# Patient Record
Sex: MALE | Race: BLACK OR AFRICAN AMERICAN | NOT HISPANIC OR LATINO | Employment: UNEMPLOYED | URBAN - METROPOLITAN AREA
[De-identification: names, ages, dates, MRNs, and addresses within clinical notes are randomized per-mention and may not be internally consistent; named-entity substitution may affect disease eponyms.]

---

## 2017-01-05 ENCOUNTER — HISTORICAL (OUTPATIENT)
Dept: RADIOLOGY | Facility: HOSPITAL | Age: 57
End: 2017-01-05

## 2017-04-03 ENCOUNTER — HISTORICAL (OUTPATIENT)
Dept: GASTROENTEROLOGY | Facility: CLINIC | Age: 57
End: 2017-04-03

## 2017-04-06 ENCOUNTER — HISTORICAL (OUTPATIENT)
Dept: GASTROENTEROLOGY | Facility: CLINIC | Age: 57
End: 2017-04-06

## 2017-07-05 ENCOUNTER — HISTORICAL (OUTPATIENT)
Dept: INTERNAL MEDICINE | Facility: CLINIC | Age: 57
End: 2017-07-05

## 2017-10-12 ENCOUNTER — HISTORICAL (OUTPATIENT)
Dept: ADMINISTRATIVE | Facility: HOSPITAL | Age: 57
End: 2017-10-12

## 2017-12-12 ENCOUNTER — HISTORICAL (OUTPATIENT)
Dept: RADIOLOGY | Facility: HOSPITAL | Age: 57
End: 2017-12-12

## 2022-04-11 ENCOUNTER — HISTORICAL (OUTPATIENT)
Dept: ADMINISTRATIVE | Facility: HOSPITAL | Age: 62
End: 2022-04-11

## 2022-04-27 VITALS
OXYGEN SATURATION: 97 % | BODY MASS INDEX: 21.37 KG/M2 | WEIGHT: 149.25 LBS | SYSTOLIC BLOOD PRESSURE: 126 MMHG | DIASTOLIC BLOOD PRESSURE: 75 MMHG | HEIGHT: 70 IN

## 2023-08-14 ENCOUNTER — HOSPITAL ENCOUNTER (EMERGENCY)
Facility: HOSPITAL | Age: 63
Discharge: HOME | End: 2023-08-14
Attending: FAMILY MEDICINE
Payer: MEDICAID

## 2023-08-14 VITALS
HEIGHT: 71 IN | TEMPERATURE: 101 F | RESPIRATION RATE: 20 BRPM | OXYGEN SATURATION: 99 % | SYSTOLIC BLOOD PRESSURE: 192 MMHG | DIASTOLIC BLOOD PRESSURE: 103 MMHG | BODY MASS INDEX: 20.3 KG/M2 | WEIGHT: 145 LBS | HEART RATE: 89 BPM

## 2023-08-14 DIAGNOSIS — I10 HYPERTENSION: ICD-10-CM

## 2023-08-14 DIAGNOSIS — U07.1 COVID-19: Primary | ICD-10-CM

## 2023-08-14 LAB
ALBUMIN SERPL-MCNC: 2.9 G/DL (ref 3.4–4.8)
ALBUMIN/GLOB SERPL: 0.6 RATIO (ref 1.1–2)
ALP SERPL-CCNC: 189 UNIT/L (ref 40–150)
ALT SERPL-CCNC: 37 UNIT/L (ref 0–55)
APPEARANCE UR: CLEAR
AST SERPL-CCNC: 76 UNIT/L (ref 5–34)
BASOPHILS # BLD AUTO: 0.02 X10(3)/MCL
BASOPHILS NFR BLD AUTO: 0.4 %
BILIRUB SERPL-MCNC: 0.7 MG/DL
BILIRUB UR QL STRIP.AUTO: NEGATIVE
BUN SERPL-MCNC: 12.2 MG/DL (ref 8.4–25.7)
CALCIUM SERPL-MCNC: 8.7 MG/DL (ref 8.8–10)
CHLORIDE SERPL-SCNC: 104 MMOL/L (ref 98–107)
CK SERPL-CCNC: 101 U/L (ref 30–200)
CO2 SERPL-SCNC: 23 MMOL/L (ref 23–31)
COLOR UR: ABNORMAL
CREAT SERPL-MCNC: 1.05 MG/DL (ref 0.73–1.18)
EOSINOPHIL # BLD AUTO: 0.01 X10(3)/MCL (ref 0–0.9)
EOSINOPHIL NFR BLD AUTO: 0.2 %
ERYTHROCYTE [DISTWIDTH] IN BLOOD BY AUTOMATED COUNT: 14.6 % (ref 11.5–17)
FLUAV AG UPPER RESP QL IA.RAPID: NOT DETECTED
FLUBV AG UPPER RESP QL IA.RAPID: NOT DETECTED
GFR SERPLBLD CREATININE-BSD FMLA CKD-EPI: >60 MLS/MIN/1.73/M2
GLOBULIN SER-MCNC: 4.7 GM/DL (ref 2.4–3.5)
GLUCOSE SERPL-MCNC: 88 MG/DL (ref 82–115)
GLUCOSE UR QL STRIP.AUTO: NEGATIVE
HCT VFR BLD AUTO: 40.8 % (ref 42–52)
HGB BLD-MCNC: 13.6 G/DL (ref 14–18)
IMM GRANULOCYTES # BLD AUTO: 0.01 X10(3)/MCL (ref 0–0.04)
IMM GRANULOCYTES NFR BLD AUTO: 0.2 %
KETONES UR QL STRIP.AUTO: NEGATIVE
LACTATE SERPL-SCNC: 1.4 MMOL/L (ref 0.5–2.2)
LEUKOCYTE ESTERASE UR QL STRIP.AUTO: NEGATIVE
LYMPHOCYTES # BLD AUTO: 1.66 X10(3)/MCL (ref 0.6–4.6)
LYMPHOCYTES NFR BLD AUTO: 31 %
MAGNESIUM SERPL-MCNC: 1.6 MG/DL (ref 1.6–2.6)
MCH RBC QN AUTO: 30.7 PG (ref 27–31)
MCHC RBC AUTO-ENTMCNC: 33.3 G/DL (ref 33–36)
MCV RBC AUTO: 92.1 FL (ref 80–94)
MONOCYTES # BLD AUTO: 1.05 X10(3)/MCL (ref 0.1–1.3)
MONOCYTES NFR BLD AUTO: 19.6 %
NEUTROPHILS # BLD AUTO: 2.6 X10(3)/MCL (ref 2.1–9.2)
NEUTROPHILS NFR BLD AUTO: 48.6 %
NITRITE UR QL STRIP.AUTO: NEGATIVE
NRBC BLD AUTO-RTO: 0 %
PH UR STRIP.AUTO: 8 [PH]
PLATELET # BLD AUTO: 127 X10(3)/MCL (ref 130–400)
PLATELETS.RETICULATED NFR BLD AUTO: 2.6 % (ref 0.9–11.2)
PMV BLD AUTO: 10.6 FL (ref 7.4–10.4)
POTASSIUM SERPL-SCNC: 4.1 MMOL/L (ref 3.5–5.1)
PROT SERPL-MCNC: 7.6 GM/DL (ref 5.8–7.6)
PROT UR QL STRIP.AUTO: NEGATIVE
RBC # BLD AUTO: 4.43 X10(6)/MCL (ref 4.7–6.1)
RBC UR QL AUTO: NEGATIVE
SARS-COV-2 RNA RESP QL NAA+PROBE: DETECTED
SODIUM SERPL-SCNC: 135 MMOL/L (ref 136–145)
SP GR UR STRIP.AUTO: 1.01
TROPONIN I SERPL-MCNC: <0.01 NG/ML (ref 0–0.04)
UROBILINOGEN UR STRIP-ACNC: >=8
WBC # SPEC AUTO: 5.35 X10(3)/MCL (ref 4.5–11.5)

## 2023-08-14 PROCEDURE — 82550 ASSAY OF CK (CPK): CPT | Performed by: FAMILY MEDICINE

## 2023-08-14 PROCEDURE — 99285 EMERGENCY DEPT VISIT HI MDM: CPT | Mod: 25

## 2023-08-14 PROCEDURE — 25000003 PHARM REV CODE 250: Performed by: FAMILY MEDICINE

## 2023-08-14 PROCEDURE — 83735 ASSAY OF MAGNESIUM: CPT | Performed by: FAMILY MEDICINE

## 2023-08-14 PROCEDURE — 0240U COVID/FLU A&B PCR: CPT | Performed by: FAMILY MEDICINE

## 2023-08-14 PROCEDURE — 80053 COMPREHEN METABOLIC PANEL: CPT | Performed by: FAMILY MEDICINE

## 2023-08-14 PROCEDURE — 81003 URINALYSIS AUTO W/O SCOPE: CPT | Performed by: FAMILY MEDICINE

## 2023-08-14 PROCEDURE — 84484 ASSAY OF TROPONIN QUANT: CPT | Performed by: FAMILY MEDICINE

## 2023-08-14 PROCEDURE — 85025 COMPLETE CBC W/AUTO DIFF WBC: CPT | Performed by: FAMILY MEDICINE

## 2023-08-14 PROCEDURE — 93005 ELECTROCARDIOGRAM TRACING: CPT

## 2023-08-14 PROCEDURE — 83605 ASSAY OF LACTIC ACID: CPT | Performed by: FAMILY MEDICINE

## 2023-08-14 PROCEDURE — 96360 HYDRATION IV INFUSION INIT: CPT

## 2023-08-14 RX ORDER — ACETAMINOPHEN 325 MG/1
650 TABLET ORAL
Status: COMPLETED | OUTPATIENT
Start: 2023-08-14 | End: 2023-08-14

## 2023-08-14 RX ADMIN — ACETAMINOPHEN 325MG 650 MG: 325 TABLET ORAL at 08:08

## 2023-08-14 RX ADMIN — SODIUM CHLORIDE 1000 ML: 9 INJECTION, SOLUTION INTRAVENOUS at 09:08

## 2023-08-15 NOTE — ED PROVIDER NOTES
"Encounter Date: 8/14/2023       History     Chief Complaint   Patient presents with    Weakness     Patient is a 62-year-old gentleman presents emergency room complaints of weakness fatigue.  Patient reports that he is "semi homeless" that has been sleeping outside for the last 7 days, reports being in the heat.  Patient reports increased weakness over today, reports feeling dehydrated.  Denies chest pain or shortness of breath.  Patient reports having a cough that developed today.  Noted to be febrile and tachycardic in triage.  Patient denies chest pain.  Denies abdominal pain nausea or vomiting.    The history is provided by the patient.     Review of patient's allergies indicates:  No Known Allergies  Past Medical History:   Diagnosis Date    Carpal tunnel syndrome     HTN (hypertension)     Sciatica      Past Surgical History:   Procedure Laterality Date    SKIN GRAFT       History reviewed. No pertinent family history.  Social History     Tobacco Use    Smoking status: Some Days     Current packs/day: 0.00     Types: Cigarettes    Smokeless tobacco: Never   Substance Use Topics    Alcohol use: Yes     Comment: daily     Review of Systems   Constitutional:  Positive for chills, fatigue and fever.   HENT:  Negative for ear pain, rhinorrhea and sore throat.    Eyes:  Negative for photophobia and pain.   Respiratory:  Negative for cough, shortness of breath and wheezing.    Cardiovascular:  Negative for chest pain.   Gastrointestinal:  Negative for abdominal pain, diarrhea, nausea and vomiting.   Genitourinary:  Negative for dysuria.   Neurological:  Positive for weakness. Negative for dizziness and headaches.   All other systems reviewed and are negative.      Physical Exam     Initial Vitals [08/14/23 2009]   BP Pulse Resp Temp SpO2   (!) 200/106 97 20 (!) 100.9 °F (38.3 °C) 98 %      MAP       --         Physical Exam    Nursing note and vitals reviewed.  Constitutional: He appears well-developed and " well-nourished.   HENT:   Head: Normocephalic and atraumatic.   Mouth/Throat: Oropharynx is clear and moist.   Eyes: EOM are normal. Pupils are equal, round, and reactive to light.   Neck: Neck supple.   Normal range of motion.  Cardiovascular:  Normal rate, regular rhythm, normal heart sounds and intact distal pulses.     Exam reveals no gallop and no friction rub.       No murmur heard.  Pulmonary/Chest: Breath sounds normal. No respiratory distress. He has no wheezes. He has no rhonchi. He has no rales.   Abdominal: Abdomen is soft. Bowel sounds are normal. He exhibits no distension. There is no abdominal tenderness.   Musculoskeletal:         General: Normal range of motion.      Cervical back: Normal range of motion and neck supple.     Neurological: He is alert and oriented to person, place, and time. He has normal strength.   Skin: Skin is warm and dry. Capillary refill takes less than 2 seconds.   Psychiatric: He has a normal mood and affect. His behavior is normal. Judgment and thought content normal.         ED Course   Procedures  Labs Reviewed   COMPREHENSIVE METABOLIC PANEL - Abnormal; Notable for the following components:       Result Value    Sodium Level 135 (*)     Calcium Level Total 8.7 (*)     Albumin Level 2.9 (*)     Globulin 4.7 (*)     Albumin/Globulin Ratio 0.6 (*)     Alkaline Phosphatase 189 (*)     Aspartate Aminotransferase 76 (*)     All other components within normal limits   COVID/FLU A&B PCR - Abnormal; Notable for the following components:    SARS-CoV-2 PCR Detected (*)     All other components within normal limits    Narrative:     The Xpert Xpress SARS-CoV-2/FLU/RSV plus is a rapid, multiplexed real-time PCR test intended for the simultaneous qualitative detection and differentiation of SARS-CoV-2, Influenza A, Influenza B, and respiratory syncytial virus (RSV) viral RNA in either nasopharyngeal swab or nasal swab specimens.         URINALYSIS, REFLEX TO URINE CULTURE - Abnormal;  Notable for the following components:    Urobilinogen, UA >=8.0 (*)     All other components within normal limits   CBC WITH DIFFERENTIAL - Abnormal; Notable for the following components:    RBC 4.43 (*)     Hgb 13.6 (*)     Hct 40.8 (*)     Platelet 127 (*)     MPV 10.6 (*)     All other components within normal limits   TROPONIN I - Normal   MAGNESIUM - Normal   LACTIC ACID, PLASMA - Normal   CK - Normal   CBC W/ AUTO DIFFERENTIAL    Narrative:     The following orders were created for panel order CBC Auto Differential.  Procedure                               Abnormality         Status                     ---------                               -----------         ------                     CBC with Differential[[id number]]        Abnormal            Final result                 Please view results for these tests on the individual orders.     EKG Readings: (Independently Interpreted)   My Independent EKG Interpretation  08/14/2023 8:30 PM  Rate: 89 bpm  Rhythm: Sinus  Axis: Normal  Intervals: Normal  ST Changes: Nonspecific  Impression: Normal sinus rhythm with nonspecific ST changes           Imaging Results              X-Ray Chest 1 View (Final result)  Result time 08/14/23 20:51:46      Final result by Que Robison MD (08/14/23 20:51:46)                   Impression:      No acute disease is seen      Electronically signed by: Que Robison MD  Date:    08/14/2023  Time:    20:51               Narrative:    EXAMINATION:  XR CHEST 1 VIEW    CLINICAL HISTORY:  fever;    TECHNIQUE:  Single frontal view of the chest was performed.    COMPARISON:  05/29/2018    FINDINGS:  No infiltrates are seen.  Heart size is within normal limits.  The costophrenic angles are clear.  There is vascular calcification noted.                                       Medications   sodium chloride 0.9% bolus 1,000 mL 1,000 mL (1,000 mLs Intravenous New Bag 8/14/23 2118)   acetaminophen tablet 650 mg (650 mg Oral Given 8/14/23 2047)      Medical Decision Making:   Initial Assessment:   62-year-old gentleman presents emergency room complaints of weakness and fatigue, reports being homeless, having significant heat exposure.  Will obtain laboratory evaluation including CBC CMP urinalysis as well as a CK level.  Patient reports cough x1 day, and has low-grade fever of 100.9-may be related to COVID-19 since we have been seeing increased frequency of this which could also be contributing to the patient's worsening fatigue.  Differential may also include community-acquired pneumonia-will obtain chest x-ray.  Patient is not short of breath saturating 98%.  Will provide IV fluids and continue to monitor.  Differential Diagnosis:   Community-acquired pneumonia, COVID-19, electrolyte abnormality, dehydration, acute kidney injury             ED Course as of 08/14/23 2140   Mon Aug 14, 2023   2121 SARS-CoV2 (COVID-19) Qualitative PCR(!): Detected  Patient noted be positive for COVID, otherwise no acute abnormality.  Stable for discharge to home. [MW]      ED Course User Index  [MW] Keven Mendoza MD                 Clinical Impression:   Final diagnoses:  [I10] Hypertension  [U07.1] COVID-19 (Primary)        ED Disposition Condition    Discharge Stable          ED Prescriptions    None       Follow-up Information       Follow up With Specialties Details Why Contact Info    [facility] - Emergency Dept Emergency Medicine  As needed, If symptoms worsen [id number]  [facility] [id number] [phone]    Primary Care Physician  In 5 days               Keven Mendoza MD  08/14/23 2140

## 2023-10-15 ENCOUNTER — HOSPITAL ENCOUNTER (INPATIENT)
Facility: HOSPITAL | Age: 63
LOS: 8 days | Discharge: HOME | DRG: 432 | End: 2023-10-23
Attending: EMERGENCY MEDICINE | Admitting: INTERNAL MEDICINE
Payer: MEDICAID

## 2023-10-15 DIAGNOSIS — K76.9 CHRONIC LIVER DISEASE: ICD-10-CM

## 2023-10-15 DIAGNOSIS — G93.41 ENCEPHALOPATHY, METABOLIC: Primary | ICD-10-CM

## 2023-10-15 DIAGNOSIS — C22.0 HCC (HEPATOCELLULAR CARCINOMA): ICD-10-CM

## 2023-10-15 DIAGNOSIS — B18.2 CHRONIC HEPATITIS C WITHOUT HEPATIC COMA: ICD-10-CM

## 2023-10-15 DIAGNOSIS — N28.9 ACUTE RENAL INSUFFICIENCY: ICD-10-CM

## 2023-10-15 DIAGNOSIS — R53.1 WEAKNESS: ICD-10-CM

## 2023-10-15 DIAGNOSIS — I10 ESSENTIAL HYPERTENSION: ICD-10-CM

## 2023-10-15 LAB
ALBUMIN SERPL-MCNC: 1.8 G/DL (ref 3.4–4.8)
ALBUMIN/GLOB SERPL: 0.3 RATIO (ref 1.1–2)
ALP SERPL-CCNC: 515 UNIT/L (ref 40–150)
ALT SERPL-CCNC: 32 UNIT/L (ref 0–55)
AST SERPL-CCNC: 83 UNIT/L (ref 5–34)
BASOPHILS # BLD AUTO: 0.04 X10(3)/MCL
BASOPHILS NFR BLD AUTO: 0.4 %
BILIRUB SERPL-MCNC: 1.7 MG/DL
BUN SERPL-MCNC: 58.4 MG/DL (ref 8.4–25.7)
CALCIUM SERPL-MCNC: 8.1 MG/DL (ref 8.8–10)
CHLORIDE SERPL-SCNC: 110 MMOL/L (ref 98–107)
CO2 SERPL-SCNC: 21 MMOL/L (ref 23–31)
CREAT SERPL-MCNC: 2.2 MG/DL (ref 0.73–1.18)
EOSINOPHIL # BLD AUTO: 0.09 X10(3)/MCL (ref 0–0.9)
EOSINOPHIL NFR BLD AUTO: 1 %
ERYTHROCYTE [DISTWIDTH] IN BLOOD BY AUTOMATED COUNT: 16 % (ref 11.5–17)
ETHANOL SERPL-MCNC: <10 MG/DL
FLUAV AG UPPER RESP QL IA.RAPID: NOT DETECTED
FLUBV AG UPPER RESP QL IA.RAPID: NOT DETECTED
GFR SERPLBLD CREATININE-BSD FMLA CKD-EPI: 33 MLS/MIN/1.73/M2
GLOBULIN SER-MCNC: 6.3 GM/DL (ref 2.4–3.5)
GLUCOSE SERPL-MCNC: 118 MG/DL (ref 82–115)
HCT VFR BLD AUTO: 39.1 % (ref 42–52)
HGB BLD-MCNC: 13.3 G/DL (ref 14–18)
IMM GRANULOCYTES # BLD AUTO: 0.05 X10(3)/MCL (ref 0–0.04)
IMM GRANULOCYTES NFR BLD AUTO: 0.6 %
LYMPHOCYTES # BLD AUTO: 1.7 X10(3)/MCL (ref 0.6–4.6)
LYMPHOCYTES NFR BLD AUTO: 19 %
MCH RBC QN AUTO: 29.2 PG (ref 27–31)
MCHC RBC AUTO-ENTMCNC: 34 G/DL (ref 33–36)
MCV RBC AUTO: 85.9 FL (ref 80–94)
MONOCYTES # BLD AUTO: 0.87 X10(3)/MCL (ref 0.1–1.3)
MONOCYTES NFR BLD AUTO: 9.7 %
NEUTROPHILS # BLD AUTO: 6.22 X10(3)/MCL (ref 2.1–9.2)
NEUTROPHILS NFR BLD AUTO: 69.3 %
NRBC BLD AUTO-RTO: 0 %
PLATELET # BLD AUTO: 305 X10(3)/MCL (ref 130–400)
PMV BLD AUTO: 10.4 FL (ref 7.4–10.4)
POTASSIUM SERPL-SCNC: 5.3 MMOL/L (ref 3.5–5.1)
PROT SERPL-MCNC: 8.1 GM/DL (ref 5.8–7.6)
RBC # BLD AUTO: 4.55 X10(6)/MCL (ref 4.7–6.1)
SARS-COV-2 RNA RESP QL NAA+PROBE: NOT DETECTED
SODIUM SERPL-SCNC: 140 MMOL/L (ref 136–145)
TROPONIN I SERPL-MCNC: <0.01 NG/ML (ref 0–0.04)
WBC # SPEC AUTO: 8.97 X10(3)/MCL (ref 4.5–11.5)

## 2023-10-15 PROCEDURE — 82077 ASSAY SPEC XCP UR&BREATH IA: CPT | Performed by: EMERGENCY MEDICINE

## 2023-10-15 PROCEDURE — 93005 ELECTROCARDIOGRAM TRACING: CPT

## 2023-10-15 PROCEDURE — 11000001 HC ACUTE MED/SURG PRIVATE ROOM

## 2023-10-15 PROCEDURE — 80307 DRUG TEST PRSMV CHEM ANLYZR: CPT | Performed by: EMERGENCY MEDICINE

## 2023-10-15 PROCEDURE — 93010 EKG 12-LEAD: ICD-10-PCS | Mod: ,,, | Performed by: STUDENT IN AN ORGANIZED HEALTH CARE EDUCATION/TRAINING PROGRAM

## 2023-10-15 PROCEDURE — 82550 ASSAY OF CK (CPK): CPT | Performed by: INTERNAL MEDICINE

## 2023-10-15 PROCEDURE — 81001 URINALYSIS AUTO W/SCOPE: CPT | Performed by: INTERNAL MEDICINE

## 2023-10-15 PROCEDURE — 93010 ELECTROCARDIOGRAM REPORT: CPT | Mod: ,,, | Performed by: STUDENT IN AN ORGANIZED HEALTH CARE EDUCATION/TRAINING PROGRAM

## 2023-10-15 PROCEDURE — 0240U COVID/FLU A&B PCR: CPT | Performed by: EMERGENCY MEDICINE

## 2023-10-15 PROCEDURE — 96360 HYDRATION IV INFUSION INIT: CPT

## 2023-10-15 PROCEDURE — 80053 COMPREHEN METABOLIC PANEL: CPT | Performed by: EMERGENCY MEDICINE

## 2023-10-15 PROCEDURE — 83690 ASSAY OF LIPASE: CPT | Performed by: INTERNAL MEDICINE

## 2023-10-15 PROCEDURE — 85025 COMPLETE CBC W/AUTO DIFF WBC: CPT | Performed by: EMERGENCY MEDICINE

## 2023-10-15 PROCEDURE — 25000003 PHARM REV CODE 250: Performed by: EMERGENCY MEDICINE

## 2023-10-15 PROCEDURE — 84484 ASSAY OF TROPONIN QUANT: CPT | Performed by: EMERGENCY MEDICINE

## 2023-10-15 RX ORDER — HYDRALAZINE HYDROCHLORIDE 20 MG/ML
20 INJECTION INTRAMUSCULAR; INTRAVENOUS
Status: COMPLETED | OUTPATIENT
Start: 2023-10-15 | End: 2023-10-16

## 2023-10-15 RX ORDER — SODIUM CHLORIDE 9 MG/ML
INJECTION, SOLUTION INTRAVENOUS CONTINUOUS
Status: DISCONTINUED | OUTPATIENT
Start: 2023-10-16 | End: 2023-10-16

## 2023-10-15 RX ADMIN — SODIUM CHLORIDE 1000 ML: 9 INJECTION, SOLUTION INTRAVENOUS at 10:10

## 2023-10-15 RX ADMIN — SODIUM CHLORIDE 1000 ML: 9 INJECTION, SOLUTION INTRAVENOUS at 11:10

## 2023-10-16 PROBLEM — N28.9 ACUTE RENAL INSUFFICIENCY: Status: ACTIVE | Noted: 2023-10-16

## 2023-10-16 LAB
ALBUMIN SERPL-MCNC: 2 G/DL (ref 3.4–4.8)
ALBUMIN/GLOB SERPL: 0.3 RATIO (ref 1.1–2)
ALP SERPL-CCNC: 540 UNIT/L (ref 40–150)
ALT SERPL-CCNC: 36 UNIT/L (ref 0–55)
AMMONIA PLAS-MSCNC: 68.8 UMOL/L (ref 18–72)
AMPHET UR QL SCN: NEGATIVE
APPEARANCE UR: CLEAR
APTT PPP: 32.6 SECONDS (ref 23.2–33.7)
AST SERPL-CCNC: 93 UNIT/L (ref 5–34)
B PERT.PT PRMT NPH QL NAA+NON-PROBE: NOT DETECTED
BACTERIA #/AREA URNS AUTO: ABNORMAL /HPF
BARBITURATE SCN PRESENT UR: NEGATIVE
BASOPHILS # BLD AUTO: 0.04 X10(3)/MCL
BASOPHILS NFR BLD AUTO: 0.4 %
BENZODIAZ UR QL SCN: NEGATIVE
BILIRUB SERPL-MCNC: 1.9 MG/DL
BILIRUB UR QL STRIP.AUTO: NEGATIVE
BUN SERPL-MCNC: 57.8 MG/DL (ref 8.4–25.7)
C PNEUM DNA NPH QL NAA+NON-PROBE: NOT DETECTED
C3 SERPL-MCNC: 42 MG/DL (ref 80–173)
C4 SERPL-MCNC: 7 MG/DL (ref 13–46)
CALCIUM SERPL-MCNC: 8.1 MG/DL (ref 8.8–10)
CANNABINOIDS UR QL SCN: NEGATIVE
CHLORIDE SERPL-SCNC: 111 MMOL/L (ref 98–107)
CK SERPL-CCNC: 21 U/L (ref 30–200)
CO2 SERPL-SCNC: 19 MMOL/L (ref 23–31)
COCAINE UR QL SCN: POSITIVE
COLOR UR AUTO: ABNORMAL
CREAT SERPL-MCNC: 2.02 MG/DL (ref 0.73–1.18)
CREAT UR-MCNC: 118 MG/DL (ref 63–166)
CREAT UR-MCNC: 118.9 MG/DL (ref 63–166)
EOSINOPHIL # BLD AUTO: 0.08 X10(3)/MCL (ref 0–0.9)
EOSINOPHIL NFR BLD AUTO: 0.7 %
ERYTHROCYTE [DISTWIDTH] IN BLOOD BY AUTOMATED COUNT: 16.2 % (ref 11.5–17)
FENTANYL UR QL SCN: NEGATIVE
GFR SERPLBLD CREATININE-BSD FMLA CKD-EPI: 37 MLS/MIN/1.73/M2
GLOBULIN SER-MCNC: 7 GM/DL (ref 2.4–3.5)
GLUCOSE SERPL-MCNC: 110 MG/DL (ref 82–115)
GLUCOSE UR QL STRIP.AUTO: NEGATIVE
HADV DNA NPH QL NAA+NON-PROBE: NOT DETECTED
HAV IGM SERPL QL IA: NONREACTIVE
HBV CORE IGM SERPL QL IA: NONREACTIVE
HBV SURFACE AG SERPL QL IA: NONREACTIVE
HCOV 229E RNA NPH QL NAA+NON-PROBE: NOT DETECTED
HCOV HKU1 RNA NPH QL NAA+NON-PROBE: NOT DETECTED
HCOV NL63 RNA NPH QL NAA+NON-PROBE: NOT DETECTED
HCOV OC43 RNA NPH QL NAA+NON-PROBE: NOT DETECTED
HCT VFR BLD AUTO: 39.9 % (ref 42–52)
HCV AB SERPL QL IA: REACTIVE
HGB BLD-MCNC: 13.4 G/DL (ref 14–18)
HIV 1+2 AB+HIV1 P24 AG SERPL QL IA: NONREACTIVE
HMPV RNA NPH QL NAA+NON-PROBE: NOT DETECTED
HPIV1 RNA NPH QL NAA+NON-PROBE: NOT DETECTED
HPIV2 RNA NPH QL NAA+NON-PROBE: NOT DETECTED
HPIV3 RNA NPH QL NAA+NON-PROBE: NOT DETECTED
HPIV4 RNA NPH QL NAA+NON-PROBE: NOT DETECTED
IMM GRANULOCYTES # BLD AUTO: 0.08 X10(3)/MCL (ref 0–0.04)
IMM GRANULOCYTES NFR BLD AUTO: 0.7 %
INR PPP: 1.2
KETONES UR QL STRIP.AUTO: NEGATIVE
LEUKOCYTE ESTERASE UR QL STRIP.AUTO: ABNORMAL
LIPASE SERPL-CCNC: 104 U/L
LYMPHOCYTES # BLD AUTO: 2.37 X10(3)/MCL (ref 0.6–4.6)
LYMPHOCYTES NFR BLD AUTO: 20.9 %
M PNEUMO DNA NPH QL NAA+NON-PROBE: NOT DETECTED
MCH RBC QN AUTO: 28.9 PG (ref 27–31)
MCHC RBC AUTO-ENTMCNC: 33.6 G/DL (ref 33–36)
MCV RBC AUTO: 86 FL (ref 80–94)
MDMA UR QL SCN: NEGATIVE
MONOCYTES # BLD AUTO: 1.14 X10(3)/MCL (ref 0.1–1.3)
MONOCYTES NFR BLD AUTO: 10 %
NEUTROPHILS # BLD AUTO: 7.65 X10(3)/MCL (ref 2.1–9.2)
NEUTROPHILS NFR BLD AUTO: 67.3 %
NITRITE UR QL STRIP.AUTO: NEGATIVE
NRBC BLD AUTO-RTO: 0 %
OPIATES UR QL SCN: NEGATIVE
OSMOLALITY UR: 495 MOSM/KG (ref 300–1300)
PCP UR QL: NEGATIVE
PH UR STRIP.AUTO: 6 [PH]
PH UR: 6 [PH] (ref 3–11)
PLATELET # BLD AUTO: 295 X10(3)/MCL (ref 130–400)
PMV BLD AUTO: 10.2 FL (ref 7.4–10.4)
POTASSIUM SERPL-SCNC: 4 MMOL/L (ref 3.5–5.1)
PROT SERPL-MCNC: 9 GM/DL (ref 5.8–7.6)
PROT UR QL STRIP.AUTO: NEGATIVE
PROT UR STRIP-MCNC: 18.9 MG/DL
PROTHROMBIN TIME: 15.2 SECONDS (ref 12.5–14.5)
RBC # BLD AUTO: 4.64 X10(6)/MCL (ref 4.7–6.1)
RBC #/AREA URNS AUTO: 11 /HPF
RBC UR QL AUTO: ABNORMAL
RSV RNA NPH QL NAA+NON-PROBE: NOT DETECTED
RV+EV RNA NPH QL NAA+NON-PROBE: NOT DETECTED
SODIUM SERPL-SCNC: 139 MMOL/L (ref 136–145)
SODIUM UR-SCNC: 37 MMOL/L
SP GR UR STRIP.AUTO: 1.02 (ref 1–1.03)
SPECIFIC GRAVITY, URINE AUTO (.000) (OHS): 1.02 (ref 1–1.03)
SQUAMOUS #/AREA URNS AUTO: <5 /HPF
URINE PROTEIN/CREATININE RATIO (OHS): 0.2
UROBILINOGEN UR STRIP-ACNC: 4
WBC # SPEC AUTO: 11.36 X10(3)/MCL (ref 4.5–11.5)
WBC #/AREA URNS AUTO: 8 /HPF

## 2023-10-16 PROCEDURE — 83935 ASSAY OF URINE OSMOLALITY: CPT | Performed by: INTERNAL MEDICINE

## 2023-10-16 PROCEDURE — 87633 RESP VIRUS 12-25 TARGETS: CPT | Performed by: INTERNAL MEDICINE

## 2023-10-16 PROCEDURE — 25000003 PHARM REV CODE 250: Performed by: INTERNAL MEDICINE

## 2023-10-16 PROCEDURE — 80053 COMPREHEN METABOLIC PANEL: CPT | Performed by: INTERNAL MEDICINE

## 2023-10-16 PROCEDURE — 85610 PROTHROMBIN TIME: CPT | Performed by: INTERNAL MEDICINE

## 2023-10-16 PROCEDURE — 25000003 PHARM REV CODE 250: Performed by: EMERGENCY MEDICINE

## 2023-10-16 PROCEDURE — 63600175 PHARM REV CODE 636 W HCPCS: Performed by: INTERNAL MEDICINE

## 2023-10-16 PROCEDURE — 84156 ASSAY OF PROTEIN URINE: CPT | Performed by: INTERNAL MEDICINE

## 2023-10-16 PROCEDURE — 63600175 PHARM REV CODE 636 W HCPCS: Performed by: EMERGENCY MEDICINE

## 2023-10-16 PROCEDURE — 87389 HIV-1 AG W/HIV-1&-2 AB AG IA: CPT | Performed by: INTERNAL MEDICINE

## 2023-10-16 PROCEDURE — 82570 ASSAY OF URINE CREATININE: CPT | Performed by: INTERNAL MEDICINE

## 2023-10-16 PROCEDURE — 85025 COMPLETE CBC W/AUTO DIFF WBC: CPT | Performed by: INTERNAL MEDICINE

## 2023-10-16 PROCEDURE — 80074 ACUTE HEPATITIS PANEL: CPT | Performed by: INTERNAL MEDICINE

## 2023-10-16 PROCEDURE — 86160 COMPLEMENT ANTIGEN: CPT | Performed by: INTERNAL MEDICINE

## 2023-10-16 PROCEDURE — 21400001 HC TELEMETRY ROOM

## 2023-10-16 PROCEDURE — 82140 ASSAY OF AMMONIA: CPT | Performed by: INTERNAL MEDICINE

## 2023-10-16 PROCEDURE — 87040 BLOOD CULTURE FOR BACTERIA: CPT | Performed by: INTERNAL MEDICINE

## 2023-10-16 PROCEDURE — 99285 EMERGENCY DEPT VISIT HI MDM: CPT | Mod: 25

## 2023-10-16 PROCEDURE — 87798 DETECT AGENT NOS DNA AMP: CPT | Performed by: INTERNAL MEDICINE

## 2023-10-16 PROCEDURE — 85730 THROMBOPLASTIN TIME PARTIAL: CPT | Performed by: INTERNAL MEDICINE

## 2023-10-16 PROCEDURE — 84300 ASSAY OF URINE SODIUM: CPT | Performed by: INTERNAL MEDICINE

## 2023-10-16 RX ORDER — FAMOTIDINE 10 MG/ML
20 INJECTION INTRAVENOUS
Status: COMPLETED | OUTPATIENT
Start: 2023-10-16 | End: 2023-10-16

## 2023-10-16 RX ORDER — HALOPERIDOL 5 MG/ML
5 INJECTION INTRAMUSCULAR EVERY 6 HOURS PRN
Status: DISCONTINUED | OUTPATIENT
Start: 2023-10-16 | End: 2023-10-16

## 2023-10-16 RX ORDER — MORPHINE SULFATE 4 MG/ML
4 INJECTION, SOLUTION INTRAMUSCULAR; INTRAVENOUS EVERY 4 HOURS PRN
Status: DISCONTINUED | OUTPATIENT
Start: 2023-10-16 | End: 2023-10-16

## 2023-10-16 RX ORDER — ONDANSETRON 2 MG/ML
4 INJECTION INTRAMUSCULAR; INTRAVENOUS EVERY 4 HOURS PRN
Status: DISCONTINUED | OUTPATIENT
Start: 2023-10-16 | End: 2023-10-23 | Stop reason: HOSPADM

## 2023-10-16 RX ORDER — ENOXAPARIN SODIUM 100 MG/ML
30 INJECTION SUBCUTANEOUS EVERY 24 HOURS
Status: DISCONTINUED | OUTPATIENT
Start: 2023-10-16 | End: 2023-10-23 | Stop reason: HOSPADM

## 2023-10-16 RX ORDER — HALOPERIDOL 5 MG/ML
2 INJECTION INTRAMUSCULAR EVERY 6 HOURS PRN
Status: DISCONTINUED | OUTPATIENT
Start: 2023-10-16 | End: 2023-10-23 | Stop reason: HOSPADM

## 2023-10-16 RX ORDER — PROMETHAZINE HYDROCHLORIDE 25 MG/ML
12.5 INJECTION, SOLUTION INTRAMUSCULAR; INTRAVENOUS EVERY 6 HOURS PRN
Status: DISCONTINUED | OUTPATIENT
Start: 2023-10-16 | End: 2023-10-23 | Stop reason: HOSPADM

## 2023-10-16 RX ORDER — ALPRAZOLAM 0.5 MG/1
0.5 TABLET ORAL ONCE
Status: COMPLETED | OUTPATIENT
Start: 2023-10-16 | End: 2023-10-16

## 2023-10-16 RX ORDER — SODIUM CHLORIDE 0.9 % (FLUSH) 0.9 %
10 SYRINGE (ML) INJECTION
Status: DISCONTINUED | OUTPATIENT
Start: 2023-10-16 | End: 2023-10-23 | Stop reason: HOSPADM

## 2023-10-16 RX ORDER — TALC
6 POWDER (GRAM) TOPICAL NIGHTLY PRN
Status: DISCONTINUED | OUTPATIENT
Start: 2023-10-16 | End: 2023-10-23 | Stop reason: HOSPADM

## 2023-10-16 RX ADMIN — ALPRAZOLAM 0.5 MG: 0.5 TABLET ORAL at 12:10

## 2023-10-16 RX ADMIN — PIPERACILLIN AND TAZOBACTAM 4.5 G: 4; .5 INJECTION, POWDER, FOR SOLUTION INTRAVENOUS at 03:10

## 2023-10-16 RX ADMIN — AZITHROMYCIN MONOHYDRATE 500 MG: 500 INJECTION, POWDER, LYOPHILIZED, FOR SOLUTION INTRAVENOUS at 06:10

## 2023-10-16 RX ADMIN — ONDANSETRON 4 MG: 2 INJECTION INTRAMUSCULAR; INTRAVENOUS at 12:10

## 2023-10-16 RX ADMIN — FOLIC ACID: 5 INJECTION, SOLUTION INTRAMUSCULAR; INTRAVENOUS; SUBCUTANEOUS at 05:10

## 2023-10-16 RX ADMIN — SODIUM CHLORIDE: 9 INJECTION, SOLUTION INTRAVENOUS at 01:10

## 2023-10-16 RX ADMIN — HALOPERIDOL LACTATE 2 MG: 5 INJECTION, SOLUTION INTRAMUSCULAR at 12:10

## 2023-10-16 RX ADMIN — FAMOTIDINE 20 MG: 10 INJECTION, SOLUTION INTRAVENOUS at 12:10

## 2023-10-16 RX ADMIN — ENOXAPARIN SODIUM 30 MG: 30 INJECTION SUBCUTANEOUS at 05:10

## 2023-10-16 RX ADMIN — MORPHINE SULFATE 4 MG: 4 INJECTION, SOLUTION INTRAMUSCULAR; INTRAVENOUS at 12:10

## 2023-10-16 RX ADMIN — PIPERACILLIN AND TAZOBACTAM 4.5 G: 4; .5 INJECTION, POWDER, FOR SOLUTION INTRAVENOUS at 07:10

## 2023-10-16 RX ADMIN — HYDRALAZINE HYDROCHLORIDE 20 MG: 20 INJECTION INTRAMUSCULAR; INTRAVENOUS at 12:10

## 2023-10-16 NOTE — CLINICAL REVIEW
Acute Met   Last Updated by Ny Chavez MD on 10/16/2023 1234     Review Status Created By   Completed Ny Chavez MD      Criteria Review   REVIEW SUMMARY     InterQual® Review Status: Completed  Criteria Status: Acute Met  Day of review: Episode Day 1  Condition Specific: Yes        REVIEW OUTCOMES     Primary Outcome: Referred for Secondary  Date/Time: 10/16/2023 1233  Reviewer: Ny Chavez     Comments:  62-year-old male admitted on 10/15/2023 with encephalopathy.  Found to have an acute kidney injury with a creatinine of 2.2 with a baseline being 0.6 to 1.1.  The patient was acidotic.  Admitted under hospitalist services.  Started on IV fluids also found to have hyperbilirubinemia.  CT scan of the abdomen was performed this demonstrated cirrhosis with patent portal vein, moderate ascites.  GI consulted.  Found to have a possible pneumonia and was started on broad-spectrum anti-infective therapy.  On hospital day 2., the patient was encephalopathic.  Paracentesis has been ordered.  First-time new onset ascites for which a paracentesis has been ordered.  Remains encephalopathic.  Would pursue IP LOC  Added by: Ny Chavez on 10/16/2023 1233        62-year-old male admitted on 10/15/2023 with encephalopathy.  Found to have an acute kidney injury with a creatinine of 2.2 with a baseline being 0.6 to 1.1.  The patient was acidotic.  Admitted under hospitalist services.  Started on IV fluids also found to have hyperbilirubinemia.  CT scan of the abdomen was performed this demonstrated cirrhosis with patent portal vein, moderate ascites.  GI consulted.  Found to have a possible pneumonia and was started on broad-spectrum anti-infective therapy.  On hospital day 2., the patient was encephalopathic.  Paracentesis has been ordered.  First-time new onset ascites for which a paracentesis has been ordered.  Remains encephalopathic.  Would pursue IP LOC     Ny Chavez MD  Added by:  Ny Chavez on 10/16/2023 1234        REVIEW DETAILS     Service Date: 10/16/2023  Product: LOC:Acute Adult  Subset: General Medical            [X] Select Day, One:          [X] Episode Day 1, One:              [X] ACUTE, >= One:                  [X] Gastrointestinal or biliary, One:                      [X] Other gastrointestinal diagnosis, actual or suspected, >= One:                          [X] Ascites <= 7d, Both:                              [X] Finding, One:                                  [X] Initial episode                              [X] Intervention, >= One:                                  [X] Therapeutic paracentesis        Version: InterQual® 2023, Oct. 2023 Release  InterQual® criteria (IQ) is confidential and proprietary information and is being provided to you solely as it pertains to the information requested. IQ may contain advanced clinical knowledge which we recommend you discuss with your physician upon disclosure to you. Use permitted by and subject to license with [employer] and/or one of its subsidiaries. IQ reflects clinical interpretations and analyses and cannot alone either (a) resolve medical ambiguities of particular situations; or (b) provide the sole basis for definitive decisions. IQ is intended solely for use as screening guidelines with respect to medical appropriateness of healthcare services. All ultimate care decisions are strictly and solely the obligation and responsibility of your health care provider. © 2023 [employer] and/or one of its subsidiaries. All Rights Reserved. CPT® only © 3879-9341 [employer]. All Rights Reserved.

## 2023-10-16 NOTE — CONSULTS
Consult Note    Reason for Consult:      We were consulted by Dr. New to evaluate this patient for cirrhosis, ascites.    HPI:     61 yo AAM unknown to our group with a pmhx of HTN.  Patient was apparently brought to the ED after his uncle whom he lives with was concerned about his behavior, reportedly talking to himself.    On presentation, hypertension 160/96 otherwise VSS.  Labs notable for SOCORRO with 58.4/Cr 2.2 and Tbili 1.8, AST 83, ALT 32, alk phos 515, plts normal.  UDS+ cocaine. Etoh <10. INR 1.2 and ammonia normal. Hepatitis panel collected and pending.  CT abd/pelv without contrast noted small right pleural effusion, right basilar consolidation with ground-glass opacities in the left lower lobe possibly reflecting infectious process, nodular shrunken liver consistent with cirrhosis and pronounced ascites, multiple calculi within distended gallbladder.  US abdomen complete noted nodular liver contour, moderate ascites, distended gallbladder with multiple stones, patent portal vein.  Patient was admitted and started on Zosyn and azithromycin to cover for intra-abdominal infection as well as pneumonia.  There are plans for ultrasound-guided paracentesis with fluid studies, as well as HIDA scan.  GI was consulted.    Patient currently somnolent and difficult to arouse despite multiple attempts.  He was given Haldol and morphine overnight.  Apparently, he was complaining of abdominal pain and being loud/disruptive.  According to nursing staff there was no reports of confusion or hallucinations. Admits to remote IV drug abuse.  Admits to alcohol use on occasion; used to drink daily but reports it has been some time.  He will not clarify.     Previous records reviewed.    RUQ US in 2017 with nodular liver contour.    He does have mild elevated LFTs to similar degree dating back to 2017.    Renal function was normal in August of this year on last labs available for review.  And no history of renal insufficiency  on labs dating back to 2017.    PCP:  No, Primary Doctor    Review of patient's allergies indicates:  No Known Allergies     Current Facility-Administered Medications   Medication Dose Route Frequency Provider Last Rate Last Admin    azithromycin 500 mg in dextrose 5 % 250 mL IVPB (ready to mix)  500 mg Intravenous Q24H Mukesh New MD   Stopped at 10/16/23 0702    enoxaparin injection 30 mg  30 mg Subcutaneous Daily Mukesh New MD        haloperidol lactate injection 2 mg  2 mg Intramuscular Q6H PRN Mukesh New MD   2 mg at 10/16/23 0049    melatonin tablet 6 mg  6 mg Oral Nightly PRN Mukesh New MD        morphine injection 4 mg  4 mg Intravenous Q4H PRN Mukesh New MD   4 mg at 10/16/23 0054    ondansetron injection 4 mg  4 mg Intravenous Q4H PRN Mukesh New MD   4 mg at 10/16/23 0054    piperacillin-tazobactam (ZOSYN) 4.5 g in dextrose 5 % in water (D5W) 100 mL IVPB (MB+)  4.5 g Intravenous Q8H Mukesh New MD 25 mL/hr at 10/16/23 0736 4.5 g at 10/16/23 0736    promethazine injection 12.5 mg  12.5 mg Intramuscular Q6H PRN Mukesh New MD        sodium chloride 0.9% flush 10 mL  10 mL Intravenous PRN Mukesh New MD         No current outpatient medications on file.       Past Medical History:  Past Medical History:   Diagnosis Date    Carpal tunnel syndrome     HTN (hypertension)     Sciatica       Past Surgical History:  Past Surgical History:   Procedure Laterality Date    SKIN GRAFT        Family History:  History reviewed. No pertinent family history.  Social History:  Social History     Tobacco Use    Smoking status: Some Days     Types: Cigarettes    Smokeless tobacco: Never   Substance Use Topics    Alcohol use: Yes     Comment: daily       Review of Systems:     Review of Systems   Unable to perform ROS: Other   Somnolent from haldol and morphine administration    Objective:     VITAL SIGNS: 24 HR MIN & MAX  LAST    Temp  Min: 97.8 °F (36.6 °C)  Max: 98.1 °F (36.7 °C)  97.8 °F (36.6 °C)        BP  Min: 105/51  Max: 201/97  (!) 153/82     Pulse  Min: 59  Max: 89  62     Resp  Min: 8  Max: 18  12    SpO2  Min: 94 %  Max: 100 %  98 %        Intake/Output Summary (Last 24 hours) at 10/16/2023 0894  Last data filed at 10/16/2023 0507  Gross per 24 hour   Intake 514.58 ml   Output --   Net 514.58 ml       Physical Exam  Constitutional:       General: He is not in acute distress.     Appearance: He is not ill-appearing.   HENT:      Head: Normocephalic and atraumatic.   Eyes:      General: No scleral icterus.  Cardiovascular:      Rate and Rhythm: Normal rate and regular rhythm.   Pulmonary:      Effort: Pulmonary effort is normal. No respiratory distress.   Abdominal:      General: Bowel sounds are normal. There is distension.      Palpations: There is no mass.      Tenderness: There is no abdominal tenderness. There is no guarding or rebound.      Comments: Somewhat firm but not tense.    Musculoskeletal:      Right lower leg: No edema.      Left lower leg: No edema.   Skin:     General: Skin is warm and dry.      Coloration: Skin is not jaundiced.   Neurological:      Mental Status: He is lethargic.      Comments: No asterixis otherwise difficult to assess.   Psychiatric:      Comments: Unable to assess.           Recent Results (from the past 48 hour(s))   Comprehensive metabolic panel    Collection Time: 10/15/23 10:38 PM   Result Value Ref Range    Sodium Level 140 136 - 145 mmol/L    Potassium Level 5.3 (H) 3.5 - 5.1 mmol/L    Chloride 110 (H) 98 - 107 mmol/L    Carbon Dioxide 21 (L) 23 - 31 mmol/L    Glucose Level 118 (H) 82 - 115 mg/dL    Blood Urea Nitrogen 58.4 (H) 8.4 - 25.7 mg/dL    Creatinine 2.20 (H) 0.73 - 1.18 mg/dL    Calcium Level Total 8.1 (L) 8.8 - 10.0 mg/dL    Protein Total 8.1 (H) 5.8 - 7.6 gm/dL    Albumin Level 1.8 (L) 3.4 - 4.8 g/dL    Globulin 6.3 (H) 2.4 - 3.5 gm/dL    Albumin/Globulin Ratio 0.3  (L) 1.1 - 2.0 ratio    Bilirubin Total 1.7 (H) <=1.5 mg/dL    Alkaline Phosphatase 515 (H) 40 - 150 unit/L    Alanine Aminotransferase 32 0 - 55 unit/L    Aspartate Aminotransferase 83 (H) 5 - 34 unit/L    eGFR 33 mls/min/1.73/m2   Ethanol    Collection Time: 10/15/23 10:38 PM   Result Value Ref Range    Ethanol Level <10.0 <=10.0 mg/dL   Troponin I    Collection Time: 10/15/23 10:38 PM   Result Value Ref Range    Troponin-I <0.010 0.000 - 0.045 ng/mL   CBC with Differential    Collection Time: 10/15/23 10:38 PM   Result Value Ref Range    WBC 8.97 4.50 - 11.50 x10(3)/mcL    RBC 4.55 (L) 4.70 - 6.10 x10(6)/mcL    Hgb 13.3 (L) 14.0 - 18.0 g/dL    Hct 39.1 (L) 42.0 - 52.0 %    MCV 85.9 80.0 - 94.0 fL    MCH 29.2 27.0 - 31.0 pg    MCHC 34.0 33.0 - 36.0 g/dL    RDW 16.0 11.5 - 17.0 %    Platelet 305 130 - 400 x10(3)/mcL    MPV 10.4 7.4 - 10.4 fL    Neut % 69.3 %    Lymph % 19.0 %    Mono % 9.7 %    Eos % 1.0 %    Basophil % 0.4 %    Lymph # 1.70 0.6 - 4.6 x10(3)/mcL    Neut # 6.22 2.1 - 9.2 x10(3)/mcL    Mono # 0.87 0.1 - 1.3 x10(3)/mcL    Eos # 0.09 0 - 0.9 x10(3)/mcL    Baso # 0.04 <=0.2 x10(3)/mcL    IG# 0.05 (H) 0 - 0.04 x10(3)/mcL    IG% 0.6 %    NRBC% 0.0 %   Lipase    Collection Time: 10/15/23 10:38 PM   Result Value Ref Range    Lipase Level 104 (H) <=60 U/L   CK    Collection Time: 10/15/23 10:38 PM   Result Value Ref Range    Creatine Kinase 21 (L) 30 - 200 U/L   COVID/FLU A&B PCR    Collection Time: 10/15/23 10:39 PM   Result Value Ref Range    Influenza A PCR Not Detected Not Detected    Influenza B PCR Not Detected Not Detected    SARS-CoV-2 PCR Not Detected Not Detected, Negative, Invalid   Drug Screen, Urine    Collection Time: 10/15/23 11:41 PM   Result Value Ref Range    Amphetamines, Urine Negative Negative    Barbituates, Urine Negative Negative    Benzodiazepine, Urine Negative Negative    Cannabinoids, Urine Negative Negative    Cocaine, Urine Positive (A) Negative    Fentanyl, Urine Negative  Negative    MDMA, Urine Negative Negative    Opiates, Urine Negative Negative    Phencyclidine, Urine Negative Negative    pH, Urine 6.0 3.0 - 11.0    Specific Gravity, Urine Auto 1.016 1.001 - 1.035   Urinalysis, Reflex to Urine Culture    Collection Time: 10/15/23 11:41 PM    Specimen: Urine, Clean Catch   Result Value Ref Range    Color, UA Dark Yellow Yellow, Light-Yellow, Dark Yellow, Carline, Straw    Appearance, UA Clear Clear    Specific Gravity, UA 1.016 1.005 - 1.030    pH, UA 6.0 5.0 - 8.5    Protein, UA Negative Negative    Glucose, UA Negative Negative, Normal    Ketones, UA Negative Negative    Blood, UA 3+ (A) Negative    Bilirubin, UA Negative Negative    Urobilinogen, UA 4.0 (A) 0.2, 1.0, Normal    Nitrites, UA Negative Negative    Leukocyte Esterase, UA Trace (A) Negative   Urinalysis, Microscopic    Collection Time: 10/15/23 11:41 PM   Result Value Ref Range    RBC, UA 11 (H) <=5 /HPF    WBC, UA 8 (H) <=5 /HPF    Squamous Epithelial Cells, UA <5 <=5 /HPF    Bacteria, UA None Seen None Seen, Rare, Occasional /HPF   Protein/Creatinine Ratio, Urine    Collection Time: 10/16/23 12:02 AM   Result Value Ref Range    Urine Protein Level 18.9 mg/dL    Urine Creatinine 118.9 63.0 - 166.0 mg/dL    Urine Protein/Creatinine Ratio 0.2    Osmolality, Urine    Collection Time: 10/16/23 12:02 AM   Result Value Ref Range    Urine Osmolality 495 300 - 1,300 mOsm/kg   Creatinine, Random Urine    Collection Time: 10/16/23 12:02 AM   Result Value Ref Range    Urine Creatinine 118.0 63.0 - 166.0 mg/dL   Sodium, Random Urine    Collection Time: 10/16/23 12:02 AM   Result Value Ref Range    Urine Sodium 37.0 mmol/L   C3 Complement    Collection Time: 10/16/23  2:25 AM   Result Value Ref Range    C3 Complement 42 (L) 80 - 173 mg/dL   C4 Complement    Collection Time: 10/16/23  2:25 AM   Result Value Ref Range    C4 Complement 7.0 (L) 13.0 - 46.0 mg/dL   HIV 1/2 Ag/Ab (4th Gen)    Collection Time: 10/16/23  2:25 AM   Result  Value Ref Range    HIV Nonreactive Nonreactive   APTT    Collection Time: 10/16/23  2:25 AM   Result Value Ref Range    PTT 32.6 23.2 - 33.7 seconds   Protime-INR    Collection Time: 10/16/23  2:25 AM   Result Value Ref Range    PT 15.2 (H) 12.5 - 14.5 seconds    INR 1.2 <=1.3   Comprehensive metabolic panel    Collection Time: 10/16/23  2:25 AM   Result Value Ref Range    Sodium Level 139 136 - 145 mmol/L    Potassium Level 4.0 3.5 - 5.1 mmol/L    Chloride 111 (H) 98 - 107 mmol/L    Carbon Dioxide 19 (L) 23 - 31 mmol/L    Glucose Level 110 82 - 115 mg/dL    Blood Urea Nitrogen 57.8 (H) 8.4 - 25.7 mg/dL    Creatinine 2.02 (H) 0.73 - 1.18 mg/dL    Calcium Level Total 8.1 (L) 8.8 - 10.0 mg/dL    Protein Total 9.0 (H) 5.8 - 7.6 gm/dL    Albumin Level 2.0 (L) 3.4 - 4.8 g/dL    Globulin 7.0 (H) 2.4 - 3.5 gm/dL    Albumin/Globulin Ratio 0.3 (L) 1.1 - 2.0 ratio    Bilirubin Total 1.9 (H) <=1.5 mg/dL    Alkaline Phosphatase 540 (H) 40 - 150 unit/L    Alanine Aminotransferase 36 0 - 55 unit/L    Aspartate Aminotransferase 93 (H) 5 - 34 unit/L    eGFR 37 mls/min/1.73/m2   CBC with Differential    Collection Time: 10/16/23  2:25 AM   Result Value Ref Range    WBC 11.36 4.50 - 11.50 x10(3)/mcL    RBC 4.64 (L) 4.70 - 6.10 x10(6)/mcL    Hgb 13.4 (L) 14.0 - 18.0 g/dL    Hct 39.9 (L) 42.0 - 52.0 %    MCV 86.0 80.0 - 94.0 fL    MCH 28.9 27.0 - 31.0 pg    MCHC 33.6 33.0 - 36.0 g/dL    RDW 16.2 11.5 - 17.0 %    Platelet 295 130 - 400 x10(3)/mcL    MPV 10.2 7.4 - 10.4 fL    Neut % 67.3 %    Lymph % 20.9 %    Mono % 10.0 %    Eos % 0.7 %    Basophil % 0.4 %    Lymph # 2.37 0.6 - 4.6 x10(3)/mcL    Neut # 7.65 2.1 - 9.2 x10(3)/mcL    Mono # 1.14 0.1 - 1.3 x10(3)/mcL    Eos # 0.08 0 - 0.9 x10(3)/mcL    Baso # 0.04 <=0.2 x10(3)/mcL    IG# 0.08 (H) 0 - 0.04 x10(3)/mcL    IG% 0.7 %    NRBC% 0.0 %   Ammonia    Collection Time: 10/16/23  5:49 AM   Result Value Ref Range    Ammonia Level 68.8 18.0 - 72.0 umol/L   Respiratory Panel    Collection  Time: 10/16/23  6:14 AM   Result Value Ref Range    Adenovirus Not Detected Not Detected    Coronavirus 229E Not Detected Not Detected    Coronavirus HKU1 Not Detected Not Detected    Coronavirus NL63 Not Detected Not Detected    Coronavirus OC43 PCR, Common Cold Virus Not Detected Not Detected    Human Metapneumovirus Not Detected Not Detected    Parainfluenza Virus 1 Not Detected Not Detected    Parainfluenza Virus 2 Not Detected Not Detected    Parainfluenza Virus 3 Not Detected Not Detected    Parainfluenza Virus 4 Not Detected Not Detected    Bordetella pertussis (ptxP) Not Detected Not Detected    Chlamydia pneumoniae Not Detected Not Detected    Mycoplasma pneumoniae Not Detected Not Detected    Human Rhinovirus/Enterovirus Not Detected Not Detected    Bordetella parapertussis (DS2654) Not Detected Not Detected       CT Abdomen Pelvis  Without Contrast    Result Date: 10/16/2023  Technique:CT of the abdomen and pelvis was performed with axial images as well as sagittal and coronal reconstruction images without intravenous contrast. Comparison:None available. Clinical History:Abdominal pain. Dosage Information:Automated Exposure Control was utilized. Findings: Thorax: Lungs:There is a small right pleural effusion. There is right basilar consolidation with ground-glass opacities in the left lower lobe, which may reflect an infectious process. Heart:The heart size is within normal limits. Abdomen: Abdominal Wall:No abdominal wall pathology is seen. Liver:The margins of the liver are nodular and the liver is somewhat shrunken. Within limitation noncontrast technique, no acute findings the liver, pancreas and spleen identified. Biliary System:No intrahepatic or extrahepatic biliary duct dilatation is seen. Gallbladder:Multiple calculi are noted within a mildly distended gallbladder. The gallbladder otherwise can not be definitively evaluated as there is pronounced ascites throughout the abdomen and pelvis such  that pericholecystic fluid and inflammatory change and even wall thickening is not accurately identifiable. Adrenals:The adrenal glands appear unremarkable. Kidneys:The right kidney appears unremarkable with no stones cysts masses or hydronephrosis. A single stone measuring 9 mm is seen on series 2, image 64 in the mid pole of the left kidney. Aorta:There is mild calcification of the abdominal aorta and its branches. Bowel: Esophagus:The visualized esophagus appears unremarkable. Stomach:The stomach appears unremarkable. Duodenum:Unremarkable appearing duodenum. Small Bowel:The small bowel appears unremarkable. Colon:Nondistended. Appendix:The appendix appears unremarkable (series 2, image 117). Peritoneum:No free intraperitoneal air is seen. There is pronounced ascites consistent with cirrhosis. Pelvis: Bladder:The bladder appears unremarkable. Male: Prostate gland:The prostate gland appears unremarkable. Bony structures: Dorsal Spine:There is mild spondylosis of the visualized dorsal spine. Bony Pelvis:The visualized bony structures of the pelvis appear unremarkable.     Impression: 1. There is a small right pleural effusion. There is right basilar consolidation with ground-glass opacities in the left lower lobe, which may reflect an infectious process. Correlate clinically as regards further evaluation and followup. 2. The margins of the liver are nodular and the liver is somewhat shrunken. These findings are consistent with cirrhosis. Correlate with clinical and laboratory findings. 3. Multiple calculi are noted within a mildly distended gallbladder. The gallbladder otherwise can not be definitively evaluated as there is pronounced ascites throughout the abdomen and pelvis such that pericholecystic fluid and inflammatory change and even wall thickening is not accurately identifiable. Correlate with clinical and laboratory findings as regards additional evaluation and follow-up of the gallbladder. 4. There is  pronounced ascites consistent with cirrhosis. 5. Details and other findings as discussed above. No significant discrepancy with overnight report. Electronically signed by: Mat Elizaldeahim Date:    10/16/2023 Time:    08:22    US Abdomen Complete    Result Date: 10/16/2023  START OF REPORT: Technique: Complete ultrasound of the abdomen was performed. Comparison: Correlated with CT abdomen and pelvis study done today at2023-10-16 03:39:34. Clinical history: Image count mismatch, please fix -- ed 7; abdominal pain. Findings: There is moderate ascites, also seen on the CT examination. Liver: The liver measures 17 cm. The liver is nodular in contour on the submitted images. Similar findings are also noted on the CT study and are consistent with cirrhosis. Biliary ducts: The common bile duct is within normal limits at 4 mm in diameter. Gallbladder: The gallbladder is distended and demonstrates multiple mobile stones. The gallbladder wall is edematous (5.6 mm). These findings may be secondary to underlying liver disease. Pancreas: The pancreas appears slightly heterogeneous and suboptimally visualized due to bowel gas. Spleen: Unremarkable appearing spleen. The spleen measures 9.7 cm in greatest dimension. There is a 2.5 x 2.1 x 2.4 cm splenule, also appreciated on the CT examination. Aorta: The aorta is within normal limits and measures 2 cm, 1.7 cm and 1.1 cm in the proximal, mid and distal segments, respectively. IVC: The IVC is within normal limits. Portal vein: Flow parameters are within normal limits with hepatopetal flow. Kidneys: Right kidney: Tiny echogenic foci are seen in the right kidney, which may reflect artifact. No right renal calculus is identified on the CT examination. The right kidney otherwise appears unremarkable. Dimensions: the right kidney measures X by X by X centimeters Sagittal: The right kidney measures 10.7 x 5.3 x 4.6 cm. Left kidney: Multiple echogenic foci are seen in the left kidney, the  largest measures 5 x 3 x 5 mm in the mid pole. The CT examination demonstrates a 9 mm nonobstructing left renal calculus in the mid pole. The left kidney otherwise appears unremarkable. Dimensions: The left kidney measures 13.2 x 5.4 x 5.1 cm. Pelvis: Bladder: Unremarkable. Impression: 1. The liver is nodular in contour on the submitted images. Similar findings are also noted on the CT study and are consistent with cirrhosis. 2. There is moderate ascites, also seen on the CT examination. 3. The gallbladder is distended and demonstrates multiple mobile stones. The gallbladder wall is edematous (5.6 mm). These findings may be secondary to underlying liver disease. The possibility of cholecystitis cannot be excluded however the sonographic Chang's sign is negative. Follow-up as clinically indicated. 4. Details and other findings as above.     Assessment / Plan:     61 yo AAM unknown to our group with a pmhx of HTN.  Patient was apparently brought to the ED after his uncle whom he lives with was concerned about his behavior, reportedly talking to himself. Found to have SOCORRO and imaging concerning for cirrhosis and ascites for which GI was consulted.     Cirrhosis - apparent new dx   MELD 3.0: 19 at 10/16/2023  Etiology: ? Will discuss etoh use with pt when alert. F/U hepatitis panel.   Ascites: See below  Screening for varices: none prior.   Encephalopathy: Ammonia normal.  No report of confusion on presentation but was given haldol.  Will monitor.   HCC Screen: imaging this admit without lesion. Check AFP.     2.  Ascites  - agree with paracentesis and fluid studies.   - renal has been consulted for SOCORRO.  Will defer diuresis to them.       Thank you for allowing us to participate in this patient's care.

## 2023-10-16 NOTE — PROGRESS NOTES
[facility]  Hospital Medicine Progress Note      CHIEF COMPLAINT   Weakness      HISTORY OF PRESENT ILLNESS:   Patient is a 62-year-old male with a history of hypertension (report not compliant with his meds for some time) who was brought to the ER after his uncle with whom he lives was concerned about his behavior.  He was reportedly talking to himself though patient denies this was the case.  He does endorse that he has been using cocaine, in his concern now he does not have a place to live.  He states he is not been feeling normal over last few weeks but will not go into further detail as he is excessively crying at bedside.  He denies suicidal homicidal ideations.  He states he would just like a home to have to visit his grandchildren.     His workup in the ER was significant for acute kidney injury as well as mild hyperbilirubinemia, transaminitis.  He was started on IV fluids admitted to the hospitalist service for further management        Patient admitted for new onset decompensated cirrhosis with ascites requiring paracentesis.      Today's information   Patient seen and examined at bedside  Patient is very drowsy from medications received unable to answer questions or follow commands  Labs reviewed with creatinine of 2, hemoglobin of 13.4, CO2 19, bilirubin 1.9, , AST 93 and normal ALT  C3 and C4 levels are low  Patient is slated for IR guided paracentesis  Nephrology has been consulted for acute kidney injury follow-up with [facility]   GI on board for decompensated cirrhosis, follow-up with [facility]   HIDA scan is negative for acute cholecystitis   Patient started on antibiotics for possible pneumonia       exam  GENERAL: awake, alert, oriented and in no acute distress, non-toxic appearing   HEENT: normocephalic atraumatic   NECK: supple   LUNGS: Clear bilaterally, no wheezing or rales, no accessory muscle use   CVS: Regular rate and rhythm, normal peripheral perfusion  ABD:  Soft, diffuse tenderness, mildly distended  EXTREMITIES: no clubbing or cyanosis  SKIN: Warm, dry.   NEURO: alert and oriented, grossly without focal deficits   PSYCHIATRIC: Cooperative       ASSESSMENT & PLAN:   New onset decompensated liver cirrhosis with ascites   Hepatitis C positive   Hyperbilirubinemia/transaminitis, abdominal pain   Acute kidney injury (suspect underlying CKD)   Possible CAP   Essential HTN, improving   Crack cocaine abuse by inhalation  Reported homelessness       Plan  Patient is very drowsy from medications received unable to answer questions or follow commands  Patient is slated for IR guided paracentesis  Hep C Ab positive - will need more information if treated or not  GI on board for decompensated cirrhosis, follow-up with recs   Nephrology has been consulted for acute kidney injury follow-up with recs   HIDA scan is negative for acute cholecystitis   Patient started on antibiotics for possible pneumonia , low threshold to discontinue antibiotics   encourage crack cocaine cessation   case management consultation, psych consult       DVT prophylaxis: lovenox  Code status: full      Dispo- TBD     VITAL SIGNS: 24 HRS MIN & MAX LAST   Temp  Min: 97.2 °F (36.2 °C)  Max: 98.1 °F (36.7 °C) 97.2 °F (36.2 °C)   BP  Min: 105/51  Max: 201/97 (!) 168/84   Pulse  Min: 56  Max: 89  (!) 56   Resp  Min: 8  Max: 18 10   SpO2  Min: 94 %  Max: 100 % 97 %     I have reviewed the following labs:  Recent Labs   Lab 10/15/23  2238 10/16/23  0225   WBC 8.97 11.36   RBC 4.55* 4.64*   HGB 13.3* 13.4*   HCT 39.1* 39.9*   MCV 85.9 86.0   MCH 29.2 28.9   MCHC 34.0 33.6   RDW 16.0 16.2    295   MPV 10.4 10.2     Recent Labs   Lab 10/15/23  2238 10/16/23  0225    139   K 5.3* 4.0   CO2 21* 19*   BUN 58.4* 57.8*   CREATININE 2.20* 2.02*   CALCIUM 8.1* 8.1*   ALBUMIN 1.8* 2.0*   ALKPHOS 515* 540*   ALT 32 36   AST 83* 93*   BILITOT 1.7* 1.9*     Microbiology Results (last 7 days)       Procedure  Component Value Units Date/Time    Blood Culture [[id number]] Collected: 10/16/23 0549    Order Status: Resulted Specimen: Blood, Venous Updated: 10/16/23 0752    Blood Culture [[id number]] Collected: 10/16/23 0549    Order Status: Resulted Specimen: Blood, Venous Updated: 10/16/23 0752    Body Fluid Culture [[id number]]     Order Status: Sent Specimen: Peritoneal Fluid from [facility]              See below for Radiology    Scheduled Med:   azithromycin  500 mg Intravenous Q24H    enoxparin  30 mg Subcutaneous Daily    piperacillin-tazobactam (Zosyn) IV (PEDS and ADULTS) (extended infusion is not appropriate)  4.5 g Intravenous Q8H      Continuous Infusions:     PRN Meds:  haloperidol lactate, melatonin, morphine, ondansetron, promethazine, sodium chloride 0.9%     Assessment/Plan:      VTE prophylaxis:     Patient condition:  Stable/Fair/Guarded/ Serious/ Critical    Anticipated discharge and Disposition:         All diagnosis and differential diagnosis have been reviewed; assessment and plan has been documented; I have personally reviewed the labs and test results that are presently available; I have reviewed the patients medication list; I have reviewed the consulting providers response and recommendations. I have reviewed or attempted to review medical records based upon their availability    All of the patient's questions have been  addressed and answered. Patient's is agreeable to the above stated plan. I will continue to monitor closely and make adjustments to medical management as needed.  _____________________________________________________________________    Nutrition Status:    Radiology:  I have personally reviewed the following imaging and agree with the radiologist.     NM Hepatobiliary Scan (HIDA)  Narrative: EXAMINATION:  NM HEPATOBILIARY IMAGING INC GB (HIDA)    CLINICAL HISTORY:  Cholecystitis;    TECHNIQUE:  8.2 mCi of intravenous Choletec was administered followed by focused gamma camera  imaging of the abdomen.    COMPARISON:  CT abdomen pelvis October 16, 2023    FINDINGS:  There is heterogeneous diminished radioisotope uptake by the liver. Central biliary activity identified with gallbladder filling by 60 minutes. There is progressive gallbladder filling over 3 hours of scintigraphy.  Small bowel activity is identified by 45 minutes progressing through 4 hours of imaging.  Impression: Negative for acute cholecystitis.    Electronically signed by: Mat Sommers  Date:    10/16/2023  Time:    14:15  US Abdomen Complete  Narrative: EXAMINATION:  US ABDOMEN COMPLETE    CLINICAL HISTORY:  abdominal pain;    COMPARISON:  Concurrent CT.    FINDINGS:  Grayscale, color and spectral Doppler evaluation of the abdomen.    No focal abnormality of the limited visualized pancreas.    Visualized portions of the aorta and inferior vena cava are normal in caliber.    Cirrhotic liver with no focal suspicious liver lesions seen.  Normal hepatopetal flow is noted in the portal vein.    There are gallstones.  There is some nonspecific gallbladder wall thickening.  The common bile duct is normal in caliber  and measures 4 mm.    The right kidney measures 11 cm, while the left measures 13 cm.  No hydronephrosis.  Left renal calculus better demonstrated on CT.    The spleen is normal in size and echogenicity  and measures 10 cm.    Urinary bladder unremarkable.    There is moderate ascites.  Impression: 1. Cirrhosis with patent portal vein.  2. Moderate ascites.  3. Cholelithiasis with nonspecific gallbladder wall thickening.  No biliary ductal dilatation.    Electronically signed by: Andriy Laws  Date:    10/16/2023  Time:    10:20  US Retroperitoneal Complete  Narrative: EXAMINATION:  US RETROPERITONEAL COMPLETE    CLINICAL HISTORY:  elevated BUN/Cr;    COMPARISON:  CT earlier today    FINDINGS:  Grayscale and color Doppler sonographic evaluation of the kidneys and urinary bladder.    The right kidney measures 10 cm.  The left kidney measures 12 cm.   No hydronephrosis.  Grossly normal renal parenchymal echogenicity.    No significant abnormality of the urinary bladder.    There is moderate ascites.  Impression: 1. No significant sonographic abnormality of the kidneys.  2. Moderate ascites.    Electronically signed by: Andriy Laws  Date:    10/16/2023  Time:    09:35  CT Abdomen Pelvis  Without Contrast  Narrative: Technique:CT of the abdomen and pelvis was performed with axial images as well as sagittal and coronal reconstruction images without intravenous contrast.    Comparison:None available.    Clinical History:Abdominal pain.    Dosage Information:Automated Exposure Control was utilized.    Findings:    Thorax:    Lungs:There is a small right pleural effusion. There is right basilar consolidation with ground-glass opacities in the left lower lobe, which may reflect an infectious process.    Heart:The heart size is within normal limits.    Abdomen:    Abdominal Wall:No abdominal wall pathology is seen.    Liver:The margins of the liver are nodular and the liver is somewhat shrunken.    Within limitation noncontrast technique, no acute findings the liver, pancreas and spleen identified.    Biliary System:No intrahepatic or extrahepatic biliary duct dilatation is seen.    Gallbladder:Multiple calculi are noted within a mildly distended gallbladder. The gallbladder otherwise can not be definitively evaluated as there is pronounced ascites throughout the abdomen and pelvis such that pericholecystic fluid and inflammatory change and even wall thickening is not accurately identifiable.    Adrenals:The adrenal glands appear unremarkable.    Kidneys:The right kidney appears unremarkable with no stones cysts masses or hydronephrosis. A single stone measuring 9 mm is seen on series 2, image 64 in the mid pole of the left kidney.    Aorta:There is mild calcification of the abdominal aorta and its branches.    Bowel:    Esophagus:The  visualized esophagus appears unremarkable.    Stomach:The stomach appears unremarkable.    Duodenum:Unremarkable appearing duodenum.    Small Bowel:The small bowel appears unremarkable.    Colon:Nondistended.    Appendix:The appendix appears unremarkable (series 2, image 117).    Peritoneum:No free intraperitoneal air is seen. There is pronounced ascites consistent with cirrhosis.    Pelvis:    Bladder:The bladder appears unremarkable.    Male:    Prostate gland:The prostate gland appears unremarkable.    Bony structures:    Dorsal Spine:There is mild spondylosis of the visualized dorsal spine.    Bony Pelvis:The visualized bony structures of the pelvis appear unremarkable.  Impression: Impression:    1. There is a small right pleural effusion. There is right basilar consolidation with ground-glass opacities in the left lower lobe, which may reflect an infectious process. Correlate clinically as regards further evaluation and followup.    2. The margins of the liver are nodular and the liver is somewhat shrunken. These findings are consistent with cirrhosis. Correlate with clinical and laboratory findings.    3. Multiple calculi are noted within a mildly distended gallbladder. The gallbladder otherwise can not be definitively evaluated as there is pronounced ascites throughout the abdomen and pelvis such that pericholecystic fluid and inflammatory change and even wall thickening is not accurately identifiable. Correlate with clinical and laboratory findings as regards additional evaluation and follow-up of the gallbladder.    4. There is pronounced ascites consistent with cirrhosis.    5. Details and other findings as discussed above.    No significant discrepancy with overnight report.    Electronically signed by: Mat Sommers  Date:    10/16/2023  Time:    08:22      Aniyah Perez MD   10/16/2023

## 2023-10-16 NOTE — NURSING
Pt arrived to US for paracentesis, asleep-does not arouse to speech; arouses to painful stimuli. Explained procedure to pt, refuses. Pt to NM for HIDA scan. Floor nurse informed.

## 2023-10-16 NOTE — NURSING
Nurses Note -- 4 Eyes      10/16/2023   1:15 PM      Skin assessed during: Admit      [x] No Altered Skin Integrity Present    []Prevention Measures Documented      [] Yes- Altered Skin Integrity Present or Discovered   [] LDA Added if Not in Epic (Describe Wound)   [] New Altered Skin Integrity was Present on Admit and Documented in LDA   [] Wound Image Taken    Wound Care Consulted? No    Attending Nurse:  ALISON Foster     Second RN/Staff Member:   YAMILKA Rob

## 2023-10-16 NOTE — ED PROVIDER NOTES
"Encounter Date: 10/15/2023       History     Chief Complaint   Patient presents with    Fatigue     EMS reports pt's uncle called because pt was "talking to himself". Pt denies A/V hallucinations. Pt reports feelings of sadness and vague complaints stating "I just don't feel right." Pt actively denies SI/HI. States is homeless and requesting food and "voucher". Denies any specific complaints.     EMS called to patient's residence for conflicting reasons patient states that his uncle called him because he was not feeling well and he says his uncle thought he was talking to himself but was not EMS states that EMS was called because the patient was acting bizarre and the uncle is kicking him out of the house and he has no where to go patient states he is not hearing voices or seeing visions he is not homicidal nonsuicidal just feels bad denies any alcohol or drug use    The history is provided by the patient and the EMS personnel.     Review of patient's allergies indicates:  No Known Allergies  Past Medical History:   Diagnosis Date    Carpal tunnel syndrome     HTN (hypertension)     Sciatica      Past Surgical History:   Procedure Laterality Date    SKIN GRAFT       History reviewed. No pertinent family history.  Social History     Tobacco Use    Smoking status: Some Days     Types: Cigarettes    Smokeless tobacco: Never   Substance Use Topics    Alcohol use: Yes     Comment: daily     Review of Systems   Constitutional:  Positive for fatigue. Negative for fever.   HENT:  Negative for sore throat.    Respiratory:  Negative for shortness of breath.    Cardiovascular:  Negative for chest pain.   Gastrointestinal:  Negative for nausea.   Genitourinary:  Negative for dysuria.   Musculoskeletal:  Negative for back pain.   Skin:  Negative for rash.   Neurological:  Negative for weakness.   Hematological:  Does not bruise/bleed easily.   Psychiatric/Behavioral:  Negative for behavioral problems, confusion, decreased " concentration, dysphoric mood, hallucinations, self-injury, sleep disturbance and suicidal ideas.        Physical Exam     Initial Vitals [10/15/23 2222]   BP Pulse Resp Temp SpO2   (!) 160/96 66 18 98.1 °F (36.7 °C) 99 %      MAP       --         Physical Exam    Nursing note and vitals reviewed.  Constitutional: He appears well-developed and well-nourished.   Disheveled   HENT:   Head: Normocephalic and atraumatic.   Eyes: EOM are normal. Pupils are equal, round, and reactive to light.   Neck:   Normal range of motion.  Cardiovascular:  Normal rate, regular rhythm, normal heart sounds and intact distal pulses.           Pulmonary/Chest: Breath sounds normal.   Abdominal: Abdomen is soft. Bowel sounds are normal.   Musculoskeletal:         General: Normal range of motion.      Cervical back: Normal range of motion.     Neurological: He is alert and oriented to person, place, and time. He has normal strength. GCS score is 15. GCS eye subscore is 4. GCS verbal subscore is 5. GCS motor subscore is 6.   Skin: Skin is warm and dry. Capillary refill takes less than 2 seconds.   Psychiatric: He has a normal mood and affect. Judgment normal.         ED Course   Procedures  Labs Reviewed   COMPREHENSIVE METABOLIC PANEL - Abnormal; Notable for the following components:       Result Value    Potassium Level 5.3 (*)     Chloride 110 (*)     Carbon Dioxide 21 (*)     Glucose Level 118 (*)     Blood Urea Nitrogen 58.4 (*)     Creatinine 2.20 (*)     Calcium Level Total 8.1 (*)     Protein Total 8.1 (*)     Albumin Level 1.8 (*)     Globulin 6.3 (*)     Albumin/Globulin Ratio 0.3 (*)     Bilirubin Total 1.7 (*)     Alkaline Phosphatase 515 (*)     Aspartate Aminotransferase 83 (*)     All other components within normal limits   CBC WITH DIFFERENTIAL - Abnormal; Notable for the following components:    RBC 4.55 (*)     Hgb 13.3 (*)     Hct 39.1 (*)     IG# 0.05 (*)     All other components within normal limits   ALCOHOL,MEDICAL  (ETHANOL) - Normal   TROPONIN I - Normal   CBC W/ AUTO DIFFERENTIAL    Narrative:     The following orders were created for panel order CBC auto differential.  Procedure                               Abnormality         Status                     ---------                               -----------         ------                     CBC with Differential[[id number]]       Abnormal            Final result                 Please view results for these tests on the individual orders.   DRUG SCREEN, URINE (BEAKER)   COVID/FLU A&B PCR          Imaging Results    None          Medications   sodium chloride 0.9% bolus 1,000 mL 1,000 mL (has no administration in time range)   hydrALAZINE injection 20 mg (has no administration in time range)   sodium chloride 0.9% bolus 1,000 mL 1,000 mL (has no administration in time range)     Medical Decision Making  Psychosis alcohol abuse electrolyte disturbance anemia fatigue,     CBC without abnormality patient with significant electrolyte dysfunction and new onset acute renal insufficiency will admit for hydration and blood pressure control    Patient nonsuicidal non homicidal will admit to Hospital Medicine for acute renal insufficiency    Amount and/or Complexity of Data Reviewed  Labs: ordered.                               Clinical Impression:   Final diagnoses:  [R53.1] Weakness  [N28.9] Acute renal insufficiency (Primary)        ED Disposition Condition    Admit Stable                Candelario Jeffers III, MD  10/15/23 6262

## 2023-10-17 LAB
AFP-TM SERPL-MCNC: 19.2 NG/ML
ALBUMIN FLD-MCNC: <0.4 GM/DL
ALBUMIN SERPL-MCNC: 1.5 G/DL (ref 3.4–4.8)
ALBUMIN/GLOB SERPL: 0.3 RATIO (ref 1.1–2)
ALP SERPL-CCNC: 377 UNIT/L (ref 40–150)
ALT SERPL-CCNC: 26 UNIT/L (ref 0–55)
AMYLASE FLD-CCNC: 101 U/L
AST SERPL-CCNC: 66 UNIT/L (ref 5–34)
BASOPHILS # BLD AUTO: 0.05 X10(3)/MCL
BASOPHILS NFR BLD AUTO: 0.5 %
BILIRUB SERPL-MCNC: 1.7 MG/DL
BUN SERPL-MCNC: 53 MG/DL (ref 8.4–25.7)
CALCIUM SERPL-MCNC: 7.4 MG/DL (ref 8.8–10)
CHLORIDE SERPL-SCNC: 116 MMOL/L (ref 98–107)
CO2 SERPL-SCNC: 22 MMOL/L (ref 23–31)
CREAT SERPL-MCNC: 1.96 MG/DL (ref 0.73–1.18)
EOSINOPHIL # BLD AUTO: 0.13 X10(3)/MCL (ref 0–0.9)
EOSINOPHIL NFR BLD AUTO: 1.2 %
ERYTHROCYTE [DISTWIDTH] IN BLOOD BY AUTOMATED COUNT: 16.9 % (ref 11.5–17)
GFR SERPLBLD CREATININE-BSD FMLA CKD-EPI: 38 MLS/MIN/1.73/M2
GLOBULIN SER-MCNC: 5.1 GM/DL (ref 2.4–3.5)
GLUCOSE FLD-MCNC: 140 MG/DL
GLUCOSE SERPL-MCNC: 82 MG/DL (ref 82–115)
HCT VFR BLD AUTO: 32.2 % (ref 42–52)
HGB BLD-MCNC: 10.9 G/DL (ref 14–18)
IMM GRANULOCYTES # BLD AUTO: 0.07 X10(3)/MCL (ref 0–0.04)
IMM GRANULOCYTES NFR BLD AUTO: 0.7 %
LDH FLD-CCNC: 47 U/L
LYMPHOCYTE MANUAL BF (OHS): 43 %
LYMPHOCYTES # BLD AUTO: 2.42 X10(3)/MCL (ref 0.6–4.6)
LYMPHOCYTES NFR BLD AUTO: 22.7 %
MACROPHAGES, FLUID MAN COUNT (OHS): 2
MCH RBC QN AUTO: 29.1 PG (ref 27–31)
MCHC RBC AUTO-ENTMCNC: 33.9 G/DL (ref 33–36)
MCV RBC AUTO: 85.9 FL (ref 80–94)
MESOTHELIAL CELLS, FLUID MAN COUNT (OHS): 1
MONOCYTE MANUAL BF (OHS): 48 %
MONOCYTES # BLD AUTO: 1.44 X10(3)/MCL (ref 0.1–1.3)
MONOCYTES NFR BLD AUTO: 13.5 %
NEUTROPHILS # BLD AUTO: 6.57 X10(3)/MCL (ref 2.1–9.2)
NEUTROPHILS MAN BF (OHS): 9 %
NEUTROPHILS NFR BLD AUTO: 61.4 %
NRBC BLD AUTO-RTO: 0 %
PLATELET # BLD AUTO: 308 X10(3)/MCL (ref 130–400)
PMV BLD AUTO: 10 FL (ref 7.4–10.4)
POTASSIUM SERPL-SCNC: 4.8 MMOL/L (ref 3.5–5.1)
PROT FLD-MCNC: 1 GM/DL
PROT SERPL-MCNC: 6.6 GM/DL (ref 5.8–7.6)
RBC # BLD AUTO: 3.75 X10(6)/MCL (ref 4.7–6.1)
SODIUM SERPL-SCNC: 143 MMOL/L (ref 136–145)
WBC # FLD AUTO: 104 /UL
WBC # SPEC AUTO: 10.68 X10(3)/MCL (ref 4.5–11.5)

## 2023-10-17 PROCEDURE — 63600175 PHARM REV CODE 636 W HCPCS: Performed by: INTERNAL MEDICINE

## 2023-10-17 PROCEDURE — 25000003 PHARM REV CODE 250: Performed by: INTERNAL MEDICINE

## 2023-10-17 PROCEDURE — 63600175 PHARM REV CODE 636 W HCPCS: Mod: JZ,JG | Performed by: PHYSICIAN ASSISTANT

## 2023-10-17 PROCEDURE — 83615 LACTATE (LD) (LDH) ENZYME: CPT | Performed by: INTERNAL MEDICINE

## 2023-10-17 PROCEDURE — P9047 ALBUMIN (HUMAN), 25%, 50ML: HCPCS | Mod: JZ,JG | Performed by: PHYSICIAN ASSISTANT

## 2023-10-17 PROCEDURE — 89051 BODY FLUID CELL COUNT: CPT | Performed by: INTERNAL MEDICINE

## 2023-10-17 PROCEDURE — 87522 HEPATITIS C REVRS TRNSCRPJ: CPT | Performed by: INTERNAL MEDICINE

## 2023-10-17 PROCEDURE — 87070 CULTURE OTHR SPECIMN AEROBIC: CPT | Performed by: INTERNAL MEDICINE

## 2023-10-17 PROCEDURE — 82150 ASSAY OF AMYLASE: CPT | Performed by: INTERNAL MEDICINE

## 2023-10-17 PROCEDURE — 82945 GLUCOSE OTHER FLUID: CPT | Performed by: INTERNAL MEDICINE

## 2023-10-17 PROCEDURE — 82105 ALPHA-FETOPROTEIN SERUM: CPT | Performed by: PHYSICIAN ASSISTANT

## 2023-10-17 PROCEDURE — 80053 COMPREHEN METABOLIC PANEL: CPT | Performed by: INTERNAL MEDICINE

## 2023-10-17 PROCEDURE — 84157 ASSAY OF PROTEIN OTHER: CPT | Performed by: INTERNAL MEDICINE

## 2023-10-17 PROCEDURE — 21400001 HC TELEMETRY ROOM

## 2023-10-17 PROCEDURE — 82042 OTHER SOURCE ALBUMIN QUAN EA: CPT | Performed by: INTERNAL MEDICINE

## 2023-10-17 PROCEDURE — 85025 COMPLETE CBC W/AUTO DIFF WBC: CPT | Performed by: INTERNAL MEDICINE

## 2023-10-17 RX ORDER — ESCITALOPRAM OXALATE 5 MG/1
5 TABLET ORAL DAILY
Status: DISCONTINUED | OUTPATIENT
Start: 2023-10-18 | End: 2023-10-23 | Stop reason: HOSPADM

## 2023-10-17 RX ORDER — ARIPIPRAZOLE 5 MG/1
5 TABLET ORAL DAILY
Status: DISCONTINUED | OUTPATIENT
Start: 2023-10-18 | End: 2023-10-23 | Stop reason: HOSPADM

## 2023-10-17 RX ORDER — ALBUMIN HUMAN 250 G/1000ML
25 SOLUTION INTRAVENOUS ONCE
Status: COMPLETED | OUTPATIENT
Start: 2023-10-17 | End: 2023-10-17

## 2023-10-17 RX ADMIN — AZITHROMYCIN MONOHYDRATE 500 MG: 500 INJECTION, POWDER, LYOPHILIZED, FOR SOLUTION INTRAVENOUS at 04:10

## 2023-10-17 RX ADMIN — PIPERACILLIN AND TAZOBACTAM 4.5 G: 4; .5 INJECTION, POWDER, FOR SOLUTION INTRAVENOUS at 06:10

## 2023-10-17 RX ADMIN — ALBUMIN (HUMAN) 25 G: 12.5 SOLUTION INTRAVENOUS at 02:10

## 2023-10-17 RX ADMIN — PIPERACILLIN AND TAZOBACTAM 4.5 G: 4; .5 INJECTION, POWDER, FOR SOLUTION INTRAVENOUS at 12:10

## 2023-10-17 RX ADMIN — CEFTRIAXONE 1 G: 1 INJECTION, POWDER, FOR SOLUTION INTRAMUSCULAR; INTRAVENOUS at 11:10

## 2023-10-17 NOTE — PROGRESS NOTES
[facility]  Hospital Medicine Progress Note           CHIEF COMPLAINT   Weakness      HISTORY OF PRESENT ILLNESS:   Patient is a 62-year-old male with a history of hypertension (report not compliant with his meds for some time) who was brought to the ER after his uncle with whom he lives was concerned about his behavior.  He was reportedly talking to himself though patient denies this was the case.  He does endorse that he has been using cocaine, in his concern now he does not have a place to live.  He states he is not been feeling normal over last few weeks but will not go into further detail as he is excessively crying at bedside.  He denies suicidal homicidal ideations.  He states he would just like a home to have to visit his grandchildren.     His workup in the ER was significant for acute kidney injury as well as mild hyperbilirubinemia, transaminitis.  He was started on IV fluids admitted to the hospitalist service for further management           Patient admitted for new onset decompensated cirrhosis with ascites requiring paracentesis.  HIDA scan is negative for acute cholecystitis   Patient started on antibiotics for possible pneumonia         Today's information   Patient seen and examined at bedside  Patient was sleeping but arousable  Vitals reviewed will unstable  Hemoglobin levels of 10.9 will monitor   Creatinine remains elevated at 1.96   CO2 of 22   Blood cultures negative at 24 hours   Patient is slated for IR guided paracentesis       exam  GENERAL: awake, alert, oriented and in no acute distress, non-toxic appearing   HEENT: normocephalic atraumatic   NECK: supple   LUNGS: Clear bilaterally, no wheezing or rales, no accessory muscle use   CVS: Regular rate and rhythm, normal peripheral perfusion  ABD: Soft, diffuse tenderness, mildly distended  EXTREMITIES: no clubbing or cyanosis  SKIN: Warm, dry.   NEURO: alert and oriented, grossly without focal deficits    PSYCHIATRIC: Cooperative        ASSESSMENT & PLAN:   New onset decompensated liver cirrhosis with ascites   Hepatitis C positive   Hyperbilirubinemia/transaminitis, abdominal pain   Acute kidney injury (suspect underlying CKD)   Possible CAP   Essential HTN, improving   Crack cocaine abuse by inhalation  Reported homelessness        Plan  Patient is slated for IR guided paracentesis  Hep C Ab positive - will need more information if treated or not, f/up viral load , for out pt f/up with ID   GI on board ,follow-up with recs   Nephrology on board, appreciate recs   HIDA scan is negative for acute cholecystitis   Patient started on antibiotics for possible pneumonia   Change to IV ceftriaxone, continue azithromycin, day 2  low threshold to discontinue antibiotics   encourage crack cocaine cessation   case management consultation, psych consult         DVT prophylaxis: lovenox  Code status: full      Dispo- TBD          VITAL SIGNS: 24 HRS MIN & MAX LAST   Temp  Min: 97.4 °F (36.3 °C)  Max: 98.8 °F (37.1 °C) 97.9 °F (36.6 °C)   BP  Min: 115/78  Max: 166/88 (!) 161/87   Pulse  Min: 54  Max: 97  64   Resp  Min: 18  Max: 20 18   SpO2  Min: 94 %  Max: 100 % 97 %     I have reviewed the following labs:  Recent Labs   Lab 10/15/23  2238 10/16/23  0225 10/17/23  0422   WBC 8.97 11.36 10.68   RBC 4.55* 4.64* 3.75*   HGB 13.3* 13.4* 10.9*   HCT 39.1* 39.9* 32.2*   MCV 85.9 86.0 85.9   MCH 29.2 28.9 29.1   MCHC 34.0 33.6 33.9   RDW 16.0 16.2 16.9    295 308   MPV 10.4 10.2 10.0     Recent Labs   Lab 10/15/23  2238 10/16/23  0225 10/17/23  0422    139 143   K 5.3* 4.0 4.8   CO2 21* 19* 22*   BUN 58.4* 57.8* 53.0*   CREATININE 2.20* 2.02* 1.96*   CALCIUM 8.1* 8.1* 7.4*   ALBUMIN 1.8* 2.0* 1.5*   ALKPHOS 515* 540* 377*   ALT 32 36 26   AST 83* 93* 66*   BILITOT 1.7* 1.9* 1.7*     Microbiology Results (last 7 days)       Procedure Component Value Units Date/Time    Body Fluid Culture [[id number]] Collected: 10/17/23  1247    Order Status: Sent Specimen: Peritoneal Fluid from Ascitic Fluid Updated: 10/17/23 1301    Blood Culture [[id number]]  (Normal) Collected: 10/16/23 0549    Order Status: Completed Specimen: Blood, Venous Updated: 10/17/23 0900     CULTURE, BLOOD ([facility]) No Growth At 24 Hours    Blood Culture [[id number]]  (Normal) Collected: 10/16/23 0549    Order Status: Completed Specimen: Blood, Venous Updated: 10/17/23 0900     CULTURE, BLOOD ([facility]) No Growth At 24 Hours             See below for Radiology    Scheduled Med:   albumin human 25%  25 g Intravenous Once    azithromycin  500 mg Intravenous Q24H    cefTRIAXone (ROCEPHIN) IVPB  1 g Intravenous Q24H    enoxparin  30 mg Subcutaneous Daily      Continuous Infusions:     PRN Meds:  haloperidol lactate, melatonin, ondansetron, promethazine, sodium chloride 0.9%     Assessment/Plan:      VTE prophylaxis:     Patient condition:  Stable/Fair/Guarded/ Serious/ Critical    Anticipated discharge and Disposition:         All diagnosis and differential diagnosis have been reviewed; assessment and plan has been documented; I have personally reviewed the labs and test results that are presently available; I have reviewed the patients medication list; I have reviewed the consulting providers response and recommendations. I have reviewed or attempted to review medical records based upon their availability    All of the patient's questions have been  addressed and answered. Patient's is agreeable to the above stated plan. I will continue to monitor closely and make adjustments to medical management as needed.  _____________________________________________________________________    Nutrition Status:    Radiology:  I have personally reviewed the following imaging and agree with the radiologist.     NM Hepatobiliary Scan (HIDA)  Narrative: EXAMINATION:  NM HEPATOBILIARY IMAGING INC GB (HIDA)    CLINICAL HISTORY:  Cholecystitis;    TECHNIQUE:  8.2 mCi of intravenous Choletec was  administered followed by focused gamma camera imaging of the abdomen.    COMPARISON:  CT abdomen pelvis October 16, 2023    FINDINGS:  There is heterogeneous diminished radioisotope uptake by the liver. Central biliary activity identified with gallbladder filling by 60 minutes. There is progressive gallbladder filling over 3 hours of scintigraphy.  Small bowel activity is identified by 45 minutes progressing through 4 hours of imaging.  Impression: Negative for acute cholecystitis.    Electronically signed by: Mat Sommers  Date:    10/16/2023  Time:    14:15  US Abdomen Complete  Narrative: EXAMINATION:  US ABDOMEN COMPLETE    CLINICAL HISTORY:  abdominal pain;    COMPARISON:  Concurrent CT.    FINDINGS:  Grayscale, color and spectral Doppler evaluation of the abdomen.    No focal abnormality of the limited visualized pancreas.    Visualized portions of the aorta and inferior vena cava are normal in caliber.    Cirrhotic liver with no focal suspicious liver lesions seen.  Normal hepatopetal flow is noted in the portal vein.    There are gallstones.  There is some nonspecific gallbladder wall thickening.  The common bile duct is normal in caliber  and measures 4 mm.    The right kidney measures 11 cm, while the left measures 13 cm.  No hydronephrosis.  Left renal calculus better demonstrated on CT.    The spleen is normal in size and echogenicity  and measures 10 cm.    Urinary bladder unremarkable.    There is moderate ascites.  Impression: 1. Cirrhosis with patent portal vein.  2. Moderate ascites.  3. Cholelithiasis with nonspecific gallbladder wall thickening.  No biliary ductal dilatation.    Electronically signed by: Andriy Laws  Date:    10/16/2023  Time:    10:20  US Retroperitoneal Complete  Narrative: EXAMINATION:  US RETROPERITONEAL COMPLETE    CLINICAL HISTORY:  elevated BUN/Cr;    COMPARISON:  CT earlier today    FINDINGS:  Grayscale and color Doppler sonographic evaluation of the kidneys and urinary  bladder.    The right kidney measures 10 cm. The left kidney measures 12 cm.   No hydronephrosis.  Grossly normal renal parenchymal echogenicity.    No significant abnormality of the urinary bladder.    There is moderate ascites.  Impression: 1. No significant sonographic abnormality of the kidneys.  2. Moderate ascites.    Electronically signed by: Andriy Laws  Date:    10/16/2023  Time:    09:35  CT Abdomen Pelvis  Without Contrast  Narrative: Technique:CT of the abdomen and pelvis was performed with axial images as well as sagittal and coronal reconstruction images without intravenous contrast.    Comparison:None available.    Clinical History:Abdominal pain.    Dosage Information:Automated Exposure Control was utilized.    Findings:    Thorax:    Lungs:There is a small right pleural effusion. There is right basilar consolidation with ground-glass opacities in the left lower lobe, which may reflect an infectious process.    Heart:The heart size is within normal limits.    Abdomen:    Abdominal Wall:No abdominal wall pathology is seen.    Liver:The margins of the liver are nodular and the liver is somewhat shrunken.    Within limitation noncontrast technique, no acute findings the liver, pancreas and spleen identified.    Biliary System:No intrahepatic or extrahepatic biliary duct dilatation is seen.    Gallbladder:Multiple calculi are noted within a mildly distended gallbladder. The gallbladder otherwise can not be definitively evaluated as there is pronounced ascites throughout the abdomen and pelvis such that pericholecystic fluid and inflammatory change and even wall thickening is not accurately identifiable.    Adrenals:The adrenal glands appear unremarkable.    Kidneys:The right kidney appears unremarkable with no stones cysts masses or hydronephrosis. A single stone measuring 9 mm is seen on series 2, image 64 in the mid pole of the left kidney.    Aorta:There is mild calcification of the abdominal aorta  and its branches.    Bowel:    Esophagus:The visualized esophagus appears unremarkable.    Stomach:The stomach appears unremarkable.    Duodenum:Unremarkable appearing duodenum.    Small Bowel:The small bowel appears unremarkable.    Colon:Nondistended.    Appendix:The appendix appears unremarkable (series 2, image 117).    Peritoneum:No free intraperitoneal air is seen. There is pronounced ascites consistent with cirrhosis.    Pelvis:    Bladder:The bladder appears unremarkable.    Male:    Prostate gland:The prostate gland appears unremarkable.    Bony structures:    Dorsal Spine:There is mild spondylosis of the visualized dorsal spine.    Bony Pelvis:The visualized bony structures of the pelvis appear unremarkable.  Impression: Impression:    1. There is a small right pleural effusion. There is right basilar consolidation with ground-glass opacities in the left lower lobe, which may reflect an infectious process. Correlate clinically as regards further evaluation and followup.    2. The margins of the liver are nodular and the liver is somewhat shrunken. These findings are consistent with cirrhosis. Correlate with clinical and laboratory findings.    3. Multiple calculi are noted within a mildly distended gallbladder. The gallbladder otherwise can not be definitively evaluated as there is pronounced ascites throughout the abdomen and pelvis such that pericholecystic fluid and inflammatory change and even wall thickening is not accurately identifiable. Correlate with clinical and laboratory findings as regards additional evaluation and follow-up of the gallbladder.    4. There is pronounced ascites consistent with cirrhosis.    5. Details and other findings as discussed above.    No significant discrepancy with overnight report.    Electronically signed by: Mat Sommers  Date:    10/16/2023  Time:    08:22      Aniyah Perez MD   10/17/2023

## 2023-10-17 NOTE — PROGRESS NOTES
Renal    HPI: 62-year-old male consulted for SOCORRO. Presented to ER on 10/15/23 after his uncle with whom he lives was concerned about his behavior.  He was reportedly talking to himself though patient denies this was the case.  He does endorse that he has been using cocaine. His workup in the ER was significant for acute kidney injury, ascites, as well as mild hyperbilirubinemia, transaminitis.  He was started on IV fluids admitted to the hospitalist service for further management. He has a history of hypertension, reportedly not compliant with his meds.  He was seen by GI for cirrhosis of unknown etiology and ordered paracentesis and HIDA, but he refused paracentesis     Chief complaint:   Hungry    Interval History:  Had paracentesis 1.2 L today with albumin. Refused MRI.    ROS:  all else Neg     [START ON 10/18/2023] ARIPiprazole  5 mg Oral Daily    azithromycin  500 mg Intravenous Q24H    cefTRIAXone (ROCEPHIN) IVPB  1 g Intravenous Q24H    enoxparin  30 mg Subcutaneous Daily    [START ON 10/18/2023] EScitalopram oxalate  5 mg Oral Daily       VITAL SIGNS: 24 HR MIN & MAX LAST    Temp  Min: 97.9 °F (36.6 °C)  Max: 98.8 °F (37.1 °C)  98.5 °F (36.9 °C)        BP  Min: 115/78  Max: 177/83  (!) 177/83     Pulse  Min: 62  Max: 97  65     Resp  Min: 18  Max: 20  18    SpO2  Min: 94 %  Max: 100 %  99 %      Wt Readings from Last 3 Encounters:   10/15/23 68 kg (150 lb)   08/14/23 65.8 kg (145 lb)   07/18/18 67.7 kg (149 lb 4 oz)       Intake/Output Summary (Last 24 hours) at 10/17/2023 1646  Last data filed at 10/17/2023 1414  Gross per 24 hour   Intake 580 ml   Output --   Net 580 ml     A&O x 4, ill appearing  EOMI  (B) symmetrical  Unlabored  Soft, NT, ND  Edema none    Recent Labs     10/15/23  2238 10/16/23  0225 10/17/23  0422    139 143   K 5.3* 4.0 4.8   CHLORIDE 110* 111* 116*   CO2 21* 19* 22*   BUN 58.4* 57.8* 53.0*   CREATININE 2.20* 2.02* 1.96*   GLUCOSE 118* 110 82   CALCIUM 8.1* 8.1* 7.4*   ALBUMIN  1.8* 2.0* 1.5*      Recent Labs     10/15/23  2238 10/16/23  0225 10/17/23  0422   WBC 8.97 11.36 10.68   HGB 13.3* 13.4* 10.9*   HCT 39.1* 39.9* 32.2*    295 308       ASSESSMENT / PLAN    Alt MS     SOCORRO, prerenal  Met Acid  Cirrhosis, ascites - s/p 1.2 L paracentesis on 10/17/23  HTN  Cholelithiasis with neg HIDA  Substance abuse     Everything is a little better, continue to hold diuretics.            Ced Gallardo M.D.  Nephrology

## 2023-10-17 NOTE — PLAN OF CARE
"BP (!) 166/88 (BP Location: Left arm, Patient Position: Lying)   Pulse 62   Temp 98.1 °F (36.7 °C) (Axillary)   Resp 20   Ht 5' 11" (1.803 m)   Wt 68 kg (150 lb)   SpO2 97%   BMI 20.92 kg/m²     Problem: Adult Inpatient Plan of Care  Goal: Plan of Care Review  Outcome: Ongoing, Progressing  Goal: Patient-Specific Goal (Individualized)  Outcome: Ongoing, Progressing  Goal: Absence of Hospital-Acquired Illness or Injury  Outcome: Ongoing, Progressing  Goal: Optimal Comfort and Wellbeing  Outcome: Ongoing, Progressing  Goal: Readiness for Transition of Care  Outcome: Ongoing, Progressing     Problem: Skin Injury Risk Increased  Goal: Skin Health and Integrity  Outcome: Ongoing, Progressing     "

## 2023-10-17 NOTE — PROGRESS NOTES
"Gastroenterology Progress Note    Subjective/Interval History:  Continues to be somnolent and difficult to arouse.  I essentially cannot get any history from him.  They attempted paracentesis yesterday but he refused.  Ultimately had paracentesis today and 1.5L removed per nursing report; note pending.      Vital Signs:  BP (!) 161/87 (BP Location: Right arm, Patient Position: Lying)   Pulse 64   Temp 97.9 °F (36.6 °C) (Oral)   Resp 18   Ht 5' 11" (1.803 m)   Wt 68 kg (150 lb)   SpO2 97%   BMI 20.92 kg/m²   Body mass index is 20.92 kg/m².    Physical Exam:  Physical Exam  Constitutional:       General: He is not in acute distress.     Appearance: He is not ill-appearing.   HENT:      Head: Normocephalic and atraumatic.   Eyes:      General: No scleral icterus.  Cardiovascular:      Rate and Rhythm: Normal rate and regular rhythm.   Pulmonary:      Effort: Pulmonary effort is normal. No respiratory distress.   Abdominal:      General: Bowel sounds are normal. There is no distension and abdomen is soft.      Palpations: There is no mass.      Tenderness: There is no abdominal tenderness. There is no guarding or rebound.   Musculoskeletal:      Right lower leg: No edema.      Left lower leg: No edema.   Skin:     General: Skin is warm and dry.      Coloration: Skin is not jaundiced.   Neurological:      Mental Status: He is lethargic.      Comments: No asterixis otherwise difficult to assess.   Psychiatric:      Comments: Unable to assess.     Labs:  Recent Results (from the past 48 hour(s))   Comprehensive metabolic panel    Collection Time: 10/15/23 10:38 PM   Result Value Ref Range    Sodium Level 140 136 - 145 mmol/L    Potassium Level 5.3 (H) 3.5 - 5.1 mmol/L    Chloride 110 (H) 98 - 107 mmol/L    Carbon Dioxide 21 (L) 23 - 31 mmol/L    Glucose Level 118 (H) 82 - 115 mg/dL    Blood Urea Nitrogen 58.4 (H) 8.4 - 25.7 mg/dL    Creatinine 2.20 (H) 0.73 - 1.18 mg/dL    Calcium Level Total 8.1 (L) 8.8 - 10.0 " mg/dL    Protein Total 8.1 (H) 5.8 - 7.6 gm/dL    Albumin Level 1.8 (L) 3.4 - 4.8 g/dL    Globulin 6.3 (H) 2.4 - 3.5 gm/dL    Albumin/Globulin Ratio 0.3 (L) 1.1 - 2.0 ratio    Bilirubin Total 1.7 (H) <=1.5 mg/dL    Alkaline Phosphatase 515 (H) 40 - 150 unit/L    Alanine Aminotransferase 32 0 - 55 unit/L    Aspartate Aminotransferase 83 (H) 5 - 34 unit/L    eGFR 33 mls/min/1.73/m2   Ethanol    Collection Time: 10/15/23 10:38 PM   Result Value Ref Range    Ethanol Level <10.0 <=10.0 mg/dL   Troponin I    Collection Time: 10/15/23 10:38 PM   Result Value Ref Range    Troponin-I <0.010 0.000 - 0.045 ng/mL   CBC with Differential    Collection Time: 10/15/23 10:38 PM   Result Value Ref Range    WBC 8.97 4.50 - 11.50 x10(3)/mcL    RBC 4.55 (L) 4.70 - 6.10 x10(6)/mcL    Hgb 13.3 (L) 14.0 - 18.0 g/dL    Hct 39.1 (L) 42.0 - 52.0 %    MCV 85.9 80.0 - 94.0 fL    MCH 29.2 27.0 - 31.0 pg    MCHC 34.0 33.0 - 36.0 g/dL    RDW 16.0 11.5 - 17.0 %    Platelet 305 130 - 400 x10(3)/mcL    MPV 10.4 7.4 - 10.4 fL    Neut % 69.3 %    Lymph % 19.0 %    Mono % 9.7 %    Eos % 1.0 %    Basophil % 0.4 %    Lymph # 1.70 0.6 - 4.6 x10(3)/mcL    Neut # 6.22 2.1 - 9.2 x10(3)/mcL    Mono # 0.87 0.1 - 1.3 x10(3)/mcL    Eos # 0.09 0 - 0.9 x10(3)/mcL    Baso # 0.04 <=0.2 x10(3)/mcL    IG# 0.05 (H) 0 - 0.04 x10(3)/mcL    IG% 0.6 %    NRBC% 0.0 %   Lipase    Collection Time: 10/15/23 10:38 PM   Result Value Ref Range    Lipase Level 104 (H) <=60 U/L   CK    Collection Time: 10/15/23 10:38 PM   Result Value Ref Range    Creatine Kinase 21 (L) 30 - 200 U/L   COVID/FLU A&B PCR    Collection Time: 10/15/23 10:39 PM   Result Value Ref Range    Influenza A PCR Not Detected Not Detected    Influenza B PCR Not Detected Not Detected    SARS-CoV-2 PCR Not Detected Not Detected, Negative, Invalid   Drug Screen, Urine    Collection Time: 10/15/23 11:41 PM   Result Value Ref Range    Amphetamines, Urine Negative Negative    Barbituates, Urine Negative Negative     Benzodiazepine, Urine Negative Negative    Cannabinoids, Urine Negative Negative    Cocaine, Urine Positive (A) Negative    Fentanyl, Urine Negative Negative    MDMA, Urine Negative Negative    Opiates, Urine Negative Negative    Phencyclidine, Urine Negative Negative    pH, Urine 6.0 3.0 - 11.0    Specific Gravity, Urine Auto 1.016 1.001 - 1.035   Urinalysis, Reflex to Urine Culture    Collection Time: 10/15/23 11:41 PM    Specimen: Urine, Clean Catch   Result Value Ref Range    Color, UA Dark Yellow Yellow, Light-Yellow, Dark Yellow, Carline, Straw    Appearance, UA Clear Clear    Specific Gravity, UA 1.016 1.005 - 1.030    pH, UA 6.0 5.0 - 8.5    Protein, UA Negative Negative    Glucose, UA Negative Negative, Normal    Ketones, UA Negative Negative    Blood, UA 3+ (A) Negative    Bilirubin, UA Negative Negative    Urobilinogen, UA 4.0 (A) 0.2, 1.0, Normal    Nitrites, UA Negative Negative    Leukocyte Esterase, UA Trace (A) Negative   Urinalysis, Microscopic    Collection Time: 10/15/23 11:41 PM   Result Value Ref Range    RBC, UA 11 (H) <=5 /HPF    WBC, UA 8 (H) <=5 /HPF    Squamous Epithelial Cells, UA <5 <=5 /HPF    Bacteria, UA None Seen None Seen, Rare, Occasional /HPF   Protein/Creatinine Ratio, Urine    Collection Time: 10/16/23 12:02 AM   Result Value Ref Range    Urine Protein Level 18.9 mg/dL    Urine Creatinine 118.9 63.0 - 166.0 mg/dL    Urine Protein/Creatinine Ratio 0.2    Osmolality, Urine    Collection Time: 10/16/23 12:02 AM   Result Value Ref Range    Urine Osmolality 495 300 - 1,300 mOsm/kg   Creatinine, Random Urine    Collection Time: 10/16/23 12:02 AM   Result Value Ref Range    Urine Creatinine 118.0 63.0 - 166.0 mg/dL   Sodium, Random Urine    Collection Time: 10/16/23 12:02 AM   Result Value Ref Range    Urine Sodium 37.0 mmol/L   C3 Complement    Collection Time: 10/16/23  2:25 AM   Result Value Ref Range    C3 Complement 42 (L) 80 - 173 mg/dL   C4 Complement    Collection Time: 10/16/23   2:25 AM   Result Value Ref Range    C4 Complement 7.0 (L) 13.0 - 46.0 mg/dL   HIV 1/2 Ag/Ab (4th Gen)    Collection Time: 10/16/23  2:25 AM   Result Value Ref Range    HIV Nonreactive Nonreactive   APTT    Collection Time: 10/16/23  2:25 AM   Result Value Ref Range    PTT 32.6 23.2 - 33.7 seconds   Protime-INR    Collection Time: 10/16/23  2:25 AM   Result Value Ref Range    PT 15.2 (H) 12.5 - 14.5 seconds    INR 1.2 <=1.3   Comprehensive metabolic panel    Collection Time: 10/16/23  2:25 AM   Result Value Ref Range    Sodium Level 139 136 - 145 mmol/L    Potassium Level 4.0 3.5 - 5.1 mmol/L    Chloride 111 (H) 98 - 107 mmol/L    Carbon Dioxide 19 (L) 23 - 31 mmol/L    Glucose Level 110 82 - 115 mg/dL    Blood Urea Nitrogen 57.8 (H) 8.4 - 25.7 mg/dL    Creatinine 2.02 (H) 0.73 - 1.18 mg/dL    Calcium Level Total 8.1 (L) 8.8 - 10.0 mg/dL    Protein Total 9.0 (H) 5.8 - 7.6 gm/dL    Albumin Level 2.0 (L) 3.4 - 4.8 g/dL    Globulin 7.0 (H) 2.4 - 3.5 gm/dL    Albumin/Globulin Ratio 0.3 (L) 1.1 - 2.0 ratio    Bilirubin Total 1.9 (H) <=1.5 mg/dL    Alkaline Phosphatase 540 (H) 40 - 150 unit/L    Alanine Aminotransferase 36 0 - 55 unit/L    Aspartate Aminotransferase 93 (H) 5 - 34 unit/L    eGFR 37 mls/min/1.73/m2   CBC with Differential    Collection Time: 10/16/23  2:25 AM   Result Value Ref Range    WBC 11.36 4.50 - 11.50 x10(3)/mcL    RBC 4.64 (L) 4.70 - 6.10 x10(6)/mcL    Hgb 13.4 (L) 14.0 - 18.0 g/dL    Hct 39.9 (L) 42.0 - 52.0 %    MCV 86.0 80.0 - 94.0 fL    MCH 28.9 27.0 - 31.0 pg    MCHC 33.6 33.0 - 36.0 g/dL    RDW 16.2 11.5 - 17.0 %    Platelet 295 130 - 400 x10(3)/mcL    MPV 10.2 7.4 - 10.4 fL    Neut % 67.3 %    Lymph % 20.9 %    Mono % 10.0 %    Eos % 0.7 %    Basophil % 0.4 %    Lymph # 2.37 0.6 - 4.6 x10(3)/mcL    Neut # 7.65 2.1 - 9.2 x10(3)/mcL    Mono # 1.14 0.1 - 1.3 x10(3)/mcL    Eos # 0.08 0 - 0.9 x10(3)/mcL    Baso # 0.04 <=0.2 x10(3)/mcL    IG# 0.08 (H) 0 - 0.04 x10(3)/mcL    IG% 0.7 %    NRBC% 0.0 %    Hepatitis Panel, Acute    Collection Time: 10/16/23  2:29 AM   Result Value Ref Range    Hepatitis A IgM Nonreactive Nonreactive    Hepatitis B Core IgM Nonreactive Nonreactive    Hepatitis B Surface Antigen Nonreactive Nonreactive    Hep C Ab Interp Reactive (A) Nonreactive   Ammonia    Collection Time: 10/16/23  5:49 AM   Result Value Ref Range    Ammonia Level 68.8 18.0 - 72.0 umol/L   Blood Culture    Collection Time: 10/16/23  5:49 AM    Specimen: Blood, Venous   Result Value Ref Range    CULTURE, BLOOD ([facility]) No Growth At 24 Hours    Blood Culture    Collection Time: 10/16/23  5:49 AM    Specimen: Blood, Venous   Result Value Ref Range    CULTURE, BLOOD ([facility]) No Growth At 24 Hours    Respiratory Panel    Collection Time: 10/16/23  6:14 AM   Result Value Ref Range    Adenovirus Not Detected Not Detected    Coronavirus 229E Not Detected Not Detected    Coronavirus HKU1 Not Detected Not Detected    Coronavirus NL63 Not Detected Not Detected    Coronavirus OC43 PCR, Common Cold Virus Not Detected Not Detected    Human Metapneumovirus Not Detected Not Detected    Parainfluenza Virus 1 Not Detected Not Detected    Parainfluenza Virus 2 Not Detected Not Detected    Parainfluenza Virus 3 Not Detected Not Detected    Parainfluenza Virus 4 Not Detected Not Detected    Bordetella pertussis (ptxP) Not Detected Not Detected    Chlamydia pneumoniae Not Detected Not Detected    Mycoplasma pneumoniae Not Detected Not Detected    Human Rhinovirus/Enterovirus Not Detected Not Detected    Bordetella parapertussis ([id number]) Not Detected Not Detected   Comprehensive Metabolic Panel    Collection Time: 10/17/23  4:22 AM   Result Value Ref Range    Sodium Level 143 136 - 145 mmol/L    Potassium Level 4.8 3.5 - 5.1 mmol/L    Chloride 116 (H) 98 - 107 mmol/L    Carbon Dioxide 22 (L) 23 - 31 mmol/L    Glucose Level 82 82 - 115 mg/dL    Blood Urea Nitrogen 53.0 (H) 8.4 - 25.7 mg/dL    Creatinine 1.96 (H) 0.73 - 1.18 mg/dL    Calcium  Level Total 7.4 (L) 8.8 - 10.0 mg/dL    Protein Total 6.6 5.8 - 7.6 gm/dL    Albumin Level 1.5 (L) 3.4 - 4.8 g/dL    Globulin 5.1 (H) 2.4 - 3.5 gm/dL    Albumin/Globulin Ratio 0.3 (L) 1.1 - 2.0 ratio    Bilirubin Total 1.7 (H) <=1.5 mg/dL    Alkaline Phosphatase 377 (H) 40 - 150 unit/L    Alanine Aminotransferase 26 0 - 55 unit/L    Aspartate Aminotransferase 66 (H) 5 - 34 unit/L    eGFR 38 mls/min/1.73/m2   CBC with Differential    Collection Time: 10/17/23  4:22 AM   Result Value Ref Range    WBC 10.68 4.50 - 11.50 x10(3)/mcL    RBC 3.75 (L) 4.70 - 6.10 x10(6)/mcL    Hgb 10.9 (L) 14.0 - 18.0 g/dL    Hct 32.2 (L) 42.0 - 52.0 %    MCV 85.9 80.0 - 94.0 fL    MCH 29.1 27.0 - 31.0 pg    MCHC 33.9 33.0 - 36.0 g/dL    RDW 16.9 11.5 - 17.0 %    Platelet 308 130 - 400 x10(3)/mcL    MPV 10.0 7.4 - 10.4 fL    Neut % 61.4 %    Lymph % 22.7 %    Mono % 13.5 %    Eos % 1.2 %    Basophil % 0.5 %    Lymph # 2.42 0.6 - 4.6 x10(3)/mcL    Neut # 6.57 2.1 - 9.2 x10(3)/mcL    Mono # 1.44 (H) 0.1 - 1.3 x10(3)/mcL    Eos # 0.13 0 - 0.9 x10(3)/mcL    Baso # 0.05 <=0.2 x10(3)/mcL    IG# 0.07 (H) 0 - 0.04 x10(3)/mcL    IG% 0.7 %    NRBC% 0.0 %       Imaging:  NM Hepatobiliary Scan ([facility])    Result Date: 10/16/2023  EXAMINATION: NM HEPATOBILIARY IMAGING INC GB ([facility]) CLINICAL HISTORY: Cholecystitis; TECHNIQUE: 8.2 mCi of intravenous Choletec was administered followed by focused gamma camera imaging of the abdomen. COMPARISON: CT abdomen pelvis October 16, 2023 FINDINGS: There is heterogeneous diminished radioisotope uptake by the liver. Central biliary activity identified with gallbladder filling by 60 minutes. There is progressive gallbladder filling over 3 hours of scintigraphy.  Small bowel activity is identified by 45 minutes progressing through 4 hours of imaging.     Negative for acute cholecystitis. Electronically signed by: Mat Sommers Date:    10/16/2023 Time:    14:15    US Abdomen Complete    Result Date:  10/16/2023  EXAMINATION: US ABDOMEN COMPLETE CLINICAL HISTORY: abdominal pain; COMPARISON: Concurrent CT. FINDINGS: Grayscale, color and spectral Doppler evaluation of the abdomen. No focal abnormality of the limited visualized pancreas. Visualized portions of the aorta and inferior vena cava are normal in caliber. Cirrhotic liver with no focal suspicious liver lesions seen.  Normal hepatopetal flow is noted in the portal vein. There are gallstones.  There is some nonspecific gallbladder wall thickening.  The common bile duct is normal in caliber  and measures 4 mm. The right kidney measures 11 cm, while the left measures 13 cm.  No hydronephrosis.  Left renal calculus better demonstrated on CT. The spleen is normal in size and echogenicity  and measures 10 cm. Urinary bladder unremarkable. There is moderate ascites.     1. Cirrhosis with patent portal vein. 2. Moderate ascites. 3. Cholelithiasis with nonspecific gallbladder wall thickening.  No biliary ductal dilatation. Electronically signed by: Andriy Laws Date:    10/16/2023 Time:    10:20    US Retroperitoneal Complete    Result Date: 10/16/2023  EXAMINATION: US RETROPERITONEAL COMPLETE CLINICAL HISTORY: elevated BUN/Cr; COMPARISON: CT earlier today FINDINGS: Grayscale and color Doppler sonographic evaluation of the kidneys and urinary bladder. The right kidney measures 10 cm. The left kidney measures 12 cm.   No hydronephrosis.  Grossly normal renal parenchymal echogenicity. No significant abnormality of the urinary bladder. There is moderate ascites.     1. No significant sonographic abnormality of the kidneys. 2. Moderate ascites. Electronically signed by: Andriy Laws Date:    10/16/2023 Time:    09:35    CT Abdomen Pelvis  Without Contrast    Result Date: 10/16/2023  Technique:CT of the abdomen and pelvis was performed with axial images as well as sagittal and coronal reconstruction images without intravenous contrast. Comparison:None available. Clinical  History:Abdominal pain. Dosage Information:Automated Exposure Control was utilized. Findings: Thorax: Lungs:There is a small right pleural effusion. There is right basilar consolidation with ground-glass opacities in the left lower lobe, which may reflect an infectious process. Heart:The heart size is within normal limits. Abdomen: Abdominal Wall:No abdominal wall pathology is seen. Liver:The margins of the liver are nodular and the liver is somewhat shrunken. Within limitation noncontrast technique, no acute findings the liver, pancreas and spleen identified. Biliary System:No intrahepatic or extrahepatic biliary duct dilatation is seen. Gallbladder:Multiple calculi are noted within a mildly distended gallbladder. The gallbladder otherwise can not be definitively evaluated as there is pronounced ascites throughout the abdomen and pelvis such that pericholecystic fluid and inflammatory change and even wall thickening is not accurately identifiable. Adrenals:The adrenal glands appear unremarkable. Kidneys:The right kidney appears unremarkable with no stones cysts masses or hydronephrosis. A single stone measuring 9 mm is seen on series 2, image 64 in the mid pole of the left kidney. Aorta:There is mild calcification of the abdominal aorta and its branches. Bowel: Esophagus:The visualized esophagus appears unremarkable. Stomach:The stomach appears unremarkable. Duodenum:Unremarkable appearing duodenum. Small Bowel:The small bowel appears unremarkable. Colon:Nondistended. Appendix:The appendix appears unremarkable (series 2, image 117). Peritoneum:No free intraperitoneal air is seen. There is pronounced ascites consistent with cirrhosis. Pelvis: Bladder:The bladder appears unremarkable. Male: Prostate gland:The prostate gland appears unremarkable. Bony structures: Dorsal Spine:There is mild spondylosis of the visualized dorsal spine. Bony Pelvis:The visualized bony structures of the pelvis appear unremarkable.      Impression: 1. There is a small right pleural effusion. There is right basilar consolidation with ground-glass opacities in the left lower lobe, which may reflect an infectious process. Correlate clinically as regards further evaluation and followup. 2. The margins of the liver are nodular and the liver is somewhat shrunken. These findings are consistent with cirrhosis. Correlate with clinical and laboratory findings. 3. Multiple calculi are noted within a mildly distended gallbladder. The gallbladder otherwise can not be definitively evaluated as there is pronounced ascites throughout the abdomen and pelvis such that pericholecystic fluid and inflammatory change and even wall thickening is not accurately identifiable. Correlate with clinical and laboratory findings as regards additional evaluation and follow-up of the gallbladder. 4. There is pronounced ascites consistent with cirrhosis. 5. Details and other findings as discussed above. No significant discrepancy with overnight report. Electronically signed by: Mat Sommers Date:    10/16/2023 Time:    08:22         Assessment/Plan:  63 yo AAM unknown to our group with a pmhx of HTN.  Patient was apparently brought to the ED after his uncle whom he lives with was concerned about his behavior, reportedly talking to himself. Found to have SOCORRO and imaging concerning for cirrhosis and ascites for which GI was consulted.      Cirrhosis - apparent new dx   MELD 3.0: 19 at 10/16/2023  Etiology: ? Will discuss etoh use with pt when alert. HCV Ab+.  Check viral laod.    Ascites: See below  Screening for varices: none prior.   Encephalopathy: Ammonia normal in ED and no asterixis.  Do not think he has HE clinically.   HCC Screen: imaging this admit without lesion. F/U AFP.      2.  Ascites  s/p paracentesis 10/17/23  - f/u fluid studies.   - given albumin.   - renal has been consulted for SOCORRO.  Will defer diuresis to them.        Dedra Wheeler PA-C

## 2023-10-17 NOTE — PROGRESS NOTES
"10/17/2023  Aman Humphrey   1960   [id number]            Psychiatry Inpatient Admission Note    Date of Admission: 10/15/2023 10:26 PM    Chief Complaint: "I feel up and down".    SUBJECTIVE:   History of Present Illness:   Aman Humphrey is a 62 y.o. male with history of HTN. +stress: non-compliance with meds and homeless. He was recently diagnosed with cirrhosis with ascites, AKD, mild hyperbilirubinemia/transaminitis. He was brought to ED after his uncle observed him acting bizarrely. UDS + cocaine. Psych consult ordered for med management.   At time of visit, he was lying in bed. He had poor eye contact. He was uncooperative and resistant with completing eval. He reports he has never felt like this before. He had difficulty describing his symptoms. He reports he feels drained. He reports he does not have history of depression. He reports he feels discouraged because he cannot find a home. He reports he has been homeless after he and his fiance broke up. He reports they were together for 10 years. He reports he does not know why the relationship terminated. He reports he sleeps ok. Appetite is fair. He denies hallucinations and delusional thoughts.         Past Psychiatric History:   Previous Psychiatric Hospitalizations: he denies   Previous Medication Trials: he denies  Previous Suicide Attempts: he denies   Outpatient psychiatrist: he denies    Past Medical/Surgical History:   Past Medical History:   Diagnosis Date    Carpal tunnel syndrome     HTN (hypertension)     Sciatica      Past Surgical History:   Procedure Laterality Date    SKIN GRAFT       Family Psychiatric History:   He denies     Allergies:   Review of patient's allergies indicates:  No Known Allergies    Substance Abuse History:   Tobacco: he denies  Alcohol: he denies  Illicit Substances: minimizing cocaine use, refusing to disclose, "That is not the problem".  Treatment: he denies      Current Medications:   Home Psychiatric Meds: he " "denies    Scheduled Meds:    azithromycin  500 mg Intravenous Q24H    cefTRIAXone (ROCEPHIN) IVPB  1 g Intravenous Q24H    enoxparin  30 mg Subcutaneous Daily      PRN Meds: haloperidol lactate, melatonin, ondansetron, promethazine, sodium chloride 0.9%   Psychotherapeutics (From admission, onward)      Start     Stop Route Frequency Ordered    10/16/23 0111  haloperidol lactate injection 2 mg         -- IM Every 6 hours PRN 10/16/23 0011          Social History:  Housing Status: homeless  Relationship Status/Sexual Orientation: heterosexual   Children: 2  Education: 12th grade   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    history: he denies  History of physical/sexual abuse: he denies   Access to gun: he denies     Legal History:   Past Charges/Incarcerations: "way, way, way back"   Pending charges: he denies      OBJECTIVE:       Vitals   Vitals:    10/17/23 1523   BP: (!) 177/83   Pulse: 65   Resp: 18   Temp: 98.5 °F (36.9 °C)        Labs/Imaging/Studies:   Recent Results (from the past 48 hour(s))   Comprehensive metabolic panel    Collection Time: 10/15/23 10:38 PM   Result Value Ref Range    Sodium Level 140 136 - 145 mmol/L    Potassium Level 5.3 (H) 3.5 - 5.1 mmol/L    Chloride 110 (H) 98 - 107 mmol/L    Carbon Dioxide 21 (L) 23 - 31 mmol/L    Glucose Level 118 (H) 82 - 115 mg/dL    Blood Urea Nitrogen 58.4 (H) 8.4 - 25.7 mg/dL    Creatinine 2.20 (H) 0.73 - 1.18 mg/dL    Calcium Level Total 8.1 (L) 8.8 - 10.0 mg/dL    Protein Total 8.1 (H) 5.8 - 7.6 gm/dL    Albumin Level 1.8 (L) 3.4 - 4.8 g/dL    Globulin 6.3 (H) 2.4 - 3.5 gm/dL    Albumin/Globulin Ratio 0.3 (L) 1.1 - 2.0 ratio    Bilirubin Total 1.7 (H) <=1.5 mg/dL    Alkaline Phosphatase 515 (H) 40 - 150 unit/L    Alanine Aminotransferase 32 0 - 55 unit/L    Aspartate Aminotransferase 83 (H) 5 - 34 unit/L    eGFR 33 mls/min/1.73/m2   Ethanol    Collection Time: 10/15/23 10:38 PM   Result Value Ref Range    Ethanol Level <10.0 <=10.0 mg/dL "   Troponin I    Collection Time: 10/15/23 10:38 PM   Result Value Ref Range    Troponin-I <0.010 0.000 - 0.045 ng/mL   CBC with Differential    Collection Time: 10/15/23 10:38 PM   Result Value Ref Range    WBC 8.97 4.50 - 11.50 x10(3)/mcL    RBC 4.55 (L) 4.70 - 6.10 x10(6)/mcL    Hgb 13.3 (L) 14.0 - 18.0 g/dL    Hct 39.1 (L) 42.0 - 52.0 %    MCV 85.9 80.0 - 94.0 fL    MCH 29.2 27.0 - 31.0 pg    MCHC 34.0 33.0 - 36.0 g/dL    RDW 16.0 11.5 - 17.0 %    Platelet 305 130 - 400 x10(3)/mcL    MPV 10.4 7.4 - 10.4 fL    Neut % 69.3 %    Lymph % 19.0 %    Mono % 9.7 %    Eos % 1.0 %    Basophil % 0.4 %    Lymph # 1.70 0.6 - 4.6 x10(3)/mcL    Neut # 6.22 2.1 - 9.2 x10(3)/mcL    Mono # 0.87 0.1 - 1.3 x10(3)/mcL    Eos # 0.09 0 - 0.9 x10(3)/mcL    Baso # 0.04 <=0.2 x10(3)/mcL    IG# 0.05 (H) 0 - 0.04 x10(3)/mcL    IG% 0.6 %    NRBC% 0.0 %   Lipase    Collection Time: 10/15/23 10:38 PM   Result Value Ref Range    Lipase Level 104 (H) <=60 U/L   CK    Collection Time: 10/15/23 10:38 PM   Result Value Ref Range    Creatine Kinase 21 (L) 30 - 200 U/L   COVID/FLU A&B PCR    Collection Time: 10/15/23 10:39 PM   Result Value Ref Range    Influenza A PCR Not Detected Not Detected    Influenza B PCR Not Detected Not Detected    SARS-CoV-2 PCR Not Detected Not Detected, Negative, Invalid   Drug Screen, Urine    Collection Time: 10/15/23 11:41 PM   Result Value Ref Range    Amphetamines, Urine Negative Negative    Barbituates, Urine Negative Negative    Benzodiazepine, Urine Negative Negative    Cannabinoids, Urine Negative Negative    Cocaine, Urine Positive (A) Negative    Fentanyl, Urine Negative Negative    MDMA, Urine Negative Negative    Opiates, Urine Negative Negative    Phencyclidine, Urine Negative Negative    pH, Urine 6.0 3.0 - 11.0    Specific Gravity, Urine Auto 1.016 1.001 - 1.035   Urinalysis, Reflex to Urine Culture    Collection Time: 10/15/23 11:41 PM    Specimen: Urine, Clean Catch   Result Value Ref Range    Color, UA  Dark Yellow Yellow, Light-Yellow, Dark Yellow, Carline, Straw    Appearance, UA Clear Clear    Specific Gravity, UA 1.016 1.005 - 1.030    pH, UA 6.0 5.0 - 8.5    Protein, UA Negative Negative    Glucose, UA Negative Negative, Normal    Ketones, UA Negative Negative    Blood, UA 3+ (A) Negative    Bilirubin, UA Negative Negative    Urobilinogen, UA 4.0 (A) 0.2, 1.0, Normal    Nitrites, UA Negative Negative    Leukocyte Esterase, UA Trace (A) Negative   Urinalysis, Microscopic    Collection Time: 10/15/23 11:41 PM   Result Value Ref Range    RBC, UA 11 (H) <=5 /HPF    WBC, UA 8 (H) <=5 /HPF    Squamous Epithelial Cells, UA <5 <=5 /HPF    Bacteria, UA None Seen None Seen, Rare, Occasional /HPF   Protein/Creatinine Ratio, Urine    Collection Time: 10/16/23 12:02 AM   Result Value Ref Range    Urine Protein Level 18.9 mg/dL    Urine Creatinine 118.9 63.0 - 166.0 mg/dL    Urine Protein/Creatinine Ratio 0.2    Osmolality, Urine    Collection Time: 10/16/23 12:02 AM   Result Value Ref Range    Urine Osmolality 495 300 - 1,300 mOsm/kg   Creatinine, Random Urine    Collection Time: 10/16/23 12:02 AM   Result Value Ref Range    Urine Creatinine 118.0 63.0 - 166.0 mg/dL   Sodium, Random Urine    Collection Time: 10/16/23 12:02 AM   Result Value Ref Range    Urine Sodium 37.0 mmol/L   C3 Complement    Collection Time: 10/16/23  2:25 AM   Result Value Ref Range    C3 Complement 42 (L) 80 - 173 mg/dL   C4 Complement    Collection Time: 10/16/23  2:25 AM   Result Value Ref Range    C4 Complement 7.0 (L) 13.0 - 46.0 mg/dL   HIV 1/2 Ag/Ab (4th Gen)    Collection Time: 10/16/23  2:25 AM   Result Value Ref Range    HIV Nonreactive Nonreactive   APTT    Collection Time: 10/16/23  2:25 AM   Result Value Ref Range    PTT 32.6 23.2 - 33.7 seconds   Protime-INR    Collection Time: 10/16/23  2:25 AM   Result Value Ref Range    PT 15.2 (H) 12.5 - 14.5 seconds    INR 1.2 <=1.3   Comprehensive metabolic panel    Collection Time: 10/16/23  2:25  AM   Result Value Ref Range    Sodium Level 139 136 - 145 mmol/L    Potassium Level 4.0 3.5 - 5.1 mmol/L    Chloride 111 (H) 98 - 107 mmol/L    Carbon Dioxide 19 (L) 23 - 31 mmol/L    Glucose Level 110 82 - 115 mg/dL    Blood Urea Nitrogen 57.8 (H) 8.4 - 25.7 mg/dL    Creatinine 2.02 (H) 0.73 - 1.18 mg/dL    Calcium Level Total 8.1 (L) 8.8 - 10.0 mg/dL    Protein Total 9.0 (H) 5.8 - 7.6 gm/dL    Albumin Level 2.0 (L) 3.4 - 4.8 g/dL    Globulin 7.0 (H) 2.4 - 3.5 gm/dL    Albumin/Globulin Ratio 0.3 (L) 1.1 - 2.0 ratio    Bilirubin Total 1.9 (H) <=1.5 mg/dL    Alkaline Phosphatase 540 (H) 40 - 150 unit/L    Alanine Aminotransferase 36 0 - 55 unit/L    Aspartate Aminotransferase 93 (H) 5 - 34 unit/L    eGFR 37 mls/min/1.73/m2   CBC with Differential    Collection Time: 10/16/23  2:25 AM   Result Value Ref Range    WBC 11.36 4.50 - 11.50 x10(3)/mcL    RBC 4.64 (L) 4.70 - 6.10 x10(6)/mcL    Hgb 13.4 (L) 14.0 - 18.0 g/dL    Hct 39.9 (L) 42.0 - 52.0 %    MCV 86.0 80.0 - 94.0 fL    MCH 28.9 27.0 - 31.0 pg    MCHC 33.6 33.0 - 36.0 g/dL    RDW 16.2 11.5 - 17.0 %    Platelet 295 130 - 400 x10(3)/mcL    MPV 10.2 7.4 - 10.4 fL    Neut % 67.3 %    Lymph % 20.9 %    Mono % 10.0 %    Eos % 0.7 %    Basophil % 0.4 %    Lymph # 2.37 0.6 - 4.6 x10(3)/mcL    Neut # 7.65 2.1 - 9.2 x10(3)/mcL    Mono # 1.14 0.1 - 1.3 x10(3)/mcL    Eos # 0.08 0 - 0.9 x10(3)/mcL    Baso # 0.04 <=0.2 x10(3)/mcL    IG# 0.08 (H) 0 - 0.04 x10(3)/mcL    IG% 0.7 %    NRBC% 0.0 %   Hepatitis Panel, Acute    Collection Time: 10/16/23  2:29 AM   Result Value Ref Range    Hepatitis A IgM Nonreactive Nonreactive    Hepatitis B Core IgM Nonreactive Nonreactive    Hepatitis B Surface Antigen Nonreactive Nonreactive    Hep C Ab Interp Reactive (A) Nonreactive   Ammonia    Collection Time: 10/16/23  5:49 AM   Result Value Ref Range    Ammonia Level 68.8 18.0 - 72.0 umol/L   Blood Culture    Collection Time: 10/16/23  5:49 AM    Specimen: Blood, Venous   Result Value Ref  Range    CULTURE, BLOOD ([facility]) No Growth At 24 Hours    Blood Culture    Collection Time: 10/16/23  5:49 AM    Specimen: Blood, Venous   Result Value Ref Range    CULTURE, BLOOD ([facility]) No Growth At 24 Hours    Respiratory Panel    Collection Time: 10/16/23  6:14 AM   Result Value Ref Range    Adenovirus Not Detected Not Detected    Coronavirus 229E Not Detected Not Detected    Coronavirus HKU1 Not Detected Not Detected    Coronavirus NL63 Not Detected Not Detected    Coronavirus OC43 PCR, Common Cold Virus Not Detected Not Detected    Human Metapneumovirus Not Detected Not Detected    Parainfluenza Virus 1 Not Detected Not Detected    Parainfluenza Virus 2 Not Detected Not Detected    Parainfluenza Virus 3 Not Detected Not Detected    Parainfluenza Virus 4 Not Detected Not Detected    Bordetella pertussis (ptxP) Not Detected Not Detected    Chlamydia pneumoniae Not Detected Not Detected    Mycoplasma pneumoniae Not Detected Not Detected    Human Rhinovirus/Enterovirus Not Detected Not Detected    Bordetella parapertussis ([id number]) Not Detected Not Detected   Comprehensive Metabolic Panel    Collection Time: 10/17/23  4:22 AM   Result Value Ref Range    Sodium Level 143 136 - 145 mmol/L    Potassium Level 4.8 3.5 - 5.1 mmol/L    Chloride 116 (H) 98 - 107 mmol/L    Carbon Dioxide 22 (L) 23 - 31 mmol/L    Glucose Level 82 82 - 115 mg/dL    Blood Urea Nitrogen 53.0 (H) 8.4 - 25.7 mg/dL    Creatinine 1.96 (H) 0.73 - 1.18 mg/dL    Calcium Level Total 7.4 (L) 8.8 - 10.0 mg/dL    Protein Total 6.6 5.8 - 7.6 gm/dL    Albumin Level 1.5 (L) 3.4 - 4.8 g/dL    Globulin 5.1 (H) 2.4 - 3.5 gm/dL    Albumin/Globulin Ratio 0.3 (L) 1.1 - 2.0 ratio    Bilirubin Total 1.7 (H) <=1.5 mg/dL    Alkaline Phosphatase 377 (H) 40 - 150 unit/L    Alanine Aminotransferase 26 0 - 55 unit/L    Aspartate Aminotransferase 66 (H) 5 - 34 unit/L    eGFR 38 mls/min/1.73/m2   CBC with Differential    Collection Time: 10/17/23  4:22 AM   Result Value Ref  "Range    WBC 10.68 4.50 - 11.50 x10(3)/mcL    RBC 3.75 (L) 4.70 - 6.10 x10(6)/mcL    Hgb 10.9 (L) 14.0 - 18.0 g/dL    Hct 32.2 (L) 42.0 - 52.0 %    MCV 85.9 80.0 - 94.0 fL    MCH 29.1 27.0 - 31.0 pg    MCHC 33.9 33.0 - 36.0 g/dL    RDW 16.9 11.5 - 17.0 %    Platelet 308 130 - 400 x10(3)/mcL    MPV 10.0 7.4 - 10.4 fL    Neut % 61.4 %    Lymph % 22.7 %    Mono % 13.5 %    Eos % 1.2 %    Basophil % 0.5 %    Lymph # 2.42 0.6 - 4.6 x10(3)/mcL    Neut # 6.57 2.1 - 9.2 x10(3)/mcL    Mono # 1.44 (H) 0.1 - 1.3 x10(3)/mcL    Eos # 0.13 0 - 0.9 x10(3)/mcL    Baso # 0.05 <=0.2 x10(3)/mcL    IG# 0.07 (H) 0 - 0.04 x10(3)/mcL    IG% 0.7 %    NRBC% 0.0 %   AFP Tumor Marker    Collection Time: 10/17/23  4:22 AM   Result Value Ref Range    Alpha Fetoprotein Level 19.20 (H) <=8.90 ng/mL   LD, Body Fluid    Collection Time: 10/17/23 12:47 PM   Result Value Ref Range    Lactate Dehydrogenase Body Fluid 47 U/L   Amylase, Body Fluid    Collection Time: 10/17/23 12:47 PM   Result Value Ref Range    Amylase Body Fluid 101 U/L   Glucose, Body Fluid    Collection Time: 10/17/23 12:47 PM   Result Value Ref Range    Glucose Body Fluid 140 mg/dL   Albumin, Body Fluid    Collection Time: 10/17/23 12:47 PM   Result Value Ref Range    Albumin Level Body Fluid <0.4 gm/dL   Protein, Body Fluid    Collection Time: 10/17/23 12:47 PM   Result Value Ref Range    Protein Body Fluid 1.0 gm/dL   Cell Count, Body Fluid    Collection Time: 10/17/23 12:47 PM   Result Value Ref Range    WBC  /uL   Differential, Body Fluid    Collection Time: 10/17/23 12:47 PM   Result Value Ref Range    Neutrophils % 9 %    Lymphocyte % 43 %    Monocyte % 48 %    Macrophage count 2     Mesothelial count 1       No results found for: "PHENYTOIN", "PHENOBARB", "VALPROATE", "CBMZ"    Psychiatric Mental Status Exam:  General Appearance: dressed in hospital garb, lying in bed, appears older than stated age, poorly groomed, disheveled  Arousal: drowsy  Behavior: reluctant to " participate, minimal responses, agitated  Movements and Motor Activity: no abnormal involuntary movements noted  Orientation: oriented to person, place, and time  Speech: responds to questions minimally/briefly  Mood: Depressed and Irritable  Affect: blunted  Thought Process: circumstantial, concrete  Associations: no loosening of associations  Thought Content and Perceptions: Unwilling to Participate  Recent and Remote Memory: Unwilling to Participate; per interview/observation with patient  Attention and Concentration: Unwilling to Participate; per interview/observation with patient  Fund of Knowledge: Unwilling to Participate; based on history, vocabulary, fund of knowledge, syntax, grammar, and content  Insight: poor; based on understanding of severity of illness and HPI  Judgment: poor; based on patient's behavior and HPI      ASSESSMENT/PLAN:   Diagnoses:  SUBSTANCE-RELATED DISORDERS; Cocaine-Related Disorders; Cocaine Abuse  and MOOD DISORDERS; Major Depressive Disorder, Recurrent: Moderate (F33.1)       Past Medical History:   Diagnosis Date    Carpal tunnel syndrome     HTN (hypertension)     Sciatica         Problem lists and Management Plans:  Lexapro 10mg po qam  Abilify 5mg po qam  Re-consult psych if needed     On this date, I have reviewed the medical history and Nursing Assessment, as well as records from referral source.  I have evaluated the mental status of the above named person and concur with the findings of all assessments.  I have provided medical direction for the development of the Treatment Plan.      Emelyn Marte

## 2023-10-17 NOTE — CONSULTS
"Nephrology on call  Consults  Chief Complaint   Patient presents with    Fatigue     EMS reports pt's uncle called because pt was "talking to himself". Pt denies A/V hallucinations. Pt reports feelings of sadness and vague complaints stating "I just don't feel right." Pt actively denies SI/HI. States is homeless and requesting food and "voucher". Denies any specific complaints.     HPI: 62-year-old male consulted for SOCORRO. Presented to ER on 10/15/23 after his uncle with whom he lives was concerned about his behavior.  He was reportedly talking to himself though patient denies this was the case.  He does endorse that he has been using cocaine. His workup in the ER was significant for acute kidney injury, ascites, as well as mild hyperbilirubinemia, transaminitis.  He was started on IV fluids admitted to the hospitalist service for further management. He has a history of hypertension, reportedly not compliant with his meds.  He was seen by GI for cirrhosis of unknown etiology and ordered paracentesis and HIDA, but he refused paracentesis.      Review of Systems   All other systems negative except for HPI      Past Medical History:   Diagnosis Date    Carpal tunnel syndrome     HTN (hypertension)     Sciatica         Past Surgical History:   Procedure Laterality Date    SKIN GRAFT        History reviewed. No pertinent family history.     Social History     Socioeconomic History    Marital status: Single   Tobacco Use    Smoking status: Some Days     Types: Cigarettes    Smokeless tobacco: Never   Substance and Sexual Activity    Alcohol use: Yes     Comment: daily      Review of patient's allergies indicates:  No Known Allergies  No current outpatient medications  Objective   VITAL SIGNS: 24 HR MIN & MAX LAST    Temp  Min: 97.2 °F (36.2 °C)  Max: 98.1 °F (36.7 °C)  97.4 °F (36.3 °C)        BP  Min: 105/51  Max: 201/97  123/65     Pulse  Min: 54  Max: 89  (!) 54     Resp  Min: 8  Max: 19  19    SpO2  Min: 94 %  Max: 100 " %  98 %      Wt Readings from Last 3 Encounters:   10/15/23 68 kg (150 lb)   08/14/23 65.8 kg (145 lb)   07/18/18 67.7 kg (149 lb 4 oz)       Intake/Output Summary (Last 24 hours) at 10/16/2023 1910  Last data filed at 10/16/2023 0507  Gross per 24 hour   Intake 514.58 ml   Output --   Net 514.58 ml     General:  Thin  Psychiatric:  Drowsy.    Eye:  Within normal limits, Normal conjunctiva.    HENT:  Normocephalic, Oral mucosa is moist.    Respiratory: Bilaterally CTA, un-labored.    Cardiovascular:  Normal rate, Regular rhythm, No murmur, No edema.    Gastrointestinal:  Protuberant, TTP  Musculoskeletal:  Normal strength, No deformity.    Integumentary:  Warm, No rash.    Recent Labs     10/15/23  2238 10/16/23  0225    139   K 5.3* 4.0   CHLORIDE 110* 111*   CO2 21* 19*   BUN 58.4* 57.8*   CREATININE 2.20* 2.02*   GLUCOSE 118* 110   CALCIUM 8.1* 8.1*   ALBUMIN 1.8* 2.0*      Recent Labs     10/15/23  2238 10/16/23  0225   WBC 8.97 11.36   HGB 13.3* 13.4*    295     NM Hepatobiliary Scan (HIDA)   Final Result      Negative for acute cholecystitis.         Electronically signed by: Mat Sommers   Date:    10/16/2023   Time:    14:15      US Retroperitoneal Complete   Final Result      1. No significant sonographic abnormality of the kidneys.   2. Moderate ascites.         Electronically signed by: Andriy Laws   Date:    10/16/2023   Time:    09:35      US Abdomen Complete   Final Result      1. Cirrhosis with patent portal vein.   2. Moderate ascites.   3. Cholelithiasis with nonspecific gallbladder wall thickening.  No biliary ductal dilatation.         Electronically signed by: Andriy Laws   Date:    10/16/2023   Time:    10:20      CT Abdomen Pelvis  Without Contrast   Final Result   Impression:      1. There is a small right pleural effusion. There is right basilar consolidation with ground-glass opacities in the left lower lobe, which may reflect an infectious process. Correlate clinically as  regards further evaluation and followup.      2. The margins of the liver are nodular and the liver is somewhat shrunken. These findings are consistent with cirrhosis. Correlate with clinical and laboratory findings.      3. Multiple calculi are noted within a mildly distended gallbladder. The gallbladder otherwise can not be definitively evaluated as there is pronounced ascites throughout the abdomen and pelvis such that pericholecystic fluid and inflammatory change and even wall thickening is not accurately identifiable. Correlate with clinical and laboratory findings as regards additional evaluation and follow-up of the gallbladder.      4. There is pronounced ascites consistent with cirrhosis.      5. Details and other findings as discussed above.      No significant discrepancy with overnight report.         Electronically signed by: Mat Sommesr   Date:    10/16/2023   Time:    08:22      US Paracentesis inc Imaging    (Results Pending)        ASSESSMENT PLAN    Alt MS    SOCORRO, prerenal  Met Acid  Cirrhosis, ascites  HTN  Cholelithiasis with neg HIDA  Substance abuse    Monitor acid and start PO Bicarb if worse  Hold off on diuresis today  OK to try diuresis if SOCORRO improves by tomorrow but I think paracentesis would be more helpful at this time.          Ced Gallardo M.D.  Nephrology

## 2023-10-18 LAB
ALBUMIN SERPL-MCNC: 1.7 G/DL (ref 3.4–4.8)
ALBUMIN/GLOB SERPL: 0.3 RATIO (ref 1.1–2)
ALP SERPL-CCNC: 363 UNIT/L (ref 40–150)
ALT SERPL-CCNC: 28 UNIT/L (ref 0–55)
AST SERPL-CCNC: 81 UNIT/L (ref 5–34)
BILIRUB SERPL-MCNC: 1.5 MG/DL
BUN SERPL-MCNC: 39.7 MG/DL (ref 8.4–25.7)
CALCIUM SERPL-MCNC: 7.7 MG/DL (ref 8.8–10)
CHLORIDE SERPL-SCNC: 116 MMOL/L (ref 98–107)
CO2 SERPL-SCNC: 22 MMOL/L (ref 23–31)
CREAT SERPL-MCNC: 1.64 MG/DL (ref 0.73–1.18)
GFR SERPLBLD CREATININE-BSD FMLA CKD-EPI: 47 MLS/MIN/1.73/M2
GLOBULIN SER-MCNC: 4.9 GM/DL (ref 2.4–3.5)
GLUCOSE SERPL-MCNC: 96 MG/DL (ref 82–115)
HCV RNA SERPL NAA+PROBE-ACNC: ABNORMAL IU/ML
MAGNESIUM SERPL-MCNC: 1.9 MG/DL (ref 1.6–2.6)
POTASSIUM SERPL-SCNC: 4.6 MMOL/L (ref 3.5–5.1)
PROT SERPL-MCNC: 6.6 GM/DL (ref 5.8–7.6)
SODIUM SERPL-SCNC: 141 MMOL/L (ref 136–145)

## 2023-10-18 PROCEDURE — 83735 ASSAY OF MAGNESIUM: CPT | Performed by: INTERNAL MEDICINE

## 2023-10-18 PROCEDURE — 63600175 PHARM REV CODE 636 W HCPCS: Performed by: INTERNAL MEDICINE

## 2023-10-18 PROCEDURE — 11000001 HC ACUTE MED/SURG PRIVATE ROOM

## 2023-10-18 PROCEDURE — 80053 COMPREHEN METABOLIC PANEL: CPT | Performed by: INTERNAL MEDICINE

## 2023-10-18 PROCEDURE — 25000003 PHARM REV CODE 250: Performed by: INTERNAL MEDICINE

## 2023-10-18 PROCEDURE — 25500020 PHARM REV CODE 255: Performed by: INTERNAL MEDICINE

## 2023-10-18 PROCEDURE — 25000003 PHARM REV CODE 250: Performed by: NURSE PRACTITIONER

## 2023-10-18 RX ORDER — HYDRALAZINE HYDROCHLORIDE 50 MG/1
50 TABLET, FILM COATED ORAL EVERY 8 HOURS
Status: DISCONTINUED | OUTPATIENT
Start: 2023-10-18 | End: 2023-10-23 | Stop reason: HOSPADM

## 2023-10-18 RX ORDER — AMLODIPINE BESYLATE 5 MG/1
10 TABLET ORAL DAILY
Status: DISCONTINUED | OUTPATIENT
Start: 2023-10-18 | End: 2023-10-23 | Stop reason: HOSPADM

## 2023-10-18 RX ORDER — HYDRALAZINE HYDROCHLORIDE 20 MG/ML
10 INJECTION INTRAMUSCULAR; INTRAVENOUS EVERY 4 HOURS PRN
Status: DISCONTINUED | OUTPATIENT
Start: 2023-10-18 | End: 2023-10-23 | Stop reason: HOSPADM

## 2023-10-18 RX ORDER — OXYCODONE HYDROCHLORIDE 5 MG/1
5 TABLET ORAL ONCE
Status: COMPLETED | OUTPATIENT
Start: 2023-10-18 | End: 2023-10-18

## 2023-10-18 RX ADMIN — ARIPIPRAZOLE 5 MG: 5 TABLET ORAL at 10:10

## 2023-10-18 RX ADMIN — HYDRALAZINE HYDROCHLORIDE 50 MG: 50 TABLET, FILM COATED ORAL at 10:10

## 2023-10-18 RX ADMIN — OXYCODONE HYDROCHLORIDE 5 MG: 5 TABLET ORAL at 10:10

## 2023-10-18 RX ADMIN — AZITHROMYCIN MONOHYDRATE 500 MG: 500 INJECTION, POWDER, LYOPHILIZED, FOR SOLUTION INTRAVENOUS at 04:10

## 2023-10-18 RX ADMIN — Medication 6 MG: at 09:10

## 2023-10-18 RX ADMIN — HYDRALAZINE HYDROCHLORIDE 50 MG: 50 TABLET, FILM COATED ORAL at 09:10

## 2023-10-18 RX ADMIN — CEFTRIAXONE 1 G: 1 INJECTION, POWDER, FOR SOLUTION INTRAMUSCULAR; INTRAVENOUS at 11:10

## 2023-10-18 RX ADMIN — IOPAMIDOL 100 ML: 755 INJECTION, SOLUTION INTRAVENOUS at 05:10

## 2023-10-18 NOTE — PLAN OF CARE
Patient refused to answer questions for admission assessment. States that he is trying to find a ride out of here

## 2023-10-18 NOTE — PROGRESS NOTES
[facility]  Hospital Medicine Progress Note        CHIEF COMPLAINT   Weakness      HISTORY OF PRESENT ILLNESS:   Patient is a 62-year-old male with a history of hypertension (report not compliant with his meds for some time) who was brought to the ER after his uncle with whom he lives was concerned about his behavior.  He was reportedly talking to himself though patient denies this was the case.  He does endorse that he has been using cocaine, in his concern now he does not have a place to live.  He states he is not been feeling normal over last few weeks but will not go into further detail as he is excessively crying at bedside.  He denies suicidal homicidal ideations.  He states he would just like a home to have to visit his grandchildren.     His workup in the ER was significant for acute kidney injury as well as mild hyperbilirubinemia, transaminitis.  He was started on IV fluids admitted to the hospitalist service for further management.  HIDA scan is negative for acute cholecystitis   Patient started on antibiotics for possible pneumonia           Patient admitted for Hepatitis C decompensated cirrhosis with ascites requiring paracentesis. Ultrasound-guided paracentesis was done on 10/17 with removal of 1.2 L of fluid.  Alpha fetoprotein is elevated significantly at 19.2.  GI has ordered an MRI with and without contrast to further evaluate the liver.  Creatinine is improving and Nephrology has signed off.       Today's information   Patient seen and examined at bedside , no family at bedside   Patient complaining of his abdomen getting swollen again  Overnight he underwent ultrasound-guided paracentesis with removal of 1.2 L of fluid  Blood pressure significantly elevated today   Creatinine levels of 1.64, improving  AFP significantly elevated at 19.2   Patient requesting not to share information with his family members  Patient also not very attentive to what is going on he  demonstrates poor insight  Also threatening to leave AMA        exam  GENERAL: awake, alert, oriented and in no acute distress, non-toxic appearing   HEENT: normocephalic atraumatic   NECK: supple   LUNGS: Clear bilaterally, no wheezing or rales, no accessory muscle use   CVS: Regular rate and rhythm, normal peripheral perfusion  ABD: Soft, diffuse tenderness, mildly distended  EXTREMITIES: no clubbing or cyanosis  SKIN: Warm, dry.   NEURO: alert and oriented, grossly without focal deficits   PSYCHIATRIC: Cooperative        ASSESSMENT & PLAN:   Hep C decompensated liver cirrhosis with ascites with elevated AFP levels  Hepatitis C positive   Hyperbilirubinemia/transaminitis, abdominal pain   Acute kidney injury, improving   Essential HTN, uncontrolled   Possible CAP on imaging   Crack cocaine abuse by inhalation  Reported homelessness        Plan  Patient complaining of his abdomen getting swollen again  Overnight he underwent ultrasound-guided paracentesis with removal of 1.2 L of fluid  Hep C Ab positive - will need more information if treated or not, f/up viral load , for out pt f/up with ID   AFP significantly elevated at 19.2   GI has ordered an MRI with and without contrast to further evaluate the liver.   Creatinine is improving and Nephrology has signed off.  Blood pressure significantly elevated today    Begin p.o. amlodipine 10 mg daily and p.o. hydralazine 50 mg q.8 hourly  IV hydralazine p.r.n. with parameters  HIDA scan is negative for acute cholecystitis   Has completed azithro for 3 days   Change to IV ceftriaxone, day 3/5  encourage crack cocaine cessation   case management consultation, psych consult    Consult palliative Care for goals of care discussion        DVT prophylaxis: lovenox  Code status: full      Dispo- TBD       VITAL SIGNS: 24 HRS MIN & MAX LAST   Temp  Min: 98.2 °F (36.8 °C)  Max: 98.5 °F (36.9 °C) 98.2 °F (36.8 °C)   BP  Min: 151/76  Max: 177/83 (!) 161/76   Pulse  Min: 65  Max: 80   69   Resp  Min: 18  Max: 20 20   SpO2  Min: 95 %  Max: 99 % 97 %     I have reviewed the following labs:  Recent Labs   Lab 10/15/23  2238 10/16/23  0225 10/17/23  0422   WBC 8.97 11.36 10.68   RBC 4.55* 4.64* 3.75*   HGB 13.3* 13.4* 10.9*   HCT 39.1* 39.9* 32.2*   MCV 85.9 86.0 85.9   MCH 29.2 28.9 29.1   MCHC 34.0 33.6 33.9   RDW 16.0 16.2 16.9    295 308   MPV 10.4 10.2 10.0     Recent Labs   Lab 10/16/23  0225 10/17/23  0422 10/18/23  0527    143 141   K 4.0 4.8 4.6   CO2 19* 22* 22*   BUN 57.8* 53.0* 39.7*   CREATININE 2.02* 1.96* 1.64*   CALCIUM 8.1* 7.4* 7.7*   MG  --   --  1.90   ALBUMIN 2.0* 1.5* 1.7*   ALKPHOS 540* 377* 363*   ALT 36 26 28   AST 93* 66* 81*   BILITOT 1.9* 1.7* 1.5     Microbiology Results (last 7 days)       Procedure Component Value Units Date/Time    Blood Culture [[id number]]  (Normal) Collected: 10/16/23 0549    Order Status: Completed Specimen: Blood, Venous Updated: 10/18/23 0901     CULTURE, BLOOD ([facility]) No Growth At 48 Hours    Blood Culture [[id number]]  (Normal) Collected: 10/16/23 0549    Order Status: Completed Specimen: Blood, Venous Updated: 10/18/23 0901     CULTURE, BLOOD ([facility]) No Growth At 48 Hours    Body Fluid Culture [[id number]] Collected: 10/17/23 1247    Order Status: Completed Specimen: Peritoneal Fluid from Ascitic Fluid Updated: 10/18/23 0642     Body Fluid Culture No Growth At 24 Hours             See below for Radiology    Scheduled Med:   amLODIPine  10 mg Oral Daily    ARIPiprazole  5 mg Oral Daily    cefTRIAXone (ROCEPHIN) IVPB  1 g Intravenous Q24H    enoxparin  30 mg Subcutaneous Daily    EScitalopram oxalate  5 mg Oral Daily    hydrALAZINE  50 mg Oral Q8H      Continuous Infusions:     PRN Meds:  haloperidol lactate, hydrALAZINE, melatonin, ondansetron, promethazine, sodium chloride 0.9%     Assessment/Plan:      VTE prophylaxis:     Patient condition:  Stable/Fair/Guarded/ Serious/ Critical    Anticipated discharge and Disposition:         All  "diagnosis and differential diagnosis have been reviewed; assessment and plan has been documented; I have personally reviewed the labs and test results that are presently available; I have reviewed the patients medication list; I have reviewed the consulting providers response and recommendations. I have reviewed or attempted to review medical records based upon their availability    All of the patient's questions have been  addressed and answered. Patient's is agreeable to the above stated plan. I will continue to monitor closely and make adjustments to medical management as needed.  _____________________________________________________________________    Nutrition Status:    Radiology:  I have personally reviewed the following imaging and agree with the radiologist.     US Paracentesis inc Imaging  EXAMINATION  US GUIDED PARACENTESIS INC IMAGING    CLINICAL HISTORY  New ascites;    COMPARISON  Diagnostic abdominal ultrasound dated 16 October 2023 reviewed prior to start of the procedure.    TECHNIQUE/FINDINGS  The procedure and indication(s) were explained to the patient, to include discussion of risks, benefits, and alternatives. Written informed consent was obtained once all questions/concerns had been addressed to satisfaction. A "time out" was then performed with all participating members of the procedure team present and in agreement.  The patient was placed in supine position.  A preliminary ultrasound scan demonstrated adequate volume of intraperitoneal free fluid to allow safe access; optimal access site was then marked and the regional abdominal wall prepped and draped in usual sterile fashion. Local anesthesia was achieved with 5 mL 1% lidocaine, injected under direct ultrasound visualization.    With continuous ultrasound guidance, a tandem needle-catheter unit was advanced into the right lateral peritoneal cavity.  The needle was then removed and the catheter left in place, with subsequent connection to " drainage tubing and vacuum container.  A total of approximately 1.5 L fluid were aspirated without incident.  Final sonographic assessment prior to catheter removal demonstrated decreased volume of the free abdominal fluid. The catheter was then removed uneventfully, hemostasis achieved with direct pressure at the access site, and no evidence of new intra-abdominal abnormality or other adverse finding was appreciated.    Estimated blood loss: negligible    IMPRESSION  Technically successful ultrasound-guided diagnostic paracentesis.    Patient Status: The patient tolerated the procedure well, with no immediate complications appreciated. The patient was monitored for a short period following procedure completion, in order to ensure no change from baseline pre-procedure condition, with subsequent release from the Radiology Dept for treatment/follow up by the primary team.    The staff radiologist (Dr. Charles) was present throughout the procedure.    Electronically signed by: Maninder Charles  Date:    10/17/2023  Time:    16:20      Aniyah Perez MD   10/18/2023

## 2023-10-18 NOTE — PROGRESS NOTES
Renal    HPI: 62-year-old male consulted for SOCORRO. Presented to ER on 10/15/23 after his uncle with whom he lives was concerned about his behavior.  He was reportedly talking to himself though patient denies this was the case.  He does endorse that he has been using cocaine. His workup in the ER was significant for acute kidney injury, ascites, as well as mild hyperbilirubinemia, transaminitis.  He was started on IV fluids admitted to the hospitalist service for further management. He has a history of hypertension, reportedly not compliant with his meds.  He was seen by GI for cirrhosis of unknown etiology and ordered paracentesis and HIDA, but he refused paracentesis     Chief complaint:   So-so    Interval History:  No issues overnight but pt seems upset    ROS:  Denies pain or SOB     ARIPiprazole  5 mg Oral Daily    cefTRIAXone (ROCEPHIN) IVPB  1 g Intravenous Q24H    enoxparin  30 mg Subcutaneous Daily    EScitalopram oxalate  5 mg Oral Daily    hydrALAZINE  50 mg Oral Q8H       VITAL SIGNS: 24 HR MIN & MAX LAST    Temp  Min: 97.9 °F (36.6 °C)  Max: 98.5 °F (36.9 °C)  98.5 °F (36.9 °C)        BP  Min: 161/87  Max: 177/83  (!) 164/82     Pulse  Min: 64  Max: 78  67     Resp  Min: 18  Max: 20  18    SpO2  Min: 95 %  Max: 99 %  96 %      Wt Readings from Last 3 Encounters:   10/17/23 68 kg (150 lb)   08/14/23 65.8 kg (145 lb)   07/18/18 67.7 kg (149 lb 4 oz)       Intake/Output Summary (Last 24 hours) at 10/18/2023 1029  Last data filed at 10/17/2023 1414  Gross per 24 hour   Intake 340 ml   Output --   Net 340 ml     A&O x 4, ill appearing  EOMI  (B) symmetrical  Unlabored  Soft, NT, ND  Edema none    Recent Labs     10/16/23  0225 10/17/23  0422 10/18/23  0527    143 141   K 4.0 4.8 4.6   CHLORIDE 111* 116* 116*   CO2 19* 22* 22*   BUN 57.8* 53.0* 39.7*   CREATININE 2.02* 1.96* 1.64*   GLUCOSE 110 82 96   CALCIUM 8.1* 7.4* 7.7*   MG  --   --  1.90   ALBUMIN 2.0* 1.5* 1.7*      Recent Labs     10/15/23  1601  10/16/23  0225 10/17/23  0422   WBC 8.97 11.36 10.68   HGB 13.3* 13.4* 10.9*   HCT 39.1* 39.9* 32.2*    295 308       ASSESSMENT / PLAN    Alt MS, new HCV cirrhosis     SOCORRO, prerenal  Met Acid  Cirrhosis, ascites - s/p 1.2 L paracentesis on 10/17/23  HTN  Cholelithiasis with neg HIDA  Substance abuse     Everything is a little better, continue to hold diuretics unless he starts to get overloaded. Avoid high dose diuretics as that can cause HRS.     *Will sign off           Ced Gallardo M.D.  Nephrology

## 2023-10-18 NOTE — PROGRESS NOTES
"Gastroenterology Progress Note    Subjective/Interval History:  Continues to have strange affect.  Will not talk to people when spoke to.  He refused his MRI abd yesterday.  Feels bloated and depressed.  Voices no other complaints.  SOCORRO continues to slowly improve.     Vital Signs:  BP (!) 151/76   Pulse 80   Temp 98.5 °F (36.9 °C) (Oral)   Resp 18   Ht 5' 10.98" (1.803 m)   Wt 68 kg (150 lb)   SpO2 96%   BMI 20.93 kg/m²   Body mass index is 20.93 kg/m².    Physical Exam:  Physical Exam  Constitutional:       General: He is not in acute distress.     Appearance: He is ill-appearing.   HENT:      Head: Normocephalic and atraumatic.   Eyes:      General: No scleral icterus.     Extraocular Movements: Extraocular movements intact.   Cardiovascular:      Rate and Rhythm: Normal rate and regular rhythm.   Pulmonary:      Effort: Pulmonary effort is normal. No respiratory distress.   Abdominal:      General: Bowel sounds are normal. There is distension (mild).      Palpations: Abdomen is soft. There is no mass.      Tenderness: There is no abdominal tenderness. There is no guarding or rebound.   Musculoskeletal:      Right lower leg: No edema.      Left lower leg: No edema.   Skin:     General: Skin is warm and dry.      Coloration: Skin is not jaundiced.   Neurological:      Mental Status: He is alert and oriented to person, place, and time.      Comments: No asterixis.    Psychiatric:         Mood and Affect: Mood is depressed.         Behavior: Behavior is withdrawn.       Labs:  Recent Results (from the past 48 hour(s))   Comprehensive Metabolic Panel    Collection Time: 10/17/23  4:22 AM   Result Value Ref Range    Sodium Level 143 136 - 145 mmol/L    Potassium Level 4.8 3.5 - 5.1 mmol/L    Chloride 116 (H) 98 - 107 mmol/L    Carbon Dioxide 22 (L) 23 - 31 mmol/L    Glucose Level 82 82 - 115 mg/dL    Blood Urea Nitrogen 53.0 (H) 8.4 - 25.7 mg/dL    Creatinine 1.96 (H) 0.73 - 1.18 mg/dL    Calcium Level Total " 7.4 (L) 8.8 - 10.0 mg/dL    Protein Total 6.6 5.8 - 7.6 gm/dL    Albumin Level 1.5 (L) 3.4 - 4.8 g/dL    Globulin 5.1 (H) 2.4 - 3.5 gm/dL    Albumin/Globulin Ratio 0.3 (L) 1.1 - 2.0 ratio    Bilirubin Total 1.7 (H) <=1.5 mg/dL    Alkaline Phosphatase 377 (H) 40 - 150 unit/L    Alanine Aminotransferase 26 0 - 55 unit/L    Aspartate Aminotransferase 66 (H) 5 - 34 unit/L    eGFR 38 mls/min/1.73/m2   CBC with Differential    Collection Time: 10/17/23  4:22 AM   Result Value Ref Range    WBC 10.68 4.50 - 11.50 x10(3)/mcL    RBC 3.75 (L) 4.70 - 6.10 x10(6)/mcL    Hgb 10.9 (L) 14.0 - 18.0 g/dL    Hct 32.2 (L) 42.0 - 52.0 %    MCV 85.9 80.0 - 94.0 fL    MCH 29.1 27.0 - 31.0 pg    MCHC 33.9 33.0 - 36.0 g/dL    RDW 16.9 11.5 - 17.0 %    Platelet 308 130 - 400 x10(3)/mcL    MPV 10.0 7.4 - 10.4 fL    Neut % 61.4 %    Lymph % 22.7 %    Mono % 13.5 %    Eos % 1.2 %    Basophil % 0.5 %    Lymph # 2.42 0.6 - 4.6 x10(3)/mcL    Neut # 6.57 2.1 - 9.2 x10(3)/mcL    Mono # 1.44 (H) 0.1 - 1.3 x10(3)/mcL    Eos # 0.13 0 - 0.9 x10(3)/mcL    Baso # 0.05 <=0.2 x10(3)/mcL    IG# 0.07 (H) 0 - 0.04 x10(3)/mcL    IG% 0.7 %    NRBC% 0.0 %   AFP Tumor Marker    Collection Time: 10/17/23  4:22 AM   Result Value Ref Range    Alpha Fetoprotein Level 19.20 (H) <=8.90 ng/mL   LD, Body Fluid    Collection Time: 10/17/23 12:47 PM   Result Value Ref Range    Lactate Dehydrogenase Body Fluid 47 U/L   Amylase, Body Fluid    Collection Time: 10/17/23 12:47 PM   Result Value Ref Range    Amylase Body Fluid 101 U/L   Glucose, Body Fluid    Collection Time: 10/17/23 12:47 PM   Result Value Ref Range    Glucose Body Fluid 140 mg/dL   Albumin, Body Fluid    Collection Time: 10/17/23 12:47 PM   Result Value Ref Range    Albumin Level Body Fluid <0.4 gm/dL   Protein, Body Fluid    Collection Time: 10/17/23 12:47 PM   Result Value Ref Range    Protein Body Fluid 1.0 gm/dL   Body Fluid Culture    Collection Time: 10/17/23 12:47 PM    Specimen: Ascitic Fluid;  Peritoneal Fluid   Result Value Ref Range    Body Fluid Culture No Growth At 24 Hours    Cell Count, Body Fluid    Collection Time: 10/17/23 12:47 PM   Result Value Ref Range    WBC  /uL   Differential, Body Fluid    Collection Time: 10/17/23 12:47 PM   Result Value Ref Range    Neutrophils % 9 %    Lymphocyte % 43 %    Monocyte % 48 %    Macrophage count 2     Mesothelial count 1    Comprehensive Metabolic Panel    Collection Time: 10/18/23  5:27 AM   Result Value Ref Range    Sodium Level 141 136 - 145 mmol/L    Potassium Level 4.6 3.5 - 5.1 mmol/L    Chloride 116 (H) 98 - 107 mmol/L    Carbon Dioxide 22 (L) 23 - 31 mmol/L    Glucose Level 96 82 - 115 mg/dL    Blood Urea Nitrogen 39.7 (H) 8.4 - 25.7 mg/dL    Creatinine 1.64 (H) 0.73 - 1.18 mg/dL    Calcium Level Total 7.7 (L) 8.8 - 10.0 mg/dL    Protein Total 6.6 5.8 - 7.6 gm/dL    Albumin Level 1.7 (L) 3.4 - 4.8 g/dL    Globulin 4.9 (H) 2.4 - 3.5 gm/dL    Albumin/Globulin Ratio 0.3 (L) 1.1 - 2.0 ratio    Bilirubin Total 1.5 <=1.5 mg/dL    Alkaline Phosphatase 363 (H) 40 - 150 unit/L    Alanine Aminotransferase 28 0 - 55 unit/L    Aspartate Aminotransferase 81 (H) 5 - 34 unit/L    eGFR 47 mls/min/1.73/m2   Magnesium    Collection Time: 10/18/23  5:27 AM   Result Value Ref Range    Magnesium Level 1.90 1.60 - 2.60 mg/dL       Imaging:  NM Hepatobiliary Scan (HIDA)    Result Date: 10/16/2023  EXAMINATION: NM HEPATOBILIARY IMAGING INC GB (HIDA) CLINICAL HISTORY: Cholecystitis; TECHNIQUE: 8.2 mCi of intravenous Choletec was administered followed by focused gamma camera imaging of the abdomen. COMPARISON: CT abdomen pelvis October 16, 2023 FINDINGS: There is heterogeneous diminished radioisotope uptake by the liver. Central biliary activity identified with gallbladder filling by 60 minutes. There is progressive gallbladder filling over 3 hours of scintigraphy.  Small bowel activity is identified by 45 minutes progressing through 4 hours of imaging.     Negative  for acute cholecystitis. Electronically signed by: Mat Sommers Date:    10/16/2023 Time:    14:15    US Abdomen Complete    Result Date: 10/16/2023  EXAMINATION: US ABDOMEN COMPLETE CLINICAL HISTORY: abdominal pain; COMPARISON: Concurrent CT. FINDINGS: Grayscale, color and spectral Doppler evaluation of the abdomen. No focal abnormality of the limited visualized pancreas. Visualized portions of the aorta and inferior vena cava are normal in caliber. Cirrhotic liver with no focal suspicious liver lesions seen.  Normal hepatopetal flow is noted in the portal vein. There are gallstones.  There is some nonspecific gallbladder wall thickening.  The common bile duct is normal in caliber  and measures 4 mm. The right kidney measures 11 cm, while the left measures 13 cm.  No hydronephrosis.  Left renal calculus better demonstrated on CT. The spleen is normal in size and echogenicity  and measures 10 cm. Urinary bladder unremarkable. There is moderate ascites.     1. Cirrhosis with patent portal vein. 2. Moderate ascites. 3. Cholelithiasis with nonspecific gallbladder wall thickening.  No biliary ductal dilatation. Electronically signed by: Andriy Laws Date:    10/16/2023 Time:    10:20    US Retroperitoneal Complete    Result Date: 10/16/2023  EXAMINATION: US RETROPERITONEAL COMPLETE CLINICAL HISTORY: elevated BUN/Cr; COMPARISON: CT earlier today FINDINGS: Grayscale and color Doppler sonographic evaluation of the kidneys and urinary bladder. The right kidney measures 10 cm. The left kidney measures 12 cm.   No hydronephrosis.  Grossly normal renal parenchymal echogenicity. No significant abnormality of the urinary bladder. There is moderate ascites.     1. No significant sonographic abnormality of the kidneys. 2. Moderate ascites. Electronically signed by: Andriy Laws Date:    10/16/2023 Time:    09:35    CT Abdomen Pelvis  Without Contrast    Result Date: 10/16/2023  Technique:CT of the abdomen and pelvis was performed  with axial images as well as sagittal and coronal reconstruction images without intravenous contrast. Comparison:None available. Clinical History:Abdominal pain. Dosage Information:Automated Exposure Control was utilized. Findings: Thorax: Lungs:There is a small right pleural effusion. There is right basilar consolidation with ground-glass opacities in the left lower lobe, which may reflect an infectious process. Heart:The heart size is within normal limits. Abdomen: Abdominal Wall:No abdominal wall pathology is seen. Liver:The margins of the liver are nodular and the liver is somewhat shrunken. Within limitation noncontrast technique, no acute findings the liver, pancreas and spleen identified. Biliary System:No intrahepatic or extrahepatic biliary duct dilatation is seen. Gallbladder:Multiple calculi are noted within a mildly distended gallbladder. The gallbladder otherwise can not be definitively evaluated as there is pronounced ascites throughout the abdomen and pelvis such that pericholecystic fluid and inflammatory change and even wall thickening is not accurately identifiable. Adrenals:The adrenal glands appear unremarkable. Kidneys:The right kidney appears unremarkable with no stones cysts masses or hydronephrosis. A single stone measuring 9 mm is seen on series 2, image 64 in the mid pole of the left kidney. Aorta:There is mild calcification of the abdominal aorta and its branches. Bowel: Esophagus:The visualized esophagus appears unremarkable. Stomach:The stomach appears unremarkable. Duodenum:Unremarkable appearing duodenum. Small Bowel:The small bowel appears unremarkable. Colon:Nondistended. Appendix:The appendix appears unremarkable (series 2, image 117). Peritoneum:No free intraperitoneal air is seen. There is pronounced ascites consistent with cirrhosis. Pelvis: Bladder:The bladder appears unremarkable. Male: Prostate gland:The prostate gland appears unremarkable. Bony structures: Dorsal Spine:There  is mild spondylosis of the visualized dorsal spine. Bony Pelvis:The visualized bony structures of the pelvis appear unremarkable.     Impression: 1. There is a small right pleural effusion. There is right basilar consolidation with ground-glass opacities in the left lower lobe, which may reflect an infectious process. Correlate clinically as regards further evaluation and followup. 2. The margins of the liver are nodular and the liver is somewhat shrunken. These findings are consistent with cirrhosis. Correlate with clinical and laboratory findings. 3. Multiple calculi are noted within a mildly distended gallbladder. The gallbladder otherwise can not be definitively evaluated as there is pronounced ascites throughout the abdomen and pelvis such that pericholecystic fluid and inflammatory change and even wall thickening is not accurately identifiable. Correlate with clinical and laboratory findings as regards additional evaluation and follow-up of the gallbladder. 4. There is pronounced ascites consistent with cirrhosis. 5. Details and other findings as discussed above. No significant discrepancy with overnight report. Electronically signed by: Mat Sommers Date:    10/16/2023 Time:    08:22         Assessment/Plan:  61 yo AAM unknown to our group with a pmhx of HTN.  Patient was apparently brought to the ED after his uncle whom he lives with was concerned about his behavior, reportedly talking to himself. Found to have SOCORRO and imaging concerning for cirrhosis and ascites for which GI was consulted.      Cirrhosis - apparent new dx   MELD 3.0: 19 at 10/16/2023  Etiology: ? Will need to discuss etoh use with pt. HCV Ab+.  F/U viral laod.    Ascites: See below.  Screening for varices: none prior.   Encephalopathy: Ammonia normal in ED and no asterixis.  Do not think he has HE clinically.   HCC Screen: see below.      2.  Ascites - nephrotic  s/p paracentesis 10/17/23: 1.L removed. No SBP. SAAG 1.1 and TP 1 c/w  nephrotic ascites.  Received albumin after.      3.  Elevated AFP   - No liver lesion on US.  Recommend MRI.  Patient refused initially.  Now agrees to proceed.     4. SOCORRO  - per renal.  They have signed off. Recommended holding diuretics unless gets overloaded and avoiding high dose diuretics as that can cause HRS.        NICOLE FlanaganC

## 2023-10-19 LAB
ALBUMIN SERPL-MCNC: 1.8 G/DL (ref 3.4–4.8)
ALBUMIN/GLOB SERPL: 0.3 RATIO (ref 1.1–2)
ALP SERPL-CCNC: 379 UNIT/L (ref 40–150)
ALT SERPL-CCNC: 29 UNIT/L (ref 0–55)
AST SERPL-CCNC: 82 UNIT/L (ref 5–34)
BASOPHILS # BLD AUTO: 0.06 X10(3)/MCL
BASOPHILS NFR BLD AUTO: 0.5 %
BILIRUB SERPL-MCNC: 1.7 MG/DL
BUN SERPL-MCNC: 33.4 MG/DL (ref 8.4–25.7)
CALCIUM SERPL-MCNC: 7.9 MG/DL (ref 8.8–10)
CHLORIDE SERPL-SCNC: 115 MMOL/L (ref 98–107)
CO2 SERPL-SCNC: 22 MMOL/L (ref 23–31)
CREAT SERPL-MCNC: 1.4 MG/DL (ref 0.73–1.18)
EOSINOPHIL # BLD AUTO: 0.1 X10(3)/MCL (ref 0–0.9)
EOSINOPHIL NFR BLD AUTO: 0.8 %
ERYTHROCYTE [DISTWIDTH] IN BLOOD BY AUTOMATED COUNT: 17 % (ref 11.5–17)
GFR SERPLBLD CREATININE-BSD FMLA CKD-EPI: 57 MLS/MIN/1.73/M2
GLOBULIN SER-MCNC: 5.3 GM/DL (ref 2.4–3.5)
GLUCOSE SERPL-MCNC: 144 MG/DL (ref 82–115)
HCT VFR BLD AUTO: 31.6 % (ref 42–52)
HGB BLD-MCNC: 10.7 G/DL (ref 14–18)
IMM GRANULOCYTES # BLD AUTO: 0.07 X10(3)/MCL (ref 0–0.04)
IMM GRANULOCYTES NFR BLD AUTO: 0.6 %
INR PPP: 1.3
LYMPHOCYTES # BLD AUTO: 1.94 X10(3)/MCL (ref 0.6–4.6)
LYMPHOCYTES NFR BLD AUTO: 16.5 %
MCH RBC QN AUTO: 29.5 PG (ref 27–31)
MCHC RBC AUTO-ENTMCNC: 33.9 G/DL (ref 33–36)
MCV RBC AUTO: 87.1 FL (ref 80–94)
MONOCYTES # BLD AUTO: 1.27 X10(3)/MCL (ref 0.1–1.3)
MONOCYTES NFR BLD AUTO: 10.8 %
NEUTROPHILS # BLD AUTO: 8.33 X10(3)/MCL (ref 2.1–9.2)
NEUTROPHILS NFR BLD AUTO: 70.8 %
NRBC BLD AUTO-RTO: 0 %
PLATELET # BLD AUTO: 291 X10(3)/MCL (ref 130–400)
PMV BLD AUTO: 9.7 FL (ref 7.4–10.4)
POTASSIUM SERPL-SCNC: 4.6 MMOL/L (ref 3.5–5.1)
PROT SERPL-MCNC: 7.1 GM/DL (ref 5.8–7.6)
PROTHROMBIN TIME: 16.3 SECONDS (ref 12.5–14.5)
RBC # BLD AUTO: 3.63 X10(6)/MCL (ref 4.7–6.1)
SODIUM SERPL-SCNC: 141 MMOL/L (ref 136–145)
WBC # SPEC AUTO: 11.77 X10(3)/MCL (ref 4.5–11.5)

## 2023-10-19 PROCEDURE — 85610 PROTHROMBIN TIME: CPT | Performed by: PHYSICIAN ASSISTANT

## 2023-10-19 PROCEDURE — 80053 COMPREHEN METABOLIC PANEL: CPT | Performed by: PHYSICIAN ASSISTANT

## 2023-10-19 PROCEDURE — 63600175 PHARM REV CODE 636 W HCPCS: Performed by: INTERNAL MEDICINE

## 2023-10-19 PROCEDURE — 11000001 HC ACUTE MED/SURG PRIVATE ROOM

## 2023-10-19 PROCEDURE — 25000003 PHARM REV CODE 250: Performed by: NURSE PRACTITIONER

## 2023-10-19 PROCEDURE — 25000003 PHARM REV CODE 250: Performed by: INTERNAL MEDICINE

## 2023-10-19 PROCEDURE — 85025 COMPLETE CBC W/AUTO DIFF WBC: CPT | Performed by: PHYSICIAN ASSISTANT

## 2023-10-19 RX ADMIN — AMLODIPINE BESYLATE 10 MG: 5 TABLET ORAL at 09:10

## 2023-10-19 RX ADMIN — PROMETHAZINE HYDROCHLORIDE 12.5 MG: 25 INJECTION INTRAMUSCULAR; INTRAVENOUS at 10:10

## 2023-10-19 RX ADMIN — ENOXAPARIN SODIUM 30 MG: 30 INJECTION SUBCUTANEOUS at 04:10

## 2023-10-19 RX ADMIN — HYDRALAZINE HYDROCHLORIDE 50 MG: 50 TABLET, FILM COATED ORAL at 09:10

## 2023-10-19 RX ADMIN — ESCITALOPRAM OXALATE 5 MG: 5 TABLET, FILM COATED ORAL at 09:10

## 2023-10-19 RX ADMIN — Medication 6 MG: at 09:10

## 2023-10-19 RX ADMIN — ARIPIPRAZOLE 5 MG: 5 TABLET ORAL at 09:10

## 2023-10-19 RX ADMIN — HYDRALAZINE HYDROCHLORIDE 50 MG: 50 TABLET, FILM COATED ORAL at 06:10

## 2023-10-19 RX ADMIN — CEFTRIAXONE 1 G: 1 INJECTION, POWDER, FOR SOLUTION INTRAMUSCULAR; INTRAVENOUS at 11:10

## 2023-10-19 NOTE — PLAN OF CARE
"Attempted admission assessment with patient he pretends to be sleeping whenever I try to talk to him. Told him if I am to help him he has to speak to me. He states go ahead. Conversation still limited. States that he has been living in the streets for weeks. Does not know if any of his siblings will take him in. Also states that he has children but "they barely have a place for themselves to stay and cannot take him in." Explained to him he will have to have conversations with his sibling to see if they are willing to take him in. Does not appear interested in conversation. Minimal words.  Asked him what kind of placement he is looking for he says he does not know. Explained to him that the hospital does not have housing we can help him get into a nursing home or a shelter. States "I'm not looking for that shit" "I can't go to a nursing home" Discussed that his drug use potentially will make placement in a nursing home difficult . Patient repeats I cannot go there. I asked him why he states that they will charge him and he will not give up his money.States that he cannot go anywhere until December. Explained to him that this is acute care setting and we will not be able to hold him until December because he does not like placement options. Re explained that he will have to speak to his siblings about a place to stay pretended to go back to sleep  "

## 2023-10-19 NOTE — PLAN OF CARE
10/19/23 1034   Discharge Assessment   Assessment Type Discharge Planning Assessment   Source of Information patient   Communicated LORRAINE with patient/caregiver Date not available/Unable to determine   Reason For Admission SILVIA   People in Home   (homeless)   Do you expect to return to your current living situation? Other (see comments)  (homeless)   Prior to hospitilization cognitive status: Alert/Oriented   Current cognitive status: Alert/Oriented   Equipment Currently Used at Home none   Readmission within 30 days? No   Patient currently being followed by outpatient case management? No   Do you currently have service(s) that help you manage your care at home? No   How do you get to doctors appointments? other (see comments)  (homeless)   Are you on dialysis? No   Do you take coumadin? No   DME Needed Upon Discharge  none   Discharge Plan discussed with: Patient   Transition of Care Barriers Does not adhere to care plan;Homeless;No family/friends to help;Social;Substance Abuse;Underinsured   Discharge Plan A Skilled Nursing Facility;Other   Discharge Plan B Other  (TBD)

## 2023-10-19 NOTE — CONSULTS
"[facility] - Oncology Acute  Hematology/Oncology  Progress Note      Consult Requested By: Mukehs New MD    Reason for Consult:     Came to see the patient this afternoon. I introduced myself.   He was mad because they brought him food and there was no utensils. I showed him the utensils and gave them to him. Then he was mad because I came to his room during his meal time and he needed to eat so the food does not get cold. I told him that I needed him to listen to me and that he was able to eat while I talk to him but he was not interested in talking.   I offered him to come tomorrow morning to talk to him and he said that he "will not be here tomorrow."   I again introduce myself and told him that I was the "cancer doctor" and he made a face and continue talking about about his food.   I asked him if he was interested in been treated for his possible cancer and then he said to me that I "had an attitude like everybody else in this hospital"  He was not interested in listening to me so I left.    I will come back tomorrow and hopefully he will be willing to listen.    Shelly Christopher MD  Hematology/Oncology  [facility]         SUBJECTIVE:     History of Present Illness:  Patient is a 62 y.o. male presents with       Continuous Infusions:  Scheduled Meds:   amLODIPine  10 mg Oral Daily    ARIPiprazole  5 mg Oral Daily    cefTRIAXone (ROCEPHIN) IVPB  1 g Intravenous Q24H    enoxparin  30 mg Subcutaneous Daily    EScitalopram oxalate  5 mg Oral Daily    hydrALAZINE  50 mg Oral Q8H     PRN Meds:haloperidol lactate, hydrALAZINE, melatonin, ondansetron, promethazine, sodium chloride 0.9%    Past Medical History:   Diagnosis Date    Carpal tunnel syndrome     HTN (hypertension)     Sciatica      Past Surgical History:   Procedure Laterality Date    SKIN GRAFT       History reviewed. No pertinent family history.  Social History     Tobacco Use    Smoking status: Some Days     " "Types: Cigarettes    Smokeless tobacco: Never   Substance Use Topics    Alcohol use: Yes     Comment: daily       Review of patient's allergies indicates:  No Known Allergies   No current facility-administered medications on file prior to encounter.     No current outpatient medications on file prior to encounter.       Review of Systems      OBJECTIVE:     Vital Signs (Most Recent)  Temp: 98.4 °F (36.9 °C) (10/19/23 1629)  Pulse: 69 (10/19/23 1629)  Resp: 18 (10/19/23 1629)  BP: (!) 140/79 (10/19/23 1629)  SpO2: 95 % (10/19/23 1629)    Pain Assessment: No pain reported at this time    Vital Signs Range (Last 24H):  Temp:  [97.7 °F (36.5 °C)-98.5 °F (36.9 °C)]   Pulse:  [69-84]   Resp:  [16-18]   BP: (128-156)/(63-79)   SpO2:  [93 %-98 %]     Physical Exam:  Physical Exam    Laboratory:  CBC with Differential:  Recent Labs   Lab 10/19/23  0950   WBC 11.77*   RBC 3.63*   HCT 31.6*   HGB 10.7*   MCV 87.1   MCH 29.5   RDW 17.0      MPV 9.7     CMP:  Recent Labs   Lab 10/19/23  0950   CALCIUM 7.9*   ALBUMIN 1.8*      K 4.6   CO2 22*   BUN 33.4*   CREATININE 1.40*   ALKPHOS 379*   ALT 29   AST 82*   BILITOT 1.7*     BMP:   Recent Labs   Lab 10/19/23  0950   CALCIUM 7.9*      K 4.6   CO2 22*   BUN 33.4*   CREATININE 1.40*     LFTs:   Recent Labs   Lab 10/19/23  0950   ALT 29   AST 82*   ALKPHOS 379*   BILITOT 1.7*   ALBUMIN 1.8*     Haptoglobin: No results for input(s): "HAPTOGLOBIN" in the last 24 hours.  Tumor Markers: No results for input(s): "PSA", "CEA", "", "AFPTM", "EE4534", "" in the last 24 hours.    Invalid input(s): "ALGTM"  Immunology: No results for input(s): "SPEP", "POONAM", "LISA", "FREELAMBDALI" in the last 24 hours.  Coagulation:   Recent Labs   Lab 10/19/23  0950   INR 1.3     Specimen (24h ago, onward)      None          Microbiology Results (last 7 days)       Procedure Component Value Units Date/Time    Blood Culture [[id number]]  (Normal) Collected: 10/16/23 0549    Order " "Status: Completed Specimen: Blood, Venous Updated: 10/19/23 0901     CULTURE, BLOOD ([facility]) No Growth At 72 Hours    Blood Culture [[id number]]  (Normal) Collected: 10/16/23 0549    Order Status: Completed Specimen: Blood, Venous Updated: 10/19/23 0901     CULTURE, BLOOD ([facility]) No Growth At 72 Hours    Body Fluid Culture [[id number]] Collected: 10/17/23 1247    Order Status: Completed Specimen: Peritoneal Fluid from Ascitic Fluid Updated: 10/19/23 0847     Body Fluid Culture No Growth At 48 Hours            Diagnostic Results:  Imaging Results              US Paracentesis inc Imaging (Final result)  Result time 10/17/23 16:20:11      Final result by Maninder Charles MD (10/17/23 16:20:11)                   Narrative:    EXAMINATION  US GUIDED PARACENTESIS INC IMAGING    CLINICAL HISTORY  New ascites;    COMPARISON  Diagnostic abdominal ultrasound dated 16 October 2023 reviewed prior to start of the procedure.    TECHNIQUE/FINDINGS  The procedure and indication(s) were explained to the patient, to include discussion of risks, benefits, and alternatives. Written informed consent was obtained once all questions/concerns had been addressed to satisfaction. A "time out" was then performed with all participating members of the procedure team present and in agreement.  The patient was placed in supine position.  A preliminary ultrasound scan demonstrated adequate volume of intraperitoneal free fluid to allow safe access; optimal access site was then marked and the regional abdominal wall prepped and draped in usual sterile fashion. Local anesthesia was achieved with 5 mL 1% lidocaine, injected under direct ultrasound visualization.    With continuous ultrasound guidance, a tandem needle-catheter unit was advanced into the right lateral peritoneal cavity.  The needle was then removed and the catheter left in place, with subsequent connection to drainage tubing and vacuum container.  A total of approximately 1.5 L fluid were " aspirated without incident.  Final sonographic assessment prior to catheter removal demonstrated decreased volume of the free abdominal fluid. The catheter was then removed uneventfully, hemostasis achieved with direct pressure at the access site, and no evidence of new intra-abdominal abnormality or other adverse finding was appreciated.    Estimated blood loss: negligible    IMPRESSION  Technically successful ultrasound-guided diagnostic paracentesis.    Patient Status: The patient tolerated the procedure well, with no immediate complications appreciated. The patient was monitored for a short period following procedure completion, in order to ensure no change from baseline pre-procedure condition, with subsequent release from the Radiology Dept for treatment/follow up by the primary team.    The staff radiologist (Dr. Charles) was present throughout the procedure.      Electronically signed by: Maninder Charles  Date:    10/17/2023  Time:    16:20                                     NM Hepatobiliary Scan (HIDA) (Final result)  Result time 10/16/23 14:15:54      Final result by Mat Sommers MD (10/16/23 14:15:54)                   Impression:      Negative for acute cholecystitis.      Electronically signed by: Mat Sommers  Date:    10/16/2023  Time:    14:15               Narrative:    EXAMINATION:  NM HEPATOBILIARY IMAGING INC GB (HIDA)    CLINICAL HISTORY:  Cholecystitis;    TECHNIQUE:  8.2 mCi of intravenous Choletec was administered followed by focused gamma camera imaging of the abdomen.    COMPARISON:  CT abdomen pelvis October 16, 2023    FINDINGS:  There is heterogeneous diminished radioisotope uptake by the liver. Central biliary activity identified with gallbladder filling by 60 minutes. There is progressive gallbladder filling over 3 hours of scintigraphy.  Small bowel activity is identified by 45 minutes progressing through 4 hours of imaging.                                       US Retroperitoneal  Complete (Final result)  Result time 10/16/23 09:35:20      Final result by Andriy Laws MD (10/16/23 09:35:20)                   Impression:      1. No significant sonographic abnormality of the kidneys.  2. Moderate ascites.      Electronically signed by: Andriy Laws  Date:    10/16/2023  Time:    09:35               Narrative:    EXAMINATION:  US RETROPERITONEAL COMPLETE    CLINICAL HISTORY:  elevated BUN/Cr;    COMPARISON:  CT earlier today    FINDINGS:  Grayscale and color Doppler sonographic evaluation of the kidneys and urinary bladder.    The right kidney measures 10 cm. The left kidney measures 12 cm.   No hydronephrosis.  Grossly normal renal parenchymal echogenicity.    No significant abnormality of the urinary bladder.    There is moderate ascites.                                       US Abdomen Complete (Final result)  Result time 10/16/23 10:20:48      Final result by Andriy Laws MD (10/16/23 10:20:48)                   Impression:      1. Cirrhosis with patent portal vein.  2. Moderate ascites.  3. Cholelithiasis with nonspecific gallbladder wall thickening.  No biliary ductal dilatation.      Electronically signed by: Andriy Laws  Date:    10/16/2023  Time:    10:20               Narrative:    EXAMINATION:  US ABDOMEN COMPLETE    CLINICAL HISTORY:  abdominal pain;    COMPARISON:  Concurrent CT.    FINDINGS:  Grayscale, color and spectral Doppler evaluation of the abdomen.    No focal abnormality of the limited visualized pancreas.    Visualized portions of the aorta and inferior vena cava are normal in caliber.    Cirrhotic liver with no focal suspicious liver lesions seen.  Normal hepatopetal flow is noted in the portal vein.    There are gallstones.  There is some nonspecific gallbladder wall thickening.  The common bile duct is normal in caliber  and measures 4 mm.    The right kidney measures 11 cm, while the left measures 13 cm.  No hydronephrosis.  Left renal calculus better  demonstrated on CT.    The spleen is normal in size and echogenicity  and measures 10 cm.    Urinary bladder unremarkable.    There is moderate ascites.                                       CT Abdomen Pelvis  Without Contrast (Final result)  Result time 10/16/23 08:22:59   Procedure changed from CT Abdomen Pelvis With Contrast     Final result by Mat Sommers MD (10/16/23 08:22:59)                   Impression:    Impression:    1. There is a small right pleural effusion. There is right basilar consolidation with ground-glass opacities in the left lower lobe, which may reflect an infectious process. Correlate clinically as regards further evaluation and followup.    2. The margins of the liver are nodular and the liver is somewhat shrunken. These findings are consistent with cirrhosis. Correlate with clinical and laboratory findings.    3. Multiple calculi are noted within a mildly distended gallbladder. The gallbladder otherwise can not be definitively evaluated as there is pronounced ascites throughout the abdomen and pelvis such that pericholecystic fluid and inflammatory change and even wall thickening is not accurately identifiable. Correlate with clinical and laboratory findings as regards additional evaluation and follow-up of the gallbladder.    4. There is pronounced ascites consistent with cirrhosis.    5. Details and other findings as discussed above.    No significant discrepancy with overnight report.      Electronically signed by: Mat Sommers  Date:    10/16/2023  Time:    08:22               Narrative:      Technique:CT of the abdomen and pelvis was performed with axial images as well as sagittal and coronal reconstruction images without intravenous contrast.    Comparison:None available.    Clinical History:Abdominal pain.    Dosage Information:Automated Exposure Control was utilized.    Findings:    Thorax:    Lungs:There is a small right pleural effusion. There is right basilar consolidation  with ground-glass opacities in the left lower lobe, which may reflect an infectious process.    Heart:The heart size is within normal limits.    Abdomen:    Abdominal Wall:No abdominal wall pathology is seen.    Liver:The margins of the liver are nodular and the liver is somewhat shrunken.    Within limitation noncontrast technique, no acute findings the liver, pancreas and spleen identified.    Biliary System:No intrahepatic or extrahepatic biliary duct dilatation is seen.    Gallbladder:Multiple calculi are noted within a mildly distended gallbladder. The gallbladder otherwise can not be definitively evaluated as there is pronounced ascites throughout the abdomen and pelvis such that pericholecystic fluid and inflammatory change and even wall thickening is not accurately identifiable.    Adrenals:The adrenal glands appear unremarkable.    Kidneys:The right kidney appears unremarkable with no stones cysts masses or hydronephrosis. A single stone measuring 9 mm is seen on series 2, image 64 in the mid pole of the left kidney.    Aorta:There is mild calcification of the abdominal aorta and its branches.    Bowel:    Esophagus:The visualized esophagus appears unremarkable.    Stomach:The stomach appears unremarkable.    Duodenum:Unremarkable appearing duodenum.    Small Bowel:The small bowel appears unremarkable.    Colon:Nondistended.    Appendix:The appendix appears unremarkable (series 2, image 117).    Peritoneum:No free intraperitoneal air is seen. There is pronounced ascites consistent with cirrhosis.    Pelvis:    Bladder:The bladder appears unremarkable.    Male:    Prostate gland:The prostate gland appears unremarkable.    Bony structures:    Dorsal Spine:There is mild spondylosis of the visualized dorsal spine.    Bony Pelvis:The visualized bony structures of the pelvis appear unremarkable.                                          ASSESSMENT/PLAN:     Patient Active Problem List   Diagnosis    Acute renal  insufficiency         Plan      Shelly Christopher MD  Hematology/Oncology  [facility]

## 2023-10-19 NOTE — PROGRESS NOTES
"Gastroenterology Progress Note    Subjective/Interval History:  Continues to be obstructive to his care and completely refuses to speak to providers when they are in the room.  He ultimately went down for MRI and was not cooperative so it was cancelled.  Had CT triple phase liver concerning for liver mass.  No new labs today.  He threatened to leave AMA but is requesting placement as he is homeless.  He denies abdominal pain, nausea, etc when asked.     Vital Signs:  BP (!) 156/77   Pulse 84   Temp 98.5 °F (36.9 °C) (Oral)   Resp 16   Ht 5' 10.98" (1.803 m)   Wt 68 kg (150 lb)   SpO2 96%   BMI 20.93 kg/m²   Body mass index is 20.93 kg/m².    Physical Exam:  Physical Exam  Constitutional:       General: He is not in acute distress.     Appearance: He is ill-appearing.   HENT:      Head: Normocephalic and atraumatic.   Eyes:      General: No scleral icterus.     Extraocular Movements: Extraocular movements intact.   Cardiovascular:      Rate and Rhythm: Normal rate and regular rhythm.   Pulmonary:      Effort: Pulmonary effort is normal. No respiratory distress.   Abdominal:      General: Bowel sounds are normal. There is distension (mild).      Palpations: Abdomen is soft. There is no mass.      Tenderness: There is no abdominal tenderness. There is no guarding or rebound.   Musculoskeletal:      Right lower leg: No edema.      Left lower leg: No edema.   Skin:     General: Skin is warm and dry.      Coloration: Skin is not jaundiced.   Neurological:      Mental Status: He is alert and oriented to person, place, and time.      Comments: No asterixis.    Psychiatric:         Mood and Affect: Mood is depressed.         Behavior: Behavior is withdrawn.       Labs:  Recent Results (from the past 48 hour(s))   LD, Body Fluid    Collection Time: 10/17/23 12:47 PM   Result Value Ref Range    Lactate Dehydrogenase Body Fluid 47 U/L   Amylase, Body Fluid    Collection Time: 10/17/23 12:47 PM   Result Value Ref Range    " Amylase Body Fluid 101 U/L   Glucose, Body Fluid    Collection Time: 10/17/23 12:47 PM   Result Value Ref Range    Glucose Body Fluid 140 mg/dL   Albumin, Body Fluid    Collection Time: 10/17/23 12:47 PM   Result Value Ref Range    Albumin Level Body Fluid <0.4 gm/dL   Protein, Body Fluid    Collection Time: 10/17/23 12:47 PM   Result Value Ref Range    Protein Body Fluid 1.0 gm/dL   Body Fluid Culture    Collection Time: 10/17/23 12:47 PM    Specimen: Ascitic Fluid; Peritoneal Fluid   Result Value Ref Range    Body Fluid Culture No Growth At 48 Hours    Cell Count, Body Fluid    Collection Time: 10/17/23 12:47 PM   Result Value Ref Range    WBC  /uL   Differential, Body Fluid    Collection Time: 10/17/23 12:47 PM   Result Value Ref Range    Neutrophils % 9 %    Lymphocyte % 43 %    Monocyte % 48 %    Macrophage count 2     Mesothelial count 1    Hepatitis C Virus Quantitative    Collection Time: 10/17/23  2:04 PM   Result Value Ref Range    HCV RNA Detect/Quant [id number] (A) Undetected IU/mL   Comprehensive Metabolic Panel    Collection Time: 10/18/23  5:27 AM   Result Value Ref Range    Sodium Level 141 136 - 145 mmol/L    Potassium Level 4.6 3.5 - 5.1 mmol/L    Chloride 116 (H) 98 - 107 mmol/L    Carbon Dioxide 22 (L) 23 - 31 mmol/L    Glucose Level 96 82 - 115 mg/dL    Blood Urea Nitrogen 39.7 (H) 8.4 - 25.7 mg/dL    Creatinine 1.64 (H) 0.73 - 1.18 mg/dL    Calcium Level Total 7.7 (L) 8.8 - 10.0 mg/dL    Protein Total 6.6 5.8 - 7.6 gm/dL    Albumin Level 1.7 (L) 3.4 - 4.8 g/dL    Globulin 4.9 (H) 2.4 - 3.5 gm/dL    Albumin/Globulin Ratio 0.3 (L) 1.1 - 2.0 ratio    Bilirubin Total 1.5 <=1.5 mg/dL    Alkaline Phosphatase 363 (H) 40 - 150 unit/L    Alanine Aminotransferase 28 0 - 55 unit/L    Aspartate Aminotransferase 81 (H) 5 - 34 unit/L    eGFR 47 mls/min/1.73/m2   Magnesium    Collection Time: 10/18/23  5:27 AM   Result Value Ref Range    Magnesium Level 1.90 1.60 - 2.60 mg/dL       Imaging:  NM  Hepatobiliary Scan ([facility])    Result Date: 10/16/2023  EXAMINATION: NM HEPATOBILIARY IMAGING INC GB ([facility]) CLINICAL HISTORY: Cholecystitis; TECHNIQUE: 8.2 mCi of intravenous Choletec was administered followed by focused gamma camera imaging of the abdomen. COMPARISON: CT abdomen pelvis October 16, 2023 FINDINGS: There is heterogeneous diminished radioisotope uptake by the liver. Central biliary activity identified with gallbladder filling by 60 minutes. There is progressive gallbladder filling over 3 hours of scintigraphy.  Small bowel activity is identified by 45 minutes progressing through 4 hours of imaging.     Negative for acute cholecystitis. Electronically signed by: Mat Sommers Date:    10/16/2023 Time:    14:15    US Abdomen Complete    Result Date: 10/16/2023  EXAMINATION: US ABDOMEN COMPLETE CLINICAL HISTORY: abdominal pain; COMPARISON: Concurrent CT. FINDINGS: Grayscale, color and spectral Doppler evaluation of the abdomen. No focal abnormality of the limited visualized pancreas. Visualized portions of the aorta and inferior vena cava are normal in caliber. Cirrhotic liver with no focal suspicious liver lesions seen.  Normal hepatopetal flow is noted in the portal vein. There are gallstones.  There is some nonspecific gallbladder wall thickening.  The common bile duct is normal in caliber  and measures 4 mm. The right kidney measures 11 cm, while the left measures 13 cm.  No hydronephrosis.  Left renal calculus better demonstrated on CT. The spleen is normal in size and echogenicity  and measures 10 cm. Urinary bladder unremarkable. There is moderate ascites.     1. Cirrhosis with patent portal vein. 2. Moderate ascites. 3. Cholelithiasis with nonspecific gallbladder wall thickening.  No biliary ductal dilatation. Electronically signed by: Andriy Laws Date:    10/16/2023 Time:    10:20    US Retroperitoneal Complete    Result Date: 10/16/2023  EXAMINATION: US RETROPERITONEAL COMPLETE CLINICAL HISTORY:  elevated BUN/Cr; COMPARISON: CT earlier today FINDINGS: Grayscale and color Doppler sonographic evaluation of the kidneys and urinary bladder. The right kidney measures 10 cm. The left kidney measures 12 cm.   No hydronephrosis.  Grossly normal renal parenchymal echogenicity. No significant abnormality of the urinary bladder. There is moderate ascites.     1. No significant sonographic abnormality of the kidneys. 2. Moderate ascites. Electronically signed by: Andriy Laws Date:    10/16/2023 Time:    09:35    CT Abdomen Pelvis  Without Contrast    Result Date: 10/16/2023  Technique:CT of the abdomen and pelvis was performed with axial images as well as sagittal and coronal reconstruction images without intravenous contrast. Comparison:None available. Clinical History:Abdominal pain. Dosage Information:Automated Exposure Control was utilized. Findings: Thorax: Lungs:There is a small right pleural effusion. There is right basilar consolidation with ground-glass opacities in the left lower lobe, which may reflect an infectious process. Heart:The heart size is within normal limits. Abdomen: Abdominal Wall:No abdominal wall pathology is seen. Liver:The margins of the liver are nodular and the liver is somewhat shrunken. Within limitation noncontrast technique, no acute findings the liver, pancreas and spleen identified. Biliary System:No intrahepatic or extrahepatic biliary duct dilatation is seen. Gallbladder:Multiple calculi are noted within a mildly distended gallbladder. The gallbladder otherwise can not be definitively evaluated as there is pronounced ascites throughout the abdomen and pelvis such that pericholecystic fluid and inflammatory change and even wall thickening is not accurately identifiable. Adrenals:The adrenal glands appear unremarkable. Kidneys:The right kidney appears unremarkable with no stones cysts masses or hydronephrosis. A single stone measuring 9 mm is seen on series 2, image 64 in the mid  pole of the left kidney. Aorta:There is mild calcification of the abdominal aorta and its branches. Bowel: Esophagus:The visualized esophagus appears unremarkable. Stomach:The stomach appears unremarkable. Duodenum:Unremarkable appearing duodenum. Small Bowel:The small bowel appears unremarkable. Colon:Nondistended. Appendix:The appendix appears unremarkable (series 2, image 117). Peritoneum:No free intraperitoneal air is seen. There is pronounced ascites consistent with cirrhosis. Pelvis: Bladder:The bladder appears unremarkable. Male: Prostate gland:The prostate gland appears unremarkable. Bony structures: Dorsal Spine:There is mild spondylosis of the visualized dorsal spine. Bony Pelvis:The visualized bony structures of the pelvis appear unremarkable.     Impression: 1. There is a small right pleural effusion. There is right basilar consolidation with ground-glass opacities in the left lower lobe, which may reflect an infectious process. Correlate clinically as regards further evaluation and followup. 2. The margins of the liver are nodular and the liver is somewhat shrunken. These findings are consistent with cirrhosis. Correlate with clinical and laboratory findings. 3. Multiple calculi are noted within a mildly distended gallbladder. The gallbladder otherwise can not be definitively evaluated as there is pronounced ascites throughout the abdomen and pelvis such that pericholecystic fluid and inflammatory change and even wall thickening is not accurately identifiable. Correlate with clinical and laboratory findings as regards additional evaluation and follow-up of the gallbladder. 4. There is pronounced ascites consistent with cirrhosis. 5. Details and other findings as discussed above. No significant discrepancy with overnight report. Electronically signed by: Mat Sommers Date:    10/16/2023 Time:    08:22         Assessment/Plan:  63 yo AAM unknown to our group with a pmhx of HTN.  Patient was apparently  brought to the ED after his uncle whom he lives with was concerned about his behavior, reportedly talking to himself. He is homeless.  Found to have SOCORRO and imaging concerning for cirrhosis and ascites for which GI was consulted.      Hep C Cirrhosis - apparent new dx   MELD 3.0: 19 at 10/16/2023  Ascites: See below.  Screening for varices: none prior.   Encephalopathy: Ammonia normal in ED and no asterixis.  Do not think he has HE clinically.   HCC Screen: see below.      2.  Ascites - nephrotic  s/p paracentesis 10/17/23: 1.L removed. No SBP. SAAG 1.1 and TP 1 c/w nephrotic ascites.  Received albumin after.      3.  Elevated AFP / Liver mass - Concern for HCC.  CT triple phase liver 10/18/23: Cirrhotic nodular appearing liver with a 1.2 x 1.3 cm lesion seen in segment 8 of the liver which shows early arterial enhancement and washout on delayed imaging.  Findings are not consistent with hemangioma.  Hepatocellular carcinoma cannot be excluded based on this appearance. Diffuse ascites  - Does not seem to need bx and possibly resectable.  Consult medical oncology.     4. SOCORRO  - per renal.  They have signed off. Recommended holding diuretics unless gets overloaded and avoiding high dose diuretics as that can cause HRS.        Dedra Wheeler PA-C

## 2023-10-20 PROCEDURE — 63600175 PHARM REV CODE 636 W HCPCS: Performed by: INTERNAL MEDICINE

## 2023-10-20 PROCEDURE — 25000003 PHARM REV CODE 250: Performed by: INTERNAL MEDICINE

## 2023-10-20 PROCEDURE — 11000001 HC ACUTE MED/SURG PRIVATE ROOM

## 2023-10-20 PROCEDURE — 99222 1ST HOSP IP/OBS MODERATE 55: CPT | Mod: ,,, | Performed by: INTERNAL MEDICINE

## 2023-10-20 PROCEDURE — 99223 1ST HOSP IP/OBS HIGH 75: CPT | Mod: ,,, | Performed by: INTERNAL MEDICINE

## 2023-10-20 PROCEDURE — 99222 PR INITIAL HOSPITAL CARE,LEVL II: ICD-10-PCS | Mod: ,,, | Performed by: INTERNAL MEDICINE

## 2023-10-20 PROCEDURE — 99223 PR INITIAL HOSPITAL CARE,LEVL III: ICD-10-PCS | Mod: ,,, | Performed by: INTERNAL MEDICINE

## 2023-10-20 RX ADMIN — HYDRALAZINE HYDROCHLORIDE 50 MG: 50 TABLET, FILM COATED ORAL at 05:10

## 2023-10-20 RX ADMIN — HYDRALAZINE HYDROCHLORIDE 50 MG: 50 TABLET, FILM COATED ORAL at 02:10

## 2023-10-20 RX ADMIN — CEFTRIAXONE 1 G: 1 INJECTION, POWDER, FOR SOLUTION INTRAMUSCULAR; INTRAVENOUS at 11:10

## 2023-10-20 RX ADMIN — HYDRALAZINE HYDROCHLORIDE 50 MG: 50 TABLET, FILM COATED ORAL at 10:10

## 2023-10-20 NOTE — PLAN OF CARE
Spoke to patient's sister Grisel Humphrey with his consent. Generally explained situation with patient's health, homeless state, unwillingness to be placed and his lack of cooperation with medical treatment. MD will discharge him if he continues to refuse treatments. She will get his siblings together and they will come to the hospital this weekend to meet

## 2023-10-20 NOTE — PROGRESS NOTES
"[facility] - Oncology Acute  Hematology/Oncology  Progress Note      Consult Requested By: Mukesh New MD    Reason for Consult:     On 10/19/2023: Came to see the patient this afternoon. I introduced myself.   He was mad because they brought him food and there was no utensils. I showed him the utensils and gave them to him. Then he was mad because I came to his room during his meal time and he needed to eat so the food does not get cold. I told him that I needed him to listen to me and that he was able to eat while I talk to him but he was not interested in talking.   I offered him to come tomorrow morning to talk to him and he said that he "will not be here tomorrow."   I again introduce myself and told him that I was the "cancer doctor" and he made a face and continue talking about about his food.   I asked him if he was interested in been treated for his possible cancer and then he said to me that I "had an attitude like everybody else in this hospital"  He was not interested in listening to me so I left.    I will come back tomorrow and hopefully he will be willing to listen.    Shelly Christopher MD  Hematology/Oncology  [facility]         SUBJECTIVE:     History of Present Illness:  Patient is a 62 y.o. male brought to the emergency room after his uncle called 911 because of erratic behavior.  When ambulance arrived to the house on-call reports that he did not have any erratic behavior , he just don't want him in his house anymore.  Patient used to live with him.  Patient with history of drug abuse including cocaine.    While in the ER patient had significant blood work abnormalities.  He was started on antibiotics for possible pneumonia.  GI was consulted and patient with cirrhosis of the liver with ascites and he underwent paracentesis after refusing few times in 1.2 L of fluid was removed.  AFP 19.2.  Triple phase CT scan of the abdomen and pelvis shows cirrhotic " nodular appearing liver with 1.2 x 1.3 cm lesion seen in the liver not consistent with hemangioma but HCC not excluded.    I came yesterday to see him but he was not cooperative.  Today he was just complaining of being hungry, been called and wanted to go home.  I again introduced myself and told him that I the cancer doctor in the do we have concerns about a possible tumor in his liver that could be cancer and discussed briefly treatment options.  Again he did not answer me and he turned himself to decide and close his eyes pretending to be sleeping.  I asked him if he was interested in having treatment and he said that he did not know and he wanted to go home and if I can not discharge him then he does not see the reason why I am in the room talking to him.    He then closed his eyes and did not answer any more questions.      Continuous Infusions:  Scheduled Meds:   amLODIPine  10 mg Oral Daily    ARIPiprazole  5 mg Oral Daily    cefTRIAXone (ROCEPHIN) IVPB  1 g Intravenous Q24H    enoxparin  30 mg Subcutaneous Daily    EScitalopram oxalate  5 mg Oral Daily    hydrALAZINE  50 mg Oral Q8H     PRN Meds:haloperidol lactate, hydrALAZINE, melatonin, ondansetron, promethazine, sodium chloride 0.9%    Past Medical History:   Diagnosis Date    Carpal tunnel syndrome     HTN (hypertension)     Sciatica      Past Surgical History:   Procedure Laterality Date    SKIN GRAFT       History reviewed. No pertinent family history.  Social History     Tobacco Use    Smoking status: Some Days     Types: Cigarettes    Smokeless tobacco: Never   Substance Use Topics    Alcohol use: Yes     Comment: daily       Review of patient's allergies indicates:  No Known Allergies   No current facility-administered medications on file prior to encounter.     No current outpatient medications on file prior to encounter.     Unable to obtain due to patient not cooperative  Review of Systems   Constitutional:  Positive for activity change.         "I want to go home   Endocrine: Positive for cold intolerance.         OBJECTIVE:     Vital Signs (Most Recent)  Temp: 98.2 °F (36.8 °C) (10/20/23 0003)  Pulse: 72 (10/20/23 0351)  Resp: 18 (10/20/23 0003)  BP: 139/74 (10/20/23 0351)  SpO2: 95 % (10/20/23 0003)    Pain Assessment: No pain reported at this time    Vital Signs Range (Last 24H):  Temp:  [97.7 °F (36.5 °C)-98.4 °F (36.9 °C)]   Pulse:  [69-85]   Resp:  [16-19]   BP: (128-148)/(68-79)   SpO2:  [93 %-95 %]     Physical Exam:  Physical Exam  HENT:      Head: Normocephalic and atraumatic.   Eyes:      Extraocular Movements: Extraocular movements intact.      Conjunctiva/sclera: Conjunctivae normal.   Pulmonary:      Effort: Pulmonary effort is normal.   Musculoskeletal:      Cervical back: Neck supple.   Neurological:      Mental Status: He is alert and oriented to person, place, and time.   Psychiatric:         Attention and Perception: He is inattentive.         Mood and Affect: Affect is angry.         Speech: He is noncommunicative.         Behavior: Behavior is agitated.         Laboratory:  CBC with Differential:  No results for input(s): "WBC", "NEUTROPCT", "LYMPH", "MONOPCT", "EOSPCT", "BASOPHIL", "RBC", "HCT", "HGB", "MCV", "MCH", "RDW", "PLT", "MPV" in the last 24 hours.    Invalid input(s): "MCMC"    CMP:  No results for input(s): "GLU", "CALCIUM", "ALBUMIN", "PROT", "NA", "K", "CO2", "CL", "BUN", "CREATININE", "ALKPHOS", "ALT", "AST", "BILITOT" in the last 24 hours.    BMP:   No results for input(s): "GLU", "CALCIUM", "NA", "K", "CO2", "CL", "BUN", "CREATININE" in the last 24 hours.    LFTs:   No results for input(s): "ALT", "AST", "ALKPHOS", "BILITOT", "PROT", "ALBUMIN" in the last 24 hours.    Haptoglobin: No results for input(s): "HAPTOGLOBIN" in the last 24 hours.  Tumor Markers: No results for input(s): "PSA", "CEA", "", "AFPTM", "DT9659", "" in the last 24 hours.    Invalid input(s): "ALGTM"  Immunology: No results for input(s): " ""SPEP", "POONAM", "LISA", "FREELAMBDALI" in the last 24 hours.  Coagulation:   No results for input(s): "PT", "INR", "APTT" in the last 24 hours.    Specimen (24h ago, onward)      None          Microbiology Results (last 7 days)       Procedure Component Value Units Date/Time    Blood Culture [[id number]]  (Normal) Collected: 10/16/23 0549    Order Status: Completed Specimen: Blood, Venous Updated: 10/20/23 0902     CULTURE, BLOOD ([facility]) No Growth At 96 Hours    Blood Culture [[id number]]  (Normal) Collected: 10/16/23 0549    Order Status: Completed Specimen: Blood, Venous Updated: 10/20/23 0902     CULTURE, BLOOD ([facility]) No Growth At 96 Hours    Body Fluid Culture [[id number]] Collected: 10/17/23 1247    Order Status: Completed Specimen: Peritoneal Fluid from Ascitic Fluid Updated: 10/20/23 0841     Body Fluid Culture No Growth At 72 Hours            Diagnostic Results:  Imaging Results              US Paracentesis inc Imaging (Final result)  Result time 10/17/23 16:20:11      Final result by Maninder Charles MD (10/17/23 16:20:11)                   Narrative:    EXAMINATION  US GUIDED PARACENTESIS INC IMAGING    CLINICAL HISTORY  New ascites;    COMPARISON  Diagnostic abdominal ultrasound dated 16 October 2023 reviewed prior to start of the procedure.    TECHNIQUE/FINDINGS  The procedure and indication(s) were explained to the patient, to include discussion of risks, benefits, and alternatives. Written informed consent was obtained once all questions/concerns had been addressed to satisfaction. A "time out" was then performed with all participating members of the procedure team present and in agreement.  The patient was placed in supine position.  A preliminary ultrasound scan demonstrated adequate volume of intraperitoneal free fluid to allow safe access; optimal access site was then marked and the regional abdominal wall prepped and draped in usual sterile fashion. Local anesthesia was achieved with 5 mL 1% lidocaine, " injected under direct ultrasound visualization.    With continuous ultrasound guidance, a tandem needle-catheter unit was advanced into the right lateral peritoneal cavity.  The needle was then removed and the catheter left in place, with subsequent connection to drainage tubing and vacuum container.  A total of approximately 1.5 L fluid were aspirated without incident.  Final sonographic assessment prior to catheter removal demonstrated decreased volume of the free abdominal fluid. The catheter was then removed uneventfully, hemostasis achieved with direct pressure at the access site, and no evidence of new intra-abdominal abnormality or other adverse finding was appreciated.    Estimated blood loss: negligible    IMPRESSION  Technically successful ultrasound-guided diagnostic paracentesis.    Patient Status: The patient tolerated the procedure well, with no immediate complications appreciated. The patient was monitored for a short period following procedure completion, in order to ensure no change from baseline pre-procedure condition, with subsequent release from the Radiology Dept for treatment/follow up by the primary team.    The staff radiologist (Dr. Charles) was present throughout the procedure.      Electronically signed by: Maninder Charles  Date:    10/17/2023  Time:    16:20                                     NM Hepatobiliary Scan (HIDA) (Final result)  Result time 10/16/23 14:15:54      Final result by Mat Sommers MD (10/16/23 14:15:54)                   Impression:      Negative for acute cholecystitis.      Electronically signed by: Mat Sommers  Date:    10/16/2023  Time:    14:15               Narrative:    EXAMINATION:  NM HEPATOBILIARY IMAGING INC GB (HIDA)    CLINICAL HISTORY:  Cholecystitis;    TECHNIQUE:  8.2 mCi of intravenous Choletec was administered followed by focused gamma camera imaging of the abdomen.    COMPARISON:  CT abdomen pelvis October 16, 2023    FINDINGS:  There is  heterogeneous diminished radioisotope uptake by the liver. Central biliary activity identified with gallbladder filling by 60 minutes. There is progressive gallbladder filling over 3 hours of scintigraphy.  Small bowel activity is identified by 45 minutes progressing through 4 hours of imaging.                                       US Retroperitoneal Complete (Final result)  Result time 10/16/23 09:35:20      Final result by Andriy Laws MD (10/16/23 09:35:20)                   Impression:      1. No significant sonographic abnormality of the kidneys.  2. Moderate ascites.      Electronically signed by: Andriy Laws  Date:    10/16/2023  Time:    09:35               Narrative:    EXAMINATION:  US RETROPERITONEAL COMPLETE    CLINICAL HISTORY:  elevated BUN/Cr;    COMPARISON:  CT earlier today    FINDINGS:  Grayscale and color Doppler sonographic evaluation of the kidneys and urinary bladder.    The right kidney measures 10 cm. The left kidney measures 12 cm.   No hydronephrosis.  Grossly normal renal parenchymal echogenicity.    No significant abnormality of the urinary bladder.    There is moderate ascites.                                       US Abdomen Complete (Final result)  Result time 10/16/23 10:20:48      Final result by Andriy Laws MD (10/16/23 10:20:48)                   Impression:      1. Cirrhosis with patent portal vein.  2. Moderate ascites.  3. Cholelithiasis with nonspecific gallbladder wall thickening.  No biliary ductal dilatation.      Electronically signed by: Andriy Laws  Date:    10/16/2023  Time:    10:20               Narrative:    EXAMINATION:  US ABDOMEN COMPLETE    CLINICAL HISTORY:  abdominal pain;    COMPARISON:  Concurrent CT.    FINDINGS:  Grayscale, color and spectral Doppler evaluation of the abdomen.    No focal abnormality of the limited visualized pancreas.    Visualized portions of the aorta and inferior vena cava are normal in caliber.    Cirrhotic liver with no  focal suspicious liver lesions seen.  Normal hepatopetal flow is noted in the portal vein.    There are gallstones.  There is some nonspecific gallbladder wall thickening.  The common bile duct is normal in caliber  and measures 4 mm.    The right kidney measures 11 cm, while the left measures 13 cm.  No hydronephrosis.  Left renal calculus better demonstrated on CT.    The spleen is normal in size and echogenicity  and measures 10 cm.    Urinary bladder unremarkable.    There is moderate ascites.                                       CT Abdomen Pelvis  Without Contrast (Final result)  Result time 10/16/23 08:22:59   Procedure changed from CT Abdomen Pelvis With Contrast     Final result by Mat Sommers MD (10/16/23 08:22:59)                   Impression:    Impression:    1. There is a small right pleural effusion. There is right basilar consolidation with ground-glass opacities in the left lower lobe, which may reflect an infectious process. Correlate clinically as regards further evaluation and followup.    2. The margins of the liver are nodular and the liver is somewhat shrunken. These findings are consistent with cirrhosis. Correlate with clinical and laboratory findings.    3. Multiple calculi are noted within a mildly distended gallbladder. The gallbladder otherwise can not be definitively evaluated as there is pronounced ascites throughout the abdomen and pelvis such that pericholecystic fluid and inflammatory change and even wall thickening is not accurately identifiable. Correlate with clinical and laboratory findings as regards additional evaluation and follow-up of the gallbladder.    4. There is pronounced ascites consistent with cirrhosis.    5. Details and other findings as discussed above.    No significant discrepancy with overnight report.      Electronically signed by: Mat Sommers  Date:    10/16/2023  Time:    08:22               Narrative:      Technique:CT of the abdomen and pelvis was  performed with axial images as well as sagittal and coronal reconstruction images without intravenous contrast.    Comparison:None available.    Clinical History:Abdominal pain.    Dosage Information:Automated Exposure Control was utilized.    Findings:    Thorax:    Lungs:There is a small right pleural effusion. There is right basilar consolidation with ground-glass opacities in the left lower lobe, which may reflect an infectious process.    Heart:The heart size is within normal limits.    Abdomen:    Abdominal Wall:No abdominal wall pathology is seen.    Liver:The margins of the liver are nodular and the liver is somewhat shrunken.    Within limitation noncontrast technique, no acute findings the liver, pancreas and spleen identified.    Biliary System:No intrahepatic or extrahepatic biliary duct dilatation is seen.    Gallbladder:Multiple calculi are noted within a mildly distended gallbladder. The gallbladder otherwise can not be definitively evaluated as there is pronounced ascites throughout the abdomen and pelvis such that pericholecystic fluid and inflammatory change and even wall thickening is not accurately identifiable.    Adrenals:The adrenal glands appear unremarkable.    Kidneys:The right kidney appears unremarkable with no stones cysts masses or hydronephrosis. A single stone measuring 9 mm is seen on series 2, image 64 in the mid pole of the left kidney.    Aorta:There is mild calcification of the abdominal aorta and its branches.    Bowel:    Esophagus:The visualized esophagus appears unremarkable.    Stomach:The stomach appears unremarkable.    Duodenum:Unremarkable appearing duodenum.    Small Bowel:The small bowel appears unremarkable.    Colon:Nondistended.    Appendix:The appendix appears unremarkable (series 2, image 117).    Peritoneum:No free intraperitoneal air is seen. There is pronounced ascites consistent with cirrhosis.    Pelvis:    Bladder:The bladder appears  unremarkable.    Male:    Prostate gland:The prostate gland appears unremarkable.    Bony structures:    Dorsal Spine:There is mild spondylosis of the visualized dorsal spine.    Bony Pelvis:The visualized bony structures of the pelvis appear unremarkable.                                          ASSESSMENT/PLAN:     Patient Active Problem List   Diagnosis    Acute renal insufficiency     I have been trying to discuss with the patient his diagnosis, prognosis and treatment recommendations.  This has been unsuccessful as the patient is uncooperative and does not want to listen what I have to say.      Plan  If patient decides to be treated for his cancer please scheudle an appt with me 1 week post d/c    Thank you for the consult    I will be available for any questions.    Shelly Christopher MD  Hematology/Oncology  [facility]

## 2023-10-20 NOTE — PROGRESS NOTES
"Gastroenterology Progress Note    Subjective/Interval History:  Continues to be obstructive to his care and completely refuses to speak to me again today.  He is eating vegetable soup and crackers.  Complaining that it is not salty enough.     Vital Signs:  /74   Pulse 72   Temp 98.2 °F (36.8 °C) (Oral)   Resp 18   Ht 5' 10.98" (1.803 m)   Wt 68 kg (150 lb)   SpO2 95%   BMI 20.93 kg/m²   Body mass index is 20.93 kg/m².    Physical Exam:  Physical Exam  Constitutional:       General: He is not in acute distress.     Appearance: He is ill-appearing.   HENT:      Head: Normocephalic and atraumatic.   Eyes:      General: No scleral icterus.     Extraocular Movements: Extraocular movements intact.   Cardiovascular:      Rate and Rhythm: Normal rate and regular rhythm.   Pulmonary:      Effort: Pulmonary effort is normal. No respiratory distress.   Abdominal:      General: Bowel sounds are normal. There is distension (mild).      Palpations: Abdomen is soft. There is no mass.      Tenderness: There is no abdominal tenderness. There is no guarding or rebound.   Musculoskeletal:      Right lower leg: No edema.      Left lower leg: No edema.   Skin:     General: Skin is warm and dry.      Coloration: Skin is not jaundiced.   Neurological:      Mental Status: He is alert and oriented to person, place, and time.      Comments: No asterixis.    Psychiatric:         Mood and Affect: Mood is depressed.         Behavior: Behavior is withdrawn.       Labs:  Recent Results (from the past 48 hour(s))   Comprehensive Metabolic Panel    Collection Time: 10/19/23  9:50 AM   Result Value Ref Range    Sodium Level 141 136 - 145 mmol/L    Potassium Level 4.6 3.5 - 5.1 mmol/L    Chloride 115 (H) 98 - 107 mmol/L    Carbon Dioxide 22 (L) 23 - 31 mmol/L    Glucose Level 144 (H) 82 - 115 mg/dL    Blood Urea Nitrogen 33.4 (H) 8.4 - 25.7 mg/dL    Creatinine 1.40 (H) 0.73 - 1.18 mg/dL    Calcium Level Total 7.9 (L) 8.8 - 10.0 mg/dL    " Protein Total 7.1 5.8 - 7.6 gm/dL    Albumin Level 1.8 (L) 3.4 - 4.8 g/dL    Globulin 5.3 (H) 2.4 - 3.5 gm/dL    Albumin/Globulin Ratio 0.3 (L) 1.1 - 2.0 ratio    Bilirubin Total 1.7 (H) <=1.5 mg/dL    Alkaline Phosphatase 379 (H) 40 - 150 unit/L    Alanine Aminotransferase 29 0 - 55 unit/L    Aspartate Aminotransferase 82 (H) 5 - 34 unit/L    eGFR 57 mls/min/1.73/m2   Protime-INR    Collection Time: 10/19/23  9:50 AM   Result Value Ref Range    PT 16.3 (H) 12.5 - 14.5 seconds    INR 1.3 <=1.3   CBC with Differential    Collection Time: 10/19/23  9:50 AM   Result Value Ref Range    WBC 11.77 (H) 4.50 - 11.50 x10(3)/mcL    RBC 3.63 (L) 4.70 - 6.10 x10(6)/mcL    Hgb 10.7 (L) 14.0 - 18.0 g/dL    Hct 31.6 (L) 42.0 - 52.0 %    MCV 87.1 80.0 - 94.0 fL    MCH 29.5 27.0 - 31.0 pg    MCHC 33.9 33.0 - 36.0 g/dL    RDW 17.0 11.5 - 17.0 %    Platelet 291 130 - 400 x10(3)/mcL    MPV 9.7 7.4 - 10.4 fL    Neut % 70.8 %    Lymph % 16.5 %    Mono % 10.8 %    Eos % 0.8 %    Basophil % 0.5 %    Lymph # 1.94 0.6 - 4.6 x10(3)/mcL    Neut # 8.33 2.1 - 9.2 x10(3)/mcL    Mono # 1.27 0.1 - 1.3 x10(3)/mcL    Eos # 0.10 0 - 0.9 x10(3)/mcL    Baso # 0.06 <=0.2 x10(3)/mcL    IG# 0.07 (H) 0 - 0.04 x10(3)/mcL    IG% 0.6 %    NRBC% 0.0 %       Imaging:  NM Hepatobiliary Scan (HIDA)    Result Date: 10/16/2023  EXAMINATION: NM HEPATOBILIARY IMAGING INC GB (HIDA) CLINICAL HISTORY: Cholecystitis; TECHNIQUE: 8.2 mCi of intravenous Choletec was administered followed by focused gamma camera imaging of the abdomen. COMPARISON: CT abdomen pelvis October 16, 2023 FINDINGS: There is heterogeneous diminished radioisotope uptake by the liver. Central biliary activity identified with gallbladder filling by 60 minutes. There is progressive gallbladder filling over 3 hours of scintigraphy.  Small bowel activity is identified by 45 minutes progressing through 4 hours of imaging.     Negative for acute cholecystitis. Electronically signed by: Mat Sommers  Date:    10/16/2023 Time:    14:15    US Abdomen Complete    Result Date: 10/16/2023  EXAMINATION: US ABDOMEN COMPLETE CLINICAL HISTORY: abdominal pain; COMPARISON: Concurrent CT. FINDINGS: Grayscale, color and spectral Doppler evaluation of the abdomen. No focal abnormality of the limited visualized pancreas. Visualized portions of the aorta and inferior vena cava are normal in caliber. Cirrhotic liver with no focal suspicious liver lesions seen.  Normal hepatopetal flow is noted in the portal vein. There are gallstones.  There is some nonspecific gallbladder wall thickening.  The common bile duct is normal in caliber  and measures 4 mm. The right kidney measures 11 cm, while the left measures 13 cm.  No hydronephrosis.  Left renal calculus better demonstrated on CT. The spleen is normal in size and echogenicity  and measures 10 cm. Urinary bladder unremarkable. There is moderate ascites.     1. Cirrhosis with patent portal vein. 2. Moderate ascites. 3. Cholelithiasis with nonspecific gallbladder wall thickening.  No biliary ductal dilatation. Electronically signed by: Andriy Laws Date:    10/16/2023 Time:    10:20    US Retroperitoneal Complete    Result Date: 10/16/2023  EXAMINATION: US RETROPERITONEAL COMPLETE CLINICAL HISTORY: elevated BUN/Cr; COMPARISON: CT earlier today FINDINGS: Grayscale and color Doppler sonographic evaluation of the kidneys and urinary bladder. The right kidney measures 10 cm. The left kidney measures 12 cm.   No hydronephrosis.  Grossly normal renal parenchymal echogenicity. No significant abnormality of the urinary bladder. There is moderate ascites.     1. No significant sonographic abnormality of the kidneys. 2. Moderate ascites. Electronically signed by: Andriy Laws Date:    10/16/2023 Time:    09:35    CT Abdomen Pelvis  Without Contrast    Result Date: 10/16/2023  Technique:CT of the abdomen and pelvis was performed with axial images as well as sagittal and coronal reconstruction  images without intravenous contrast. Comparison:None available. Clinical History:Abdominal pain. Dosage Information:Automated Exposure Control was utilized. Findings: Thorax: Lungs:There is a small right pleural effusion. There is right basilar consolidation with ground-glass opacities in the left lower lobe, which may reflect an infectious process. Heart:The heart size is within normal limits. Abdomen: Abdominal Wall:No abdominal wall pathology is seen. Liver:The margins of the liver are nodular and the liver is somewhat shrunken. Within limitation noncontrast technique, no acute findings the liver, pancreas and spleen identified. Biliary System:No intrahepatic or extrahepatic biliary duct dilatation is seen. Gallbladder:Multiple calculi are noted within a mildly distended gallbladder. The gallbladder otherwise can not be definitively evaluated as there is pronounced ascites throughout the abdomen and pelvis such that pericholecystic fluid and inflammatory change and even wall thickening is not accurately identifiable. Adrenals:The adrenal glands appear unremarkable. Kidneys:The right kidney appears unremarkable with no stones cysts masses or hydronephrosis. A single stone measuring 9 mm is seen on series 2, image 64 in the mid pole of the left kidney. Aorta:There is mild calcification of the abdominal aorta and its branches. Bowel: Esophagus:The visualized esophagus appears unremarkable. Stomach:The stomach appears unremarkable. Duodenum:Unremarkable appearing duodenum. Small Bowel:The small bowel appears unremarkable. Colon:Nondistended. Appendix:The appendix appears unremarkable (series 2, image 117). Peritoneum:No free intraperitoneal air is seen. There is pronounced ascites consistent with cirrhosis. Pelvis: Bladder:The bladder appears unremarkable. Male: Prostate gland:The prostate gland appears unremarkable. Bony structures: Dorsal Spine:There is mild spondylosis of the visualized dorsal spine. Bony  Pelvis:The visualized bony structures of the pelvis appear unremarkable.     Impression: 1. There is a small right pleural effusion. There is right basilar consolidation with ground-glass opacities in the left lower lobe, which may reflect an infectious process. Correlate clinically as regards further evaluation and followup. 2. The margins of the liver are nodular and the liver is somewhat shrunken. These findings are consistent with cirrhosis. Correlate with clinical and laboratory findings. 3. Multiple calculi are noted within a mildly distended gallbladder. The gallbladder otherwise can not be definitively evaluated as there is pronounced ascites throughout the abdomen and pelvis such that pericholecystic fluid and inflammatory change and even wall thickening is not accurately identifiable. Correlate with clinical and laboratory findings as regards additional evaluation and follow-up of the gallbladder. 4. There is pronounced ascites consistent with cirrhosis. 5. Details and other findings as discussed above. No significant discrepancy with overnight report. Electronically signed by: Mat Sommers Date:    10/16/2023 Time:    08:22         Assessment/Plan:  61 yo AAM unknown to our group with a pmhx of HTN.  Patient was apparently brought to the ED after his uncle whom he lives with was concerned about his behavior, reportedly talking to himself. He is homeless.  Found to have SOCORRO and imaging concerning for cirrhosis and ascites for which GI was consulted.      Hep C Cirrhosis - apparent new dx   MELD 3.0: 19 at 10/16/2023  Ascites: See below.  Screening for varices: none prior.   Encephalopathy: Ammonia normal in ED and no asterixis.  Do not think he has HE clinically.   HCC Screen: see below.      2.  Ascites - nephrotic  s/p paracentesis 10/17/23: 1.L removed. No SBP. SAAG 1.1 and TP 1 c/w nephrotic ascites.  Received albumin after.      3.  Elevated AFP / Liver mass - Concern for HCC.  CT triple phase liver  10/18/23: Cirrhotic nodular appearing liver with a 1.2 x 1.3 cm lesion seen in segment 8 of the liver which shows early arterial enhancement and washout on delayed imaging.  Findings are not consistent with hemangioma.  Hepatocellular carcinoma cannot be excluded based on this appearance. Diffuse ascites  - Does not seem to need bx and possibly resectable.  Medical oncology consult reviewed; rec outpt f/u     4. SOCORRO  - per renal.  They have signed off. Recommended holding diuretics unless gets overloaded and avoiding high dose diuretics as that can cause HRS.     Continue supportive care for cirrhosis and f/u with med onc if wishes for treatment.  Can f/u for cirrhosis management at [facility]. GI available if needed.        Dedra Wheeler PA-C

## 2023-10-20 NOTE — PROGRESS NOTES
"Inpatient Nutrition Evaluation    Admit Date: 10/15/2023   Total duration of encounter: 5 days    Nutrition Recommendation/Prescription     Continue regular diet as tolerated  RD to order Boost BID to provide an additional 240 kcal and 10 g protein per container  RD to monitor po intake and weight    Nutrition Assessment     Chart Review    Reason Seen: length of stay    Malnutrition Screening Tool Results   Have you recently lost weight without trying?: No  Have you been eating poorly because of a decreased appetite?: No   MST Score: 0     Diagnosis:  Decompensated Cirrhosis with Ascites  Elevated AFP levels with Hepatic Lesion concerning for HCC  Hepatitis C positive   Hyperbilirubinemia/Transaminitis likely 2/2 Cirrhosis  SOCORRO 2/2 Prerenal Azotemia - Improved  Essential HTN   Possible CAP on imaging   Substance Use Disorder  Reported homelessness    Relevant Medical History: HTN    Nutrition-Related Medications: zofran prn, phenergan prn    Nutrition-Related Labs: 10/19: WBC-11.77, RBC-3.63, H/H-10.7/31.6, Cl-115, BUN-33.4, creat-1.40, glu-144, queta-7.9, bili total-1.7, AST-82      Diet Order: Diet Adult Regular  Oral Supplement Order: Boost  Appetite/Oral Intake: good/% of meals  Factors Affecting Nutritional Intake: none identified  Food/Zoroastrian/Cultural Preferences: unable to obtain  Food Allergies: unable to obtain       Wound(s):       Comments    10/20: pt not cooperative with conversation, only complaining about the food being cold. Pt ate breakfast during attempted evaluation. Pt with fair-good appetite per documented intake. Obtained lunch order and offered ONS, will trial. No GI issues reported per notes, last BM 10/18. Unable to obtain appetite or weight history. Per MST, no reports of decreased appetite or weight loss prior admission. Per EMR weights, weight stable x 2 months.    Anthropometrics    Height: 5' 10.98" (180.3 cm)    Last Weight: 68 kg (150 lb) (10/17/23 5727) Weight Method: Bed " Scale  BMI (Calculated): 20.9  BMI Classification: normal (BMI 18.5-24.9)        Ideal Body Weight (IBW), Male: 171.88 lb     % Ideal Body Weight, Male (lb): 87.27 %                          Usual Weight Provided By: EMR weight history    Wt Readings from Last 5 Encounters:   10/17/23 68 kg (150 lb)   08/14/23 65.8 kg (145 lb)   07/18/18 67.7 kg (149 lb 4 oz)     Weight Change(s) Since Admission:  Admit Weight: 68 kg (150 lb) (10/15/23 2222)      Patient Education    Not applicable.    Monitoring & Evaluation     Dietitian will monitor food and beverage intake, weight, electrolyte/renal panel, and glucose/endocrine profile.  Nutrition Risk/Follow-Up: low (follow-up in 5-7 days)  Patients assigned 'low nutrition risk' status do not qualify for a full nutritional assessment but will be monitored and re-evaluated in a 5-7 day time period. Please consult if re-evaluation needed sooner.

## 2023-10-20 NOTE — PROGRESS NOTES
[facility]  Hospital Medicine Progress Note        Chief Complaint: Inpatient Follow-up for     HPI:   62-year-old male with a history of hypertension (report not compliant with his meds for some time) who was brought to the ER after his uncle with whom he lives was concerned about his behavior.  He was reportedly talking to himself though patient denies this was the case.  He does endorse that he has been using cocaine, in his concern now he does not have a place to live.  He states he is not been feeling normal over last few weeks but will not go into further detail as he is excessively crying at bedside.  He denies suicidal homicidal ideations.  He states he would just like a home to have to visit his grandchildren.     His workup in the ER was significant for acute kidney injury as well as mild hyperbilirubinemia, transaminitis.  He was started on IV fluids admitted to the hospitalist service for further management.  HIDA scan is negative for acute cholecystitis   Patient started on antibiotics for possible pneumonia. GI consulted. Noted to have cirrhosis with ascites for which he underwent paracentesis with removal of 1.2 L fluid & rreceived Albumin after. AFP noted to be elevated at 19.2. GI ordered Triple Phase CT A/P which showed Cirrhotic nodular appearing liver with a 1.2 x 1.3 cm lesion seen in liver which shows early arterial enhancement and washout on delayed imaging not consistent with hemangioma with HCC not excluded. Nephrology signed off due to improvement in renal indices. Patient to not be diuresed aggressively due to risk of HRS unless grossly volume overloaded.    Interval Hx:   Today, Mr Humphrey reported fatigue & weakness. He was told about CT A/P findings in particular the hepatic lesion with suspicion for HCC. Will continue following GI recs. GI recommending Oncology consult which will be placed tomorrow. No biopsy needed & hepatic lesion thought to be  resectable.    Case was discussed with patient's nurse on the floor.    Objective/physical exam:  General: alert male lying comfortably in bed, in no acute distress  HENT: oral and oropharyngeal mucosa moist, pink, with no erythema or exudates, no ear pain or discharge  Neck: normal neck movement, no lymph nodes or swellings, no JVD or Carotid bruit  Respiratory: clear breathing sounds bilaterally, no crackles, rales, ronchi or wheezes  Cardiovascular: clear S1 and S2, no murmurs, rubs or gallops  Peripheral Vascular: no lesions, ulcers or erosions, normal peripheral pulses and no pedal edema  Gastrointestinal: soft, non-tender,mildly distended abdomen, no guarding, rigidity or rebound tenderness, normal bowel sounds  Integumentary: normal skin color, no rashes or lesions  Neuro: AAO x 3; motor strength 5/5 in B/L UEs & LEs; sensation intact to gross and fine touch B/L; CN II-XII grossly intact    VITAL SIGNS: 24 HRS MIN & MAX LAST   Temp  Min: 97.7 °F (36.5 °C)  Max: 98.5 °F (36.9 °C) 98.4 °F (36.9 °C)   BP  Min: 128/70  Max: 156/77 (!) 140/79   Pulse  Min: 69  Max: 84  69   Resp  Min: 16  Max: 18 18   SpO2  Min: 93 %  Max: 96 % 95 %     I have reviewed the following labs:  Recent Labs   Lab 10/16/23  0225 10/17/23  0422 10/19/23  0950   WBC 11.36 10.68 11.77*   RBC 4.64* 3.75* 3.63*   HGB 13.4* 10.9* 10.7*   HCT 39.9* 32.2* 31.6*   MCV 86.0 85.9 87.1   MCH 28.9 29.1 29.5   MCHC 33.6 33.9 33.9   RDW 16.2 16.9 17.0    308 291   MPV 10.2 10.0 9.7     Recent Labs   Lab 10/17/23  0422 10/18/23  0527 10/19/23  0950    141 141   K 4.8 4.6 4.6   CO2 22* 22* 22*   BUN 53.0* 39.7* 33.4*   CREATININE 1.96* 1.64* 1.40*   CALCIUM 7.4* 7.7* 7.9*   MG  --  1.90  --    ALBUMIN 1.5* 1.7* 1.8*   ALKPHOS 377* 363* 379*   ALT 26 28 29   AST 66* 81* 82*   BILITOT 1.7* 1.5 1.7*     Assessment/Plan:  Decompensated Cirrhosis with Ascites  Elevated AFP levels with Hepatic Lesion concerning for HCC  Hepatitis C positive  "  Hyperbilirubinemia/Transaminitis likely 2/2 Cirrhosis  SOCORRO 2/2 Prerenal Azotemia - Improved  Essential HTN   Possible CAP on imaging   Substance Use Disorder  Reported homelessness    Patient continues to be admitted  Continues to endorse generalized pain & weakness; wants "to go home" but wishes to be treated for new liver lesion concerning for HCC  GI on-board; follow recs  Nephrology signed off with improving renal indices; holding off on diuresis d/t risk of HRS  Oncology to be consulted tomorrow per GI recs; Hepatic lesion thought to not require biopsy & be resectable  Continued on IV Rocephin day 3; completed 3 days of Azithromycin  Continued on Amlodipine, Aripiprazole, Escitalopram, Hydralazine TID  Continue monitoring symptoms  Consult PT    VTE prophylaxis: Lovenox    Patient condition:  Stable    Anticipated discharge and Disposition:     Pending    All diagnosis and differential diagnosis have been reviewed; assessment and plan has been documented; I have personally reviewed the labs and test results that are presently available; I have reviewed the patients medication list; I have reviewed the consulting providers response and recommendations. I have reviewed or attempted to review medical records based upon their availability    All of the patient's questions have been  addressed and answered. Patient's is agreeable to the above stated plan. I will continue to monitor closely and make adjustments to medical management as needed.  _____________________________________________________________________    Radiology:  I have personally reviewed the following imaging and agree with the radiologist.     CT Abdomen Pelvis w/o Contrast Plus Triphasic w/Contrast  Narrative: EXAMINATION:  CT ABDOMEN PELVIS W/O CONTRAST PLUS TRIPHASIC W/CONTRAST    CLINICAL HISTORY:  would not do MRI of liver;    TECHNIQUE:  Low dose axial images, sagittal and coronal reformations were obtained from the lung bases to the pubic " symphysis following the IV administration of  contrast.  Delayed imaging and pre contrasted imaging was performed as well. Automatic exposure control is utilized to reduce patient radiation exposure.    COMPARISON:  10/16/2023    FINDINGS:  There is bibasilar atelectasis and bilateral pleural effusions.  Findings are worse than the prior examination    The liver is enlarged with a diffusely nodular contour consistent with cirrhosis.  There is a early arterial phase enhancing lesion seen in segment 8 of the liver on image number 29 series 7.  It measures 12 mm x 13 mm.  Portal venous phase of the examination shows the lesion becomes Isoattenuated.  Delayed imaging shows that the lesion becomes hypoattenuated and there is a degree of washout.  Findings are not consistent with a hemangioma.  No other liver lesion is seen.  Portal and hepatic veins appear normal.    There is large volume ascites seen.    The gallbladder appears grossly unremarkable.    The pancreas appears normal.  No pancreatic mass or lesion is seen.    The spleen appears normal.  No splenic mass or lesion is seen.    The adrenal glands appear normal.  No adrenal nodule is seen.    The kidneys are normal in size.  No hydronephrosis is seen.  No hydroureter is seen.  There is a nonobstructing medullary calculus seen in the midpole the left kidney.  No other areas of nephro or ureterolithiasis is seen.    Urinary bladder appears normal.    No colitis is seen.  No diverticulitis is seen.  No colonic mass or lesion or inflammatory process is seen.    No free air seen.    The bones appear grossly unremarkable.  Impression: Cirrhotic nodular appearing liver with a 1.2 x 1.3 cm lesion seen in segment 8 of the liver which shows early arterial enhancement and washout on delayed imaging.  Findings are not consistent with hemangioma.  Hepatocellular carcinoma cannot be excluded based on this appearance.    Diffuse ascites    Nonobstructing medullary calculus in  the midpole the left kidney    Bibasilar atelectasis and pleural effusions worse than the prior examination    This report was flagged in Epic as abnormal.    Electronically signed by: Mark Anthony Navarro  Date:    10/18/2023  Time:    17:59      Jayden Messina MD   10/19/2023

## 2023-10-20 NOTE — PLAN OF CARE
Problem: Adult Inpatient Plan of Care  Goal: Plan of Care Review  Outcome: Ongoing, Progressing  Goal: Patient-Specific Goal (Individualized)  Outcome: Ongoing, Progressing  Goal: Absence of Hospital-Acquired Illness or Injury  Outcome: Ongoing, Progressing  Goal: Optimal Comfort and Wellbeing  Outcome: Ongoing, Progressing  Goal: Readiness for Transition of Care  Outcome: Ongoing, Progressing     Problem: Skin Injury Risk Increased  Goal: Skin Health and Integrity  Outcome: Ongoing, Progressing     Problem: Coping Ineffective  Goal: Effective Coping  Outcome: Ongoing, Progressing

## 2023-10-20 NOTE — CONSULTS
Patient Name: Aman Humphrey   MRN: [id number]   Admission Date: 10/15/2023   Hospital Length of Stay: 5   Attending Provider: Mukesh New MD   Consulting Provider: Prateek Lucero M.D.  Reason for Consult: Goals of Care  Primary Care Physician: Lesly, Primary Doctor     Principal Problem: Acute renal insufficiency     Patient information was obtained from patient, relative(s), and ER records.      Final diagnoses:  [R53.1] Weakness  [N28.9] Acute renal insufficiency (Primary)     Assessment/Plan:     I reviewed the patient and family's understanding of the seriousness of the illness and its expected prognosis. We discussed the patient's goals of care and treatment preferences. We discussed the difference between palliative and curative medicine. I explained the differences between home health, palliative and hospice care. I clarified current code status. I identified the surrogate decision maker or health care POA. I explained the difference between a living will (advanced directive) and LaPost. We discussed the patient's chosen code status as listed above and the contents of the LaPOST. I answered all questions and we formulated a plan including recommendations for symptom management and how to best achieve goals of care.     I reviewed the patient's current clinical status with the nurse. I reviewed clinical documentation, labs and imaging.     Symptom management review: He denies any symptoms.    We attempted to review the patient's understanding about his current illness, including the work-up obtained and recommendations for further w/u and treatment. He was resistant to the communication and continued to change the subject. He spoke mainly about his displeasure that nurses haven't been attentive to him and did not wake him up when his tray of food arrived. I asked him about his goals for treatment based on his recent diagnosis of cirrhosis and live mass. I asked him if he would agreed to treatment  "recommendations. He said, "of course." I then asked if he would agree to speak with the oncologist and to allow the further testing recommended. He did not answer, but began talking about how he just wanted to go home. I clarified that he does not have a home to go back to. He said, if my ex would call me back I could stay with her. I explained that our CM has tried to assist him in placement but he was unwilling to work with her. He just kept repeating, "I want to go home. My sister can take care of me. I asked for any contacts. He have me a couple of phone numbers of sister, but these were not usable. CM knew the number for one of his sisters. I reached out to Grisel, his sister to discuss my concern about the patient's lack of plan. She said that she will contact all of his siblings and his 2 children and then let us know how they can help.    I also spoke to him about code status. However, he changed the subject and would not give an answer. He will remain a full code for now.    Disposition: To be determined.      History of Present Illness:     63 y/o M h/o homelessness, cocaine use (positive on admit), currently admitted with Dx of decompensated cirrhosis with ascites (s/p 1.2 L removed on 10/17), left sided PNA, SOCORRO and a liver mass (felt to be resectable). We were consulted to review goals of care.      Active Ambulatory Problems     Diagnosis Date Noted    No Active Ambulatory Problems     Resolved Ambulatory Problems     Diagnosis Date Noted    No Resolved Ambulatory Problems     Past Medical History:   Diagnosis Date    Carpal tunnel syndrome     HTN (hypertension)     Sciatica         Past Surgical History:   Procedure Laterality Date    SKIN GRAFT          Review of patient's allergies indicates:  No Known Allergies       Current Facility-Administered Medications:     amLODIPine tablet 10 mg, 10 mg, Oral, Daily, Aniyah Perez MD, 10 mg at 10/19/23 0953    ARIPiprazole tablet 5 mg, 5 mg, Oral, " "Daily, Emelyn Marte, PMHNP, 5 mg at 10/19/23 0953    cefTRIAXone (ROCEPHIN) 1 g in dextrose 5 % in water (D5W) 100 mL IVPB (MB+), 1 g, Intravenous, Q24H, Aniyah Perez MD, Stopped at 10/19/23 1224    enoxaparin injection 30 mg, 30 mg, Subcutaneous, Daily, Mukesh New MD, 30 mg at 10/19/23 1647    EScitalopram oxalate tablet 5 mg, 5 mg, Oral, Daily, Emelyn Marte, PMHNP, 5 mg at 10/19/23 0954    haloperidol lactate injection 2 mg, 2 mg, Intramuscular, Q6H PRN, Mukesh New MD, 2 mg at 10/16/23 0049    hydrALAZINE injection 10 mg, 10 mg, Intravenous, Q4H PRN, Aniyah Perez MD    hydrALAZINE tablet 50 mg, 50 mg, Oral, Q8H, Aniyah Perez MD, 50 mg at 10/20/23 0505    melatonin tablet 6 mg, 6 mg, Oral, Nightly PRN, Mukesh New MD, 6 mg at 10/19/23 2112    ondansetron injection 4 mg, 4 mg, Intravenous, Q4H PRN, Mukesh New MD, 4 mg at 10/16/23 0054    promethazine injection 12.5 mg, 12.5 mg, Intramuscular, Q6H PRN, Mukesh New MD, 12.5 mg at 10/19/23 2216    sodium chloride 0.9% flush 10 mL, 10 mL, Intravenous, PRN, Mukesh New MD     haloperidol lactate, hydrALAZINE, melatonin, ondansetron, promethazine, sodium chloride 0.9%     History reviewed. No pertinent family history.     Review of Systems   Constitutional:  Negative for appetite change.   HENT:  Negative for trouble swallowing.    Respiratory:  Negative for shortness of breath.    Gastrointestinal:  Negative for abdominal pain and nausea.   Musculoskeletal:  Negative for arthralgias.   Psychiatric/Behavioral:  The patient is not nervous/anxious.             Objective:   BP (!) 167/75   Pulse 71   Temp 98 °F (36.7 °C)   Resp 18   Ht 5' 10.98" (1.803 m)   Wt 68 kg (150 lb)   SpO2 95%   BMI 20.93 kg/m²      Physical Exam  Vitals reviewed.   Constitutional:       General: He is not in acute distress.     Appearance: He is ill-appearing. He is not toxic-appearing.   HENT: "      Head: Normocephalic.      Right Ear: External ear normal.      Left Ear: External ear normal.      Nose: Nose normal.      Mouth/Throat:      Mouth: Mucous membranes are moist.      Pharynx: Oropharynx is clear.   Eyes:      Extraocular Movements: Extraocular movements intact.      Conjunctiva/sclera: Conjunctivae normal.      Pupils: Pupils are equal, round, and reactive to light.   Cardiovascular:      Rate and Rhythm: Normal rate and regular rhythm.      Pulses: Normal pulses.   Pulmonary:      Effort: Pulmonary effort is normal.      Breath sounds: Normal breath sounds.   Abdominal:      General: Abdomen is flat. Bowel sounds are normal. There is no distension.      Palpations: Abdomen is soft.      Tenderness: There is no abdominal tenderness.   Musculoskeletal:      Right lower leg: No edema.      Left lower leg: No edema.   Skin:     General: Skin is warm.   Neurological:      General: No focal deficit present.      Mental Status: He is alert and oriented to person, place, and time.   Psychiatric:      Comments: He is obstinate and speaks with slow slurring, changing the subject frequently.             Review of Symptoms  Review of Symptoms      Symptom Assessment (ESAS 0-10 Scale)  Pain:  0  Dyspnea:  0  Anxiety:  0  Nausea:  0  Depression:  0  Anorexia:  0  Fatigue:  0  Insomnia:  0  Restlessness:  0  Agitation:  0     CAM / Delirium:  Negative  Constipation:  Negative  Diarrhea:  Negative    Anxiety:  Is not nervous/anxious    Bowel Management Plan (BMP):  Yes      Pain Assessment:  OME in 24 hours:  0  Location(s):      Modified Delia Scale:  0    Performance Status:  40    Living Arrangements:  Lives alone    Psychosocial/Cultural:   See Palliative Psychosocial Note: No  Pt is homeless. Per CM, his family kicked him out due to drug abuse. See assessment  **Primary  to Follow**  Palliative Care  Consult: No    Spiritual:  F - Annetta and Belief:  Unable to determine      Time-Based Charting:  Yes    Total Time Spent: 0 minutes      Advance Care Planning   Advance Directives:   Living Will: No    LaPOST: No    Do Not Resuscitate Status: No    Medical Power of : No    Agent's Name:  None    Decision Making:  Patient answered questions and Family answered questions            Caregiver burden formerly assessed: Yes and No        > 50% of 55 min of encounter was spent in chart review, face to face discussion of goals of care, symptom assessment, coordination of care and emotional support.     Prateek Lucero M.D.  Palliative Medicine  [facility]

## 2023-10-21 LAB
ALBUMIN SERPL-MCNC: 1.8 G/DL (ref 3.4–4.8)
ALBUMIN/GLOB SERPL: 0.3 RATIO (ref 1.1–2)
ALP SERPL-CCNC: 349 UNIT/L (ref 40–150)
ALT SERPL-CCNC: 30 UNIT/L (ref 0–55)
AST SERPL-CCNC: 83 UNIT/L (ref 5–34)
AV INDEX (PROSTH): 1
AV MEAN GRADIENT: 4 MMHG
AV PEAK GRADIENT: 7 MMHG
AV VALVE AREA BY VELOCITY RATIO: 4.15 CM²
AV VALVE AREA: 4.14 CM²
AV VELOCITY RATIO: 1
BACTERIA BLD CULT: NORMAL
BACTERIA BLD CULT: NORMAL
BILIRUB SERPL-MCNC: 1.8 MG/DL
BSA FOR ECHO PROCEDURE: 1.85 M2
BUN SERPL-MCNC: 32.6 MG/DL (ref 8.4–25.7)
CALCIUM SERPL-MCNC: 7.8 MG/DL (ref 8.8–10)
CHLORIDE SERPL-SCNC: 114 MMOL/L (ref 98–107)
CO2 SERPL-SCNC: 19 MMOL/L (ref 23–31)
CREAT SERPL-MCNC: 1.34 MG/DL (ref 0.73–1.18)
CV ECHO LV RWT: 0.47 CM
DOP CALC AO PEAK VEL: 1.31 M/S
DOP CALC AO VTI: 25.2 CM
DOP CALC LVOT AREA: 4.2 CM2
DOP CALC LVOT DIAMETER: 2.3 CM
DOP CALC LVOT PEAK VEL: 1.31 M/S
DOP CALC LVOT STROKE VOLUME: 104.23 CM3
DOP CALC MV VTI: 25.7 CM
DOP CALCLVOT PEAK VEL VTI: 25.1 CM
E WAVE DECELERATION TIME: 151 MSEC
E/A RATIO: 1.62
E/E' RATIO: 7.62 M/S
ECHO LV POSTERIOR WALL: 0.99 CM (ref 0.6–1.1)
FRACTIONAL SHORTENING: 50 % (ref 28–44)
GFR SERPLBLD CREATININE-BSD FMLA CKD-EPI: 60 MLS/MIN/1.73/M2
GLOBULIN SER-MCNC: 5.3 GM/DL (ref 2.4–3.5)
GLUCOSE SERPL-MCNC: 127 MG/DL (ref 82–115)
INTERVENTRICULAR SEPTUM: 1.05 CM (ref 0.6–1.1)
LEFT ATRIUM SIZE: 4.1 CM
LEFT ATRIUM VOLUME INDEX MOD: 36.9 ML/M2
LEFT ATRIUM VOLUME MOD: 69 CM3
LEFT INTERNAL DIMENSION IN SYSTOLE: 2.1 CM (ref 2.1–4)
LEFT VENTRICLE DIASTOLIC VOLUME INDEX: 42.51 ML/M2
LEFT VENTRICLE DIASTOLIC VOLUME: 79.5 ML
LEFT VENTRICLE MASS INDEX: 76 G/M2
LEFT VENTRICLE SYSTOLIC VOLUME INDEX: 7.7 ML/M2
LEFT VENTRICLE SYSTOLIC VOLUME: 14.4 ML
LEFT VENTRICULAR INTERNAL DIMENSION IN DIASTOLE: 4.22 CM (ref 3.5–6)
LEFT VENTRICULAR MASS: 142.18 G
LV LATERAL E/E' RATIO: 6.6 M/S
LV SEPTAL E/E' RATIO: 9 M/S
LVOT MG: 4 MMHG
LVOT MV: 0.87 CM/S
MV MEAN GRADIENT: 1 MMHG
MV PEAK A VEL: 0.61 M/S
MV PEAK E VEL: 0.99 M/S
MV PEAK GRADIENT: 4 MMHG
MV STENOSIS PRESSURE HALF TIME: 56 MS
MV VALVE AREA BY CONTINUITY EQUATION: 4.06 CM2
MV VALVE AREA P 1/2 METHOD: 3.93 CM2
OHS LV EJECTION FRACTION SIMPSONS BIPLANE MOD: 63 %
PISA TR MAX VEL: 3 M/S
POTASSIUM SERPL-SCNC: 4.3 MMOL/L (ref 3.5–5.1)
PROT SERPL-MCNC: 7.1 GM/DL (ref 5.8–7.6)
PV PEAK GRADIENT: 4 MMHG
PV PEAK VELOCITY: 0.97 M/S
RA PRESSURE ESTIMATED: 3 MMHG
RV TB RVSP: 6 MMHG
SODIUM SERPL-SCNC: 139 MMOL/L (ref 136–145)
TDI LATERAL: 0.15 M/S
TDI SEPTAL: 0.11 M/S
TDI: 0.13 M/S
TR MAX PG: 36 MMHG
TRICUSPID ANNULAR PLANE SYSTOLIC EXCURSION: 2.26 CM
TV REST PULMONARY ARTERY PRESSURE: 39 MMHG
Z-SCORE OF LEFT VENTRICULAR DIMENSION IN END DIASTOLE: -2.11
Z-SCORE OF LEFT VENTRICULAR DIMENSION IN END SYSTOLE: -3.37

## 2023-10-21 PROCEDURE — 63600175 PHARM REV CODE 636 W HCPCS: Performed by: INTERNAL MEDICINE

## 2023-10-21 PROCEDURE — 80053 COMPREHEN METABOLIC PANEL: CPT | Performed by: INTERNAL MEDICINE

## 2023-10-21 PROCEDURE — 25000003 PHARM REV CODE 250: Performed by: INTERNAL MEDICINE

## 2023-10-21 PROCEDURE — 11000001 HC ACUTE MED/SURG PRIVATE ROOM

## 2023-10-21 PROCEDURE — 25000003 PHARM REV CODE 250: Performed by: NURSE PRACTITIONER

## 2023-10-21 RX ORDER — BISMUTH SUBSALICYLATE 525 MG/30ML
30 LIQUID ORAL EVERY 6 HOURS PRN
Status: DISCONTINUED | OUTPATIENT
Start: 2023-10-21 | End: 2023-10-23 | Stop reason: HOSPADM

## 2023-10-21 RX ORDER — SPIRONOLACTONE 25 MG/1
25 TABLET ORAL 2 TIMES DAILY
Status: DISCONTINUED | OUTPATIENT
Start: 2023-10-21 | End: 2023-10-23

## 2023-10-21 RX ORDER — PROPRANOLOL HYDROCHLORIDE 10 MG/1
10 TABLET ORAL 2 TIMES DAILY
Status: DISCONTINUED | OUTPATIENT
Start: 2023-10-21 | End: 2023-10-23 | Stop reason: HOSPADM

## 2023-10-21 RX ADMIN — HYDRALAZINE HYDROCHLORIDE 50 MG: 50 TABLET, FILM COATED ORAL at 01:10

## 2023-10-21 RX ADMIN — HYDRALAZINE HYDROCHLORIDE 50 MG: 50 TABLET, FILM COATED ORAL at 09:10

## 2023-10-21 RX ADMIN — ESCITALOPRAM OXALATE 5 MG: 5 TABLET, FILM COATED ORAL at 08:10

## 2023-10-21 RX ADMIN — AMLODIPINE BESYLATE 10 MG: 5 TABLET ORAL at 08:10

## 2023-10-21 RX ADMIN — SPIRONOLACTONE 25 MG: 25 TABLET, FILM COATED ORAL at 08:10

## 2023-10-21 RX ADMIN — ARIPIPRAZOLE 5 MG: 5 TABLET ORAL at 08:10

## 2023-10-21 RX ADMIN — PROPRANOLOL HYDROCHLORIDE 10 MG: 10 TABLET ORAL at 08:10

## 2023-10-21 RX ADMIN — CEFTRIAXONE 1 G: 1 INJECTION, POWDER, FOR SOLUTION INTRAMUSCULAR; INTRAVENOUS at 12:10

## 2023-10-21 NOTE — PROGRESS NOTES
[facility] Medicine Progress Note        Chief Complaint: Inpatient Follow-up for cirrhosis    HPI:   62-year-old male with a history of hypertension (report not compliant with his meds for some time) who was brought to the ER after his uncle with whom he lives was concerned about his behavior.  He was reportedly talking to himself though patient denies this was the case.  He does endorse that he has been using cocaine, in his concern now he does not have a place to live.  He states he is not been feeling normal over last few weeks but will not go into further detail as he is excessively crying at bedside.  He denies suicidal homicidal ideations.  He states he would just like a home to have to visit his grandchildren.     His workup in the ER was significant for acute kidney injury as well as mild hyperbilirubinemia, transaminitis.  He was started on IV fluids admitted to the hospitalist service for further management.  HIDA scan is negative for acute cholecystitis   Patient started on antibiotics for possible pneumonia. GI consulted. Noted to have cirrhosis with ascites for which he underwent paracentesis with removal of 1.2 L fluid & rreceived Albumin after. AFP noted to be elevated at 19.2. GI ordered Triple Phase CT A/P which showed Cirrhotic nodular appearing liver with a 1.2 x 1.3 cm lesion seen in liver which shows early arterial enhancement and washout on delayed imaging not consistent with hemangioma with HCC not excluded. Nephrology signed off due to improvement in renal indices. Patient to not be diuresed aggressively due to risk of HRS unless grossly volume overloaded.    Interval Hx:   10/19/20-Today, Mr Humphrey reported fatigue & weakness. He was told about CT A/P findings in particular the hepatic lesion with suspicion for HCC. Will continue following GI recs. GI recommending Oncology consult which will be placed tomorrow. No biopsy needed & hepatic lesion  thought to be resectable.      10/20/23 dr smart - chart reviewed and pt examined. I have to question myself if giancarlo has the capacity to understand whats going on and make the rigjht decision. He has been kind of confrontational with everybody. I was able to explain  his entire hospital course including findings of HCC but he keeps asking questions not related to hospital course , ongoing issues.   Oncology came to see pt today but pt not interested in talking.  Family to meet  , I would gladly participate in this chat     10/21/23 dr smart -  attempted to talk to pt with help from sister but impossible, pt just wants to go home . Still family not taking  him back .    Case was discussed with patient's nurse on the floor.  Objective/physical exam:  General: alert male lying comfortably in bed, in no acute distress  HENT: oral and oropharyngeal mucosa moist, pink, with no erythema or exudates, no ear pain or discharge  Neck: normal neck movement, no lymph nodes or swellings, no JVD or Carotid bruit  Respiratory: clear breathing sounds bilaterally, no crackles, rales, ronchi or wheezes  Cardiovascular: clear S1 and S2, no murmurs, rubs or gallops  Peripheral Vascular: no lesions, ulcers or erosions, normal peripheral pulses and no pedal edema  Gastrointestinal: soft, non-tender,mildly distended abdomen, no guarding, rigidity or rebound tenderness, normal bowel sounds  Integumentary: normal skin color, no rashes or lesions  Neuro: AAO x 3; motor strength 5/5 in B/L UEs & LEs; sensation intact to gross and fine touch B/L; CN II-XII grossly intact      Echocardiogram       Left Ventricle: The left ventricle is normal in size. There is moderate concentric hypertrophy. Normal wall motion. There is normal systolic function with a visually estimated ejection fraction of 60 - 65%. Grade II diastolic dysfunction.    Left Atrium: Left atrium is mildly dilated.    Right Ventricle: Normal right  ventricular cavity size. Right ventricle wall motion  is normal. Systolic function is normal.    Right Atrium: Right atrium is mildly dilated.    Aortic Valve: The aortic valve is a trileaflet valve. There is mild aortic valve sclerosis. There is no stenosis. Aortic valve peak velocity is 1.31 m/s. Mean gradient is 4 mmHg. There is no significant regurgitation.    Mitral Valve: There is no stenosis. The mean pressure gradient across the mitral valve is 1 mmHg at a heart rate of  bpm. There is mild regurgitation.    Tricuspid Valve: There is mild regurgitation.    IVC/SVC: Normal venous pressure at 3 mmHg.    Pericardium: There is a trivial effusion. No indication of cardiac tamponade. Left pleural effusion.    VITAL SIGNS: 24 HRS MIN & MAX LAST   Temp  Min: 97.9 °F (36.6 °C)  Max: 98.5 °F (36.9 °C) 97.9 °F (36.6 °C)   BP  Min: 130/66  Max: 158/73 130/66   Pulse  Min: 68  Max: 77  73   Resp  Min: 20  Max: 20 20   SpO2  Min: 92 %  Max: 97 % (!) 92 %     I have reviewed the following labs:  Recent Labs   Lab 10/16/23  0225 10/17/23  0422 10/19/23  0950   WBC 11.36 10.68 11.77*   RBC 4.64* 3.75* 3.63*   HGB 13.4* 10.9* 10.7*   HCT 39.9* 32.2* 31.6*   MCV 86.0 85.9 87.1   MCH 28.9 29.1 29.5   MCHC 33.6 33.9 33.9   RDW 16.2 16.9 17.0    308 291   MPV 10.2 10.0 9.7       Recent Labs   Lab 10/18/23  0527 10/19/23  0950 10/21/23  0503    141 139   K 4.6 4.6 4.3   CO2 22* 22* 19*   BUN 39.7* 33.4* 32.6*   CREATININE 1.64* 1.40* 1.34*   CALCIUM 7.7* 7.9* 7.8*   MG 1.90  --   --    ALBUMIN 1.7* 1.8* 1.8*   ALKPHOS 363* 379* 349*   ALT 28 29 30   AST 81* 82* 83*   BILITOT 1.5 1.7* 1.8*       Assessment/Plan:  Decompensated Cirrhosis with Ascites  Elevated AFP levels with Hepatic Lesion concerning for HCC  Hepatitis C positive   Hyperbilirubinemia/Transaminitis likely 2/2 Cirrhosis  SOCORRO 2/2 Prerenal Azotemia - Improved  Essential HTN   Possible CAP on imaging   Substance Use Disorder  Reported homelessness  Diastolic  "dysfunction     Patient continues to be admitted  Continues to endorse generalized pain & weakness; wants "to go home" but wishes to be treated for new liver lesion concerning for HCC  GI on-board; follow recs  Nephrology signed off with improving renal indices; holding off on diuresis d/t risk of HRS  Oncology  consulted but pt did not participate in conversation; Hepatic lesion thought to not require biopsy & be resectable  Continued on IV Rocephin day 4; completed 3 days of Azithromycin  Continued on Amlodipine, Aripiprazole, Escitalopram, Hydralazine TID  Continue monitoring symptoms  Consult PT    Start aldactone/bb     VTE prophylaxis: Lovenox    Patient condition:  Stable    Anticipated discharge and Disposition:     Pending    All diagnosis and differential diagnosis have been reviewed; assessment and plan has been documented; I have personally reviewed the labs and test results that are presently available; I have reviewed the patients medication list; I have reviewed the consulting providers response and recommendations. I have reviewed or attempted to review medical records based upon their availability    All of the patient's questions have been  addressed and answered. Patient's is agreeable to the above stated plan. I will continue to monitor closely and make adjustments to medical management as needed.  _____________________________________________________________________    Radiology:  I have personally reviewed the following imaging and agree with the radiologist.     Echo    Left Ventricle: The left ventricle is normal in size. There is moderate   concentric hypertrophy. Normal wall motion. There is normal systolic   function with a visually estimated ejection fraction of 60 - 65%. Grade II   diastolic dysfunction.    Left Atrium: Left atrium is mildly dilated.    Right Ventricle: Normal right ventricular cavity size. Right ventricle   wall motion  is normal. Systolic function is normal.    Right " Atrium: Right atrium is mildly dilated.    Aortic Valve: The aortic valve is a trileaflet valve. There is mild   aortic valve sclerosis. There is no stenosis. Aortic valve peak velocity   is 1.31 m/s. Mean gradient is 4 mmHg. There is no significant   regurgitation.    Mitral Valve: There is no stenosis. The mean pressure gradient across   the mitral valve is 1 mmHg at a heart rate of  bpm. There is mild   regurgitation.    Tricuspid Valve: There is mild regurgitation.    IVC/SVC: Normal venous pressure at 3 mmHg.    Pericardium: There is a trivial effusion. No indication of cardiac   tamponade. Left pleural effusion.      Kevin Mccabe MD   10/21/2023

## 2023-10-21 NOTE — PLAN OF CARE
Spoke to patient and his sister about treatment and discharge plan. He was uncooperative and just kept repeating that he wants to go home. When she asked him where he will go he says I don't know when she told him if he discharged he would be on the streets he shrugged his shoulders like he didn't care. We made no progress.

## 2023-10-21 NOTE — PROGRESS NOTES
[facility] Medicine Progress Note        Chief Complaint: Inpatient Follow-up for cirrhosis    HPI:   62-year-old male with a history of hypertension (report not compliant with his meds for some time) who was brought to the ER after his uncle with whom he lives was concerned about his behavior.  He was reportedly talking to himself though patient denies this was the case.  He does endorse that he has been using cocaine, in his concern now he does not have a place to live.  He states he is not been feeling normal over last few weeks but will not go into further detail as he is excessively crying at bedside.  He denies suicidal homicidal ideations.  He states he would just like a home to have to visit his grandchildren.     His workup in the ER was significant for acute kidney injury as well as mild hyperbilirubinemia, transaminitis.  He was started on IV fluids admitted to the hospitalist service for further management.  HIDA scan is negative for acute cholecystitis   Patient started on antibiotics for possible pneumonia. GI consulted. Noted to have cirrhosis with ascites for which he underwent paracentesis with removal of 1.2 L fluid & rreceived Albumin after. AFP noted to be elevated at 19.2. GI ordered Triple Phase CT A/P which showed Cirrhotic nodular appearing liver with a 1.2 x 1.3 cm lesion seen in liver which shows early arterial enhancement and washout on delayed imaging not consistent with hemangioma with HCC not excluded. Nephrology signed off due to improvement in renal indices. Patient to not be diuresed aggressively due to risk of HRS unless grossly volume overloaded.    Interval Hx:   10/19/20-Today, Mr Humphrey reported fatigue & weakness. He was told about CT A/P findings in particular the hepatic lesion with suspicion for HCC. Will continue following GI recs. GI recommending Oncology consult which will be placed tomorrow. No biopsy needed & hepatic lesion  thought to be resectable.      10/20/23 dr bing palacios reviewed and pt examined. I have to question myself if giancarlo has the capacity to understand whats going on and make the rigjht decision. He has been kind of confrontational with everybody. I was able to explain  his entire hospital course including findings of HCC but he keeps asking questions not related to hospital course , ongoing issues.   Oncology came to see pt today but pt not interested in talking.  Family to meet  , I would gladly participate in this chat     Case was discussed with patient's nurse on the floor.  Objective/physical exam:  General: alert male lying comfortably in bed, in no acute distress  HENT: oral and oropharyngeal mucosa moist, pink, with no erythema or exudates, no ear pain or discharge  Neck: normal neck movement, no lymph nodes or swellings, no JVD or Carotid bruit  Respiratory: clear breathing sounds bilaterally, no crackles, rales, ronchi or wheezes  Cardiovascular: clear S1 and S2, no murmurs, rubs or gallops  Peripheral Vascular: no lesions, ulcers or erosions, normal peripheral pulses and no pedal edema  Gastrointestinal: soft, non-tender,mildly distended abdomen, no guarding, rigidity or rebound tenderness, normal bowel sounds  Integumentary: normal skin color, no rashes or lesions  Neuro: AAO x 3; motor strength 5/5 in B/L UEs & LEs; sensation intact to gross and fine touch B/L; CN II-XII grossly intact    VITAL SIGNS: 24 HRS MIN & MAX LAST   Temp  Min: 98 °F (36.7 °C)  Max: 98.3 °F (36.8 °C) 98.3 °F (36.8 °C)   BP  Min: 136/53  Max: 167/75 (!) 136/53   Pulse  Min: 71  Max: 85  78   Resp  Min: 18  Max: 19 18   SpO2  Min: 94 %  Max: 95 % (!) 94 %     I have reviewed the following labs:  Recent Labs   Lab 10/16/23  0225 10/17/23  0422 10/19/23  0950   WBC 11.36 10.68 11.77*   RBC 4.64* 3.75* 3.63*   HGB 13.4* 10.9* 10.7*   HCT 39.9* 32.2* 31.6*   MCV 86.0 85.9 87.1   MCH 28.9 29.1 29.5   MCHC 33.6 33.9 33.9  "  RDW 16.2 16.9 17.0    308 291   MPV 10.2 10.0 9.7       Recent Labs   Lab 10/17/23  0422 10/18/23  0527 10/19/23  0950    141 141   K 4.8 4.6 4.6   CO2 22* 22* 22*   BUN 53.0* 39.7* 33.4*   CREATININE 1.96* 1.64* 1.40*   CALCIUM 7.4* 7.7* 7.9*   MG  --  1.90  --    ALBUMIN 1.5* 1.7* 1.8*   ALKPHOS 377* 363* 379*   ALT 26 28 29   AST 66* 81* 82*   BILITOT 1.7* 1.5 1.7*       Assessment/Plan:  Decompensated Cirrhosis with Ascites  Elevated AFP levels with Hepatic Lesion concerning for HCC  Hepatitis C positive   Hyperbilirubinemia/Transaminitis likely 2/2 Cirrhosis  SOCORRO 2/2 Prerenal Azotemia - Improved  Essential HTN   Possible CAP on imaging   Substance Use Disorder  Reported homelessness    Patient continues to be admitted  Continues to endorse generalized pain & weakness; wants "to go home" but wishes to be treated for new liver lesion concerning for HCC  GI on-board; follow recs  Nephrology signed off with improving renal indices; holding off on diuresis d/t risk of HRS  Oncology  consulted but pt did not participate in conversation; Hepatic lesion thought to not require biopsy & be resectable  Continued on IV Rocephin day 4; completed 3 days of Azithromycin  Continued on Amlodipine, Aripiprazole, Escitalopram, Hydralazine TID  Continue monitoring symptoms  Consult PT    Lets get echocardiogram /I will meet w family tomorrow too. Will start low dose lasix/ aldactone/bb once echo report available    VTE prophylaxis: Lovenox    Patient condition:  Stable    Anticipated discharge and Disposition:     Pending    All diagnosis and differential diagnosis have been reviewed; assessment and plan has been documented; I have personally reviewed the labs and test results that are presently available; I have reviewed the patients medication list; I have reviewed the consulting providers response and recommendations. I have reviewed or attempted to review medical records based upon their availability    All of " the patient's questions have been  addressed and answered. Patient's is agreeable to the above stated plan. I will continue to monitor closely and make adjustments to medical management as needed.  _____________________________________________________________________    Radiology:  I have personally reviewed the following imaging and agree with the radiologist.     CT Abdomen Pelvis w/o Contrast Plus Triphasic w/Contrast  Narrative: EXAMINATION:  CT ABDOMEN PELVIS W/O CONTRAST PLUS TRIPHASIC W/CONTRAST    CLINICAL HISTORY:  would not do MRI of liver;    TECHNIQUE:  Low dose axial images, sagittal and coronal reformations were obtained from the lung bases to the pubic symphysis following the IV administration of  contrast.  Delayed imaging and pre contrasted imaging was performed as well. Automatic exposure control is utilized to reduce patient radiation exposure.    COMPARISON:  10/16/2023    FINDINGS:  There is bibasilar atelectasis and bilateral pleural effusions.  Findings are worse than the prior examination    The liver is enlarged with a diffusely nodular contour consistent with cirrhosis.  There is a early arterial phase enhancing lesion seen in segment 8 of the liver on image number 29 series 7.  It measures 12 mm x 13 mm.  Portal venous phase of the examination shows the lesion becomes Isoattenuated.  Delayed imaging shows that the lesion becomes hypoattenuated and there is a degree of washout.  Findings are not consistent with a hemangioma.  No other liver lesion is seen.  Portal and hepatic veins appear normal.    There is large volume ascites seen.    The gallbladder appears grossly unremarkable.    The pancreas appears normal.  No pancreatic mass or lesion is seen.    The spleen appears normal.  No splenic mass or lesion is seen.    The adrenal glands appear normal.  No adrenal nodule is seen.    The kidneys are normal in size.  No hydronephrosis is seen.  No hydroureter is seen.  There is a  nonobstructing medullary calculus seen in the midpole the left kidney.  No other areas of nephro or ureterolithiasis is seen.    Urinary bladder appears normal.    No colitis is seen.  No diverticulitis is seen.  No colonic mass or lesion or inflammatory process is seen.    No free air seen.    The bones appear grossly unremarkable.  Impression: Cirrhotic nodular appearing liver with a 1.2 x 1.3 cm lesion seen in segment 8 of the liver which shows early arterial enhancement and washout on delayed imaging.  Findings are not consistent with hemangioma.  Hepatocellular carcinoma cannot be excluded based on this appearance.    Diffuse ascites    Nonobstructing medullary calculus in the midpole the left kidney    Bibasilar atelectasis and pleural effusions worse than the prior examination    This report was flagged in Epic as abnormal.    Electronically signed by: Mark Anthony Navarro  Date:    10/18/2023  Time:    17:59      Kevin Mccabe MD   10/20/2023

## 2023-10-22 LAB
ANION GAP SERPL CALC-SCNC: 4 MEQ/L
BACTERIA FLD CULT: NORMAL
BUN SERPL-MCNC: 30.1 MG/DL (ref 8.4–25.7)
CALCIUM SERPL-MCNC: 7.9 MG/DL (ref 8.8–10)
CHLORIDE SERPL-SCNC: 116 MMOL/L (ref 98–107)
CO2 SERPL-SCNC: 16 MMOL/L (ref 23–31)
CREAT SERPL-MCNC: 1.14 MG/DL (ref 0.73–1.18)
CREAT/UREA NIT SERPL: 26
GFR SERPLBLD CREATININE-BSD FMLA CKD-EPI: >60 MLS/MIN/1.73/M2
GLUCOSE SERPL-MCNC: 82 MG/DL (ref 82–115)
POTASSIUM SERPL-SCNC: 5.2 MMOL/L (ref 3.5–5.1)
SODIUM SERPL-SCNC: 136 MMOL/L (ref 136–145)

## 2023-10-22 PROCEDURE — 11000001 HC ACUTE MED/SURG PRIVATE ROOM

## 2023-10-22 PROCEDURE — 25000003 PHARM REV CODE 250: Performed by: NURSE PRACTITIONER

## 2023-10-22 PROCEDURE — 25000003 PHARM REV CODE 250: Performed by: INTERNAL MEDICINE

## 2023-10-22 PROCEDURE — 80048 BASIC METABOLIC PNL TOTAL CA: CPT | Performed by: INTERNAL MEDICINE

## 2023-10-22 RX ORDER — OXYCODONE HYDROCHLORIDE 5 MG/1
5 TABLET ORAL EVERY 8 HOURS PRN
Status: DISCONTINUED | OUTPATIENT
Start: 2023-10-22 | End: 2023-10-23 | Stop reason: HOSPADM

## 2023-10-22 RX ADMIN — PROPRANOLOL HYDROCHLORIDE 10 MG: 10 TABLET ORAL at 08:10

## 2023-10-22 RX ADMIN — HYDRALAZINE HYDROCHLORIDE 50 MG: 50 TABLET, FILM COATED ORAL at 10:10

## 2023-10-22 RX ADMIN — OXYCODONE HYDROCHLORIDE 5 MG: 5 TABLET ORAL at 10:10

## 2023-10-22 RX ADMIN — SPIRONOLACTONE 25 MG: 25 TABLET, FILM COATED ORAL at 08:10

## 2023-10-22 RX ADMIN — AMLODIPINE BESYLATE 10 MG: 5 TABLET ORAL at 09:10

## 2023-10-22 RX ADMIN — SPIRONOLACTONE 25 MG: 25 TABLET, FILM COATED ORAL at 09:10

## 2023-10-22 RX ADMIN — ESCITALOPRAM OXALATE 5 MG: 5 TABLET, FILM COATED ORAL at 09:10

## 2023-10-22 RX ADMIN — HYDRALAZINE HYDROCHLORIDE 50 MG: 50 TABLET, FILM COATED ORAL at 05:10

## 2023-10-22 RX ADMIN — PROPRANOLOL HYDROCHLORIDE 10 MG: 10 TABLET ORAL at 09:10

## 2023-10-22 RX ADMIN — ARIPIPRAZOLE 5 MG: 5 TABLET ORAL at 09:10

## 2023-10-22 NOTE — PROGRESS NOTES
[facility] Medicine Progress Note        Chief Complaint: Inpatient Follow-up for cirrhosis    HPI:   62-year-old male with a history of hypertension (report not compliant with his meds for some time) who was brought to the ER after his uncle with whom he lives was concerned about his behavior.  He was reportedly talking to himself though patient denies this was the case.  He does endorse that he has been using cocaine, in his concern now he does not have a place to live.  He states he is not been feeling normal over last few weeks but will not go into further detail as he is excessively crying at bedside.  He denies suicidal homicidal ideations.  He states he would just like a home to have to visit his grandchildren.     His workup in the ER was significant for acute kidney injury as well as mild hyperbilirubinemia, transaminitis.  He was started on IV fluids admitted to the hospitalist service for further management.  HIDA scan is negative for acute cholecystitis   Patient started on antibiotics for possible pneumonia. GI consulted. Noted to have cirrhosis with ascites for which he underwent paracentesis with removal of 1.2 L fluid & rreceived Albumin after. AFP noted to be elevated at 19.2. GI ordered Triple Phase CT A/P which showed Cirrhotic nodular appearing liver with a 1.2 x 1.3 cm lesion seen in liver which shows early arterial enhancement and washout on delayed imaging not consistent with hemangioma with HCC not excluded. Nephrology signed off due to improvement in renal indices. Patient to not be diuresed aggressively due to risk of HRS unless grossly volume overloaded.    Interval Hx:   10/19/20-Today, Mr Humphrey reported fatigue & weakness. He was told about CT A/P findings in particular the hepatic lesion with suspicion for HCC. Will continue following GI recs. GI recommending Oncology consult which will be placed tomorrow. No biopsy needed & hepatic lesion  thought to be resectable.      10/20/23 dr smart - chart reviewed and pt examined. I have to question myself if giancarlo has the capacity to understand whats going on and make the rigjht decision. He has been kind of confrontational with everybody. I was able to explain  his entire hospital course including findings of HCC but he keeps asking questions not related to hospital course , ongoing issues.   Oncology came to see pt today but pt not interested in talking.  Family to meet  , I would gladly participate in this chat     10/21/23 dr smart -  attempted to talk to pt with help from sister but impossible, pt just wants to go home . Still family not taking  him back .    10/22/23 dr smart - will refer to ID at [facility] for hep c+, probably not a candidate to tx since he has advanced cirrhosis w hcc. We have been unable to do any progress with him. Wont sign ama form/ refuses to talk to physicians / wont let  clean his room/ throwing food trays around/ seen by psych and placed on lexapro and abilify and signed off/ family meeting was a disaster since pt would not cooperate. Will get PT to see tomorrow before discharge . Family wont take him back . Will discharge in am .    Case was discussed with patient's nurse on the floor.  Objective/physical exam:  General: alert male lying comfortably in bed, in no acute distress  HENT: oral and oropharyngeal mucosa moist, pink, with no erythema or exudates, no ear pain or discharge  Neck: normal neck movement, no lymph nodes or swellings, no JVD or Carotid bruit  Respiratory: clear breathing sounds bilaterally, no crackles, rales, ronchi or wheezes  Cardiovascular: clear S1 and S2, no murmurs, rubs or gallops  Peripheral Vascular: no lesions, ulcers or erosions, normal peripheral pulses and no pedal edema  Gastrointestinal: soft, non-tender,mildly distended abdomen, no guarding, rigidity or rebound tenderness, normal bowel  sounds  Integumentary: normal skin color, no rashes or lesions  Neuro: AAO x 3; motor strength 5/5 in B/L UEs & LEs; sensation intact to gross and fine touch B/L; CN II-XII grossly intact      Echocardiogram       Left Ventricle: The left ventricle is normal in size. There is moderate concentric hypertrophy. Normal wall motion. There is normal systolic function with a visually estimated ejection fraction of 60 - 65%. Grade II diastolic dysfunction.    Left Atrium: Left atrium is mildly dilated.    Right Ventricle: Normal right ventricular cavity size. Right ventricle wall motion  is normal. Systolic function is normal.    Right Atrium: Right atrium is mildly dilated.    Aortic Valve: The aortic valve is a trileaflet valve. There is mild aortic valve sclerosis. There is no stenosis. Aortic valve peak velocity is 1.31 m/s. Mean gradient is 4 mmHg. There is no significant regurgitation.    Mitral Valve: There is no stenosis. The mean pressure gradient across the mitral valve is 1 mmHg at a heart rate of  bpm. There is mild regurgitation.    Tricuspid Valve: There is mild regurgitation.    IVC/SVC: Normal venous pressure at 3 mmHg.    Pericardium: There is a trivial effusion. No indication of cardiac tamponade. Left pleural effusion.    VITAL SIGNS: 24 HRS MIN & MAX LAST   Temp  Min: 97.9 °F (36.6 °C)  Max: 97.9 °F (36.6 °C) 97.9 °F (36.6 °C)   BP  Min: 110/66  Max: 150/70 110/66   Pulse  Min: 58  Max: 80  (!) 58   No data recorded 20   SpO2  Min: 92 %  Max: 97 % 95 %     I have reviewed the following labs:  Recent Labs   Lab 10/16/23  0225 10/17/23  0422 10/19/23  0950   WBC 11.36 10.68 11.77*   RBC 4.64* 3.75* 3.63*   HGB 13.4* 10.9* 10.7*   HCT 39.9* 32.2* 31.6*   MCV 86.0 85.9 87.1   MCH 28.9 29.1 29.5   MCHC 33.6 33.9 33.9   RDW 16.2 16.9 17.0    308 291   MPV 10.2 10.0 9.7       Recent Labs   Lab 10/18/23  0527 10/19/23  0950 10/21/23  0503 10/22/23  0455    141 139 136   K 4.6 4.6 4.3 5.2*   CO2 22*  "22* 19* 16*   BUN 39.7* 33.4* 32.6* 30.1*   CREATININE 1.64* 1.40* 1.34* 1.14   CALCIUM 7.7* 7.9* 7.8* 7.9*   MG 1.90  --   --   --    ALBUMIN 1.7* 1.8* 1.8*  --    ALKPHOS 363* 379* 349*  --    ALT 28 29 30  --    AST 81* 82* 83*  --    BILITOT 1.5 1.7* 1.8*  --        Assessment/Plan:  Decompensated Cirrhosis with Ascites  Elevated AFP levels with Hepatic Lesion concerning for HCC  Hepatitis C positive   Hyperbilirubinemia/Transaminitis likely 2/2 Cirrhosis  SOCORRO 2/2 Prerenal Azotemia - Improved  Essential HTN   Possible CAP on imaging   Substance Use Disorder  Reported homelessness  Diastolic dysfunction     Patient continues to be admitted  Continues to endorse generalized pain & weakness; wants "to go home" but wishes to be treated for new liver lesion concerning for HCC  GI on-board; follow recs  Nephrology signed off with improving renal indices; holding off on diuresis d/t risk of HRS  Oncology  consulted but pt did not participate in conversation; Hepatic lesion thought to not require biopsy & be resectable  Continued on IV Rocephin day 4; completed 3 days of Azithromycin  Continued on Amlodipine, Aripiprazole, Escitalopram, Hydralazine TID  Continue monitoring symptoms  Consult PT    Start aldactone/bb     VTE prophylaxis: Lovenox    Patient condition:  Stable    Anticipated discharge and Disposition:     Pending    All diagnosis and differential diagnosis have been reviewed; assessment and plan has been documented; I have personally reviewed the labs and test results that are presently available; I have reviewed the patients medication list; I have reviewed the consulting providers response and recommendations. I have reviewed or attempted to review medical records based upon their availability    All of the patient's questions have been  addressed and answered. Patient's is agreeable to the above stated plan. I will continue to monitor closely and make adjustments to medical management as " needed.  _____________________________________________________________________    Radiology:  I have personally reviewed the following imaging and agree with the radiologist.     Echo    Left Ventricle: The left ventricle is normal in size. There is moderate   concentric hypertrophy. Normal wall motion. There is normal systolic   function with a visually estimated ejection fraction of 60 - 65%. Grade II   diastolic dysfunction.    Left Atrium: Left atrium is mildly dilated.    Right Ventricle: Normal right ventricular cavity size. Right ventricle   wall motion  is normal. Systolic function is normal.    Right Atrium: Right atrium is mildly dilated.    Aortic Valve: The aortic valve is a trileaflet valve. There is mild   aortic valve sclerosis. There is no stenosis. Aortic valve peak velocity   is 1.31 m/s. Mean gradient is 4 mmHg. There is no significant   regurgitation.    Mitral Valve: There is no stenosis. The mean pressure gradient across   the mitral valve is 1 mmHg at a heart rate of  bpm. There is mild   regurgitation.    Tricuspid Valve: There is mild regurgitation.    IVC/SVC: Normal venous pressure at 3 mmHg.    Pericardium: There is a trivial effusion. No indication of cardiac   tamponade. Left pleural effusion.      Kevin Mccabe MD   10/22/2023

## 2023-10-23 VITALS
RESPIRATION RATE: 18 BRPM | SYSTOLIC BLOOD PRESSURE: 126 MMHG | TEMPERATURE: 98 F | WEIGHT: 150 LBS | DIASTOLIC BLOOD PRESSURE: 68 MMHG | BODY MASS INDEX: 21 KG/M2 | OXYGEN SATURATION: 96 % | HEART RATE: 60 BPM | HEIGHT: 71 IN

## 2023-10-23 PROBLEM — B18.2 HEPATITIS C, CHRONIC: Status: ACTIVE | Noted: 2023-10-23

## 2023-10-23 PROBLEM — K76.9 CHRONIC LIVER DISEASE: Status: ACTIVE | Noted: 2023-10-23

## 2023-10-23 PROBLEM — G93.41 ENCEPHALOPATHY, METABOLIC: Status: RESOLVED | Noted: 2023-10-23 | Resolved: 2023-10-23

## 2023-10-23 PROBLEM — G93.41 ENCEPHALOPATHY, METABOLIC: Status: ACTIVE | Noted: 2023-10-23

## 2023-10-23 PROBLEM — N17.9 AKI (ACUTE KIDNEY INJURY): Status: RESOLVED | Noted: 2023-10-23 | Resolved: 2023-10-23

## 2023-10-23 PROBLEM — N17.9 AKI (ACUTE KIDNEY INJURY): Status: ACTIVE | Noted: 2023-10-23

## 2023-10-23 PROBLEM — I10 ESSENTIAL HYPERTENSION: Status: ACTIVE | Noted: 2023-10-23

## 2023-10-23 PROBLEM — C22.0 HCC (HEPATOCELLULAR CARCINOMA): Status: ACTIVE | Noted: 2023-10-23

## 2023-10-23 PROCEDURE — 25000003 PHARM REV CODE 250: Performed by: INTERNAL MEDICINE

## 2023-10-23 PROCEDURE — 25000003 PHARM REV CODE 250: Performed by: NURSE PRACTITIONER

## 2023-10-23 RX ORDER — FUROSEMIDE 20 MG/1
20 TABLET ORAL DAILY
Qty: 30 TABLET | Refills: 11 | OUTPATIENT
Start: 2023-10-23 | End: 2023-11-19

## 2023-10-23 RX ORDER — ESCITALOPRAM OXALATE 5 MG/1
5 TABLET ORAL DAILY
Qty: 30 TABLET | Refills: 11 | Status: SHIPPED | OUTPATIENT
Start: 2023-10-23 | End: 2024-10-22

## 2023-10-23 RX ORDER — AMLODIPINE BESYLATE 10 MG/1
10 TABLET ORAL DAILY
Qty: 30 TABLET | Refills: 11 | Status: SHIPPED | OUTPATIENT
Start: 2023-10-23 | End: 2024-10-22

## 2023-10-23 RX ORDER — SPIRONOLACTONE 25 MG/1
25 TABLET ORAL DAILY
Qty: 30 TABLET | Refills: 11 | Status: SHIPPED | OUTPATIENT
Start: 2023-10-24 | End: 2024-10-23

## 2023-10-23 RX ORDER — SPIRONOLACTONE 25 MG/1
25 TABLET ORAL DAILY
Status: DISCONTINUED | OUTPATIENT
Start: 2023-10-24 | End: 2023-10-23 | Stop reason: HOSPADM

## 2023-10-23 RX ORDER — HYDRALAZINE HYDROCHLORIDE 50 MG/1
50 TABLET, FILM COATED ORAL EVERY 8 HOURS
Qty: 90 TABLET | Refills: 11 | Status: SHIPPED | OUTPATIENT
Start: 2023-10-23 | End: 2024-10-22

## 2023-10-23 RX ORDER — PROPRANOLOL HYDROCHLORIDE 10 MG/1
10 TABLET ORAL 2 TIMES DAILY
Qty: 60 TABLET | Refills: 11 | Status: SHIPPED | OUTPATIENT
Start: 2023-10-23 | End: 2024-10-22

## 2023-10-23 RX ORDER — FUROSEMIDE 20 MG/1
20 TABLET ORAL DAILY
Status: DISCONTINUED | OUTPATIENT
Start: 2023-10-23 | End: 2023-10-23 | Stop reason: HOSPADM

## 2023-10-23 RX ORDER — ARIPIPRAZOLE 5 MG/1
5 TABLET ORAL DAILY
Qty: 30 TABLET | Refills: 11 | Status: SHIPPED | OUTPATIENT
Start: 2023-10-23 | End: 2024-10-22

## 2023-10-23 RX ADMIN — ARIPIPRAZOLE 5 MG: 5 TABLET ORAL at 09:10

## 2023-10-23 RX ADMIN — SODIUM ZIRCONIUM CYCLOSILICATE 10 G: 10 POWDER, FOR SUSPENSION ORAL at 09:10

## 2023-10-23 RX ADMIN — PROPRANOLOL HYDROCHLORIDE 10 MG: 10 TABLET ORAL at 09:10

## 2023-10-23 RX ADMIN — FUROSEMIDE 20 MG: 20 TABLET ORAL at 09:10

## 2023-10-23 RX ADMIN — ESCITALOPRAM OXALATE 5 MG: 5 TABLET, FILM COATED ORAL at 09:10

## 2023-10-23 RX ADMIN — SPIRONOLACTONE 25 MG: 25 TABLET, FILM COATED ORAL at 09:10

## 2023-10-23 RX ADMIN — HYDRALAZINE HYDROCHLORIDE 50 MG: 50 TABLET, FILM COATED ORAL at 06:10

## 2023-10-23 RX ADMIN — AMLODIPINE BESYLATE 10 MG: 5 TABLET ORAL at 09:10

## 2023-10-23 NOTE — DISCHARGE INSTRUCTIONS
1- please follow up [facility] and [facility] for:  A-primary care MD  B- Infectious disease clinic for treatment of hepatitis C  C- oncology for treatment of hepatocellular carcinoma          2- stop substance abuse/ be compliant with medications and appointments    3- no salt in diet

## 2023-10-23 NOTE — PLAN OF CARE
Referrals sent to [facility] Internal Medicine, Infectious Disease, and Oncology they will contact patient with an appointment

## 2023-10-23 NOTE — PLAN OF CARE
Spoke to patient with sister present notified of discharge and plan for appointments. Brought resource for drug rehab he states that he isn't doing drugs now he only did before he came because he was depressed he does not feel he will do again. Discussed shelter he said he does not know how that works but was not interested in conversation. He states that he thought we were getting him a house. Explained again that we do not have housing. He is asking for supplies to clean himself up in the room. He will let me know at noon where he is going

## 2023-10-23 NOTE — PLAN OF CARE
10/23/23 1014   Final Note   Assessment Type Final Discharge Note   Anticipated Discharge Disposition Home   Post-Acute Status   Discharge Delays None known at this time

## 2023-10-23 NOTE — DISCHARGE SUMMARY
[facility] Hospital Medicine Discharge Summary    Admit Date: 10/15/2023  Discharge Date and Time: 10/23/60564:52 AM  Admitting Physician: ROZ Team  Discharging Physician: Kevin Mccabe MD.  Primary Care Physician: Lesly Primary Doctor  Consults: Gastroenterology, Hematology/Oncology, Nephrology, Psychiatry, palliative team     Discharge Diagnoses:    Elevated AFP levels with Hepatic Lesion concerning for HCC    Hepatitis C positive , tx naive      SOCORRO 2/2 Prerenal Azotemia -RESOLVED    Essential HTN       Substance Use Disorder     homelessness    Diastolic dysfunction     Decompensated chronic liver disease w Hyperbilirubinemia/Transaminitis /ascites  2/2 Cirrhosis    HEP C AB + WITH HCV VIAL LOAD > 1.9 MILLION     Metabolic encephalopathy    Diastolic dysfunction     Cholelithiasis    Right pleural effusion /consolidation    CAP LLL airspace opacities          Hospital Course:   62-year-old male with a history of hypertension (report not compliant with his meds for some time) who was brought to the ER after his uncle with whom he lives was concerned about his behavior. He was reportedly talking to himself though patient denies this was the case.  He does endorse that he has been using cocaine and his uncle kicked him out of the house for substance abuse , main concern is that he does not have a place to live.  He states he is not been feeling normal over last few weeks but will not go into further detail as he is excessively crying at bedside.  He denies suicidal homicidal ideations.  He states he would just like a home to have to visit his grandchildren.    His workup in the ER was significant for acute kidney injury as well as mild hyperbilirubinemia, transaminitis.  He was started on IV fluids and admitted to the hospitalist service for further management.  HIDA scan is negative for acute cholecystitis . AMMONIA LEVELS WNL    Patient started on antibiotics for possible pneumonia  "seen on imaging. GI consulted. Noted to have hep c ab +, tx naive w associated  cirrhosis with ascites for which he underwent paracentesis with removal of 1.2 L fluid & received Albumin after.Fluid pertinent for transudate. AFP noted to be elevated at 19.2. GI ordered Triple Phase CT A/P which showed Cirrhotic nodular appearing liver with a 1.2 x 1.3 cm lesion seen in liver which shows early arterial enhancement and washout on delayed imaging not consistent with hemangioma with HCC not excluded. Nephrology was consulted and  signed off due to improvement in renal indices. Patient to not be diuresed aggressively due to risk of HRS unless grossly volume overloaded. Psych consulted and placed on lexapro 10 mgs/ abilify 5 mgs po daily and signed off.     Mr Humphrey was told about CT A/P findings in particular the hepatic lesion with suspicion for HCC. GI recommending Oncology consult .No biopsy needed & hepatic lesion thought to be resectable.  Pt was seen by oncology twice to see pt but pt not interested in talking.  He has been treating hospital staff disrespectfully .     I have to question myself if giancarlo has the mental capacity to understand whats going on and make the rigjht decision. He has been kind of confrontational with everybody. I was able to explain  his entire hospital course including findings of HCC but he keeps asking questions not related to hospital course , ongoing issues.     Palliative  team was consulted, dr Crockett attempted to discuss goals of care but as per DR crockett " pt was resistant to the communication and continued to change the subject. He spoke mainly about his displeasure that nurses haven't been attentive to him and did not wake him up when his tray of food arrived. I asked him about his goals for treatment based on his recent diagnosis of cirrhosis and liver mass. I asked him if he would agreed to treatment recommendations. He said, "of course." I then asked if he would agree " "to speak with the oncologist and to allow the further testing recommended. He did not answer, but began talking about how he just wanted to go home. I clarified that he does not have a home to go back to. He said, if my ex would call me back I could stay with her."     for the floor decided to have a meeting with family/ Pt but this again did not get to anything. Pt did not cooperate  and family refuses to take pt back with them .    Pt has medicaid so will set up pt with oncology at [facility] w Dr abebe for further evaluation/ tx of his hepatic lesion  highly suspicious for HCC W AFP OF 19.20.      Echocardiogram was done w preserved EF / diastolic dysfunction. Pt was placed on low dose inderal and aldactone leading to hyperkalemia requiring lokelma. Aldactone  decreased to 25 mgs po daily w lasix  20 mgs       Will refer to [facility] for primary MD/oncology and ID at [facility] for hep c+. We have been unable to have any progress with him. Wont sign ama form/ refuses to talk to physicians / wont let  clean his room/ throwing food trays around/ seen by psych and placed on lexapro and abilify and signed off/ family meeting was a disaster since pt would not cooperate. Consulted PT to see before discharge . Will attempt to educate about how to get to shelter.  Will send rx's to our pharmacy and will be deliver to his room.  Pt discharged.    WE HAVE ATTEMPTED TO PROVIDE  MR ARGUELLO WITH THE BEST CARE AVAILABLE BUT PT REFUSES TO COOPERATE WITH STAFF. WILL SET HIM UP WITH FOLLOW UP REQUIRED.  Pt was seen and examined on the day of discharge  Vitals:  VITAL SIGNS: 24 HRS MIN & MAX LAST   Temp  Min: 97.5 °F (36.4 °C)  Max: 98 °F (36.7 °C) 97.9 °F (36.6 °C)   BP  Min: 110/66  Max: 145/77 126/68   Pulse  Min: 57  Max: 60  60   Resp  Min: 17  Max: 20 18   SpO2  Min: 95 %  Max: 99 % 96 %       Physical Exam:    General: alert male lying comfortably in bed, in no acute distress  HENT: oral and oropharyngeal mucosa moist, " pink, with no erythema or exudates, no ear pain or discharge  Neck: normal neck movement, no lymph nodes or swellings, no JVD or Carotid bruit  Respiratory: clear breathing sounds bilaterally, no crackles, rales, ronchi or wheezes  Cardiovascular: clear S1 and S2, no murmurs, rubs or gallops  Peripheral Vascular: no lesions, ulcers or erosions, normal peripheral pulses and no pedal edema  Gastrointestinal: soft, non-tender,mildly distended abdomen, no guarding, rigidity or rebound tenderness, normal bowel sounds  Integumentary: normal skin color, no rashes or lesions  Neuro: AAO x 3; motor strength 5/5 in B/L UEs & LEs; sensation intact to gross and fine touch B/L; CN II-XII grossly intact         Procedures Performed: No admission procedures for hospital encounter.     Significant Diagnostic Studies: See Full reports for all details    Recent Labs   Lab 10/17/23  0422 10/19/23  0950   WBC 10.68 11.77*   RBC 3.75* 3.63*   HGB 10.9* 10.7*   HCT 32.2* 31.6*   MCV 85.9 87.1   MCH 29.1 29.5   MCHC 33.9 33.9   RDW 16.9 17.0    291   MPV 10.0 9.7       Recent Labs   Lab 10/18/23  0527 10/19/23  0950 10/21/23  0503 10/22/23  0455    141 139 136   K 4.6 4.6 4.3 5.2*   CO2 22* 22* 19* 16*   BUN 39.7* 33.4* 32.6* 30.1*   CREATININE 1.64* 1.40* 1.34* 1.14   CALCIUM 7.7* 7.9* 7.8* 7.9*   MG 1.90  --   --   --    ALBUMIN 1.7* 1.8* 1.8*  --    ALKPHOS 363* 379* 349*  --    ALT 28 29 30  --    AST 81* 82* 83*  --    BILITOT 1.5 1.7* 1.8*  --         Microbiology Results (last 7 days)       Procedure Component Value Units Date/Time    Body Fluid Culture [[id number]] Collected: 10/17/23 1247    Order Status: Completed Specimen: Peritoneal Fluid from Ascitic Fluid Updated: 10/22/23 0815     Body Fluid Culture No Growth at 5 days    Blood Culture [[id number]]  (Normal) Collected: 10/16/23 0549    Order Status: Completed Specimen: Blood, Venous Updated: 10/21/23 0900     CULTURE, BLOOD ([facility]) No Growth at 5 days    Blood  Culture [[id number]]  (Normal) Collected: 10/16/23 0549    Order Status: Completed Specimen: Blood, Venous Updated: 10/21/23 0900     CULTURE, BLOOD ([facility]) No Growth at 5 days             Echo    Left Ventricle: The left ventricle is normal in size. There is moderate   concentric hypertrophy. Normal wall motion. There is normal systolic   function with a visually estimated ejection fraction of 60 - 65%. Grade II   diastolic dysfunction.    Left Atrium: Left atrium is mildly dilated.    Right Ventricle: Normal right ventricular cavity size. Right ventricle   wall motion  is normal. Systolic function is normal.    Right Atrium: Right atrium is mildly dilated.    Aortic Valve: The aortic valve is a trileaflet valve. There is mild   aortic valve sclerosis. There is no stenosis. Aortic valve peak velocity   is 1.31 m/s. Mean gradient is 4 mmHg. There is no significant   regurgitation.    Mitral Valve: There is no stenosis. The mean pressure gradient across   the mitral valve is 1 mmHg at a heart rate of  bpm. There is mild   regurgitation.    Tricuspid Valve: There is mild regurgitation.    IVC/SVC: Normal venous pressure at 3 mmHg.    Pericardium: There is a trivial effusion. No indication of cardiac   tamponade. Left pleural effusion.         Medication List      You have not been prescribed any medications.          Explained in detail to the patient about the discharge plan, medications, and follow-up visits. Pt understands and agrees with the treatment plan  Discharge Disposition: self care/ shelter  Discharged Condition: stable  Diet- cardiac  Dietary Orders (From admission, onward)       Start     Ordered    10/20/23 1159  Dietary nutrition supplements Boost Vanilla; BID  Continuous        Question Answer Comment   Select PO Supplement: Boost Vanilla    Frequency: BID        10/20/23 1158    10/18/23 1812  Diet Adult Regular  Diet effective now         10/18/23 1812                   Medications Per [facility] med  rec  Activities as tolerated   Follow-up Information       Clinics, [facility] Amb Follow up.    Why: A referral was sent for you to [facility] they will contact you with an appointment  Contact information:  [facility] [address]  [phone]                           For further questions contact hospitalist office    Discharge time 33 minutes    For worsening symptoms, chest pain, shortness of breath, increased abdominal pain, high grade fever, stroke or stroke like symptoms, immediately go to the nearest Emergency Room or call 911 as soon as possible.      Kevin Mcdermott M.D, on 10/23/2023. at 7:52 AM.

## 2023-10-26 ENCOUNTER — HOSPITAL ENCOUNTER (EMERGENCY)
Facility: HOSPITAL | Age: 63
Discharge: HOME | End: 2023-10-26
Attending: EMERGENCY MEDICINE
Payer: MEDICAID

## 2023-10-26 VITALS
HEIGHT: 70 IN | DIASTOLIC BLOOD PRESSURE: 87 MMHG | WEIGHT: 149.94 LBS | SYSTOLIC BLOOD PRESSURE: 166 MMHG | BODY MASS INDEX: 21.47 KG/M2 | RESPIRATION RATE: 24 BRPM | TEMPERATURE: 98 F | OXYGEN SATURATION: 100 % | HEART RATE: 65 BPM

## 2023-10-26 DIAGNOSIS — K74.60 HEPATIC CIRRHOSIS, UNSPECIFIED HEPATIC CIRRHOSIS TYPE, UNSPECIFIED WHETHER ASCITES PRESENT: ICD-10-CM

## 2023-10-26 DIAGNOSIS — R68.81 EARLY SATIETY: Primary | ICD-10-CM

## 2023-10-26 LAB
ALBUMIN SERPL-MCNC: 1.9 G/DL (ref 3.4–4.8)
ALP SERPL-CCNC: 473 UNIT/L (ref 40–150)
ALT SERPL-CCNC: 49 UNIT/L (ref 0–55)
ANION GAP SERPL CALC-SCNC: 5 MEQ/L
APPEARANCE UR: CLEAR
AST SERPL-CCNC: 120 UNIT/L (ref 5–34)
BACTERIA #/AREA URNS AUTO: ABNORMAL /HPF
BASOPHILS # BLD AUTO: 0.07 X10(3)/MCL
BASOPHILS NFR BLD AUTO: 0.8 %
BILIRUB SERPL-MCNC: 1.6 MG/DL
BILIRUB UR QL STRIP.AUTO: NEGATIVE
BILIRUBIN DIRECT+TOT PNL SERPL-MCNC: 0.6 MG/DL (ref 0–0.8)
BILIRUBIN DIRECT+TOT PNL SERPL-MCNC: 1 MG/DL (ref 0–?)
BNP BLD-MCNC: 400.5 PG/ML
BUN SERPL-MCNC: 17.7 MG/DL (ref 8.4–25.7)
CALCIUM SERPL-MCNC: 8.7 MG/DL (ref 8.8–10)
CHLORIDE SERPL-SCNC: 115 MMOL/L (ref 98–107)
CO2 SERPL-SCNC: 20 MMOL/L (ref 23–31)
COLOR UR AUTO: YELLOW
CREAT SERPL-MCNC: 0.85 MG/DL (ref 0.73–1.18)
CREAT/UREA NIT SERPL: 21
EOSINOPHIL # BLD AUTO: 0.11 X10(3)/MCL (ref 0–0.9)
EOSINOPHIL NFR BLD AUTO: 1.3 %
ERYTHROCYTE [DISTWIDTH] IN BLOOD BY AUTOMATED COUNT: 18.5 % (ref 11.5–17)
GFR SERPLBLD CREATININE-BSD FMLA CKD-EPI: >60 MLS/MIN/1.73/M2
GLUCOSE SERPL-MCNC: 74 MG/DL (ref 82–115)
GLUCOSE UR QL STRIP.AUTO: NORMAL
HCT VFR BLD AUTO: 34.8 % (ref 42–52)
HGB BLD-MCNC: 11.3 G/DL (ref 14–18)
HYALINE CASTS #/AREA URNS LPF: ABNORMAL /LPF
IMM GRANULOCYTES # BLD AUTO: 0.04 X10(3)/MCL (ref 0–0.04)
IMM GRANULOCYTES NFR BLD AUTO: 0.5 %
KETONES UR QL STRIP.AUTO: NEGATIVE
LEUKOCYTE ESTERASE UR QL STRIP.AUTO: NEGATIVE
LYMPHOCYTES # BLD AUTO: 1.75 X10(3)/MCL (ref 0.6–4.6)
LYMPHOCYTES NFR BLD AUTO: 20.9 %
MAGNESIUM SERPL-MCNC: 1.4 MG/DL (ref 1.6–2.6)
MCH RBC QN AUTO: 29.2 PG (ref 27–31)
MCHC RBC AUTO-ENTMCNC: 32.5 G/DL (ref 33–36)
MCV RBC AUTO: 89.9 FL (ref 80–94)
MONOCYTES # BLD AUTO: 1.15 X10(3)/MCL (ref 0.1–1.3)
MONOCYTES NFR BLD AUTO: 13.8 %
NEUTROPHILS # BLD AUTO: 5.24 X10(3)/MCL (ref 2.1–9.2)
NEUTROPHILS NFR BLD AUTO: 62.7 %
NITRITE UR QL STRIP.AUTO: NEGATIVE
NRBC BLD AUTO-RTO: 0 %
PH UR STRIP.AUTO: 6 [PH]
PLATELET # BLD AUTO: 244 X10(3)/MCL (ref 130–400)
PMV BLD AUTO: 10.7 FL (ref 7.4–10.4)
POCT GLUCOSE: 93 MG/DL (ref 70–110)
POTASSIUM SERPL-SCNC: 4.2 MMOL/L (ref 3.5–5.1)
PROT SERPL-MCNC: 7.9 GM/DL (ref 5.8–7.6)
PROT UR QL STRIP.AUTO: NEGATIVE
RBC # BLD AUTO: 3.87 X10(6)/MCL (ref 4.7–6.1)
RBC #/AREA URNS AUTO: ABNORMAL /HPF
RBC UR QL AUTO: ABNORMAL
SODIUM SERPL-SCNC: 140 MMOL/L (ref 136–145)
SP GR UR STRIP.AUTO: 1.01 (ref 1–1.03)
SQUAMOUS #/AREA URNS LPF: ABNORMAL /HPF
UROBILINOGEN UR STRIP-ACNC: NORMAL
WBC # SPEC AUTO: 8.36 X10(3)/MCL (ref 4.5–11.5)
WBC #/AREA URNS AUTO: ABNORMAL /HPF

## 2023-10-26 PROCEDURE — 80076 HEPATIC FUNCTION PANEL: CPT | Performed by: EMERGENCY MEDICINE

## 2023-10-26 PROCEDURE — 83735 ASSAY OF MAGNESIUM: CPT | Performed by: EMERGENCY MEDICINE

## 2023-10-26 PROCEDURE — 80048 BASIC METABOLIC PNL TOTAL CA: CPT | Performed by: EMERGENCY MEDICINE

## 2023-10-26 PROCEDURE — 99283 EMERGENCY DEPT VISIT LOW MDM: CPT

## 2023-10-26 PROCEDURE — 82962 GLUCOSE BLOOD TEST: CPT

## 2023-10-26 PROCEDURE — 81001 URINALYSIS AUTO W/SCOPE: CPT | Performed by: EMERGENCY MEDICINE

## 2023-10-26 PROCEDURE — 85025 COMPLETE CBC W/AUTO DIFF WBC: CPT | Performed by: EMERGENCY MEDICINE

## 2023-10-26 PROCEDURE — 83880 ASSAY OF NATRIURETIC PEPTIDE: CPT | Performed by: EMERGENCY MEDICINE

## 2023-10-26 RX ORDER — OMEPRAZOLE 20 MG/1
20 CAPSULE, DELAYED RELEASE ORAL DAILY
Qty: 30 CAPSULE | Refills: 0 | Status: SHIPPED | OUTPATIENT
Start: 2023-10-26 | End: 2023-11-25

## 2023-10-26 NOTE — ED PROVIDER NOTES
"ED PROVIDER NOTE  10/26/2023    CHIEF COMPLAINT:   Chief Complaint   Patient presents with    Abdominal Pain     C/o abdominal pain, states abdomen keeps "blowing up". Ascites noted.        HISTORY OF PRESENT ILLNESS:   Aman Humphrey is a 62 y.o. male who presents with chief complaint Abdominal distention. Patient reports that every time he eats or drinks anything he just feels bloated and he is not able to eat like he pleases. He also reports that he needs somewhere to stay. He states that he is currently homeless and has been using his disability check to stay at hotels until his money runs out. He was recently diagnosed with liver lesion concerning for HCC and states that he wants to get this evaluated. Denies fever, vomiting, diarrhea.    The history is provided by the patient.         REVIEW OF SYSTEMS: as noted in the HPI.  NURSING NOTES REVIEWED      PAST MEDICAL/SURGICAL HISTORY:   Past Medical History:   Diagnosis Date    Carpal tunnel syndrome     HTN (hypertension)     Sciatica       Past Surgical History:   Procedure Laterality Date    SKIN GRAFT         FAMILY HISTORY: History reviewed. No pertinent family history.    SOCIAL HISTORY:   Social History     Tobacco Use    Smoking status: Some Days     Types: Cigarettes    Smokeless tobacco: Never   Substance Use Topics    Alcohol use: Yes     Comment: daily    Drug use: Not Currently       ALLERGIES: Review of patient's allergies indicates:  No Known Allergies    PHYSICAL EXAM:  Initial Vitals [10/26/23 1310]   BP Pulse Resp Temp SpO2   (!) 166/87 65 (!) 24 97.5 °F (36.4 °C) 100 %      MAP       --         Physical Exam    Nursing note and vitals reviewed.  Constitutional: He appears well-developed and well-nourished. No distress.   HENT:   Head: Normocephalic and atraumatic.   Nose: Nose normal.   Mouth/Throat: Oropharynx is clear and moist and mucous membranes are normal.   Eyes: Conjunctivae and EOM are normal. Pupils are equal, round, and reactive to " light.   Neck: Neck supple. No tracheal deviation present.   Cardiovascular:  Normal rate, regular rhythm, normal heart sounds, intact distal pulses and normal pulses.           Pulmonary/Chest: Effort normal and breath sounds normal. No respiratory distress.   Abdominal: Abdomen is soft. He exhibits distension (Mild) and fluid wave. There is no abdominal tenderness. There is no rebound and no guarding.   Musculoskeletal:         General: Normal range of motion.      Cervical back: Neck supple.     Neurological: He is alert and oriented to person, place, and time. GCS eye subscore is 4. GCS verbal subscore is 5. GCS motor subscore is 6.   CN II-XII intact. Moves all extremities. No gross sensory or motor deficits.   Skin: Skin is warm, dry and intact.   Psychiatric: His speech is normal and behavior is normal. Judgment and thought content normal. Cognition and memory are normal. He exhibits a depressed mood. He expresses no suicidal ideation.         RESULTS:  Labs Reviewed   BASIC METABOLIC PANEL - Abnormal; Notable for the following components:       Result Value    Chloride 115 (*)     Carbon Dioxide 20 (*)     Glucose Level 74 (*)     Calcium Level Total 8.7 (*)     All other components within normal limits   B-TYPE NATRIURETIC PEPTIDE - Abnormal; Notable for the following components:    Natriuretic Peptide 400.5 (*)     All other components within normal limits   MAGNESIUM - Abnormal; Notable for the following components:    Magnesium Level 1.40 (*)     All other components within normal limits   URINALYSIS, REFLEX TO URINE CULTURE - Abnormal; Notable for the following components:    Blood, UA 1+ (*)     Bacteria, UA Trace (*)     RBC, UA 6-10 (*)     All other components within normal limits   CBC WITH DIFFERENTIAL - Abnormal; Notable for the following components:    RBC 3.87 (*)     Hgb 11.3 (*)     Hct 34.8 (*)     MCHC 32.5 (*)     RDW 18.5 (*)     MPV 10.7 (*)     All other components within normal limits    HEPATIC FUNCTION PANEL - Abnormal; Notable for the following components:    Albumin Level 1.9 (*)     Alkaline Phosphatase 473 (*)     Aspartate Aminotransferase 120 (*)     Bilirubin Direct 1.0 (*)     Bilirubin Total 1.6 (*)     Protein Total 7.9 (*)     All other components within normal limits   CBC W/ AUTO DIFFERENTIAL    Narrative:     The following orders were created for panel order CBC auto differential.  Procedure                               Abnormality         Status                     ---------                               -----------         ------                     CBC with Differential[[id number]]       Abnormal            Final result                 Please view results for these tests on the individual orders.   EXTRA TUBES    Narrative:     The following orders were created for panel order EXTRA TUBES.  Procedure                               Abnormality         Status                     ---------                               -----------         ------                     Light Blue Top Hold[[id number]]                             In process                 Gold Top Hold[[id number]]                                   In process                   Please view results for these tests on the individual orders.   LIGHT BLUE TOP HOLD   GOLD TOP HOLD   PATHOLOGIST INTERPRETATION   POCT GLUCOSE     Imaging Results    None         PROCEDURES:  Procedures    ECG:       ED COURSE AND MEDICAL DECISION MAKING:  Medications - No data to display  ED Course as of 10/26/23 2338   Thu Oct 26, 2023   1410 WBC: 8.36 [IB]   1410 Hemoglobin(!): 11.3 [IB]   1410 Platelet Count: 244 [IB]   1413 Creatinine: 0.85 [IB]   1433 BNP(!): 400.5 [IB]   1513 Patient is requesting something to eat, drinking juice, tolerating PO intake. [IB]   1529 POCT Glucose: 93 [IB]   1546 Leukocytes, UA: Negative [IB]   1546 NITRITE UA: Negative [IB]      ED Course User Index  [IB] Romaine Borges, DO        Medical Decision  Making  62-year-old male presents complaining of early satiety feeling bloated whenever he eats or drinks anything, also requesting housing placement.  He was recently admitted for decompensated cirrhosis and found to have a lesion in his liver concerning for hepatocellular carcinoma.  Labs obtained today are grossly unremarkable compared to his previous labs.  He has no abdominal tenderness on exam and is only mildly distended.  I do not feel that he needs another paracentesis performed and I do not suspect underlying SBP.  He was requesting something to eat which we have provided and he was tolerating p.o. intake.  I do not see that he was on any proton pump inhibitor so I will start him on Prilosec to see if that helps with his early satiety.  Recommend that he keep his follow up appointments with GI and Oncology.  Given strict ED return precautions. I have spoken with the patient and/or caregivers. I have explained the patient's condition, diagnoses and treatment plan based on the information available to me at this time. I have answered the patient's and/or caregiver's questions and addressed any concerns. The patient and/or caregivers have as good an understanding of the patient's diagnosis, condition and treatment plan as can be expected at this point. The vital signs have been stable. The patient's condition is stable and appropriate for discharge from the emergency department.     The patient will pursue further outpatient evaluation with the primary care physician or other designated or consulting physician as outlined in the discharge instructions. The patient and/or caregivers are agreeable to this plan of care and follow-up instructions have been explained in detail. The patient and/or caregivers have received these instructions in written format and have expressed an understanding of the discharge instructions. The patient and/or caregivers are aware that any significant change in condition or  worsening of symptoms should prompt an immediate return to this or the closest emergency department or a call to 911.    Amount and/or Complexity of Data Reviewed  External Data Reviewed: labs, radiology and notes.  Labs: ordered. Decision-making details documented in ED Course.    Risk  Prescription drug management.  Decision regarding hospitalization.  Diagnosis or treatment significantly limited by social determinants of health.        CLINICAL IMPRESSION:  1. Early satiety    2. Hepatic cirrhosis, unspecified hepatic cirrhosis type, unspecified whether ascites present        DISPOSITION:   ED Disposition Condition    Discharge Stable            ED Prescriptions       Medication Sig Dispense Start Date End Date Auth. Provider    omeprazole (PRILOSEC) 20 MG capsule Take 1 capsule (20 mg total) by mouth once daily. 30 capsule 10/26/2023 11/25/2023 Romaine Borges, DO          Follow-up Information       Follow up With Specialties Details Why Contact Info    [facility] Emergency Medicine  If symptoms worsen [address] [facility] [id number] [phone]               Romaine Borges DO  10/26/23 6239

## 2023-11-11 ENCOUNTER — HOSPITAL ENCOUNTER (EMERGENCY)
Facility: HOSPITAL | Age: 63
Discharge: HOME | End: 2023-11-11
Attending: STUDENT IN AN ORGANIZED HEALTH CARE EDUCATION/TRAINING PROGRAM
Payer: MEDICAID

## 2023-11-11 VITALS
DIASTOLIC BLOOD PRESSURE: 86 MMHG | HEIGHT: 67 IN | HEART RATE: 95 BPM | TEMPERATURE: 98 F | OXYGEN SATURATION: 99 % | BODY MASS INDEX: 20.4 KG/M2 | RESPIRATION RATE: 20 BRPM | WEIGHT: 130 LBS | SYSTOLIC BLOOD PRESSURE: 175 MMHG

## 2023-11-11 DIAGNOSIS — Z76.5 MALINGERING: Primary | ICD-10-CM

## 2023-11-11 DIAGNOSIS — Z59.00 HOMELESSNESS: ICD-10-CM

## 2023-11-11 DIAGNOSIS — R10.9 INTERMITTENT ABDOMINAL PAIN: ICD-10-CM

## 2023-11-11 LAB
ALBUMIN SERPL-MCNC: 2.2 G/DL (ref 3.4–4.8)
ALBUMIN/GLOB SERPL: 0.4 RATIO (ref 1.1–2)
ALP SERPL-CCNC: 325 UNIT/L (ref 40–150)
ALT SERPL-CCNC: 33 UNIT/L (ref 0–55)
AMPHET UR QL SCN: NEGATIVE
APPEARANCE UR: CLEAR
AST SERPL-CCNC: 67 UNIT/L (ref 5–34)
BACTERIA #/AREA URNS AUTO: ABNORMAL /HPF
BARBITURATE SCN PRESENT UR: NEGATIVE
BASOPHILS # BLD AUTO: 0.03 X10(3)/MCL
BASOPHILS NFR BLD AUTO: 0.5 %
BENZODIAZ UR QL SCN: NEGATIVE
BILIRUB SERPL-MCNC: 1.3 MG/DL
BILIRUB UR QL STRIP.AUTO: NEGATIVE
BUN SERPL-MCNC: 6 MG/DL (ref 8.4–25.7)
CALCIUM SERPL-MCNC: 8.2 MG/DL (ref 8.8–10)
CANNABINOIDS UR QL SCN: NEGATIVE
CHLORIDE SERPL-SCNC: 107 MMOL/L (ref 98–107)
CO2 SERPL-SCNC: 29 MMOL/L (ref 23–31)
COCAINE UR QL SCN: POSITIVE
COLOR UR AUTO: YELLOW
CREAT SERPL-MCNC: 0.78 MG/DL (ref 0.73–1.18)
EOSINOPHIL # BLD AUTO: 0.17 X10(3)/MCL (ref 0–0.9)
EOSINOPHIL NFR BLD AUTO: 2.6 %
ERYTHROCYTE [DISTWIDTH] IN BLOOD BY AUTOMATED COUNT: 18.1 % (ref 11.5–17)
ETHANOL SERPL-MCNC: <10 MG/DL
FENTANYL UR QL SCN: NEGATIVE
GFR SERPLBLD CREATININE-BSD FMLA CKD-EPI: >60 MLS/MIN/1.73/M2
GLOBULIN SER-MCNC: 6.2 GM/DL (ref 2.4–3.5)
GLUCOSE SERPL-MCNC: 120 MG/DL (ref 82–115)
GLUCOSE UR QL STRIP.AUTO: NORMAL
HCT VFR BLD AUTO: 34.2 % (ref 42–52)
HGB BLD-MCNC: 11.3 G/DL (ref 14–18)
IMM GRANULOCYTES # BLD AUTO: 0.01 X10(3)/MCL (ref 0–0.04)
IMM GRANULOCYTES NFR BLD AUTO: 0.2 %
KETONES UR QL STRIP.AUTO: NEGATIVE
LEUKOCYTE ESTERASE UR QL STRIP.AUTO: NEGATIVE
LIPASE SERPL-CCNC: 62 U/L
LYMPHOCYTES # BLD AUTO: 1.65 X10(3)/MCL (ref 0.6–4.6)
LYMPHOCYTES NFR BLD AUTO: 25.1 %
MCH RBC QN AUTO: 30.4 PG (ref 27–31)
MCHC RBC AUTO-ENTMCNC: 33 G/DL (ref 33–36)
MCV RBC AUTO: 91.9 FL (ref 80–94)
MDMA UR QL SCN: NEGATIVE
MONOCYTES # BLD AUTO: 0.75 X10(3)/MCL (ref 0.1–1.3)
MONOCYTES NFR BLD AUTO: 11.4 %
MUCOUS THREADS URNS QL MICRO: ABNORMAL /LPF
NEUTROPHILS # BLD AUTO: 3.96 X10(3)/MCL (ref 2.1–9.2)
NEUTROPHILS NFR BLD AUTO: 60.2 %
NITRITE UR QL STRIP.AUTO: NEGATIVE
NRBC BLD AUTO-RTO: 0 %
OPIATES UR QL SCN: NEGATIVE
PCP UR QL: NEGATIVE
PH UR STRIP.AUTO: 7.5 [PH]
PH UR: 7.5 [PH] (ref 3–11)
PLATELET # BLD AUTO: 146 X10(3)/MCL (ref 130–400)
PMV BLD AUTO: 9.4 FL (ref 7.4–10.4)
POTASSIUM SERPL-SCNC: 3.6 MMOL/L (ref 3.5–5.1)
PROT SERPL-MCNC: 8.4 GM/DL (ref 5.8–7.6)
PROT UR QL STRIP.AUTO: NEGATIVE
RBC # BLD AUTO: 3.72 X10(6)/MCL (ref 4.7–6.1)
RBC #/AREA URNS AUTO: ABNORMAL /HPF
RBC UR QL AUTO: ABNORMAL
SODIUM SERPL-SCNC: 140 MMOL/L (ref 136–145)
SP GR UR STRIP.AUTO: 1.01 (ref 1–1.03)
SQUAMOUS #/AREA URNS LPF: ABNORMAL /HPF
UROBILINOGEN UR STRIP-ACNC: 8
WBC # SPEC AUTO: 6.57 X10(3)/MCL (ref 4.5–11.5)
WBC #/AREA URNS AUTO: ABNORMAL /HPF

## 2023-11-11 PROCEDURE — 83690 ASSAY OF LIPASE: CPT | Performed by: PHYSICIAN ASSISTANT

## 2023-11-11 PROCEDURE — 80307 DRUG TEST PRSMV CHEM ANLYZR: CPT | Performed by: PHYSICIAN ASSISTANT

## 2023-11-11 PROCEDURE — 85025 COMPLETE CBC W/AUTO DIFF WBC: CPT | Performed by: PHYSICIAN ASSISTANT

## 2023-11-11 PROCEDURE — 99283 EMERGENCY DEPT VISIT LOW MDM: CPT

## 2023-11-11 PROCEDURE — 81001 URINALYSIS AUTO W/SCOPE: CPT | Mod: XB | Performed by: PHYSICIAN ASSISTANT

## 2023-11-11 PROCEDURE — 80053 COMPREHEN METABOLIC PANEL: CPT | Performed by: PHYSICIAN ASSISTANT

## 2023-11-11 PROCEDURE — 82077 ASSAY SPEC XCP UR&BREATH IA: CPT | Performed by: PHYSICIAN ASSISTANT

## 2023-11-11 SDOH — SOCIAL DETERMINANTS OF HEALTH (SDOH): HOMELESSNESS UNSPECIFIED: Z59.00

## 2023-11-11 NOTE — ED PROVIDER NOTES
"Encounter Date: 11/11/2023       History     Chief Complaint   Patient presents with    Abdominal Pain     Pt picked up from [facility] with c/o abd pain x1 month, worsening today. Recently dx with colon cancer.      63 y.o. male with a history of hypertension presents to the ED via EMS with complaints of generalized abdominal pain with intermittent nausea for unknown amount of time.  Patient was picked up outside of [facility] and says that he does not know where his credit card is and does not have a place to stay. He was recently diagnosed with liver lesion concerning for HCC however has not followed up because "I keep forgetting." Denies fever, chills, vomiting, diarrhea. Denies pain or nausea at this time. Last use of cocaine was a few days ago    The history is provided by the patient and the EMS personnel. No  was used.     Review of patient's allergies indicates:  No Known Allergies  Past Medical History:   Diagnosis Date    Carpal tunnel syndrome     HTN (hypertension)     Sciatica      Past Surgical History:   Procedure Laterality Date    SKIN GRAFT       No family history on file.  Social History     Tobacco Use    Smoking status: Some Days     Types: Cigarettes    Smokeless tobacco: Never   Substance Use Topics    Alcohol use: Yes     Comment: daily    Drug use: Not Currently     Review of Systems   Constitutional:  Negative for fever.   HENT:  Negative for sore throat.    Respiratory:  Negative for shortness of breath.    Cardiovascular:  Negative for chest pain.   Gastrointestinal:  Positive for abdominal pain and nausea. Negative for diarrhea and vomiting.   Genitourinary:  Negative for dysuria.   Musculoskeletal:  Negative for back pain.   Skin:  Negative for rash.   Neurological:  Negative for weakness.   Hematological:  Does not bruise/bleed easily.   All other systems reviewed and are negative.      Physical Exam     Initial Vitals [11/11/23 1254]   BP Pulse Resp " Temp SpO2   (!) 188/100 72 16 97.5 °F (36.4 °C) 100 %      MAP       --         Physical Exam    Nursing note and vitals reviewed.  Constitutional: He appears well-developed and well-nourished.   Patient keeps falling asleep during exam, not forthcoming with information; kept having to wake patient up to ask questions    HENT:   Head: Normocephalic and atraumatic.   Eyes: EOM are normal. Pupils are equal, round, and reactive to light.   Neck: Neck supple.   Normal range of motion.  Cardiovascular:  Normal rate, regular rhythm and normal heart sounds.           Pulmonary/Chest: Breath sounds normal.   Abdominal: Abdomen is soft. Bowel sounds are normal. He exhibits no distension. There is no abdominal tenderness. There is no rebound and no guarding.   Musculoskeletal:         General: Normal range of motion.      Cervical back: Normal range of motion and neck supple.     Neurological: He is alert and oriented to person, place, and time. He has normal strength. GCS score is 15. GCS eye subscore is 4. GCS verbal subscore is 5. GCS motor subscore is 6.   Skin: Skin is warm and dry.   Psychiatric: He has a normal mood and affect.         ED Course   Procedures  Labs Reviewed   COMPREHENSIVE METABOLIC PANEL - Abnormal; Notable for the following components:       Result Value    Glucose Level 120 (*)     Blood Urea Nitrogen 6.0 (*)     Calcium Level Total 8.2 (*)     Protein Total 8.4 (*)     Albumin Level 2.2 (*)     Globulin 6.2 (*)     Albumin/Globulin Ratio 0.4 (*)     Alkaline Phosphatase 325 (*)     Aspartate Aminotransferase 67 (*)     All other components within normal limits   LIPASE - Abnormal; Notable for the following components:    Lipase Level 62 (*)     All other components within normal limits   URINALYSIS, REFLEX TO URINE CULTURE - Abnormal; Notable for the following components:    Blood, UA Trace (*)     Urobilinogen, UA 8.0 (*)     Mucous, UA Trace (*)     RBC, UA 6-10 (*)     All other components within  normal limits   DRUG SCREEN, URINE (BEAKER) - Abnormal; Notable for the following components:    Cocaine, Urine Positive (*)     All other components within normal limits    Narrative:     Cut off concentrations:    Amphetamines - 1000 ng/ml  Barbiturates - 200 ng/ml  Benzodiazepine - 200 ng/ml  Cannabinoids (THC) - 50 ng/ml  Cocaine - 300 ng/ml  Fentanyl - 1.0 ng/ml  MDMA - 500 ng/ml  Opiates - 300 ng/ml   Phencyclidine (PCP) - 25 ng/ml    Specimen submitted for drug analysis and tested for pH and specific gravity in order to evaluate sample integrity. Suspect tampering if specific gravity is <1.003 and/or pH is not within the range of 4.5 - 8.0  False negatives may result form substances such as bleach added to urine.  False positives may result for the presence of a substance with similar chemical structure to the drug or its metabolite.    This test provides only a PRELIMINARY analytical test result. A more specific alternate chemical method must be used in order to obtain a confirmed analytical result. Gas chromatography/mass spectrometry (GC/MS) is the preferred confirmatory method. Other chemical confirmation methods are available. Clinical consideration and professional judgement should be applied to any drug of abuse test result, particularly when preliminary positive results are used.    Positive results will be confirmed only at the physicians request. Unconfirmed screening results are to be used only for medical purposes (treatment).        CBC WITH DIFFERENTIAL - Abnormal; Notable for the following components:    RBC 3.72 (*)     Hgb 11.3 (*)     Hct 34.2 (*)     RDW 18.1 (*)     All other components within normal limits   ALCOHOL,MEDICAL (ETHANOL) - Normal   CBC W/ AUTO DIFFERENTIAL    Narrative:     The following orders were created for panel order CBC auto differential.  Procedure                               Abnormality         Status                     ---------                                "-----------         ------                     CBC with Differential[[id number]]       Abnormal            Final result                 Please view results for these tests on the individual orders.          Imaging Results    None          Medications - No data to display  Medical Decision Making  Differential diagnosis: malingering, homelessness, pancreatitis, SOCORRO    63 y.o. male with a history of hypertension presents to the ED via EMS with complaints of generalized abdominal pain with intermittent nausea for unknown amount of time.  Patient was picked up outside of [facility] and says that he does not know where his credit card is and does not have a place to stay. He was recently diagnosed with liver lesion concerning for HCC however has not followed up because "I keep forgetting." Denies fever, chills, vomiting, diarrhea. Denies pain or nausea at this time. Last use of cocaine was a few days ago      Amount and/or Complexity of Data Reviewed  External Data Reviewed: notes.     Details: Multiple ED visits recently for similar symptoms and complaints; [facility]   Labs: ordered. Decision-making details documented in ED Course.               ED Course as of 11/11/23 1747   Sat Nov 11, 2023   1357 ALP(!): 325 [MA]   1357 AST(!): 67  Improved when compared to labs done 2 weeks ago [MA]   1357 Lipase(!): 62  Still tolerating PO [MA]   1730 I went into discuss all lab results with patient.  Patient has remained asymptomatic throughout his ED stay.  Whenever I told him that he will be going home, he started coming up with more symptoms however patient has been sleeping his entire ER visit.  He became irate with the nurses when they asked him to give a urine sample and would not do so for over an hour. All labs have actually improved from previous.  Patient was tolerate p.o. without issue.  Has no abdominal tenderness on physical exam.  Will discharge home.  " "Patient was given a phone to talk to his friend. [MA]      ED Course User Index  [MA] Bang Pelayo PA-C          BP (!) 150/84   Pulse 80   Temp 97.5 °F (36.4 °C) (Temporal)   Resp 20   Ht 5' 7" (1.702 m)   Wt 59 kg (130 lb)   SpO2 99%   BMI 20.36 kg/m²             Clinical Impression:   Final diagnoses:  [Z76.5] Malingering (Primary)  [Z59.00] Homelessness  [R10.9] Intermittent abdominal pain        ED Disposition Condition    Discharge Stable          ED Prescriptions    None       Follow-up Information       Follow up With Specialties Details Why Contact Info    [facility]    Referral has been sent on your behalf; they will call to schedule follow-up appointment in order to establish care             Bang Pelayo PA-C  11/11/23 6944    "

## 2023-11-14 ENCOUNTER — PATIENT OUTREACH (OUTPATIENT)
Dept: EMERGENCY MEDICINE | Facility: HOSPITAL | Age: 63
End: 2023-11-14
Payer: MEDICAID

## 2023-11-16 ENCOUNTER — PATIENT OUTREACH (OUTPATIENT)
Dept: EMERGENCY MEDICINE | Facility: HOSPITAL | Age: 63
End: 2023-11-16
Payer: MEDICAID

## 2023-11-19 ENCOUNTER — HOSPITAL ENCOUNTER (EMERGENCY)
Facility: HOSPITAL | Age: 63
Discharge: HOME | End: 2023-11-19
Attending: EMERGENCY MEDICINE
Payer: MEDICAID

## 2023-11-19 VITALS
DIASTOLIC BLOOD PRESSURE: 90 MMHG | HEART RATE: 72 BPM | SYSTOLIC BLOOD PRESSURE: 164 MMHG | TEMPERATURE: 98 F | HEIGHT: 67 IN | WEIGHT: 140 LBS | BODY MASS INDEX: 21.97 KG/M2 | RESPIRATION RATE: 17 BRPM | OXYGEN SATURATION: 100 %

## 2023-11-19 DIAGNOSIS — M79.89 LEG SWELLING: Primary | ICD-10-CM

## 2023-11-19 DIAGNOSIS — R60.0 PERIPHERAL EDEMA: ICD-10-CM

## 2023-11-19 DIAGNOSIS — R03.0 ELEVATED BLOOD PRESSURE READING: ICD-10-CM

## 2023-11-19 LAB
ALBUMIN SERPL-MCNC: 2.4 G/DL (ref 3.4–4.8)
ALBUMIN/GLOB SERPL: 0.4 RATIO (ref 1.1–2)
ALP SERPL-CCNC: 293 UNIT/L (ref 40–150)
ALT SERPL-CCNC: 29 UNIT/L (ref 0–55)
AST SERPL-CCNC: 62 UNIT/L (ref 5–34)
BASOPHILS # BLD AUTO: 0.03 X10(3)/MCL
BASOPHILS NFR BLD AUTO: 0.4 %
BILIRUB SERPL-MCNC: 1.1 MG/DL
BNP BLD-MCNC: 80.7 PG/ML
BUN SERPL-MCNC: 8.2 MG/DL (ref 8.4–25.7)
CALCIUM SERPL-MCNC: 8.5 MG/DL (ref 8.8–10)
CHLORIDE SERPL-SCNC: 108 MMOL/L (ref 98–107)
CO2 SERPL-SCNC: 25 MMOL/L (ref 23–31)
CREAT SERPL-MCNC: 0.67 MG/DL (ref 0.73–1.18)
EOSINOPHIL # BLD AUTO: 0.18 X10(3)/MCL (ref 0–0.9)
EOSINOPHIL NFR BLD AUTO: 2.7 %
ERYTHROCYTE [DISTWIDTH] IN BLOOD BY AUTOMATED COUNT: 18 % (ref 11.5–17)
GFR SERPLBLD CREATININE-BSD FMLA CKD-EPI: >60 MLS/MIN/1.73/M2
GLOBULIN SER-MCNC: 5.9 GM/DL (ref 2.4–3.5)
GLUCOSE SERPL-MCNC: 90 MG/DL (ref 82–115)
HCT VFR BLD AUTO: 35 % (ref 42–52)
HGB BLD-MCNC: 11.3 G/DL (ref 14–18)
IMM GRANULOCYTES # BLD AUTO: 0.03 X10(3)/MCL (ref 0–0.04)
IMM GRANULOCYTES NFR BLD AUTO: 0.4 %
LYMPHOCYTES # BLD AUTO: 2.28 X10(3)/MCL (ref 0.6–4.6)
LYMPHOCYTES NFR BLD AUTO: 34.2 %
MCH RBC QN AUTO: 29.5 PG (ref 27–31)
MCHC RBC AUTO-ENTMCNC: 32.3 G/DL (ref 33–36)
MCV RBC AUTO: 91.4 FL (ref 80–94)
MONOCYTES # BLD AUTO: 0.81 X10(3)/MCL (ref 0.1–1.3)
MONOCYTES NFR BLD AUTO: 12.1 %
NEUTROPHILS # BLD AUTO: 3.34 X10(3)/MCL (ref 2.1–9.2)
NEUTROPHILS NFR BLD AUTO: 50.2 %
NRBC BLD AUTO-RTO: 0 %
PLATELET # BLD AUTO: 163 X10(3)/MCL (ref 130–400)
PMV BLD AUTO: 9.5 FL (ref 7.4–10.4)
POTASSIUM SERPL-SCNC: 3.8 MMOL/L (ref 3.5–5.1)
PROT SERPL-MCNC: 8.3 GM/DL (ref 5.8–7.6)
RBC # BLD AUTO: 3.83 X10(6)/MCL (ref 4.7–6.1)
SODIUM SERPL-SCNC: 138 MMOL/L (ref 136–145)
WBC # SPEC AUTO: 6.67 X10(3)/MCL (ref 4.5–11.5)

## 2023-11-19 PROCEDURE — 80053 COMPREHEN METABOLIC PANEL: CPT | Performed by: EMERGENCY MEDICINE

## 2023-11-19 PROCEDURE — 85025 COMPLETE CBC W/AUTO DIFF WBC: CPT | Performed by: EMERGENCY MEDICINE

## 2023-11-19 PROCEDURE — 99284 EMERGENCY DEPT VISIT MOD MDM: CPT

## 2023-11-19 PROCEDURE — 25000003 PHARM REV CODE 250: Performed by: EMERGENCY MEDICINE

## 2023-11-19 PROCEDURE — 83880 ASSAY OF NATRIURETIC PEPTIDE: CPT | Performed by: EMERGENCY MEDICINE

## 2023-11-19 RX ORDER — CLONIDINE HYDROCHLORIDE 0.1 MG/1
0.1 TABLET ORAL
Status: COMPLETED | OUTPATIENT
Start: 2023-11-19 | End: 2023-11-19

## 2023-11-19 RX ORDER — FUROSEMIDE 40 MG/1
40 TABLET ORAL DAILY
Qty: 5 TABLET | Refills: 0 | Status: SHIPPED | OUTPATIENT
Start: 2023-11-19 | End: 2023-11-19 | Stop reason: SDUPTHER

## 2023-11-19 RX ORDER — FUROSEMIDE 20 MG/1
20 TABLET ORAL DAILY
Qty: 60 TABLET | Refills: 1 | Status: SHIPPED | OUTPATIENT
Start: 2023-11-19

## 2023-11-19 RX ADMIN — CLONIDINE HYDROCHLORIDE 0.1 MG: 0.1 TABLET ORAL at 04:11

## 2023-11-19 NOTE — ED NOTES
"Pt was informed again that they have been d/c and attempted to move pt to lobby. Pt noncompliant and security called. Pt states, " May bravo just check in right after I leave, its too fucking cold. May bravo call the  on yall." Charge notified.   "

## 2023-11-19 NOTE — ED PROVIDER NOTES
"As per HPI.  Differential diagnosis:  Peripheral edema, leg swelling, noncompliance with  Encounter Date: 11/19/2023    SCRIBE #1 NOTE: I, Zeyad Cm, am scribing for, and in the presence of,  Aquiles Carbajal MD. I have scribed the following portions of the note - Other sections scribed: HPI,ROS,PE.       History     Chief Complaint   Patient presents with    Leg Swelling     Pt arrives via [facility], EMS / Pt reports c/o leg swelling. Pt reports that he has not taken medications in 2 weeks. Pt is hypertensive, +2 pitting edema to lower ext bilaterally. Pt denies hx of heart failure. Pt reported to ems + drug use, (cocaine and marijuana). Pt denies SI or HI. Pt states " I need to clip my toenails , piss, shit, and get something to eat and I will be good. "     62 y/o male with PMHx of sciatica, hepatitis C, renal insufficiency, cirrhosis, and HTN presents to ED c/o bilateral leg swelling. Pt reports he hasn't taken his fluid pills in 2x weeks. He denies any SOB.     The history is provided by the patient. No  was used.     Review of patient's allergies indicates:  No Known Allergies  Past Medical History:   Diagnosis Date    Carpal tunnel syndrome     HTN (hypertension)     Sciatica      Past Surgical History:   Procedure Laterality Date    SKIN GRAFT       No family history on file.  Social History     Tobacco Use    Smoking status: Some Days     Types: Cigarettes    Smokeless tobacco: Never   Substance Use Topics    Alcohol use: Yes     Comment: daily    Drug use: Not Currently     Review of Systems   Constitutional:  Negative for activity change, chills, diaphoresis, fatigue and fever.   HENT:  Negative for congestion, ear pain, sinus pain and sore throat.    Eyes:  Negative for visual disturbance.   Respiratory:  Negative for cough, shortness of breath, wheezing and stridor.    Cardiovascular:  Positive for leg swelling. Negative for chest pain and palpitations.   Gastrointestinal:  Negative " for abdominal pain, constipation, diarrhea, nausea, rectal pain and vomiting.   Genitourinary:  Negative for dysuria and hematuria.   Musculoskeletal:  Negative for arthralgias, back pain and myalgias.   Skin:  Negative for rash.   Neurological:  Negative for dizziness, syncope, weakness, numbness and headaches.   All other systems reviewed and are negative.      Physical Exam     Initial Vitals [11/19/23 0409]   BP Pulse Resp Temp SpO2   (!) 200/119 71 18 97.5 °F (36.4 °C) 99 %      MAP       --         Physical Exam    Nursing note and vitals reviewed.  Constitutional: No distress.   HENT:   Head: Normocephalic and atraumatic.   Eyes: EOM are normal.   Neck: Trachea normal. Neck supple.   Normal range of motion.  Cardiovascular:  Normal rate and regular rhythm.           No murmur heard.  Pulmonary/Chest: Breath sounds normal. No respiratory distress.   Abdominal: Abdomen is soft. Bowel sounds are normal. He exhibits no distension. There is no abdominal tenderness. There is no rebound and no guarding.   Musculoskeletal:         General: Edema (1-2+ pitting edema to BLE) present. Normal range of motion.      Cervical back: Normal range of motion and neck supple.      Lumbar back: Normal.     Neurological: He is alert and oriented to person, place, and time. He has normal strength.   Skin: Skin is warm and dry. No rash noted.   Psychiatric: He has a normal mood and affect.         ED Course   Procedures  Labs Reviewed   COMPREHENSIVE METABOLIC PANEL - Abnormal; Notable for the following components:       Result Value    Chloride 108 (*)     Blood Urea Nitrogen 8.2 (*)     Creatinine 0.67 (*)     Calcium Level Total 8.5 (*)     Protein Total 8.3 (*)     Albumin Level 2.4 (*)     Globulin 5.9 (*)     Albumin/Globulin Ratio 0.4 (*)     Alkaline Phosphatase 293 (*)     Aspartate Aminotransferase 62 (*)     All other components within normal limits   CBC WITH DIFFERENTIAL - Abnormal; Notable for the following components:     RBC 3.83 (*)     Hgb 11.3 (*)     Hct 35.0 (*)     MCHC 32.3 (*)     RDW 18.0 (*)     All other components within normal limits   B-TYPE NATRIURETIC PEPTIDE - Normal   CBC W/ AUTO DIFFERENTIAL    Narrative:     The following orders were created for panel order CBC auto differential.  Procedure                               Abnormality         Status                     ---------                               -----------         ------                     CBC with Differential[[id number]]       Abnormal            Final result                 Please view results for these tests on the individual orders.          Imaging Results    None          Medications   cloNIDine tablet 0.1 mg (0.1 mg Oral Given 11/19/23 0453)             Scribe Attestation:   Scribe #1: I performed the above scribed service and the documentation accurately describes the services I performed. I attest to the accuracy of the note.    Attending Attestation:           Physician Attestation for Scribe:  Physician Attestation Statement for Scribe #1: I, Aquiles Carbajal MD, reviewed documentation, as scribed by Zeyad Cm in my presence, and it is both accurate and complete.         Medical Decision Making  Differential diagnosis:  Noncompliance with diuretic medication, peripheral edema    Amount and/or Complexity of Data Reviewed  Labs: ordered. Decision-making details documented in ED Course.     Details: All labs are stable, patient has a mild anemia  Discussion of management or test interpretation with external provider(s): Patient is not taking his Lasix, is supposed to be on Lasix 20 mg q.d..  Prescription for Lasix was given prior to discharge.  Workup essentially normal.  Patient was given clonidine 0.1 mg while in the ER and blood pressure trended down.    Risk  Prescription drug management.                                Clinical Impression:  Final diagnoses:  [M79.89] Leg swelling (Primary)  [R60.9] Peripheral edema  [R03.0]  Elevated blood pressure reading          ED Disposition Condition    Discharge Stable          ED Prescriptions       Medication Sig Dispense Start Date End Date Auth. Provider    furosemide (LASIX) 40 MG tablet  (Status: Discontinued) Take 1 tablet (40 mg total) by mouth once daily. 5 tablet 11/19/2023 11/19/2023 Aquiles Carbajal MD    furosemide (LASIX) 20 MG tablet Take 1 tablet (20 mg total) by mouth once daily. 60 tablet 11/19/2023 -- Aquiles Carbajal MD          Follow-up Information    None          Aquiles Carbajal MD  11/19/23 3859

## 2023-11-19 NOTE — ED NOTES
"Patient asleep in the bed. When asked patient about his ride status, he states he had been calling then states he has been sleeping. Informed the patient that he will need to wait in the lobby for a ride and that he was given an extra hour to find a ride home. Patient then states he will check back in because he is "cold". Patient refusing to leave, security notified.   "

## 2023-11-19 NOTE — DISCHARGE INSTRUCTIONS
Follow-up with your primary care physician   Has Your Skin Lesion Been Treated?: not been treated Is This A New Presentation, Or A Follow-Up?: Moles

## 2023-11-19 NOTE — ED NOTES
"Informed patient he was being discharged and that he would need to find a ride to return home. Patient states he did not have a ride, therefore, I advised him that he could use the phone in the lobby and wait for a ride there. Patient states, "I'm going to need warm blankets then". I educated the patient that this was a fire hazard as well as a tripping hazard and not allowed in the main lobby. Patient then states he would call around for a ride.   "

## 2023-11-23 ENCOUNTER — HOSPITAL ENCOUNTER (EMERGENCY)
Facility: HOSPITAL | Age: 63
Discharge: HOME | End: 2023-11-24
Attending: STUDENT IN AN ORGANIZED HEALTH CARE EDUCATION/TRAINING PROGRAM
Payer: MEDICAID

## 2023-11-23 DIAGNOSIS — R60.9 EDEMA: ICD-10-CM

## 2023-11-23 LAB
ALBUMIN SERPL-MCNC: 2.3 G/DL (ref 3.4–4.8)
ALBUMIN/GLOB SERPL: 0.4 RATIO (ref 1.1–2)
ALP SERPL-CCNC: 280 UNIT/L (ref 40–150)
ALT SERPL-CCNC: 29 UNIT/L (ref 0–55)
AST SERPL-CCNC: 61 UNIT/L (ref 5–34)
BASOPHILS # BLD AUTO: 0.05 X10(3)/MCL
BASOPHILS NFR BLD AUTO: 0.7 %
BILIRUB SERPL-MCNC: 0.9 MG/DL
BNP BLD-MCNC: 108.2 PG/ML
BUN SERPL-MCNC: 9.2 MG/DL (ref 8.4–25.7)
CALCIUM SERPL-MCNC: 8.7 MG/DL (ref 8.8–10)
CHLORIDE SERPL-SCNC: 110 MMOL/L (ref 98–107)
CO2 SERPL-SCNC: 26 MMOL/L (ref 23–31)
CREAT SERPL-MCNC: 0.82 MG/DL (ref 0.73–1.18)
EOSINOPHIL # BLD AUTO: 0.19 X10(3)/MCL (ref 0–0.9)
EOSINOPHIL NFR BLD AUTO: 2.6 %
ERYTHROCYTE [DISTWIDTH] IN BLOOD BY AUTOMATED COUNT: 18 % (ref 11.5–17)
GFR SERPLBLD CREATININE-BSD FMLA CKD-EPI: >60 MLS/MIN/1.73/M2
GLOBULIN SER-MCNC: 6 GM/DL (ref 2.4–3.5)
GLUCOSE SERPL-MCNC: 81 MG/DL (ref 82–115)
HCT VFR BLD AUTO: 36.6 % (ref 42–52)
HGB BLD-MCNC: 11.6 G/DL (ref 14–18)
HOLD SPECIMEN: NORMAL
IMM GRANULOCYTES # BLD AUTO: 0.02 X10(3)/MCL (ref 0–0.04)
IMM GRANULOCYTES NFR BLD AUTO: 0.3 %
LYMPHOCYTES # BLD AUTO: 2.05 X10(3)/MCL (ref 0.6–4.6)
LYMPHOCYTES NFR BLD AUTO: 28.4 %
MCH RBC QN AUTO: 29.3 PG (ref 27–31)
MCHC RBC AUTO-ENTMCNC: 31.7 G/DL (ref 33–36)
MCV RBC AUTO: 92.4 FL (ref 80–94)
MONOCYTES # BLD AUTO: 0.85 X10(3)/MCL (ref 0.1–1.3)
MONOCYTES NFR BLD AUTO: 11.8 %
NEUTROPHILS # BLD AUTO: 4.07 X10(3)/MCL (ref 2.1–9.2)
NEUTROPHILS NFR BLD AUTO: 56.2 %
NRBC BLD AUTO-RTO: 0 %
PLATELET # BLD AUTO: 200 X10(3)/MCL (ref 130–400)
PMV BLD AUTO: 9.6 FL (ref 7.4–10.4)
POTASSIUM SERPL-SCNC: 3.7 MMOL/L (ref 3.5–5.1)
PROT SERPL-MCNC: 8.3 GM/DL (ref 5.8–7.6)
RBC # BLD AUTO: 3.96 X10(6)/MCL (ref 4.7–6.1)
SODIUM SERPL-SCNC: 143 MMOL/L (ref 136–145)
TROPONIN I SERPL-MCNC: 0.01 NG/ML (ref 0–0.04)
WBC # SPEC AUTO: 7.23 X10(3)/MCL (ref 4.5–11.5)

## 2023-11-23 PROCEDURE — 93005 ELECTROCARDIOGRAM TRACING: CPT

## 2023-11-23 PROCEDURE — 96372 THER/PROPH/DIAG INJ SC/IM: CPT | Performed by: NURSE PRACTITIONER

## 2023-11-23 PROCEDURE — 99284 EMERGENCY DEPT VISIT MOD MDM: CPT

## 2023-11-23 PROCEDURE — 83880 ASSAY OF NATRIURETIC PEPTIDE: CPT | Performed by: NURSE PRACTITIONER

## 2023-11-23 PROCEDURE — 84484 ASSAY OF TROPONIN QUANT: CPT | Performed by: NURSE PRACTITIONER

## 2023-11-23 PROCEDURE — 80053 COMPREHEN METABOLIC PANEL: CPT | Performed by: NURSE PRACTITIONER

## 2023-11-23 PROCEDURE — 63600175 PHARM REV CODE 636 W HCPCS: Performed by: NURSE PRACTITIONER

## 2023-11-23 PROCEDURE — 85025 COMPLETE CBC W/AUTO DIFF WBC: CPT | Performed by: NURSE PRACTITIONER

## 2023-11-23 RX ORDER — FUROSEMIDE 10 MG/ML
20 INJECTION INTRAMUSCULAR; INTRAVENOUS
Status: COMPLETED | OUTPATIENT
Start: 2023-11-23 | End: 2023-11-23

## 2023-11-23 RX ADMIN — FUROSEMIDE 20 MG: 10 INJECTION, SOLUTION INTRAMUSCULAR; INTRAVENOUS at 08:11

## 2023-11-24 VITALS
TEMPERATURE: 98 F | DIASTOLIC BLOOD PRESSURE: 85 MMHG | HEART RATE: 85 BPM | SYSTOLIC BLOOD PRESSURE: 128 MMHG | BODY MASS INDEX: 20.57 KG/M2 | WEIGHT: 138.88 LBS | RESPIRATION RATE: 14 BRPM | HEIGHT: 69 IN | OXYGEN SATURATION: 100 %

## 2023-11-24 NOTE — DISCHARGE INSTRUCTIONS
Keep extremities elevated while sitting and supine.  Take medication as previously prescribed.  Return to the [facility] ED immediately for onset of chest pain, SOB, or fever.   Follow up with your primary care physician in 3-5 days for follow up evaluation.

## 2023-11-24 NOTE — ED PROVIDER NOTES
"Encounter Date: 11/23/2023       History     Chief Complaint   Patient presents with    Ankle Pain     Pt reports bilateral nontraumatic ankle pain. No edema noted.      Pt is a 63 y.o. male who presents to the [facility] ED complaining of bilateral ankle pain, swelling. Hx of HTN, hepatitis C, renal insufficiency, cirrhosis. Seen at [facility] ED on 11/19 for same issue and currently on treatment for peripheral edema but reports not routinely taking his medications. Denies chest pain, SOB, weakness, nausea, vomiting, or loss of bowel or bladder control. Pt reports partially coming in tonight also do to the cold. States, "I'm homeless and I think the weather is making me hurt."      Review of patient's allergies indicates:  No Known Allergies  Past Medical History:   Diagnosis Date    Carpal tunnel syndrome     HTN (hypertension)     Sciatica      Past Surgical History:   Procedure Laterality Date    SKIN GRAFT       No family history on file.  Social History     Tobacco Use    Smoking status: Some Days     Types: Cigarettes    Smokeless tobacco: Never   Substance Use Topics    Alcohol use: Yes     Comment: daily    Drug use: Not Currently     Review of Systems   Constitutional:  Negative for chills, diaphoresis, fatigue and fever.   HENT:  Negative for facial swelling, postnasal drip, rhinorrhea, sinus pressure, sinus pain, sore throat and trouble swallowing.    Respiratory:  Negative for cough, chest tightness, shortness of breath and wheezing.    Cardiovascular:  Positive for leg swelling. Negative for chest pain and palpitations.   Gastrointestinal:  Negative for abdominal pain, diarrhea, nausea and vomiting.   Genitourinary:  Negative for dysuria, flank pain, hematuria and urgency.   Musculoskeletal:  Negative for arthralgias, back pain and myalgias.   Skin:  Negative for color change and rash.   Neurological:  Negative for dizziness, syncope, weakness and headaches.   Hematological:  Does not bruise/bleed easily.   All " other systems reviewed and are negative.      Physical Exam     Initial Vitals [11/23/23 1809]   BP Pulse Resp Temp SpO2   122/88 90 16 98.2 °F (36.8 °C) 100 %      MAP       --         Physical Exam    Nursing note and vitals reviewed.  Constitutional: Vital signs are normal. He appears well-developed and well-nourished.   HENT:   Head: Normocephalic.   Nose: Nose normal.   Mouth/Throat: Oropharynx is clear and moist.   Eyes: Conjunctivae and EOM are normal. Pupils are equal, round, and reactive to light.   Neck: Neck supple.   Normal range of motion.  Cardiovascular:  Normal rate, regular rhythm, normal heart sounds and intact distal pulses.           Pulmonary/Chest: Effort normal and breath sounds normal. No respiratory distress. He has no wheezes. He has no rhonchi. He has no rales. He exhibits no tenderness.   Abdominal: Abdomen is soft and flat. Bowel sounds are normal. There is no abdominal tenderness. There is no rebound, no guarding, no tenderness at McBurney's point and negative Chang's sign.   Musculoskeletal:         General: Normal range of motion.      Cervical back: Normal range of motion and neck supple.      Right lower leg: Tenderness present. 1+ Edema present.      Left lower leg: Tenderness present. 1+ Edema present.        Legs:      Neurological: He is alert and oriented to person, place, and time. He has normal strength.   Skin: Skin is warm and dry. Capillary refill takes less than 2 seconds.   Psychiatric: He has a normal mood and affect. His behavior is normal. Judgment and thought content normal.         ED Course   Procedures  Labs Reviewed   B-TYPE NATRIURETIC PEPTIDE - Abnormal; Notable for the following components:       Result Value    Natriuretic Peptide 108.2 (*)     All other components within normal limits   COMPREHENSIVE METABOLIC PANEL - Abnormal; Notable for the following components:    Chloride 110 (*)     Glucose Level 81 (*)     Calcium Level Total 8.7 (*)     Protein  Total 8.3 (*)     Albumin Level 2.3 (*)     Globulin 6.0 (*)     Albumin/Globulin Ratio 0.4 (*)     Alkaline Phosphatase 280 (*)     Aspartate Aminotransferase 61 (*)     All other components within normal limits   CBC WITH DIFFERENTIAL - Abnormal; Notable for the following components:    RBC 3.96 (*)     Hgb 11.6 (*)     Hct 36.6 (*)     MCHC 31.7 (*)     RDW 18.0 (*)     All other components within normal limits   TROPONIN I - Normal   CBC W/ AUTO DIFFERENTIAL    Narrative:     The following orders were created for panel order CBC auto differential.  Procedure                               Abnormality         Status                     ---------                               -----------         ------                     CBC with Differential[[id number]]       Abnormal            Final result                 Please view results for these tests on the individual orders.   EXTRA TUBES    Narrative:     The following orders were created for panel order EXTRA TUBES.  Procedure                               Abnormality         Status                     ---------                               -----------         ------                     Light Blue Top Hold[[id number]]                             In process                 Lavender Top Hold[[id number]]                               In process                 Gold Top Hold[[id number]]                                   In process                   Please view results for these tests on the individual orders.   LIGHT BLUE TOP HOLD   LAVENDER TOP HOLD   GOLD TOP HOLD          Imaging Results    None          Medications   furosemide injection 20 mg (has no administration in time range)     Medical Decision Making  Differential:  Peripheral edema  CHF  Electrolyte imbalance    Amount and/or Complexity of Data Reviewed  Labs: ordered.    Risk  Prescription drug management.               ED Course as of 11/23/23 1952   Thu Nov 23, 2023   1949 Pt resting quietly at this time.  I have discussed pt diagnostic values with Dr. Jean Baptiste, ED physician. Physician agrees with plan to provide pt IM Lasix and to prep for discharge. During course of ED stay, pt has displayed no SOB, chest pain, fever. I have discussed with patient all pertinent ED information and results. Discussed diagnosis and treatment plan with patient. Follow up instructions and return to ED instruction have been given. All questions and concerns were addressed at this time. Patient voices understanding of information and instructions. Patient is comfortable with plan and discharge. Patient is stable for discharge.    [JA]      ED Course User Index  [JA] Andriy Unger Jr., FNP                        Clinical Impression:  Final diagnoses:  [R60.9] Edema          ED Disposition Condition    Discharge Stable          ED Prescriptions    None       Follow-up Information       Follow up With Specialties Details Why Contact Info    [facility] Emergency Medicine In 3 days As needed, If symptoms worsen [address] [facility] [id number] [phone]             Andriy Unger Jr., FNP  11/23/23 1952

## 2023-12-22 ENCOUNTER — PATIENT OUTREACH (OUTPATIENT)
Dept: EMERGENCY MEDICINE | Facility: HOSPITAL | Age: 63
End: 2023-12-22
Payer: MEDICAID

## 2024-01-26 LAB — PATH REV: NORMAL

## 2025-04-23 ENCOUNTER — HOSPITAL ENCOUNTER (EMERGENCY)
Facility: HOSPITAL | Age: 65
Discharge: HOME | End: 2025-04-23
Attending: STUDENT IN AN ORGANIZED HEALTH CARE EDUCATION/TRAINING PROGRAM
Payer: MEDICAID

## 2025-04-23 VITALS
OXYGEN SATURATION: 98 % | WEIGHT: 130 LBS | DIASTOLIC BLOOD PRESSURE: 58 MMHG | HEART RATE: 63 BPM | TEMPERATURE: 99 F | RESPIRATION RATE: 16 BRPM | HEIGHT: 69 IN | SYSTOLIC BLOOD PRESSURE: 143 MMHG | BODY MASS INDEX: 19.26 KG/M2

## 2025-04-23 DIAGNOSIS — R07.9 CHEST PAIN: ICD-10-CM

## 2025-04-23 LAB
ALBUMIN SERPL-MCNC: 2.1 G/DL (ref 3.4–4.8)
ALBUMIN/GLOB SERPL: 0.4 RATIO (ref 1.1–2)
ALP SERPL-CCNC: 268 UNIT/L (ref 40–150)
ALT SERPL-CCNC: 83 UNIT/L (ref 0–55)
ANION GAP SERPL CALC-SCNC: 4 MEQ/L
AST SERPL-CCNC: 111 UNIT/L (ref 11–45)
BASOPHILS # BLD AUTO: 0.02 X10(3)/MCL
BASOPHILS NFR BLD AUTO: 0.3 %
BILIRUB SERPL-MCNC: 1.5 MG/DL
BNP BLD-MCNC: 96.6 PG/ML
BUN SERPL-MCNC: 8.5 MG/DL (ref 8.4–25.7)
CALCIUM SERPL-MCNC: 8.2 MG/DL (ref 8.8–10)
CHLORIDE SERPL-SCNC: 104 MMOL/L (ref 98–107)
CO2 SERPL-SCNC: 28 MMOL/L (ref 23–31)
CREAT SERPL-MCNC: 0.73 MG/DL (ref 0.72–1.25)
CREAT/UREA NIT SERPL: 12
EOSINOPHIL # BLD AUTO: 0.25 X10(3)/MCL (ref 0–0.9)
EOSINOPHIL NFR BLD AUTO: 3.2 %
ERYTHROCYTE [DISTWIDTH] IN BLOOD BY AUTOMATED COUNT: 14.9 % (ref 11.5–17)
GFR SERPLBLD CREATININE-BSD FMLA CKD-EPI: >60 ML/MIN/1.73/M2
GLOBULIN SER-MCNC: 5.8 GM/DL (ref 2.4–3.5)
GLUCOSE SERPL-MCNC: 133 MG/DL (ref 82–115)
HCT VFR BLD AUTO: 36.2 % (ref 42–52)
HGB BLD-MCNC: 11.5 G/DL (ref 14–18)
IMM GRANULOCYTES # BLD AUTO: 0.03 X10(3)/MCL (ref 0–0.04)
IMM GRANULOCYTES NFR BLD AUTO: 0.4 %
INR PPP: 1.3
LYMPHOCYTES # BLD AUTO: 0.86 X10(3)/MCL (ref 0.6–4.6)
LYMPHOCYTES NFR BLD AUTO: 11 %
MCH RBC QN AUTO: 29.1 PG (ref 27–31)
MCHC RBC AUTO-ENTMCNC: 31.8 G/DL (ref 33–36)
MCV RBC AUTO: 91.6 FL (ref 80–94)
MONOCYTES # BLD AUTO: 0.92 X10(3)/MCL (ref 0.1–1.3)
MONOCYTES NFR BLD AUTO: 11.8 %
NEUTROPHILS # BLD AUTO: 5.72 X10(3)/MCL (ref 2.1–9.2)
NEUTROPHILS NFR BLD AUTO: 73.3 %
NRBC BLD AUTO-RTO: 0 %
PLATELET # BLD AUTO: 215 X10(3)/MCL (ref 130–400)
PMV BLD AUTO: 10 FL (ref 7.4–10.4)
POTASSIUM SERPL-SCNC: 4 MMOL/L (ref 3.5–5.1)
PROT SERPL-MCNC: 7.9 GM/DL (ref 5.8–7.6)
PROTHROMBIN TIME: 15.8 SECONDS (ref 12.5–14.5)
RBC # BLD AUTO: 3.95 X10(6)/MCL (ref 4.7–6.1)
SODIUM SERPL-SCNC: 136 MMOL/L (ref 136–145)
TROPONIN I SERPL-MCNC: <0.01 NG/ML (ref 0–0.04)
WBC # BLD AUTO: 7.8 X10(3)/MCL (ref 4.5–11.5)

## 2025-04-23 PROCEDURE — 83880 ASSAY OF NATRIURETIC PEPTIDE: CPT | Performed by: NURSE PRACTITIONER

## 2025-04-23 PROCEDURE — 84484 ASSAY OF TROPONIN QUANT: CPT | Performed by: NURSE PRACTITIONER

## 2025-04-23 PROCEDURE — 85025 COMPLETE CBC W/AUTO DIFF WBC: CPT | Performed by: NURSE PRACTITIONER

## 2025-04-23 PROCEDURE — 96374 THER/PROPH/DIAG INJ IV PUSH: CPT

## 2025-04-23 PROCEDURE — 63600175 PHARM REV CODE 636 W HCPCS: Performed by: NURSE PRACTITIONER

## 2025-04-23 PROCEDURE — 80053 COMPREHEN METABOLIC PANEL: CPT | Performed by: NURSE PRACTITIONER

## 2025-04-23 PROCEDURE — 85610 PROTHROMBIN TIME: CPT | Performed by: NURSE PRACTITIONER

## 2025-04-23 PROCEDURE — 93010 ELECTROCARDIOGRAM REPORT: CPT | Mod: ,,, | Performed by: INTERNAL MEDICINE

## 2025-04-23 PROCEDURE — 96375 TX/PRO/DX INJ NEW DRUG ADDON: CPT

## 2025-04-23 PROCEDURE — 99285 EMERGENCY DEPT VISIT HI MDM: CPT | Mod: 25

## 2025-04-23 PROCEDURE — 93005 ELECTROCARDIOGRAM TRACING: CPT

## 2025-04-23 RX ORDER — TRAMADOL HYDROCHLORIDE 50 MG/1
50 TABLET ORAL EVERY 6 HOURS PRN
Qty: 16 TABLET | Refills: 0 | Status: SHIPPED | OUTPATIENT
Start: 2025-04-23

## 2025-04-23 RX ORDER — ONDANSETRON HYDROCHLORIDE 2 MG/ML
4 INJECTION, SOLUTION INTRAVENOUS
Status: COMPLETED | OUTPATIENT
Start: 2025-04-23 | End: 2025-04-23

## 2025-04-23 RX ORDER — MORPHINE SULFATE 4 MG/ML
4 INJECTION, SOLUTION INTRAMUSCULAR; INTRAVENOUS
Refills: 0 | Status: COMPLETED | OUTPATIENT
Start: 2025-04-23 | End: 2025-04-23

## 2025-04-23 RX ADMIN — ONDANSETRON 4 MG: 2 INJECTION INTRAMUSCULAR; INTRAVENOUS at 03:04

## 2025-04-23 RX ADMIN — MORPHINE SULFATE 4 MG: 4 INJECTION, SOLUTION INTRAMUSCULAR; INTRAVENOUS at 03:04

## 2025-04-23 NOTE — FIRST PROVIDER EVALUATION
Medical screening examination initiated.  I have conducted a focused provider triage encounter, findings are as follows:    Brief history of present illness:  Patient states chest pain and SOB x 1 week.     There were no vitals filed for this visit.    Pertinent physical exam:  Awake, alert, ambulatory      Brief workup plan:  Labs, EKG, Imaging      Preliminary workup initiated; this workup will be continued and followed by the physician or advanced practice provider that is assigned to the patient when roomed.

## 2025-04-23 NOTE — ED PROVIDER NOTES
Encounter Date: 4/23/2025       History     Chief Complaint   Patient presents with    Chest Pain     Pt c/o LCW chest pain x 1 week ago and generally feeling unwell. Pt tearful in triage. PMH hypertension, states he does not take any medications he is supposed to be taking     See MDM    The history is provided by the patient. No  was used.     Review of patient's allergies indicates:  No Known Allergies  Past Medical History:   Diagnosis Date    Carpal tunnel syndrome     HTN (hypertension)     Sciatica      Past Surgical History:   Procedure Laterality Date    SKIN GRAFT       No family history on file.  Social History[1]  Review of Systems   Constitutional:  Negative for fever.   Respiratory:  Negative for cough and shortness of breath.    Cardiovascular:  Positive for chest pain.   Gastrointestinal:  Negative for abdominal pain.   Genitourinary:  Negative for difficulty urinating and dysuria.   Musculoskeletal:  Negative for gait problem.   Skin:  Negative for color change.   Neurological:  Negative for dizziness, speech difficulty and headaches.   Psychiatric/Behavioral:  Negative for hallucinations and suicidal ideas.    All other systems reviewed and are negative.      Physical Exam     Initial Vitals [04/23/25 1303]   BP Pulse Resp Temp SpO2   (!) 161/78 87 20 98.5 °F (36.9 °C) 98 %      MAP       --         Physical Exam    Nursing note and vitals reviewed.  Constitutional: He appears well-developed and well-nourished.   HENT:   Head: Normocephalic.   Eyes: EOM are normal.   Neck: Neck supple.   Normal range of motion.  Cardiovascular:  Normal rate, regular rhythm, normal heart sounds and intact distal pulses.           Pulmonary/Chest: Breath sounds normal. He exhibits tenderness (left chest wall).   Abdominal: Abdomen is soft. Bowel sounds are normal.   Musculoskeletal:         General: Normal range of motion.      Cervical back: Normal range of motion and neck supple.  Pt to ED with c/o worse headache of her life. Pt woke up with symptoms at 730 am. Pt took 400mg of advil with no relief. Some right sided weakness to right arm. Per patient she has ha multiple surgeries on that arm and has some weakness at baseline.        Neurological: He is alert and oriented to person, place, and time. He has normal strength.   Skin: Skin is warm and dry. Capillary refill takes less than 2 seconds.   Psychiatric: He has a normal mood and affect. His behavior is normal. Judgment and thought content normal.         ED Course   Procedures  Labs Reviewed   COMPREHENSIVE METABOLIC PANEL - Abnormal       Result Value    Sodium 136      Potassium 4.0      Chloride 104      CO2 28      Glucose 133 (*)     Blood Urea Nitrogen 8.5      Creatinine 0.73      Calcium 8.2 (*)     Protein Total 7.9 (*)     Albumin 2.1 (*)     Globulin 5.8 (*)     Albumin/Globulin Ratio 0.4 (*)     Bilirubin Total 1.5       (*)     ALT 83 (*)      (*)     eGFR >60      Anion Gap 4.0      BUN/Creatinine Ratio 12     PROTIME-INR - Abnormal    PT 15.8 (*)     INR 1.3      Narrative:     Protimes are used to monitor anticoagulant agents such as warfarin. PT INR values are based on the current patient normal mean and the AL value for the specific instrument reagent used.  **Routine theraputic target values for the INR are 2.0-3.0**   CBC WITH DIFFERENTIAL - Abnormal    WBC 7.80      RBC 3.95 (*)     Hgb 11.5 (*)     Hct 36.2 (*)     MCV 91.6      MCH 29.1      MCHC 31.8 (*)     RDW 14.9      Platelet 215      MPV 10.0      Neut % 73.3      Lymph % 11.0      Mono % 11.8      Eos % 3.2      Basophil % 0.3      Imm Grans % 0.4      Neut # 5.72      Lymph # 0.86      Mono # 0.92      Eos # 0.25      Baso # 0.02      Imm Gran # 0.03      NRBC% 0.0     B-TYPE NATRIURETIC PEPTIDE - Normal    Natriuretic Peptide 96.6     TROPONIN I - Normal    Troponin-I <0.010     CBC W/ AUTO DIFFERENTIAL    Narrative:     The following orders were created for panel order CBC Auto Differential.  Procedure                               Abnormality         Status                     ---------                               -----------         ------                     CBC with  Differential[[id number]]       Abnormal            Final result                 Please view results for these tests on the individual orders.     EKG Readings: (Independently Interpreted)   Rhythm: Normal Sinus Rhythm. Heart Rate: 79. Ectopy: No Ectopy. Conduction: Normal. ST Segments: Normal ST Segments. T Waves: Normal. Axis: Normal. Clinical Impression: Normal Sinus Rhythm       Imaging Results              X-Ray Chest PA And Lateral (Final result)  Result time 04/23/25 13:33:23      Final result by Ancelmo Alex MD (04/23/25 13:33:23)                   Impression:      No acute chest disease is identified.      Electronically signed by: Ancelmo Alex  Date:    04/23/2025  Time:    13:33               Narrative:    EXAMINATION:  XR CHEST PA AND LATERAL    CLINICAL HISTORY:  chest pain;, .    FINDINGS:  No alveolar consolidation, effusion, or pneumothorax is seen.   The thoracic aorta is normal  cardiac silhouette, central pulmonary vessels and mediastinum are normal in size and are grossly unremarkable.   visualized osseous structures are grossly unremarkable.                                       Medications   morphine injection 4 mg (4 mg Intravenous Given 4/23/25 1530)   ondansetron injection 4 mg (4 mg Intravenous Given 4/23/25 1530)     Medical Decision Making  Historian:  Patient.  Patient is a Black or  64 y.o. male that presents with chest pain that has been present 1 week. Associated symptoms body aches. Surrounding information is nothing. Exacerbated by nothing. Relieved by nothing. Patient treatment prior to arrival none. Risk factors include none. Other history pertaining to this complaint nothing.   Assessment:  See physical exam.  DD:  Acute coronary syndrome, chest wall pain, cardiac ischemia  ED Course: History was obtained.  Physical was performed.  Patient's chest pain was reproducible.  He has TANIA score is low.  We will send a referral to Cardiology at [facility]. Medical or  surgical consults:  None. Social determinants that affect healthcare:  None.       Amount and/or Complexity of Data Reviewed  Labs:      Details: Elevated liver functions which is expected due to his history of alcoholism and cirrhosis  Radiology:      Details: Chest x-ray no acute finding  ECG/medicine tests: independent interpretation performed.     Details: See EKG section    Risk  Prescription drug management.  Risk Details: Ultram                                      Clinical Impression:  Final diagnoses:  [R07.9] Chest pain - left chest           ED Disposition Condition    Discharge Stable          ED Prescriptions       Medication Sig Dispense Start Date End Date Auth. Provider    traMADoL (ULTRAM) 50 mg tablet Take 1 tablet (50 mg total) by mouth every 6 (six) hours as needed for Pain. 16 tablet 4/23/2025 -- Westley Davis FNP          Follow-up Information       Follow up With Specialties Details Why Contact Info    Your Primary Care Provider  Call in 3 days ed follow up     [facility]  Office will call with the appointment date and time [id number] [facility] [id number] [phone]                 [1]   Social History  Tobacco Use    Smoking status: Some Days     Types: Cigarettes    Smokeless tobacco: Never   Substance Use Topics    Alcohol use: Yes     Comment: daily    Drug use: Not Currently        Westley Davis FNP  04/23/25 [id number]

## 2025-04-24 LAB
OHS QRS DURATION: 96 MS
OHS QTC CALCULATION: 415 MS

## 2025-06-17 ENCOUNTER — HOSPITAL ENCOUNTER (OUTPATIENT)
Facility: HOSPITAL | Age: 65
Discharge: HOME | End: 2025-06-28
Attending: STUDENT IN AN ORGANIZED HEALTH CARE EDUCATION/TRAINING PROGRAM | Admitting: STUDENT IN AN ORGANIZED HEALTH CARE EDUCATION/TRAINING PROGRAM
Payer: MEDICAID

## 2025-06-17 DIAGNOSIS — M19.90 ARTHRITIS: ICD-10-CM

## 2025-06-17 DIAGNOSIS — R06.02 SHORTNESS OF BREATH: Primary | ICD-10-CM

## 2025-06-17 DIAGNOSIS — D64.9 ANEMIA, UNSPECIFIED TYPE: ICD-10-CM

## 2025-06-17 DIAGNOSIS — R53.1 WEAKNESS: ICD-10-CM

## 2025-06-17 DIAGNOSIS — M79.89 LEG SWELLING: ICD-10-CM

## 2025-06-17 DIAGNOSIS — K74.60 HEPATIC CIRRHOSIS, UNSPECIFIED HEPATIC CIRRHOSIS TYPE, UNSPECIFIED WHETHER ASCITES PRESENT: ICD-10-CM

## 2025-06-17 DIAGNOSIS — R07.9 CHEST PAIN: ICD-10-CM

## 2025-06-17 DIAGNOSIS — R10.9 ABDOMINAL PAIN, UNSPECIFIED ABDOMINAL LOCATION: ICD-10-CM

## 2025-06-17 LAB
ALBUMIN SERPL-MCNC: 1.9 G/DL (ref 3.4–4.8)
ALBUMIN/GLOB SERPL: 0.3 RATIO (ref 1.1–2)
ALP SERPL-CCNC: 443 UNIT/L (ref 40–150)
ALT SERPL-CCNC: 82 UNIT/L (ref 0–55)
ANION GAP SERPL CALC-SCNC: 2 MEQ/L
APTT PPP: 38.8 SECONDS (ref 23.2–33.7)
AST SERPL-CCNC: 169 UNIT/L (ref 11–45)
BACTERIA #/AREA URNS AUTO: ABNORMAL /HPF
BASOPHILS # BLD AUTO: 0.05 X10(3)/MCL
BASOPHILS NFR BLD AUTO: 0.7 %
BILIRUB SERPL-MCNC: 1.4 MG/DL
BILIRUB UR QL STRIP.AUTO: NEGATIVE
BNP BLD-MCNC: 163 PG/ML
BUN SERPL-MCNC: 10.1 MG/DL (ref 8.4–25.7)
CALCIUM SERPL-MCNC: 8.3 MG/DL (ref 8.8–10)
CHLORIDE SERPL-SCNC: 107 MMOL/L (ref 98–107)
CLARITY UR: CLEAR
CO2 SERPL-SCNC: 27 MMOL/L (ref 23–31)
COLOR UR AUTO: YELLOW
CREAT SERPL-MCNC: 0.79 MG/DL (ref 0.72–1.25)
CREAT/UREA NIT SERPL: 13
EOSINOPHIL # BLD AUTO: 0.17 X10(3)/MCL (ref 0–0.9)
EOSINOPHIL NFR BLD AUTO: 2.3 %
ERYTHROCYTE [DISTWIDTH] IN BLOOD BY AUTOMATED COUNT: 18.4 % (ref 11.5–17)
GFR SERPLBLD CREATININE-BSD FMLA CKD-EPI: >60 ML/MIN/1.73/M2
GLOBULIN SER-MCNC: 6.7 GM/DL (ref 2.4–3.5)
GLUCOSE SERPL-MCNC: 97 MG/DL (ref 82–115)
GLUCOSE UR QL STRIP: NORMAL
HCT VFR BLD AUTO: 34.4 % (ref 42–52)
HGB BLD-MCNC: 10.7 G/DL (ref 14–18)
HGB UR QL STRIP: NEGATIVE
IMM GRANULOCYTES # BLD AUTO: 0.02 X10(3)/MCL (ref 0–0.04)
IMM GRANULOCYTES NFR BLD AUTO: 0.3 %
INR PPP: 1.4
KETONES UR QL STRIP: NEGATIVE
LEUKOCYTE ESTERASE UR QL STRIP: NEGATIVE
LIPASE SERPL-CCNC: 49 U/L
LYMPHOCYTES # BLD AUTO: 1.6 X10(3)/MCL (ref 0.6–4.6)
LYMPHOCYTES NFR BLD AUTO: 21.5 %
MCH RBC QN AUTO: 28.8 PG (ref 27–31)
MCHC RBC AUTO-ENTMCNC: 31.1 G/DL (ref 33–36)
MCV RBC AUTO: 92.5 FL (ref 80–94)
MONOCYTES # BLD AUTO: 0.83 X10(3)/MCL (ref 0.1–1.3)
MONOCYTES NFR BLD AUTO: 11.2 %
NEUTROPHILS # BLD AUTO: 4.76 X10(3)/MCL (ref 2.1–9.2)
NEUTROPHILS NFR BLD AUTO: 64 %
NITRITE UR QL STRIP: NEGATIVE
NRBC BLD AUTO-RTO: 0 %
PH UR STRIP: 7 [PH]
PLATELET # BLD AUTO: 175 X10(3)/MCL (ref 130–400)
PMV BLD AUTO: 10.2 FL (ref 7.4–10.4)
POTASSIUM SERPL-SCNC: 4.3 MMOL/L (ref 3.5–5.1)
PROT SERPL-MCNC: 8.6 GM/DL (ref 5.8–7.6)
PROT UR QL STRIP: NEGATIVE
PROTHROMBIN TIME: 17.2 SECONDS (ref 12.5–14.5)
RBC # BLD AUTO: 3.72 X10(6)/MCL (ref 4.7–6.1)
RBC #/AREA URNS AUTO: ABNORMAL /HPF
SODIUM SERPL-SCNC: 136 MMOL/L (ref 136–145)
SP GR UR STRIP.AUTO: 1.02 (ref 1–1.03)
SQUAMOUS #/AREA URNS LPF: ABNORMAL /HPF
TROPONIN I SERPL-MCNC: 0.02 NG/ML (ref 0–0.04)
UROBILINOGEN UR STRIP-ACNC: 2
WBC # BLD AUTO: 7.43 X10(3)/MCL (ref 4.5–11.5)
WBC #/AREA URNS AUTO: ABNORMAL /HPF

## 2025-06-17 PROCEDURE — G0378 HOSPITAL OBSERVATION PER HR: HCPCS

## 2025-06-17 PROCEDURE — 11000001 HC ACUTE MED/SURG PRIVATE ROOM

## 2025-06-17 PROCEDURE — 96375 TX/PRO/DX INJ NEW DRUG ADDON: CPT

## 2025-06-17 PROCEDURE — 81001 URINALYSIS AUTO W/SCOPE: CPT | Performed by: NURSE PRACTITIONER

## 2025-06-17 PROCEDURE — 63600175 PHARM REV CODE 636 W HCPCS: Performed by: PHYSICIAN ASSISTANT

## 2025-06-17 PROCEDURE — 85610 PROTHROMBIN TIME: CPT | Performed by: PHYSICIAN ASSISTANT

## 2025-06-17 PROCEDURE — 96374 THER/PROPH/DIAG INJ IV PUSH: CPT

## 2025-06-17 PROCEDURE — 85730 THROMBOPLASTIN TIME PARTIAL: CPT | Performed by: PHYSICIAN ASSISTANT

## 2025-06-17 PROCEDURE — 84484 ASSAY OF TROPONIN QUANT: CPT | Performed by: NURSE PRACTITIONER

## 2025-06-17 PROCEDURE — 63600175 PHARM REV CODE 636 W HCPCS: Performed by: NURSE PRACTITIONER

## 2025-06-17 PROCEDURE — 93005 ELECTROCARDIOGRAM TRACING: CPT

## 2025-06-17 PROCEDURE — 80053 COMPREHEN METABOLIC PANEL: CPT | Performed by: NURSE PRACTITIONER

## 2025-06-17 PROCEDURE — 83690 ASSAY OF LIPASE: CPT | Performed by: NURSE PRACTITIONER

## 2025-06-17 PROCEDURE — 99285 EMERGENCY DEPT VISIT HI MDM: CPT | Mod: 25

## 2025-06-17 PROCEDURE — 25500020 PHARM REV CODE 255: Performed by: PHYSICIAN ASSISTANT

## 2025-06-17 PROCEDURE — 85025 COMPLETE CBC W/AUTO DIFF WBC: CPT | Performed by: NURSE PRACTITIONER

## 2025-06-17 PROCEDURE — 93010 ELECTROCARDIOGRAM REPORT: CPT | Mod: ,,, | Performed by: INTERNAL MEDICINE

## 2025-06-17 PROCEDURE — 83880 ASSAY OF NATRIURETIC PEPTIDE: CPT | Performed by: PHYSICIAN ASSISTANT

## 2025-06-17 RX ORDER — ONDANSETRON HYDROCHLORIDE 2 MG/ML
4 INJECTION, SOLUTION INTRAVENOUS ONCE
Status: COMPLETED | OUTPATIENT
Start: 2025-06-17 | End: 2025-06-17

## 2025-06-17 RX ORDER — ONDANSETRON HYDROCHLORIDE 2 MG/ML
4 INJECTION, SOLUTION INTRAVENOUS
Status: ACTIVE | OUTPATIENT
Start: 2025-06-17 | End: 2025-06-18

## 2025-06-17 RX ORDER — MORPHINE SULFATE 4 MG/ML
4 INJECTION, SOLUTION INTRAMUSCULAR; INTRAVENOUS
Refills: 0 | Status: ACTIVE | OUTPATIENT
Start: 2025-06-17 | End: 2025-06-18

## 2025-06-17 RX ORDER — FAMOTIDINE 10 MG/ML
20 INJECTION, SOLUTION INTRAVENOUS
Status: COMPLETED | OUTPATIENT
Start: 2025-06-17 | End: 2025-06-17

## 2025-06-17 RX ADMIN — FAMOTIDINE 20 MG: 10 INJECTION, SOLUTION INTRAVENOUS at 09:06

## 2025-06-17 RX ADMIN — IOHEXOL 100 ML: 350 INJECTION, SOLUTION INTRAVENOUS at 09:06

## 2025-06-17 RX ADMIN — ONDANSETRON 4 MG: 2 INJECTION INTRAMUSCULAR; INTRAVENOUS at 06:06

## 2025-06-17 NOTE — FIRST PROVIDER EVALUATION
"Medical screening examination initiated.  I have conducted a focused provider triage encounter, findings are as follows:    Brief history of present illness:  Patient states generalized abdominal pain, nausea, bloating, decreased appetite and generalized weakness.    Vitals:    06/17/25 1650   BP: (!) 164/86   Pulse: 71   Resp: 16   Temp: 98.2 °F (36.8 °C)   SpO2: 100%   Weight: 59 kg (130 lb)   Height: 5' 9" (1.753 m)       Pertinent physical exam:  Awake, alert, ambulatory      Brief workup plan:  Labs, EKG, imaging    Preliminary workup initiated; this workup will be continued and followed by the physician or advanced practice provider that is assigned to the patient when roomed.  " Addended by: MANUEL JOHNSON on: 8/17/2018 01:01 PM     Modules accepted: Orders

## 2025-06-17 NOTE — Clinical Note
Diagnosis: Shortness of breath [786.05.ICD-9-CM]   Future Attending Provider: REYES, THAIRY G [[id number]]   Admit to which facility:: [facility] [[id number]]   Reason for IP Medical Treatment  (Clinical interventions that can only be accomplished in the IP setting? ) :: cirrohsis, ascites   Plans for Post-Acute care--if anticipated (pick the single best option):: C. Discharge home with home health services

## 2025-06-18 PROBLEM — F17.200 TOBACCO DEPENDENCY: Status: ACTIVE | Noted: 2025-06-18

## 2025-06-18 LAB
ACCEPTIBLE SP GR UR QL: >1.03 (ref 1–1.03)
ALBUMIN FLD-MCNC: 0.5 GM/DL
ALBUMIN SERPL-MCNC: 1.6 G/DL (ref 3.4–4.8)
ALBUMIN/GLOB SERPL: 0.3 RATIO (ref 1.1–2)
ALP SERPL-CCNC: 373 UNIT/L (ref 40–150)
ALT SERPL-CCNC: 72 UNIT/L (ref 0–55)
AMMONIA PLAS-MSCNC: 28.8 UMOL/L (ref 18–72)
AMPHET UR QL SCN: NEGATIVE
AMYLASE FLD-CCNC: 53 U/L
ANION GAP SERPL CALC-SCNC: 2 MEQ/L
APAP SERPL-MCNC: <3 UG/ML (ref 10–30)
APTT PPP: 178.1 SECONDS (ref 23.2–33.7)
AST SERPL-CCNC: 141 UNIT/L (ref 11–45)
BARBITURATE SCN PRESENT UR: NEGATIVE
BASOPHILS # BLD AUTO: 0.02 X10(3)/MCL
BASOPHILS NFR BLD AUTO: 0.3 %
BENZODIAZ UR QL SCN: NEGATIVE
BILIRUB SERPL-MCNC: 1.4 MG/DL
BUN SERPL-MCNC: 8.7 MG/DL (ref 8.4–25.7)
CALCIUM SERPL-MCNC: 7.9 MG/DL (ref 8.8–10)
CANNABINOIDS UR QL SCN: NEGATIVE
CHLORIDE SERPL-SCNC: 108 MMOL/L (ref 98–107)
CLARITY BODY FLUID (OLG): CLEAR
CO2 SERPL-SCNC: 26 MMOL/L (ref 23–31)
COCAINE UR QL SCN: POSITIVE
COLOR BODY FLUID (OLG): YELLOW
CREAT SERPL-MCNC: 0.77 MG/DL (ref 0.72–1.25)
CREAT/UREA NIT SERPL: 11
EOSINOPHIL # BLD AUTO: 0.24 X10(3)/MCL (ref 0–0.9)
EOSINOPHIL NFR BLD AUTO: 3.3 %
ERYTHROCYTE [DISTWIDTH] IN BLOOD BY AUTOMATED COUNT: 18.2 % (ref 11.5–17)
ETHANOL SERPL-MCNC: <10 MG/DL
FENTANYL UR QL SCN: NEGATIVE
GFR SERPLBLD CREATININE-BSD FMLA CKD-EPI: >60 ML/MIN/1.73/M2
GGT SERPL-CCNC: 364 U/L (ref 12–64)
GLOBULIN SER-MCNC: 5.9 GM/DL (ref 2.4–3.5)
GLUCOSE SERPL-MCNC: 127 MG/DL (ref 82–115)
GRAM STN SPEC: NORMAL
GRAM STN SPEC: NORMAL
HAV IGM SERPL QL IA: NONREACTIVE
HBV CORE IGM SERPL QL IA: NONREACTIVE
HBV SURFACE AG SERPL QL IA: NONREACTIVE
HCT VFR BLD AUTO: 31.4 % (ref 42–52)
HCV AB SERPL QL IA: REACTIVE
HGB BLD-MCNC: 9.9 G/DL (ref 14–18)
IMM GRANULOCYTES # BLD AUTO: 0.02 X10(3)/MCL (ref 0–0.04)
IMM GRANULOCYTES NFR BLD AUTO: 0.3 %
LDH FLD-CCNC: 68 U/L
LYMPHOCYTES # BLD AUTO: 2.1 X10(3)/MCL (ref 0.6–4.6)
LYMPHOCYTES NFR BLD AUTO: 28.6 %
LYMPHOCYTES NFR FLD MANUAL: 90 %
MACROPHAGES MAN COUNT BF (OLG): 262
MAGNESIUM SERPL-MCNC: 1.5 MG/DL (ref 1.6–2.6)
MCH RBC QN AUTO: 28.7 PG (ref 27–31)
MCHC RBC AUTO-ENTMCNC: 31.5 G/DL (ref 33–36)
MCV RBC AUTO: 91 FL (ref 80–94)
MDMA UR QL SCN: NEGATIVE
MONOCYTE MAN % BF (OLG): 8 %
MONOCYTES # BLD AUTO: 0.63 X10(3)/MCL (ref 0.1–1.3)
MONOCYTES NFR BLD AUTO: 8.6 %
NEUTROPHILS # BLD AUTO: 4.33 X10(3)/MCL (ref 2.1–9.2)
NEUTROPHILS MAN % BF (OLG): 2 %
NEUTROPHILS NFR BLD AUTO: 58.9 %
NRBC BLD AUTO-RTO: 0 %
OHS QRS DURATION: 94 MS
OHS QTC CALCULATION: 404 MS
OPIATES UR QL SCN: NEGATIVE
PCP UR QL: NEGATIVE
PH UR: 7 [PH] (ref 3–11)
PLATELET # BLD AUTO: 154 X10(3)/MCL (ref 130–400)
PMV BLD AUTO: 10.4 FL (ref 7.4–10.4)
POTASSIUM SERPL-SCNC: 3.8 MMOL/L (ref 3.5–5.1)
PROT FLD-MCNC: 1.4 GM/DL
PROT SERPL-MCNC: 7.5 GM/DL (ref 5.8–7.6)
RBC # BLD AUTO: 3.45 X10(6)/MCL (ref 4.7–6.1)
SODIUM SERPL-SCNC: 136 MMOL/L (ref 136–145)
TNC BODY FLUID (OLG): NORMAL
TROPONIN I SERPL-MCNC: 0.01 NG/ML (ref 0–0.04)
TROPONIN I SERPL-MCNC: 0.02 NG/ML (ref 0–0.04)
WBC # BLD AUTO: 7.34 X10(3)/MCL (ref 4.5–11.5)
WBC # FLD AUTO: 217 /UL

## 2025-06-18 PROCEDURE — G0378 HOSPITAL OBSERVATION PER HR: HCPCS

## 2025-06-18 PROCEDURE — 80143 DRUG ASSAY ACETAMINOPHEN: CPT

## 2025-06-18 PROCEDURE — 88112 CYTOPATH CELL ENHANCE TECH: CPT

## 2025-06-18 PROCEDURE — 80053 COMPREHEN METABOLIC PANEL: CPT

## 2025-06-18 PROCEDURE — 83735 ASSAY OF MAGNESIUM: CPT

## 2025-06-18 PROCEDURE — 89051 BODY FLUID CELL COUNT: CPT | Performed by: STUDENT IN AN ORGANIZED HEALTH CARE EDUCATION/TRAINING PROGRAM

## 2025-06-18 PROCEDURE — 82042 OTHER SOURCE ALBUMIN QUAN EA: CPT | Performed by: STUDENT IN AN ORGANIZED HEALTH CARE EDUCATION/TRAINING PROGRAM

## 2025-06-18 PROCEDURE — 63600175 PHARM REV CODE 636 W HCPCS

## 2025-06-18 PROCEDURE — 63600175 PHARM REV CODE 636 W HCPCS: Performed by: STUDENT IN AN ORGANIZED HEALTH CARE EDUCATION/TRAINING PROGRAM

## 2025-06-18 PROCEDURE — 87186 SC STD MICRODIL/AGAR DIL: CPT | Performed by: STUDENT IN AN ORGANIZED HEALTH CARE EDUCATION/TRAINING PROGRAM

## 2025-06-18 PROCEDURE — 80307 DRUG TEST PRSMV CHEM ANLYZR: CPT

## 2025-06-18 PROCEDURE — 82140 ASSAY OF AMMONIA: CPT

## 2025-06-18 PROCEDURE — 82977 ASSAY OF GGT: CPT

## 2025-06-18 PROCEDURE — 85730 THROMBOPLASTIN TIME PARTIAL: CPT

## 2025-06-18 PROCEDURE — 25000003 PHARM REV CODE 250: Performed by: STUDENT IN AN ORGANIZED HEALTH CARE EDUCATION/TRAINING PROGRAM

## 2025-06-18 PROCEDURE — 84484 ASSAY OF TROPONIN QUANT: CPT

## 2025-06-18 PROCEDURE — 80074 ACUTE HEPATITIS PANEL: CPT

## 2025-06-18 PROCEDURE — 25000003 PHARM REV CODE 250

## 2025-06-18 PROCEDURE — 11000001 HC ACUTE MED/SURG PRIVATE ROOM

## 2025-06-18 PROCEDURE — 82077 ASSAY SPEC XCP UR&BREATH IA: CPT

## 2025-06-18 PROCEDURE — 88305 TISSUE EXAM BY PATHOLOGIST: CPT | Performed by: STUDENT IN AN ORGANIZED HEALTH CARE EDUCATION/TRAINING PROGRAM

## 2025-06-18 PROCEDURE — 84157 ASSAY OF PROTEIN OTHER: CPT | Performed by: STUDENT IN AN ORGANIZED HEALTH CARE EDUCATION/TRAINING PROGRAM

## 2025-06-18 PROCEDURE — 85025 COMPLETE CBC W/AUTO DIFF WBC: CPT

## 2025-06-18 PROCEDURE — 87205 SMEAR GRAM STAIN: CPT | Performed by: STUDENT IN AN ORGANIZED HEALTH CARE EDUCATION/TRAINING PROGRAM

## 2025-06-18 PROCEDURE — 82150 ASSAY OF AMYLASE: CPT | Performed by: STUDENT IN AN ORGANIZED HEALTH CARE EDUCATION/TRAINING PROGRAM

## 2025-06-18 PROCEDURE — 83615 LACTATE (LD) (LDH) ENZYME: CPT | Performed by: STUDENT IN AN ORGANIZED HEALTH CARE EDUCATION/TRAINING PROGRAM

## 2025-06-18 PROCEDURE — 87070 CULTURE OTHR SPECIMN AEROBIC: CPT | Performed by: STUDENT IN AN ORGANIZED HEALTH CARE EDUCATION/TRAINING PROGRAM

## 2025-06-18 RX ORDER — MAGNESIUM SULFATE HEPTAHYDRATE 40 MG/ML
2 INJECTION, SOLUTION INTRAVENOUS ONCE
Status: COMPLETED | OUTPATIENT
Start: 2025-06-18 | End: 2025-06-18

## 2025-06-18 RX ORDER — TALC
6 POWDER (GRAM) TOPICAL NIGHTLY PRN
Status: DISCONTINUED | OUTPATIENT
Start: 2025-06-18 | End: 2025-06-28 | Stop reason: HOSPADM

## 2025-06-18 RX ORDER — NIFEDIPINE 30 MG/1
30 TABLET, EXTENDED RELEASE ORAL DAILY
Status: DISCONTINUED | OUTPATIENT
Start: 2025-06-18 | End: 2025-06-28 | Stop reason: HOSPADM

## 2025-06-18 RX ORDER — ENOXAPARIN SODIUM 100 MG/ML
1 INJECTION SUBCUTANEOUS EVERY 24 HOURS
Status: DISCONTINUED | OUTPATIENT
Start: 2025-06-18 | End: 2025-06-18

## 2025-06-18 RX ORDER — ALUMINUM HYDROXIDE, MAGNESIUM HYDROXIDE, AND SIMETHICONE 1200; 120; 1200 MG/30ML; MG/30ML; MG/30ML
30 SUSPENSION ORAL 4 TIMES DAILY PRN
Status: DISCONTINUED | OUTPATIENT
Start: 2025-06-18 | End: 2025-06-28 | Stop reason: HOSPADM

## 2025-06-18 RX ORDER — THIAMINE HYDROCHLORIDE 100 MG/ML
400 INJECTION, SOLUTION INTRAMUSCULAR; INTRAVENOUS ONCE
Status: COMPLETED | OUTPATIENT
Start: 2025-06-18 | End: 2025-06-18

## 2025-06-18 RX ORDER — LOSARTAN POTASSIUM 25 MG/1
25 TABLET ORAL DAILY
Status: DISCONTINUED | OUTPATIENT
Start: 2025-06-18 | End: 2025-06-28 | Stop reason: HOSPADM

## 2025-06-18 RX ORDER — FOLIC ACID 1 MG/1
1 TABLET ORAL DAILY
Status: DISCONTINUED | OUTPATIENT
Start: 2025-06-18 | End: 2025-06-28 | Stop reason: HOSPADM

## 2025-06-18 RX ORDER — SODIUM CHLORIDE 0.9 % (FLUSH) 0.9 %
10 SYRINGE (ML) INJECTION
Status: DISCONTINUED | OUTPATIENT
Start: 2025-06-18 | End: 2025-06-28 | Stop reason: HOSPADM

## 2025-06-18 RX ORDER — IBUPROFEN 200 MG
16 TABLET ORAL
Status: DISCONTINUED | OUTPATIENT
Start: 2025-06-18 | End: 2025-06-28 | Stop reason: HOSPADM

## 2025-06-18 RX ORDER — POLYETHYLENE GLYCOL 3350 17 G/17G
17 POWDER, FOR SOLUTION ORAL 2 TIMES DAILY PRN
Status: DISCONTINUED | OUTPATIENT
Start: 2025-06-18 | End: 2025-06-28 | Stop reason: HOSPADM

## 2025-06-18 RX ORDER — FUROSEMIDE 10 MG/ML
20 INJECTION INTRAMUSCULAR; INTRAVENOUS EVERY 8 HOURS
Status: DISCONTINUED | OUTPATIENT
Start: 2025-06-18 | End: 2025-06-22

## 2025-06-18 RX ORDER — GLUCAGON 1 MG
1 KIT INJECTION
Status: DISCONTINUED | OUTPATIENT
Start: 2025-06-18 | End: 2025-06-28 | Stop reason: HOSPADM

## 2025-06-18 RX ORDER — ENOXAPARIN SODIUM 100 MG/ML
1 INJECTION SUBCUTANEOUS EVERY 12 HOURS
Status: DISCONTINUED | OUTPATIENT
Start: 2025-06-18 | End: 2025-06-20

## 2025-06-18 RX ORDER — HEPARIN SODIUM,PORCINE/D5W 25000/250
0-40 INTRAVENOUS SOLUTION INTRAVENOUS CONTINUOUS
Status: DISCONTINUED | OUTPATIENT
Start: 2025-06-18 | End: 2025-06-18

## 2025-06-18 RX ORDER — LORAZEPAM 2 MG/ML
2 INJECTION INTRAMUSCULAR
Status: DISCONTINUED | OUTPATIENT
Start: 2025-06-18 | End: 2025-06-28 | Stop reason: HOSPADM

## 2025-06-18 RX ORDER — LORAZEPAM 1 MG/1
2 TABLET ORAL EVERY 4 HOURS PRN
Status: DISCONTINUED | OUTPATIENT
Start: 2025-06-18 | End: 2025-06-28 | Stop reason: HOSPADM

## 2025-06-18 RX ORDER — IBUPROFEN 200 MG
24 TABLET ORAL
Status: DISCONTINUED | OUTPATIENT
Start: 2025-06-18 | End: 2025-06-28 | Stop reason: HOSPADM

## 2025-06-18 RX ORDER — BISACODYL 10 MG/1
10 SUPPOSITORY RECTAL DAILY PRN
Status: DISCONTINUED | OUTPATIENT
Start: 2025-06-18 | End: 2025-06-28 | Stop reason: HOSPADM

## 2025-06-18 RX ORDER — THIAMINE HCL 100 MG
100 TABLET ORAL DAILY
Status: DISCONTINUED | OUTPATIENT
Start: 2025-06-18 | End: 2025-06-28 | Stop reason: HOSPADM

## 2025-06-18 RX ADMIN — FUROSEMIDE 20 MG: 10 INJECTION, SOLUTION INTRAMUSCULAR; INTRAVENOUS at 02:06

## 2025-06-18 RX ADMIN — FOLIC ACID 1 MG: 1 TABLET ORAL at 08:06

## 2025-06-18 RX ADMIN — NIFEDIPINE 30 MG: 30 TABLET, FILM COATED, EXTENDED RELEASE ORAL at 08:06

## 2025-06-18 RX ADMIN — LACTULOSE 10 G: 10 SOLUTION ORAL at 08:06

## 2025-06-18 RX ADMIN — FUROSEMIDE 20 MG: 10 INJECTION, SOLUTION INTRAMUSCULAR; INTRAVENOUS at 08:06

## 2025-06-18 RX ADMIN — HEPARIN SODIUM 18 UNITS/KG/HR: 10000 INJECTION, SOLUTION INTRAVENOUS at 02:06

## 2025-06-18 RX ADMIN — THIAMINE HCL TAB 100 MG 100 MG: 100 TAB at 08:06

## 2025-06-18 RX ADMIN — MAGNESIUM SULFATE HEPTAHYDRATE 2 G: 40 INJECTION, SOLUTION INTRAVENOUS at 08:06

## 2025-06-18 RX ADMIN — LOSARTAN POTASSIUM 25 MG: 25 TABLET, FILM COATED ORAL at 08:06

## 2025-06-18 RX ADMIN — MAGNESIUM SULFATE HEPTAHYDRATE 2 G: 40 INJECTION, SOLUTION INTRAVENOUS at 10:06

## 2025-06-18 RX ADMIN — THIAMINE HYDROCHLORIDE 400 MG: 100 INJECTION, SOLUTION INTRAMUSCULAR; INTRAVENOUS at 02:06

## 2025-06-18 RX ADMIN — LACTULOSE 10 G: 10 SOLUTION ORAL at 02:06

## 2025-06-18 RX ADMIN — THERA TABS 1 TABLET: TAB at 08:06

## 2025-06-18 NOTE — PROGRESS NOTES
Pharmacist Renal Dose Adjustment Note    Aman Humphrey is a 64 y.o. male being treated with the medication enoxaparin    Patient Data:    Vital Signs (Most Recent):  Temp: 97.9 °F (36.6 °C) (06/17/25 2116)  Pulse: 67 (06/18/25 1601)  Resp: 17 (06/18/25 1601)  BP: (!) 146/71 (06/18/25 1601)  SpO2: 98 % (06/18/25 1601) Vital Signs (72h Range):  Temp:  [97.9 °F (36.6 °C)-98.2 °F (36.8 °C)]   Pulse:  [49-71]   Resp:  [10-27]   BP: (146-193)/()   SpO2:  [93 %-100 %]      Recent Labs   Lab 06/17/25  1755 06/18/25  0614   CREATININE 0.79 0.77     Serum creatinine: 0.77 mg/dL 06/18/25 0614  Estimated creatinine clearance: 81.9 mL/min    Medication: enoxaparin dose: 60 mg frequency q24h will be changed to medication: enoxaparin dose: 60 mg frequency: q12h    Pharmacist's Name: Mai Durbin  Pharmacist's Extension: [phone]

## 2025-06-18 NOTE — ED NOTES
Dr Arita informed of PTT results. Heparin stopped and no signs or symptoms of bleeding at this time.

## 2025-06-18 NOTE — OP NOTE
Radiology Post-Procedure Note    Pre Op Diagnosis: Ascites    Post Op Diagnosis: Same    Secondary Diagnoses:   Problem List Items Addressed This Visit    None  Visit Diagnoses         Shortness of breath    -  Primary      Weakness        Relevant Orders    EKG 12-lead (Completed)      Leg swelling          Arthritis          Abdominal pain, unspecified abdominal location          Chest pain        Relevant Orders    EKG 12-lead             Procedure: US Guided Paracentesis    Procedure performed by: Neto Bird MD    Assistant: None    Written Informed Consent Obtained: Yes    Specimen Removed: None    Estimated Blood Loss: <10 cc    Condition: Stable    Outcome: The patient tolerated the procedure well and was without complications.    For further details please see the imaging report associated.    Disposition: Transfer back to inpatient unit.

## 2025-06-18 NOTE — H&P
[facility] Hospital Medicine History & Physical Examination       Patient Name: Aman Humphrey  MRN: [id number]  Patient Class: IP- Inpatient   Admission Date: 6/17/2025   Admitting Physician: ROZ Service   Length of Stay: 1  Attending Physician: Mateusz Arita MD  Primary Care Provider: Lesly Primary Doctor  Face-to-Face encounter date: 06/18/2025  Code Status:  Full Code  Chief Complaint: Bloated (Presents via [facility] from home with c/o abdominal distention. Also reports abdominal pain. Denies nausea and vomiting. LBM this morning. )      Screening for Social Drivers for health:  Patient screened for food insecurity, housing instability, transportation needs, utility difficulties, and interpersonal safety (select all that apply as identified as concern)  []Housing or Food  []Transportation Needs  []Utility Difficulties  []Interpersonal safety  [x]None      Patient information was obtained from patient, patient's family, past medical records and ER records.  ED records were reviewed in detail and documented below    HISTORY OF PRESENT ILLNESS:   Aman Humphrey is a 64 y.o. male who  has a past medical history of Carpal tunnel syndrome, HTN (hypertension), and Sciatica.. The patient presented to [facility] on 6/17/2025 with a primary complaint of abdominal pain of a few days, and overall feeling unwell for the past 1 month.  Patient mentions he has been feeling unwell and has been having early satiety for the past 1 month.  Endorses significant nausea, bloating, weight loss but denies any fever, chills, body aches, vomiting, changes in bowels.  As per previous documentation from 2023 it seems that patient has a known history liver cirrhosis and a hepatic lesion which is concerning for HCC, also is positive and treated for hepatitis-C in the past.    Vital signs on arrival are WNL except for elevated blood pressure, CBC unremarkable, CMP shows some electrolyte abnormalities-hypocalcemia,  hypomagnesemia,.  AST/ALT are elevated 2-1.  Ammonia and Bilirubin WNL .  BNP of 159 with a normal troponin.  Alcohol and Tylenol level normal.  Cocaine positive on UDS.  UA negative.  CT abdomen and pelvis shows portal vein thrombosis with what seems to be acute on chronic thrombosis.  Cirrhosis with moderate ascites, cholelithiasis, and nonobstructing kidney stone.    Patient was placed on heparin as he will likely require a EGD to rule out varices by GI at some point during this hospital stay prior to discharge.  AFP to monitor for HCC.  We will order paracentesis with fluid studies.  Hypercoag study ordered to rule out any other causes of portal venous thrombosis.  Hep C RNA ordered.  We will consult GI for possible EGD and to see if they can recommend blood thinners prior to discharge.  PAST MEDICAL HISTORY:     Past Medical History:   Diagnosis Date    Carpal tunnel syndrome     HTN (hypertension)     Sciatica        PAST SURGICAL HISTORY:     Past Surgical History:   Procedure Laterality Date    SKIN GRAFT         ALLERGIES:   Patient has no known allergies.    FAMILY HISTORY:   Reviewed and negative    SOCIAL HISTORY:     Social History     Tobacco Use    Smoking status: Some Days     Types: Cigarettes    Smokeless tobacco: Never   Substance Use Topics    Alcohol use: Yes     Comment: daily        HOME MEDICATIONS:     Prior to Admission medications    Medication Sig Start Date End Date Taking? Authorizing Provider   amLODIPine (NORVASC) 10 MG tablet Take 1 tablet (10 mg total) by mouth once daily. 10/23/23 10/22/24  Kevin Mccabe MD   ARIPiprazole (ABILIFY) 5 MG Tab Take 1 tablet (5 mg total) by mouth once daily. 10/23/23 10/22/24  Kevin Mccabe MD   EScitalopram oxalate (LEXAPRO) 5 MG Tab Take 1 tablet (5 mg total) by mouth once daily. 10/23/23 10/22/24  Kevin Mccabe MD   furosemide (LASIX) 20 MG tablet Take 1 tablet (20 mg total) by mouth once daily. 11/19/23   Aquiles Carbajal MD    hydrALAZINE (APRESOLINE) 50 MG tablet Take 1 tablet (50 mg total) by mouth every 8 (eight) hours. 10/23/23 10/22/24  Kevin Mccabe MD   omeprazole (PRILOSEC) 20 MG capsule Take 1 capsule (20 mg total) by mouth once daily. 10/26/23 11/25/23  Romaine Borges DO   propranoloL (INDERAL) 10 MG tablet Take 1 tablet (10 mg total) by mouth 2 (two) times daily. 10/23/23 10/22/24  Kevin Mccabe MD   spironolactone (ALDACTONE) 25 MG tablet Take 1 tablet (25 mg total) by mouth once daily. 10/24/23 10/23/24  Kevin Mccabe MD   traMADoL (ULTRAM) 50 mg tablet Take 1 tablet (50 mg total) by mouth every 6 (six) hours as needed for Pain. 4/23/25   Westley Davis, MICHAELP       REVIEW OF SYSTEMS:   Except as documented, all other systems reviewed and negative     PHYSICAL EXAM:     VITAL SIGNS: 24 HRS MIN & MAX LAST   Temp  Min: 97.9 °F (36.6 °C)  Max: 98.2 °F (36.8 °C) 97.9 °F (36.6 °C)   BP  Min: 153/79  Max: 193/98 (!) 156/73   Pulse  Min: 49  Max: 71  60   Resp  Min: 10  Max: 27 13   SpO2  Min: 95 %  Max: 100 % 100 %     General appearance:  Ill-appearing  HENT: Atraumatic head. Moist mucous membranes of oral cavity.  Eyes: Normal extraocular movements.   Neck: Supple.   Lungs: Clear to auscultation bilaterally. No wheezing present.   Heart: Regular rate and rhythm. S1 and S2 present with no murmurs/gallop/rub. No pedal edema. No JVD present.   Abdomen:  Tender abdomen   Extremities: No cyanosis, clubbing, or edema.  Skin: No Rash.   Neuro: Motor and sensory exams grossly intact. Good tone. Muscle strength 5/5 in all 4 extremities  Psych/mental status: Appropriate mood and affect. Responds appropriately to questions.     LABS AND IMAGING:     Recent Labs   Lab 06/17/25  1755 06/18/25  0614   WBC 7.43 7.34   RBC 3.72* 3.45*   HGB 10.7* 9.9*   HCT 34.4* 31.4*   MCV 92.5 91.0   MCH 28.8 28.7   MCHC 31.1* 31.5*   RDW 18.4* 18.2*    154   MPV 10.2 10.4       Recent Labs   Lab 06/17/25  1755 06/18/25  0614    136    K 4.3 3.8    108*   CO2 27 26   BUN 10.1 8.7   CREATININE 0.79 0.77   GLU 97 127*   CALCIUM 8.3* 7.9*   MG  --  1.50*   ALBUMIN 1.9* 1.6*   PROT 8.6* 7.5   ALKPHOS 443* 373*   ALT 82* 72*   * 141*   BILITOT 1.4 1.4       Microbiology Results (last 7 days)       ** No results found for the last 168 hours. **             CTA Chest Non-Coronary (PE Studies)  START OF REPORT:  Technique: CT Scan of the chest was performed with intravenous contrast with direct axial images as well as sagittal and coronal reconstruction images pulmonary embolus protocol.    Dosage Information: Automated Exposure Control was utilized.    Comparison: None.    Findings:  Soft Tissues: Unremarkable.  Lines and Tubes: None.  Neck: The visualized soft tissues of the neck appear unremarkable.  Mediastinum: The mediastinal structures are within normal limits.  Heart: The heart size is within normal limits. Mild coronary artery calcification is seen.  Aorta: No aortic dissection or aneurysm is seen. Mild aortic calcification is seen in the thoracic aorta.  Pulmonary Arteries: No filling defects are seen in the pulmonary arteries to suggest pulmonary embolus.  Lungs: There is moderate non specific dependent change at the lung bases. Mild streaky linear opacity is seen consistent with scarring and subsegmental. No acute focal infiltrate or consolidation is seen.  Pleura: No effusions or pneumothorax are identified.  Bony Structures:  Spine: Subtle spondylolytic changes are seen in the thoracic spine.  Ribs: The ribs appear unremarkable.  Abdomen: Abdominal findings will be discussed separately in the abdomen CT report.    Impression:  1. No filling defects are seen in the pulmonary arteries to suggest pulmonary embolus.  2. No acute focal infiltrate or consolidation is seen.  3. Details and other findings as discussed above.      ASSESSMENT & PLAN:   Abdominal pain  Early satiety  Nausea  Cirrhosis of liver -meld score of 11 on this  admission  Portal venous thrombosis -noted on CT; hyper coag study and AFP ordered   Ascites -IR consulted for paracentesis studies  Nonobstructing kidney stone  Cholelithiasis  Hypomagnesemia  Cocaine positive on UDS  History of Hepatic adenoma with concerns of HCC  History of hep C -hep C RNA ordered    Additional medical history includes carpal tunnel syndrome, hypertension, sciatica    -CT scan shows portal venous thrombosis which seems to be acute on chronic thrombosis in addition to ascites, cholelithiasis, nonobstructing kidney stone  -endorses abdominal pain, early satiety, nausea  -placed on heparin  -hep C RNA ordered to confirm treatment  -alpha fetoprotein ordered, suspecting HCC  -hypercoagulability workup ordered, although this is less likely cause  -GI consulted as patient may require EGD to evaluate for varices prior to treating with blood thinners  -pain control  -IR consulted for paracentesis with fluid studies  -start diet as EGD, if performed, is likely to happen tomorrow  -daily labs  -ammonia levels are normal, no signs of decompensated cirrhosis  -lactulose for bowel regimen    VTE Prophylaxis:  Heparin  Patient condition:  Stable    __________________________________________________________________________  INPATIENT LIST OF MEDICATIONS     Scheduled Meds:   folic acid  1 mg Oral Daily    lactulose 10 gram/15 ml  10 g Oral TID    magnesium sulfate 2 g IVPB  2 g Intravenous Once    Followed by    magnesium sulfate 2 g IVPB  2 g Intravenous Once    morphine  4 mg Intravenous ED 1 Time    multivitamin  1 tablet Oral Daily    ondansetron  4 mg Intravenous ED 1 Time    thiamine  100 mg Oral Daily     Continuous Infusions:   heparin (porcine) in D5W  0-40 Units/kg/hr Intravenous Continuous 10.6 mL/hr at 06/18/25 0214 18 Units/kg/hr at 06/18/25 0214     PRN Meds:.  Current Facility-Administered Medications:     aluminum-magnesium hydroxide-simethicone, 30 mL, Oral, QID PRN    bisacodyL, 10 mg,  Rectal, Daily PRN    dextrose 50%, 12.5 g, Intravenous, PRN    dextrose 50%, 25 g, Intravenous, PRN    glucagon (human recombinant), 1 mg, Intramuscular, PRN    glucose, 16 g, Oral, PRN    glucose, 24 g, Oral, PRN    heparin (PORCINE), 60 Units/kg, Intravenous, PRN    heparin (PORCINE), 30 Units/kg, Intravenous, PRN    lorazepam, 2 mg, Intravenous, Q15 Min PRN    LORazepam, 2 mg, Oral, Q4H PRN    melatonin, 6 mg, Oral, Nightly PRN    polyethylene glycol, 17 g, Oral, BID PRN    sodium chloride 0.9%, 10 mL, Intravenous, PRN        Mateusz Arita MD   06/18/2025

## 2025-06-18 NOTE — ED NOTES
"Attempted to admin lovenox injection. Pt refused injection even after education of the importance of medication and states "If I can't eat I am not taking anything!".   "

## 2025-06-18 NOTE — INTERVAL H&P NOTE
The patient has been examined and the H&P has been reviewed:    I concur with the findings and no changes have occurred since H&P was written. Aman Humphrey is a 64 y.o. male with Cirrhosis & Portal Vein Thrombus with Ascites who presents for Paracentesis.         Active Hospital Problems    Diagnosis  POA    Tobacco dependency [F17.200]  Unknown      Resolved Hospital Problems   No resolved problems to display.

## 2025-06-18 NOTE — ED PROVIDER NOTES
Encounter Date: 6/17/2025       History     Chief Complaint   Patient presents with    Bloated     Presents via [facility] from home with c/o abdominal distention. Also reports abdominal pain. Denies nausea and vomiting. LBM this morning.      See MDM for details.      The history is provided by the patient. No  was used.     Review of patient's allergies indicates:  No Known Allergies  Past Medical History:   Diagnosis Date    Carpal tunnel syndrome     HTN (hypertension)     Sciatica      Past Surgical History:   Procedure Laterality Date    SKIN GRAFT       No family history on file.  Social History[1]  Review of Systems   Constitutional: Negative.  Negative for activity change, appetite change, diaphoresis, fatigue and fever.   HENT:  Negative for rhinorrhea and sinus pressure.    Eyes: Negative.    Respiratory: Negative.  Negative for chest tightness.    Cardiovascular:  Negative for chest pain.   Gastrointestinal:  Positive for abdominal pain. Negative for abdominal distention.   Endocrine: Negative.    Genitourinary: Negative.    Musculoskeletal:  Positive for arthralgias.   Allergic/Immunologic: Negative.    Neurological:  Negative for dizziness and headaches.   Hematological: Negative.    Psychiatric/Behavioral: Negative.     All other systems reviewed and are negative.      Physical Exam     Initial Vitals [06/17/25 1650]   BP Pulse Resp Temp SpO2   (!) 164/86 71 16 98.2 °F (36.8 °C) 100 %      MAP       --         Physical Exam    Nursing note and vitals reviewed.  Constitutional: He appears well-developed and well-nourished. He is not diaphoretic. He is cooperative. He appears ill. No distress.   HENT:   Head: Normocephalic and atraumatic. Not macrocephalic.   Right Ear: Tympanic membrane and external ear normal. Tympanic membrane is not erythematous.   Left Ear: Tympanic membrane and external ear normal. Tympanic membrane is not erythematous.   Nose: No mucosal edema. Right sinus exhibits  no frontal sinus tenderness. Left sinus exhibits no frontal sinus tenderness. Mouth/Throat: Oropharynx is clear and moist and mucous membranes are normal. No oropharyngeal exudate.   Eyes: Conjunctivae and EOM are normal. Pupils are equal, round, and reactive to light. Right eye exhibits no discharge. Left eye exhibits no discharge.   Neck: Neck supple. No tracheal deviation present.   Normal range of motion.  Cardiovascular:  Normal rate and regular rhythm.           Pulmonary/Chest: Effort normal and breath sounds normal. No respiratory distress. He has no decreased breath sounds. He has no wheezes. He has no rhonchi. He has no rales. He exhibits no tenderness.   Abdominal: Abdomen is soft. He exhibits distension. Bowel sounds are decreased. There is generalized abdominal tenderness.   Musculoskeletal:         General: Normal range of motion.      Cervical back: Normal range of motion and neck supple.     Lymphadenopathy:        Head (right side): No submental adenopathy present.        Head (left side): No submental adenopathy present.     He has no cervical adenopathy.   Neurological: He is alert and oriented to person, place, and time. He has normal strength. No cranial nerve deficit. GCS score is 15. GCS eye subscore is 4. GCS verbal subscore is 5. GCS motor subscore is 6.   Skin: Skin is warm.   Psychiatric: He has a normal mood and affect. His behavior is normal. Judgment and thought content normal.         ED Course   Procedures  Labs Reviewed   COMPREHENSIVE METABOLIC PANEL - Abnormal       Result Value    Sodium 136      Potassium 4.3      Chloride 107      CO2 27      Glucose 97      Blood Urea Nitrogen 10.1      Creatinine 0.79      Calcium 8.3 (*)     Protein Total 8.6 (*)     Albumin 1.9 (*)     Globulin 6.7 (*)     Albumin/Globulin Ratio 0.3 (*)     Bilirubin Total 1.4       (*)     ALT 82 (*)      (*)     eGFR >60      Anion Gap 2.0      BUN/Creatinine Ratio 13     URINALYSIS, REFLEX  TO URINE CULTURE - Abnormal    Color, UA Yellow      Appearance, UA Clear      Specific Gravity, UA 1.018      pH, UA 7.0      Protein, UA Negative      Glucose, UA Normal      Ketones, UA Negative      Blood, UA Negative      Bilirubin, UA Negative      Urobilinogen, UA 2.0 (*)     Nitrites, UA Negative      Leukocyte Esterase, UA Negative      RBC, UA 0-5      WBC, UA 0-5      Bacteria, UA None Seen      Squamous Epithelial Cells, UA None Seen     CBC WITH DIFFERENTIAL - Abnormal    WBC 7.43      RBC 3.72 (*)     Hgb 10.7 (*)     Hct 34.4 (*)     MCV 92.5      MCH 28.8      MCHC 31.1 (*)     RDW 18.4 (*)     Platelet 175      MPV 10.2      Neut % 64.0      Lymph % 21.5      Mono % 11.2      Eos % 2.3      Basophil % 0.7      Imm Grans % 0.3      Neut # 4.76      Lymph # 1.60      Mono # 0.83      Eos # 0.17      Baso # 0.05      Imm Gran # 0.02      NRBC% 0.0     B-TYPE NATRIURETIC PEPTIDE - Abnormal    Natriuretic Peptide 163.0 (*)    APTT - Abnormal    PTT 38.8 (*)    PROTIME-INR - Abnormal    PT 17.2 (*)     INR 1.4 (*)     Narrative:     Protimes are used to monitor anticoagulant agents such as warfarin. PT INR values are based on the current patient normal mean and the AL value for the specific instrument reagent used.  **Routine theraputic target values for the INR are 2.0-3.0**   LIPASE - Normal    Lipase Level 49     TROPONIN I - Normal    Troponin-I 0.022     CBC W/ AUTO DIFFERENTIAL    Narrative:     The following orders were created for panel order CBC Auto Differential.  Procedure                               Abnormality         Status                     ---------                               -----------         ------                     CBC with Differential[[id number]]       Abnormal            Final result                 Please view results for these tests on the individual orders.     EKG Readings: (Independently Interpreted)   Initial Reading: No STEMI. Rhythm: Sinus Bradycardia. Heart Rate:  58. Ectopy: No Ectopy. Conduction: Normal.       Imaging Results              CTA Chest Non-Coronary (PE Studies) (Preliminary result)  Result time 06/17/25 22:34:43      Preliminary result by Jarred Tinsley Jr., MD (06/17/25 22:34:43)                   Narrative:    START OF REPORT:  Technique: CT Scan of the chest was performed with intravenous contrast with direct axial images as well as sagittal and coronal reconstruction images pulmonary embolus protocol.    Dosage Information: Automated Exposure Control was utilized.    Comparison: None.    Findings:  Soft Tissues: Unremarkable.  Lines and Tubes: None.  Neck: The visualized soft tissues of the neck appear unremarkable.  Mediastinum: The mediastinal structures are within normal limits.  Heart: The heart size is within normal limits. Mild coronary artery calcification is seen.  Aorta: No aortic dissection or aneurysm is seen. Mild aortic calcification is seen in the thoracic aorta.  Pulmonary Arteries: No filling defects are seen in the pulmonary arteries to suggest pulmonary embolus.  Lungs: There is moderate non specific dependent change at the lung bases. Mild streaky linear opacity is seen consistent with scarring and subsegmental. No acute focal infiltrate or consolidation is seen.  Pleura: No effusions or pneumothorax are identified.  Bony Structures:  Spine: Subtle spondylolytic changes are seen in the thoracic spine.  Ribs: The ribs appear unremarkable.  Abdomen: Abdominal findings will be discussed separately in the abdomen CT report.      Impression:  1. No filling defects are seen in the pulmonary arteries to suggest pulmonary embolus.  2. No acute focal infiltrate or consolidation is seen.  3. Details and other findings as discussed above.                          Preliminary result by Polyheal, Rad Results In (06/17/25 22:04:12)                   Narrative:    START OF REPORT:  Technique: CT Scan of the chest abdomen and pelvis was performed  with intravenous contrast with axial as well as sagittal and, coronal images.    Dosage Information: Automated Exposure Control was utilized 770.11 mGy.cm.    Comparison: Comparison is with study dated 2023-10-18 17:38:57.    Clinical History: Chest pain; r/o pe-Bloated (Presents via [facility] from home with c/o abdominal distention. Also reports abdominal pain. Denies nausea and vomiting. LBM this morning. )ct.    Findings:  Soft Tissues: Unremarkable.  Lines and Tubes: None.  Neck: The visualized soft tissues of the neck appear unremarkable.  Mediastinum: The mediastinal structures are within normal limits.  Heart: The heart size is within normal limits. Mild coronary artery calcification is seen.  Aorta: No aortic dissection or aneurysm is seen. Mild aortic calcification is seen in the thoracic aorta.  Pulmonary Arteries: No filling defects are seen in the pulmonary arteries to suggest pulmonary embolus.  Lungs: There is mild non specific dependent change at the lung bases. Mild streaky linear opacity is seen consistent with scarring and subsegmental.  Pleura: No effusions or pneumothorax are identified.  Bony Structures:  Spine: Subtle spondylolytic changes are seen in the thoracic spine.  Ribs: The ribs appear unremarkable.  Liver: A large filling defect is seen in the main portal vein extending and occluding the right branch. There is heterogeneous enhancement of the right posterior hepatic lobe. The liver again shows nodular contour with multiple collateral vessels in the gastrohepatic and paraesophageal regions consistent with cirrhosis. Massive abdominopelvic ascites is appreciated.  Biliary System: There is focal dilatation of the right anterior sectoral duct and its branches on imaage 42 through 57 series 7.  Gallbladder: Multiple varisized gallstones are seen in the gallbladder which otherwise appears unremarkable.  Pancreas: The pancreas appears unremarkable.  Spleen: The spleen appears  unremarkable.  Adrenals: The adrenal glands appear unremarkable.  Kidneys: The right kidney appears unremarkable with no stones cysts masses or hydronephrosis. A stable stone measuring 9.7 mm is seen on Image 69, Series 7 in the mid pole of the left kidney. The left kidney otherwise appears unremarkable with no cysts masses or hydronephrosis identified.  Aorta: There is moderate calcification of the abdominal aorta and its branches.  IVC: Unremarkable.  Bowel:  Esophagus: The visualized esophagus appears unremarkable.  Stomach: The stomach appears unremarkable.  Duodenum: Unremarkable appearing duodenum.  Small Bowel: The small bowel appears unremarkable.  Colon: Nondistended.  Appendix: The appendix appears unremarkable seen on Image 113, Series 7 through Image 107, Series 7.  Peritoneum: No free intraperitoneal air is seen.    Pelvis:  Bladder: The bladder appears unremarkable.  Male:  Prostate gland: There are a few calcifications in the prostate gland.    Bony structures:  Dorsal Spine: There is mild to moderate spondylosis of the visualized dorsal spine.  Bony Pelvis: There is mild degenerative change of the bilateral hip.      Impression:  1. No filling defects are seen in the pulmonary arteries to suggest pulmonary embolus.  2. A large filling defect is seen in the main portal vein extending and occluding the right branch. There is heterogeneous enhancement of the right posterior hepatic lobe. This is consistent with acute portal vein thrombosis. Correlate with clinical and laboratory findings as regards further evaluation and follow-up. The liver again shows nodular contour with multiple collateral vessels in the gastrohepatic and paraesophageal regions consistent with cirrhosis. Massive abdominopelvic ascites is appreciated.  3. There is focal dilatation of the right anterior sectoral duct and its branches on imaage 42 through 57 series 7. A hepatic lesion/ ductal lesion is not entirely excluded. Correlate  with clinical and laboratory findings as regards further evaluation and follow-up.  4. Multiple varisized gallstones are seen in the gallbladder which otherwise appears unremarkable.  5. Details and other findings as discussed above.                                         CT Abdomen Pelvis With IV Contrast NO Oral Contrast (In process)                      Medications   morphine injection 4 mg (0 mg Intravenous Hold 6/17/25 2100)   ondansetron injection 4 mg (0 mg Intravenous Hold 6/17/25 2100)   sodium chloride 0.9% flush 10 mL (has no administration in time range)   melatonin tablet 6 mg (has no administration in time range)   polyethylene glycol packet 17 g (has no administration in time range)   bisacodyL suppository 10 mg (has no administration in time range)   aluminum-magnesium hydroxide-simethicone 200-200-20 mg/5 mL suspension 30 mL (has no administration in time range)   glucose chewable tablet 16 g (has no administration in time range)   glucose chewable tablet 24 g (has no administration in time range)   dextrose 50% injection 12.5 g (has no administration in time range)   dextrose 50% injection 25 g (has no administration in time range)   glucagon (human recombinant) injection 1 mg (has no administration in time range)   ondansetron injection 4 mg (4 mg Intravenous Given 6/17/25 1801)   famotidine (PF) injection 20 mg (20 mg Intravenous Given 6/17/25 2122)   iohexoL (OMNIPAQUE 350) injection 100 mL (100 mLs Intravenous Given 6/17/25 2107)     Medical Decision Making  64yoAAM w/hx of arthritis, carpal tunnel syndrome, hypertension, and sciatica that presents to the emergency department with generalized body aches for 15 years and abdominal pain for the last 2-3 days.  Patient states he also has leg swelling and shortness of breath with chest pain that is worsening over the last few days as well.  Denies nausea, or vomiting.  No fever.  States he may have had a spot somewhere on his stomach years ago.   "Patient denies dysuria.not currently anticoagulated states he "doesn't take any medicine anyway". Patient has history of "drinking all his life". Patient "doesn't know if he has lost any weight". Poor historian. Denies history of cirrhosis diagnosis in the past.     Problems Addressed:  Abdominal pain, unspecified abdominal location: acute illness or injury     Details: Differential diagnosis included but not limited to:  Acute abdomen, abdominal infection, abdominal mass, malignancy, gastritis, gastroenteritis, obstruction, other etiology    Patient has significant ascites on imaging with acute portal vein thrombosis.  LFTs are also elevated.  No significant leukocytosis.  Cardiac enzymes were normal.  EKG shows bradycardia.  Patient has no known history of cirrhosis.  Patient needs further evaluation for cirrhosis and ascites.  Patient will be admitted at this time for further evaluation.  Patient agreeable to plan.  [facility], [facility] accepts [facility] patient for admission.  Discussed with Er physician, Dr. Vigil, who evaluated this patient at the bedside, who agrees with plan.  Leg swelling: chronic illness or injury with exacerbation, progression, or side effects of treatment     Details: Differential diagnosis included but not limited to:  Congestive heart failure, CHF exacerbation, arthritis, gout, other etiology  Shortness of breath: chronic illness or injury with exacerbation, progression, or side effects of treatment     Details: Differential diagnosis included but not limited to:  Myocardial infarction, cardiac abnormality, pulmonary embolus, Congestive heart failure, CHF exacerbation, arthritis, gout, other etiology    Amount and/or Complexity of Data Reviewed  Labs: ordered. Decision-making details documented in ED Course.  Radiology: ordered. Decision-making details documented in ED Course.  ECG/medicine tests: ordered. Decision-making details documented in ED Course.    Risk  Prescription " drug management.  Decision regarding hospitalization.               ED Course as of 06/18/25 0006   Tue Jun 17, 2025 2204 BNP(!): 163.0 [ST]   2208 CTA Chest Non-Coronary (PE Studies)  Impression:  1. No filling defects are seen in the pulmonary arteries to suggest pulmonary embolus.  2. A large filling defect is seen in the main portal vein extending and occluding the right branch. There is heterogeneous enhancement of the right posterior hepatic lobe. This is consistent with acute portal vein thrombosis. Correlate with clinical and laboratory findings as regards further evaluation and follow-up. The liver again shows nodular contour with multiple collateral vessels in the gastrohepatic and paraesophageal regions consistent with cirrhosis. Massive abdominopelvic ascites is appreciated.  3. There is focal dilatation of the right anterior sectoral duct and its branches on imaage 42 through 57 series 7. A hepatic lesion/ ductal lesion is not entirely excluded. Correlate with clinical and laboratory findings as regards further evaluation and follow-up.  4. Multiple varisized gallstones are seen in the gallbladder which otherwise appears unremarkable.  5. Details and other findings as discussed above.        This result has not been signed. Information might be incomplete.      Exam Ended: 06/17/25 22:04 CDT Last Resulted: 06/17/25 22:04 CDT     [ST]   2223 Troponin I: 0.022 [ST]   2223 AST(!): 169 [ST]   2223 ALT(!): 82 [ST]   2223 ALP(!): 443 [ST]   2334 HM paged [ST]   2334 INR(!): 1.4 [ST]   2334 PT(!): 17.2 [ST]      ED Course User Index  [ST] Shannan Whitt PA                           Clinical Impression:  Final diagnoses:  [R53.1] Weakness  [R06.02] Shortness of breath (Primary)  [M79.89] Leg swelling  [M19.90] Arthritis  [R10.9] Abdominal pain, unspecified abdominal location          ED Disposition Condition    Admit                This note was typed partially using voice recognition software.  Please  be reminded that not all corrections/addendums to grammar may have been made prior to closing of this chart.         [1]   Social History  Tobacco Use    Smoking status: Some Days     Types: Cigarettes    Smokeless tobacco: Never   Substance Use Topics    Alcohol use: Yes     Comment: daily    Drug use: Not Currently        Shannan Whitt PA  06/18/25 0006       Shannan Whitt PA  06/18/25 0013

## 2025-06-18 NOTE — CONSULTS
Consult Note    Reason for Consult:      We were consulted to evaluate this patient for cirrhosis with PVT.     HPI:   64-year-old male known to Dr. Deleon from previous admission with PMH of HCV cirrhosis, hepatic lesion, elevated AFP, HTN presented to ED yesterday 6/17 with complaints of abdominal pain.    On arrival, hypertensive 164/86 otherwise afebrile and VSS.  Labs notable for normocytic anemia HGB 10.7, INR 1.4, T bili 1.4, , , ALT 82, , alcohol level WNL.  UDS positive for cocaine.  CTA/PW/IV contrast noted portal vein thrombosis that is occlusive within right portal system.  Some collaterals within annette hepatis which suggests some degree of chronic component.  Sequela of cirrhosis with moderate volume ascites and small gastroesophageal varices.  Hypervascular area in segment 8, favor perfusion abnormality related to PVT however consider multiphase abdominal MRI to evaluate for potential tumor.  CT chest negative for PE.  Started on heparin drip.  Hep C RNA and AFP ordered.      Patient was seen by our group 10/2023 for new findings of cirrhosis.  At that time, found to have a liver lesion with an elevated AFP.  CT triple phase liver 10/18/23: 1.2 x 1.3 cm lesion seen in segment 8 of the liver which shows early arterial enhancement and washout on delayed imaging.  Findings are not consistent with hemangioma. HCC not excluded. Patient was completely obstructive to his care during that admission and refused to speak with GI or heme/onc. Also refused MRI. Recommended outpt f/u with [facility]. It doesn't appear patient ever followed through with this.    Previous records reviewed...  10/17/2023 HCV RNA Quant: [id number]  10/17/2023 AFP 19.2    PCP:  No, Primary Doctor    Review of patient's allergies indicates:  No Known Allergies     Current Medications[1]  Prescriptions Prior to Admission[2]    Past Medical History:  Past Medical History:   Diagnosis Date    Carpal tunnel syndrome     HTN  (hypertension)     Sciatica       Past Surgical History:  Past Surgical History:   Procedure Laterality Date    SKIN GRAFT        Family History:  No family history on file.  Social History:  Social History     Tobacco Use    Smoking status: Some Days     Types: Cigarettes    Smokeless tobacco: Never   Substance Use Topics    Alcohol use: Yes     Comment: daily       Review of Systems:     Review of Systems   Gastrointestinal:  Negative for abdominal pain, blood in stool, constipation, diarrhea, nausea and vomiting.       Objective:     VITAL SIGNS: 24 HR MIN & MAX LAST    Temp  Min: 97.9 °F (36.6 °C)  Max: 98.2 °F (36.8 °C)  97.9 °F (36.6 °C)        BP  Min: 153/79  Max: 193/98  (!) 162/82     Pulse  Min: 49  Max: 71  (!) 55     Resp  Min: 10  Max: 27  13    SpO2  Min: 95 %  Max: 100 %  100 %      No intake or output data in the 24 hours ending 06/18/25 0915    Physical Exam  Constitutional:       General: He is not in acute distress.     Appearance: He is ill-appearing.   HENT:      Head: Normocephalic and atraumatic.   Eyes:      Extraocular Movements: Extraocular movements intact.   Cardiovascular:      Rate and Rhythm: Normal rate and regular rhythm.   Pulmonary:      Effort: Pulmonary effort is normal. No respiratory distress.   Abdominal:      General: Bowel sounds are normal. There is no distension.      Palpations: There is no mass.      Tenderness: There is no abdominal tenderness. There is no guarding or rebound.      Comments: Firm to palpation   Skin:     General: Skin is warm and dry.      Coloration: Skin is not jaundiced.   Neurological:      Mental Status: He is alert and oriented to person, place, and time.   Psychiatric:         Mood and Affect: Mood normal. Affect is angry.         Behavior: Behavior normal.           Recent Results (from the past 48 hours)   EKG 12-lead    Collection Time: 06/17/25  5:23 PM   Result Value Ref Range    QRS Duration 94 ms    [facility] QTC Calculation 404 ms    Comprehensive Metabolic Panel    Collection Time: 06/17/25  5:55 PM   Result Value Ref Range    Sodium 136 136 - 145 mmol/L    Potassium 4.3 3.5 - 5.1 mmol/L    Chloride 107 98 - 107 mmol/L    CO2 27 23 - 31 mmol/L    Glucose 97 82 - 115 mg/dL    Blood Urea Nitrogen 10.1 8.4 - 25.7 mg/dL    Creatinine 0.79 0.72 - 1.25 mg/dL    Calcium 8.3 (L) 8.8 - 10.0 mg/dL    Protein Total 8.6 (H) 5.8 - 7.6 gm/dL    Albumin 1.9 (L) 3.4 - 4.8 g/dL    Globulin 6.7 (H) 2.4 - 3.5 gm/dL    Albumin/Globulin Ratio 0.3 (L) 1.1 - 2.0 ratio    Bilirubin Total 1.4 <=1.5 mg/dL     (H) 40 - 150 unit/L    ALT 82 (H) 0 - 55 unit/L     (H) 11 - 45 unit/L    eGFR >60 mL/min/1.73/m2    Anion Gap 2.0 mEq/L    BUN/Creatinine Ratio 13    Lipase    Collection Time: 06/17/25  5:55 PM   Result Value Ref Range    Lipase Level 49 <=60 U/L   Troponin I    Collection Time: 06/17/25  5:55 PM   Result Value Ref Range    Troponin-I 0.022 0.000 - 0.045 ng/mL   CBC with Differential    Collection Time: 06/17/25  5:55 PM   Result Value Ref Range    WBC 7.43 4.50 - 11.50 x10(3)/mcL    RBC 3.72 (L) 4.70 - 6.10 x10(6)/mcL    Hgb 10.7 (L) 14.0 - 18.0 g/dL    Hct 34.4 (L) 42.0 - 52.0 %    MCV 92.5 80.0 - 94.0 fL    MCH 28.8 27.0 - 31.0 pg    MCHC 31.1 (L) 33.0 - 36.0 g/dL    RDW 18.4 (H) 11.5 - 17.0 %    Platelet 175 130 - 400 x10(3)/mcL    MPV 10.2 7.4 - 10.4 fL    Neut % 64.0 %    Lymph % 21.5 %    Mono % 11.2 %    Eos % 2.3 %    Basophil % 0.7 %    Imm Grans % 0.3 %    Neut # 4.76 2.1 - 9.2 x10(3)/mcL    Lymph # 1.60 0.6 - 4.6 x10(3)/mcL    Mono # 0.83 0.1 - 1.3 x10(3)/mcL    Eos # 0.17 0 - 0.9 x10(3)/mcL    Baso # 0.05 <=0.2 x10(3)/mcL    Imm Gran # 0.02 0.00 - 0.04 x10(3)/mcL    NRBC% 0.0 %   Urinalysis, Reflex to Urine Culture Urine, Clean Catch    Collection Time: 06/17/25  8:05 PM    Specimen: Urine, Clean Catch   Result Value Ref Range    Color, UA Yellow Yellow, Light-Yellow, Colorless, Straw, Dark-Yellow    Appearance, UA Clear Clear     Specific Gravity, UA 1.018 1.005 - 1.030    pH, UA 7.0 5.0 - 8.5    Protein, UA Negative Negative    Glucose, UA Normal Negative, Normal    Ketones, UA Negative Negative    Blood, UA Negative Negative    Bilirubin, UA Negative Negative    Urobilinogen, UA 2.0 (A) 0.2, 1.0, Normal    Nitrites, UA Negative Negative    Leukocyte Esterase, UA Negative Negative    RBC, UA 0-5 None Seen, 0-2, 3-5, 0-5 /HPF    WBC, UA 0-5 None Seen, 0-2, 3-5, 0-5 /HPF    Bacteria, UA None Seen None Seen, Trace /HPF    Squamous Epithelial Cells, UA None Seen None Seen, Trace /HPF   Brain natriuretic peptide    Collection Time: 06/17/25  9:14 PM   Result Value Ref Range    Natriuretic Peptide 163.0 (H) <=100.0 pg/mL   APTT    Collection Time: 06/17/25 10:50 PM   Result Value Ref Range    PTT 38.8 (H) 23.2 - 33.7 seconds   Protime-INR    Collection Time: 06/17/25 10:50 PM   Result Value Ref Range    PT 17.2 (H) 12.5 - 14.5 seconds    INR 1.4 (H) <=1.3   Drug Screen, Urine    Collection Time: 06/18/25 12:16 AM   Result Value Ref Range    Amphetamines, Urine Negative Negative    Barbiturates, Urine Negative Negative    Benzodiazepine, Urine Negative Negative    Cannabinoids, Urine Negative Negative    Cocaine, Urine Positive (A) Negative    Fentanyl, Urine Negative Negative    MDMA, Urine Negative Negative    Opiates, Urine Negative Negative    Phencyclidine, Urine Negative Negative    pH, Urine 7.0 3.0 - 11.0    Specific Gravity, Urine Auto >1.030 1.001 - 1.035   Ethanol    Collection Time: 06/18/25 12:18 AM   Result Value Ref Range    Ethanol Level <10.0 <=10.0 mg/dL   Troponin I    Collection Time: 06/18/25 12:18 AM   Result Value Ref Range    Troponin-I 0.015 0.000 - 0.045 ng/mL   Acetaminophen Level    Collection Time: 06/18/25 12:18 AM   Result Value Ref Range    Acetaminophen Level <3.0 (L) 10.0 - 30.0 ug/ml   Gamma GT    Collection Time: 06/18/25 12:18 AM   Result Value Ref Range    Gamma Glutamyl Transferase 364 (H) 12 - 64 U/L    Ammonia    Collection Time: 06/18/25 12:18 AM   Result Value Ref Range    Ammonia Level 28.8 18.0 - 72.0 umol/L   Comprehensive Metabolic Panel (CMP)    Collection Time: 06/18/25  6:14 AM   Result Value Ref Range    Sodium 136 136 - 145 mmol/L    Potassium 3.8 3.5 - 5.1 mmol/L    Chloride 108 (H) 98 - 107 mmol/L    CO2 26 23 - 31 mmol/L    Glucose 127 (H) 82 - 115 mg/dL    Blood Urea Nitrogen 8.7 8.4 - 25.7 mg/dL    Creatinine 0.77 0.72 - 1.25 mg/dL    Calcium 7.9 (L) 8.8 - 10.0 mg/dL    Protein Total 7.5 5.8 - 7.6 gm/dL    Albumin 1.6 (L) 3.4 - 4.8 g/dL    Globulin 5.9 (H) 2.4 - 3.5 gm/dL    Albumin/Globulin Ratio 0.3 (L) 1.1 - 2.0 ratio    Bilirubin Total 1.4 <=1.5 mg/dL     (H) 40 - 150 unit/L    ALT 72 (H) 0 - 55 unit/L     (H) 11 - 45 unit/L    eGFR >60 mL/min/1.73/m2    Anion Gap 2.0 mEq/L    BUN/Creatinine Ratio 11    Magnesium    Collection Time: 06/18/25  6:14 AM   Result Value Ref Range    Magnesium Level 1.50 (L) 1.60 - 2.60 mg/dL   Troponin I    Collection Time: 06/18/25  6:14 AM   Result Value Ref Range    Troponin-I 0.020 0.000 - 0.045 ng/mL   CBC with Differential    Collection Time: 06/18/25  6:14 AM   Result Value Ref Range    WBC 7.34 4.50 - 11.50 x10(3)/mcL    RBC 3.45 (L) 4.70 - 6.10 x10(6)/mcL    Hgb 9.9 (L) 14.0 - 18.0 g/dL    Hct 31.4 (L) 42.0 - 52.0 %    MCV 91.0 80.0 - 94.0 fL    MCH 28.7 27.0 - 31.0 pg    MCHC 31.5 (L) 33.0 - 36.0 g/dL    RDW 18.2 (H) 11.5 - 17.0 %    Platelet 154 130 - 400 x10(3)/mcL    MPV 10.4 7.4 - 10.4 fL    Neut % 58.9 %    Lymph % 28.6 %    Mono % 8.6 %    Eos % 3.3 %    Basophil % 0.3 %    Imm Grans % 0.3 %    Neut # 4.33 2.1 - 9.2 x10(3)/mcL    Lymph # 2.10 0.6 - 4.6 x10(3)/mcL    Mono # 0.63 0.1 - 1.3 x10(3)/mcL    Eos # 0.24 0 - 0.9 x10(3)/mcL    Baso # 0.02 <=0.2 x10(3)/mcL    Imm Gran # 0.02 0.00 - 0.04 x10(3)/mcL    NRBC% 0.0 %       CT Abdomen Pelvis With IV Contrast NO Oral Contrast  Result Date: 6/18/2025  EXAMINATION: CT ABDOMEN PELVIS WITH  IV CONTRAST CLINICAL HISTORY: Abdominal pain and abdominal distension TECHNIQUE: Axial CT images were obtained through the abdomen and pelvis following IV administration of contrast.  100 mL Omnipaque 350.  Coronal and sagittal reconstructions submitted and interpreted.  Automated exposure control, dose radiation lowering technique, was utilized. COMPARISON: CT abdomen and pelvis without contrast from 10/18/2023 CT abdomen and pelvis from 10/16/2023 Abdominal sonogram from 06/02/2017 FINDINGS: Cirrhotic morphology to the liver.  Thrombus is present within the distal segment of the main portal vein with extension into the right and left portal system.  Thrombus is occlusive at multiple segments of the right portal system.  Splenic vein and SMV appear patent. There is geographic area of slightly increased enhancement at segment 8 measuring 4.2 x 4.0 cm.  No other hypervascular areas.  There are multiple areas of patchy decreased density in the right hepatic lobe which is felt to be secondary to portal vein thrombosis. Spleen is nonenlarged.  Pancreas, adrenal glands and right kidney appear normal.  Nonobstructing left kidney stone measures 10 mm.  No hydronephrosis. Cholelithiasis is present.  The bowel is nonobstructed.  Air and stool are present throughout the colon which appears within normal limits.  Normal appendix is present.  There is moderate volume ascites.  No free air. Moderate atherosclerosis of the abdominal aorta and its branches is present.  Mild, diffuse bladder wall thickening is present.  No suspicious osteolytic or osteoblastic lesion.     1. Portal vein thrombosis which is occlusive within the right portal system.  There is some collaterals within the annette hepatis with suggests some degree of chronic component.  Given the appearance of the thrombus in addition to the small amount of portal venous collaterals, favor acute on chronic thrombosis. 2. Sequelae of cirrhosis with moderate volume ascites  and small gastroesophageal varices. 3. Hypervascular area in segment 8 (image 31, series 1).  Favor perfusion abnormality related to portal vein thrombosis.  However, consider multiphase abdominal MRI to evaluate for potential tumor. 4. Cholelithiasis. 5. Nonobstructing left-sided kidney stone. Electronically signed by: Mukesh Lu MD Date:    06/18/2025 Time:    08:04    CTA Chest Non-Coronary (PE Studies)  Result Date: 6/18/2025  EXAMINATION: CTA CHEST NON CORONARY (PE STUDIES) CLINICAL HISTORY: Pulmonary embolism suspected. 63 yo male presenting with feeling unwell for the past month and a half. He reports abdominal bloating, leg swelling, and decreased appetite. He reports generalized weakness as well. States he normally drinks alcohol daily but hasn't in the past 30 days or so. TECHNIQUE: Axial CT images were obtained through the chest after IV administration of contrast. 100 mL Omnipaque 350. MIP, coronal and sagittal reconstructions submitted and interpreted.  Automated exposure control, dose radiation lowering technique, was utilized. COMPARISON: None FINDINGS: Slightly limited exam secondary to contrast bolus timing. Soft Tissues: Unremarkable. Lines and Tubes: None. Neck: The visualized soft tissues of the neck appear unremarkable. Mediastinum: The mediastinal structures are within normal limits. Heart: The heart size is within normal limits. Mild coronary artery calcification is seen. Aorta: No aortic dissection or aneurysm is seen. Mild aortic calcification is seen in the thoracic aorta. Pulmonary Arteries: No filling defects are seen in the pulmonary arteries to suggest pulmonary embolus. Lungs: There is moderate non specific dependent change at the lung bases. Mild streaky linear opacity is seen consistent with scarring and subsegmental. No acute focal infiltrate or consolidation is seen. Pleura: No effusions or pneumothorax are identified. Bony Structures: Spine: Subtle spondylolytic changes are  seen in the thoracic spine. Ribs: The ribs appear unremarkable.     Slightly limited exam of the pulmonary arteries secondary to contrast bolus timing. 1. No filling defects are seen in the pulmonary arteries to suggest pulmonary embolus. 2. No acute focal infiltrate or consolidation is seen. 3. Details and other findings as discussed above. [employer] concurrence Electronically signed by: Mukesh Lu MD Date:    06/18/2025 Time:    07:57      Imaging personally reviewed by myself and SP.    Assessment / Plan:   64-year-old male known to Dr. Deleon from previous admission with PMH of HCV cirrhosis, hepatic lesion, elevated AFP, HTN, and medical non compliance presented to ED yesterday 6/17 with complaints of abdominal pain. Found to have PVT and again found to have a liver mass. Started on heparin drip and admitted for further care. Hep C RNA and AFP ordered. GI consulted for PVT.    Cirrhosis  MELD 3.0: 15 at 6/18/2025    Origin: HCV  Ascites: moderate volume on CT, para ordered by primary  Screening for varices: no previous EGD, CT noted gastroesophageal varices, plts wnl  Encephalopathy: no s/s at this time, no hx  HCC Screen: AFP elevated at 19 10/2023, liver mass noted on CT 10/2023 and again this admission    Liver mass  See HPI  PVT  On Heparin gtt    - continue supp care per primary  - f/u paracentesis and fluid studies  - f/u AFP  - f/u HCV RNA  - pt agreeable to MRI. Recommend multiphasic MRI for further evaluation of possible hepatic mass vs perfusion abnormality  - NPO at [facility] for EGD tomorrow to evaluate for EV    Thank you for allowing us to participate in this patient's care.         [1]   Current Facility-Administered Medications   Medication Dose Route Frequency Provider Last Rate Last Admin    aluminum-magnesium hydroxide-simethicone 200-200-20 mg/5 mL suspension 30 mL  30 mL Oral QID PRN Vandana Arthur FNP        bisacodyL suppository 10 mg  10 mg Rectal Daily PRN Vandana Arthur FNP         dextrose 50% injection 12.5 g  12.5 g Intravenous PRN Quentin Arthuryssa, FNP        dextrose 50% injection 25 g  25 g Intravenous PRN Quentin Arthuryssa, FNP        folic acid tablet 1 mg  1 mg Oral Daily Vandana Arthur, FNP   1 mg at 06/18/25 0826    furosemide injection 20 mg  20 mg Intravenous Q8H Mateusz Arita MD   20 mg at 06/18/25 0827    glucagon (human recombinant) injection 1 mg  1 mg Intramuscular PRN Quentin Arthuryssa, FNP        glucose chewable tablet 16 g  16 g Oral PRN Berkley Arthursa, FNP        glucose chewable tablet 24 g  24 g Oral PRN Berkley Arthursa, FNP        heparin 25,000 units in dextrose 5% (100 units/ml) IV bolus from bag HIGH INTENSITY nomogram - LAF  60 Units/kg Intravenous PRN Quentin Arthuryssa, FNP        heparin 25,000 units in dextrose 5% (100 units/ml) IV bolus from bag HIGH INTENSITY nomogram - LAF  30 Units/kg Intravenous PRN Quentin Arthuryssa, FNP        heparin 25,000 units in dextrose 5% 250 mL (100 units/mL) infusion HIGH INTENSITY nomogram - LAF  0-40 Units/kg/hr Intravenous Continuous Vandana Arthur FNP 10.6 mL/hr at 06/18/25 0214 18 Units/kg/hr at 06/18/25 0214    lactulose 10 gram/15 ml solution 10 g  10 g Oral TID Mateusz Arita MD   10 g at 06/18/25 0827    LORazepam injection 2 mg  2 mg Intravenous Q15 Min PRN Vandana Arthur, MICHAELP        LORazepam tablet 2 mg  2 mg Oral Q4H PRN Vandana Arthur, FNP        losartan tablet 25 mg  25 mg Oral Daily Mateusz Arita MD   25 mg at 06/18/25 0826    magnesium sulfate 2g in water 50mL IVPB (premix)  2 g Intravenous Once Mateusz Arita MD 25 mL/hr at 06/18/25 0825 2 g at 06/18/25 0825    Followed by    magnesium sulfate 2g in water 50mL IVPB (premix)  2 g Intravenous Once Mateusz Arita MD        melatonin tablet 6 mg  6 mg Oral Nightly PRN Vandana Arthur, FNP        multivitamin tablet  1 tablet Oral Daily Vandana Arthur FNP   1 tablet at 06/18/25 0826    NIFEdipine 24 hr tablet 30 mg  30 mg Oral Daily Mateusz Arita MD   30 mg at 06/18/25 0826     polyethylene glycol packet 17 g  17 g Oral BID PRN Vandana Arthur FNP        sodium chloride 0.9% flush 10 mL  10 mL Intravenous PRN Vandana Arthur FNP        thiamine tablet 100 mg  100 mg Oral Daily Vandana Arthur FNP   100 mg at 06/18/25 0829     Current Outpatient Medications   Medication Sig Dispense Refill    amLODIPine (NORVASC) 10 MG tablet Take 1 tablet (10 mg total) by mouth once daily. 30 tablet 11    ARIPiprazole (ABILIFY) 5 MG Tab Take 1 tablet (5 mg total) by mouth once daily. 30 tablet 11    EScitalopram oxalate (LEXAPRO) 5 MG Tab Take 1 tablet (5 mg total) by mouth once daily. 30 tablet 11    furosemide (LASIX) 20 MG tablet Take 1 tablet (20 mg total) by mouth once daily. 60 tablet 1    hydrALAZINE (APRESOLINE) 50 MG tablet Take 1 tablet (50 mg total) by mouth every 8 (eight) hours. 90 tablet 11    omeprazole (PRILOSEC) 20 MG capsule Take 1 capsule (20 mg total) by mouth once daily. 30 capsule 0    propranoloL (INDERAL) 10 MG tablet Take 1 tablet (10 mg total) by mouth 2 (two) times daily. 60 tablet 11    spironolactone (ALDACTONE) 25 MG tablet Take 1 tablet (25 mg total) by mouth once daily. 30 tablet 11    traMADoL (ULTRAM) 50 mg tablet Take 1 tablet (50 mg total) by mouth every 6 (six) hours as needed for Pain. 16 tablet 0   [2] (Not in a hospital admission)

## 2025-06-19 LAB
ANION GAP SERPL CALC-SCNC: 2 MEQ/L
BASOPHILS # BLD AUTO: 0.04 X10(3)/MCL
BASOPHILS NFR BLD AUTO: 0.5 %
BUN SERPL-MCNC: 9.9 MG/DL (ref 8.4–25.7)
CALCIUM SERPL-MCNC: 8.1 MG/DL (ref 8.8–10)
CHLORIDE SERPL-SCNC: 106 MMOL/L (ref 98–107)
CO2 SERPL-SCNC: 27 MMOL/L (ref 23–31)
CREAT SERPL-MCNC: 0.83 MG/DL (ref 0.72–1.25)
CREAT/UREA NIT SERPL: 12
EOSINOPHIL # BLD AUTO: 0.17 X10(3)/MCL (ref 0–0.9)
EOSINOPHIL NFR BLD AUTO: 1.9 %
ERYTHROCYTE [DISTWIDTH] IN BLOOD BY AUTOMATED COUNT: 18.6 % (ref 11.5–17)
GFR SERPLBLD CREATININE-BSD FMLA CKD-EPI: >60 ML/MIN/1.73/M2
GLUCOSE SERPL-MCNC: 103 MG/DL (ref 82–115)
HCT VFR BLD AUTO: 31 % (ref 42–52)
HGB BLD-MCNC: 10 G/DL (ref 14–18)
IMM GRANULOCYTES # BLD AUTO: 0.04 X10(3)/MCL (ref 0–0.04)
IMM GRANULOCYTES NFR BLD AUTO: 0.5 %
LYMPHOCYTES # BLD AUTO: 2.03 X10(3)/MCL (ref 0.6–4.6)
LYMPHOCYTES NFR BLD AUTO: 23 %
MAGNESIUM SERPL-MCNC: 1.6 MG/DL (ref 1.6–2.6)
MCH RBC QN AUTO: 29 PG (ref 27–31)
MCHC RBC AUTO-ENTMCNC: 32.3 G/DL (ref 33–36)
MCV RBC AUTO: 89.9 FL (ref 80–94)
MONOCYTES # BLD AUTO: 0.91 X10(3)/MCL (ref 0.1–1.3)
MONOCYTES NFR BLD AUTO: 10.3 %
NEUTROPHILS # BLD AUTO: 5.65 X10(3)/MCL (ref 2.1–9.2)
NEUTROPHILS NFR BLD AUTO: 63.8 %
NRBC BLD AUTO-RTO: 0 %
PHOSPHATE SERPL-MCNC: 3.6 MG/DL (ref 2.3–4.7)
PLATELET # BLD AUTO: 167 X10(3)/MCL (ref 130–400)
PMV BLD AUTO: 10.7 FL (ref 7.4–10.4)
POTASSIUM SERPL-SCNC: 4.1 MMOL/L (ref 3.5–5.1)
RBC # BLD AUTO: 3.45 X10(6)/MCL (ref 4.7–6.1)
SODIUM SERPL-SCNC: 135 MMOL/L (ref 136–145)
WBC # BLD AUTO: 8.84 X10(3)/MCL (ref 4.5–11.5)

## 2025-06-19 PROCEDURE — 83735 ASSAY OF MAGNESIUM: CPT | Performed by: STUDENT IN AN ORGANIZED HEALTH CARE EDUCATION/TRAINING PROGRAM

## 2025-06-19 PROCEDURE — 25000003 PHARM REV CODE 250: Performed by: STUDENT IN AN ORGANIZED HEALTH CARE EDUCATION/TRAINING PROGRAM

## 2025-06-19 PROCEDURE — 96372 THER/PROPH/DIAG INJ SC/IM: CPT | Performed by: STUDENT IN AN ORGANIZED HEALTH CARE EDUCATION/TRAINING PROGRAM

## 2025-06-19 PROCEDURE — 25000003 PHARM REV CODE 250

## 2025-06-19 PROCEDURE — 84100 ASSAY OF PHOSPHORUS: CPT | Performed by: STUDENT IN AN ORGANIZED HEALTH CARE EDUCATION/TRAINING PROGRAM

## 2025-06-19 PROCEDURE — 11000001 HC ACUTE MED/SURG PRIVATE ROOM

## 2025-06-19 PROCEDURE — 63600175 PHARM REV CODE 636 W HCPCS

## 2025-06-19 PROCEDURE — 80048 BASIC METABOLIC PNL TOTAL CA: CPT | Performed by: STUDENT IN AN ORGANIZED HEALTH CARE EDUCATION/TRAINING PROGRAM

## 2025-06-19 PROCEDURE — G0378 HOSPITAL OBSERVATION PER HR: HCPCS

## 2025-06-19 PROCEDURE — 36415 COLL VENOUS BLD VENIPUNCTURE: CPT | Performed by: STUDENT IN AN ORGANIZED HEALTH CARE EDUCATION/TRAINING PROGRAM

## 2025-06-19 PROCEDURE — 63600175 PHARM REV CODE 636 W HCPCS: Performed by: STUDENT IN AN ORGANIZED HEALTH CARE EDUCATION/TRAINING PROGRAM

## 2025-06-19 PROCEDURE — 85025 COMPLETE CBC W/AUTO DIFF WBC: CPT | Performed by: STUDENT IN AN ORGANIZED HEALTH CARE EDUCATION/TRAINING PROGRAM

## 2025-06-19 RX ORDER — MORPHINE SULFATE 4 MG/ML
2 INJECTION, SOLUTION INTRAMUSCULAR; INTRAVENOUS EVERY 6 HOURS PRN
Status: DISCONTINUED | OUTPATIENT
Start: 2025-06-19 | End: 2025-06-28

## 2025-06-19 RX ADMIN — FUROSEMIDE 20 MG: 10 INJECTION, SOLUTION INTRAMUSCULAR; INTRAVENOUS at 02:06

## 2025-06-19 RX ADMIN — FUROSEMIDE 20 MG: 10 INJECTION, SOLUTION INTRAMUSCULAR; INTRAVENOUS at 05:06

## 2025-06-19 RX ADMIN — ENOXAPARIN SODIUM 60 MG: 60 INJECTION SUBCUTANEOUS at 04:06

## 2025-06-19 RX ADMIN — FUROSEMIDE 20 MG: 10 INJECTION, SOLUTION INTRAMUSCULAR; INTRAVENOUS at 10:06

## 2025-06-19 RX ADMIN — LORAZEPAM 2 MG: 1 TABLET ORAL at 06:06

## 2025-06-19 RX ADMIN — LACTULOSE 10 G: 10 SOLUTION ORAL at 02:06

## 2025-06-19 RX ADMIN — MORPHINE SULFATE 2 MG: 4 INJECTION, SOLUTION INTRAMUSCULAR; INTRAVENOUS at 12:06

## 2025-06-19 RX ADMIN — LACTULOSE 10 G: 10 SOLUTION ORAL at 10:06

## 2025-06-19 NOTE — PROGRESS NOTES
[facility] Hospital Medicine Progress Note        Chief Complaint: Inpatient Follow-up for     HPI: Aman Humphrey is a 64 y.o. male who  has a past medical history of Carpal tunnel syndrome, HTN (hypertension), and Sciatica.. The patient presented to [facility] on 6/17/2025 with a primary complaint of abdominal pain of a few days, and overall feeling unwell for the past 1 month.  Patient mentions he has been feeling unwell and has been having early satiety for the past 1 month.  Endorses significant nausea, bloating, weight loss but denies any fever, chills, body aches, vomiting, changes in bowels.  As per previous documentation from 2023 it seems that patient has a known history liver cirrhosis and a hepatic lesion which is concerning for HCC, also is positive and treated for hepatitis-C in the past.     Vital signs on arrival are WNL except for elevated blood pressure, CBC unremarkable, CMP shows some electrolyte abnormalities-hypocalcemia, hypomagnesemia,.  AST/ALT are elevated 2-1.  Ammonia and Bilirubin WNL .  BNP of 159 with a normal troponin.  Alcohol and Tylenol level normal.  Cocaine positive on UDS.  UA negative.  CT abdomen and pelvis shows portal vein thrombosis with what seems to be acute on chronic thrombosis.  Cirrhosis with moderate ascites, cholelithiasis, and nonobstructing kidney stone.     Patient was placed on heparin as he will likely require a EGD to rule out varices by GI at some point during this hospital stay prior to discharge.  AFP to monitor for HCC.  We will order paracentesis with fluid studies.  Hypercoag study ordered to rule out any other causes of portal venous thrombosis.  Hep C RNA ordered.  We will consult GI for possible EGD and to see if they can recommend blood thinners prior to discharge.    Interval Hx: Very hungry , EGD scheduled for tomorrow    Case was discussed with patient's nurse and  on the floor.    Objective/physical  exam:  General: In no acute distress, afebrile  Chest: Clear to auscultation bilaterally  Heart: RRR, +S1, S2, no appreciable murmur  Abdomen: Soft, nontender, BS +  MSK: Warm, no lower extremity edema, no clubbing or cyanosis  Neurologic: Alert and oriented x4, Cranial nerve II-XII intact, Strength 5/5 in all 4 extremities    VITAL SIGNS: 24 HRS MIN & MAX LAST   Temp  Min: 97.8 °F (36.6 °C)  Max: 98.9 °F (37.2 °C) 98.5 °F (36.9 °C)   BP  Min: 124/63  Max: 153/75 (!) 145/69   Pulse  Min: 66  Max: 82  66   Resp  Min: 15  Max: 19 15   SpO2  Min: 96 %  Max: 99 % 99 %     I have reviewed the following labs:  Recent Labs   Lab 06/17/25 1755 06/18/25  0614 06/19/25  0529   WBC 7.43 7.34 8.84   RBC 3.72* 3.45* 3.45*   HGB 10.7* 9.9* 10.0*   HCT 34.4* 31.4* 31.0*   MCV 92.5 91.0 89.9   MCH 28.8 28.7 29.0   MCHC 31.1* 31.5* 32.3*   RDW 18.4* 18.2* 18.6*    154 167   MPV 10.2 10.4 10.7*     Recent Labs   Lab 06/17/25 1755 06/18/25  0614 06/19/25  0529    136 135*   K 4.3 3.8 4.1    108* 106   CO2 27 26 27   BUN 10.1 8.7 9.9   CREATININE 0.79 0.77 0.83   GLU 97 127* 103   CALCIUM 8.3* 7.9* 8.1*   MG  --  1.50* 1.60   ALBUMIN 1.9* 1.6*  --    PROT 8.6* 7.5  --    ALKPHOS 443* 373*  --    ALT 82* 72*  --    * 141*  --    BILITOT 1.4 1.4  --      Microbiology Results (last 7 days)       Procedure Component Value Units Date/Time    Body Fluid Culture [[id number]] Collected: 06/18/25 1347    Order Status: Completed Specimen: Body Fluid from Peritoneal Fluid Updated: 06/19/25 0641     Body Fluid Culture No Growth At 24 Hours    Gram Stain [[id number]] Collected: 06/18/25 1347    Order Status: Completed Specimen: Body Fluid from Peritoneal Fluid Updated: 06/18/25 1721     GRAM STAIN Rare WBC observed      No bacteria seen             See below for Radiology    Assessment/Plan:  Abdominal pain  Early satiety  Nausea  Cirrhosis of liver -meld score of 11 on this admission  Portal venous thrombosis -noted on  CT; hyper coag study and AFP ordered   Ascites -IR consulted for paracentesis studies  Nonobstructing kidney stone  Cholelithiasis  Hypomagnesemia  Cocaine positive on UDS  History of Hepatic adenoma with concerns of HCC  History of hep C -hep C RNA ordered     Additional medical history includes carpal tunnel syndrome, hypertension, sciatica     -CT scan shows portal venous thrombosis which seems to be acute on chronic thrombosis in addition to ascites, cholelithiasis, nonobstructing kidney stone  - Will need MRI Abd w/wo  -endorses abdominal pain, early satiety, nausea  -placed on heparin  -hep C RNA ordered to confirm treatment  -alpha fetoprotein ordered, suspecting HCC  -hypercoagulability workup ordered, although this is less likely cause  -GI consulted as patient may require EGD to evaluate for varices prior to treating with blood thinners  -pain control  -IR consulted for paracentesis with fluid studies  -start diet as EGD, if performed, is likely to happen tomorrow  -daily labs  -ammonia levels are normal, no signs of decompensated cirrhosis  -lactulose for bowel regimen     VTE Prophylaxis:  FD Lovenox    Patient condition:  Stable      Anticipated discharge and Disposition:         All diagnosis and differential diagnosis have been reviewed; assessment and plan has been documented; I have personally reviewed the labs and test results that are presently available; I have reviewed the patients medication list; I have reviewed the consulting providers response and recommendations. I have reviewed or attempted to review medical records based upon their availability    All of the patient's questions have been  addressed and answered. Patient's is agreeable to the above stated plan. I will continue to monitor closely and make adjustments to medical management as needed.    Portions of this note dictated using EMR integrated voice recognition software, and may be subject to voice recognition errors not corrected  at proofreading. Please contact writer for clarification if needed.   _____________________________________________________________________    Malnutrition Status:  Nutrition consulted. Most recent weight and BMI monitored-     Measurements:  Wt Readings from Last 1 Encounters:   06/18/25 59 kg (130 lb)   Body mass index is 19.2 kg/m².    Patient has been screened and assessed by RD.    Malnutrition Type:  Context:    Level: other (see comments) (Unable to assess)    Malnutrition Characteristic Summary:  Weight Loss (Malnutrition): other (see comments) (Does not meet criteria)  Energy Intake (Malnutrition): less than or equal to 75% for greater than or equal to 1 month  Fluid Accumulation (Malnutrition): other (see comments) (Not present)    Interventions/Recommendations (treatment strategy):        Scheduled Med:   enoxparin  1 mg/kg Subcutaneous Q12H (treatment, non-standard time)    folic acid  1 mg Oral Daily    furosemide (LASIX) injection  20 mg Intravenous Q8H    lactulose 10 gram/15 ml  10 g Oral TID    losartan  25 mg Oral Daily    multivitamin  1 tablet Oral Daily    NIFEdipine  30 mg Oral Daily    thiamine  100 mg Oral Daily      Continuous Infusions:     PRN Meds:    Current Facility-Administered Medications:     aluminum-magnesium hydroxide-simethicone, 30 mL, Oral, QID PRN    bisacodyL, 10 mg, Rectal, Daily PRN    dextrose 50%, 12.5 g, Intravenous, PRN    dextrose 50%, 25 g, Intravenous, PRN    glucagon (human recombinant), 1 mg, Intramuscular, PRN    glucose, 16 g, Oral, PRN    glucose, 24 g, Oral, PRN    lorazepam, 2 mg, Intravenous, Q15 Min PRN    LORazepam, 2 mg, Oral, Q4H PRN    melatonin, 6 mg, Oral, Nightly PRN    morphine, 2 mg, Intravenous, Q6H PRN    polyethylene glycol, 17 g, Oral, BID PRN    sodium chloride 0.9%, 10 mL, Intravenous, PRN     Radiology:  I have personally reviewed the following imaging and agree with the radiologist.     IR Paracentesis with Imaging  Narrative:  PROCEDURE:  Ultrasound Guided Paracentesis    CLINICAL HISTORY:  64-year-old male with cirrhosis, portal vein thrombus, and ascites    ANESTHESIA:  Local anesthesia    PHYSICIANS:  Neto Bird MD    PROCEDURAL SUMMARY:  After informed consent was obtained, the patient was placed supine on the ultrasound table. A limited ultrasound of the abdomen was performed with Dr. Bird present in the room.  This confirmed the presence of an appropriate volume of ascites for drainage.  The planned skin entry site and route for needle passage for paracentesis was then determined. The skin overlying the right lower quadrant of the abdomen was marked.  The site was then prepped and draped in the usual sterile fashion.    The soft tissues were anesthetized with lidocaine and a dermatotomy was performed.  Under ultrasound guidance, a sheath needle was advanced from the skin entry site into the peritoneal cavity.  When the needle entered the peritoneal cavity, fluid was aspirated.  The sheath was then advanced over the needle into the cavity.  The needle was removed.  Aspiration was performed and a total of 2.1 L of serous fluid was drained from the peritoneal cavity.  The catheter was then removed.  A sterile dressing was applied.    The patient tolerated the procedure well and was without any apparent complications.  Impression: Technically successful ultrasound-guided paracentesis.    Electronically signed by: Neto Bird  Date:    06/18/2025  Time:    15:02  CT Abdomen Pelvis With IV Contrast NO Oral Contrast  Narrative: EXAMINATION:  CT ABDOMEN PELVIS WITH IV CONTRAST    CLINICAL HISTORY:  Abdominal pain and abdominal distension    TECHNIQUE:  Axial CT images were obtained through the abdomen and pelvis following IV administration of contrast.  100 mL Omnipaque 350.  Coronal and sagittal reconstructions submitted and interpreted.  Automated exposure control, dose radiation lowering technique, was  utilized.    COMPARISON:  CT abdomen and pelvis without contrast from 10/18/2023    CT abdomen and pelvis from 10/16/2023    Abdominal sonogram from 06/02/2017    FINDINGS:  Cirrhotic morphology to the liver.  Thrombus is present within the distal segment of the main portal vein with extension into the right and left portal system.  Thrombus is occlusive at multiple segments of the right portal system.  Splenic vein and SMV appear patent.    There is geographic area of slightly increased enhancement at segment 8 measuring 4.2 x 4.0 cm.  No other hypervascular areas.  There are multiple areas of patchy decreased density in the right hepatic lobe which is felt to be secondary to portal vein thrombosis.    Spleen is nonenlarged.  Pancreas, adrenal glands and right kidney appear normal.  Nonobstructing left kidney stone measures 10 mm.  No hydronephrosis.    Cholelithiasis is present.  The bowel is nonobstructed.  Air and stool are present throughout the colon which appears within normal limits.  Normal appendix is present.  There is moderate volume ascites.  No free air.    Moderate atherosclerosis of the abdominal aorta and its branches is present.  Mild, diffuse bladder wall thickening is present.  No suspicious osteolytic or osteoblastic lesion.  Impression: 1. Portal vein thrombosis which is occlusive within the right portal system.  There is some collaterals within the annette hepatis with suggests some degree of chronic component.  Given the appearance of the thrombus in addition to the small amount of portal venous collaterals, favor acute on chronic thrombosis.  2. Sequelae of cirrhosis with moderate volume ascites and small gastroesophageal varices.  3. Hypervascular area in segment 8 (image 31, series 1).  Favor perfusion abnormality related to portal vein thrombosis.  However, consider multiphase abdominal MRI to evaluate for potential tumor.  4. Cholelithiasis.  5. Nonobstructing left-sided kidney  stone.    Electronically signed by: Mukesh Lu MD  Date:    06/18/2025  Time:    08:04  CTA Chest Non-Coronary (PE Studies)  Narrative: EXAMINATION:  CTA CHEST NON CORONARY (PE STUDIES)    CLINICAL HISTORY:  Pulmonary embolism suspected. 63 yo male presenting with feeling unwell for the past month and a half. He reports abdominal bloating, leg swelling, and decreased appetite. He reports generalized weakness as well. States he normally drinks alcohol daily but hasn't in the past 30 days or so.    TECHNIQUE:  Axial CT images were obtained through the chest after IV administration of contrast. 100 mL Omnipaque 350. MIP, coronal and sagittal reconstructions submitted and interpreted.  Automated exposure control, dose radiation lowering technique, was utilized.    COMPARISON:  None    FINDINGS:  Slightly limited exam secondary to contrast bolus timing.    Soft Tissues: Unremarkable.    Lines and Tubes: None.    Neck: The visualized soft tissues of the neck appear unremarkable.    Mediastinum: The mediastinal structures are within normal limits.    Heart: The heart size is within normal limits. Mild coronary artery calcification is seen.    Aorta: No aortic dissection or aneurysm is seen. Mild aortic calcification is seen in the thoracic aorta.    Pulmonary Arteries: No filling defects are seen in the pulmonary arteries to suggest pulmonary embolus.    Lungs: There is moderate non specific dependent change at the lung bases. Mild streaky linear opacity is seen consistent with scarring and subsegmental. No acute focal infiltrate or consolidation is seen.    Pleura: No effusions or pneumothorax are identified.    Bony Structures:    Spine: Subtle spondylolytic changes are seen in the thoracic spine.    Ribs: The ribs appear unremarkable.  Impression: Slightly limited exam of the pulmonary arteries secondary to contrast bolus timing.    1. No filling defects are seen in the pulmonary arteries to suggest pulmonary  embolus.    2. No acute focal infiltrate or consolidation is seen.    3. Details and other findings as discussed above.    [employer] concurrence    Electronically signed by: Mukesh Lu MD  Date:    06/18/2025  Time:    07:57      Naa Lee MD  Department of Hospital Medicine   [facility]   06/19/2025

## 2025-06-19 NOTE — PLAN OF CARE
06/19/25 1119   Discharge Assessment   Assessment Type Discharge Planning Assessment   Confirmed/corrected address, phone number and insurance Yes   Confirmed Demographics Correct on Facesheet   Source of Information patient   People in Home other relative(s)   Name(s) of People in Home Cousin   Do you expect to return to your current living situation? Yes   Do you have help at home or someone to help you manage your care at home? Yes   Home Accessibility stairs to enter home   Number of Stairs, Main Entrance four   Stair Railings, Main Entrance railings safe and in good condition   Home Layout Able to live on 1st floor   Equipment Currently Used at Home none   Readmission within 30 days? No   Patient currently being followed by outpatient case management? No   Do you currently have service(s) that help you manage your care at home? No   How do you get to doctors appointments? public transportation   Discharge Plan A Home with family   Discharge Plan B Home   Discharge Plan discussed with: Patient   Transition of Care Barriers None

## 2025-06-19 NOTE — NURSING
Received a call from GI Lab regarding EGD today. Spoke with ALISON López (GI LAB) at 1125. EGD rescheduled for 06/20/2025 at 0800 due to AM Labs and Lovenox was given this AM. Hold Lovenox per GI.

## 2025-06-19 NOTE — PROGRESS NOTES
Inpatient Nutrition Assessment    Admit Date: 6/17/2025   Total duration of encounter: 2 days   Patient Age: 64 y.o.    Nutrition Recommendation/Prescription     Continue Low Sodium diet as tolerated  Trial Boost Plus BID to aid with nutrition intake (360 kcal and 14 gm protein per serving)  Encourage intake of small, frequent meals 5-6 times throughout the day   Antiemetic PRN  Continue folic acid, MVI and thiamine supplementation for alcohol dependency   Monitor PO intake, labs and weight    Communication of Recommendations: reviewed with nurse and reviewed with family    Nutrition Assessment     Malnutrition Assessment/Nutrition-Focused Physical Exam       Malnutrition Level: other (see comments) (Unable to assess) (06/19/25 1256)  Energy Intake (Malnutrition): less than or equal to 75% for greater than or equal to 1 month (06/19/25 1256)  Weight Loss (Malnutrition): other (see comments) (Does not meet criteria) (06/19/25 1256)                 [place] (Muscle Loss): other (see comments) (unable to assess)                       Fluid Accumulation (Malnutrition): other (see comments) (Not present) (06/19/25 1256)        A minimum of two characteristics is recommended for diagnosis of either severe or non-severe malnutrition.    Chart Review    Reason Seen: malnutrition screening tool (MST)    Malnutrition Screening Tool Results   Have you recently lost weight without trying?: Unsure  Have you been eating poorly because of a decreased appetite?: No   MST Score: 2   Diagnosis:  Abdominal pain  Early satiety  Nausea  Cirrhosis of liver -meld score of 11 on this admission  Portal venous thrombosis -noted on CT; hyper coag study and AFP ordered   Ascites -IR consulted for paracentesis studies  Nonobstructing kidney stone  Cholelithiasis  Hypomagnesemia  Cocaine positive on UDS    Relevant Medical History: Carpal tunnel syndrome, HTN, Sciatica, cirrhosis, portal vein thrombosis, hepatic adenoma with concerns of  HCC, hepatitis C      Scheduled Medications:  enoxparin, 1 mg/kg, Q12H (treatment, non-standard time)  folic acid, 1 mg, Daily  furosemide (LASIX) injection, 20 mg, Q8H  lactulose 10 gram/15 ml, 10 g, TID  losartan, 25 mg, Daily  multivitamin, 1 tablet, Daily  NIFEdipine, 30 mg, Daily  thiamine, 100 mg, Daily    Continuous Infusions:   PRN Medications:  aluminum-magnesium hydroxide-simethicone, 30 mL, QID PRN  bisacodyL, 10 mg, Daily PRN  dextrose 50%, 12.5 g, PRN  dextrose 50%, 25 g, PRN  glucagon (human recombinant), 1 mg, PRN  glucose, 16 g, PRN  glucose, 24 g, PRN  lorazepam, 2 mg, Q15 Min PRN  LORazepam, 2 mg, Q4H PRN  melatonin, 6 mg, Nightly PRN  morphine, 2 mg, Q6H PRN  polyethylene glycol, 17 g, BID PRN  sodium chloride 0.9%, 10 mL, PRN    Calorie Containing IV Medications: no significant kcals from medications at this time    Recent Labs   Lab 06/17/25  1755 06/18/25  0018 06/18/25  0614 06/19/25  0529     --  136 135*   K 4.3  --  3.8 4.1   CALCIUM 8.3*  --  7.9* 8.1*   PHOS  --   --   --  3.6   MG  --   --  1.50* 1.60     --  108* 106   CO2 27  --  26 27   BUN 10.1  --  8.7 9.9   CREATININE 0.79  --  0.77 0.83   EGFRNORACEVR >60  --  >60 >60   GLU 97  --  127* 103   BILITOT 1.4  --  1.4  --    ALKPHOS 443*  --  373*  --    ALT 82*  --  72*  --    *  --  141*  --    ALBUMIN 1.9*  --  1.6*  --    AMMONIA  --  28.8  --   --    LIPASE 49  --   --   --    WBC 7.43  --  7.34 8.84   HGB 10.7*  --  9.9* 10.0*   HCT 34.4*  --  31.4* 31.0*     Nutrition Orders:  Diet Low Sodium (2 gm) Standard Tray  Diet NPO  Dietary nutrition supplements BID; Boost Plus Nutritional Drink - Any flavor    Appetite/Oral Intake: fair/50-75% of meals  Factors Affecting Nutritional Intake: abdominal pain, early satiety, excessive alcohol intake, and nausea  Social Needs Impacting Access to Food: unable to assess at this time; will attempt on follow-up  Food/Jewish/Cultural Preferences: unable to obtain  Food  "Allergies: no known food allergies  Last Bowel Movement: 25  Wound(s):  none documented    Comments    : Pt asleep at time of rounds, brother in room unsure of patient's appetite or weight history PTA. Pt was NPO for EGD today, however EGD rescheduled for tomorrow. Diet resumed and pt NPOMN. Per MD note, documentation of early satiety x 1 month with associated weight loss. +nausea, LBM  noted. No recent weight loss noted past year per EMR weight history. Will attempt additional history and NFPE upon follow up as appropriate. Will trial ONS BID to aid with nutrition intake  documented decreased PO intake x 1 month.    Anthropometrics    Height: 5' 9" (175.3 cm), Height Method: Stated  Last Weight: 59 kg (130 lb) (254), Weight Method: Standard Scale  BMI (Calculated): 19.2  BMI Classification: normal (BMI 18.5-24.9)        Ideal Body Weight (IBW), Male: 160 lb     % Ideal Body Weight, Male (lb): 81.25 %                 Usual Body Weight (UBW), k kg  % Usual Body Weight: 100.15     Usual Weight Provided By: EMR weight history    Wt Readings from Last 5 Encounters:   25 59 kg (130 lb)   25 59 kg (130 lb)   23 63 kg (138 lb 14.2 oz)   23 63.5 kg (140 lb)   23 59 kg (130 lb)     Weight Change(s) Since Admission:   Wt Readings from Last 1 Encounters:   25 59 kg (130 lb)   25 1650 59 kg (130 lb)   Admit Weight: 59 kg (130 lb) (25 1650), Weight Method: Standard Scale    Estimated Needs    Weight Used For Calorie Calculations: 59 kg (130 lb 1.1 oz)  Energy Calorie Requirements (kcal): 1650 kcal (1.2 SF)     Weight Used For Protein Calculations: 59 kg (130 lb 1.1 oz)  Protein Requirements: 65-71 gm (1.1-1.2 gm/kg)  Fluid Requirements (mL): 1770 mL (30 mL/kg)        Enteral Nutrition     Patient not receiving enteral nutrition at this time.    Parenteral Nutrition     Patient not receiving parenteral nutrition support at this " time.    Evaluation of Received Nutrient Intake    Calories: not meeting estimated needs  Protein: not meeting estimated needs    Patient Education     Not applicable.    Nutrition Diagnosis     PES: Inadequate energy intake related to inability to consume sufficient nutrients as evidenced by early satiety x 1 month. (new)     Nutrition Interventions     Intervention(s): general/healthful diet, modified composition of meals/snacks, commercial beverage, multivitamin/mineral supplement therapy, prescription medication, and collaboration with other providers    Goal: Meet greater than 80% of nutritional needs by follow-up. (new)  Goal: Consume % of oral supplements by follow-up. (new)    Nutrition Goals & Monitoring     Dietitian will monitor: food and beverage intake, weight, and gastrointestinal profile  Discharge planning: continue low sodium diet with Boost or similar oral supplements  Nutrition Risk/Follow-Up: patient at increased nutrition risk; dietitian will follow-up twice weekly   Please consult if re-assessment needed sooner.

## 2025-06-20 ENCOUNTER — ANESTHESIA (OUTPATIENT)
Dept: ENDOSCOPY | Facility: HOSPITAL | Age: 65
End: 2025-06-20
Payer: MEDICAID

## 2025-06-20 ENCOUNTER — ANESTHESIA EVENT (OUTPATIENT)
Dept: ENDOSCOPY | Facility: HOSPITAL | Age: 65
End: 2025-06-20
Payer: MEDICAID

## 2025-06-20 LAB
ALBUMIN SERPL-MCNC: 1.7 G/DL (ref 3.4–4.8)
ALBUMIN/GLOB SERPL: 0.3 RATIO (ref 1.1–2)
ALP SERPL-CCNC: 440 UNIT/L (ref 40–150)
ALT SERPL-CCNC: 123 UNIT/L (ref 0–55)
ANION GAP SERPL CALC-SCNC: 4 MEQ/L
APTT PPP: 40.8 SECONDS (ref 23.2–33.7)
AST SERPL-CCNC: 224 UNIT/L (ref 11–45)
BASOPHILS # BLD AUTO: 0.04 X10(3)/MCL
BASOPHILS NFR BLD AUTO: 0.5 %
BILIRUB SERPL-MCNC: 1.1 MG/DL
BUN SERPL-MCNC: 12.7 MG/DL (ref 8.4–25.7)
CALCIUM SERPL-MCNC: 7.9 MG/DL (ref 8.8–10)
CHLORIDE SERPL-SCNC: 102 MMOL/L (ref 98–107)
CO2 SERPL-SCNC: 31 MMOL/L (ref 23–31)
CREAT SERPL-MCNC: 0.89 MG/DL (ref 0.72–1.25)
CREAT/UREA NIT SERPL: 14
EOSINOPHIL # BLD AUTO: 0.24 X10(3)/MCL (ref 0–0.9)
EOSINOPHIL NFR BLD AUTO: 2.8 %
ERYTHROCYTE [DISTWIDTH] IN BLOOD BY AUTOMATED COUNT: 18.6 % (ref 11.5–17)
GFR SERPLBLD CREATININE-BSD FMLA CKD-EPI: >60 ML/MIN/1.73/M2
GLOBULIN SER-MCNC: 6.1 GM/DL (ref 2.4–3.5)
GLUCOSE SERPL-MCNC: 97 MG/DL (ref 82–115)
HCT VFR BLD AUTO: 33.1 % (ref 42–52)
HGB BLD-MCNC: 10.7 G/DL (ref 14–18)
IMM GRANULOCYTES # BLD AUTO: 0.03 X10(3)/MCL (ref 0–0.04)
IMM GRANULOCYTES NFR BLD AUTO: 0.3 %
INR PPP: 1.4
LYMPHOCYTES # BLD AUTO: 2.2 X10(3)/MCL (ref 0.6–4.6)
LYMPHOCYTES NFR BLD AUTO: 25.5 %
MCH RBC QN AUTO: 29.6 PG (ref 27–31)
MCHC RBC AUTO-ENTMCNC: 32.3 G/DL (ref 33–36)
MCV RBC AUTO: 91.7 FL (ref 80–94)
MONOCYTES # BLD AUTO: 1 X10(3)/MCL (ref 0.1–1.3)
MONOCYTES NFR BLD AUTO: 11.6 %
NEUTROPHILS # BLD AUTO: 5.11 X10(3)/MCL (ref 2.1–9.2)
NEUTROPHILS NFR BLD AUTO: 59.3 %
NRBC BLD AUTO-RTO: 0 %
PATH REV: NORMAL
PLATELET # BLD AUTO: 151 X10(3)/MCL (ref 130–400)
PMV BLD AUTO: 10.6 FL (ref 7.4–10.4)
POCT GLUCOSE: 111 MG/DL (ref 70–110)
POTASSIUM SERPL-SCNC: 3.8 MMOL/L (ref 3.5–5.1)
PROT SERPL-MCNC: 7.8 GM/DL (ref 5.8–7.6)
PROTHROMBIN TIME: 16.7 SECONDS (ref 12.5–14.5)
PSYCHE PATHOLOGY RESULT: NORMAL
RBC # BLD AUTO: 3.61 X10(6)/MCL (ref 4.7–6.1)
SODIUM SERPL-SCNC: 137 MMOL/L (ref 136–145)
WBC # BLD AUTO: 8.62 X10(3)/MCL (ref 4.5–11.5)

## 2025-06-20 PROCEDURE — 85025 COMPLETE CBC W/AUTO DIFF WBC: CPT | Performed by: INTERNAL MEDICINE

## 2025-06-20 PROCEDURE — 81339 MPL GENE SEQ ALYS EXON 10: CPT

## 2025-06-20 PROCEDURE — 99223 1ST HOSP IP/OBS HIGH 75: CPT | Mod: ,,, | Performed by: STUDENT IN AN ORGANIZED HEALTH CARE EDUCATION/TRAINING PROGRAM

## 2025-06-20 PROCEDURE — 63600175 PHARM REV CODE 636 W HCPCS: Performed by: STUDENT IN AN ORGANIZED HEALTH CARE EDUCATION/TRAINING PROGRAM

## 2025-06-20 PROCEDURE — 37000009 HC ANESTHESIA EA ADD 15 MINS: Performed by: INTERNAL MEDICINE

## 2025-06-20 PROCEDURE — 63600175 PHARM REV CODE 636 W HCPCS: Performed by: NURSE ANESTHETIST, CERTIFIED REGISTERED

## 2025-06-20 PROCEDURE — 85730 THROMBOPLASTIN TIME PARTIAL: CPT | Performed by: INTERNAL MEDICINE

## 2025-06-20 PROCEDURE — 11000001 HC ACUTE MED/SURG PRIVATE ROOM

## 2025-06-20 PROCEDURE — 43235 EGD DIAGNOSTIC BRUSH WASH: CPT | Performed by: INTERNAL MEDICINE

## 2025-06-20 PROCEDURE — 25000003 PHARM REV CODE 250: Performed by: NURSE ANESTHETIST, CERTIFIED REGISTERED

## 2025-06-20 PROCEDURE — 81219 CALR GENE COM VARIANTS: CPT

## 2025-06-20 PROCEDURE — 36415 COLL VENOUS BLD VENIPUNCTURE: CPT | Performed by: INTERNAL MEDICINE

## 2025-06-20 PROCEDURE — 36415 COLL VENOUS BLD VENIPUNCTURE: CPT | Performed by: STUDENT IN AN ORGANIZED HEALTH CARE EDUCATION/TRAINING PROGRAM

## 2025-06-20 PROCEDURE — 80053 COMPREHEN METABOLIC PANEL: CPT | Performed by: INTERNAL MEDICINE

## 2025-06-20 PROCEDURE — 37000008 HC ANESTHESIA 1ST 15 MINUTES: Performed by: INTERNAL MEDICINE

## 2025-06-20 PROCEDURE — G0378 HOSPITAL OBSERVATION PER HR: HCPCS

## 2025-06-20 PROCEDURE — 25000003 PHARM REV CODE 250: Performed by: STUDENT IN AN ORGANIZED HEALTH CARE EDUCATION/TRAINING PROGRAM

## 2025-06-20 PROCEDURE — 85610 PROTHROMBIN TIME: CPT | Performed by: INTERNAL MEDICINE

## 2025-06-20 PROCEDURE — 81270 JAK2 GENE: CPT | Performed by: STUDENT IN AN ORGANIZED HEALTH CARE EDUCATION/TRAINING PROGRAM

## 2025-06-20 RX ORDER — LIDOCAINE HYDROCHLORIDE 10 MG/ML
INJECTION, SOLUTION EPIDURAL; INFILTRATION; INTRACAUDAL; PERINEURAL
Status: DISCONTINUED | OUTPATIENT
Start: 2025-06-20 | End: 2025-06-20

## 2025-06-20 RX ORDER — ONDANSETRON HYDROCHLORIDE 2 MG/ML
4 INJECTION, SOLUTION INTRAVENOUS ONCE
Status: CANCELLED | OUTPATIENT
Start: 2025-06-20 | End: 2025-06-20

## 2025-06-20 RX ORDER — PROPOFOL 10 MG/ML
VIAL (ML) INTRAVENOUS
Status: DISCONTINUED | OUTPATIENT
Start: 2025-06-20 | End: 2025-06-20

## 2025-06-20 RX ORDER — LIDOCAINE HYDROCHLORIDE 10 MG/ML
1 INJECTION, SOLUTION EPIDURAL; INFILTRATION; INTRACAUDAL; PERINEURAL ONCE
Status: CANCELLED | OUTPATIENT
Start: 2025-06-20 | End: 2025-06-20

## 2025-06-20 RX ORDER — METOCLOPRAMIDE HYDROCHLORIDE 5 MG/ML
10 INJECTION INTRAMUSCULAR; INTRAVENOUS EVERY 10 MIN PRN
Status: CANCELLED | OUTPATIENT
Start: 2025-06-20

## 2025-06-20 RX ORDER — SODIUM CHLORIDE 9 MG/ML
INJECTION, SOLUTION INTRAVENOUS CONTINUOUS
Status: CANCELLED | OUTPATIENT
Start: 2025-06-20

## 2025-06-20 RX ADMIN — FUROSEMIDE 20 MG: 10 INJECTION, SOLUTION INTRAMUSCULAR; INTRAVENOUS at 03:06

## 2025-06-20 RX ADMIN — FUROSEMIDE 20 MG: 10 INJECTION, SOLUTION INTRAMUSCULAR; INTRAVENOUS at 06:06

## 2025-06-20 RX ADMIN — PROPOFOL 20 MG: 10 INJECTION, EMULSION INTRAVENOUS at 10:06

## 2025-06-20 RX ADMIN — LACTULOSE 10 G: 10 SOLUTION ORAL at 08:06

## 2025-06-20 RX ADMIN — PROPOFOL 50 MG: 10 INJECTION, EMULSION INTRAVENOUS at 10:06

## 2025-06-20 RX ADMIN — SODIUM CHLORIDE, SODIUM GLUCONATE, SODIUM ACETATE, POTASSIUM CHLORIDE AND MAGNESIUM CHLORIDE: 526; 502; 368; 37; 30 INJECTION, SOLUTION INTRAVENOUS at 10:06

## 2025-06-20 RX ADMIN — LACTULOSE 10 G: 10 SOLUTION ORAL at 03:06

## 2025-06-20 RX ADMIN — FUROSEMIDE 20 MG: 10 INJECTION, SOLUTION INTRAMUSCULAR; INTRAVENOUS at 09:06

## 2025-06-20 RX ADMIN — LIDOCAINE HYDROCHLORIDE 100 MG: 10 INJECTION, SOLUTION EPIDURAL; INFILTRATION; INTRACAUDAL; PERINEURAL at 10:06

## 2025-06-20 NOTE — TRANSFER OF CARE
"Anesthesia Transfer of Care Note    Patient: Aman Humphrey    Procedure(s) Performed: Procedure(s) (LRB):  EGD (N/A)    Patient location: GI    Anesthesia Type: MAC    Transport from OR: Transported from OR on room air with adequate spontaneous ventilation    Post pain: adequate analgesia    Post assessment: no apparent anesthetic complications    Post vital signs: stable    Level of consciousness: awake    Nausea/Vomiting: no nausea/vomiting    Complications: none    Transfer of care protocol was followed      Last vitals: Visit Vitals  BP (!) 145/79 (BP Location: Left arm, Patient Position: Sitting)   Pulse 76   Temp 36.5 °C (97.7 °F) (Tympanic)   Resp 14   Ht 5' 9" (1.753 m)   Wt 59 kg (130 lb)   SpO2 98%   BMI 19.20 kg/m²     "

## 2025-06-20 NOTE — ANESTHESIA PREPROCEDURE EVALUATION
06/20/2025  Aman Humphrey is a 64 y.o., male with a past medical history of multiple medical issues including  cirrhosis, elevated alpha-fetoprotein, Carpal tunnel syndrome, HTN, Sciatica, and noncompliance who was admitted with the abdominal pain.  Imaging study consistent with portal vein thrombosis within the right portal system which is occlusive.  Some collaterals reported suggestive of chronicity.  Moderate volume ascites as well as cancer esophageal varices (small).  Presently on heparin. Follow up ascitic fluid analysis.          Presents for EGD to exclude any underlying varices.     Other Medical History   Carpal tunnel syndrome Sciatica   HTN (hypertension)    Hepatocellular carcinoma    Surgical History    SKIN GRAFT      Problem List  Current as of 06/20/25 0651  Chronic liver disease Encephalopathy, metabolic   Essential hypertension HCC (hepatocellular carcinoma)   Hepatitis C, chronic Tobacco dependency     H/H: 10/31  PLT: 167  PTT:  Urine drug screen Positive for Cocaine 6/18/25.  EKG: SB    Pre-op Assessment    I have reviewed the Patient Summary Reports.     I have reviewed the Nursing Notes. I have reviewed the NPO Status.   I have reviewed the Medications.     Review of Systems  Anesthesia Hx:  No problems with previous Anesthesia                Social:  Non-Smoker       Cardiovascular:     Hypertension                                          Hepatic/GI:      Liver Disease, Hepatitis              Neurological:    Neuromuscular Disease,                                   Psych:  Psychiatric History                  Physical Exam  General: Lethargic and Cachexia  Minimally cooperative. Lying in fetal position with blanket over his head. States he's hungry. Does not cooperate with airway exam  Airway:  Mallampati: unable to assess   TM Distance: Normal  Neck ROM: Normal  ROM    Dental:  Intact    Chest/Lungs:  Clear to auscultation, Normal Respiratory Rate    Heart:  Rate: Normal  Rhythm: Regular Rhythm  Sounds: Normal    Abdomen:  Normal, Soft, Nontender        Anesthesia Plan  Type of Anesthesia, risks & benefits discussed:    Anesthesia Type: MAC  Intra-op Monitoring Plan: Standard ASA Monitors  Post Op Pain Control Plan: multimodal analgesia  Induction:  IV  Airway Plan: Direct  Informed Consent: Informed consent signed with the Patient and all parties understand the risks and agree with anesthesia plan.  All questions answered.   ASA Score: 4  Day of Surgery Review of History & Physical: H&P Update referred to the surgeon/provider.    Ready For Surgery From Anesthesia Perspective.     .

## 2025-06-20 NOTE — PROCEDURES
Aman Humphrey   MRN: [id number]   ADMISSION DATE: 2025  : 1960  AGE: 64 y.o.    DATE OF PROCEDURE:  2025     PROCEDURE:  EGD, diagnostic    SURGEON: LILLIANA BURTON    PREOPERATIVE DIAGNOSIS:  History of liver cirrhosis, history of portal vein thrombosis, rule out esophageal varices    POSTOPERATIVE DIAGNOSIS:  1. Grade 1 esophageal varices with no stigmata of recent bleed.    2. Portal hypertensive gastropathy, mild.  Limited visualization of the gastric mucosa.  3. Probable gastroparesis.  Moderate-to-large amount of food residue in the fundus, body and antrum with limited visualization.   4.  Mild-to-moderate antral gastritis.  No biopsies were obtained.  Otherwise normal exam.    HISTORY AND PHYSICAL:  As per preoperative note.      DESCRIPTION OF PROCEDURE:  Informed consent was obtained.  Patient was placed in left lateral position.  Sedation per anesthesia service.  Olympus video gastroscope was introduced into the oral cavity and esophagus was intubated under direct visualization. The scope was carefully advanced to the distal duodenum.  Patient tolerated the procedure well without any complications.    FINDINGS:  Esophagus:  There was evidence of grade 1 esophageal varices in distal esophagus with no stigmata of recent bleeding.  Photodocumentation was obtained.  Stomach: There was moderate-to-large amount of food residue in fundus, body and antrum limiting visualization of significant portion of mucosa.  Not able to rule out gastric varices on retroflexion.  There was evidence of mosaic appearance of the mucosa present in fundus and body suggestive of portal hypertensive gastropathy.  No evidence of bleeding to the extent of visualization.  No ulcerations noted.    Duodenum: Duodenal bulb, 2nd and 3rd portion of the duodenum appeared unremarkable to the extent of exam.    ESTIMATED BLOOD LOSS:  None    COMPLICATIONS:  None    RECOMMENDATIONS:  1. Resume previous diet.    2. Okay to proceed  with the anticoagulation from GI standpoint.  NeedS periodic monitoring of the lab workup.

## 2025-06-20 NOTE — ANESTHESIA POSTPROCEDURE EVALUATION
Anesthesia Post Evaluation    Patient: Aman Humphrey    Procedure(s) Performed: Procedure(s) (LRB):  EGD (N/A)    Final Anesthesia Type: MAC      Patient location during evaluation: PACU  Patient participation: Yes- Able to Participate  Level of consciousness: awake and alert  Post-procedure vital signs: reviewed and stable  Pain management: adequate  Airway patency: patent  PERNELL mitigation strategies: Extubation and recovery carried out in lateral, semiupright, or other nonsupine position  PONV status at discharge: No PONV  Anesthetic complications: no      Cardiovascular status: blood pressure returned to baseline  Respiratory status: room air and unassisted  Hydration status: euvolemic  Follow-up not needed.  Comments: PT. Appears to have adequately recovered from Anesthetic. VSS, airway patient. No apparent complications noted.              Vitals Value Taken Time   /77 06/20/25 11:13   Temp 36.4 °C (97.5 °F) 06/20/25 10:23   Pulse 75 06/20/25 11:13   Resp 16 06/20/25 11:13   SpO2 98 % 06/20/25 11:13         No case tracking events are documented in the log.      Pain/Jitendra Score: Pain Rating Prior to Med Admin: 7 (6/19/2025 12:08 AM)  Pain Rating Post Med Admin: 3 (6/19/2025 12:38 AM)  Jitendra Score: 10 (6/20/2025 11:25 AM)

## 2025-06-20 NOTE — CONSULTS
[facility] - Oncology Acute  Hematology/Oncology  Consult Note    Patient Name: Aman Humphrey  MRN: [id number]  Admission Date: 6/17/2025  Hospital Length of Stay: 3 days  Attending Provider: Reyes, Thairy G, DO  Consulting Provider: Elizabeth K Lejeune, MD  Principal Problem:<principal problem not specified>    Inpatient consult to Hematology  Consult performed by: Lejeune, Elizabeth Kennedy, MD  Consult ordered by: aNa Lee MD        Subjective:     HPI:   65yo M presented on 6/17/25 with weakness and abdominal pain. He has HCV cirrhosis with history of hepatic lesion which was concerning for HCC. Imaging revealed acute on chronic thrombosis of port vein and cirrhosis with moderate ascites. He is being evaluated by GI for his cirrhosis and for esophageal varices. Hematology was consulted for PVT.     This morning he does not want to talk to me. Upset he doesn't get to eat for EGD. I was able to discuss his PVT and enlarging liver lesion. I explained the role for workup. I discussed taking anticoagulation outpatient. He had zero interest in doing this and said he wasn't going to take any medicine.     Oncology Treatment Plan:   [No matching plan found]    Medications:  Continuous Infusions:    Scheduled Meds:   enoxparin  1 mg/kg Subcutaneous Q12H (treatment, non-standard time)    folic acid  1 mg Oral Daily    furosemide (LASIX) injection  20 mg Intravenous Q8H    lactulose 10 gram/15 ml  10 g Oral TID    losartan  25 mg Oral Daily    multivitamin  1 tablet Oral Daily    NIFEdipine  30 mg Oral Daily    thiamine  100 mg Oral Daily     PRN Meds:  Current Facility-Administered Medications:     aluminum-magnesium hydroxide-simethicone, 30 mL, Oral, QID PRN    bisacodyL, 10 mg, Rectal, Daily PRN    dextrose 50%, 12.5 g, Intravenous, PRN    dextrose 50%, 25 g, Intravenous, PRN    glucagon (human recombinant), 1 mg, Intramuscular, PRN    glucose, 16 g, Oral, PRN    glucose, 24 g, Oral, PRN     lorazepam, 2 mg, Intravenous, Q15 Min PRN    LORazepam, 2 mg, Oral, Q4H PRN    melatonin, 6 mg, Oral, Nightly PRN    morphine, 2 mg, Intravenous, Q6H PRN    polyethylene glycol, 17 g, Oral, BID PRN    sodium chloride 0.9%, 10 mL, Intravenous, PRN     Review of patient's allergies indicates:  No Known Allergies     Past Medical History:   Diagnosis Date    Carpal tunnel syndrome     HTN (hypertension)     Sciatica      Past Surgical History:   Procedure Laterality Date    SKIN GRAFT       Family History    None       Tobacco Use    Smoking status: Some Days     Types: Cigarettes    Smokeless tobacco: Never   Substance and Sexual Activity    Alcohol use: Yes     Comment: daily    Drug use: Not Currently    Sexual activity: Not on file       Review of Systems   Constitutional:  Negative for fatigue and fever.   HENT:  Negative for sore throat.    Eyes:  Negative for visual disturbance.   Respiratory:  Negative for cough and shortness of breath.    Cardiovascular:  Negative for chest pain.   Gastrointestinal:  Positive for abdominal pain. Negative for constipation, diarrhea, nausea and vomiting.   Genitourinary:  Negative for dysuria.   Musculoskeletal:  Negative for back pain.   Skin:  Negative for rash.   Neurological:  Negative for weakness.     Objective:     Vital Signs (Most Recent):  Temp: 97.7 °F (36.5 °C) (06/20/25 0850)  Pulse: 76 (06/20/25 0850)  Resp: 14 (06/20/25 0850)  BP: (!) 145/79 (06/20/25 0850)  SpO2: 98 % (room air) (06/20/25 0850) Vital Signs (24h Range):  Temp:  [97.7 °F (36.5 °C)-99 °F (37.2 °C)] 97.7 °F (36.5 °C)  Pulse:  [66-91] 76  Resp:  [14-15] 14  SpO2:  [98 %-99 %] 98 %  BP: (123-149)/(55-79) 145/79     Weight: 59 kg (130 lb)  Body mass index is 19.2 kg/m².  Body surface area is 1.69 meters squared.    No intake or output data in the 24 hours ending 06/20/25 0907    Physical Exam  Constitutional:       General: He is not in acute distress.     Appearance: He is ill-appearing.   HENT:       Head: Normocephalic and atraumatic.      Nose: Nose normal.      Mouth/Throat:      Mouth: Mucous membranes are moist.      Pharynx: Oropharynx is clear.   Eyes:      Extraocular Movements: Extraocular movements intact.      Conjunctiva/sclera: Conjunctivae normal.      Pupils: Pupils are equal, round, and reactive to light.   Cardiovascular:      Rate and Rhythm: Normal rate and regular rhythm.      Pulses: Normal pulses.      Heart sounds: Normal heart sounds. No murmur heard.  Pulmonary:      Effort: Pulmonary effort is normal. No respiratory distress.      Breath sounds: Normal breath sounds.   Abdominal:      General: There is no distension.      Palpations: Abdomen is soft.      Tenderness: There is no abdominal tenderness.   Musculoskeletal:         General: Normal range of motion.      Cervical back: Normal range of motion and neck supple.      Right lower leg: No edema.      Left lower leg: No edema.   Lymphadenopathy:      Cervical: No cervical adenopathy.   Skin:     General: Skin is warm and dry.   Neurological:      General: No focal deficit present.      Mental Status: He is alert and oriented to person, place, and time.         Significant Labs:   CBC:   Recent Labs   Lab 06/19/25  0529 06/20/25  0639   WBC 8.84 8.62   HGB 10.0* 10.7*   HCT 31.0* 33.1*    151    and CMP:   Recent Labs   Lab 06/19/25  0529 06/20/25  0639   * 137   K 4.1 3.8    102   CO2 27 31    97   BUN 9.9 12.7   CREATININE 0.83 0.89   CALCIUM 8.1* 7.9*   PROT  --  7.8*   ALBUMIN  --  1.7*   BILITOT  --  1.1   ALKPHOS  --  440*   AST  --  224*   ALT  --  123*       Diagnostic Results:  Reviewed in EPIC    Assessment/Plan:     Portal Vein Thrombosis - Likely due to his cirrhosis and liver disease, however will evaluate for other hypercoag causes. Some of the workup cannot be done inpatient or in the setting of an acute thrombosis - however I will order what can be appropriately done.     Possible Liver Mass -  He was seen in October '24 while inpatient to discuss possible liver mass suspicious for HCC in the setting of elevated AFP and background cirrhosis. Imaging now with possible growth of that area of concern now up to 4.2cm.     I discussed both of these issues with the patient this morning. I asked if he would be able and willing to take a blood thinner pill once a day and undergo further imaging or biopsy of his liver mass given the concern for cancer. He said that would require too much work and he has no support. He had little interest in talking to me, discussing his health, or following up with me outpatient. This is similar to discussion other oncologists have had with him previously.     - Consider consulting social work to help with placement as patient current lives with friends  - I have very high doubt he will actually take anticoagulation upon discharge, but if he does would recommend Xarelto 20mg daily given the once a day requirement. Of course with his varices, the risks/benefits of anticoagulation would need to be discussed.   - Will perform hypercoag workup, however I suspect this is more so due to his liver disease.   - He has zero interest in further workup or treatment within our department, please reach out should that change.     Thank you for your consult.     Elizabeth K Lejeune, MD  Hematology/Oncology  [facility] - Oncology [facility]

## 2025-06-21 PROCEDURE — 25000003 PHARM REV CODE 250: Performed by: INTERNAL MEDICINE

## 2025-06-21 PROCEDURE — 25500020 PHARM REV CODE 255: Performed by: STUDENT IN AN ORGANIZED HEALTH CARE EDUCATION/TRAINING PROGRAM

## 2025-06-21 PROCEDURE — 25000003 PHARM REV CODE 250

## 2025-06-21 PROCEDURE — 63600175 PHARM REV CODE 636 W HCPCS

## 2025-06-21 PROCEDURE — A9577 INJ MULTIHANCE: HCPCS | Performed by: STUDENT IN AN ORGANIZED HEALTH CARE EDUCATION/TRAINING PROGRAM

## 2025-06-21 PROCEDURE — G0378 HOSPITAL OBSERVATION PER HR: HCPCS

## 2025-06-21 PROCEDURE — 25000003 PHARM REV CODE 250: Performed by: STUDENT IN AN ORGANIZED HEALTH CARE EDUCATION/TRAINING PROGRAM

## 2025-06-21 PROCEDURE — 63600175 PHARM REV CODE 636 W HCPCS: Performed by: STUDENT IN AN ORGANIZED HEALTH CARE EDUCATION/TRAINING PROGRAM

## 2025-06-21 PROCEDURE — 11000001 HC ACUTE MED/SURG PRIVATE ROOM

## 2025-06-21 RX ADMIN — NIFEDIPINE 30 MG: 30 TABLET, FILM COATED, EXTENDED RELEASE ORAL at 10:06

## 2025-06-21 RX ADMIN — LACTULOSE 10 G: 10 SOLUTION ORAL at 10:06

## 2025-06-21 RX ADMIN — FUROSEMIDE 20 MG: 10 INJECTION, SOLUTION INTRAMUSCULAR; INTRAVENOUS at 03:06

## 2025-06-21 RX ADMIN — FUROSEMIDE 20 MG: 10 INJECTION, SOLUTION INTRAMUSCULAR; INTRAVENOUS at 10:06

## 2025-06-21 RX ADMIN — RIVAROXABAN 20 MG: 10 TABLET, FILM COATED ORAL at 04:06

## 2025-06-21 RX ADMIN — FOLIC ACID 1 MG: 1 TABLET ORAL at 10:06

## 2025-06-21 RX ADMIN — THERA TABS 1 TABLET: TAB at 10:06

## 2025-06-21 RX ADMIN — FUROSEMIDE 20 MG: 10 INJECTION, SOLUTION INTRAMUSCULAR; INTRAVENOUS at 05:06

## 2025-06-21 RX ADMIN — LOSARTAN POTASSIUM 25 MG: 25 TABLET, FILM COATED ORAL at 10:06

## 2025-06-21 RX ADMIN — GADOBENATE DIMEGLUMINE 11 ML: 529 INJECTION, SOLUTION INTRAVENOUS at 09:06

## 2025-06-21 RX ADMIN — MORPHINE SULFATE 2 MG: 4 INJECTION, SOLUTION INTRAMUSCULAR; INTRAVENOUS at 05:06

## 2025-06-21 RX ADMIN — THIAMINE HCL TAB 100 MG 100 MG: 100 TAB at 10:06

## 2025-06-21 NOTE — PROGRESS NOTES
[facility] Hospital Medicine Progress Note        Chief Complaint: Inpatient Follow-up for     HPI: Aman Humphrey is a 64 y.o. male who  has a past medical history of Carpal tunnel syndrome, HTN (hypertension), and Sciatica.. The patient presented to [facility] on 6/17/2025 with a primary complaint of abdominal pain of a few days, and overall feeling unwell for the past 1 month.  Patient mentions he has been feeling unwell and has been having early satiety for the past 1 month.  Endorses significant nausea, bloating, weight loss but denies any fever, chills, body aches, vomiting, changes in bowels.  As per previous documentation from 2023 it seems that patient has a known history liver cirrhosis and a hepatic lesion which is concerning for HCC, also is positive and treated for hepatitis-C in the past.     Vital signs on arrival are WNL except for elevated blood pressure, CBC unremarkable, CMP shows some electrolyte abnormalities-hypocalcemia, hypomagnesemia,.  AST/ALT are elevated 2-1.  Ammonia and Bilirubin WNL .  BNP of 159 with a normal troponin.  Alcohol and Tylenol level normal.  Cocaine positive on UDS.  UA negative.  CT abdomen and pelvis shows portal vein thrombosis with what seems to be acute on chronic thrombosis.  Cirrhosis with moderate ascites, cholelithiasis, and nonobstructing kidney stone.     Patient was placed on heparin as he will likely require a EGD to rule out varices by GI at some point during this hospital stay prior to discharge.  AFP to monitor for HCC.  We will order paracentesis with fluid studies.  Hypercoag study ordered to rule out any other causes of portal venous thrombosis.  Hep C RNA ordered.  We will consult GI for possible EGD and to see if they can recommend blood thinners prior to discharge.    Interval Hx: EGD completed, food contents in stomach. Grade 1 varices noted, OK to start AC. Sister and brother in law at bedside who are trying to encourage  pt pursue further work up of his tumor. Pt is concerned that is he doesn't have his own place right now.    Case was discussed with patient's nurse and  on the floor.    Objective/physical exam:  General: In no acute distress, afebrile  Chest: Clear to auscultation bilaterally  Heart: RRR, +S1, S2, no appreciable murmur  Abdomen: Soft, nontender, BS +  MSK: Warm, no lower extremity edema, no clubbing or cyanosis  Neurologic: Alert and oriented x4, Cranial nerve II-XII intact, Strength 5/5 in all 4 extremities    VITAL SIGNS: 24 HRS MIN & MAX LAST   Temp  Min: 97.5 °F (36.4 °C)  Max: 98.5 °F (36.9 °C) 98.5 °F (36.9 °C)   BP  Min: 118/69  Max: 161/76 (!) 161/76   Pulse  Min: 73  Max: 102  93   Resp  Min: 14  Max: 21 15   SpO2  Min: 96 %  Max: 100 % 97 %     I have reviewed the following labs:  Recent Labs   Lab 06/18/25  0614 06/19/25  0529 06/20/25  0639   WBC 7.34 8.84 8.62   RBC 3.45* 3.45* 3.61*   HGB 9.9* 10.0* 10.7*   HCT 31.4* 31.0* 33.1*   MCV 91.0 89.9 91.7   MCH 28.7 29.0 29.6   MCHC 31.5* 32.3* 32.3*   RDW 18.2* 18.6* 18.6*    167 151   MPV 10.4 10.7* 10.6*     Recent Labs   Lab 06/17/25  1755 06/18/25  0614 06/19/25  0529 06/20/25  0639    136 135* 137   K 4.3 3.8 4.1 3.8    108* 106 102   CO2 27 26 27 31   BUN 10.1 8.7 9.9 12.7   CREATININE 0.79 0.77 0.83 0.89   GLU 97 127* 103 97   CALCIUM 8.3* 7.9* 8.1* 7.9*   MG  --  1.50* 1.60  --    ALBUMIN 1.9* 1.6*  --  1.7*   PROT 8.6* 7.5  --  7.8*   ALKPHOS 443* 373*  --  440*   ALT 82* 72*  --  123*   * 141*  --  224*   BILITOT 1.4 1.4  --  1.1     Microbiology Results (last 7 days)       Procedure Component Value Units Date/Time    Body Fluid Culture [[id number]] Collected: 06/18/25 1347    Order Status: Completed Specimen: Body Fluid from Peritoneal Fluid Updated: 06/20/25 0731     Body Fluid Culture No Growth At 48 Hours    Gram Stain [[id number]] Collected: 06/18/25 1347    Order Status: Completed Specimen: Body Fluid  from Peritoneal Fluid Updated: 06/18/25 1721     GRAM STAIN Rare WBC observed      No bacteria seen             See below for Radiology    Assessment/Plan:  Abdominal pain  Early satiety  Nausea  Cirrhosis of liver -meld score of 11 on this admission  Portal venous thrombosis -noted on CT; hyper coag study and AFP ordered   Ascites -IR consulted for paracentesis studies  Nonobstructing kidney stone  Cholelithiasis  Hypomagnesemia  Cocaine positive on UDS  History of Hepatic adenoma with concerns of HCC  History of hep C -hep C RNA ordered     Additional medical history includes carpal tunnel syndrome, hypertension, sciatica     -CT scan shows portal venous thrombosis which seems to be acute on chronic thrombosis in addition to ascites, cholelithiasis, nonobstructing kidney stone    -endorses abdominal pain, early satiety, nausea    -alpha fetoprotein ordered, suspecting HCC  -hypercoagulability workup ordered, although this is less likely cause  --placed on heparin-->Lovenox-->will switch to PO Xarelto at discharge  -hep C RNA ordered to confirm treatment  -GI consulted, EGD performed Grade 1 distal esophageal varices, likely portal hypertensive gastropathy, no bleeding. Significant food contents noted in stomach.  - Will need MRI Abd w/wo, pt is having trouble staying NPO, will try again in am if not will discharge home to follow up, expect compliance will be difficult given housing situation, drug use  -IR consulted for paracentesis with fluid studies, continuing diuresis       VTE Prophylaxis:  FD Lovenox    Patient condition:  Stable      Anticipated discharge and Disposition:   Pending MRI, will need OP GI, Onc follow up      All diagnosis and differential diagnosis have been reviewed; assessment and plan has been documented; I have personally reviewed the labs and test results that are presently available; I have reviewed the patients medication list; I have reviewed the consulting providers response and  recommendations. I have reviewed or attempted to review medical records based upon their availability    All of the patient's questions have been  addressed and answered. Patient's is agreeable to the above stated plan. I will continue to monitor closely and make adjustments to medical management as needed.    Portions of this note dictated using EMR integrated voice recognition software, and may be subject to voice recognition errors not corrected at proofreading. Please contact writer for clarification if needed.   _____________________________________________________________________    Malnutrition Status:  Nutrition consulted. Most recent weight and BMI monitored-     Measurements:  Wt Readings from Last 1 Encounters:   06/18/25 59 kg (130 lb)   Body mass index is 19.2 kg/m².    Patient has been screened and assessed by RD.    Malnutrition Type:  Context:    Level: other (see comments) (Unable to assess)    Malnutrition Characteristic Summary:  Weight Loss (Malnutrition): other (see comments) (Does not meet criteria)  Energy Intake (Malnutrition): less than or equal to 75% for greater than or equal to 1 month  Fluid Accumulation (Malnutrition): other (see comments) (Not present)    Interventions/Recommendations (treatment strategy):        Scheduled Med:   enoxparin  1 mg/kg Subcutaneous Q12H (treatment, non-standard time)    folic acid  1 mg Oral Daily    furosemide (LASIX) injection  20 mg Intravenous Q8H    lactulose 10 gram/15 ml  10 g Oral TID    losartan  25 mg Oral Daily    multivitamin  1 tablet Oral Daily    NIFEdipine  30 mg Oral Daily    [START ON 6/21/2025] rivaroxaban  20 mg Oral Daily with dinner    thiamine  100 mg Oral Daily      Continuous Infusions:     PRN Meds:    Current Facility-Administered Medications:     aluminum-magnesium hydroxide-simethicone, 30 mL, Oral, QID PRN    bisacodyL, 10 mg, Rectal, Daily PRN    dextrose 50%, 12.5 g, Intravenous, PRN    dextrose 50%, 25 g, Intravenous, PRN     glucagon (human recombinant), 1 mg, Intramuscular, PRN    glucose, 16 g, Oral, PRN    glucose, 24 g, Oral, PRN    lorazepam, 2 mg, Intravenous, Q15 Min PRN    LORazepam, 2 mg, Oral, Q4H PRN    melatonin, 6 mg, Oral, Nightly PRN    morphine, 2 mg, Intravenous, Q6H PRN    polyethylene glycol, 17 g, Oral, BID PRN    sodium chloride 0.9%, 10 mL, Intravenous, PRN     Radiology:  I have personally reviewed the following imaging and agree with the radiologist.     IR Paracentesis with Imaging  Narrative: PROCEDURE:  Ultrasound Guided Paracentesis    CLINICAL HISTORY:  64-year-old male with cirrhosis, portal vein thrombus, and ascites    ANESTHESIA:  Local anesthesia    PHYSICIANS:  Neto Bird MD    PROCEDURAL SUMMARY:  After informed consent was obtained, the patient was placed supine on the ultrasound table. A limited ultrasound of the abdomen was performed with Dr. Bird present in the room.  This confirmed the presence of an appropriate volume of ascites for drainage.  The planned skin entry site and route for needle passage for paracentesis was then determined. The skin overlying the right lower quadrant of the abdomen was marked.  The site was then prepped and draped in the usual sterile fashion.    The soft tissues were anesthetized with lidocaine and a dermatotomy was performed.  Under ultrasound guidance, a sheath needle was advanced from the skin entry site into the peritoneal cavity.  When the needle entered the peritoneal cavity, fluid was aspirated.  The sheath was then advanced over the needle into the cavity.  The needle was removed.  Aspiration was performed and a total of 2.1 L of serous fluid was drained from the peritoneal cavity.  The catheter was then removed.  A sterile dressing was applied.    The patient tolerated the procedure well and was without any apparent complications.  Impression: Technically successful ultrasound-guided paracentesis.    Electronically signed by: Neto  Pelon  Date:    06/18/2025  Time:    15:02  CT Abdomen Pelvis With IV Contrast NO Oral Contrast  Narrative: EXAMINATION:  CT ABDOMEN PELVIS WITH IV CONTRAST    CLINICAL HISTORY:  Abdominal pain and abdominal distension    TECHNIQUE:  Axial CT images were obtained through the abdomen and pelvis following IV administration of contrast.  100 mL Omnipaque 350.  Coronal and sagittal reconstructions submitted and interpreted.  Automated exposure control, dose radiation lowering technique, was utilized.    COMPARISON:  CT abdomen and pelvis without contrast from 10/18/2023    CT abdomen and pelvis from 10/16/2023    Abdominal sonogram from 06/02/2017    FINDINGS:  Cirrhotic morphology to the liver.  Thrombus is present within the distal segment of the main portal vein with extension into the right and left portal system.  Thrombus is occlusive at multiple segments of the right portal system.  Splenic vein and SMV appear patent.    There is geographic area of slightly increased enhancement at segment 8 measuring 4.2 x 4.0 cm.  No other hypervascular areas.  There are multiple areas of patchy decreased density in the right hepatic lobe which is felt to be secondary to portal vein thrombosis.    Spleen is nonenlarged.  Pancreas, adrenal glands and right kidney appear normal.  Nonobstructing left kidney stone measures 10 mm.  No hydronephrosis.    Cholelithiasis is present.  The bowel is nonobstructed.  Air and stool are present throughout the colon which appears within normal limits.  Normal appendix is present.  There is moderate volume ascites.  No free air.    Moderate atherosclerosis of the abdominal aorta and its branches is present.  Mild, diffuse bladder wall thickening is present.  No suspicious osteolytic or osteoblastic lesion.  Impression: 1. Portal vein thrombosis which is occlusive within the right portal system.  There is some collaterals within the annette hepatis with suggests some degree of chronic  component.  Given the appearance of the thrombus in addition to the small amount of portal venous collaterals, favor acute on chronic thrombosis.  2. Sequelae of cirrhosis with moderate volume ascites and small gastroesophageal varices.  3. Hypervascular area in segment 8 (image 31, series 1).  Favor perfusion abnormality related to portal vein thrombosis.  However, consider multiphase abdominal MRI to evaluate for potential tumor.  4. Cholelithiasis.  5. Nonobstructing left-sided kidney stone.    Electronically signed by: Mukesh Lu MD  Date:    06/18/2025  Time:    08:04  CTA Chest Non-Coronary (PE Studies)  Narrative: EXAMINATION:  CTA CHEST NON CORONARY (PE STUDIES)    CLINICAL HISTORY:  Pulmonary embolism suspected. 65 yo male presenting with feeling unwell for the past month and a half. He reports abdominal bloating, leg swelling, and decreased appetite. He reports generalized weakness as well. States he normally drinks alcohol daily but hasn't in the past 30 days or so.    TECHNIQUE:  Axial CT images were obtained through the chest after IV administration of contrast. 100 mL Omnipaque 350. MIP, coronal and sagittal reconstructions submitted and interpreted.  Automated exposure control, dose radiation lowering technique, was utilized.    COMPARISON:  None    FINDINGS:  Slightly limited exam secondary to contrast bolus timing.    Soft Tissues: Unremarkable.    Lines and Tubes: None.    Neck: The visualized soft tissues of the neck appear unremarkable.    Mediastinum: The mediastinal structures are within normal limits.    Heart: The heart size is within normal limits. Mild coronary artery calcification is seen.    Aorta: No aortic dissection or aneurysm is seen. Mild aortic calcification is seen in the thoracic aorta.    Pulmonary Arteries: No filling defects are seen in the pulmonary arteries to suggest pulmonary embolus.    Lungs: There is moderate non specific dependent change at the lung bases. Mild  streaky linear opacity is seen consistent with scarring and subsegmental. No acute focal infiltrate or consolidation is seen.    Pleura: No effusions or pneumothorax are identified.    Bony Structures:    Spine: Subtle spondylolytic changes are seen in the thoracic spine.    Ribs: The ribs appear unremarkable.  Impression: Slightly limited exam of the pulmonary arteries secondary to contrast bolus timing.    1. No filling defects are seen in the pulmonary arteries to suggest pulmonary embolus.    2. No acute focal infiltrate or consolidation is seen.    3. Details and other findings as discussed above.    [employer] concurrence    Electronically signed by: Mukesh Lu MD  Date:    06/18/2025  Time:    07:57      Naa Lee MD  Department of Hospital Medicine   [facility]   06/20/2025

## 2025-06-21 NOTE — PROGRESS NOTES
[facility] Hospital Medicine Progress Note        Chief Complaint: Inpatient Follow-up for     HPI: Aman Humphrey is a 64 y.o. male who  has a past medical history of Carpal tunnel syndrome, HTN (hypertension), and Sciatica.. The patient presented to [facility] on 6/17/2025 with a primary complaint of abdominal pain of a few days, and overall feeling unwell for the past 1 month.  Patient mentions he has been feeling unwell and has been having early satiety for the past 1 month.  Endorses significant nausea, bloating, weight loss but denies any fever, chills, body aches, vomiting, changes in bowels.  As per previous documentation from 2023 it seems that patient has a known history liver cirrhosis and a hepatic lesion which is concerning for HCC, also is positive and treated for hepatitis-C in the past.     Vital signs on arrival are WNL except for elevated blood pressure, CBC unremarkable, CMP shows some electrolyte abnormalities-hypocalcemia, hypomagnesemia,.  AST/ALT are elevated 2-1.  Ammonia and Bilirubin WNL .  BNP of 159 with a normal troponin.  Alcohol and Tylenol level normal.  Cocaine positive on UDS.  UA negative.  CT abdomen and pelvis shows portal vein thrombosis with what seems to be acute on chronic thrombosis.  Cirrhosis with moderate ascites, cholelithiasis, and nonobstructing kidney stone.     Patient was placed on heparin as he will likely require a EGD to rule out varices by GI at some point during this hospital stay prior to discharge.  AFP to monitor for HCC.  We will order paracentesis with fluid studies.  Hypercoag study ordered to rule out any other causes of portal venous thrombosis.  Hep C RNA ordered.  We will consult GI for possible EGD and to see if they can recommend blood thinners prior to discharge.    Interval Hx:      6/20 EGD completed, food contents in stomach. Grade 1 varices noted, OK to start AC. Sister and brother in law at bedside who are trying to  encourage pt pursue further work up of his tumor. Pt is concerned that is he doesn't have his own place right now.    6/21/25 MRI abd wwo completed, non-diagnostic for HCC due to timing of contrast, pt says that he would like to stay in the hospital until he feels better. Explained that he will need further work up as an outpatient    Case was discussed with patient's nurse and  on the floor.    Objective/physical exam:  General: In no acute distress, afebrile  Chest: Clear to auscultation bilaterally  Heart: RRR, +S1, S2, no appreciable murmur  Abdomen: Soft, nontender, BS +  MSK: Warm, no lower extremity edema, no clubbing or cyanosis  Neurologic: Alert and oriented x4, Cranial nerve II-XII intact, Strength 5/5 in all 4 extremities    VITAL SIGNS: 24 HRS MIN & MAX LAST   Temp  Min: 97.4 °F (36.3 °C)  Max: 98.7 °F (37.1 °C) 98.7 °F (37.1 °C)   BP  Min: 140/83  Max: 164/78 (!) 164/78   Pulse  Min: 81  Max: 102  81   Resp  Min: 14  Max: 16 16   SpO2  Min: 97 %  Max: 99 % 99 %     I have reviewed the following labs:  Recent Labs   Lab 06/18/25 0614 06/19/25  0529 06/20/25  0639   WBC 7.34 8.84 8.62   RBC 3.45* 3.45* 3.61*   HGB 9.9* 10.0* 10.7*   HCT 31.4* 31.0* 33.1*   MCV 91.0 89.9 91.7   MCH 28.7 29.0 29.6   MCHC 31.5* 32.3* 32.3*   RDW 18.2* 18.6* 18.6*    167 151   MPV 10.4 10.7* 10.6*     Recent Labs   Lab 06/17/25  1755 06/18/25  0614 06/19/25  0529 06/20/25  0639    136 135* 137   K 4.3 3.8 4.1 3.8    108* 106 102   CO2 27 26 27 31   BUN 10.1 8.7 9.9 12.7   CREATININE 0.79 0.77 0.83 0.89   GLU 97 127* 103 97   CALCIUM 8.3* 7.9* 8.1* 7.9*   MG  --  1.50* 1.60  --    ALBUMIN 1.9* 1.6*  --  1.7*   PROT 8.6* 7.5  --  7.8*   ALKPHOS 443* 373*  --  440*   ALT 82* 72*  --  123*   * 141*  --  224*   BILITOT 1.4 1.4  --  1.1     Microbiology Results (last 7 days)       Procedure Component Value Units Date/Time    Body Fluid Culture [[id number]] Collected: 06/18/25 1347    Order  Status: Completed Specimen: Body Fluid from Peritoneal Fluid Updated: 06/21/25 0715     Body Fluid Culture No Growth At 72 Hours    Gram Stain [[id number]] Collected: 06/18/25 1347    Order Status: Completed Specimen: Body Fluid from Peritoneal Fluid Updated: 06/18/25 1721     GRAM STAIN Rare WBC observed      No bacteria seen             See below for Radiology    Assessment/Plan:  Abdominal pain  Early satiety  Nausea  Cirrhosis of liver -meld score of 11 on this admission  Portal venous thrombosis -noted on CT; hyper coag study and AFP ordered   Ascites -IR consulted for paracentesis studies  Nonobstructing kidney stone  Cholelithiasis  Hypomagnesemia  Cocaine positive on UDS  History of Hepatic adenoma with concerns of HCC  History of hep C -hep C RNA ordered     Additional medical history includes carpal tunnel syndrome, hypertension, sciatica     -CT scan shows portal venous thrombosis which seems to be acute on chronic thrombosis in addition to ascites, cholelithiasis, nonobstructing kidney stone    -endorses abdominal pain, early satiety, nausea    -alpha fetoprotein ordered, suspecting HCC  -hypercoagulability workup ordered, although this is less likely cause  --placed on heparin-->Lovenox-->will switch to PO Xarelto at discharge  -hep C RNA ordered to confirm treatment  -GI consulted, EGD performed Grade 1 distal esophageal varices, likely portal hypertensive gastropathy, no bleeding. Significant food contents noted in stomach.  - Will need MRI Abd w/wo, it was non-diagnostic due to timing of the contrast, pt has had a CT A/P and CTA chest this admission, do not want to get another contrasted study  -  expect compliance will be difficult given housing situation, drug use  -IR consulted for paracentesis with fluid studies, continuing diuresis       VTE Prophylaxis:  FD Lovenox    Patient condition:  Stable      Anticipated discharge and Disposition:   Pending MRI, will need OP GI, Onc follow up      All  diagnosis and differential diagnosis have been reviewed; assessment and plan has been documented; I have personally reviewed the labs and test results that are presently available; I have reviewed the patients medication list; I have reviewed the consulting providers response and recommendations. I have reviewed or attempted to review medical records based upon their availability    All of the patient's questions have been  addressed and answered. Patient's is agreeable to the above stated plan. I will continue to monitor closely and make adjustments to medical management as needed.    Portions of this note dictated using EMR integrated voice recognition software, and may be subject to voice recognition errors not corrected at proofreading. Please contact writer for clarification if needed.   _____________________________________________________________________    Malnutrition Status:  Nutrition consulted. Most recent weight and BMI monitored-     Measurements:  Wt Readings from Last 1 Encounters:   06/18/25 59 kg (130 lb)   Body mass index is 19.2 kg/m².    Patient has been screened and assessed by RD.    Malnutrition Type:  Context:    Level: other (see comments) (Unable to assess)    Malnutrition Characteristic Summary:  Weight Loss (Malnutrition): other (see comments) (Does not meet criteria)  Energy Intake (Malnutrition): less than or equal to 75% for greater than or equal to 1 month  Fluid Accumulation (Malnutrition): other (see comments) (Not present)    Interventions/Recommendations (treatment strategy):        Scheduled Med:   folic acid  1 mg Oral Daily    furosemide (LASIX) injection  20 mg Intravenous Q8H    lactulose 10 gram/15 ml  10 g Oral TID    losartan  25 mg Oral Daily    multivitamin  1 tablet Oral Daily    NIFEdipine  30 mg Oral Daily    rivaroxaban  20 mg Oral Daily with dinner    thiamine  100 mg Oral Daily      Continuous Infusions:     PRN Meds:    Current Facility-Administered Medications:      aluminum-magnesium hydroxide-simethicone, 30 mL, Oral, QID PRN    bisacodyL, 10 mg, Rectal, Daily PRN    dextrose 50%, 12.5 g, Intravenous, PRN    dextrose 50%, 25 g, Intravenous, PRN    glucagon (human recombinant), 1 mg, Intramuscular, PRN    glucose, 16 g, Oral, PRN    glucose, 24 g, Oral, PRN    lorazepam, 2 mg, Intravenous, Q15 Min PRN    LORazepam, 2 mg, Oral, Q4H PRN    melatonin, 6 mg, Oral, Nightly PRN    morphine, 2 mg, Intravenous, Q6H PRN    polyethylene glycol, 17 g, Oral, BID PRN    sodium chloride 0.9%, 10 mL, Intravenous, PRN     Radiology:  I have personally reviewed the following imaging and agree with the radiologist.     MRI Abdomen W WO Contrast  Narrative: EXAMINATION:  MRI ABDOMEN W WO CONTRAST    CLINICAL HISTORY:  Cirrhosis.Liver lesion, > 1cm;    TECHNIQUE:  Multiplanar multisequence MRI of the abdomen performed without and with gadolinium based IV contrast .    COMPARISON:  CT 17 June 2025, CT 18 October 2023    FINDINGS:  This exam is nondiagnostic for HCC as only delayed scans obtained secondary to patient motion and inability to hold breath.    The liver is cirrhotic and there is portal vein thrombus also seen on CT.  Heterogeneous enhancement of the liver likely relates to portal vein thrombus.  No discrete suspicious lesion seen on diffusion imaging.  Small volume ascites.    There are gallstones.  No significant biliary ductal dilatation.  The spleen is not significantly enlarged.  No gross abnormality pancreas or adrenals.  No hydronephrosis.    No dilated bowel loops.  Abdominal aorta normal in caliber.  Impression: 1. Cirrhosis with portal vein thrombus and small volume ascites.  2. Nondiagnostic exam for HCC secondary to timing of contrast.  Recommend outpatient liver mass protocol CT.    Electronically signed by: Andriy Laws  Date:    06/21/2025  Time:    11:19      Naa Lee MD  Department of Hospital Medicine   [facility]    06/21/2025

## 2025-06-22 LAB
AMMONIA PLAS-MSCNC: 51.7 UMOL/L (ref 18–72)
OHS QRS DURATION: 82 MS
OHS QTC CALCULATION: 440 MS
TROPONIN I SERPL-MCNC: 0.03 NG/ML (ref 0–0.04)
TROPONIN I SERPL-MCNC: <0.01 NG/ML (ref 0–0.04)

## 2025-06-22 PROCEDURE — 93005 ELECTROCARDIOGRAM TRACING: CPT

## 2025-06-22 PROCEDURE — G0378 HOSPITAL OBSERVATION PER HR: HCPCS

## 2025-06-22 PROCEDURE — 63600175 PHARM REV CODE 636 W HCPCS

## 2025-06-22 PROCEDURE — 82140 ASSAY OF AMMONIA: CPT | Performed by: INTERNAL MEDICINE

## 2025-06-22 PROCEDURE — 25000003 PHARM REV CODE 250

## 2025-06-22 PROCEDURE — 11000001 HC ACUTE MED/SURG PRIVATE ROOM

## 2025-06-22 PROCEDURE — 93010 ELECTROCARDIOGRAM REPORT: CPT | Mod: ,,, | Performed by: INTERNAL MEDICINE

## 2025-06-22 PROCEDURE — 63600175 PHARM REV CODE 636 W HCPCS: Performed by: STUDENT IN AN ORGANIZED HEALTH CARE EDUCATION/TRAINING PROGRAM

## 2025-06-22 PROCEDURE — 36415 COLL VENOUS BLD VENIPUNCTURE: CPT | Performed by: INTERNAL MEDICINE

## 2025-06-22 PROCEDURE — 25000003 PHARM REV CODE 250: Performed by: INTERNAL MEDICINE

## 2025-06-22 PROCEDURE — 63600175 PHARM REV CODE 636 W HCPCS: Performed by: INTERNAL MEDICINE

## 2025-06-22 PROCEDURE — 84484 ASSAY OF TROPONIN QUANT: CPT | Performed by: INTERNAL MEDICINE

## 2025-06-22 PROCEDURE — 25000003 PHARM REV CODE 250: Performed by: STUDENT IN AN ORGANIZED HEALTH CARE EDUCATION/TRAINING PROGRAM

## 2025-06-22 RX ORDER — LIDOCAINE HYDROCHLORIDE 20 MG/ML
15 SOLUTION OROPHARYNGEAL ONCE
Status: COMPLETED | OUTPATIENT
Start: 2025-06-22 | End: 2025-06-22

## 2025-06-22 RX ORDER — PANTOPRAZOLE SODIUM 40 MG/1
40 TABLET, DELAYED RELEASE ORAL DAILY
Status: DISCONTINUED | OUTPATIENT
Start: 2025-06-22 | End: 2025-06-28 | Stop reason: HOSPADM

## 2025-06-22 RX ORDER — ALUMINUM HYDROXIDE, MAGNESIUM HYDROXIDE, AND SIMETHICONE 1200; 120; 1200 MG/30ML; MG/30ML; MG/30ML
30 SUSPENSION ORAL ONCE
Status: COMPLETED | OUTPATIENT
Start: 2025-06-22 | End: 2025-06-22

## 2025-06-22 RX ORDER — FUROSEMIDE 10 MG/ML
20 INJECTION INTRAMUSCULAR; INTRAVENOUS EVERY 12 HOURS
Status: DISCONTINUED | OUTPATIENT
Start: 2025-06-22 | End: 2025-06-28 | Stop reason: HOSPADM

## 2025-06-22 RX ADMIN — RIVAROXABAN 20 MG: 10 TABLET, FILM COATED ORAL at 05:06

## 2025-06-22 RX ADMIN — THIAMINE HCL TAB 100 MG 100 MG: 100 TAB at 09:06

## 2025-06-22 RX ADMIN — LOSARTAN POTASSIUM 25 MG: 25 TABLET, FILM COATED ORAL at 09:06

## 2025-06-22 RX ADMIN — NIFEDIPINE 30 MG: 30 TABLET, FILM COATED, EXTENDED RELEASE ORAL at 09:06

## 2025-06-22 RX ADMIN — LACTULOSE 10 G: 10 SOLUTION ORAL at 03:06

## 2025-06-22 RX ADMIN — FUROSEMIDE 20 MG: 10 INJECTION, SOLUTION INTRAMUSCULAR; INTRAVENOUS at 08:06

## 2025-06-22 RX ADMIN — PANTOPRAZOLE SODIUM 40 MG: 40 TABLET, DELAYED RELEASE ORAL at 08:06

## 2025-06-22 RX ADMIN — FOLIC ACID 1 MG: 1 TABLET ORAL at 09:06

## 2025-06-22 RX ADMIN — FUROSEMIDE 20 MG: 10 INJECTION, SOLUTION INTRAMUSCULAR; INTRAVENOUS at 06:06

## 2025-06-22 RX ADMIN — LACTULOSE 10 G: 10 SOLUTION ORAL at 08:06

## 2025-06-22 RX ADMIN — THERA TABS 1 TABLET: TAB at 09:06

## 2025-06-22 RX ADMIN — MORPHINE SULFATE 2 MG: 4 INJECTION, SOLUTION INTRAMUSCULAR; INTRAVENOUS at 10:06

## 2025-06-22 RX ADMIN — ALUMINUM HYDROXIDE, MAGNESIUM HYDROXIDE, AND DIMETHICONE 30 ML: 200; 20; 200 SUSPENSION ORAL at 08:06

## 2025-06-22 RX ADMIN — POLYETHYLENE GLYCOL 3350 17 G: 17 POWDER, FOR SOLUTION ORAL at 10:06

## 2025-06-22 RX ADMIN — LACTULOSE 10 G: 10 SOLUTION ORAL at 09:06

## 2025-06-22 RX ADMIN — FUROSEMIDE 20 MG: 10 INJECTION, SOLUTION INTRAMUSCULAR; INTRAVENOUS at 03:06

## 2025-06-22 RX ADMIN — LIDOCAINE HYDROCHLORIDE 15 ML: 20 SOLUTION ORAL at 08:06

## 2025-06-22 NOTE — PROGRESS NOTES
[facility] Hospital Medicine Progress Note        Chief Complaint: Inpatient Follow-up for     HPI: Aman Humphrey is a 64 y.o. male who  has a past medical history of Carpal tunnel syndrome, HTN (hypertension), and Sciatica.. The patient presented to [facility] on 6/17/2025 with a primary complaint of abdominal pain of a few days, and overall feeling unwell for the past 1 month.  Patient mentions he has been feeling unwell and has been having early satiety for the past 1 month.  Endorses significant nausea, bloating, weight loss but denies any fever, chills, body aches, vomiting, changes in bowels.  As per previous documentation from 2023 it seems that patient has a known history liver cirrhosis and a hepatic lesion which is concerning for HCC, also is positive and treated for hepatitis-C in the past.     Vital signs on arrival are WNL except for elevated blood pressure, CBC unremarkable, CMP shows some electrolyte abnormalities-hypocalcemia, hypomagnesemia,.  AST/ALT are elevated 2-1.  Ammonia and Bilirubin WNL .  BNP of 159 with a normal troponin.  Alcohol and Tylenol level normal.  Cocaine positive on UDS.  UA negative.  CT abdomen and pelvis shows portal vein thrombosis with what seems to be acute on chronic thrombosis.  Cirrhosis with moderate ascites, cholelithiasis, and nonobstructing kidney stone.     Patient was placed on heparin as he will likely require a EGD to rule out varices by GI at some point during this hospital stay prior to discharge.  AFP to monitor for HCC.  We will order paracentesis with fluid studies.  Hypercoag study ordered to rule out any other causes of portal venous thrombosis.  Hep C RNA ordered.  We will consult GI for possible EGD and to see if they can recommend blood thinners prior to discharge.    Interval Hx:      6/20 EGD completed, food contents in stomach. Grade 1 varices noted, OK to start AC. Sister and brother in law at bedside who are trying to  encourage pt pursue further work up of his tumor. Pt is concerned that is he doesn't have his own place right now.    6/21/25 MRI abd wwo completed, non-diagnostic for HCC due to timing of contrast, pt says that he would like to stay in the hospital until he feels better. Explained that he will need further work up as an outpatient    6/22/25 Lethargic and doesn't feel well. Had an episode of CP- trop, EKG no acute STEMI, likely due to gastritis    Case was discussed with patient's nurse and  on the floor.    Objective/physical exam:  General: In no acute distress, afebrile  Chest: Clear to auscultation bilaterally  Heart: RRR, +S1, S2, no appreciable murmur  Abdomen: Soft, nontender, BS +  MSK: Warm, no lower extremity edema, no clubbing or cyanosis  Neurologic: Alert and oriented x4, Cranial nerve II-XII intact, Strength 5/5 in all 4 extremities    VITAL SIGNS: 24 HRS MIN & MAX LAST   Temp  Min: 98.3 °F (36.8 °C)  Max: 100 °F (37.8 °C) 98.7 °F (37.1 °C)   BP  Min: 126/51  Max: 143/63 (!) 126/51   Pulse  Min: 86  Max: 105  91   Resp  Min: 15  Max: 18 18   SpO2  Min: 97 %  Max: 99 % 99 %     I have reviewed the following labs:  Recent Labs   Lab 06/18/25  0614 06/19/25  0529 06/20/25  0639   WBC 7.34 8.84 8.62   RBC 3.45* 3.45* 3.61*   HGB 9.9* 10.0* 10.7*   HCT 31.4* 31.0* 33.1*   MCV 91.0 89.9 91.7   MCH 28.7 29.0 29.6   MCHC 31.5* 32.3* 32.3*   RDW 18.2* 18.6* 18.6*    167 151   MPV 10.4 10.7* 10.6*     Recent Labs   Lab 06/17/25  1755 06/18/25  0614 06/19/25  0529 06/20/25  0639    136 135* 137   K 4.3 3.8 4.1 3.8    108* 106 102   CO2 27 26 27 31   BUN 10.1 8.7 9.9 12.7   CREATININE 0.79 0.77 0.83 0.89   GLU 97 127* 103 97   CALCIUM 8.3* 7.9* 8.1* 7.9*   MG  --  1.50* 1.60  --    ALBUMIN 1.9* 1.6*  --  1.7*   PROT 8.6* 7.5  --  7.8*   ALKPHOS 443* 373*  --  440*   ALT 82* 72*  --  123*   * 141*  --  224*   BILITOT 1.4 1.4  --  1.1     Microbiology Results (last 7 days)        Procedure Component Value Units Date/Time    Body Fluid Culture [[id number]] Collected: 06/18/25 1347    Order Status: Completed Specimen: Body Fluid from Peritoneal Fluid Updated: 06/22/25 0731     Body Fluid Culture No growth at 4 days    Gram Stain [[id number]] Collected: 06/18/25 1347    Order Status: Completed Specimen: Body Fluid from Peritoneal Fluid Updated: 06/18/25 1721     GRAM STAIN Rare WBC observed      No bacteria seen             See below for Radiology    Assessment/Plan:  Abdominal pain  Early satiety  Nausea  Cirrhosis of liver -meld score of 11 on this admission  Portal venous thrombosis -noted on CT; hyper coag study and AFP ordered   Ascites -IR consulted for paracentesis studies  Nonobstructing kidney stone  Cholelithiasis  Hypomagnesemia  Cocaine positive on UDS  History of Hepatic adenoma with concerns of HCC  History of hep C -hep C RNA ordered     Additional medical history includes carpal tunnel syndrome, hypertension, sciatica     -CT scan shows portal venous thrombosis which seems to be acute on chronic thrombosis in addition to ascites, cholelithiasis, nonobstructing kidney stone    -endorses abdominal pain, early satiety, nausea    -alpha fetoprotein ordered, suspecting HCC  -hypercoagulability workup ordered, although this is less likely cause  --placed on heparin-->Lovenox-->will switch to PO Xarelto at discharge  -hep C RNA ordered to confirm treatment  -GI consulted, EGD performed Grade 1 distal esophageal varices, likely portal hypertensive gastropathy, no bleeding. Significant food contents noted in stomach.  -MRI Abd w/wo, was non-diagnostic due to timing of the contrast, pt has had a CT A/P and CTA chest this admission, do not want to get another contrasted study at this time  -  expect compliance will be difficult given housing situation, drug use  -IR consulted for paracentesis with fluid studies, continuing diuresis       VTE Prophylaxis:  FD Lovenox    Patient  condition:  Stable      Anticipated discharge and Disposition:   Pe, will need OP GI, Onc follow up      All diagnosis and differential diagnosis have been reviewed; assessment and plan has been documented; I have personally reviewed the labs and test results that are presently available; I have reviewed the patients medication list; I have reviewed the consulting providers response and recommendations. I have reviewed or attempted to review medical records based upon their availability    All of the patient's questions have been  addressed and answered. Patient's is agreeable to the above stated plan. I will continue to monitor closely and make adjustments to medical management as needed.    Portions of this note dictated using EMR integrated voice recognition software, and may be subject to voice recognition errors not corrected at proofreading. Please contact writer for clarification if needed.   _____________________________________________________________________    Malnutrition Status:  Nutrition consulted. Most recent weight and BMI monitored-     Measurements:  Wt Readings from Last 1 Encounters:   06/18/25 59 kg (130 lb)   Body mass index is 19.2 kg/m².    Patient has been screened and assessed by RD.    Malnutrition Type:  Context:    Level: other (see comments) (Unable to assess)    Malnutrition Characteristic Summary:  Weight Loss (Malnutrition): other (see comments) (Does not meet criteria)  Energy Intake (Malnutrition): less than or equal to 75% for greater than or equal to 1 month  Fluid Accumulation (Malnutrition): other (see comments) (Not present)    Interventions/Recommendations (treatment strategy):        Scheduled Med:   folic acid  1 mg Oral Daily    furosemide (LASIX) injection  20 mg Intravenous Q8H    lactulose 10 gram/15 ml  10 g Oral TID    losartan  25 mg Oral Daily    multivitamin  1 tablet Oral Daily    NIFEdipine  30 mg Oral Daily    rivaroxaban  20 mg Oral Daily with dinner     thiamine  100 mg Oral Daily      Continuous Infusions:     PRN Meds:    Current Facility-Administered Medications:     aluminum-magnesium hydroxide-simethicone, 30 mL, Oral, QID PRN    bisacodyL, 10 mg, Rectal, Daily PRN    dextrose 50%, 12.5 g, Intravenous, PRN    dextrose 50%, 25 g, Intravenous, PRN    glucagon (human recombinant), 1 mg, Intramuscular, PRN    glucose, 16 g, Oral, PRN    glucose, 24 g, Oral, PRN    lorazepam, 2 mg, Intravenous, Q15 Min PRN    LORazepam, 2 mg, Oral, Q4H PRN    melatonin, 6 mg, Oral, Nightly PRN    morphine, 2 mg, Intravenous, Q6H PRN    polyethylene glycol, 17 g, Oral, BID PRN    sodium chloride 0.9%, 10 mL, Intravenous, PRN     Radiology:  I have personally reviewed the following imaging and agree with the radiologist.     MRI Abdomen W WO Contrast  Narrative: EXAMINATION:  MRI ABDOMEN W WO CONTRAST    CLINICAL HISTORY:  Cirrhosis.Liver lesion, > 1cm;    TECHNIQUE:  Multiplanar multisequence MRI of the abdomen performed without and with gadolinium based IV contrast .    COMPARISON:  CT 17 June 2025, CT 18 October 2023    FINDINGS:  This exam is nondiagnostic for HCC as only delayed scans obtained secondary to patient motion and inability to hold breath.    The liver is cirrhotic and there is portal vein thrombus also seen on CT.  Heterogeneous enhancement of the liver likely relates to portal vein thrombus.  No discrete suspicious lesion seen on diffusion imaging.  Small volume ascites.    There are gallstones.  No significant biliary ductal dilatation.  The spleen is not significantly enlarged.  No gross abnormality pancreas or adrenals.  No hydronephrosis.    No dilated bowel loops.  Abdominal aorta normal in caliber.  Impression: 1. Cirrhosis with portal vein thrombus and small volume ascites.  2. Nondiagnostic exam for HCC secondary to timing of contrast.  Recommend outpatient liver mass protocol CT.    Electronically signed by: Andriy  Eusebia  Date:    06/21/2025  Time:    11:19      Naa Lee MD  Department of Hospital Medicine   [facility]   06/22/2025

## 2025-06-23 PROBLEM — I81 PORTAL VEIN THROMBOSIS: Status: ACTIVE | Noted: 2025-06-23

## 2025-06-23 LAB
ALBUMIN SERPL-MCNC: 1.7 G/DL (ref 3.4–4.8)
ALBUMIN/GLOB SERPL: 0.3 RATIO (ref 1.1–2)
ALP SERPL-CCNC: 405 UNIT/L (ref 40–150)
ALT SERPL-CCNC: 134 UNIT/L (ref 0–55)
ANION GAP SERPL CALC-SCNC: 0 MEQ/L
AST SERPL-CCNC: 173 UNIT/L (ref 11–45)
BASOPHILS # BLD AUTO: 0.03 X10(3)/MCL
BASOPHILS NFR BLD AUTO: 0.3 %
BILIRUB SERPL-MCNC: 2.1 MG/DL
BUN SERPL-MCNC: 17.5 MG/DL (ref 8.4–25.7)
CALCIUM SERPL-MCNC: 8.2 MG/DL (ref 8.8–10)
CHLORIDE SERPL-SCNC: 104 MMOL/L (ref 98–107)
CO2 SERPL-SCNC: 30 MMOL/L (ref 23–31)
CREAT SERPL-MCNC: 0.74 MG/DL (ref 0.72–1.25)
CREAT/UREA NIT SERPL: 24
EOSINOPHIL # BLD AUTO: 0.24 X10(3)/MCL (ref 0–0.9)
EOSINOPHIL NFR BLD AUTO: 2.6 %
ERYTHROCYTE [DISTWIDTH] IN BLOOD BY AUTOMATED COUNT: 18.6 % (ref 11.5–17)
GFR SERPLBLD CREATININE-BSD FMLA CKD-EPI: >60 ML/MIN/1.73/M2
GLOBULIN SER-MCNC: 6.6 GM/DL (ref 2.4–3.5)
GLUCOSE SERPL-MCNC: 107 MG/DL (ref 82–115)
HCT VFR BLD AUTO: 33.1 % (ref 42–52)
HGB BLD-MCNC: 10.8 G/DL (ref 14–18)
IMM GRANULOCYTES # BLD AUTO: 0.03 X10(3)/MCL (ref 0–0.04)
IMM GRANULOCYTES NFR BLD AUTO: 0.3 %
LYMPHOCYTES # BLD AUTO: 1.82 X10(3)/MCL (ref 0.6–4.6)
LYMPHOCYTES NFR BLD AUTO: 19.4 %
MCH RBC QN AUTO: 29.3 PG (ref 27–31)
MCHC RBC AUTO-ENTMCNC: 32.6 G/DL (ref 33–36)
MCV RBC AUTO: 89.9 FL (ref 80–94)
MONOCYTES # BLD AUTO: 1.16 X10(3)/MCL (ref 0.1–1.3)
MONOCYTES NFR BLD AUTO: 12.4 %
NEUTROPHILS # BLD AUTO: 6.1 X10(3)/MCL (ref 2.1–9.2)
NEUTROPHILS NFR BLD AUTO: 65 %
NRBC BLD AUTO-RTO: 0 %
PLATELET # BLD AUTO: 151 X10(3)/MCL (ref 130–400)
PMV BLD AUTO: 10.7 FL (ref 7.4–10.4)
POTASSIUM SERPL-SCNC: 4.2 MMOL/L (ref 3.5–5.1)
PROT SERPL-MCNC: 8.3 GM/DL (ref 5.8–7.6)
RBC # BLD AUTO: 3.68 X10(6)/MCL (ref 4.7–6.1)
SODIUM SERPL-SCNC: 134 MMOL/L (ref 136–145)
WBC # BLD AUTO: 9.38 X10(3)/MCL (ref 4.5–11.5)

## 2025-06-23 PROCEDURE — 85025 COMPLETE CBC W/AUTO DIFF WBC: CPT | Performed by: INTERNAL MEDICINE

## 2025-06-23 PROCEDURE — 97162 PT EVAL MOD COMPLEX 30 MIN: CPT

## 2025-06-23 PROCEDURE — G0378 HOSPITAL OBSERVATION PER HR: HCPCS

## 2025-06-23 PROCEDURE — 63600175 PHARM REV CODE 636 W HCPCS: Performed by: INTERNAL MEDICINE

## 2025-06-23 PROCEDURE — 97166 OT EVAL MOD COMPLEX 45 MIN: CPT

## 2025-06-23 PROCEDURE — 36415 COLL VENOUS BLD VENIPUNCTURE: CPT | Performed by: INTERNAL MEDICINE

## 2025-06-23 PROCEDURE — 80053 COMPREHEN METABOLIC PANEL: CPT | Performed by: INTERNAL MEDICINE

## 2025-06-23 PROCEDURE — 25000003 PHARM REV CODE 250: Performed by: INTERNAL MEDICINE

## 2025-06-23 PROCEDURE — 25000003 PHARM REV CODE 250: Performed by: STUDENT IN AN ORGANIZED HEALTH CARE EDUCATION/TRAINING PROGRAM

## 2025-06-23 PROCEDURE — 25000003 PHARM REV CODE 250

## 2025-06-23 PROCEDURE — 63600175 PHARM REV CODE 636 W HCPCS

## 2025-06-23 PROCEDURE — 11000001 HC ACUTE MED/SURG PRIVATE ROOM

## 2025-06-23 RX ORDER — FOLIC ACID 1 MG/1
1 TABLET ORAL DAILY
Qty: 30 TABLET | Refills: 11 | Status: ON HOLD | OUTPATIENT
Start: 2025-06-24 | End: 2026-06-24

## 2025-06-23 RX ORDER — ARIPIPRAZOLE 5 MG/1
5 TABLET ORAL DAILY
Qty: 30 TABLET | Refills: 2 | Status: ON HOLD | OUTPATIENT
Start: 2025-06-23 | End: 2025-09-21

## 2025-06-23 RX ORDER — IBUPROFEN 200 MG
1 TABLET ORAL DAILY
Qty: 30 PATCH | Refills: 2 | Status: ON HOLD | OUTPATIENT
Start: 2025-06-23 | End: 2025-09-21

## 2025-06-23 RX ORDER — SPIRONOLACTONE 25 MG/1
25 TABLET ORAL DAILY
Qty: 30 TABLET | Refills: 11 | Status: SHIPPED | OUTPATIENT
Start: 2025-06-23 | End: 2025-06-28

## 2025-06-23 RX ORDER — NIFEDIPINE 30 MG/1
30 TABLET, EXTENDED RELEASE ORAL DAILY
Qty: 30 TABLET | Refills: 11 | Status: SHIPPED | OUTPATIENT
Start: 2025-06-24 | End: 2025-06-28 | Stop reason: HOSPADM

## 2025-06-23 RX ORDER — LANOLIN ALCOHOL/MO/W.PET/CERES
100 CREAM (GRAM) TOPICAL DAILY
Qty: 30 TABLET | Refills: 2 | Status: ON HOLD | OUTPATIENT
Start: 2025-06-24 | End: 2025-09-22

## 2025-06-23 RX ORDER — ESCITALOPRAM OXALATE 5 MG/1
5 TABLET ORAL DAILY
Qty: 30 TABLET | Refills: 2 | Status: ON HOLD | OUTPATIENT
Start: 2025-06-23 | End: 2025-09-21

## 2025-06-23 RX ORDER — FUROSEMIDE 20 MG/1
20 TABLET ORAL DAILY
Qty: 60 TABLET | Refills: 1 | Status: ON HOLD | OUTPATIENT
Start: 2025-06-23

## 2025-06-23 RX ORDER — PANTOPRAZOLE SODIUM 40 MG/1
40 TABLET, DELAYED RELEASE ORAL DAILY
Qty: 90 TABLET | Refills: 3 | Status: ON HOLD | OUTPATIENT
Start: 2025-06-24 | End: 2026-06-24

## 2025-06-23 RX ADMIN — MORPHINE SULFATE 2 MG: 4 INJECTION, SOLUTION INTRAMUSCULAR; INTRAVENOUS at 01:06

## 2025-06-23 RX ADMIN — FOLIC ACID 1 MG: 1 TABLET ORAL at 09:06

## 2025-06-23 RX ADMIN — RIVAROXABAN 20 MG: 10 TABLET, FILM COATED ORAL at 06:06

## 2025-06-23 RX ADMIN — THIAMINE HCL TAB 100 MG 100 MG: 100 TAB at 09:06

## 2025-06-23 RX ADMIN — LACTULOSE 10 G: 10 SOLUTION ORAL at 02:06

## 2025-06-23 RX ADMIN — FUROSEMIDE 20 MG: 10 INJECTION, SOLUTION INTRAMUSCULAR; INTRAVENOUS at 09:06

## 2025-06-23 RX ADMIN — FUROSEMIDE 20 MG: 10 INJECTION, SOLUTION INTRAMUSCULAR; INTRAVENOUS at 08:06

## 2025-06-23 RX ADMIN — LACTULOSE 10 G: 10 SOLUTION ORAL at 09:06

## 2025-06-23 RX ADMIN — THERA TABS 1 TABLET: TAB at 09:06

## 2025-06-23 RX ADMIN — LOSARTAN POTASSIUM 25 MG: 25 TABLET, FILM COATED ORAL at 09:06

## 2025-06-23 RX ADMIN — PANTOPRAZOLE SODIUM 40 MG: 40 TABLET, DELAYED RELEASE ORAL at 09:06

## 2025-06-23 RX ADMIN — LACTULOSE 10 G: 10 SOLUTION ORAL at 08:06

## 2025-06-23 RX ADMIN — NIFEDIPINE 30 MG: 30 TABLET, FILM COATED, EXTENDED RELEASE ORAL at 09:06

## 2025-06-23 NOTE — PT/OT/SLP EVAL
"Physical Therapy Evaluation    Patient Name:  Aman Humphrey   MRN:  [id number]    Recommendations:     Discharge therapy intensity: Moderate Intensity Therapy   Discharge Equipment Recommendations: walker, rolling   Barriers to discharge: Decreased caregiver support and Impaired mobility    Assessment:     Aman Humphrey is a 64 y.o. male admitted with a medical diagnosis of Carpal tunnel syndrome, HTN (hypertension), Sciatica, HCV cirrhosis, hepatic lesion, elevated AFP, HTN. The patient presented to [facility] on 6/17/2025 with a primary complaint of abdominal pain with nausea, bloating, weight loss.  He presents with the following impairments/functional limitations: weakness, impaired endurance, impaired self care skills, impaired functional mobility, gait instability, impaired balance, decreased lower extremity function, pain, decreased ROM. Pt lives alone and does not have any AD. Prior to admission he was IND with ADLs. Pt reported fatigue and that "someone always comes in here when I am comfortable." Pt agreeable to therapy session after encouragement. Pt currently requires CGA-Oma for all functional activities. While sitting EOB, pt was able to maintain sitting balance, but would frequently attempt to lay down. Pt also wished to lie down at the foot of the bed because "that is more comfortable." Pt educated on safety and was positioned correctly in bed. Pt would benefit from MODERATE intensity skilled physical therapy sessions following d/c from this facility.     Rehab Prognosis: Fair; patient would benefit from acute skilled PT services to address these deficits and reach maximum level of function.    Recent Surgery: Procedure(s) (LRB):  EGD (N/A) 3 Days Post-Op    Plan:     During this hospitalization, patient would benefit from acute PT services 5 x/week to address the identified rehab impairments via gait training, therapeutic activities, therapeutic exercises and progress toward the following " goals:    Plan of Care Expires:  07/23/25    Subjective     Chief Complaint: fatigue/pain  Patient/Family Comments/goals: return home.   Pain/Comfort:  Pain Rating 1: no pain reported.     Prior to admission, patients level of function was IND.  Equipment used at home: none.  DME owned (not currently used): none.  Upon discharge, patient will have assistance from self.    Objective:     Communicated with NSG prior to session.  Patient found right sidelying with Other (comments) (none.)  upon PT entry to room.    General Precautions: Standard, fall  Orthopedic Precautions:N/A   Braces: N/A  Respiratory Status: Room air      Exams:  RLE ROM: WFL  RLE Strength: Pt able to mobilize LE through full ROM against gravity.   LLE ROM: WFL  LLE Strength: Pt able to mobilize LE through full ROM against gravity.   Skin integrity: Visible skin intact      Functional Mobility:  Bed Mobility:     Supine to Sit: contact guard assistance  Sit to Supine: contact guard assistance  Transfers:     Sit to Stand:  minimum assistance with rolling walker  Gait: Pt ambulated ~25ft CGA-Oma using RW. Pt demonstrates significant forward posture and decreased gait speed. Gait speed and posture improved once close to bathroom, but declined ambulating back to bed.   Balance: Static standing balance: FAIR. Dynamic standing balance: FAIR.       AM-PAC 6 CLICK MOBILITY  Total Score:19     Co-Treatment: Yes, due to Limited activity tolerance      Patient provided with verbal education education regarding PT role/goals/POC, fall prevention, and safety awareness.  Understanding was verbalized.     Patient left left sidelying with call button in reach.    GOALS:   Multidisciplinary Problems       Physical Therapy Goals          Problem: Physical Therapy    Goal Priority Disciplines Outcome Interventions   Physical Therapy Goal     PT, PT/OT Progressing    Description: Goals to be met by: 7/23/2025     Patient will increase functional independence with  mobility by performin. Supine to sit with Modified [place]  2. Sit to supine with Modified [place]  3. Sit to stand transfer with Modified [place]  4. Gait  x 100 feet with Modified [place] using Rolling Walker.                          History:     Past Medical History:   Diagnosis Date    Carpal tunnel syndrome     HTN (hypertension)     Sciatica        Past Surgical History:   Procedure Laterality Date    ESOPHAGOGASTRODUODENOSCOPY N/A 2025    Procedure: EGD;  Surgeon: Jalyn Day MD;  Location: [facility] ENDOSCOPY;  Service: Gastroenterology;  Laterality: N/A;    SKIN GRAFT         Time Tracking:     PT Received On: 25  PT Start Time: 1127     PT Stop Time: 1145  PT Total Time (min): 18 min     Billable Minutes: Evaluation 18 min      2025

## 2025-06-23 NOTE — NURSING
"Security called to complete [facility] Care Team Partnership Agreement form at 0724 this morning. Patient refusing care, threatening, demanding, and rude to staff. Patient verbally stated, "You'll don't know who I am but you're gonna find out. I'm calling my people". Patient threaten to call news channels and take legal action against the hospital. Patient refused to sign document after being read word for word with security present. Sister Elizabeth Humphrey called nurses station at [phone] concerned after being contacted by the patient. Ms. Elizabeth Humphrey stated that she would be by later today to help with the patient. Patient Advocate number provided to patient. Patient safe in bed with wheels locked, in lowest position, personal items and call light within reach.  "

## 2025-06-23 NOTE — PT/OT/SLP EVAL
"Occupational Therapy  Evaluation    Name: Aman Humphrey  MRN: [id number]  Recent Surgery: Procedure(s) (LRB):  EGD (N/A) 3 Days Post-Op    Recommendations:     Discharge therapy intensity: Moderate Intensity Therapy   Discharge Equipment Recommendations:  walker, rolling  Barriers to discharge:       Assessment:     Aman Humphrey is a 64 y.o. male with a medical diagnosis of abdominal pain, nausea, cirrhosis of liver, portal venous thrombosis, ascites.  He presents with the following performance deficits affecting function: weakness, impaired endurance, impaired self care skills, impaired functional mobility, gait instability, impaired balance, decreased upper extremity function, impaired cognition.   Pt required max motivation to participate in eval today. Min A with RW to bathroom. Poor safety awareness. Recommend moderate intensity.     Rehab Prognosis: Poor; patient would benefit from acute skilled OT services to address these deficits and reach maximum level of function.       Plan:     Patient to be seen 3 x/week to address the above listed problems via self-care/home management, therapeutic activities, therapeutic exercises  Plan of Care Expires: 07/23/25  Plan of Care Reviewed with: patient    Subjective     Chief Complaint: "I'm too weak"  Patient/Family Comments/goals: to get better    Occupational Profile:  Living Environment: pt lives with cousin, stairs to enter, 4 steps with rail   Previous level of function: indep   Roles and Routines: does not drive or work   Equipment Used at Home: none  Assistance upon Discharge: TBD    Pain/Comfort:  Location - Orientation 1: generalized  Pain Addressed 1: Reposition    Patients cultural, spiritual, Congregation conflicts given the current situation:      Objective:     OT communicated with nrsg prior to session.      Patient was found right sidelying with   upon OT entry to room.    General Precautions: Standard, fall  Orthopedic Precautions:    Braces:  "       Functional Mobility/Transfers:  Bed mobility:    Supine to Sit: minimum assistance  Sit to Supine: minimum assistance  Transfers: Toilet Transfer: minimum assistance with rolling walker using Step Transfer  Functional Mobility: poor safety ; pt resistant to using RW correctly and requesting a rollator    Activities of Daily Living:  Lower Body Dressing: maximal assistance    Toileting: minimum assistance for t/f to toilet     AMPA 6 Click ADL:  [facility] Total Score: 12    Functional Cognition:  Affect: Agitated    Upper Extremity Function:  Right Upper Extremity:   Strength: 2/5 - complains of carpal tunnel     Left Upper Extremity:  Strength: 2/5 - complains of carpal tunnel     Balance:   Dynamic standing balance:min A with RW    Therapeutic Positioning  Risk for acquired pressure injuries is increased due to relative decrease in mobility d/t hospitalization  and impaired mobility.    OT interventions performed during the course of today's session:   Therapeutic positioning was provided at the conclusion of session to offload all bony prominences for the prevention and/or reduction of pressure injuries    OT recommendations for therapeutic positioning throughout hospitalization:   Follow [facility] Pressure Injury Prevention Protocol      Co-Treatment: Yes, due to Limited activity tolerance    Patient Education:  Patient provided with verbal education education regarding OT role/goals/POC, fall prevention, safety awareness, and Discharge/DME recommendations.  Understanding was verbalized.     Patient left HOB elevated with all lines intact, call button in reach, bed alarm on, and nrsg notified.    GOALS:   Multidisciplinary Problems       Occupational Therapy Goals          Problem: Occupational Therapy    Goal Priority Disciplines Outcome Interventions   Occupational Therapy Goal     OT, PT/OT Progressing    Description: Goals to be met by: in 1 month      Patient will increase functional independence with ADLs by  performing:    UE Dressing with Minimal Assistance.  LE Dressing with Minimal Assistance.  Grooming while seated at sink with Modified [place].  Toileting from toilet with Modified [place] for hygiene and clothing management.   Toilet transfer to bedside commode with Modified [place].                         History:     Past Medical History:   Diagnosis Date    Carpal tunnel syndrome     HTN (hypertension)     Sciatica          Past Surgical History:   Procedure Laterality Date    ESOPHAGOGASTRODUODENOSCOPY N/A 6/20/2025    Procedure: EGD;  Surgeon: Jalyn Day MD;  Location: [facility] ENDOSCOPY;  Service: Gastroenterology;  Laterality: N/A;    SKIN GRAFT         Time Tracking:     OT Date of Treatment:    OT Start Time: 1127  OT Stop Time: 1145  OT Total Time (min): 18 min    Billable Minutes:Evaluation Moderate Complexity     6/23/2025

## 2025-06-23 NOTE — NURSING
"Pt is very demanding and disrespectful, refusing treatment and complaining that he is not being fed. States " he haven't ate since he been here". When redirected and shown his dinner tray he states he don't want that he wants cornbread and milk. Patient was told that was not available at this time and offered a sandwich tray instead with orange juice.  "

## 2025-06-23 NOTE — PLAN OF CARE
Problem: Physical Therapy  Goal: Physical Therapy Goal  Description: Goals to be met by: 2025     Patient will increase functional independence with mobility by performin. Supine to sit with Modified [place]  2. Sit to supine with Modified [place]  3. Sit to stand transfer with Modified [place]  4. Gait  x 100 feet with Modified [place] using Rolling Walker.     Outcome: Progressing

## 2025-06-23 NOTE — PLAN OF CARE
06/23/25 1216   Discharge Reassessment   Assessment Type Discharge Planning Reassessment   Discharge Plan discussed with: Patient   Discharge Plan A Rehab   Discharge Plan B Skilled Nursing Facility   Post-Acute Status   Post-Acute Authorization Placement   Post-Acute Placement Status Referrals Sent  ([facility] and [facility])     Patient's nurse reports therapy recommending rehab. Dr. Lee requested to discuss rehab with patient. Patient in agreement.     [facility] unable to accept patient.

## 2025-06-23 NOTE — PROGRESS NOTES
[facility] Hospital Medicine Progress Note        Chief Complaint: Inpatient Follow-up for     HPI: Aman Humphrey is a 64 y.o. male who  has a past medical history of Carpal tunnel syndrome, HTN (hypertension), and Sciatica.. The patient presented to [facility] on 6/17/2025 with a primary complaint of abdominal pain of a few days, and overall feeling unwell for the past 1 month.  Patient mentions he has been feeling unwell and has been having early satiety for the past 1 month.  Endorses significant nausea, bloating, weight loss but denies any fever, chills, body aches, vomiting, changes in bowels.  As per previous documentation from 2023 it seems that patient has a known history liver cirrhosis and a hepatic lesion which is concerning for HCC, also is positive and treated for hepatitis-C in the past.     Vital signs on arrival are WNL except for elevated blood pressure, CBC unremarkable, CMP shows some electrolyte abnormalities-hypocalcemia, hypomagnesemia,.  AST/ALT are elevated 2-1.  Ammonia and Bilirubin WNL .  BNP of 159 with a normal troponin.  Alcohol and Tylenol level normal.  Cocaine positive on UDS.  UA negative.  CT abdomen and pelvis shows portal vein thrombosis with what seems to be acute on chronic thrombosis.  Cirrhosis with moderate ascites, cholelithiasis, and nonobstructing kidney stone.     Patient was placed on heparin as he will likely require a EGD to rule out varices by GI at some point during this hospital stay prior to discharge.  AFP to monitor for HCC.  We will order paracentesis with fluid studies.  Hypercoag study ordered to rule out any other causes of portal venous thrombosis.  Hep C RNA ordered.  We will consult GI for possible EGD and to see if they can recommend blood thinners prior to discharge.    Interval Hx:      6/20 EGD completed, food contents in stomach. Grade 1 varices noted, OK to start AC. Sister and brother in law at bedside who are trying to  encourage pt pursue further work up of his tumor. Pt is concerned that is he doesn't have his own place right now.    6/21/25 MRI abd wwo completed, non-diagnostic for HCC due to timing of contrast, pt says that he would like to stay in the hospital until he feels better. Explained that he will need further work up as an outpatient    6/22/25 Lethargic and doesn't feel well. Had an episode of CP- trop, EKG no acute STEMI, likely due to gastritis    6/23/25 Pt was being uncooperative and threatening to call his people. Now agrees to go to rehab    Case was discussed with patient's nurse and  on the floor.    Objective/physical exam:  General: In no acute distress, afebrile  Chest: Clear to auscultation bilaterally  Heart: RRR, +S1, S2, no appreciable murmur  Abdomen: Soft, nontender, BS +  MSK: Warm, no lower extremity edema, no clubbing or cyanosis  Neurologic: Alert and oriented x4, Cranial nerve II-XII intact, Strength 5/5 in all 4 extremities    VITAL SIGNS: 24 HRS MIN & MAX LAST   Temp  Min: 97.8 °F (36.6 °C)  Max: 98.2 °F (36.8 °C) 97.8 °F (36.6 °C)   BP  Min: 135/62  Max: 145/75 (!) 145/75   Pulse  Min: 80  Max: 97  80   Resp  Min: 15  Max: 20 20   SpO2  Min: 96 %  Max: 97 % 97 %     I have reviewed the following labs:  Recent Labs   Lab 06/19/25  0529 06/20/25  0639 06/23/25  0538   WBC 8.84 8.62 9.38   RBC 3.45* 3.61* 3.68*   HGB 10.0* 10.7* 10.8*   HCT 31.0* 33.1* 33.1*   MCV 89.9 91.7 89.9   MCH 29.0 29.6 29.3   MCHC 32.3* 32.3* 32.6*   RDW 18.6* 18.6* 18.6*    151 151   MPV 10.7* 10.6* 10.7*     Recent Labs   Lab 06/18/25  0614 06/19/25  0529 06/20/25  0639 06/23/25  0538    135* 137 134*   K 3.8 4.1 3.8 4.2   * 106 102 104   CO2 26 27 31 30   BUN 8.7 9.9 12.7 17.5   CREATININE 0.77 0.83 0.89 0.74   * 103 97 107   CALCIUM 7.9* 8.1* 7.9* 8.2*   MG 1.50* 1.60  --   --    ALBUMIN 1.6*  --  1.7* 1.7*   PROT 7.5  --  7.8* 8.3*   ALKPHOS 373*  --  440* 405*   ALT 72*  --   123* 134*   *  --  224* 173*   BILITOT 1.4  --  1.1 2.1*     Microbiology Results (last 7 days)       Procedure Component Value Units Date/Time    Body Fluid Culture [[id number]] Collected: 06/18/25 1347    Order Status: Completed Specimen: Body Fluid from Peritoneal Fluid Updated: 06/23/25 0722     Body Fluid Culture No Growth at 5 days    Gram Stain [[id number]] Collected: 06/18/25 1347    Order Status: Completed Specimen: Body Fluid from Peritoneal Fluid Updated: 06/18/25 1721     GRAM STAIN Rare WBC observed      No bacteria seen             See below for Radiology    Assessment/Plan:  Abdominal pain  Early satiety  Nausea  Cirrhosis of liver -meld score of 11 on this admission  Portal venous thrombosis -noted on CT; hyper coag study and AFP ordered   Ascites -IR consulted for paracentesis studies  Nonobstructing kidney stone  Cholelithiasis  Hypomagnesemia  Cocaine positive on UDS  History of Hepatic adenoma with concerns of HCC  History of hep C -hep C RNA ordered     Additional medical history includes carpal tunnel syndrome, hypertension, sciatica     -CT scan shows portal venous thrombosis which seems to be acute on chronic thrombosis in addition to ascites, cholelithiasis, nonobstructing kidney stone    -endorses abdominal pain, early satiety, nausea    -alpha fetoprotein ordered, suspecting HCC  -hypercoagulability workup ordered, although this is less likely cause  --placed on heparin-->Lovenox-->will switch to PO Xarelto at discharge  -hep C RNA ordered to confirm treatment  -GI consulted, EGD performed Grade 1 distal esophageal varices, likely portal hypertensive gastropathy, no bleeding. Significant food contents noted in stomach.  -MRI Abd w/wo, was non-diagnostic due to timing of the contrast, recommend outpatient liver mass protocol CT   -  expect compliance will be difficult given housing situation, drug use  -IR consulted for paracentesis with fluid studies, continuing diuresis       VTE  Prophylaxis:  FD Lovenox    Patient condition:  Stable      Anticipated discharge and Disposition:   Pe, will need OP GI, Onc follow up      All diagnosis and differential diagnosis have been reviewed; assessment and plan has been documented; I have personally reviewed the labs and test results that are presently available; I have reviewed the patients medication list; I have reviewed the consulting providers response and recommendations. I have reviewed or attempted to review medical records based upon their availability    All of the patient's questions have been  addressed and answered. Patient's is agreeable to the above stated plan. I will continue to monitor closely and make adjustments to medical management as needed.    Portions of this note dictated using EMR integrated voice recognition software, and may be subject to voice recognition errors not corrected at proofreading. Please contact writer for clarification if needed.   _____________________________________________________________________    Malnutrition Status:  Nutrition consulted. Most recent weight and BMI monitored-     Measurements:  Wt Readings from Last 1 Encounters:   06/18/25 59 kg (130 lb)   Body mass index is 19.2 kg/m².    Patient has been screened and assessed by RD.    Malnutrition Type:  Context:    Level: other (see comments) (Unable to assess)    Malnutrition Characteristic Summary:  Weight Loss (Malnutrition): other (see comments) (Does not meet criteria)  Energy Intake (Malnutrition): less than or equal to 75% for greater than or equal to 1 month  Fluid Accumulation (Malnutrition): other (see comments) (Not present)    Interventions/Recommendations (treatment strategy):        Scheduled Med:   folic acid  1 mg Oral Daily    furosemide (LASIX) injection  20 mg Intravenous Q12H    lactulose 10 gram/15 ml  10 g Oral TID    losartan  25 mg Oral Daily    multivitamin  1 tablet Oral Daily    NIFEdipine  30 mg Oral Daily    pantoprazole   40 mg Oral Daily    rivaroxaban  20 mg Oral Daily with dinner    thiamine  100 mg Oral Daily      Continuous Infusions:     PRN Meds:    Current Facility-Administered Medications:     aluminum-magnesium hydroxide-simethicone, 30 mL, Oral, QID PRN    bisacodyL, 10 mg, Rectal, Daily PRN    dextrose 50%, 12.5 g, Intravenous, PRN    dextrose 50%, 25 g, Intravenous, PRN    glucagon (human recombinant), 1 mg, Intramuscular, PRN    glucose, 16 g, Oral, PRN    glucose, 24 g, Oral, PRN    lorazepam, 2 mg, Intravenous, Q15 Min PRN    LORazepam, 2 mg, Oral, Q4H PRN    melatonin, 6 mg, Oral, Nightly PRN    morphine, 2 mg, Intravenous, Q6H PRN    polyethylene glycol, 17 g, Oral, BID PRN    sodium chloride 0.9%, 10 mL, Intravenous, PRN     Radiology:  I have personally reviewed the following imaging and agree with the radiologist.     MRI Abdomen W WO Contrast  Narrative: EXAMINATION:  MRI ABDOMEN W WO CONTRAST    CLINICAL HISTORY:  Cirrhosis.Liver lesion, > 1cm;    TECHNIQUE:  Multiplanar multisequence MRI of the abdomen performed without and with gadolinium based IV contrast .    COMPARISON:  CT 17 June 2025, CT 18 October 2023    FINDINGS:  This exam is nondiagnostic for HCC as only delayed scans obtained secondary to patient motion and inability to hold breath.    The liver is cirrhotic and there is portal vein thrombus also seen on CT.  Heterogeneous enhancement of the liver likely relates to portal vein thrombus.  No discrete suspicious lesion seen on diffusion imaging.  Small volume ascites.    There are gallstones.  No significant biliary ductal dilatation.  The spleen is not significantly enlarged.  No gross abnormality pancreas or adrenals.  No hydronephrosis.    No dilated bowel loops.  Abdominal aorta normal in caliber.  Impression: 1. Cirrhosis with portal vein thrombus and small volume ascites.  2. Nondiagnostic exam for HCC secondary to timing of contrast.  Recommend outpatient liver mass protocol  CT.    Electronically signed by: Andriy Laws  Date:    06/21/2025  Time:    11:19      Naa Lee MD  Department of Hospital Medicine   [facility]   06/23/2025

## 2025-06-23 NOTE — PLAN OF CARE
06/23/25 1059   Final Note   Assessment Type Final Discharge Note   Anticipated Discharge Disposition Home   Post-Acute Status   Discharge Delays None known at this time

## 2025-06-24 LAB
ALBUMIN SERPL-MCNC: 1.7 G/DL (ref 3.4–4.8)
ALBUMIN/GLOB SERPL: 0.3 RATIO (ref 1.1–2)
ALP SERPL-CCNC: 374 UNIT/L (ref 40–150)
ALT SERPL-CCNC: 120 UNIT/L (ref 0–55)
ANION GAP SERPL CALC-SCNC: 5 MEQ/L
AST SERPL-CCNC: 147 UNIT/L (ref 11–45)
BASOPHILS # BLD AUTO: 0.03 X10(3)/MCL
BASOPHILS NFR BLD AUTO: 0.3 %
BILIRUB SERPL-MCNC: 2.3 MG/DL
BUN SERPL-MCNC: 19.9 MG/DL (ref 8.4–25.7)
CALCIUM SERPL-MCNC: 8.4 MG/DL (ref 8.8–10)
CHLORIDE SERPL-SCNC: 104 MMOL/L (ref 98–107)
CO2 SERPL-SCNC: 27 MMOL/L (ref 23–31)
CREAT SERPL-MCNC: 0.8 MG/DL (ref 0.72–1.25)
CREAT/UREA NIT SERPL: 25
EOSINOPHIL # BLD AUTO: 0.27 X10(3)/MCL (ref 0–0.9)
EOSINOPHIL NFR BLD AUTO: 3 %
ERYTHROCYTE [DISTWIDTH] IN BLOOD BY AUTOMATED COUNT: 18.4 % (ref 11.5–17)
GFR SERPLBLD CREATININE-BSD FMLA CKD-EPI: >60 ML/MIN/1.73/M2
GLOBULIN SER-MCNC: 6.7 GM/DL (ref 2.4–3.5)
GLUCOSE SERPL-MCNC: 116 MG/DL (ref 82–115)
HCT VFR BLD AUTO: 34.1 % (ref 42–52)
HGB BLD-MCNC: 11 G/DL (ref 14–18)
IMM GRANULOCYTES # BLD AUTO: 0.03 X10(3)/MCL (ref 0–0.04)
IMM GRANULOCYTES NFR BLD AUTO: 0.3 %
LYMPHOCYTES # BLD AUTO: 1.75 X10(3)/MCL (ref 0.6–4.6)
LYMPHOCYTES NFR BLD AUTO: 19.3 %
MCH RBC QN AUTO: 29.6 PG (ref 27–31)
MCHC RBC AUTO-ENTMCNC: 32.3 G/DL (ref 33–36)
MCV RBC AUTO: 91.7 FL (ref 80–94)
MONOCYTES # BLD AUTO: 0.92 X10(3)/MCL (ref 0.1–1.3)
MONOCYTES NFR BLD AUTO: 10.2 %
NEUTROPHILS # BLD AUTO: 6.05 X10(3)/MCL (ref 2.1–9.2)
NEUTROPHILS NFR BLD AUTO: 66.9 %
NRBC BLD AUTO-RTO: 0 %
PLATELET # BLD AUTO: 168 X10(3)/MCL (ref 130–400)
PMV BLD AUTO: 10.5 FL (ref 7.4–10.4)
POTASSIUM SERPL-SCNC: 4.2 MMOL/L (ref 3.5–5.1)
PROT SERPL-MCNC: 8.4 GM/DL (ref 5.8–7.6)
RBC # BLD AUTO: 3.72 X10(6)/MCL (ref 4.7–6.1)
SODIUM SERPL-SCNC: 136 MMOL/L (ref 136–145)
WBC # BLD AUTO: 9.05 X10(3)/MCL (ref 4.5–11.5)

## 2025-06-24 PROCEDURE — 96374 THER/PROPH/DIAG INJ IV PUSH: CPT | Mod: 59

## 2025-06-24 PROCEDURE — 25000003 PHARM REV CODE 250

## 2025-06-24 PROCEDURE — 85025 COMPLETE CBC W/AUTO DIFF WBC: CPT | Performed by: INTERNAL MEDICINE

## 2025-06-24 PROCEDURE — 36415 COLL VENOUS BLD VENIPUNCTURE: CPT | Performed by: INTERNAL MEDICINE

## 2025-06-24 PROCEDURE — 63600175 PHARM REV CODE 636 W HCPCS: Performed by: INTERNAL MEDICINE

## 2025-06-24 PROCEDURE — 25000003 PHARM REV CODE 250: Performed by: STUDENT IN AN ORGANIZED HEALTH CARE EDUCATION/TRAINING PROGRAM

## 2025-06-24 PROCEDURE — 96375 TX/PRO/DX INJ NEW DRUG ADDON: CPT

## 2025-06-24 PROCEDURE — 25000003 PHARM REV CODE 250: Performed by: INTERNAL MEDICINE

## 2025-06-24 PROCEDURE — 96376 TX/PRO/DX INJ SAME DRUG ADON: CPT

## 2025-06-24 PROCEDURE — 80053 COMPREHEN METABOLIC PANEL: CPT | Performed by: INTERNAL MEDICINE

## 2025-06-24 PROCEDURE — G0378 HOSPITAL OBSERVATION PER HR: HCPCS

## 2025-06-24 PROCEDURE — 63600175 PHARM REV CODE 636 W HCPCS

## 2025-06-24 RX ADMIN — FUROSEMIDE 20 MG: 10 INJECTION, SOLUTION INTRAMUSCULAR; INTRAVENOUS at 09:06

## 2025-06-24 RX ADMIN — THIAMINE HCL TAB 100 MG 100 MG: 100 TAB at 08:06

## 2025-06-24 RX ADMIN — LACTULOSE 10 G: 10 SOLUTION ORAL at 08:06

## 2025-06-24 RX ADMIN — NIFEDIPINE 30 MG: 30 TABLET, FILM COATED, EXTENDED RELEASE ORAL at 08:06

## 2025-06-24 RX ADMIN — PANTOPRAZOLE SODIUM 40 MG: 40 TABLET, DELAYED RELEASE ORAL at 08:06

## 2025-06-24 RX ADMIN — RIVAROXABAN 20 MG: 10 TABLET, FILM COATED ORAL at 06:06

## 2025-06-24 RX ADMIN — MORPHINE SULFATE 2 MG: 4 INJECTION, SOLUTION INTRAMUSCULAR; INTRAVENOUS at 09:06

## 2025-06-24 RX ADMIN — LACTULOSE 10 G: 10 SOLUTION ORAL at 09:06

## 2025-06-24 RX ADMIN — THERA TABS 1 TABLET: TAB at 08:06

## 2025-06-24 RX ADMIN — FUROSEMIDE 20 MG: 10 INJECTION, SOLUTION INTRAMUSCULAR; INTRAVENOUS at 08:06

## 2025-06-24 RX ADMIN — LOSARTAN POTASSIUM 25 MG: 25 TABLET, FILM COATED ORAL at 08:06

## 2025-06-24 RX ADMIN — FOLIC ACID 1 MG: 1 TABLET ORAL at 08:06

## 2025-06-24 RX ADMIN — LACTULOSE 10 G: 10 SOLUTION ORAL at 03:06

## 2025-06-24 RX ADMIN — MORPHINE SULFATE 2 MG: 4 INJECTION, SOLUTION INTRAMUSCULAR; INTRAVENOUS at 03:06

## 2025-06-24 RX ADMIN — MORPHINE SULFATE 2 MG: 4 INJECTION, SOLUTION INTRAMUSCULAR; INTRAVENOUS at 08:06

## 2025-06-24 NOTE — PLAN OF CARE
Problem: Adult Inpatient Plan of Care  Goal: Plan of Care Review  Outcome: Progressing  Flowsheets (Taken 6/24/2025 0800)  Plan of Care Reviewed With: patient  Goal: Patient-Specific Goal (Individualized)  Outcome: Progressing  Goal: Absence of Hospital-Acquired Illness or Injury  Outcome: Progressing  Intervention: Identify and Manage Fall Risk  Flowsheets (Taken 6/24/2025 0800)  Safety Promotion/Fall Prevention:   assistive device/personal item within reach   side rails raised x 2  Intervention: Prevent Skin Injury  Flowsheets (Taken 6/24/2025 0800)  Body Position:   position changed independently   weight shifting  Intervention: Prevent and Manage VTE (Venous Thromboembolism) Risk  Flowsheets (Taken 6/24/2025 0800)  VTE Prevention/Management:   ROM (active) performed   fluids promoted  Intervention: Prevent Infection  Flowsheets (Taken 6/24/2025 0800)  Infection Prevention:   environmental surveillance performed   hand hygiene promoted   rest/sleep promoted  Goal: Optimal Comfort and Wellbeing  Outcome: Progressing  Intervention: Monitor Pain and Promote Comfort  Flowsheets (Taken 6/24/2025 0800)  Pain Management Interventions:   care clustered   pillow support provided   position adjusted  Intervention: Provide Person-Centered Care  Flowsheets (Taken 6/24/2025 0800)  Trust Relationship/Rapport:   care explained   choices provided   emotional support provided   empathic listening provided   questions answered   questions encouraged   thoughts/feelings acknowledged   reassurance provided  Goal: Readiness for Transition of Care  Outcome: Progressing     Problem: Fall Injury Risk  Goal: Absence of Fall and Fall-Related Injury  Outcome: Progressing  Intervention: Identify and Manage Contributors  Flowsheets (Taken 6/24/2025 0800)  Medication Review/Management: medications reviewed  Intervention: Promote Injury-Free Environment  Flowsheets (Taken 6/24/2025 0800)  Safety Promotion/Fall Prevention:   assistive  device/personal item within reach   side rails raised x 2

## 2025-06-24 NOTE — PROGRESS NOTES
[facility] Hospital Medicine Progress Note        Chief Complaint: Inpatient Follow-up for     HPI:   64 y.o. male with Carpal tunnel syndrome, HTN (hypertension), and Sciatica.. The patient presented to [facility] on 6/17/2025 with a primary complaint of abdominal pain of a few days, and overall feeling unwell for the past 1 month.  Patient mentions he has been feeling unwell and has been having early satiety for the past 1 month.  Endorses significant nausea, bloating, weight loss but denies any fever, chills, body aches, vomiting, changes in bowels.  As per previous documentation from 2023 it seems that patient has a known history liver cirrhosis and a hepatic lesion which is concerning for HCC, also is positive and treated for hepatitis-C in the past.     Vital signs on arrival are WNL except for elevated blood pressure, CBC unremarkable, CMP shows some electrolyte abnormalities-hypocalcemia, hypomagnesemia,.  AST/ALT are elevated 2-1.  Ammonia and Bilirubin WNL .  BNP of 159 with a normal troponin.  Alcohol and Tylenol level normal.  Cocaine positive on UDS.  UA negative.  CT abdomen and pelvis shows portal vein thrombosis with what seems to be acute on chronic thrombosis.  Cirrhosis with moderate ascites, cholelithiasis, and nonobstructing kidney stone.     Patient was placed on heparin as he will likely require an EGD to rule out varices by GI at some point during this hospital stay prior to discharge.  AFP to monitor for HCC.  Ordered paracentesis with fluid studies.  Hypercoag study ordered to rule out any other causes of portal venous thrombosis.  Hep C RNA ordered.  Consulted GI for possible EGD and to see if they can recommend blood thinners prior to discharge. EGD completed, food contents in stomach. Grade 1 varices noted, OK to start AC. MRI abd wwo completed, non-diagnostic for HCC due to timing of contrast, pt says that he would like to stay in the hospital until he feels  better. Explained that he will need further work up as an outpatient PT/OT on board; recommending moderate-intensity therapy. CM working on placement.    Interval Hx:    No new complaints. Awaiting placement.    Objective/physical exam:  General: In no acute distress, afebrile  Chest: Clear to auscultation bilaterally  Heart: RRR, +S1, S2, no appreciable murmur  Abdomen: Soft, nontender, BS +  MSK: Warm, no lower extremity edema, no clubbing or cyanosis  Neurologic: Alert and oriented x4, Cranial nerve II-XII intact, Strength 5/5 in all 4 extremities    VITAL SIGNS: 24 HRS MIN & MAX LAST   Temp  Min: 98.7 °F (37.1 °C)  Max: 99.4 °F (37.4 °C) 99.4 °F (37.4 °C)   BP  Min: 117/69  Max: 137/67 137/67   Pulse  Min: 88  Max: 90  90   Resp  Min: 18  Max: 20 18   SpO2  Min: 97 %  Max: 99 % 97 %     I have reviewed the following labs:  Recent Labs   Lab 06/20/25  0639 06/23/25  0538 06/24/25  0543   WBC 8.62 9.38 9.05   RBC 3.61* 3.68* 3.72*   HGB 10.7* 10.8* 11.0*   HCT 33.1* 33.1* 34.1*   MCV 91.7 89.9 91.7   MCH 29.6 29.3 29.6   MCHC 32.3* 32.6* 32.3*   RDW 18.6* 18.6* 18.4*    151 168   MPV 10.6* 10.7* 10.5*     Recent Labs   Lab 06/18/25  0614 06/19/25  0529 06/20/25  0639 06/23/25  0538 06/24/25  0543    135* 137 134* 136   K 3.8 4.1 3.8 4.2 4.2   * 106 102 104 104   CO2 26 27 31 30 27   BUN 8.7 9.9 12.7 17.5 19.9   CREATININE 0.77 0.83 0.89 0.74 0.80   * 103 97 107 116*   CALCIUM 7.9* 8.1* 7.9* 8.2* 8.4*   MG 1.50* 1.60  --   --   --    ALBUMIN 1.6*  --  1.7* 1.7* 1.7*   PROT 7.5  --  7.8* 8.3* 8.4*   ALKPHOS 373*  --  440* 405* 374*   ALT 72*  --  123* 134* 120*   *  --  224* 173* 147*   BILITOT 1.4  --  1.1 2.1* 2.3*     Assessment/Plan:  Abdominal pain, Early satiety, Nausea possibly 2/2 PVT  Cirrhosis of liver -meld score of 11 on this admission  Portal venous thrombosis -noted on CT; hyper coag study and AFP ordered   Ascites -IR consulted for paracentesis studies  Nonobstructing  kidney stone  Cholelithiasis  Hypomagnesemia  Cocaine positive on UDS  History of Hepatic adenoma with concerns of HCC  History of hep C -hep C RNA ordered  HTN  Carpal Tunnel  Sciatica    Continues to be admitted   Reporting no new complaints   PT/OT on board; recommending moderate intensity therapy   CM on board for placement   GI recommendations noted   Continued on Xarelto  Continued on folic acid, Lasix b.i.d., lactulose t.i.d., losartan, nifedipine, Protonix, thiamine  CT A/P & MRI Abd reviewed      VTE Prophylaxis:  FD Lovenox    Patient condition:  Stable    Anticipated discharge and Disposition:  Placement    All diagnosis and differential diagnosis have been reviewed; assessment and plan has been documented; I have personally reviewed the labs and test results that are presently available; I have reviewed the patients medication list; I have reviewed the consulting providers response and recommendations. I have reviewed or attempted to review medical records based upon their availability    All of the patient's questions have been  addressed and answered. Patient's is agreeable to the above stated plan. I will continue to monitor closely and make adjustments to medical management as needed.    Portions of this note dictated using EMR integrated voice recognition software, and may be subject to voice recognition errors not corrected at proofreading. Please contact writer for clarification if needed.     Radiology:  I have personally reviewed the following imaging and agree with the radiologist.     MRI Abdomen W WO Contrast  Narrative: EXAMINATION:  MRI ABDOMEN W WO CONTRAST    CLINICAL HISTORY:  Cirrhosis.Liver lesion, > 1cm;    TECHNIQUE:  Multiplanar multisequence MRI of the abdomen performed without and with gadolinium based IV contrast .    COMPARISON:  CT 17 June 2025, CT 18 October 2023    FINDINGS:  This exam is nondiagnostic for HCC as only delayed scans obtained secondary to patient motion and  inability to hold breath.    The liver is cirrhotic and there is portal vein thrombus also seen on CT.  Heterogeneous enhancement of the liver likely relates to portal vein thrombus.  No discrete suspicious lesion seen on diffusion imaging.  Small volume ascites.    There are gallstones.  No significant biliary ductal dilatation.  The spleen is not significantly enlarged.  No gross abnormality pancreas or adrenals.  No hydronephrosis.    No dilated bowel loops.  Abdominal aorta normal in caliber.  Impression: 1. Cirrhosis with portal vein thrombus and small volume ascites.  2. Nondiagnostic exam for HCC secondary to timing of contrast.  Recommend outpatient liver mass protocol CT.    Electronically signed by: Andriy Laws  Date:    06/21/2025  Time:    11:19      Jayden Messina MD  Department of Hospital Medicine   [facility]   06/24/2025

## 2025-06-24 NOTE — PLAN OF CARE
SSC spoke with pt at bedside about choices for skilled therapy. Pt states he does not feel like he is in his right state of mind to make these decisions and would like his sister Elizabeth to help him. Left a voicemail for Elizabeth regarding placement choice, awaiting a call back at this time.

## 2025-06-24 NOTE — NURSING
"Pt refused midnight and 4 am vitals along AM labs states " we are just trying to poison" and he just want to go home but he is waiting on his family. MD was notified.   "

## 2025-06-24 NOTE — PLAN OF CARE
[facility] rehab denied patient denied due to moderate intensity recommended due to poor therapy participation

## 2025-06-24 NOTE — PROGRESS NOTES
Inpatient Nutrition Assessment    Admit Date: 6/17/2025   Total duration of encounter: 7 days   Patient Age: 64 y.o.    Nutrition Recommendation/Prescription     Continue regular diet as tolerated  Continue Boost Plus BID (360 kcal and 14 gm protein per serving). If patient agreeable, can increase to TID  Encourage intake of small, frequent meals 5-6 times throughout the day  Antiemetic PRN  Continue folic acid, MVI and thiamine supplementation for alcohol dependency   Monitor PO intake, labs and weight     Communication of Recommendations: EMR    Nutrition Assessment     Malnutrition Assessment/Nutrition-Focused Physical Exam       Malnutrition Level: other (see comments) (Unable to assess) (06/19/25 1256)  Energy Intake (Malnutrition): less than or equal to 75% for greater than or equal to 1 month (06/19/25 1256)  Weight Loss (Malnutrition): other (see comments) (Does not meet criteria) (06/19/25 1256)                 Midnight Region (Muscle Loss): other (see comments) (unable to assess)                       Fluid Accumulation (Malnutrition): other (see comments) (Not present) (06/19/25 1256)        A minimum of two characteristics is recommended for diagnosis of either severe or non-severe malnutrition.    Chart Review    Reason Seen: follow-up    Malnutrition Screening Tool Results   Have you recently lost weight without trying?: Unsure  Have you been eating poorly because of a decreased appetite?: No   MST Score: 2   Diagnosis:  Abdominal pain  Early satiety  Nausea  Cirrhosis of liver -meld score of 11 on this admission  Portal venous thrombosis -noted on CT; hyper coag study and AFP ordered   Ascites -IR consulted for paracentesis studies  Nonobstructing kidney stone  Cholelithiasis  Hypomagnesemia  Cocaine positive on UDS  History of Hepatic adenoma with concerns of HCC  History of hep C -hep C RNA ordered    Relevant Medical History: Carpal tunnel syndrome, HTN, Sciatica, cirrhosis, portal vein thrombosis,  hepatic adenoma with concerns of HCC, hepatitis C      Scheduled Medications:  folic acid, 1 mg, Daily  furosemide (LASIX) injection, 20 mg, Q12H  lactulose 10 gram/15 ml, 10 g, TID  losartan, 25 mg, Daily  multivitamin, 1 tablet, Daily  NIFEdipine, 30 mg, Daily  pantoprazole, 40 mg, Daily  rivaroxaban, 20 mg, Daily with dinner  thiamine, 100 mg, Daily    Continuous Infusions:   PRN Medications:  aluminum-magnesium hydroxide-simethicone, 30 mL, QID PRN  bisacodyL, 10 mg, Daily PRN  dextrose 50%, 12.5 g, PRN  dextrose 50%, 25 g, PRN  glucagon (human recombinant), 1 mg, PRN  glucose, 16 g, PRN  glucose, 24 g, PRN  lorazepam, 2 mg, Q15 Min PRN  LORazepam, 2 mg, Q4H PRN  melatonin, 6 mg, Nightly PRN  morphine, 2 mg, Q6H PRN  polyethylene glycol, 17 g, BID PRN  sodium chloride 0.9%, 10 mL, PRN    Calorie Containing IV Medications: no significant kcals from medications at this time    Recent Labs   Lab 06/17/25  1755 06/18/25  0018 06/18/25  0614 06/19/25  0529 06/20/25  0639 06/22/25  0526 06/23/25  0538 06/24/25  0543     --  136 135* 137  --  134* 136   K 4.3  --  3.8 4.1 3.8  --  4.2 4.2   CALCIUM 8.3*  --  7.9* 8.1* 7.9*  --  8.2* 8.4*   PHOS  --   --   --  3.6  --   --   --   --    MG  --   --  1.50* 1.60  --   --   --   --      --  108* 106 102  --  104 104   CO2 27  --  26 27 31  --  30 27   BUN 10.1  --  8.7 9.9 12.7  --  17.5 19.9   CREATININE 0.79  --  0.77 0.83 0.89  --  0.74 0.80   EGFRNORACEVR >60  --  >60 >60 >60  --  >60 >60   GLU 97  --  127* 103 97  --  107 116*   BILITOT 1.4  --  1.4  --  1.1  --  2.1* 2.3*   ALKPHOS 443*  --  373*  --  440*  --  405* 374*   ALT 82*  --  72*  --  123*  --  134* 120*   *  --  141*  --  224*  --  173* 147*   ALBUMIN 1.9*  --  1.6*  --  1.7*  --  1.7* 1.7*   AMMONIA  --  28.8  --   --   --  51.7  --   --    LIPASE 49  --   --   --   --   --   --   --    WBC 7.43  --  7.34 8.84 8.62  --  9.38 9.05   HGB 10.7*  --  9.9* 10.0* 10.7*  --  10.8* 11.0*   HCT  "34.4*  --  31.4* 31.0* 33.1*  --  33.1* 34.1*     Nutrition Orders:  Diet Adult Regular Fluid - 1200mL  Diet Adult Regular  Dietary nutrition supplements BID; Boost Plus Nutritional Drink - Any flavor    Appetite/Oral Intake: fair/50-75% of meals  Factors Affecting Nutritional Intake: abdominal pain, early satiety, excessive alcohol intake, and nausea  Social Needs Impacting Access to Food: unable to assess at this time; will attempt on follow-up  Food/Congregation/Cultural Preferences: unable to obtain  Food Allergies: no known food allergies  Last Bowel Movement: 25  Wound(s):  none documented     Comments    : Pt asleep at time of rounds, brother in room unsure of patient's appetite or weight history PTA. Pt was NPO for EGD today, however EGD rescheduled for tomorrow. Diet resumed and pt NPOMN. Per MD note, documentation of early satiety x 1 month with associated weight loss. +nausea, LBM  noted. No recent weight loss noted past year per EMR weight history. Will attempt additional history and NFPE upon follow up as appropriate. Will trial ONS BID to aid with nutrition intake 2/2 documented decreased PO intake x 1 month.     : Not appropriate to round on patient at this time. Pt threatening staff, being uncooperative and refusing care. 50% PO intake of meal documented. Will continue ONS at this time; if patient agreeable, can increase to TID. LBM  noted. Will continue to monitor.    Anthropometrics    Height: 5' 9" (175.3 cm), Height Method: Stated  Last Weight: 59 kg (130 lb) (25 6564), Weight Method: Standard Scale  BMI (Calculated): 19.2  BMI Classification: normal (BMI 18.5-24.9)        Ideal Body Weight (IBW), Male: 160 lb     % Ideal Body Weight, Male (lb): 81.25 %                 Usual Body Weight (UBW), k kg  % Usual Body Weight: 100.15     Usual Weight Provided By: EMR weight history    Wt Readings from Last 5 Encounters:   25 59 kg (130 lb)   25 59 kg (130 lb) "   11/23/23 63 kg (138 lb 14.2 oz)   11/19/23 63.5 kg (140 lb)   11/11/23 59 kg (130 lb)     Weight Change(s) Since Admission:   Wt Readings from Last 1 Encounters:   06/18/25 2324 59 kg (130 lb)   06/17/25 1650 59 kg (130 lb)   Admit Weight: 59 kg (130 lb) (06/17/25 1650), Weight Method: Standard Scale    Estimated Needs    Weight Used For Calorie Calculations: 59 kg (130 lb 1.1 oz)  Energy Calorie Requirements (kcal): 1650 kcal (1.2 SF)     Weight Used For Protein Calculations: 59 kg (130 lb 1.1 oz)  Protein Requirements: 65-71 gm (1.1-1.2 gm/kg)  Fluid Requirements (mL): 1770 mL (30 mL/kg)        Enteral Nutrition     Patient not receiving enteral nutrition at this time.    Parenteral Nutrition     Patient not receiving parenteral nutrition support at this time.    Evaluation of Received Nutrient Intake    Calories: not meeting estimated needs  Protein: not meeting estimated needs    Patient Education     Not applicable.    Nutrition Diagnosis     PES: Inadequate energy intake related to inability to consume sufficient nutrients as evidenced by  early satiety x 1 month. (active)     Nutrition Interventions     Intervention(s): general/healthful diet, modified composition of meals/snacks, commercial beverage, multivitamin/mineral supplement therapy, prescription medication, and collaboration with other providers    Goal: Meet greater than 80% of nutritional needs by follow-up. (goal progressing)  Goal: Consume % of oral supplements by follow-up. (goal progressing)    Nutrition Goals & Monitoring     Dietitian will monitor: food and beverage intake, weight, and gastrointestinal profile  Discharge planning: continue regular diet with Boost or similar oral supplements  Nutrition Risk/Follow-Up: patient at increased nutrition risk; dietitian will follow-up twice weekly   Please consult if re-assessment needed sooner.

## 2025-06-25 LAB
ALBUMIN SERPL-MCNC: 1.6 G/DL (ref 3.4–4.8)
ALBUMIN/GLOB SERPL: 0.2 RATIO (ref 1.1–2)
ALP SERPL-CCNC: 329 UNIT/L (ref 40–150)
ALT SERPL-CCNC: 98 UNIT/L (ref 0–55)
AMMONIA PLAS-MSCNC: 85.4 UMOL/L (ref 18–72)
ANION GAP SERPL CALC-SCNC: 2 MEQ/L
AST SERPL-CCNC: 115 UNIT/L (ref 11–45)
BASOPHILS # BLD AUTO: 0.02 X10(3)/MCL
BASOPHILS NFR BLD AUTO: 0.3 %
BILIRUB SERPL-MCNC: 2.2 MG/DL
BUN SERPL-MCNC: 23.3 MG/DL (ref 8.4–25.7)
CALCIUM SERPL-MCNC: 8 MG/DL (ref 8.8–10)
CHLORIDE SERPL-SCNC: 104 MMOL/L (ref 98–107)
CO2 SERPL-SCNC: 29 MMOL/L (ref 23–31)
CREAT SERPL-MCNC: 0.91 MG/DL (ref 0.72–1.25)
CREAT/UREA NIT SERPL: 26
EOSINOPHIL # BLD AUTO: 0.18 X10(3)/MCL (ref 0–0.9)
EOSINOPHIL NFR BLD AUTO: 2.3 %
ERYTHROCYTE [DISTWIDTH] IN BLOOD BY AUTOMATED COUNT: 18.3 % (ref 11.5–17)
GFR SERPLBLD CREATININE-BSD FMLA CKD-EPI: >60 ML/MIN/1.73/M2
GLOBULIN SER-MCNC: 6.5 GM/DL (ref 2.4–3.5)
GLUCOSE SERPL-MCNC: 122 MG/DL (ref 82–115)
HCT VFR BLD AUTO: 31.7 % (ref 42–52)
HGB BLD-MCNC: 10.3 G/DL (ref 14–18)
IMM GRANULOCYTES # BLD AUTO: 0.03 X10(3)/MCL (ref 0–0.04)
IMM GRANULOCYTES NFR BLD AUTO: 0.4 %
LYMPHOCYTES # BLD AUTO: 1.41 X10(3)/MCL (ref 0.6–4.6)
LYMPHOCYTES NFR BLD AUTO: 17.7 %
MCH RBC QN AUTO: 29.6 PG (ref 27–31)
MCHC RBC AUTO-ENTMCNC: 32.5 G/DL (ref 33–36)
MCV RBC AUTO: 91.1 FL (ref 80–94)
MONOCYTES # BLD AUTO: 0.83 X10(3)/MCL (ref 0.1–1.3)
MONOCYTES NFR BLD AUTO: 10.4 %
NEUTROPHILS # BLD AUTO: 5.51 X10(3)/MCL (ref 2.1–9.2)
NEUTROPHILS NFR BLD AUTO: 68.9 %
NRBC BLD AUTO-RTO: 0 %
OHS QRS DURATION: 92 MS
OHS QTC CALCULATION: 429 MS
PLATELET # BLD AUTO: 185 X10(3)/MCL (ref 130–400)
PMV BLD AUTO: 10.9 FL (ref 7.4–10.4)
POTASSIUM SERPL-SCNC: 3.8 MMOL/L (ref 3.5–5.1)
PROT SERPL-MCNC: 8.1 GM/DL (ref 5.8–7.6)
RBC # BLD AUTO: 3.48 X10(6)/MCL (ref 4.7–6.1)
SODIUM SERPL-SCNC: 135 MMOL/L (ref 136–145)
TROPONIN I SERPL-MCNC: 0.01 NG/ML (ref 0–0.04)
WBC # BLD AUTO: 7.98 X10(3)/MCL (ref 4.5–11.5)

## 2025-06-25 PROCEDURE — 85025 COMPLETE CBC W/AUTO DIFF WBC: CPT | Performed by: INTERNAL MEDICINE

## 2025-06-25 PROCEDURE — 25000003 PHARM REV CODE 250: Performed by: INTERNAL MEDICINE

## 2025-06-25 PROCEDURE — 36415 COLL VENOUS BLD VENIPUNCTURE: CPT | Performed by: INTERNAL MEDICINE

## 2025-06-25 PROCEDURE — 63600175 PHARM REV CODE 636 W HCPCS: Performed by: STUDENT IN AN ORGANIZED HEALTH CARE EDUCATION/TRAINING PROGRAM

## 2025-06-25 PROCEDURE — 93010 ELECTROCARDIOGRAM REPORT: CPT | Mod: ,,, | Performed by: INTERNAL MEDICINE

## 2025-06-25 PROCEDURE — 63600175 PHARM REV CODE 636 W HCPCS: Performed by: INTERNAL MEDICINE

## 2025-06-25 PROCEDURE — 36415 COLL VENOUS BLD VENIPUNCTURE: CPT | Performed by: NURSE PRACTITIONER

## 2025-06-25 PROCEDURE — 96376 TX/PRO/DX INJ SAME DRUG ADON: CPT

## 2025-06-25 PROCEDURE — 93005 ELECTROCARDIOGRAM TRACING: CPT

## 2025-06-25 PROCEDURE — 25000003 PHARM REV CODE 250: Performed by: STUDENT IN AN ORGANIZED HEALTH CARE EDUCATION/TRAINING PROGRAM

## 2025-06-25 PROCEDURE — 84484 ASSAY OF TROPONIN QUANT: CPT | Performed by: NURSE PRACTITIONER

## 2025-06-25 PROCEDURE — 97116 GAIT TRAINING THERAPY: CPT | Mod: CQ

## 2025-06-25 PROCEDURE — 36415 COLL VENOUS BLD VENIPUNCTURE: CPT | Performed by: STUDENT IN AN ORGANIZED HEALTH CARE EDUCATION/TRAINING PROGRAM

## 2025-06-25 PROCEDURE — 97535 SELF CARE MNGMENT TRAINING: CPT | Mod: CO

## 2025-06-25 PROCEDURE — 25000003 PHARM REV CODE 250

## 2025-06-25 PROCEDURE — G0378 HOSPITAL OBSERVATION PER HR: HCPCS

## 2025-06-25 PROCEDURE — 80053 COMPREHEN METABOLIC PANEL: CPT | Performed by: INTERNAL MEDICINE

## 2025-06-25 PROCEDURE — 63600175 PHARM REV CODE 636 W HCPCS

## 2025-06-25 PROCEDURE — 82140 ASSAY OF AMMONIA: CPT | Performed by: STUDENT IN AN ORGANIZED HEALTH CARE EDUCATION/TRAINING PROGRAM

## 2025-06-25 RX ORDER — METOCLOPRAMIDE HYDROCHLORIDE 5 MG/ML
5 INJECTION INTRAMUSCULAR; INTRAVENOUS EVERY 8 HOURS
Status: DISCONTINUED | OUTPATIENT
Start: 2025-06-25 | End: 2025-06-28 | Stop reason: HOSPADM

## 2025-06-25 RX ADMIN — PANTOPRAZOLE SODIUM 40 MG: 40 TABLET, DELAYED RELEASE ORAL at 09:06

## 2025-06-25 RX ADMIN — RIVAROXABAN 20 MG: 10 TABLET, FILM COATED ORAL at 06:06

## 2025-06-25 RX ADMIN — FOLIC ACID 1 MG: 1 TABLET ORAL at 09:06

## 2025-06-25 RX ADMIN — THIAMINE HCL TAB 100 MG 100 MG: 100 TAB at 09:06

## 2025-06-25 RX ADMIN — FUROSEMIDE 20 MG: 10 INJECTION, SOLUTION INTRAMUSCULAR; INTRAVENOUS at 09:06

## 2025-06-25 RX ADMIN — THERA TABS 1 TABLET: TAB at 09:06

## 2025-06-25 RX ADMIN — LACTULOSE 10 G: 10 SOLUTION ORAL at 09:06

## 2025-06-25 RX ADMIN — LACTULOSE 30 G: 10 SOLUTION ORAL at 08:06

## 2025-06-25 RX ADMIN — LACTULOSE 30 G: 10 SOLUTION ORAL at 03:06

## 2025-06-25 RX ADMIN — METOCLOPRAMIDE 5 MG: 5 INJECTION, SOLUTION INTRAMUSCULAR; INTRAVENOUS at 09:06

## 2025-06-25 RX ADMIN — NIFEDIPINE 30 MG: 30 TABLET, FILM COATED, EXTENDED RELEASE ORAL at 09:06

## 2025-06-25 RX ADMIN — LOSARTAN POTASSIUM 25 MG: 25 TABLET, FILM COATED ORAL at 09:06

## 2025-06-25 RX ADMIN — MORPHINE SULFATE 2 MG: 4 INJECTION, SOLUTION INTRAMUSCULAR; INTRAVENOUS at 04:06

## 2025-06-25 NOTE — PLAN OF CARE
To patient's sister Nacho and she is ok with any skilled facility if patient is agreeable to go. Her sister Grisel works at [employer] and if they can take skilled she would be willing to send him there first. Will speak to patient again and call her on the phone if we need to.

## 2025-06-25 NOTE — PT/OT/SLP PROGRESS
Occupational Therapy   Treatment    Name: Aman Humphrey  MRN: [id number]    Recommendations:     Recommended therapy intensity at discharge: Moderate Intensity Therapy   Discharge Equipment Recommendations:  walker, rolling  Barriers to discharge:       Assessment:     Aman Humphrey is a 64 y.o. male with a medical diagnosis of carpal tunnel syndrome, HTN (hypertension), sciatica, HCV cirrhosis, hepatic lesion, elevated AFP, HTN. The patient presented to [facility] on 6/17/2025 with a primary complaint of abdominal pain with nausea, bloating, weight loss. Performance deficits affecting function are weakness, impaired endurance, impaired self care skills, impaired functional mobility, gait instability, impaired balance, decreased upper extremity function, impaired cognition.     Pt very impulsive and unsafe during session. Required heavy verbal cues for attention to tasks and following directions. High fall risk. Would benefit from moderate intensity therapy.     Rehab Prognosis:  Good; patient would benefit from acute skilled OT services to address these deficits and reach maximum level of function.       Plan:     Patient to be seen 3 x/week to address the above listed problems via self-care/home management, therapeutic activities, therapeutic exercises  Plan of Care Expires: 07/23/25  Plan of Care Reviewed with: patient, family    Subjective     Pain/Comfort:  Pain Rating 1: 0/10    Objective:     Communicated with: RN prior to session.  Patient found up in chair upon OT entry to room.    General Precautions: Standard, fall    Orthopedic Precautions:N/A  Braces: N/A  Respiratory Status: Room air     Occupational Performance:     Functional Mobility/Transfers:  Bed mobility:    Sit to Supine: stand by assistance  Transfers: Sit to Stand: contact guard assistance with rolling walker  Toilet Transfer: stand by assistance with grab bars using Step Transfer  Pt ambulated in hallway with CGA and RW. Noted with multiple  LOB, veering into obstacles and walls in hallway and poor management of RW. Required max verbal cues for safety and correction.     Activities of Daily Living:  Grooming: contact guard assistance standing at sink to wash hands.   Lower Body Dressing: stand by assistance to don slippers. Educated on safety and avoiding wearing slippers during mobility to reduce chance of fall however pt insisted and became agitated.   Toileting: stand by assistance for posterior pericare after +BM.     Balance:   Static Sitting Balance: No UE support: Good: Patient able to maintain balance without handhold support, limited postural sway.  Dynamic Sitting Balance: No UE support: Good: Patient accepts moderate challenge; able to maintain balance while picking object off floor.    Therapeutic Positioning    OT interventions performed during the course of today's session in an effort to prevent and/or reduce acquired pressure injuries:   Education was provided on benefits of and recommendations for therapeutic positioning    [facility] 6 Click ADL: 19    Co-Treatment: No    Patient Education:  Patient provided with verbal education education regarding OT role/goals/POC, fall prevention, and safety awareness.  Additional teaching is warranted.      Patient left HOB elevated with all lines intact, call button in reach, bed alarm on, and family present.    GOALS:   Multidisciplinary Problems       Occupational Therapy Goals          Problem: Occupational Therapy    Goal Priority Disciplines Outcome Interventions   Occupational Therapy Goal     OT, PT/OT Progressing    Description: Goals to be met by: in 1 month      Patient will increase functional independence with ADLs by performing:    UE Dressing with Minimal Assistance.  LE Dressing with Minimal Assistance.  Grooming while seated at sink with Modified [place].  Toileting from toilet with Modified [place] for hygiene and clothing management.   Toilet transfer to bedside commode  with Modified [place].                         Time Tracking:     OT Date of Treatment: 06/25/25  OT Start Time: 1353  OT Stop Time: 1431  OT Total Time (min): 38 min    Billable Minutes:Self Care/Home Management 38    Supervising Occupational Therapist: HAMILTON Agudelo  OT/DION: DION     Number of DION visits since last OT visit: 1    6/25/2025

## 2025-06-25 NOTE — PT/OT/SLP PROGRESS
Physical Therapy Treatment    Patient Name:  Aman Humphrey   MRN:  [id number]    Recommendations:     Discharge therapy intensity: Moderate Intensity Therapy   Discharge Equipment Recommendations: walker, rolling  Barriers to discharge: Decreased caregiver support, Impaired mobility, and Ongoing medical needs    Assessment:     Aman Humphrey is a 64 y.o. male admitted with a medical diagnosis of Carpal tunnel syndrome, HTN (hypertension), Sciatica, HCV cirrhosis, hepatic lesion, elevated AFP, HTN. The patient presented to [facility] on 6/17/2025 with a primary complaint of abdominal pain with nausea, bloating, weight loss.  He presents with the following impairments/functional limitations: weakness, impaired endurance, impaired self care skills, impaired functional mobility, gait instability, impaired balance, decreased lower extremity function, pain, decreased ROM. Pt lives alone and does not have any AD.      Rehab Prognosis: Fair; patient would benefit from acute skilled PT services to address these deficits and reach maximum level of function.    Recent Surgery: Procedure(s) (LRB):  EGD (N/A) 5 Days Post-Op    Plan:     During this hospitalization, patient would benefit from acute PT services 5 x/week to address the identified rehab impairments via gait training, therapeutic activities, therapeutic exercises and progress toward the following goals:    Plan of Care Expires:  07/23/25    Subjective     Chief Complaint: many, he complained about everything from the staff to the way his socks fit.     Objective:     Communicated with nurse prior to session.  Patient found supine with Other (comments) (none.) upon PT entry to room.     General Precautions: Standard, fall  Orthopedic Precautions: N/A  Braces: N/A  Respiratory Status: Room air  Blood Pressure:   Skin Integrity: Visible skin intact      Functional Mobility:  SBA to get EOB and same for STS  Gait to bathroom with SBA using RW and additional 80 ft without AD  min assist.     Education Provided:  Role and goals of PT, transfer training, bed mobility, gait training, balance training, safety awareness, assistive device, strengthening exercises, and importance of participating in PT to return to PLOF.     Patient left up in chair with family present    GOALS:   Multidisciplinary Problems       Physical Therapy Goals          Problem: Physical Therapy    Goal Priority Disciplines Outcome Interventions   Physical Therapy Goal     PT, PT/OT Progressing    Description: Goals to be met by: 2025     Patient will increase functional independence with mobility by performin. Supine to sit with Modified [place]  2. Sit to supine with Modified [place]  3. Sit to stand transfer with Modified [place]  4. Gait  x 100 feet with Modified [place] using Rolling Walker.                          Time Tracking:     PT Received On: 25  PT Start Time: 1300     PT Stop Time: 1317  PT Total Time (min): 17 min     Billable Minutes: Gait Training 17    Treatment Type: Treatment  PT/PTA: PTA     Number of PTA visits since last PT visit: 2025

## 2025-06-25 NOTE — PLAN OF CARE
Spoke to patient with family at bedside he is interested in rehab states that he knows that he needs to participate and is willing. States that he thinks that he is constipated and his stomach hurts. He is unsure about bowel movements. Family feels that he may have some depression will discuss with MD.

## 2025-06-25 NOTE — PLAN OF CARE
"Spoke to patient about rehab denial and SNF. Patient states "I am not going to go to the nursing home. I know that is what they want and what they are after. I will not go that is the last resort." Discussed that he has been denied and we are at last resort or we need an alternate plan. He states that he is not going to go he will go back home.   "

## 2025-06-25 NOTE — PROGRESS NOTES
[facility] Hospital Medicine Progress Note        Chief Complaint: Inpatient Follow-up for     HPI:   64 y.o. male with Carpal tunnel syndrome, HTN (hypertension), and Sciatica.. The patient presented to [facility] on 6/17/2025 with a primary complaint of abdominal pain of a few days, and overall feeling unwell for the past 1 month.  Patient mentions he has been feeling unwell and has been having early satiety for the past 1 month.  Endorses significant nausea, bloating, weight loss but denies any fever, chills, body aches, vomiting, changes in bowels.  As per previous documentation from 2023 it seems that patient has a known history liver cirrhosis and a hepatic lesion which is concerning for HCC, also is positive and treated for hepatitis-C in the past.     Vital signs on arrival are WNL except for elevated blood pressure, CBC unremarkable, CMP shows some electrolyte abnormalities-hypocalcemia, hypomagnesemia,.  AST/ALT are elevated 2-1.  Ammonia and Bilirubin WNL .  BNP of 159 with a normal troponin.  Alcohol and Tylenol level normal.  Cocaine positive on UDS.  UA negative.  CT abdomen and pelvis shows portal vein thrombosis with what seems to be acute on chronic thrombosis.  Cirrhosis with moderate ascites, cholelithiasis, and nonobstructing kidney stone.     Patient was placed on heparin as he will likely require an EGD to rule out varices by GI at some point during this hospital stay prior to discharge.  AFP to monitor for HCC.  Ordered paracentesis with fluid studies.  Hypercoag study ordered to rule out any other causes of portal venous thrombosis.  Hep C RNA ordered.  Consulted GI for possible EGD and to see if they can recommend blood thinners prior to discharge. EGD completed, food contents in stomach. Grade 1 varices noted, OK to start AC. MRI abd wwo completed, non-diagnostic for HCC due to timing of contrast, pt says that he would like to stay in the hospital until he feels  better. Explained that he will need further work up as an outpatient PT/OT on board; recommending moderate-intensity therapy. CM working on placement.    Interval Hx:    No new complaints. Awaiting placement.    Objective/physical exam:  General: In no acute distress, afebrile  Chest: Clear to auscultation bilaterally  Heart: RRR, +S1, S2, no appreciable murmur  Abdomen: Soft, nontender, BS +  MSK: Warm, no lower extremity edema, no clubbing or cyanosis  Neurologic: Alert and oriented x4, Cranial nerve II-XII intact, Strength 5/5 in all 4 extremities    VITAL SIGNS: 24 HRS MIN & MAX LAST   Temp  Min: 98.2 °F (36.8 °C)  Max: 98.9 °F (37.2 °C) 98.8 °F (37.1 °C)   BP  Min: 118/65  Max: 143/73 121/67   Pulse  Min: 86  Max: 96  86   Resp  Min: 16  Max: 18 16   SpO2  Min: 97 %  Max: 100 % 100 %     I have reviewed the following labs:  Recent Labs   Lab 06/23/25  0538 06/24/25  0543 06/25/25  0632   WBC 9.38 9.05 7.98   RBC 3.68* 3.72* 3.48*   HGB 10.8* 11.0* 10.3*   HCT 33.1* 34.1* 31.7*   MCV 89.9 91.7 91.1   MCH 29.3 29.6 29.6   MCHC 32.6* 32.3* 32.5*   RDW 18.6* 18.4* 18.3*    168 185   MPV 10.7* 10.5* 10.9*     Recent Labs   Lab 06/19/25  0529 06/20/25  0639 06/23/25  0538 06/24/25  0543 06/25/25  0632   *   < > 134* 136 135*   K 4.1   < > 4.2 4.2 3.8      < > 104 104 104   CO2 27   < > 30 27 29   BUN 9.9   < > 17.5 19.9 23.3   CREATININE 0.83   < > 0.74 0.80 0.91      < > 107 116* 122*   CALCIUM 8.1*   < > 8.2* 8.4* 8.0*   MG 1.60  --   --   --   --    ALBUMIN  --    < > 1.7* 1.7* 1.6*   PROT  --    < > 8.3* 8.4* 8.1*   ALKPHOS  --    < > 405* 374* 329*   ALT  --    < > 134* 120* 98*   AST  --    < > 173* 147* 115*   BILITOT  --    < > 2.1* 2.3* 2.2*    < > = values in this interval not displayed.     Assessment/Plan:  Abdominal pain, Early satiety, Nausea possibly 2/2 PVT  Cirrhosis of liver -meld score of 11 on this admission  Portal venous thrombosis -noted on CT; hyper coag study and AFP  ordered   Ascites -IR consulted for paracentesis studies  Nonobstructing kidney stone  Cholelithiasis  Hypomagnesemia  Cocaine positive on UDS  History of Hepatic adenoma with concerns of HCC  History of hep C -hep C RNA ordered  HTN  Carpal Tunnel  Sciatica    Continues to be admitted   Reporting no new complaints   Tolerating PO intake and having BMs per chart review  PT/OT on board; recommending moderate intensity therapy   CM on board for placement   GI recommendations noted   Continued on Xarelto  Continued on folic acid, Lasix b.i.d., lactulose t.i.d., losartan, nifedipine, Protonix, thiamine  CT A/P & MRI Abd reviewed      VTE Prophylaxis:  FD Lovenox    Patient condition:  Stable    Anticipated discharge and Disposition:  Placement    All diagnosis and differential diagnosis have been reviewed; assessment and plan has been documented; I have personally reviewed the labs and test results that are presently available; I have reviewed the patients medication list; I have reviewed the consulting providers response and recommendations. I have reviewed or attempted to review medical records based upon their availability    All of the patient's questions have been  addressed and answered. Patient's is agreeable to the above stated plan. I will continue to monitor closely and make adjustments to medical management as needed.    Portions of this note dictated using EMR integrated voice recognition software, and may be subject to voice recognition errors not corrected at proofreading. Please contact writer for clarification if needed.     Radiology:  I have personally reviewed the following imaging and agree with the radiologist.     MRI Abdomen W WO Contrast  Narrative: EXAMINATION:  MRI ABDOMEN W WO CONTRAST    CLINICAL HISTORY:  Cirrhosis.Liver lesion, > 1cm;    TECHNIQUE:  Multiplanar multisequence MRI of the abdomen performed without and with gadolinium based IV contrast .    COMPARISON:  CT 17 June 2025, CT 18  October 2023    FINDINGS:  This exam is nondiagnostic for HCC as only delayed scans obtained secondary to patient motion and inability to hold breath.    The liver is cirrhotic and there is portal vein thrombus also seen on CT.  Heterogeneous enhancement of the liver likely relates to portal vein thrombus.  No discrete suspicious lesion seen on diffusion imaging.  Small volume ascites.    There are gallstones.  No significant biliary ductal dilatation.  The spleen is not significantly enlarged.  No gross abnormality pancreas or adrenals.  No hydronephrosis.    No dilated bowel loops.  Abdominal aorta normal in caliber.  Impression: 1. Cirrhosis with portal vein thrombus and small volume ascites.  2. Nondiagnostic exam for HCC secondary to timing of contrast.  Recommend outpatient liver mass protocol CT.    Electronically signed by: Andriy Laws  Date:    06/21/2025  Time:    11:19      Jayden Messina MD  Department of Hospital Medicine   [facility]   06/25/2025

## 2025-06-26 LAB
ALBUMIN SERPL-MCNC: 1.7 G/DL (ref 3.4–4.8)
ALBUMIN/GLOB SERPL: 0.3 RATIO (ref 1.1–2)
ALP SERPL-CCNC: 339 UNIT/L (ref 40–150)
ALT SERPL-CCNC: 99 UNIT/L (ref 0–55)
ANION GAP SERPL CALC-SCNC: 5 MEQ/L
APICAL FOUR CHAMBER EJECTION FRACTION: 65 %
AST SERPL-CCNC: 117 UNIT/L (ref 11–45)
AV INDEX (PROSTH): 1.15
AV MEAN GRADIENT: 2 MMHG
AV PEAK GRADIENT: 5 MMHG
AV VALVE AREA BY VELOCITY RATIO: 2.8 CM²
AV VALVE AREA: 4 CM²
AV VELOCITY RATIO: 0.82
BASOPHILS # BLD AUTO: 0.03 X10(3)/MCL
BASOPHILS NFR BLD AUTO: 0.3 %
BILIRUB SERPL-MCNC: 2 MG/DL
BSA FOR ECHO PROCEDURE: 1.69 M2
BUN SERPL-MCNC: 26.1 MG/DL (ref 8.4–25.7)
CALCIUM SERPL-MCNC: 8.2 MG/DL (ref 8.8–10)
CHLORIDE SERPL-SCNC: 104 MMOL/L (ref 98–107)
CO2 SERPL-SCNC: 26 MMOL/L (ref 23–31)
CREAT SERPL-MCNC: 1.13 MG/DL (ref 0.72–1.25)
CREAT/UREA NIT SERPL: 23
DOP CALC AO PEAK VEL: 1.1 M/S
DOP CALC AO VTI: 18.4 CM
DOP CALC LVOT AREA: 3.5 CM2
DOP CALC LVOT DIAMETER: 2.1 CM
DOP CALC LVOT PEAK VEL: 0.9 M/S
DOP CALC LVOT STROKE VOLUME: 73 CM3
DOP CALC MV VTI: 21.3 CM
DOP CALCLVOT PEAK VEL VTI: 21.1 CM
E/A RATIO: 0.74
E/E' RATIO: 6 M/S
EOSINOPHIL # BLD AUTO: 0.17 X10(3)/MCL (ref 0–0.9)
EOSINOPHIL NFR BLD AUTO: 1.8 %
ERYTHROCYTE [DISTWIDTH] IN BLOOD BY AUTOMATED COUNT: 18 % (ref 11.5–17)
GFR SERPLBLD CREATININE-BSD FMLA CKD-EPI: >60 ML/MIN/1.73/M2
GLOBULIN SER-MCNC: 6.7 GM/DL (ref 2.4–3.5)
GLUCOSE SERPL-MCNC: 104 MG/DL (ref 82–115)
HCT VFR BLD AUTO: 31.7 % (ref 42–52)
HGB BLD-MCNC: 10.3 G/DL (ref 14–18)
IMM GRANULOCYTES # BLD AUTO: 0.03 X10(3)/MCL (ref 0–0.04)
IMM GRANULOCYTES NFR BLD AUTO: 0.3 %
LEFT ATRIUM AREA SYSTOLIC (APICAL 4 CHAMBER): 11.7 CM2
LEFT ATRIUM SIZE: 3.3 CM
LEFT VENTRICLE END DIASTOLIC VOLUME APICAL 4 CHAMBER: 92.8 ML
LEFT VENTRICLE END SYSTOLIC VOLUME APICAL 4 CHAMBER: 27.3 ML
LV LATERAL E/E' RATIO: 5.2 M/S
LV SEPTAL E/E' RATIO: 7.6 M/S
LVOT MG: 2 MMHG
LVOT MV: 0.76 CM/S
LYMPHOCYTES # BLD AUTO: 2.03 X10(3)/MCL (ref 0.6–4.6)
LYMPHOCYTES NFR BLD AUTO: 21.8 %
MCH RBC QN AUTO: 29.9 PG (ref 27–31)
MCHC RBC AUTO-ENTMCNC: 32.5 G/DL (ref 33–36)
MCV RBC AUTO: 91.9 FL (ref 80–94)
MONOCYTES # BLD AUTO: 1.1 X10(3)/MCL (ref 0.1–1.3)
MONOCYTES NFR BLD AUTO: 11.8 %
MV MEAN GRADIENT: 2 MMHG
MV PEAK A VEL: 0.92 M/S
MV PEAK E VEL: 0.68 M/S
MV PEAK GRADIENT: 3 MMHG
MV STENOSIS PRESSURE HALF TIME: 54 MS
MV VALVE AREA BY CONTINUITY EQUATION: 3.43 CM2
MV VALVE AREA P 1/2 METHOD: 4.07 CM2
NEUTROPHILS # BLD AUTO: 5.95 X10(3)/MCL (ref 2.1–9.2)
NEUTROPHILS NFR BLD AUTO: 64 %
NRBC BLD AUTO-RTO: 0 %
PLATELET # BLD AUTO: 173 X10(3)/MCL (ref 130–400)
PMV BLD AUTO: 10.8 FL (ref 7.4–10.4)
POTASSIUM SERPL-SCNC: 4.1 MMOL/L (ref 3.5–5.1)
PROT SERPL-MCNC: 8.4 GM/DL (ref 5.8–7.6)
PV PEAK GRADIENT: 4 MMHG
PV PEAK VELOCITY: 1.01 M/S
RA PRESSURE ESTIMATED: 3 MMHG
RBC # BLD AUTO: 3.45 X10(6)/MCL (ref 4.7–6.1)
SODIUM SERPL-SCNC: 135 MMOL/L (ref 136–145)
TDI LATERAL: 0.13 M/S
TDI SEPTAL: 0.09 M/S
TDI: 0.11 M/S
TRICUSPID ANNULAR PLANE SYSTOLIC EXCURSION: 1.5 CM
TROPONIN I SERPL-MCNC: <0.01 NG/ML (ref 0–0.04)
WBC # BLD AUTO: 9.31 X10(3)/MCL (ref 4.5–11.5)

## 2025-06-26 PROCEDURE — 84484 ASSAY OF TROPONIN QUANT: CPT | Performed by: NURSE PRACTITIONER

## 2025-06-26 PROCEDURE — 84484 ASSAY OF TROPONIN QUANT: CPT | Mod: 91 | Performed by: NURSE PRACTITIONER

## 2025-06-26 PROCEDURE — 80053 COMPREHEN METABOLIC PANEL: CPT | Performed by: INTERNAL MEDICINE

## 2025-06-26 PROCEDURE — 63600175 PHARM REV CODE 636 W HCPCS: Performed by: INTERNAL MEDICINE

## 2025-06-26 PROCEDURE — 25000003 PHARM REV CODE 250: Performed by: STUDENT IN AN ORGANIZED HEALTH CARE EDUCATION/TRAINING PROGRAM

## 2025-06-26 PROCEDURE — G0378 HOSPITAL OBSERVATION PER HR: HCPCS

## 2025-06-26 PROCEDURE — 36415 COLL VENOUS BLD VENIPUNCTURE: CPT | Performed by: INTERNAL MEDICINE

## 2025-06-26 PROCEDURE — 96376 TX/PRO/DX INJ SAME DRUG ADON: CPT

## 2025-06-26 PROCEDURE — 36415 COLL VENOUS BLD VENIPUNCTURE: CPT | Performed by: NURSE PRACTITIONER

## 2025-06-26 PROCEDURE — 63600175 PHARM REV CODE 636 W HCPCS

## 2025-06-26 PROCEDURE — 25000003 PHARM REV CODE 250: Performed by: INTERNAL MEDICINE

## 2025-06-26 PROCEDURE — 63600175 PHARM REV CODE 636 W HCPCS: Performed by: STUDENT IN AN ORGANIZED HEALTH CARE EDUCATION/TRAINING PROGRAM

## 2025-06-26 PROCEDURE — 85025 COMPLETE CBC W/AUTO DIFF WBC: CPT | Performed by: INTERNAL MEDICINE

## 2025-06-26 RX ADMIN — MORPHINE SULFATE 2 MG: 4 INJECTION, SOLUTION INTRAMUSCULAR; INTRAVENOUS at 06:06

## 2025-06-26 RX ADMIN — METOCLOPRAMIDE 5 MG: 5 INJECTION, SOLUTION INTRAMUSCULAR; INTRAVENOUS at 05:06

## 2025-06-26 RX ADMIN — LACTULOSE 30 G: 10 SOLUTION ORAL at 09:06

## 2025-06-26 RX ADMIN — FUROSEMIDE 20 MG: 10 INJECTION, SOLUTION INTRAMUSCULAR; INTRAVENOUS at 09:06

## 2025-06-26 RX ADMIN — RIVAROXABAN 20 MG: 10 TABLET, FILM COATED ORAL at 06:06

## 2025-06-26 RX ADMIN — METOCLOPRAMIDE 5 MG: 5 INJECTION, SOLUTION INTRAMUSCULAR; INTRAVENOUS at 09:06

## 2025-06-26 NOTE — PT/OT/SLP PROGRESS
Physical Therapy Treatment    Patient Name:  Aman Humphrey   MRN:  [id number]      Attempted twice but decline and persuasion to participate to no avail.

## 2025-06-26 NOTE — PROGRESS NOTES
[facility] Hospital Medicine Progress Note        Chief Complaint: Inpatient Follow-up for     HPI: 64 y.o. male with Carpal tunnel syndrome, HTN (hypertension), and Sciatica.. The patient presented to [facility] on 6/17/2025 with a primary complaint of abdominal pain of a few days, and overall feeling unwell for the past 1 month.  Patient mentions he has been feeling unwell and has been having early satiety for the past 1 month.  Endorses significant nausea, bloating, weight loss but denies any fever, chills, body aches, vomiting, changes in bowels.  As per previous documentation from 2023 it seems that patient has a known history liver cirrhosis and a hepatic lesion which is concerning for HCC, also is positive and treated for hepatitis-C in the past.     Vital signs on arrival are WNL except for elevated blood pressure, CBC unremarkable, CMP shows some electrolyte abnormalities-hypocalcemia, hypomagnesemia,.  AST/ALT are elevated 2-1.  Ammonia and Bilirubin WNL .  BNP of 159 with a normal troponin.  Alcohol and Tylenol level normal.  Cocaine positive on UDS.  UA negative.  CT abdomen and pelvis shows portal vein thrombosis with what seems to be acute on chronic thrombosis.  Cirrhosis with moderate ascites, cholelithiasis, and nonobstructing kidney stone.     Patient was placed on heparin as he will likely require an EGD to rule out varices by GI at some point during this hospital stay prior to discharge.  AFP to monitor for HCC.  Ordered paracentesis with fluid studies.  Hypercoag study ordered to rule out any other causes of portal venous thrombosis.  Hep C RNA ordered.  Consulted GI for possible EGD and to see if they can recommend blood thinners prior to discharge. EGD completed, food contents in stomach. Grade 1 varices noted, OK to start AC. MRI abd wwo completed, non-diagnostic for HCC due to timing of contrast, pt says that he would like to stay in the hospital until he feels  better. Explained that he will need further work up as an outpatient PT/OT on board; recommending moderate-intensity therapy. CM working on placement.    Interval Hx:   Patient seen and examined with nursing staff bedside he was very angry that overnight they were coming and doing vitals and we are not letting him sleep  Case was discussed with patient's nurse and  on the floor.    Objective/physical exam:  General: In no acute distress, afebrile  Chest: Clear to auscultation bilaterally  Heart: RRR, +S1, S2, no appreciable murmur  Abdomen: Soft, nontender, BS +  MSK: Warm, no lower extremity edema, no clubbing or cyanosis  Neurologic:  Alert and awake  VITAL SIGNS: 24 HRS MIN & MAX LAST   Temp  Min: 98.6 °F (37 °C)  Max: 99.7 °F (37.6 °C) 99 °F (37.2 °C)   BP  Min: 109/59  Max: 132/68 121/61   Pulse  Min: 73  Max: 93  73   Resp  Min: 16  Max: 18 16   SpO2  Min: 96 %  Max: 100 % 98 %     I have reviewed the following labs:  Recent Labs   Lab 06/24/25  0543 06/25/25  0632 06/26/25  0110   WBC 9.05 7.98 9.31   RBC 3.72* 3.48* 3.45*   HGB 11.0* 10.3* 10.3*   HCT 34.1* 31.7* 31.7*   MCV 91.7 91.1 91.9   MCH 29.6 29.6 29.9   MCHC 32.3* 32.5* 32.5*   RDW 18.4* 18.3* 18.0*    185 173   MPV 10.5* 10.9* 10.8*     Recent Labs   Lab 06/24/25  0543 06/25/25  0632 06/26/25  0110    135* 135*   K 4.2 3.8 4.1    104 104   CO2 27 29 26   BUN 19.9 23.3 26.1*   CREATININE 0.80 0.91 1.13   * 122* 104   CALCIUM 8.4* 8.0* 8.2*   ALBUMIN 1.7* 1.6* 1.7*   PROT 8.4* 8.1* 8.4*   ALKPHOS 374* 329* 339*   * 98* 99*   * 115* 117*   BILITOT 2.3* 2.2* 2.0*     Microbiology Results (last 7 days)       Procedure Component Value Units Date/Time    Body Fluid Culture [[id number]]  (Abnormal) Collected: 06/18/25 1347    Order Status: Completed Specimen: Body Fluid from Peritoneal Fluid Updated: 06/25/25 1047     Body Fluid Culture Propionibacterium acnes     Comment: Isolated from Thio  only  Susceptibility sent to reference lab. Results to follow on separate report.                See below for Radiology    Assessment/Plan:  Abdominal pain, Early satiety, Nausea possibly 2/2 PVT  Cirrhosis of liver -meld score of 11 on this admission  Portal venous thrombosis -noted on CT; hyper coag study and AFP ordered   Ascites -IR consulted for paracentesis studies  Nonobstructing kidney stone  Cholelithiasis  Hypomagnesemia  Cocaine positive on UDS  History of Hepatic adenoma with concerns of HCC  History of hep C -hep C RNA ordered  HTN  Carpal Tunnel  Sciatica      Ascitic fluid culture is growing propranolol bacteriuria will start on doxycycline awaiting cultures and sensitivity  Reviewed lab work from this morning fairly stable   Continue with PT and OT they are recommending moderate intensity case management is working on placement   Continued on Xarelto  Continued on folic acid, Lasix b.i.d., lactulose t.i.d., losartan, nifedipine, Protonix, thiamine    VTE prophylaxis:  Xarelto    Patient condition:  Stable/Fair/Guarded/ Serious/ Critical    Anticipated discharge and Disposition:         All diagnosis and differential diagnosis have been reviewed; assessment and plan has been documented; I have personally reviewed the labs and test results that are presently available; I have reviewed the patients medication list; I have reviewed the consulting providers response and recommendations. I have reviewed or attempted to review medical records based upon their availability    All of the patient's questions have been  addressed and answered. Patient's is agreeable to the above stated plan. I will continue to monitor closely and make adjustments to medical management as needed.    Portions of this note dictated using EMR integrated voice recognition software, and may be subject to voice recognition errors not corrected at proofreading. Please contact writer for clarification if needed.    _____________________________________________________________________    Malnutrition Status:  Nutrition consulted. Most recent weight and BMI monitored-     Measurements:  Wt Readings from Last 1 Encounters:   06/18/25 59 kg (130 lb)   Body mass index is 19.2 kg/m².    Patient has been screened and assessed by RD.    Malnutrition Type:  Context:    Level: other (see comments) (Unable to assess)    Malnutrition Characteristic Summary:  Weight Loss (Malnutrition): other (see comments) (Does not meet criteria)  Energy Intake (Malnutrition): less than or equal to 75% for greater than or equal to 1 month  Fluid Accumulation (Malnutrition): other (see comments) (Not present)    Interventions/Recommendations (treatment strategy):        Scheduled Med:   folic acid  1 mg Oral Daily    furosemide (LASIX) injection  20 mg Intravenous Q12H    lactulose 10 gram/15 ml  30 g Oral TID    losartan  25 mg Oral Daily    metoclopramide  5 mg Intravenous Q8H    multivitamin  1 tablet Oral Daily    NIFEdipine  30 mg Oral Daily    pantoprazole  40 mg Oral Daily    rivaroxaban  20 mg Oral Daily with dinner    thiamine  100 mg Oral Daily      Continuous Infusions:     PRN Meds:    Current Facility-Administered Medications:     aluminum-magnesium hydroxide-simethicone, 30 mL, Oral, QID PRN    bisacodyL, 10 mg, Rectal, Daily PRN    dextrose 50%, 12.5 g, Intravenous, PRN    dextrose 50%, 25 g, Intravenous, PRN    glucagon (human recombinant), 1 mg, Intramuscular, PRN    glucose, 16 g, Oral, PRN    glucose, 24 g, Oral, PRN    lorazepam, 2 mg, Intravenous, Q15 Min PRN    LORazepam, 2 mg, Oral, Q4H PRN    melatonin, 6 mg, Oral, Nightly PRN    morphine, 2 mg, Intravenous, Q6H PRN    polyethylene glycol, 17 g, Oral, BID PRN    sodium chloride 0.9%, 10 mL, Intravenous, PRN     Radiology:  I have personally reviewed the following imaging and agree with the radiologist.     MRI Abdomen W WO Contrast  Narrative: EXAMINATION:  MRI ABDOMEN W WO  CONTRAST    CLINICAL HISTORY:  Cirrhosis.Liver lesion, > 1cm;    TECHNIQUE:  Multiplanar multisequence MRI of the abdomen performed without and with gadolinium based IV contrast .    COMPARISON:  CT 17 June 2025, CT 18 October 2023    FINDINGS:  This exam is nondiagnostic for HCC as only delayed scans obtained secondary to patient motion and inability to hold breath.    The liver is cirrhotic and there is portal vein thrombus also seen on CT.  Heterogeneous enhancement of the liver likely relates to portal vein thrombus.  No discrete suspicious lesion seen on diffusion imaging.  Small volume ascites.    There are gallstones.  No significant biliary ductal dilatation.  The spleen is not significantly enlarged.  No gross abnormality pancreas or adrenals.  No hydronephrosis.    No dilated bowel loops.  Abdominal aorta normal in caliber.  Impression: 1. Cirrhosis with portal vein thrombus and small volume ascites.  2. Nondiagnostic exam for HCC secondary to timing of contrast.  Recommend outpatient liver mass protocol CT.    Electronically signed by: Andriy Laws  Date:    06/21/2025  Time:    11:19      Mandie Ortiz MD  Department of Hospital Medicine   [facility]   06/26/2025

## 2025-06-27 PROBLEM — E44.0 MODERATE MALNUTRITION: Status: ACTIVE | Noted: 2025-06-27

## 2025-06-27 LAB
ALBUMIN SERPL-MCNC: 1.7 G/DL (ref 3.4–4.8)
ALBUMIN/GLOB SERPL: 0.2 RATIO (ref 1.1–2)
ALP SERPL-CCNC: 359 UNIT/L (ref 40–150)
ALT SERPL-CCNC: 96 UNIT/L (ref 0–55)
ANION GAP SERPL CALC-SCNC: 4 MEQ/L
AST SERPL-CCNC: 122 UNIT/L (ref 11–45)
BASOPHILS # BLD AUTO: 0.02 X10(3)/MCL
BASOPHILS NFR BLD AUTO: 0.2 %
BILIRUB SERPL-MCNC: 2 MG/DL
BUN SERPL-MCNC: 22.4 MG/DL (ref 8.4–25.7)
CALCIUM SERPL-MCNC: 8.2 MG/DL (ref 8.8–10)
CHLORIDE SERPL-SCNC: 104 MMOL/L (ref 98–107)
CO2 SERPL-SCNC: 28 MMOL/L (ref 23–31)
CREAT SERPL-MCNC: 0.8 MG/DL (ref 0.72–1.25)
CREAT/UREA NIT SERPL: 28
EOSINOPHIL # BLD AUTO: 0.15 X10(3)/MCL (ref 0–0.9)
EOSINOPHIL NFR BLD AUTO: 1.6 %
ERYTHROCYTE [DISTWIDTH] IN BLOOD BY AUTOMATED COUNT: 17.7 % (ref 11.5–17)
GFR SERPLBLD CREATININE-BSD FMLA CKD-EPI: >60 ML/MIN/1.73/M2
GLOBULIN SER-MCNC: 6.9 GM/DL (ref 2.4–3.5)
GLUCOSE SERPL-MCNC: 91 MG/DL (ref 82–115)
HCT VFR BLD AUTO: 31.6 % (ref 42–52)
HGB BLD-MCNC: 10.3 G/DL (ref 14–18)
IMM GRANULOCYTES # BLD AUTO: 0.02 X10(3)/MCL (ref 0–0.04)
IMM GRANULOCYTES NFR BLD AUTO: 0.2 %
LYMPHOCYTES # BLD AUTO: 1.45 X10(3)/MCL (ref 0.6–4.6)
LYMPHOCYTES NFR BLD AUTO: 15.4 %
MCH RBC QN AUTO: 29.5 PG (ref 27–31)
MCHC RBC AUTO-ENTMCNC: 32.6 G/DL (ref 33–36)
MCV RBC AUTO: 90.5 FL (ref 80–94)
MONOCYTES # BLD AUTO: 1.2 X10(3)/MCL (ref 0.1–1.3)
MONOCYTES NFR BLD AUTO: 12.8 %
NEUTROPHILS # BLD AUTO: 6.56 X10(3)/MCL (ref 2.1–9.2)
NEUTROPHILS NFR BLD AUTO: 69.8 %
NRBC BLD AUTO-RTO: 0 %
PLATELET # BLD AUTO: 179 X10(3)/MCL (ref 130–400)
PMV BLD AUTO: 10.8 FL (ref 7.4–10.4)
POTASSIUM SERPL-SCNC: 4 MMOL/L (ref 3.5–5.1)
PROT SERPL-MCNC: 8.6 GM/DL (ref 5.8–7.6)
RBC # BLD AUTO: 3.49 X10(6)/MCL (ref 4.7–6.1)
SODIUM SERPL-SCNC: 136 MMOL/L (ref 136–145)
WBC # BLD AUTO: 9.4 X10(3)/MCL (ref 4.5–11.5)

## 2025-06-27 PROCEDURE — 25000003 PHARM REV CODE 250: Performed by: STUDENT IN AN ORGANIZED HEALTH CARE EDUCATION/TRAINING PROGRAM

## 2025-06-27 PROCEDURE — 63600175 PHARM REV CODE 636 W HCPCS: Performed by: STUDENT IN AN ORGANIZED HEALTH CARE EDUCATION/TRAINING PROGRAM

## 2025-06-27 PROCEDURE — 36415 COLL VENOUS BLD VENIPUNCTURE: CPT | Performed by: INTERNAL MEDICINE

## 2025-06-27 PROCEDURE — 25000003 PHARM REV CODE 250: Performed by: INTERNAL MEDICINE

## 2025-06-27 PROCEDURE — 25000003 PHARM REV CODE 250

## 2025-06-27 PROCEDURE — 80053 COMPREHEN METABOLIC PANEL: CPT | Performed by: INTERNAL MEDICINE

## 2025-06-27 PROCEDURE — G0378 HOSPITAL OBSERVATION PER HR: HCPCS

## 2025-06-27 PROCEDURE — 97116 GAIT TRAINING THERAPY: CPT | Mod: CQ

## 2025-06-27 PROCEDURE — 63600175 PHARM REV CODE 636 W HCPCS: Performed by: INTERNAL MEDICINE

## 2025-06-27 PROCEDURE — 85025 COMPLETE CBC W/AUTO DIFF WBC: CPT | Performed by: INTERNAL MEDICINE

## 2025-06-27 PROCEDURE — 96376 TX/PRO/DX INJ SAME DRUG ADON: CPT

## 2025-06-27 PROCEDURE — 97535 SELF CARE MNGMENT TRAINING: CPT

## 2025-06-27 PROCEDURE — 63600175 PHARM REV CODE 636 W HCPCS

## 2025-06-27 RX ADMIN — METOCLOPRAMIDE 5 MG: 5 INJECTION, SOLUTION INTRAMUSCULAR; INTRAVENOUS at 05:06

## 2025-06-27 RX ADMIN — THERA TABS 1 TABLET: TAB at 08:06

## 2025-06-27 RX ADMIN — THIAMINE HCL TAB 100 MG 100 MG: 100 TAB at 08:06

## 2025-06-27 RX ADMIN — LACTULOSE 30 G: 10 SOLUTION ORAL at 10:06

## 2025-06-27 RX ADMIN — PANTOPRAZOLE SODIUM 40 MG: 40 TABLET, DELAYED RELEASE ORAL at 08:06

## 2025-06-27 RX ADMIN — LOSARTAN POTASSIUM 25 MG: 25 TABLET, FILM COATED ORAL at 08:06

## 2025-06-27 RX ADMIN — FUROSEMIDE 20 MG: 10 INJECTION, SOLUTION INTRAMUSCULAR; INTRAVENOUS at 08:06

## 2025-06-27 RX ADMIN — FOLIC ACID 1 MG: 1 TABLET ORAL at 08:06

## 2025-06-27 RX ADMIN — ALUMINUM HYDROXIDE, MAGNESIUM HYDROXIDE, AND DIMETHICONE 30 ML: 200; 20; 200 SUSPENSION ORAL at 08:06

## 2025-06-27 RX ADMIN — LACTULOSE 30 G: 10 SOLUTION ORAL at 08:06

## 2025-06-27 RX ADMIN — MORPHINE SULFATE 2 MG: 4 INJECTION, SOLUTION INTRAMUSCULAR; INTRAVENOUS at 05:06

## 2025-06-27 RX ADMIN — FUROSEMIDE 20 MG: 10 INJECTION, SOLUTION INTRAMUSCULAR; INTRAVENOUS at 10:06

## 2025-06-27 RX ADMIN — LACTULOSE 30 G: 10 SOLUTION ORAL at 03:06

## 2025-06-27 RX ADMIN — METOCLOPRAMIDE 5 MG: 5 INJECTION, SOLUTION INTRAMUSCULAR; INTRAVENOUS at 03:06

## 2025-06-27 RX ADMIN — NIFEDIPINE 30 MG: 30 TABLET, FILM COATED, EXTENDED RELEASE ORAL at 08:06

## 2025-06-27 RX ADMIN — METOCLOPRAMIDE 5 MG: 5 INJECTION, SOLUTION INTRAMUSCULAR; INTRAVENOUS at 10:06

## 2025-06-27 RX ADMIN — RIVAROXABAN 20 MG: 10 TABLET, FILM COATED ORAL at 05:06

## 2025-06-27 NOTE — PLAN OF CARE
[facility] spoke with pt at bedside and explained that inpatient rehab denied and offered to work up skilled therapy. Pt states he does not want to go to a nursing home and wants to go home.

## 2025-06-27 NOTE — PROGRESS NOTES
[facility] Hospital Medicine Progress Note        Chief Complaint: Inpatient Follow-up for     HPI: 64 y.o. male with Carpal tunnel syndrome, HTN (hypertension), and Sciatica.. The patient presented to [facility] on 6/17/2025 with a primary complaint of abdominal pain of a few days, and overall feeling unwell for the past 1 month.  Patient mentions he has been feeling unwell and has been having early satiety for the past 1 month.  Endorses significant nausea, bloating, weight loss but denies any fever, chills, body aches, vomiting, changes in bowels.  As per previous documentation from 2023 it seems that patient has a known history liver cirrhosis and a hepatic lesion which is concerning for HCC, also is positive and treated for hepatitis-C in the past.     Vital signs on arrival are WNL except for elevated blood pressure, CBC unremarkable, CMP shows some electrolyte abnormalities-hypocalcemia, hypomagnesemia,.  AST/ALT are elevated 2-1.  Ammonia and Bilirubin WNL .  BNP of 159 with a normal troponin.  Alcohol and Tylenol level normal.  Cocaine positive on UDS.  UA negative.  CT abdomen and pelvis shows portal vein thrombosis with what seems to be acute on chronic thrombosis.  Cirrhosis with moderate ascites, cholelithiasis, and nonobstructing kidney stone.     Patient was placed on heparin as he will likely require an EGD to rule out varices by GI at some point during this hospital stay prior to discharge.  AFP to monitor for HCC.  Ordered paracentesis with fluid studies.  Hypercoag study ordered to rule out any other causes of portal venous thrombosis.  Hep C RNA ordered.  Consulted GI for possible EGD and to see if they can recommend blood thinners prior to discharge. EGD completed, food contents in stomach. Grade 1 varices noted, OK to start AC. MRI abd wwo completed, non-diagnostic for HCC due to timing of contrast, pt says that he would like to stay in the hospital until he feels  better. Explained that he will need further work up as an outpatient PT/OT on board; recommending moderate-intensity therapy. CM working on placement.    Interval Hx:   Patient seen and examined no family member bedside  Objective/physical exam:  General: In no acute distress, afebrile  Chest: Clear to auscultation bilaterally  Heart: RRR, +S1, S2, no appreciable murmur  Abdomen: Soft, nontender, BS +  MSK: Warm, no lower extremity edema, no clubbing or cyanosis  Neurologic:  Alert and awake  VITAL SIGNS: 24 HRS MIN & MAX LAST   Temp  Min: 98.5 °F (36.9 °C)  Max: 98.9 °F (37.2 °C) 98.5 °F (36.9 °C)   BP  Min: 114/57  Max: 148/72 128/62   Pulse  Min: 83  Max: 93  89   Resp  Min: 16  Max: 22 18   SpO2  Min: 98 %  Max: 100 % 100 %     I have reviewed the following labs:  Recent Labs   Lab 06/24/25  0543 06/25/25  0632 06/26/25  0110   WBC 9.05 7.98 9.31   RBC 3.72* 3.48* 3.45*   HGB 11.0* 10.3* 10.3*   HCT 34.1* 31.7* 31.7*   MCV 91.7 91.1 91.9   MCH 29.6 29.6 29.9   MCHC 32.3* 32.5* 32.5*   RDW 18.4* 18.3* 18.0*    185 173   MPV 10.5* 10.9* 10.8*     Recent Labs   Lab 06/24/25  0543 06/25/25  0632 06/26/25  0110    135* 135*   K 4.2 3.8 4.1    104 104   CO2 27 29 26   BUN 19.9 23.3 26.1*   CREATININE 0.80 0.91 1.13   * 122* 104   CALCIUM 8.4* 8.0* 8.2*   ALBUMIN 1.7* 1.6* 1.7*   PROT 8.4* 8.1* 8.4*   ALKPHOS 374* 329* 339*   * 98* 99*   * 115* 117*   BILITOT 2.3* 2.2* 2.0*     Microbiology Results (last 7 days)       Procedure Component Value Units Date/Time    Body Fluid Culture [[id number]]  (Abnormal) Collected: 06/18/25 1347    Order Status: Completed Specimen: Body Fluid from Peritoneal Fluid Updated: 06/25/25 1047     Body Fluid Culture Propionibacterium acnes     Comment: Isolated from Thio only  Susceptibility sent to reference lab. Results to follow on separate report.                See below for Radiology    Assessment/Plan:  Abdominal pain, Early satiety, Nausea  possibly 2/2 PVT  Cirrhosis of liver -meld score of 11 on this admission  Portal venous thrombosis -noted on CT; hyper coag study and AFP ordered   Ascites -IR consulted for paracentesis studies  Nonobstructing kidney stone  Cholelithiasis  Hypomagnesemia  Cocaine positive on UDS  History of Hepatic adenoma with concerns of HCC  History of hep C -hep C RNA ordered  HTN  Carpal Tunnel  Sciatica    Patient was denied by rehab facility and he is refusing skilled nursing facility reviewed Physical therapy's note and they are recommending moderate intensity unless patient has 247 care   I called both of the patient's sister but no one picked up the phone  I will await their response  Ascitic fluid culture is growing proprinobacteria on doxycycline awaiting cultures and sensitivity  Reviewed lab work from this morning fairly stable   Continue with PT and OT they are recommending moderate intensity case management is working on placement   Continued on Xarelto  Continued on folic acid, Lasix b.i.d., lactulose t.i.d., losartan, nifedipine, Protonix, thiamine    VTE prophylaxis:  Xarelto    Patient condition:  Stable/Fair/Guarded/ Serious/ Critical    Anticipated discharge and Disposition:         All diagnosis and differential diagnosis have been reviewed; assessment and plan has been documented; I have personally reviewed the labs and test results that are presently available; I have reviewed the patients medication list; I have reviewed the consulting providers response and recommendations. I have reviewed or attempted to review medical records based upon their availability    All of the patient's questions have been  addressed and answered. Patient's is agreeable to the above stated plan. I will continue to monitor closely and make adjustments to medical management as needed.    Portions of this note dictated using EMR integrated voice recognition software, and may be subject to voice recognition errors not corrected at  proofreading. Please contact writer for clarification if needed.   _____________________________________________________________________    Malnutrition Status:  Nutrition consulted. Most recent weight and BMI monitored-     Measurements:  Wt Readings from Last 1 Encounters:   06/18/25 59 kg (130 lb)   Body mass index is 19.2 kg/m².    Patient has been screened and assessed by RD.    Malnutrition Type:  Context:    Level: other (see comments) (Unable to assess)    Malnutrition Characteristic Summary:  Weight Loss (Malnutrition): other (see comments) (Does not meet criteria)  Energy Intake (Malnutrition): less than or equal to 75% for greater than or equal to 1 month  Fluid Accumulation (Malnutrition): other (see comments) (Not present)    Interventions/Recommendations (treatment strategy):        Scheduled Med:   folic acid  1 mg Oral Daily    furosemide (LASIX) injection  20 mg Intravenous Q12H    lactulose 10 gram/15 ml  30 g Oral TID    losartan  25 mg Oral Daily    metoclopramide  5 mg Intravenous Q8H    multivitamin  1 tablet Oral Daily    NIFEdipine  30 mg Oral Daily    pantoprazole  40 mg Oral Daily    rivaroxaban  20 mg Oral Daily with dinner    thiamine  100 mg Oral Daily      Continuous Infusions:     PRN Meds:    Current Facility-Administered Medications:     aluminum-magnesium hydroxide-simethicone, 30 mL, Oral, QID PRN    bisacodyL, 10 mg, Rectal, Daily PRN    dextrose 50%, 12.5 g, Intravenous, PRN    dextrose 50%, 25 g, Intravenous, PRN    glucagon (human recombinant), 1 mg, Intramuscular, PRN    glucose, 16 g, Oral, PRN    glucose, 24 g, Oral, PRN    lorazepam, 2 mg, Intravenous, Q15 Min PRN    LORazepam, 2 mg, Oral, Q4H PRN    melatonin, 6 mg, Oral, Nightly PRN    morphine, 2 mg, Intravenous, Q6H PRN    polyethylene glycol, 17 g, Oral, BID PRN    sodium chloride 0.9%, 10 mL, Intravenous, PRN     Radiology:  I have personally reviewed the following imaging and agree with the radiologist.     Echo     Left Ventricle: The left ventricle is normal in size. Normal wall   thickness. There is normal systolic function with a visually estimated   ejection fraction of 60 - 65%.    Right Ventricle: The right ventricle is normal in size Systolic   function is reduced.TAPSE is 1.5 cm.    Aortic Valve: The aortic valve is a trileaflet valve. There is aortic   valve sclerosis.    IVC/SVC: Normal venous pressure at 3 mmHg.      Mandie Ortiz MD  Department of Hospital Medicine   [facility]   06/27/2025

## 2025-06-27 NOTE — PROGRESS NOTES
Inpatient Nutrition Assessment    Admit Date: 6/17/2025   Total duration of encounter: 10 days   Patient Age: 64 y.o.    Nutrition Recommendation/Prescription     Continue regular diet as tolerated  Continue Boost Plus BID (360 kcal and 14 gm protein per serving)  Encourage adequate PO intake  Encourage intake of small, frequent meals 5-6 times throughout the day   Antiemetic PRN  Continue folic acid, MVI and thiamine supplementation for alcohol dependency   Monitor PO intake, labs and weight     Communication of Recommendations: reviewed with patient    Nutrition Assessment     Malnutrition Assessment/Nutrition-Focused Physical Exam    Malnutrition Context: chronic illness (06/27/25 1400)  Malnutrition Level: moderate (06/27/25 1400)  Energy Intake (Malnutrition): less than or equal to 75% for greater than or equal to 1 month (06/27/25 1400)  Weight Loss (Malnutrition): other (see comments) (Does not meet criteria) (06/27/25 1400)  Subcutaneous Fat (Malnutrition): mild depletion (06/27/25 1400)           Muscle Mass (Malnutrition): moderate depletion (06/27/25 1400)  [place] (Muscle Loss): moderate depletion                       Fluid Accumulation (Malnutrition): other (see comments) (Not present) (06/27/25 1400)        A minimum of two characteristics is recommended for diagnosis of either severe or non-severe malnutrition.    Chart Review    Reason Seen: follow-up    Malnutrition Screening Tool Results   Have you recently lost weight without trying?: Unsure  Have you been eating poorly because of a decreased appetite?: No   MST Score: 2   Diagnosis:  Abdominal pain, Early satiety, Nausea possibly 2/2 PVT  Cirrhosis of liver -meld score of 11 on this admission  Portal venous thrombosis -noted on CT; hyper coag study and AFP ordered   Ascites -IR consulted for paracentesis studies  Nonobstructing kidney stone  Cholelithiasis  Hypomagnesemia  Cocaine positive on UDS  History of Hepatic adenoma with concerns  of HCC  History of hep C -hep C RNA ordered  HTN  Carpal Tunnel  Sciatica    Relevant Medical History: Carpal tunnel syndrome, HTN, Sciatica, cirrhosis, portal vein thrombosis, hepatic adenoma with concerns of HCC, hepatitis C      Scheduled Medications:  folic acid, 1 mg, Daily  furosemide (LASIX) injection, 20 mg, Q12H  lactulose 10 gram/15 ml, 30 g, TID  losartan, 25 mg, Daily  metoclopramide, 5 mg, Q8H  multivitamin, 1 tablet, Daily  NIFEdipine, 30 mg, Daily  pantoprazole, 40 mg, Daily  rivaroxaban, 20 mg, Daily with dinner  thiamine, 100 mg, Daily    Continuous Infusions:   PRN Medications:  aluminum-magnesium hydroxide-simethicone, 30 mL, QID PRN  bisacodyL, 10 mg, Daily PRN  dextrose 50%, 12.5 g, PRN  dextrose 50%, 25 g, PRN  glucagon (human recombinant), 1 mg, PRN  glucose, 16 g, PRN  glucose, 24 g, PRN  lorazepam, 2 mg, Q15 Min PRN  LORazepam, 2 mg, Q4H PRN  melatonin, 6 mg, Nightly PRN  morphine, 2 mg, Q6H PRN  polyethylene glycol, 17 g, BID PRN  sodium chloride 0.9%, 10 mL, PRN    Calorie Containing IV Medications: no significant kcals from medications at this time    Recent Labs   Lab 06/22/25  0526 06/23/25  0538 06/24/25  0543 06/25/25  0632 06/25/25  1148 06/26/25  0110 06/27/25  0657   NA  --  134* 136 135*  --  135* 136   K  --  4.2 4.2 3.8  --  4.1 4.0   CALCIUM  --  8.2* 8.4* 8.0*  --  8.2* 8.2*   CL  --  104 104 104  --  104 104   CO2  --  30 27 29  --  26 28   BUN  --  17.5 19.9 23.3  --  26.1* 22.4   CREATININE  --  0.74 0.80 0.91  --  1.13 0.80   EGFRNORACEVR  --  >60 >60 >60  --  >60 >60   GLU  --  107 116* 122*  --  104 91   BILITOT  --  2.1* 2.3* 2.2*  --  2.0* 2.0*   ALKPHOS  --  405* 374* 329*  --  339* 359*   ALT  --  134* 120* 98*  --  99* 96*   AST  --  173* 147* 115*  --  117* 122*   ALBUMIN  --  1.7* 1.7* 1.6*  --  1.7* 1.7*   AMMONIA 51.7  --   --   --  85.4*  --   --    WBC  --  9.38 9.05 7.98  --  9.31 9.40   HGB  --  10.8* 11.0* 10.3*  --  10.3* 10.3*   HCT  --  33.1* 34.1* 31.7*  " --  31.7* 31.6*     Nutrition Orders:  Diet Adult Regular Fluid - 1200mL  Diet Adult Regular  Dietary nutrition supplements BID; Boost Plus Nutritional Drink - Any flavor    Appetite/Oral Intake: fair/50-75% of meals  Factors Affecting Nutritional Intake: abdominal pain, early satiety, excessive alcohol intake, and nausea  Social Needs Impacting Access to Food: none identified  Food/Lutheran/Cultural Preferences: none reported  Food Allergies: no known food allergies  Last Bowel Movement: 06/27/25  Wound(s):  none documented    Comments    6/19: Pt asleep at time of rounds, brother in room unsure of patient's appetite or weight history PTA. Pt was NPO for EGD today, however EGD rescheduled for tomorrow. Diet resumed and pt NPOMN. Per MD note, documentation of early satiety x 1 month with associated weight loss. +nausea, LBM 6/17 noted. No recent weight loss noted past year per EMR weight history. Will attempt additional history and NFPE upon follow up as appropriate. Will trial ONS BID to aid with nutrition intake 2/2 documented decreased PO intake x 1 month.      6/23: Not appropriate to round on patient at this time. Pt threatening staff, being uncooperative and refusing care. 50% PO intake of meal documented. Will continue ONS at this time; if patient agreeable, can increase to TID. LBM 6/23 noted. Will continue to monitor.    6/27: Pt reports slight improvement in PO intake. Average 55% PO intake over 5 meals documented in EMR. Encouraged adequate intake of meals and ONS, will continue BID at this time. LBM 6/27 noted. Moderate muscle and mild fat depletion per NFPE.    Anthropometrics    Height: 5' 9" (175.3 cm), Height Method: Stated  Last Weight: 59 kg (130 lb) (06/18/25 7994), Weight Method: Standard Scale  BMI (Calculated): 19.2  BMI Classification: normal (BMI 18.5-24.9)        Ideal Body Weight (IBW), Male: 160 lb     % Ideal Body Weight, Male (lb): 81.25 %                 Usual Body Weight (UBW), kg: " 59 kg  % Usual Body Weight: 100.15     Usual Weight Provided By: EMR weight history    Wt Readings from Last 5 Encounters:   06/18/25 59 kg (130 lb)   04/23/25 59 kg (130 lb)   11/23/23 63 kg (138 lb 14.2 oz)   11/19/23 63.5 kg (140 lb)   11/11/23 59 kg (130 lb)     Weight Change(s) Since Admission:   Wt Readings from Last 1 Encounters:   06/18/25 2324 59 kg (130 lb)   06/17/25 1650 59 kg (130 lb)   Admit Weight: 59 kg (130 lb) (06/17/25 1650), Weight Method: Standard Scale    Estimated Needs    Weight Used For Calorie Calculations: 59 kg (130 lb 1.1 oz)  Energy Calorie Requirements (kcal): 1650 kcal (1.2 SF)     Weight Used For Protein Calculations: 59 kg (130 lb 1.1 oz)  Protein Requirements: 65-71 gm (1.1-1.2 gm/kg)  Fluid Requirements (mL): 1770 mL (30 mL/kg)        Enteral Nutrition     Patient not receiving enteral nutrition at this time.    Parenteral Nutrition     Patient not receiving parenteral nutrition support at this time.    Evaluation of Received Nutrient Intake    Calories: not meeting estimated needs  Protein: not meeting estimated needs    Patient Education     Not applicable.    Nutrition Diagnosis     PES: Inadequate energy intake related to inability to consume sufficient nutrients as evidenced by early satiety x 1 month. (active)     PES: Moderate chronic disease or condition related malnutrition Related to chronic illness As Evidenced by:  - energy intake: <= 75% for 1 months (meets criteria for <= 75% >= 1 month (severe - chronic))- moderate muscle mass depletion- mild loss of subcuteanout fat active    Nutrition Interventions     Intervention(s): general/healthful diet, commercial beverage, multivitamin/mineral supplement therapy, prescription medication, and collaboration with other providers  Intervention(s): Oral nutritional supplement    Goal: Meet greater than 80% of nutritional needs by follow-up. (goal progressing)  Goal: Consume % of oral supplements by follow-up. (goal  progressing)    Nutrition Goals & Monitoring     Dietitian will monitor: food and beverage intake, weight, and gastrointestinal profile  Discharge planning: continue regular diet with boost or similar oral supplements  Nutrition Risk/Follow-Up: patient at increased nutrition risk; dietitian will follow-up twice weekly   Please consult if re-assessment needed sooner.

## 2025-06-27 NOTE — PT/OT/SLP PROGRESS
Occupational Therapy   Treatment    Name: Aman Humphrey  MRN: [id number]    Recommendations:     Recommended therapy intensity at discharge: Moderate Intensity Therapy   Discharge Equipment Recommendations:  to be determined by next level of care  Barriers to discharge:       Assessment:     Aman Humphrey is a 64 y.o. male with a medical diagnosis of Portal vein thrombosis.  He presents with the following. Performance deficits affecting function are weakness, impaired endurance, impaired self care skills, impaired functional mobility, gait instability, impaired balance.   Pt tolerated session well. Much more participatory as compared to previous session.       Rehab Prognosis:  Good; patient would benefit from acute skilled OT services to address these deficits and reach maximum level of function.       Plan:     Patient to be seen 3 x/week to address the above listed problems via self-care/home management, therapeutic activities, therapeutic exercises  Plan of Care Expires: 07/23/25  Plan of Care Reviewed with: patient    Subjective     Pain/Comfort:  Pain Rating 1: 0/10    Objective:     Communicated with: nrsg prior to session.  Patient found HOB elevated with   upon OT entry to room.    General Precautions: Standard, fall    Orthopedic Precautions:N/A  Braces: N/A  Respiratory Status: Room air  Vital Signs:      Occupational Performance:     Functional Mobility/Transfers:  Bed mobility:    Supine to Sit: contact guard assistance  Sit to Supine: contact guard assistance  Transfers: Toilet Transfer: contact guard assistance with rolling walker using Step Transfer    Activities of Daily Living:  Grooming: contact guard assistance standing at sink for oral care   Lower Body Dressing: contact guard assistance to don BLE slippers   Toileting: contact guard assistance standing over toilet to urinate   Cues for safety throughout     [facility] 6 Click ADL: 19    Co-Treatment: No    Patient Education:  Patient provided with  verbal education education regarding OT role/goals/POC, fall prevention, and safety awareness.  Understanding was verbalized.      Patient left HOB elevated with all lines intact, call button in reach, and bed alarm on.    GOALS:   Multidisciplinary Problems       Occupational Therapy Goals          Problem: Occupational Therapy    Goal Priority Disciplines Outcome Interventions   Occupational Therapy Goal     OT, PT/OT Progressing    Description: Goals to be met by: in 1 month      Patient will increase functional independence with ADLs by performing:    UE Dressing with Minimal Assistance.  LE Dressing with Minimal Assistance.  Grooming while seated at sink with Modified [place].  Toileting from toilet with Modified [place] for hygiene and clothing management.   Toilet transfer to bedside commode with Modified [place].                         Time Tracking:     OT Date of Treatment:    OT Start Time: 1038  OT Stop Time: 1104  OT Total Time (min): 26 min    Billable Minutes:Self Care/Home Management 26 min     OT/DION: OT     Number of DION visits since last OT visit: 2    6/27/2025

## 2025-06-27 NOTE — PT/OT/SLP PROGRESS
Physical Therapy Treatment    Patient Name:  Aman Humphrey   MRN:  [id number]    Recommendations:     Discharge therapy intensity: Moderate Intensity Therapy   Discharge Equipment Recommendations: walker, rolling  Barriers to discharge: Decreased caregiver support, Impaired mobility, and Ongoing medical needs    Assessment:     Aman Humphrey is a 64 y.o. male admitted with a medical diagnosis of Carpal tunnel syndrome, HTN (hypertension), Sciatica, HCV cirrhosis, hepatic lesion, elevated AFP, HTN. The patient presented to [facility] on 6/17/2025 with a primary complaint of abdominal pain with nausea, bloating, weight loss.  He presents with the following impairments/functional limitations: weakness, impaired endurance, impaired self care skills, impaired functional mobility, gait instability, impaired balance, decreased lower extremity function, pain, decreased ROM. Pt lives alone and does not have any AD.      Rehab Prognosis: Fair; patient would benefit from acute skilled PT services to address these deficits and reach maximum level of function.    Recent Surgery: Procedure(s) (LRB):  EGD (N/A) 7 Days Post-Op    Plan:     During this hospitalization, patient would benefit from acute PT services 5 x/week to address the identified rehab impairments via gait training, therapeutic activities, therapeutic exercises and progress toward the following goals:    Plan of Care Expires:  07/23/25    Subjective     Chief Complaint: many    Objective:     Communicated with nurse prior to session.  Patient found right sidelying with Other (comments) (none.) upon PT entry to room.     General Precautions: Standard, fall  Orthopedic Precautions: N/A  Braces: N/A  Respiratory Status: Room air  Blood Pressure:   Skin Integrity: Visible skin intact      Functional Mobility:  Independent to get EOB and SBA transfers  Gait to bathroom with RW, LOB but self corrected. Additional 125 ft RW SBA but unsteady and unsafe.   Pt stated he lives  alone therefore rec mod unless he has  care.    Patient left right sidelying with call button in reach and bed alarm on      GOALS:   Multidisciplinary Problems       Physical Therapy Goals          Problem: Physical Therapy    Goal Priority Disciplines Outcome Interventions   Physical Therapy Goal     PT, PT/OT Progressing    Description: Goals to be met by: 2025     Patient will increase functional independence with mobility by performin. Supine to sit with Modified [place]  2. Sit to supine with Modified [place]  3. Sit to stand transfer with Modified [place]  4. Gait  x 100 feet with Modified [place] using Rolling Walker.                          Time Tracking:     PT Received On: 25  PT Start Time: 815     PT Stop Time: 830  PT Total Time (min): 15 min     Billable Minutes: Gait Training 15    Treatment Type: Treatment  PT/PTA: PTA     Number of PTA visits since last PT visit: 2     2025

## 2025-06-28 VITALS
HEART RATE: 87 BPM | SYSTOLIC BLOOD PRESSURE: 122 MMHG | HEIGHT: 69 IN | DIASTOLIC BLOOD PRESSURE: 66 MMHG | TEMPERATURE: 98 F | RESPIRATION RATE: 16 BRPM | BODY MASS INDEX: 19.26 KG/M2 | WEIGHT: 130 LBS | OXYGEN SATURATION: 100 %

## 2025-06-28 PROCEDURE — 25000003 PHARM REV CODE 250: Performed by: NURSE PRACTITIONER

## 2025-06-28 PROCEDURE — 25000003 PHARM REV CODE 250: Performed by: STUDENT IN AN ORGANIZED HEALTH CARE EDUCATION/TRAINING PROGRAM

## 2025-06-28 PROCEDURE — 25000003 PHARM REV CODE 250: Performed by: INTERNAL MEDICINE

## 2025-06-28 PROCEDURE — 96376 TX/PRO/DX INJ SAME DRUG ADON: CPT

## 2025-06-28 PROCEDURE — 63600175 PHARM REV CODE 636 W HCPCS: Performed by: INTERNAL MEDICINE

## 2025-06-28 PROCEDURE — 63600175 PHARM REV CODE 636 W HCPCS: Performed by: STUDENT IN AN ORGANIZED HEALTH CARE EDUCATION/TRAINING PROGRAM

## 2025-06-28 PROCEDURE — G0378 HOSPITAL OBSERVATION PER HR: HCPCS

## 2025-06-28 PROCEDURE — 25000003 PHARM REV CODE 250

## 2025-06-28 RX ORDER — SPIRONOLACTONE 25 MG/1
25 TABLET ORAL DAILY
Qty: 30 TABLET | Refills: 0 | Status: ON HOLD | OUTPATIENT
Start: 2025-06-28 | End: 2025-07-28

## 2025-06-28 RX ORDER — OXYCODONE HYDROCHLORIDE 5 MG/1
5 TABLET ORAL EVERY 8 HOURS PRN
Refills: 0 | Status: DISCONTINUED | OUTPATIENT
Start: 2025-06-28 | End: 2025-06-28 | Stop reason: HOSPADM

## 2025-06-28 RX ORDER — DOXYCYCLINE HYCLATE 100 MG
100 TABLET ORAL EVERY 12 HOURS
Qty: 20 TABLET | Refills: 0 | Status: ON HOLD | OUTPATIENT
Start: 2025-06-28 | End: 2025-07-08

## 2025-06-28 RX ORDER — DOXYCYCLINE HYCLATE 100 MG
100 TABLET ORAL EVERY 12 HOURS
Status: DISCONTINUED | OUTPATIENT
Start: 2025-06-28 | End: 2025-06-28 | Stop reason: HOSPADM

## 2025-06-28 RX ADMIN — PANTOPRAZOLE SODIUM 40 MG: 40 TABLET, DELAYED RELEASE ORAL at 09:06

## 2025-06-28 RX ADMIN — LACTULOSE 30 G: 10 SOLUTION ORAL at 09:06

## 2025-06-28 RX ADMIN — OXYCODONE HYDROCHLORIDE 5 MG: 5 TABLET ORAL at 02:06

## 2025-06-28 RX ADMIN — THIAMINE HCL TAB 100 MG 100 MG: 100 TAB at 09:06

## 2025-06-28 RX ADMIN — LOSARTAN POTASSIUM 25 MG: 25 TABLET, FILM COATED ORAL at 09:06

## 2025-06-28 RX ADMIN — THERA TABS 1 TABLET: TAB at 09:06

## 2025-06-28 RX ADMIN — FUROSEMIDE 20 MG: 10 INJECTION, SOLUTION INTRAMUSCULAR; INTRAVENOUS at 09:06

## 2025-06-28 RX ADMIN — FOLIC ACID 1 MG: 1 TABLET ORAL at 09:06

## 2025-06-28 RX ADMIN — NIFEDIPINE 30 MG: 30 TABLET, FILM COATED, EXTENDED RELEASE ORAL at 09:06

## 2025-06-28 RX ADMIN — METOCLOPRAMIDE 5 MG: 5 INJECTION, SOLUTION INTRAMUSCULAR; INTRAVENOUS at 04:06

## 2025-06-28 NOTE — NURSING
Dr. Ortiz and I spoke with patient's son today, he stated that patient has someone at home with him 24 hours/day. Patients son also mentions that patient will have a guaranteed ride to and from outpatient physical therapy.

## 2025-06-28 NOTE — DISCHARGE SUMMARY
[facility] Medicine Discharge Summary    Admit Date: 6/17/2025  Discharge Date and Time: 6/28/20251:53 PM  Admitting Physician: ROZ Team  Discharging Physician: Mandie Ortiz MD.  Primary Care Physician: Lesly, Primary Doctor  Consults: Gastroenterology    Discharge Diagnoses:  Abdominal pain, Early satiety, Nausea possibly 2/2 thrombosis  Cirrhosis of liver -meld score of 11 on this admission  Portal venous thrombosis \  Ascites -  Nonobstructing kidney stone  Cholelithiasis  Hypomagnesemia  Cocaine positive on UDS  History of Hepatic adenoma with concerns of HCC  History of hep C -  HTN  Carpal Tunnel  Sciatica    Hospital Course:   64 y.o. male with Carpal tunnel syndrome, HTN (hypertension), and Sciatica.. The patient presented to [facility] on 6/17/2025 with a primary complaint of abdominal pain of a few days, and overall feeling unwell for the past 1 month.  Patient mentions he has been feeling unwell and has been having early satiety for the past 1 month.  Endorses significant nausea, bloating, weight loss but denies any fever, chills, body aches, vomiting, changes in bowels.  As per previous documentation from 2023 it seems that patient has a known history liver cirrhosis and a hepatic lesion which is concerning for HCC, also is positive and treated for hepatitis-C in the past.     Vital signs on arrival are WNL except for elevated blood pressure, CBC unremarkable, CMP shows some electrolyte abnormalities-hypocalcemia, hypomagnesemia,.  AST/ALT are elevated 2-1.  Ammonia and Bilirubin WNL .  BNP of 159 with a normal troponin.  Alcohol and Tylenol level normal.  Cocaine positive on UDS.  UA negative.  CT abdomen and pelvis shows portal vein thrombosis with what seems to be acute on chronic thrombosis.  Cirrhosis with moderate ascites, cholelithiasis, and nonobstructing kidney stone.     Patient was placed on heparin as he will likely require an EGD to rule out varices by GI  at some point during this hospital stay prior to discharge.  AFP to monitor for HCC.  Ordered paracentesis with fluid studies.  Hypercoag study ordered to rule out any other causes of portal venous thrombosis.  Hep C RNA ordered.  Consulted GI for possible EGD and to see if they can recommend blood thinners prior to discharge. EGD completed, food contents in stomach. Grade 1 varices noted, OK to start AC. MRI abd wwo completed, non-diagnostic for HCC due to timing of contrast, pt says that he would like to stay in the hospital until he feels better. Explained that he will need further work up as an outpatient PT/OT on board; recommending moderate-intensity therapy. CM working on placement.  Patient was denied by rehab patient was given the option of skilled nursing facility he refused he was working with PT and OT and they were okay discharging the patient home with [facility] discussed with patient's son in person and he said they will have [facility] care for him at home so patient was discharged home in stable condition with outpatient physical therapy and patient's son reported that they will have the transportation to bring him back and forth from the physical therapy place patient will follow up with Oncology primary care physician and GI ascitic fluid culture grew propinobacteriia for which patient was on doxy and will be discharged home on doxycycline for 7 days  Pt was seen and examined on the day of discharge  Vitals:  VITAL SIGNS: 24 HRS MIN & MAX LAST   Temp  Min: 97.8 °F (36.6 °C)  Max: 98.9 °F (37.2 °C) 98.3 °F (36.8 °C)   BP  Min: 114/58  Max: 130/64 122/66   Pulse  Min: 77  Max: 87  87   Resp  Min: 16  Max: 18 16   SpO2  Min: 97 %  Max: 100 % 100 %       Physical Exam:  General: In no acute distress, afebrile  Chest: Clear to auscultation bilaterally  Heart: RRR, +S1, S2, no appreciable murmur  Abdomen: Soft, nontender, BS +  MSK: Warm, no lower extremity edema, no clubbing or cyanosis  Neurologic:  Alert and  awake    Procedures Performed: No admission procedures for hospital encounter.     Significant Diagnostic Studies: See Full reports for all details    Recent Labs   Lab 06/25/25  0632 06/26/25  0110 06/27/25  0657   WBC 7.98 9.31 9.40   RBC 3.48* 3.45* 3.49*   HGB 10.3* 10.3* 10.3*   HCT 31.7* 31.7* 31.6*   MCV 91.1 91.9 90.5   MCH 29.6 29.9 29.5   MCHC 32.5* 32.5* 32.6*   RDW 18.3* 18.0* 17.7*    173 179   MPV 10.9* 10.8* 10.8*       Recent Labs   Lab 06/25/25  0632 06/26/25  0110 06/27/25  0657   * 135* 136   K 3.8 4.1 4.0    104 104   CO2 29 26 28   BUN 23.3 26.1* 22.4   CREATININE 0.91 1.13 0.80   * 104 91   CALCIUM 8.0* 8.2* 8.2*   ALBUMIN 1.6* 1.7* 1.7*   PROT 8.1* 8.4* 8.6*   ALKPHOS 329* 339* 359*   ALT 98* 99* 96*   * 117* 122*   BILITOT 2.2* 2.0* 2.0*        Microbiology Results (last 7 days)       Procedure Component Value Units Date/Time    Body Fluid Culture [[id number]]  (Abnormal) Collected: 06/18/25 1347    Order Status: Completed Specimen: Body Fluid from Peritoneal Fluid Updated: 06/25/25 1047     Body Fluid Culture Propionibacterium acnes     Comment: Isolated from Thio only  Susceptibility sent to reference lab. Results to follow on separate report.                Echo    Left Ventricle: The left ventricle is normal in size. Normal wall   thickness. There is normal systolic function with a visually estimated   ejection fraction of 60 - 65%.    Right Ventricle: The right ventricle is normal in size Systolic   function is reduced.TAPSE is 1.5 cm.    Aortic Valve: The aortic valve is a trileaflet valve. There is aortic   valve sclerosis.    IVC/SVC: Normal venous pressure at 3 mmHg.         Medication List        START taking these medications      doxycycline 100 MG tablet  Commonly known as: VIBRA-TABS  Take 1 tablet (100 mg total) by mouth every 12 (twelve) hours. for 10 days     folic acid 1 MG tablet  Commonly known as: FOLVITE  Take 1 tablet (1 mg total) by  mouth once daily.     lactulose 10 gram/15 ml 10 gram/15 mL (15 mL) solution  Commonly known as: CHRONULAC  Take 30 mLs (20 g total) by mouth 3 (three) times daily as needed.     multivitamin Tab  Take 1 tablet by mouth once daily.     nicotine 14 mg/24 hr  Commonly known as: NICODERM CQ  Place 1 patch onto the skin once daily.     pantoprazole 40 MG tablet  Commonly known as: PROTONIX  Take 1 tablet (40 mg total) by mouth once daily.     rivaroxaban 20 mg Tab  Commonly known as: XARELTO  Take 1 tablet (20 mg total) by mouth daily with dinner or evening meal.     thiamine 100 MG tablet  Take 1 tablet (100 mg total) by mouth once daily.            CONTINUE taking these medications      ARIPiprazole 5 MG Tab  Commonly known as: ABILIFY  Take 1 tablet (5 mg total) by mouth once daily.     EScitalopram oxalate 5 MG Tab  Commonly known as: LEXAPRO  Take 1 tablet (5 mg total) by mouth once daily.     furosemide 20 MG tablet  Commonly known as: LASIX  Take 1 tablet (20 mg total) by mouth once daily.     spironolactone 25 MG tablet  Commonly known as: ALDACTONE  Take 1 tablet (25 mg total) by mouth once daily.            STOP taking these medications      amLODIPine 10 MG tablet  Commonly known as: NORVASC     hydrALAZINE 50 MG tablet  Commonly known as: APRESOLINE     omeprazole 20 MG capsule  Commonly known as: PRILOSEC     propranoloL 10 MG tablet  Commonly known as: INDERAL     traMADoL 50 mg tablet  Commonly known as: ULTRAM               Where to Get Your Medications        These medications were sent to [facility] - [address]  [address], [facility] [address]      Phone: [phone]   ARIPiprazole 5 MG Tab  doxycycline 100 MG tablet  EScitalopram oxalate 5 MG Tab  folic acid 1 MG tablet  furosemide 20 MG tablet  lactulose 10 gram/15 ml 10 gram/15 mL (15 mL) solution  multivitamin Tab  nicotine 14 mg/24 hr  pantoprazole 40 MG  tablet  rivaroxaban 20 mg Tab  spironolactone 25 MG tablet  thiamine 100 MG tablet          Explained in detail to the patient about the discharge plan, medications, and follow-up visits. Pt understands and agrees with the treatment plan  Discharge Disposition: Home or Self Care   Discharged Condition: stable  Diet-    Medications Per DC med rec  Activities as tolerated   Follow-up Information       Cl[facility], [facility] Follow up in 1 week(s).    Why: Physicians office will contact you with appointment date & time! :)  Contact information:  [address] [id number]  [phone]               Cheo Fonseca MD Follow up in 2 week(s).    Specialty: Hematology and Oncology  Why: Follow up post discharge regarding liver mass.    We will call you with this appointment.  Contact information:  [address] [place] [address]  [phone]               Renetta Howard PA-C Follow up in 2 day(s).    Specialty: Gastroenterology  Why: We will call you with this appointment.  Contact information:  [address] [facility]  [phone]  [facility] [address]  [phone]               Out pt PT Follow up.               No, Primary Doctor Follow up in 1 week(s).                           For further questions contact hospitalist office    Discharge time 33 minutes    For worsening symptoms, chest pain, shortness of breath, increased abdominal pain, high grade fever, stroke or stroke like symptoms, immediately go to the nearest Emergency Room or call 911 as soon as possible.      Mandie Pena M.D, on 6/28/2025. at 1:53 PM.

## 2025-06-30 LAB
M MPNR RESULT: NORMAL
PATH REPORT.FINAL DX SPEC: NORMAL

## 2025-07-03 ENCOUNTER — HOSPITAL ENCOUNTER (EMERGENCY)
Facility: HOSPITAL | Age: 65
Discharge: ANOTHER HOSPITAL | End: 2025-07-03
Attending: INTERNAL MEDICINE
Payer: MEDICAID

## 2025-07-03 ENCOUNTER — HOSPITAL ENCOUNTER (INPATIENT)
Facility: HOSPITAL | Age: 65
LOS: 27 days | Discharge: NURSING, REHAB OR LONG-TERM CARE | DRG: 444 | End: 2025-07-30
Attending: HOSPITALIST | Admitting: HOSPITALIST
Payer: MEDICAID

## 2025-07-03 VITALS
SYSTOLIC BLOOD PRESSURE: 127 MMHG | BODY MASS INDEX: 17.77 KG/M2 | DIASTOLIC BLOOD PRESSURE: 65 MMHG | OXYGEN SATURATION: 98 % | RESPIRATION RATE: 15 BRPM | WEIGHT: 120 LBS | TEMPERATURE: 99 F | HEART RATE: 96 BPM | HEIGHT: 69 IN

## 2025-07-03 DIAGNOSIS — K80.50 CHOLEDOCHOLITHIASIS: ICD-10-CM

## 2025-07-03 DIAGNOSIS — I63.9 STROKE: ICD-10-CM

## 2025-07-03 DIAGNOSIS — I81 PORTAL VEIN THROMBOSIS: ICD-10-CM

## 2025-07-03 DIAGNOSIS — R79.1 SUPRATHERAPEUTIC INR: Primary | ICD-10-CM

## 2025-07-03 DIAGNOSIS — K80.50 CHOLEDOCHOLITHIASIS: Primary | ICD-10-CM

## 2025-07-03 DIAGNOSIS — R07.9 CHEST PAIN: ICD-10-CM

## 2025-07-03 LAB
ALBUMIN SERPL-MCNC: 2 G/DL (ref 3.4–4.8)
ALBUMIN/GLOB SERPL: 0.3 RATIO (ref 1.1–2)
ALP SERPL-CCNC: 400 UNIT/L (ref 40–150)
ALT SERPL-CCNC: 424 UNIT/L (ref 0–55)
ANION GAP SERPL CALC-SCNC: 7 MEQ/L
AST SERPL-CCNC: 386 UNIT/L (ref 11–45)
BASOPHILS # BLD AUTO: 0.01 X10(3)/MCL
BASOPHILS NFR BLD AUTO: 0.1 %
BILIRUB DIRECT SERPL-MCNC: 3 MG/DL (ref 0–?)
BILIRUB SERPL-MCNC: 4 MG/DL
BUN SERPL-MCNC: 18.2 MG/DL (ref 8.4–25.7)
CALCIUM SERPL-MCNC: 8.2 MG/DL (ref 8.8–10)
CHLORIDE SERPL-SCNC: 103 MMOL/L (ref 98–107)
CO2 SERPL-SCNC: 24 MMOL/L (ref 23–31)
CREAT SERPL-MCNC: 1.12 MG/DL (ref 0.72–1.25)
CREAT/UREA NIT SERPL: 16
EOSINOPHIL # BLD AUTO: 0 X10(3)/MCL (ref 0–0.9)
EOSINOPHIL NFR BLD AUTO: 0 %
ERYTHROCYTE [DISTWIDTH] IN BLOOD BY AUTOMATED COUNT: 17.1 % (ref 11.5–17)
GFR SERPLBLD CREATININE-BSD FMLA CKD-EPI: >60 ML/MIN/1.73/M2
GLOBULIN SER-MCNC: 7.6 GM/DL (ref 2.4–3.5)
GLUCOSE SERPL-MCNC: 100 MG/DL (ref 82–115)
HCT VFR BLD AUTO: 31.4 % (ref 42–52)
HGB BLD-MCNC: 10.8 G/DL (ref 14–18)
IMM GRANULOCYTES # BLD AUTO: 0.06 X10(3)/MCL (ref 0–0.04)
IMM GRANULOCYTES NFR BLD AUTO: 0.4 %
LACTATE SERPL-SCNC: 1 MMOL/L (ref 0.5–2.2)
LIPASE SERPL-CCNC: 55 U/L
LYMPHOCYTES # BLD AUTO: 1.12 X10(3)/MCL (ref 0.6–4.6)
LYMPHOCYTES NFR BLD AUTO: 8.1 %
MAYO GENERIC ORDERABLE RESULT: ABNORMAL
MCH RBC QN AUTO: 30.8 PG (ref 27–31)
MCHC RBC AUTO-ENTMCNC: 34.4 G/DL (ref 33–36)
MCV RBC AUTO: 89.5 FL (ref 80–94)
MONOCYTES # BLD AUTO: 1.79 X10(3)/MCL (ref 0.1–1.3)
MONOCYTES NFR BLD AUTO: 12.9 %
NEUTROPHILS # BLD AUTO: 10.86 X10(3)/MCL (ref 2.1–9.2)
NEUTROPHILS NFR BLD AUTO: 78.5 %
NRBC BLD AUTO-RTO: 0 %
PLATELET # BLD AUTO: 246 X10(3)/MCL (ref 130–400)
PMV BLD AUTO: 9.9 FL (ref 7.4–10.4)
POTASSIUM SERPL-SCNC: 4.3 MMOL/L (ref 3.5–5.1)
PROT SERPL-MCNC: 9.6 GM/DL (ref 5.8–7.6)
RBC # BLD AUTO: 3.51 X10(6)/MCL (ref 4.7–6.1)
SODIUM SERPL-SCNC: 134 MMOL/L (ref 136–145)
WBC # BLD AUTO: 13.84 X10(3)/MCL (ref 4.5–11.5)

## 2025-07-03 PROCEDURE — 63600175 PHARM REV CODE 636 W HCPCS: Performed by: HOSPITALIST

## 2025-07-03 PROCEDURE — 80053 COMPREHEN METABOLIC PANEL: CPT | Performed by: INTERNAL MEDICINE

## 2025-07-03 PROCEDURE — 96372 THER/PROPH/DIAG INJ SC/IM: CPT | Performed by: INTERNAL MEDICINE

## 2025-07-03 PROCEDURE — 99285 EMERGENCY DEPT VISIT HI MDM: CPT | Mod: 25

## 2025-07-03 PROCEDURE — 63600175 PHARM REV CODE 636 W HCPCS: Performed by: EMERGENCY MEDICINE

## 2025-07-03 PROCEDURE — 85025 COMPLETE CBC W/AUTO DIFF WBC: CPT | Performed by: INTERNAL MEDICINE

## 2025-07-03 PROCEDURE — 87040 BLOOD CULTURE FOR BACTERIA: CPT | Performed by: INTERNAL MEDICINE

## 2025-07-03 PROCEDURE — 82248 BILIRUBIN DIRECT: CPT | Performed by: INTERNAL MEDICINE

## 2025-07-03 PROCEDURE — 96365 THER/PROPH/DIAG IV INF INIT: CPT

## 2025-07-03 PROCEDURE — 83690 ASSAY OF LIPASE: CPT | Performed by: INTERNAL MEDICINE

## 2025-07-03 PROCEDURE — 11000001 HC ACUTE MED/SURG PRIVATE ROOM

## 2025-07-03 PROCEDURE — 63600175 PHARM REV CODE 636 W HCPCS: Performed by: INTERNAL MEDICINE

## 2025-07-03 PROCEDURE — 83605 ASSAY OF LACTIC ACID: CPT | Performed by: INTERNAL MEDICINE

## 2025-07-03 PROCEDURE — 25000003 PHARM REV CODE 250: Performed by: HOSPITALIST

## 2025-07-03 PROCEDURE — 96375 TX/PRO/DX INJ NEW DRUG ADDON: CPT

## 2025-07-03 PROCEDURE — 25000003 PHARM REV CODE 250: Performed by: INTERNAL MEDICINE

## 2025-07-03 PROCEDURE — 21400001 HC TELEMETRY ROOM

## 2025-07-03 PROCEDURE — 99223 1ST HOSP IP/OBS HIGH 75: CPT | Mod: ,,, | Performed by: SURGERY

## 2025-07-03 RX ORDER — PANTOPRAZOLE SODIUM 40 MG/1
40 TABLET, DELAYED RELEASE ORAL DAILY
Status: DISCONTINUED | OUTPATIENT
Start: 2025-07-04 | End: 2025-07-19

## 2025-07-03 RX ORDER — ARIPIPRAZOLE 5 MG/1
5 TABLET ORAL DAILY
Status: DISCONTINUED | OUTPATIENT
Start: 2025-07-04 | End: 2025-07-10

## 2025-07-03 RX ORDER — THIAMINE HCL 100 MG
100 TABLET ORAL DAILY
Status: DISCONTINUED | OUTPATIENT
Start: 2025-07-04 | End: 2025-07-30 | Stop reason: HOSPADM

## 2025-07-03 RX ORDER — MORPHINE SULFATE 2 MG/ML
6 INJECTION, SOLUTION INTRAMUSCULAR; INTRAVENOUS
Refills: 0 | Status: COMPLETED | OUTPATIENT
Start: 2025-07-03 | End: 2025-07-03

## 2025-07-03 RX ORDER — DICYCLOMINE HYDROCHLORIDE 10 MG/ML
20 INJECTION INTRAMUSCULAR
Status: COMPLETED | OUTPATIENT
Start: 2025-07-03 | End: 2025-07-03

## 2025-07-03 RX ORDER — ONDANSETRON HYDROCHLORIDE 2 MG/ML
4 INJECTION, SOLUTION INTRAVENOUS
Status: COMPLETED | OUTPATIENT
Start: 2025-07-03 | End: 2025-07-03

## 2025-07-03 RX ORDER — IBUPROFEN 200 MG
1 TABLET ORAL DAILY
Status: DISCONTINUED | OUTPATIENT
Start: 2025-07-04 | End: 2025-07-30 | Stop reason: HOSPADM

## 2025-07-03 RX ORDER — SODIUM CHLORIDE 0.9 % (FLUSH) 0.9 %
10 SYRINGE (ML) INJECTION
Status: DISCONTINUED | OUTPATIENT
Start: 2025-07-03 | End: 2025-07-30 | Stop reason: HOSPADM

## 2025-07-03 RX ORDER — ESCITALOPRAM OXALATE 5 MG/1
5 TABLET ORAL DAILY
Status: DISCONTINUED | OUTPATIENT
Start: 2025-07-04 | End: 2025-07-10

## 2025-07-03 RX ORDER — ENOXAPARIN SODIUM 100 MG/ML
1 INJECTION SUBCUTANEOUS EVERY 12 HOURS
Status: DISPENSED | OUTPATIENT
Start: 2025-07-03 | End: 2025-07-06

## 2025-07-03 RX ORDER — FOLIC ACID 1 MG/1
1 TABLET ORAL DAILY
Status: DISCONTINUED | OUTPATIENT
Start: 2025-07-04 | End: 2025-07-30 | Stop reason: HOSPADM

## 2025-07-03 RX ORDER — TALC
6 POWDER (GRAM) TOPICAL NIGHTLY PRN
Status: DISCONTINUED | OUTPATIENT
Start: 2025-07-03 | End: 2025-07-30 | Stop reason: HOSPADM

## 2025-07-03 RX ADMIN — PIPERACILLIN SODIUM AND TAZOBACTAM SODIUM 4.5 G: 4; .5 INJECTION, POWDER, LYOPHILIZED, FOR SOLUTION INTRAVENOUS at 06:07

## 2025-07-03 RX ADMIN — DICYCLOMINE HYDROCHLORIDE 20 MG: 10 INJECTION, SOLUTION INTRAMUSCULAR at 03:07

## 2025-07-03 RX ADMIN — ONDANSETRON 4 MG: 2 INJECTION INTRAMUSCULAR; INTRAVENOUS at 10:07

## 2025-07-03 RX ADMIN — MORPHINE SULFATE 6 MG: 2 INJECTION, SOLUTION INTRAMUSCULAR; INTRAVENOUS at 10:07

## 2025-07-03 RX ADMIN — ENOXAPARIN SODIUM 50 MG: 60 INJECTION SUBCUTANEOUS at 10:07

## 2025-07-03 RX ADMIN — PIPERACILLIN SODIUM AND TAZOBACTAM SODIUM 4.5 G: 4; .5 INJECTION, POWDER, LYOPHILIZED, FOR SOLUTION INTRAVENOUS at 05:07

## 2025-07-03 NOTE — CONSULTS
Acute Care Surgery   Consult    Patient Name: Aman Humphrey  YOB: 1960  Date: 07/03/2025 5:58 PM  Date of Admission: 7/3/2025  Room#845/845 A   HD#0  POD#* No surgery found *    PRESENTING HISTORY   Chief Complaint/Reason for Admission: <principal problem not specified>  History source(s): patient and chart   History of Present Illness:  64-year-old gentleman with past medical history of hepatocellular carcinoma, metabolic encephalopathy, hypertension, chronic liver disease, chronic hepatitis C, portal vein thrombosis cirrhosis.  He was recently admitted at [facility] on 06/17 after feeling generally unwell for the month prior with associated nausea, bloating, weight loss.  He has had known liver cirrhosis and hepatic lesion concerning for HCC as well as chronic hepatitis-C dating back to 2023.  In his prior admission CT AP showed portal vein thrombosis which was acute on chronic as well as moderate ascites, and cholelithiasis.  He had an EGD performed which showed grade 1 varices although was limited by food contents.  He also underwent paracentesis with fluid culture showing propinobacteriia bacteria for which he has been on doxycycline.    He now presents as a transfer from [facility] ED where he presented initially with a chief complaint of abdominal pain.  He was found to have scleral icterus, WBC 14, , , , T bili 4.0, direct bili 3.0, workup concerning for potential choledocholithiasis.  Most recent labs on 06/27 showed a alk-phos 359., T bilirubin of 2 , ALT 96.  He was transferred here for GI services and no ultrasound or MRI available per the ED note.    He has a pending MRI MRCP at this time. Afebrile. VSS. He is reporting mild RUQ tenderness that has been chronic for 2-3 months. Denies vomiting, but reports nausea, weight loss, loss of appetite. Denies fevers, chills.    Review of Systems:  12 point ROS negative except as stated in HPI    PAST HISTORY:   Past medical  history:  Past Medical History:   Diagnosis Date    Carpal tunnel syndrome     HTN (hypertension)     Sciatica        Past surgical history:  Past Surgical History:   Procedure Laterality Date    ESOPHAGOGASTRODUODENOSCOPY N/A 6/20/2025    Procedure: EGD;  Surgeon: Jalyn Day MD;  Location: [facility] ENDOSCOPY;  Service: Gastroenterology;  Laterality: N/A;    SKIN GRAFT         Family history:  No family history on file.    Social history:  Social History     Socioeconomic History    Marital status: Single   Tobacco Use    Smoking status: Some Days     Types: Cigarettes    Smokeless tobacco: Never   Substance and Sexual Activity    Alcohol use: Yes     Comment: daily    Drug use: Not Currently     Social Drivers of Health     Financial Resource Strain: Low Risk  (7/3/2025)    Overall Financial Resource Strain (CARDIA)     Difficulty of Paying Living Expenses: Not hard at all   Food Insecurity: No Food Insecurity (7/3/2025)    Hunger Vital Sign     Worried About Running Out of Food in the Last Year: Never true     Ran Out of Food in the Last Year: Never true   Transportation Needs: No Transportation Needs (7/3/2025)    PRAPARE - Transportation     Lack of Transportation (Medical): No     Lack of Transportation (Non-Medical): No   Stress: No Stress Concern Present (7/3/2025)    [employer] - Occupational Stress Questionnaire     Feeling of Stress : Not at all   Housing Stability: Low Risk  (7/3/2025)    Housing Stability Vital Sign     Unable to Pay for Housing in the Last Year: No     Number of Times Moved in the Last Year: 0     Homeless in the Last Year: No     Tobacco Use History[1]   Social History     Substance and Sexual Activity   Alcohol Use Yes    Comment: daily        MEDICATIONS & ALLERGIES:     Current Facility-Administered Medications on File Prior to Encounter   Medication    [COMPLETED] dicyclomine injection 20 mg    [COMPLETED] morphine injection 6 mg    [COMPLETED]  ondansetron injection 4 mg    [COMPLETED] piperacillin-tazobactam (ZOSYN) 4.5 g in D5W 100 mL IVPB (MB+)     Current Outpatient Medications on File Prior to Encounter   Medication Sig    ARIPiprazole (ABILIFY) 5 MG Tab Take 1 tablet (5 mg total) by mouth once daily.    doxycycline (VIBRA-TABS) 100 MG tablet Take 1 tablet (100 mg total) by mouth every 12 (twelve) hours. for 10 days    EScitalopram oxalate (LEXAPRO) 5 MG Tab Take 1 tablet (5 mg total) by mouth once daily.    folic acid (FOLVITE) 1 MG tablet Take 1 tablet (1 mg total) by mouth once daily.    furosemide (LASIX) 20 MG tablet Take 1 tablet (20 mg total) by mouth once daily.    lactulose 10 gram/15 ml (CHRONULAC) 10 gram/15 mL (15 mL) solution Take 30 mLs (20 g total) by mouth 3 (three) times daily as needed.    multivitamin Tab Take 1 tablet by mouth once daily.    nicotine (NICODERM CQ) 14 mg/24 hr Place 1 patch onto the skin once daily.    pantoprazole (PROTONIX) 40 MG tablet Take 1 tablet (40 mg total) by mouth once daily.    rivaroxaban (XARELTO) 20 mg Tab Take 1 tablet (20 mg total) by mouth daily with dinner or evening meal.    spironolactone (ALDACTONE) 25 MG tablet Take 1 tablet (25 mg total) by mouth once daily.    thiamine 100 MG tablet Take 1 tablet (100 mg total) by mouth once daily.     Allergies: Review of patient's allergies indicates:  No Known Allergies  Scheduled Meds:   [START ON 7/4/2025] ARIPiprazole  5 mg Oral Daily    enoxparin  1 mg/kg Subcutaneous Q12H (prophylaxis, 0900/2100)    [START ON 7/4/2025] EScitalopram oxalate  5 mg Oral Daily    [START ON 7/4/2025] folic acid  1 mg Oral Daily    [START ON 7/4/2025] nicotine  1 patch Transdermal Daily    [START ON 7/4/2025] pantoprazole  40 mg Oral Daily    piperacillin-tazobactam (Zosyn) IV (PEDS and ADULTS) (extended infusion is not appropriate)  4.5 g Intravenous Q8H    [START ON 7/4/2025] thiamine  100 mg Oral Daily     Continuous Infusions:  PRN Meds:  Current Facility-Administered  "Medications:     lactulose 10 gram/15 ml, 20 g, Oral, TID PRN    melatonin, 6 mg, Oral, Nightly PRN    sodium chloride 0.9%, 10 mL, Intravenous, PRN    OBJECTIVE:   Vital Signs:  VITAL SIGNS: 24 HR MIN & MAX LAST   Temp  Min: 98.4 °F (36.9 °C)  Max: 98.8 °F (37.1 °C)  98.4 °F (36.9 °C)   BP  Min: 127/65  Max: 158/77  (!) 158/77    Pulse  Min: 78  Max: 109  99    Resp  Min: 10  Max: 22  20    SpO2  Min: 98 %  Max: 100 %  99 %      HT: 5' 8.9" (175 cm)  WT: 54.1 kg (119 lb 4.3 oz)  BMI: 17.7     Intake/output:  Intake/Output - Last 3 Shifts       None          No intake or output data in the 24 hours ending 07/03/25 1859      Physical Exam:  General: Eyes sunken and temporal wasting  HEENT: Normocephalic, PERRL, scleral icterus  CV: RR  Resp: NWOB  GI:  Abdomen soft, mild RUQ tenderness, non distended  :  Deferred  MSK: No  cyanosis, peripheral edema, moving all extremities spontaneously, + muscle atrophy globally   Skin/wounds:  No rashes, ulcers, erythema  Neuro:  CNII-XII grossly intact, sleepy during interview, alert    Labs:  Troponin:  No results for input(s): "TROPONINI" in the last 72 hours.  CBC:  Recent Labs     07/03/25  0343   WBC 13.84*   RBC 3.51*   HGB 10.8*   HCT 31.4*      MCV 89.5   MCH 30.8   MCHC 34.4     CMP:  Recent Labs     07/03/25  0343      CALCIUM 8.2*   ALBUMIN 2.0*   PROT 9.6*   *   K 4.3   CO2 24      BUN 18.2   CREATININE 1.12   ALKPHOS 400*   *   *   BILITOT 4.0*     Lactic Acid:  Recent Labs     07/03/25  0550   LACTATE 1.0     ETOH:  No results for input(s): "ETHANOL" in the last 72 hours.   Urine Drug Screen:  No results for input(s): "COCAINE", "OPIATE", "BARBITURATE", "AMPHETAMINE", "FENTANYL", "CANNABINOIDS", "MDMA" in the last 72 hours.    Invalid input(s): "BENZODIAZEPINE", "PHENCYCLIDINE"   ABG:  No results for input(s): "PH", "PO2", "PCO2", "HCO3", "BE" in the last 168 hours.   I have reviewed all pertinent lab results within the past " 24 hours.    Diagnostic Results:     I have reviewed all pertinent imaging results/findings within the past 24 hours.    ASSESSMENT & PLAN:    64-year-old male history of liver cirrhosis, hepatitis-C chronic, hepatocellular carcinoma, acute on chronic portal vein thrombosis and moderate ascites presents as a transfer from [facility] ED for GI services and further workup after he was seen there and found to have scleral icterus, WBC 14, , , , T bili 4.0, direct bili 3.0, workup concerning for potential choledocholithiasis.  LFTs and bilirubin are worse than prior labs on 06/27 when he was admitted at [facility]. No signs of cholecystitis on prior imaging. MRCP is pending.     Will follow up MRCP  Will follow-up GI recommendations  No signs of peritonitis  Recommend daily CBC, CMP  Would recommend ultrasound abdomen as well  Continue antibiotics  Will follow    Manuel Fitch M.D.   [facility] General Surgery - PGY2              [1]   Social History  Tobacco Use   Smoking Status Some Days    Types: Cigarettes   Smokeless Tobacco Never

## 2025-07-03 NOTE — ED PROVIDER NOTES
Encounter Date: 7/3/2025       History     Chief Complaint   Patient presents with    Abdominal Pain     ABD pain x 2 months     Presents with abdominal pain for the last few months. States evaluated at [facility] where was Dx with liver cancer. Prescribed Boost and Lactulose which is making him have diarrhea. Denies fever, SOB, bloody stools, vomiting or rash    The history is provided by the patient and medical records.     Review of patient's allergies indicates:  No Known Allergies  Past Medical History:   Diagnosis Date    Carpal tunnel syndrome     HTN (hypertension)     Sciatica      Past Surgical History:   Procedure Laterality Date    ESOPHAGOGASTRODUODENOSCOPY N/A 6/20/2025    Procedure: EGD;  Surgeon: Jalyn Day MD;  Location: [facility] ENDOSCOPY;  Service: Gastroenterology;  Laterality: N/A;    SKIN GRAFT       No family history on file.  Social History[1]  Review of Systems   Gastrointestinal:  Positive for abdominal pain.       Physical Exam     Initial Vitals [07/03/25 0304]   BP Pulse Resp Temp SpO2   130/64 109 20 98.8 °F (37.1 °C) 99 %      MAP       --         Physical Exam    Nursing note and vitals reviewed.  Constitutional: He appears well-developed. No distress.   Underweight   HENT:   Head: Normocephalic and atraumatic. Mouth/Throat: Oropharynx is clear and moist.   Eyes: Conjunctivae and EOM are normal. Pupils are equal, round, and reactive to light. Scleral icterus is present.   Neck: Neck supple. No JVD present.   Normal range of motion.  Cardiovascular:  Regular rhythm, normal heart sounds and intact distal pulses.           Tachycardic   Pulmonary/Chest: Breath sounds normal. No respiratory distress.   Abdominal: Abdomen is soft. Bowel sounds are normal. He exhibits no distension. There is abdominal tenderness (Diffuse). There is no rebound and no guarding.   Musculoskeletal:         General: No edema. Normal range of motion.      Cervical back: Normal range of motion and neck supple.      Neurological: He is alert and oriented to person, place, and time. GCS score is 15. GCS eye subscore is 4. GCS verbal subscore is 5. GCS motor subscore is 6.   Skin: Skin is warm and dry. No rash noted.   Psychiatric: Thought content normal.         ED Course   Procedures  Labs Reviewed   COMPREHENSIVE METABOLIC PANEL - Abnormal       Result Value    Sodium 134 (*)     Potassium 4.3      Chloride 103      CO2 24      Glucose 100      Blood Urea Nitrogen 18.2      Creatinine 1.12      Calcium 8.2 (*)     Protein Total 9.6 (*)     Albumin 2.0 (*)     Globulin 7.6 (*)     Albumin/Globulin Ratio 0.3 (*)     Bilirubin Total 4.0 (*)      (*)      (*)      (*)     eGFR >60      Anion Gap 7.0      BUN/Creatinine Ratio 16     CBC WITH DIFFERENTIAL - Abnormal    WBC 13.84 (*)     RBC 3.51 (*)     Hgb 10.8 (*)     Hct 31.4 (*)     MCV 89.5      MCH 30.8      MCHC 34.4      RDW 17.1 (*)     Platelet 246      MPV 9.9      Neut % 78.5      Lymph % 8.1      Mono % 12.9      Eos % 0.0      Basophil % 0.1      Imm Grans % 0.4      Neut # 10.86 (*)     Lymph # 1.12      Mono # 1.79 (*)     Eos # 0.00      Baso # 0.01      Imm Gran # 0.06 (*)     NRBC% 0.0     BILIRUBIN, DIRECT - Abnormal    Bilirubin Direct 3.0 (*)    LIPASE - Normal    Lipase Level 55     BLOOD CULTURE OLG   BLOOD CULTURE OLG   CBC W/ AUTO DIFFERENTIAL    Narrative:     The following orders were created for panel order CBC auto differential.  Procedure                               Abnormality         Status                     ---------                               -----------         ------                     CBC with Differential[[id number]]       Abnormal            Final result                 Please view results for these tests on the individual orders.   LACTIC ACID, PLASMA          Imaging Results    None          Medications   piperacillin-tazobactam (ZOSYN) 4.5 g in D5W 100 mL IVPB (MB+) (has no administration in time range)    dicyclomine injection 20 mg (20 mg Intramuscular Given 7/3/25 0346)     Medical Decision Making  Amount and/or Complexity of Data Reviewed  External Data Reviewed: radiology.     Details: Impression:     1. Portal vein thrombosis which is occlusive within the right portal system.  There is some collaterals within the annette hepatis with suggests some degree of chronic component.  Given the appearance of the thrombus in addition to the small amount of portal venous collaterals, favor acute on chronic thrombosis.  2. Sequelae of cirrhosis with moderate volume ascites and small gastroesophageal varices.  3. Hypervascular area in segment 8 (image 31, series 1).  Favor perfusion abnormality related to portal vein thrombosis.  However, consider multiphase abdominal MRI to evaluate for potential tumor.  4. Cholelithiasis.  5. Nonobstructing left-sided kidney stone.        Electronically signed by:Mukesh Lu MD  Date:                                            06/18/2025  Time:                                           08:04    Impression:     1. Cirrhosis with portal vein thrombus and small volume ascites.  2. Nondiagnostic exam for HCC secondary to timing of contrast.  Recommend outpatient liver mass protocol CT.        Electronically signed by:Andriy Laws  Date:                                            06/21/2025  Time:                                           11:19    Labs: ordered. Decision-making details documented in ED Course.    Risk  Prescription drug management.                        5:42 AM    Pt with gallstones, worsening of transaminases and bilirubin (direct) suggestive of obstructive pathology. Pt may have choledocholithiasis, will need MRCP, possible ERCP. Pt with SIRS 2/4 for which sepsis pathway started with Zosyn as antibiotic therapy.   Will start transfer process due to no GI service at this facility. No US or MRI available at this time neither.               Clinical Impression:  Final  diagnoses:  [K80.50] Choledocholithiasis (Primary)          ED Disposition Condition    Transfer to Another Facility Fair                      [1]   Social History  Tobacco Use    Smoking status: Some Days     Types: Cigarettes    Smokeless tobacco: Never   Substance Use Topics    Alcohol use: Yes     Comment: daily    Drug use: Not Currently        Kevin Parson MD  07/03/25 0544

## 2025-07-04 LAB
ABS NEUT (OLG): 12.37 X10(3)/MCL (ref 2.1–9.2)
AFP-TM SERPL-MCNC: 83.5 NG/ML
ALBUMIN SERPL-MCNC: 1.6 G/DL (ref 3.4–4.8)
ALBUMIN/GLOB SERPL: 0.2 RATIO (ref 1.1–2)
ALP SERPL-CCNC: 333 UNIT/L (ref 40–150)
ALT SERPL-CCNC: 417 UNIT/L (ref 0–55)
AMMONIA PLAS-MSCNC: 45.6 UMOL/L (ref 18–72)
ANION GAP SERPL CALC-SCNC: 16 MEQ/L
ANISOCYTOSIS BLD QL SMEAR: ABNORMAL
AST SERPL-CCNC: 312 UNIT/L (ref 11–45)
BACTERIA FLD CULT: ABNORMAL
BILIRUB SERPL-MCNC: 4.9 MG/DL
BUN SERPL-MCNC: 29.1 MG/DL (ref 8.4–25.7)
BURR CELLS (OLG): ABNORMAL
CALCIUM SERPL-MCNC: 7.8 MG/DL (ref 8.8–10)
CANCER AG125 SERPL-ACNC: 824.2 UNIT/ML (ref 0–35)
CANCER AG19-9 SERPL-ACNC: 82.01 UNIT/ML (ref 0–37)
CHLORIDE SERPL-SCNC: 102 MMOL/L (ref 98–107)
CO2 SERPL-SCNC: 18 MMOL/L (ref 23–31)
CREAT SERPL-MCNC: 1.83 MG/DL (ref 0.72–1.25)
CREAT/UREA NIT SERPL: 16
ERYTHROCYTE [DISTWIDTH] IN BLOOD BY AUTOMATED COUNT: 17.2 % (ref 11.5–17)
FOLATE SERPL-MCNC: 13.2 NG/ML (ref 7–31.4)
GFR SERPLBLD CREATININE-BSD FMLA CKD-EPI: 41 ML/MIN/1.73/M2
GLOBULIN SER-MCNC: 6.9 GM/DL (ref 2.4–3.5)
GLUCOSE SERPL-MCNC: 40 MG/DL (ref 82–115)
HCT VFR BLD AUTO: 31.5 % (ref 42–52)
HGB BLD-MCNC: 10.4 G/DL (ref 14–18)
INR PPP: 1.8
INSTRUMENT WBC (OLG): 15.86 X10(3)/MCL
LYMPHOCYTES NFR BLD MANUAL: 0.79 X10(3)/MCL (ref 0.6–4.6)
LYMPHOCYTES NFR BLD MANUAL: 5 %
MAGNESIUM SERPL-MCNC: 1.8 MG/DL (ref 1.6–2.6)
MCH RBC QN AUTO: 29.5 PG (ref 27–31)
MCHC RBC AUTO-ENTMCNC: 33 G/DL (ref 33–36)
MCV RBC AUTO: 89.2 FL (ref 80–94)
MONOCYTES NFR BLD MANUAL: 17 %
MONOCYTES NFR BLD MANUAL: 2.7 X10(3)/MCL (ref 0.1–1.3)
NEUTROPHILS NFR BLD MANUAL: 78 %
PHOSPHATE SERPL-MCNC: 5.8 MG/DL (ref 2.3–4.7)
PLATELET # BLD AUTO: 283 X10(3)/MCL (ref 130–400)
PLATELET # BLD EST: NORMAL 10*3/UL
PMV BLD AUTO: 9.8 FL (ref 7.4–10.4)
POCT GLUCOSE: 234 MG/DL (ref 70–110)
POCT GLUCOSE: 72 MG/DL (ref 70–110)
POIKILOCYTOSIS BLD QL SMEAR: ABNORMAL
POTASSIUM SERPL-SCNC: 3.8 MMOL/L (ref 3.5–5.1)
PROT SERPL-MCNC: 8.5 GM/DL (ref 5.8–7.6)
PROTHROMBIN TIME: 20.7 SECONDS (ref 12.5–14.5)
RBC # BLD AUTO: 3.53 X10(6)/MCL (ref 4.7–6.1)
RBC MORPH BLD: ABNORMAL
SODIUM SERPL-SCNC: 136 MMOL/L (ref 136–145)
T4 FREE SERPL-MCNC: 1.06 NG/DL (ref 0.7–1.48)
TROPONIN I SERPL-MCNC: 0.02 NG/ML (ref 0–0.04)
TSH SERPL-ACNC: 1.71 UIU/ML (ref 0.35–4.94)
WBC # BLD AUTO: 15.86 X10(3)/MCL (ref 4.5–11.5)

## 2025-07-04 PROCEDURE — 63600175 PHARM REV CODE 636 W HCPCS

## 2025-07-04 PROCEDURE — 99233 SBSQ HOSP IP/OBS HIGH 50: CPT | Mod: ,,, | Performed by: SURGERY

## 2025-07-04 PROCEDURE — 85610 PROTHROMBIN TIME: CPT | Performed by: HOSPITALIST

## 2025-07-04 PROCEDURE — 36415 COLL VENOUS BLD VENIPUNCTURE: CPT | Performed by: HOSPITALIST

## 2025-07-04 PROCEDURE — 25000003 PHARM REV CODE 250: Performed by: INTERNAL MEDICINE

## 2025-07-04 PROCEDURE — 11000001 HC ACUTE MED/SURG PRIVATE ROOM

## 2025-07-04 PROCEDURE — 86304 IMMUNOASSAY TUMOR CA 125: CPT

## 2025-07-04 PROCEDURE — 80053 COMPREHEN METABOLIC PANEL: CPT | Performed by: HOSPITALIST

## 2025-07-04 PROCEDURE — 25000003 PHARM REV CODE 250: Performed by: HOSPITALIST

## 2025-07-04 PROCEDURE — 86301 IMMUNOASSAY TUMOR CA 19-9: CPT

## 2025-07-04 PROCEDURE — 84439 ASSAY OF FREE THYROXINE: CPT | Performed by: INTERNAL MEDICINE

## 2025-07-04 PROCEDURE — S4991 NICOTINE PATCH NONLEGEND: HCPCS | Performed by: HOSPITALIST

## 2025-07-04 PROCEDURE — 84443 ASSAY THYROID STIM HORMONE: CPT | Performed by: INTERNAL MEDICINE

## 2025-07-04 PROCEDURE — 84484 ASSAY OF TROPONIN QUANT: CPT

## 2025-07-04 PROCEDURE — 82140 ASSAY OF AMMONIA: CPT | Performed by: INTERNAL MEDICINE

## 2025-07-04 PROCEDURE — 21400001 HC TELEMETRY ROOM

## 2025-07-04 PROCEDURE — 84100 ASSAY OF PHOSPHORUS: CPT | Performed by: NURSE PRACTITIONER

## 2025-07-04 PROCEDURE — 63600175 PHARM REV CODE 636 W HCPCS: Performed by: HOSPITALIST

## 2025-07-04 PROCEDURE — 36415 COLL VENOUS BLD VENIPUNCTURE: CPT | Performed by: INTERNAL MEDICINE

## 2025-07-04 PROCEDURE — 82105 ALPHA-FETOPROTEIN SERUM: CPT

## 2025-07-04 PROCEDURE — 83735 ASSAY OF MAGNESIUM: CPT | Performed by: NURSE PRACTITIONER

## 2025-07-04 PROCEDURE — 82746 ASSAY OF FOLIC ACID SERUM: CPT | Performed by: INTERNAL MEDICINE

## 2025-07-04 PROCEDURE — 25000003 PHARM REV CODE 250

## 2025-07-04 PROCEDURE — 85025 COMPLETE CBC W/AUTO DIFF WBC: CPT | Performed by: HOSPITALIST

## 2025-07-04 RX ORDER — SODIUM CHLORIDE 9 MG/ML
INJECTION, SOLUTION INTRAVENOUS CONTINUOUS
Status: DISCONTINUED | OUTPATIENT
Start: 2025-07-04 | End: 2025-07-08

## 2025-07-04 RX ORDER — ONDANSETRON HYDROCHLORIDE 2 MG/ML
4 INJECTION, SOLUTION INTRAVENOUS ONCE
Status: COMPLETED | OUTPATIENT
Start: 2025-07-04 | End: 2025-07-04

## 2025-07-04 RX ORDER — FAMOTIDINE 10 MG/ML
20 INJECTION, SOLUTION INTRAVENOUS ONCE
Status: COMPLETED | OUTPATIENT
Start: 2025-07-04 | End: 2025-07-04

## 2025-07-04 RX ORDER — GLUCAGON 1 MG
1 KIT INJECTION
Status: DISCONTINUED | OUTPATIENT
Start: 2025-07-04 | End: 2025-07-30 | Stop reason: HOSPADM

## 2025-07-04 RX ORDER — DEXTROSE MONOHYDRATE 100 MG/ML
INJECTION, SOLUTION INTRAVENOUS CONTINUOUS
Status: DISCONTINUED | OUTPATIENT
Start: 2025-07-04 | End: 2025-07-05

## 2025-07-04 RX ORDER — IBUPROFEN 200 MG
16 TABLET ORAL
Status: DISCONTINUED | OUTPATIENT
Start: 2025-07-04 | End: 2025-07-30 | Stop reason: HOSPADM

## 2025-07-04 RX ORDER — IBUPROFEN 200 MG
24 TABLET ORAL
Status: DISCONTINUED | OUTPATIENT
Start: 2025-07-04 | End: 2025-07-30 | Stop reason: HOSPADM

## 2025-07-04 RX ADMIN — ONDANSETRON 4 MG: 2 INJECTION INTRAMUSCULAR; INTRAVENOUS at 04:07

## 2025-07-04 RX ADMIN — SODIUM CHLORIDE: 9 INJECTION, SOLUTION INTRAVENOUS at 09:07

## 2025-07-04 RX ADMIN — NICOTINE 1 PATCH: 14 PATCH TRANSDERMAL at 09:07

## 2025-07-04 RX ADMIN — LORAZEPAM 1 MG: 2 INJECTION INTRAMUSCULAR; INTRAVENOUS at 03:07

## 2025-07-04 RX ADMIN — FAMOTIDINE 20 MG: 10 INJECTION, SOLUTION INTRAVENOUS at 04:07

## 2025-07-04 RX ADMIN — PIPERACILLIN SODIUM AND TAZOBACTAM SODIUM 4.5 G: 4; .5 INJECTION, POWDER, LYOPHILIZED, FOR SOLUTION INTRAVENOUS at 09:07

## 2025-07-04 RX ADMIN — DEXTROSE MONOHYDRATE 25 G: 25 INJECTION, SOLUTION INTRAVENOUS at 05:07

## 2025-07-04 RX ADMIN — DEXTROSE MONOHYDRATE: 100 INJECTION, SOLUTION INTRAVENOUS at 05:07

## 2025-07-04 RX ADMIN — SODIUM CHLORIDE: 9 INJECTION, SOLUTION INTRAVENOUS at 06:07

## 2025-07-04 RX ADMIN — ENOXAPARIN SODIUM 50 MG: 60 INJECTION SUBCUTANEOUS at 08:07

## 2025-07-04 RX ADMIN — PIPERACILLIN SODIUM AND TAZOBACTAM SODIUM 4.5 G: 4; .5 INJECTION, POWDER, LYOPHILIZED, FOR SOLUTION INTRAVENOUS at 06:07

## 2025-07-04 RX ADMIN — ENOXAPARIN SODIUM 50 MG: 60 INJECTION SUBCUTANEOUS at 09:07

## 2025-07-04 RX ADMIN — PIPERACILLIN SODIUM AND TAZOBACTAM SODIUM 4.5 G: 4; .5 INJECTION, POWDER, LYOPHILIZED, FOR SOLUTION INTRAVENOUS at 04:07

## 2025-07-04 NOTE — PLAN OF CARE
Problem: Adult Inpatient Plan of Care  Goal: Plan of Care Review  Outcome: Progressing  Flowsheets (Taken 7/3/2025 2258)  Plan of Care Reviewed With: patient  Goal: Patient-Specific Goal (Individualized)  Outcome: Progressing  Goal: Absence of Hospital-Acquired Illness or Injury  Outcome: Progressing  Intervention: Identify and Manage Fall Risk  Flowsheets (Taken 7/3/2025 2258)  Safety Promotion/Fall Prevention:   assistive device/personal item within reach   side rails raised x 2   medications reviewed   lighting adjusted   instructed to call staff for mobility   Fall Risk reviewed with patient/family   Fall Risk signage in place  Intervention: Prevent Skin Injury  Flowsheets (Taken 7/3/2025 2258)  Body Position:   weight shifting   position changed independently  Skin Protection: incontinence pads utilized  Device Skin Pressure Protection: absorbent pad utilized/changed  Intervention: Prevent and Manage VTE (Venous Thromboembolism) Risk  Flowsheets (Taken 7/3/2025 2258)  VTE Prevention/Management:   fluids promoted   dorsiflexion/plantar flexion performed   bleeding risk assessed   ambulation promoted  Intervention: Prevent Infection  Flowsheets (Taken 7/3/2025 2258)  Infection Prevention:   rest/sleep promoted   single patient room provided   hand hygiene promoted  Goal: Optimal Comfort and Wellbeing  Outcome: Progressing  Intervention: Monitor Pain and Promote Comfort  Flowsheets (Taken 7/3/2025 2258)  Pain Management Interventions:   position adjusted   pillow support provided   medication offered   care clustered  Intervention: Provide Person-Centered Care  Flowsheets (Taken 7/3/2025 2258)  Trust Relationship/Rapport:   care explained   choices provided   empathic listening provided   questions answered   questions encouraged   emotional support provided   thoughts/feelings acknowledged   reassurance provided  Goal: Readiness for Transition of Care  Outcome: Progressing

## 2025-07-04 NOTE — PROGRESS NOTES
Acute Care Surgery   Progress Note  Admit Date: 7/3/2025  HD#1  POD#* No surgery found *    Subjective:   Interval history:  NAEON  AF  VSS  Hypoglycemic to 40 on am labs  LFTs slightly improved, T bili increased to 4.9 from 4.0  US and MRCP pending    Home Meds:  Current Outpatient Medications   Medication Instructions    ARIPiprazole (ABILIFY) 5 mg, Oral, Daily    doxycycline (VIBRA-TABS) 100 mg, Oral, Every 12 hours    EScitalopram oxalate (LEXAPRO) 5 mg, Oral, Daily    folic acid (FOLVITE) 1 mg, Oral, Daily    furosemide (LASIX) 20 mg, Oral, Daily    lactulose 10 gram/15 ml (CHRONULAC) 20 g, Oral, 3 times daily PRN    multivitamin Tab 1 tablet, Oral, Daily    nicotine (NICODERM CQ) 14 mg/24 hr 1 patch, Transdermal, Daily    pantoprazole (PROTONIX) 40 mg, Oral, Daily    rivaroxaban (XARELTO) 20 mg, Oral, With dinner    spironolactone (ALDACTONE) 25 mg, Oral, Daily    thiamine 100 mg, Oral, Daily      Scheduled Meds:   ARIPiprazole  5 mg Oral Daily    enoxparin  1 mg/kg Subcutaneous Q12H (prophylaxis, 0900/2100)    EScitalopram oxalate  5 mg Oral Daily    folic acid  1 mg Oral Daily    nicotine  1 patch Transdermal Daily    pantoprazole  40 mg Oral Daily    piperacillin-tazobactam (Zosyn) IV (PEDS and ADULTS) (extended infusion is not appropriate)  4.5 g Intravenous Q8H    thiamine  100 mg Oral Daily     Continuous Infusions:   0.9% NaCl   Intravenous Continuous 90 mL/hr at 07/04/25 0636 New Bag at 07/04/25 0636    D10W   Intravenous Continuous 25 mL/hr at 07/04/25 0543 New Bag at 07/04/25 0543     PRN Meds:  Current Facility-Administered Medications:     dextrose 50%, 12.5 g, Intravenous, PRN    dextrose 50%, 25 g, Intravenous, PRN    glucagon (human recombinant), 1 mg, Intramuscular, PRN    glucose, 16 g, Oral, PRN    glucose, 24 g, Oral, PRN    lactulose 10 gram/15 ml, 20 g, Oral, TID PRN    melatonin, 6 mg, Oral, Nightly PRN    sodium chloride 0.9%, 10 mL, Intravenous, PRN     Objective:     VITAL SIGNS: 24  "HR MIN & MAX LAST   Temp  Min: 98.1 °F (36.7 °C)  Max: 98.8 °F (37.1 °C)  98.1 °F (36.7 °C)   BP  Min: 100/63  Max: 158/77  100/63    Pulse  Min: 78  Max: 114  93    Resp  Min: 10  Max: 20  18    SpO2  Min: 98 %  Max: 100 %  99 %      HT: 5' 8.9" (175 cm)  WT: 54.1 kg (119 lb 4.3 oz)  BMI: 17.7     Intake/output:  Intake/Output - Last 3 Shifts         07/02 0700 07/03 0659 07/03 0700 07/04 0659 07/04 0700 07/05 0659    IV Piggyback  61.5     Total Intake(mL/kg)  61.5 (1.1)     Net  +61.5                    Intake/Output Summary (Last 24 hours) at 7/4/2025 0715  Last data filed at 7/3/2025 2018  Gross per 24 hour   Intake 61.48 ml   Output --   Net 61.48 ml           Lines/drains/airway:  Peripheral IV 07/03/25 1749 Anterior;Right Forearm (Active)   Site Assessment Clean;Dry;Intact 07/03/25 2000   Line Securement Device Secured with sutureless device 07/03/25 2000   Extremity Assessment Distal to IV No abnormal discoloration;No swelling;No warmth;No redness 07/03/25 2000   Line Status Saline locked;Flushed 07/03/25 2000   Dressing Status Clean;Dry;Intact 07/03/25 2000   Dressing Intervention Integrity maintained 07/03/25 2000   Number of days: 0       Physical Exam:  General: Eyes sunken and temporal wasting, sleeping comfortably   HEENT: Normocephalic, PERRL, scleral icterus  CV: RR  Resp: NWOB  GI:  Abdomen soft, mild RUQ tenderness, non distended  :  Deferred  MSK: No cyanosis, peripheral edema, moving all extremities spontaneously, + muscle atrophy globally   Skin/wounds:  No rashes, ulcers, erythema  Neuro:  CNII-XII grossly intact, sleepy during interview, alert       Labs:  Renal:  Recent Labs     07/03/25  0343 07/04/25  0312   BUN 18.2 29.1*   CREATININE 1.12 1.83*     No results for input(s): "LACTIC" in the last 72 hours.  FENGI:  Recent Labs     07/03/25  0343 07/04/25  0312   * 136   K 4.3 3.8    102   CO2 24 18*   CALCIUM 8.2* 7.8*   MG  --  1.80   PHOS  --  5.8*   PROT 9.6* 8.5* " "  ALBUMIN 2.0* 1.6*   BILITOT 4.0* 4.9*   * 312*   ALKPHOS 400* 333*   * 417*     Heme:  Recent Labs     07/03/25  0343 07/04/25 0312   HGB 10.8* 10.4*   HCT 31.4* 31.5*    283   INR  --  1.8*     ID:  Recent Labs     07/03/25  0343 07/04/25 0312   WBC 13.84* 15.86*     CBG:  No results for input(s): "GLUCOSE" in the last 72 hours.   Cardiovascular:  Recent Labs   Lab 07/04/25 0312   TROPONINI 0.023     ABG:  No results for input(s): "PH", "PO2", "PCO2", "HCO3", "BE" in the last 168 hours.   I have reviewed all pertinent lab results within the past 24 hours.    Imaging:  MRI MRCP Abdomen W WO Contrast 3D WO Independent WS XPD    (Results Pending)   US Abdomen Complete    (Results Pending)      I have reviewed all pertinent imaging results/findings within the past 24 hours.    Micro/Path/Other:  Microbiology Results (last 7 days)       ** No results found for the last 168 hours. **           Pathology Results  (Last 7 days)      None             Assessment & Plan:   64-year-old male history of liver cirrhosis, hepatitis-C chronic, hepatocellular carcinoma, acute on chronic portal vein thrombosis and moderate ascites presents as a transfer from [facility] ED for GI services and further workup after he was seen there and found to have scleral icterus, WBC 14, , , , T bili 4.0, direct bili 3.0, workup concerning for potential choledocholithiasis.  LFTs and bilirubin are worse than prior labs on 06/27 when he was admitted at [facility]. No signs of cholecystitis on prior imaging. MRCP is pending.      Will follow up MRCP  Will follow up US ABD  Will follow up CT AP   Will follow-up GI recommendations  No signs of peritonitis  Continue antibiotics  Tumor markers - AFP, CA 19-9,   CT AP pending, US pending     CT AP images showed distended gallbladder w/ cholelithiasis and ascites. Patient with known portal vein thrombosis and liver cirrhosis with presumed HCC. At this time, " anticoagulating this patient and treating his thrombosis likely outweighs any surgical intervention at this time. He is a poor surgical candidate considering liver cirrhosis, esophageal varices, and unknown cancer status and portal thormbosis needing anticoagulation. Would recommend IR cholecystotomy tube at this time due to patient's comorbidities and presumed HCC. Would consider surgical intervention if concerns for bowel ischemia from portal vein thrombosis.      Manuel Fitch M.D.   [facility] General Surgery - PGY2

## 2025-07-04 NOTE — H&P
[facility] MEDICINE - H&P ADMISSION NOTE      Patient Name: Aman Humphrey  MRN: [id number]  Patient Class: IP- Inpatient   Admission Date: 7/3/2025   Admitting Physician: ROZ Service   Attending Physician: Ricardo Trotter MD  Primary Care Provider: Lesly Primary Doctor  Face-to-Face encounter date: 07/04/2025        CHIEF COMPLAINT   No chief complaint on file.      HISTORY OF PRESENTING ILLNESS   64-year-old male with a past medical history of carpal tunnel syndrome, HTN, sciatica, portal vein thrombosis, cocaine use, hepatitis-C, hepatic adenoma with concern of HCC, cirrhosis.  Presented to outside emergency department with complaints of abdominal pain x2 months and was found to have acute on chronic hyperbilirubinemia, leukocytosis and thus transferred to our facility.  At the time of my examination this morning he is drowsy, did not really answer much questions, awakens with stimulus.        PAST MEDICAL HISTORY     Past Medical History:   Diagnosis Date    Carpal tunnel syndrome     HTN (hypertension)     Sciatica        PAST SURGICAL HISTORY     Past Surgical History:   Procedure Laterality Date    ESOPHAGOGASTRODUODENOSCOPY N/A 6/20/2025    Procedure: EGD;  Surgeon: Jalyn Day MD;  Location: [facility] ENDOSCOPY;  Service: Gastroenterology;  Laterality: N/A;    SKIN GRAFT         FAMILY HISTORY   Reviewed and noncontributory to this case    SOCIAL HISTORY   Social History[1]  Screening for Social Drivers for health:  Patient screened for food insecurity, housing instability, transportation needs, utility difficulties, and interpersonal safety (select all that apply as identified as concern)  []Housing or Food  []Transportation Needs  []Utility Difficulties  []Interpersonal safety  [x]None      HOME MEDICATIONS     Prior to Admission medications    Medication Sig Start Date End Date Taking? Authorizing Provider   ARIPiprazole (ABILIFY) 5 MG Tab Take 1 tablet (5 mg total)  by mouth once daily. 6/23/25 9/21/25 Yes Naa Lee MD   doxycycline (VIBRA-TABS) 100 MG tablet Take 1 tablet (100 mg total) by mouth every 12 (twelve) hours. for 10 days 6/28/25 7/8/25 Yes Mandie Ortiz MD   EScitalopram oxalate (LEXAPRO) 5 MG Tab Take 1 tablet (5 mg total) by mouth once daily. 6/23/25 9/21/25 Yes Naa Lee MD   folic acid (FOLVITE) 1 MG tablet Take 1 tablet (1 mg total) by mouth once daily. 6/24/25 6/24/26 Yes Naa Lee MD   furosemide (LASIX) 20 MG tablet Take 1 tablet (20 mg total) by mouth once daily. 6/23/25  Yes Naa Lee MD   lactulose 10 gram/15 ml (CHRONULAC) 10 gram/15 mL (15 mL) solution Take 30 mLs (20 g total) by mouth 3 (three) times daily as needed. 6/28/25 7/28/25 Yes Mandie Ortiz MD   multivitamin Tab Take 1 tablet by mouth once daily. 6/24/25 9/22/25 Yes Naa Lee MD   nicotine (NICODERM CQ) 14 mg/24 hr Place 1 patch onto the skin once daily. 6/23/25 9/21/25 Yes Naa Lee MD   pantoprazole (PROTONIX) 40 MG tablet Take 1 tablet (40 mg total) by mouth once daily. 6/24/25 6/24/26 Yes Naa Lee MD   rivaroxaban (XARELTO) 20 mg Tab Take 1 tablet (20 mg total) by mouth daily with dinner or evening meal. 6/23/25 12/20/25 Yes Naa Lee MD   spironolactone (ALDACTONE) 25 MG tablet Take 1 tablet (25 mg total) by mouth once daily. 6/28/25 7/28/25 Yes Mandie Ortiz MD   thiamine 100 MG tablet Take 1 tablet (100 mg total) by mouth once daily. 6/24/25 9/22/25 Yes Naa Lee MD       ALLERGIES   Patient has no known allergies.          REVIEW OF SYSTEMS   Except as documented above, all other systems reviewed and negative    PHYSICAL EXAM     Vitals:    07/04/25 0519   BP: 100/63   Pulse: 93   Resp:    Temp:       General:  In no acute distress, resting comfortably  Head and neck:  Atraumatic, normocephalic, moist mucous membranes, supple neck  Chest:  Clear to auscultation bilaterally  Heart:   S1, S2, no appreciable murmur  Abdomen:  Soft, nontender, BS +  MSK:  Warm, no lower extremity edema, no clubbing or cyanosis  Neuro:  Drowsy, awakens with stimulus, did not really answer any questions or follow commands  Integumentary:  No obvious skin rash  Psychiatry:            ASSESSMENT AND PLAN   Acute encephalopathy-hepatic?  Acute kidney injury   Acute on chronic hyperbilirubinemia  Recent known portal vein thrombosis    History of: As listed above      Obtain abdominal imaging CT abdomen pelvis and ultrasound abdomen  If the above is revealing then we can cancel MRCP which was already ordered  GI consulted  IV fluids  Zosyn  Check ammonia, CT head, T4  Continue full-dose Lovenox    DVT prophylaxis:    __________________________________________________________________  LABS/MICRO/MEDS/DIAGNOSTICS       LABS  Recent Labs     07/04/25  0312      K 3.8   CO2 18*   BUN 29.1*   CREATININE 1.83*   CALCIUM 7.8*   ALKPHOS 333*   *   *   ALBUMIN 1.6*     Recent Labs     07/04/25  0312   WBC 15.86*   RBC 3.53*   HCT 31.5*   MCV 89.2          MICROBIOLOGY  Microbiology Results (last 7 days)       ** No results found for the last 168 hours. **            MEDICATIONS   ARIPiprazole  5 mg Oral Daily    enoxparin  1 mg/kg Subcutaneous Q12H (prophylaxis, 0900/2100)    EScitalopram oxalate  5 mg Oral Daily    folic acid  1 mg Oral Daily    nicotine  1 patch Transdermal Daily    pantoprazole  40 mg Oral Daily    piperacillin-tazobactam (Zosyn) IV (PEDS and ADULTS) (extended infusion is not appropriate)  4.5 g Intravenous Q8H    thiamine  100 mg Oral Daily      INFUSIONS   0.9% NaCl   Intravenous Continuous 90 mL/hr at 07/04/25 0636 New Bag at 07/04/25 0636    D10W   Intravenous Continuous 25 mL/hr at 07/04/25 0543 New Bag at 07/04/25 0543       DIAGNOSTIC TESTS  MRI MRCP Abdomen W WO Contrast 3D WO Independent WS XPD    (Results Pending)   US Abdomen Complete    (Results Pending)          Patient  information was obtained from patient, patient's family, past medical records and ER records.   All diagnosis and differential diagnosis have been reviewed; assessment and plan has been documented. I have personally reviewed the labs and test results that are presently available; I have reviewed the patients medication list. I have reviewed the consulting providers response and recommendations. I have reviewed or attempted to review medical records based upon their availability.  All of the patient's questions have been addressed and answered. Patient's is agreeable to the above stated plan. I will continue to monitor closely and make adjustments to medical management as needed.  This note was created using [employer] voice recognition software that occasionally misinterpreted phrases or words.  Please contact me if any questions may rise regarding documentation to clarify verbiage.        Ricardo Trotter MD   Internal Medicine  Department of Hospital Medicine  [facility] - Oncology [facility]         [1]   Social History  Socioeconomic History    Marital status: Single   Tobacco Use    Smoking status: Some Days     Types: Cigarettes    Smokeless tobacco: Never   Substance and Sexual Activity    Alcohol use: Yes     Comment: daily    Drug use: Not Currently     Social Drivers of Health     Financial Resource Strain: Low Risk  (7/3/2025)    Overall Financial Resource Strain (CARDIA)     Difficulty of Paying Living Expenses: Not hard at all   Food Insecurity: No Food Insecurity (7/3/2025)    Hunger Vital Sign     Worried About Running Out of Food in the Last Year: Never true     Ran Out of Food in the Last Year: Never true   Transportation Needs: No Transportation Needs (7/3/2025)    PRAPARE - Transportation     Lack of Transportation (Medical): No     Lack of Transportation (Non-Medical): No   Stress: No Stress Concern Present (7/3/2025)    [employer] - Occupational Stress  Questionnaire     Feeling of Stress : Not at all   Housing Stability: Low Risk  (7/3/2025)    Housing Stability Vital Sign     Unable to Pay for Housing in the Last Year: No     Number of Times Moved in the Last Year: 0     Homeless in the Last Year: No

## 2025-07-04 NOTE — PROGRESS NOTES
"Gastroenterology Progress Note    Subjective/Interval History:  64-year-old  man known to Dr. Forte from a recent admission transferred from an outside facility for evaluation of worsening liver function studies and possible service for possible choledocholithiasis.  The patient is unable to give me any meaningful history.  He can barely stay awake.  His nurse tells me that he was given Ativan last night because of severe agitation and threatening to leave AMA.  History is therefore gathered from his last admission and the ER note.  From his previous discharge summary states he presented to the hospital 06/17/2025 with a complaint of abdominal pain for a few days and overall feeling unwell for a month.  He had been experiencing early satiety with nausea, bloating and weight loss.  It was noted that it was documented in 2023 that he had a history liver cirrhosis and hepatic lesion which was concerning for HCC and he was also positive and treated for hepatitis-C in the past.  He was noted to have elevated transaminases with a normal bilirubin and ammonia level.  He was positive for cocaine on a urine drug screen.  A CT scan of his abdomen and pelvis revealed portal vein thrombosis in what seemed to be acute on chronic thrombosis.  Cirrhosis with moderate ascites, cholelithiasis and a nonobstructing kidney stone.  The patient had an EGD performed by Dr. Forte that revealed food contents in his stomach, grade 1 varices.  Patient did have a paracentesis during his hospitalization they grew propinobacteriia.  He was discharged with doxycycline.  He did have an MRI with and without IV contrast during that admission on 06/21/2025 that revealed cirrhosis with portal vein thrombosis and small volume ascites.  Nondiagnostic exam for HCC secondary to timing of contrast.      ROS:  ROS unobtainable    Vital Signs:  /64   Pulse 85   Temp 97.6 °F (36.4 °C) (Oral)   Resp 18   Ht 5' 8.9" (1.75 m)   Wt 54.1 " kg (119 lb 4.3 oz)   SpO2 100%   BMI 17.66 kg/m²   Body mass index is 17.66 kg/m².    Physical Exam:  Physical Exam  In general:  Cachectic and very somnolent.  Difficult to arouse    HEENT sclera icteric conjunctiva pink    Cardiovascular: Regular rate and rhythm     Lungs:  Clear    Abdomen:  Soft, flat.  There appears to be some mild tenderness in upper abdomen without guarding or rebound.  No mass or organomegaly appreciated    Extremities:  No clubbing cyanosis edema  Labs:  Recent Results (from the past 48 hours)   Comprehensive metabolic panel    Collection Time: 07/03/25  3:43 AM   Result Value Ref Range    Sodium 134 (L) 136 - 145 mmol/L    Potassium 4.3 3.5 - 5.1 mmol/L    Chloride 103 98 - 107 mmol/L    CO2 24 23 - 31 mmol/L    Glucose 100 82 - 115 mg/dL    Blood Urea Nitrogen 18.2 8.4 - 25.7 mg/dL    Creatinine 1.12 0.72 - 1.25 mg/dL    Calcium 8.2 (L) 8.8 - 10.0 mg/dL    Protein Total 9.6 (H) 5.8 - 7.6 gm/dL    Albumin 2.0 (L) 3.4 - 4.8 g/dL    Globulin 7.6 (H) 2.4 - 3.5 gm/dL    Albumin/Globulin Ratio 0.3 (L) 1.1 - 2.0 ratio    Bilirubin Total 4.0 (H) <=1.5 mg/dL     (H) 40 - 150 unit/L     (H) 0 - 55 unit/L     (H) 11 - 45 unit/L    eGFR >60 mL/min/1.73/m2    Anion Gap 7.0 mEq/L    BUN/Creatinine Ratio 16    CBC with Differential    Collection Time: 07/03/25  3:43 AM   Result Value Ref Range    WBC 13.84 (H) 4.50 - 11.50 x10(3)/mcL    RBC 3.51 (L) 4.70 - 6.10 x10(6)/mcL    Hgb 10.8 (L) 14.0 - 18.0 g/dL    Hct 31.4 (L) 42.0 - 52.0 %    MCV 89.5 80.0 - 94.0 fL    MCH 30.8 27.0 - 31.0 pg    MCHC 34.4 33.0 - 36.0 g/dL    RDW 17.1 (H) 11.5 - 17.0 %    Platelet 246 130 - 400 x10(3)/mcL    MPV 9.9 7.4 - 10.4 fL    Neut % 78.5 %    Lymph % 8.1 %    Mono % 12.9 %    Eos % 0.0 %    Basophil % 0.1 %    Imm Grans % 0.4 %    Neut # 10.86 (H) 2.1 - 9.2 x10(3)/mcL    Lymph # 1.12 0.6 - 4.6 x10(3)/mcL    Mono # 1.79 (H) 0.1 - 1.3 x10(3)/mcL    Eos # 0.00 0 - 0.9 x10(3)/mcL    Baso # 0.01 <=0.2  x10(3)/mcL    Imm Gran # 0.06 (H) 0.00 - 0.04 x10(3)/mcL    NRBC% 0.0 %   Lipase    Collection Time: 07/03/25  3:43 AM   Result Value Ref Range    Lipase Level 55 <=60 U/L   Bilirubin, Direct    Collection Time: 07/03/25  3:43 AM   Result Value Ref Range    Bilirubin Direct 3.0 (H) 0.0 - <0.5 mg/dL   Blood Culture #1 **CANNOT BE ORDERED STAT**    Collection Time: 07/03/25  5:50 AM    Specimen: Arm, Left; Blood   Result Value Ref Range    Blood Culture No Growth At 24 Hours    Blood Culture #2 **CANNOT BE ORDERED STAT**    Collection Time: 07/03/25  5:50 AM    Specimen: Arm, Right; Blood   Result Value Ref Range    Blood Culture No Growth At 24 Hours    Lactic acid, plasma    Collection Time: 07/03/25  5:50 AM   Result Value Ref Range    Lactic Acid Level 1.0 0.5 - 2.2 mmol/L   Comprehensive Metabolic Panel    Collection Time: 07/04/25  3:12 AM   Result Value Ref Range    Sodium 136 136 - 145 mmol/L    Potassium 3.8 3.5 - 5.1 mmol/L    Chloride 102 98 - 107 mmol/L    CO2 18 (L) 23 - 31 mmol/L    Glucose 40 (LL) 82 - 115 mg/dL    Blood Urea Nitrogen 29.1 (H) 8.4 - 25.7 mg/dL    Creatinine 1.83 (H) 0.72 - 1.25 mg/dL    Calcium 7.8 (L) 8.8 - 10.0 mg/dL    Protein Total 8.5 (H) 5.8 - 7.6 gm/dL    Albumin 1.6 (L) 3.4 - 4.8 g/dL    Globulin 6.9 (H) 2.4 - 3.5 gm/dL    Albumin/Globulin Ratio 0.2 (L) 1.1 - 2.0 ratio    Bilirubin Total 4.9 (H) <=1.5 mg/dL     (H) 40 - 150 unit/L     (H) 0 - 55 unit/L     (H) 11 - 45 unit/L    eGFR 41 mL/min/1.73/m2    Anion Gap 16.0 mEq/L    BUN/Creatinine Ratio 16    Protime-INR    Collection Time: 07/04/25  3:12 AM   Result Value Ref Range    PT 20.7 (H) 12.5 - 14.5 seconds    INR 1.8 (H) <=1.3   Magnesium    Collection Time: 07/04/25  3:12 AM   Result Value Ref Range    Magnesium Level 1.80 1.60 - 2.60 mg/dL   Phosphorus    Collection Time: 07/04/25  3:12 AM   Result Value Ref Range    Phosphorus Level 5.8 (H) 2.3 - 4.7 mg/dL   CBC with Differential    Collection Time:  07/04/25  3:12 AM   Result Value Ref Range    WBC 15.86 (H) 4.50 - 11.50 x10(3)/mcL    RBC 3.53 (L) 4.70 - 6.10 x10(6)/mcL    Hgb 10.4 (L) 14.0 - 18.0 g/dL    Hct 31.5 (L) 42.0 - 52.0 %    MCV 89.2 80.0 - 94.0 fL    MCH 29.5 27.0 - 31.0 pg    MCHC 33.0 33.0 - 36.0 g/dL    RDW 17.2 (H) 11.5 - 17.0 %    Platelet 283 130 - 400 x10(3)/mcL    MPV 9.8 7.4 - 10.4 fL   Troponin I    Collection Time: 07/04/25  3:12 AM   Result Value Ref Range    Troponin-I 0.023 0.000 - 0.045 ng/mL   Manual Differential    Collection Time: 07/04/25  3:12 AM   Result Value Ref Range    WBC 15.86 x10(3)/mcL    Neutrophils % 78 %    Lymphs % 5 %    Monocytes % 17 %    Neutrophils Abs 12.3708 (H) 2.1 - 9.2 x10(3)/mcL    Lymphs Abs 0.793 0.6 - 4.6 x10(3)/mcL    Monocytes Abs 2.6962 (H) 0.1 - 1.3 x10(3)/mcL    Platelets Normal Normal, Adequate    RBC Morph Abnormal (A) Normal    Poikilocytosis 2+ (A) (none)    Anisocytosis 1+ (A) (none)    Mehreen Cells 2+ (A) (none)   T4, Free    Collection Time: 07/04/25  3:12 AM   Result Value Ref Range    Thyroxine Free 1.06 0.70 - 1.48 ng/dL   TSH    Collection Time: 07/04/25  3:12 AM   Result Value Ref Range    TSH 1.710 0.350 - 4.940 uIU/mL   AFP Tumor Marker    Collection Time: 07/04/25  3:12 AM   Result Value Ref Range    Alpha Fetoprotein Level 83.50 (H) <=8.90 ng/mL   Cancer Antigen 19-9    Collection Time: 07/04/25  3:12 AM   Result Value Ref Range    CA 19-9 82.01 (H) 0.00 - 37.00 unit/mL       Collection Time: 07/04/25  3:12 AM   Result Value Ref Range    Cancer Antigen 125 824.2 (H) 0.0 - 35.0 unit/mL   POCT glucose    Collection Time: 07/04/25  5:14 AM   Result Value Ref Range    POCT Glucose 72 70 - 110 mg/dL   POCT glucose    Collection Time: 07/04/25  5:44 AM   Result Value Ref Range    POCT Glucose 234 (H) 70 - 110 mg/dL   Ammonia    Collection Time: 07/04/25  8:15 AM   Result Value Ref Range    Ammonia Level 45.6 18.0 - 72.0 umol/L   Folate    Collection Time: 07/04/25  8:15 AM   Result  Value Ref Range    Folate Level 13.2 7.0 - 31.4 ng/mL       Imaging:  CT Abdomen Pelvis  Without Contrast  Result Date: 7/4/2025  EXAMINATION: CT ABDOMEN PELVIS WITHOUT CONTRAST CLINICAL HISTORY: Right upper quadrant pain, elevated bilirubin, history of cirrhosis and portal vein thrombosis; TECHNIQUE: CT imaging of the abdomen and pelvis without intravenous contrast. Axial, coronal and sagittal reformatted images reviewed. Dose length product is 486 mGycm. Automatic exposure control, adjustment of mA/kV or iterative reconstruction technique used to limit radiation dose. COMPARISON: CT 06/17/2025 FINDINGS: Assessment of the visceral organs and vasculature is limited by the lack of IV contrast. Liver/biliary: Cirrhosis.  Artifact through the posterior right liver due to arm positioning with limited overall evaluation of the hepatic parenchyma given this artifact and lack of IV contrast.  Gallbladder distended with multiple gallstones.  No biliary dilatation. Pancreas: Normal. Spleen: Normal. Adrenals: Normal. Genitourinary: 7 mm left renal stone.  No hydronephrosis.  Bladder within normal limits. Stomach/bowel: No bowel obstruction. Normal appendix.  Mild proximal colonic wall thickening. Lymph nodes and peritoneum: Few borderline enlarged periportal lymph nodes similar to prior CT.  Small volume ascites. Vasculature: Numerous aortic and iliac artery calcifications. Abdominal wall: Normal. Lung bases: Mild dependent atelectasis. Bones: No acute osseous findings.     Cirrhosis with small volume ascites. Mild wall thickening of the proximal colon favored secondary to the ascites, but colitis also possible. Distended gallbladder with several gallstones, but no defined gallbladder wall thickening. Electronically signed by: John Paul Chicas Date:    07/04/2025 Time:    10:52    CT Head Without Contrast  Result Date: 7/4/2025  EXAMINATION: CT HEAD WITHOUT CONTRAST CLINICAL HISTORY: Mental status change, unknown cause;  TECHNIQUE: Sequential axial images were performed of the brain without contrast. Dose product length of 1060 mGycm. Automated exposure control was utilized to minimize radiation dose. COMPARISON: June 2, 2017. FINDINGS: There is no intracranial mass effect, midline shift, hydrocephalus or hemorrhage. There is no sulcal effacement or low attenuation changes to suggest recent large vessel territory infarction.  There is left paramedian pontine lacunar infarct which is new since the previous exam.  There is also new left internal capsule posterior limb new lacunar infarct.  Chronic microangiopathic ischemic changes are mild.  The ventricular system and sulcal markings prominence is consistent with atrophy. There is no acute extra axial fluid collection. Mucoperiosteal thickening and fluid left maxillary sinus.  Otherwise, visualized paranasal sinuses are clear without mucosal thickening, polypoidal abnormality or air-fluid levels. Mastoid air cells aeration is optimal.     1.  No acute large vessel territory infarct identified. 2.  New but nonacute appearing lacunar infarcts pk and the left internal capsule.  If clinically indicated, consideration may be given to further assessment with MRI of the brain. Electronically signed by: Mat Sommers Date:    07/04/2025 Time:    08:57    Echo  Result Date: 6/26/2025    Left Ventricle: The left ventricle is normal in size. Normal wall thickness. There is normal systolic function with a visually estimated ejection fraction of 60 - 65%.   Right Ventricle: The right ventricle is normal in size Systolic function is reduced.TAPSE is 1.5 cm.   Aortic Valve: The aortic valve is a trileaflet valve. There is aortic valve sclerosis.   IVC/SVC: Normal venous pressure at 3 mmHg.     MRI Abdomen W WO Contrast  Result Date: 6/21/2025  EXAMINATION: MRI ABDOMEN W WO CONTRAST CLINICAL HISTORY: Cirrhosis.Liver lesion, > 1cm; TECHNIQUE: Multiplanar multisequence MRI of the abdomen performed  without and with gadolinium based IV contrast . COMPARISON: CT 17 June 2025, CT 18 October 2023 FINDINGS: This exam is nondiagnostic for HCC as only delayed scans obtained secondary to patient motion and inability to hold breath. The liver is cirrhotic and there is portal vein thrombus also seen on CT.  Heterogeneous enhancement of the liver likely relates to portal vein thrombus.  No discrete suspicious lesion seen on diffusion imaging.  Small volume ascites. There are gallstones.  No significant biliary ductal dilatation.  The spleen is not significantly enlarged.  No gross abnormality pancreas or adrenals.  No hydronephrosis. No dilated bowel loops.  Abdominal aorta normal in caliber.     1. Cirrhosis with portal vein thrombus and small volume ascites. 2. Nondiagnostic exam for HCC secondary to timing of contrast.  Recommend outpatient liver mass protocol CT. Electronically signed by: Andriy Laws Date:    06/21/2025 Time:    11:19    IR Paracentesis with Imaging  Result Date: 6/18/2025  PROCEDURE: Ultrasound Guided Paracentesis CLINICAL HISTORY: 64-year-old male with cirrhosis, portal vein thrombus, and ascites ANESTHESIA: Local anesthesia PHYSICIANS: Neto Bird MD PROCEDURAL SUMMARY: After informed consent was obtained, the patient was placed supine on the ultrasound table. A limited ultrasound of the abdomen was performed with Dr. Bird present in the room.  This confirmed the presence of an appropriate volume of ascites for drainage.  The planned skin entry site and route for needle passage for paracentesis was then determined. The skin overlying the right lower quadrant of the abdomen was marked.  The site was then prepped and draped in the usual sterile fashion. The soft tissues were anesthetized with lidocaine and a dermatotomy was performed.  Under ultrasound guidance, a sheath needle was advanced from the skin entry site into the peritoneal cavity.  When the needle entered the peritoneal cavity,  fluid was aspirated.  The sheath was then advanced over the needle into the cavity.  The needle was removed.  Aspiration was performed and a total of 2.1 L of serous fluid was drained from the peritoneal cavity.  The catheter was then removed.  A sterile dressing was applied. The patient tolerated the procedure well and was without any apparent complications.     Technically successful ultrasound-guided paracentesis. Electronically signed by: Neto Bird Date:    06/18/2025 Time:    15:02    CT Abdomen Pelvis With IV Contrast NO Oral Contrast  Result Date: 6/18/2025  EXAMINATION: CT ABDOMEN PELVIS WITH IV CONTRAST CLINICAL HISTORY: Abdominal pain and abdominal distension TECHNIQUE: Axial CT images were obtained through the abdomen and pelvis following IV administration of contrast.  100 mL Omnipaque 350.  Coronal and sagittal reconstructions submitted and interpreted.  Automated exposure control, dose radiation lowering technique, was utilized. COMPARISON: CT abdomen and pelvis without contrast from 10/18/2023 CT abdomen and pelvis from 10/16/2023 Abdominal sonogram from 06/02/2017 FINDINGS: Cirrhotic morphology to the liver.  Thrombus is present within the distal segment of the main portal vein with extension into the right and left portal system.  Thrombus is occlusive at multiple segments of the right portal system.  Splenic vein and SMV appear patent. There is geographic area of slightly increased enhancement at segment 8 measuring 4.2 x 4.0 cm.  No other hypervascular areas.  There are multiple areas of patchy decreased density in the right hepatic lobe which is felt to be secondary to portal vein thrombosis. Spleen is nonenlarged.  Pancreas, adrenal glands and right kidney appear normal.  Nonobstructing left kidney stone measures 10 mm.  No hydronephrosis. Cholelithiasis is present.  The bowel is nonobstructed.  Air and stool are present throughout the colon which appears within normal limits.  Normal  appendix is present.  There is moderate volume ascites.  No free air. Moderate atherosclerosis of the abdominal aorta and its branches is present.  Mild, diffuse bladder wall thickening is present.  No suspicious osteolytic or osteoblastic lesion.     1. Portal vein thrombosis which is occlusive within the right portal system.  There is some collaterals within the annette hepatis with suggests some degree of chronic component.  Given the appearance of the thrombus in addition to the small amount of portal venous collaterals, favor acute on chronic thrombosis. 2. Sequelae of cirrhosis with moderate volume ascites and small gastroesophageal varices. 3. Hypervascular area in segment 8 (image 31, series 1).  Favor perfusion abnormality related to portal vein thrombosis.  However, consider multiphase abdominal MRI to evaluate for potential tumor. 4. Cholelithiasis. 5. Nonobstructing left-sided kidney stone. Electronically signed by: Mukesh Lu MD Date:    06/18/2025 Time:    08:04    CTA Chest Non-Coronary (PE Studies)  Result Date: 6/18/2025  EXAMINATION: CTA CHEST NON CORONARY (PE STUDIES) CLINICAL HISTORY: Pulmonary embolism suspected. 65 yo male presenting with feeling unwell for the past month and a half. He reports abdominal bloating, leg swelling, and decreased appetite. He reports generalized weakness as well. States he normally drinks alcohol daily but hasn't in the past 30 days or so. TECHNIQUE: Axial CT images were obtained through the chest after IV administration of contrast. 100 mL Omnipaque 350. MIP, coronal and sagittal reconstructions submitted and interpreted.  Automated exposure control, dose radiation lowering technique, was utilized. COMPARISON: None FINDINGS: Slightly limited exam secondary to contrast bolus timing. Soft Tissues: Unremarkable. Lines and Tubes: None. Neck: The visualized soft tissues of the neck appear unremarkable. Mediastinum: The mediastinal structures are within normal  limits. Heart: The heart size is within normal limits. Mild coronary artery calcification is seen. Aorta: No aortic dissection or aneurysm is seen. Mild aortic calcification is seen in the thoracic aorta. Pulmonary Arteries: No filling defects are seen in the pulmonary arteries to suggest pulmonary embolus. Lungs: There is moderate non specific dependent change at the lung bases. Mild streaky linear opacity is seen consistent with scarring and subsegmental. No acute focal infiltrate or consolidation is seen. Pleura: No effusions or pneumothorax are identified. Bony Structures: Spine: Subtle spondylolytic changes are seen in the thoracic spine. Ribs: The ribs appear unremarkable.     Slightly limited exam of the pulmonary arteries secondary to contrast bolus timing. 1. No filling defects are seen in the pulmonary arteries to suggest pulmonary embolus. 2. No acute focal infiltrate or consolidation is seen. 3. Details and other findings as discussed above. [employer] concurrence Electronically signed by: Mukesh Lu MD Date:    06/18/2025 Time:    07:57         Assessment/Plan:  Impression:  Worsening liver function studies possibly secondary to worsening underlying liver disease, possible hepatocellular cancer, possible cholecystitis, possible choledocholithiasis    Recommendation:  We will obtain MRCP/MRI for further evaluation of his biliary system to see if ERCP indicated.  Agree with tumor markers.  Would cover with IV antibiotic therapy given his recent positive culture of his ascitic fluid.  Prognosis is poor.  Would discuss situation with his family if possible.       Alec Borges MD

## 2025-07-05 LAB
ALBUMIN SERPL-MCNC: 1.8 G/DL (ref 3.4–4.8)
ALBUMIN/GLOB SERPL: 0.2 RATIO (ref 1.1–2)
ALP SERPL-CCNC: 334 UNIT/L (ref 40–150)
ALT SERPL-CCNC: 510 UNIT/L (ref 0–55)
ANION GAP SERPL CALC-SCNC: 10 MEQ/L
AST SERPL-CCNC: 401 UNIT/L (ref 11–45)
BASOPHILS # BLD AUTO: 0.03 X10(3)/MCL
BASOPHILS NFR BLD AUTO: 0.2 %
BILIRUB SERPL-MCNC: 4.4 MG/DL
BUN SERPL-MCNC: 45.3 MG/DL (ref 8.4–25.7)
CALCIUM SERPL-MCNC: 8.2 MG/DL (ref 8.8–10)
CHLORIDE SERPL-SCNC: 103 MMOL/L (ref 98–107)
CO2 SERPL-SCNC: 22 MMOL/L (ref 23–31)
CREAT SERPL-MCNC: 2.1 MG/DL (ref 0.72–1.25)
CREAT/UREA NIT SERPL: 22
EOSINOPHIL # BLD AUTO: 0.02 X10(3)/MCL (ref 0–0.9)
EOSINOPHIL NFR BLD AUTO: 0.2 %
ERYTHROCYTE [DISTWIDTH] IN BLOOD BY AUTOMATED COUNT: 16.9 % (ref 11.5–17)
GFR SERPLBLD CREATININE-BSD FMLA CKD-EPI: 35 ML/MIN/1.73/M2
GLOBULIN SER-MCNC: 7.8 GM/DL (ref 2.4–3.5)
GLUCOSE SERPL-MCNC: 61 MG/DL (ref 82–115)
HCT VFR BLD AUTO: 36.2 % (ref 42–52)
HGB BLD-MCNC: 12 G/DL (ref 14–18)
IMM GRANULOCYTES # BLD AUTO: 0.07 X10(3)/MCL (ref 0–0.04)
IMM GRANULOCYTES NFR BLD AUTO: 0.5 %
INR PPP: 1.6
LYMPHOCYTES # BLD AUTO: 1.62 X10(3)/MCL (ref 0.6–4.6)
LYMPHOCYTES NFR BLD AUTO: 12.3 %
MCH RBC QN AUTO: 29.7 PG (ref 27–31)
MCHC RBC AUTO-ENTMCNC: 33.1 G/DL (ref 33–36)
MCV RBC AUTO: 89.6 FL (ref 80–94)
MONOCYTES # BLD AUTO: 1.49 X10(3)/MCL (ref 0.1–1.3)
MONOCYTES NFR BLD AUTO: 11.3 %
NEUTROPHILS # BLD AUTO: 9.96 X10(3)/MCL (ref 2.1–9.2)
NEUTROPHILS NFR BLD AUTO: 75.5 %
NRBC BLD AUTO-RTO: 0 %
PLATELET # BLD AUTO: 309 X10(3)/MCL (ref 130–400)
PMV BLD AUTO: 10 FL (ref 7.4–10.4)
POCT GLUCOSE: 69 MG/DL (ref 70–110)
POCT GLUCOSE: 89 MG/DL (ref 70–110)
POTASSIUM SERPL-SCNC: 4.4 MMOL/L (ref 3.5–5.1)
PROT SERPL-MCNC: 9.6 GM/DL (ref 5.8–7.6)
PROTHROMBIN TIME: 18.8 SECONDS (ref 12.5–14.5)
RBC # BLD AUTO: 4.04 X10(6)/MCL (ref 4.7–6.1)
SODIUM SERPL-SCNC: 135 MMOL/L (ref 136–145)
WBC # BLD AUTO: 13.19 X10(3)/MCL (ref 4.5–11.5)

## 2025-07-05 PROCEDURE — 25500020 PHARM REV CODE 255: Performed by: INTERNAL MEDICINE

## 2025-07-05 PROCEDURE — S4991 NICOTINE PATCH NONLEGEND: HCPCS | Performed by: HOSPITALIST

## 2025-07-05 PROCEDURE — 21400001 HC TELEMETRY ROOM

## 2025-07-05 PROCEDURE — 85610 PROTHROMBIN TIME: CPT | Performed by: INTERNAL MEDICINE

## 2025-07-05 PROCEDURE — 25000003 PHARM REV CODE 250

## 2025-07-05 PROCEDURE — 63600175 PHARM REV CODE 636 W HCPCS: Performed by: HOSPITALIST

## 2025-07-05 PROCEDURE — 25000003 PHARM REV CODE 250: Performed by: HOSPITALIST

## 2025-07-05 PROCEDURE — A9577 INJ MULTIHANCE: HCPCS | Performed by: INTERNAL MEDICINE

## 2025-07-05 PROCEDURE — 25000003 PHARM REV CODE 250: Performed by: INTERNAL MEDICINE

## 2025-07-05 PROCEDURE — 99233 SBSQ HOSP IP/OBS HIGH 50: CPT | Mod: ,,, | Performed by: SURGERY

## 2025-07-05 PROCEDURE — 36415 COLL VENOUS BLD VENIPUNCTURE: CPT | Performed by: INTERNAL MEDICINE

## 2025-07-05 PROCEDURE — 11000001 HC ACUTE MED/SURG PRIVATE ROOM

## 2025-07-05 PROCEDURE — 85025 COMPLETE CBC W/AUTO DIFF WBC: CPT | Performed by: INTERNAL MEDICINE

## 2025-07-05 PROCEDURE — 80053 COMPREHEN METABOLIC PANEL: CPT | Performed by: INTERNAL MEDICINE

## 2025-07-05 RX ADMIN — THIAMINE HCL TAB 100 MG 100 MG: 100 TAB at 08:07

## 2025-07-05 RX ADMIN — GADOBENATE DIMEGLUMINE 11 ML: 529 INJECTION, SOLUTION INTRAVENOUS at 03:07

## 2025-07-05 RX ADMIN — SODIUM CHLORIDE: 9 INJECTION, SOLUTION INTRAVENOUS at 08:07

## 2025-07-05 RX ADMIN — FOLIC ACID 1 MG: 1 TABLET ORAL at 08:07

## 2025-07-05 RX ADMIN — ENOXAPARIN SODIUM 50 MG: 60 INJECTION SUBCUTANEOUS at 08:07

## 2025-07-05 RX ADMIN — NICOTINE 1 PATCH: 14 PATCH TRANSDERMAL at 08:07

## 2025-07-05 RX ADMIN — DEXTROSE MONOHYDRATE: 100 INJECTION, SOLUTION INTRAVENOUS at 06:07

## 2025-07-05 RX ADMIN — PANTOPRAZOLE SODIUM 40 MG: 40 TABLET, DELAYED RELEASE ORAL at 08:07

## 2025-07-05 RX ADMIN — PIPERACILLIN SODIUM AND TAZOBACTAM SODIUM 4.5 G: 4; .5 INJECTION, POWDER, LYOPHILIZED, FOR SOLUTION INTRAVENOUS at 05:07

## 2025-07-05 RX ADMIN — ARIPIPRAZOLE 5 MG: 5 TABLET ORAL at 08:07

## 2025-07-05 RX ADMIN — ESCITALOPRAM OXALATE 5 MG: 5 TABLET, FILM COATED ORAL at 08:07

## 2025-07-05 RX ADMIN — PIPERACILLIN SODIUM AND TAZOBACTAM SODIUM 4.5 G: 4; .5 INJECTION, POWDER, LYOPHILIZED, FOR SOLUTION INTRAVENOUS at 08:07

## 2025-07-05 RX ADMIN — PIPERACILLIN SODIUM AND TAZOBACTAM SODIUM 4.5 G: 4; .5 INJECTION, POWDER, LYOPHILIZED, FOR SOLUTION INTRAVENOUS at 12:07

## 2025-07-05 NOTE — PROGRESS NOTES
Acute Care Surgery   Progress Note  Admit Date: 7/3/2025  HD#2  POD#* No surgery found *    Subjective:   Interval history:  NAEON  AF  VSS  LFTs slightly improved, T bili increased to 4.4 from 4.9  Ultrasound suspicious for thrombosed main portal vein and thickened gallbladder wall and positive Chang sign consistent with acute cholecystitis as well as hepatomegaly and hepatic steatosis   CT with cirrhosis, mild wall thickening of the proximal colon, and distended gallbladder with no definitive gallbladder wall thickening  MRCP pending  Denies nausea, vomiting, fevers, chills, chest pain, shortness of breath    Home Meds:  Current Outpatient Medications   Medication Instructions    ARIPiprazole (ABILIFY) 5 mg, Oral, Daily    doxycycline (VIBRA-TABS) 100 mg, Oral, Every 12 hours    EScitalopram oxalate (LEXAPRO) 5 mg, Oral, Daily    folic acid (FOLVITE) 1 mg, Oral, Daily    furosemide (LASIX) 20 mg, Oral, Daily    lactulose 10 gram/15 ml (CHRONULAC) 20 g, Oral, 3 times daily PRN    multivitamin Tab 1 tablet, Oral, Daily    nicotine (NICODERM CQ) 14 mg/24 hr 1 patch, Transdermal, Daily    pantoprazole (PROTONIX) 40 mg, Oral, Daily    rivaroxaban (XARELTO) 20 mg, Oral, With dinner    spironolactone (ALDACTONE) 25 mg, Oral, Daily    thiamine 100 mg, Oral, Daily      Scheduled Meds:   ARIPiprazole  5 mg Oral Daily    enoxparin  1 mg/kg Subcutaneous Q12H (prophylaxis, 0900/2100)    EScitalopram oxalate  5 mg Oral Daily    folic acid  1 mg Oral Daily    nicotine  1 patch Transdermal Daily    pantoprazole  40 mg Oral Daily    piperacillin-tazobactam (Zosyn) IV (PEDS and ADULTS) (extended infusion is not appropriate)  4.5 g Intravenous Q8H    thiamine  100 mg Oral Daily     Continuous Infusions:   0.9% NaCl   Intravenous Continuous   Stopped at 07/04/25 0944    D10W   Intravenous Continuous 25 mL/hr at 07/05/25 0642 New Bag at 07/05/25 0642     PRN Meds:  Current Facility-Administered Medications:     dextrose 50%, 12.5  "g, Intravenous, PRN    dextrose 50%, 25 g, Intravenous, PRN    glucagon (human recombinant), 1 mg, Intramuscular, PRN    glucose, 16 g, Oral, PRN    glucose, 24 g, Oral, PRN    lactulose 10 gram/15 ml, 20 g, Oral, TID PRN    melatonin, 6 mg, Oral, Nightly PRN    sodium chloride 0.9%, 10 mL, Intravenous, PRN     Objective:     VITAL SIGNS: 24 HR MIN & MAX LAST   Temp  Min: 97.6 °F (36.4 °C)  Max: 98.4 °F (36.9 °C)  98.4 °F (36.9 °C)   BP  Min: 97/52  Max: 120/69  120/69    Pulse  Min: 72  Max: 86  72    Resp  Min: 18  Max: 20  18    SpO2  Min: 93 %  Max: 100 %  98 %      HT: 5' 8.9" (175 cm)  WT: 54.1 kg (119 lb 4.3 oz)  BMI: 17.7     Intake/output:  Intake/Output - Last 3 Shifts         07/03 0700 07/04 0659 07/04 0700 07/05 0659 07/05 0700 07/06 0659    I.V. (mL/kg)  29.8 (0.5)     IV Piggyback 61.5 165.9     Total Intake(mL/kg) 61.5 (1.1) 195.7 (3.6)     Net +61.5 +195.7                    Intake/Output Summary (Last 24 hours) at 7/5/2025 0736  Last data filed at 7/4/2025 1841  Gross per 24 hour   Intake 195.69 ml   Output --   Net 195.69 ml           Lines/drains/airway:  Peripheral IV 07/03/25 1749 Anterior;Right Forearm (Active)   Site Assessment Clean;Dry;Intact 07/03/25 2000   Line Securement Device Secured with sutureless device 07/03/25 2000   Extremity Assessment Distal to IV No abnormal discoloration;No swelling;No warmth;No redness 07/03/25 2000   Line Status Saline locked;Flushed 07/03/25 2000   Dressing Status Clean;Dry;Intact 07/03/25 2000   Dressing Intervention Integrity maintained 07/03/25 2000   Number of days: 0       Physical Exam:  General: Eyes sunken and temporal wasting, sleeping comfortably   HEENT: Normocephalic, PERRL, scleral icterus  CV: RR  Resp: NWOB  GI:  Abdomen soft, mild RUQ tenderness, non distended  :  Deferred  MSK: No cyanosis, peripheral edema, moving all extremities spontaneously, + muscle atrophy globally   Skin/wounds:  No rashes, ulcers, erythema  Neuro:  CNII-XII " "grossly intact, sleepy during interview, alert       Labs:  Renal:  Recent Labs     07/03/25  0343 07/04/25 0312 07/05/25  0332   BUN 18.2 29.1* 45.3*   CREATININE 1.12 1.83* 2.10*     No results for input(s): "LACTIC" in the last 72 hours.  FENGI:  Recent Labs     07/03/25 0343 07/04/25 0312 07/05/25 0332   * 136 135*   K 4.3 3.8 4.4    102 103   CO2 24 18* 22*   CALCIUM 8.2* 7.8* 8.2*   MG  --  1.80  --    PHOS  --  5.8*  --    PROT 9.6* 8.5* 9.6*   ALBUMIN 2.0* 1.6* 1.8*   BILITOT 4.0* 4.9* 4.4*   * 312* 401*   ALKPHOS 400* 333* 334*   * 417* 510*     Heme:  Recent Labs     07/03/25 0343 07/04/25 0312 07/05/25 0332   HGB 10.8* 10.4* 12.0*   HCT 31.4* 31.5* 36.2*    283 309   INR  --  1.8* 1.6*     ID:  Recent Labs     07/03/25 0343 07/04/25 0312 07/05/25 0332   WBC 13.84* 15.86  15.86* 13.19*     CBG:  No results for input(s): "GLUCOSE" in the last 72 hours.   Cardiovascular:  Recent Labs   Lab 07/04/25 0312   TROPONINI 0.023     ABG:  No results for input(s): "PH", "PO2", "PCO2", "HCO3", "BE" in the last 168 hours.   I have reviewed all pertinent lab results within the past 24 hours.    Imaging:  CT Abdomen Pelvis  Without Contrast   Final Result      Cirrhosis with small volume ascites.      Mild wall thickening of the proximal colon favored secondary to the ascites, but colitis also possible.      Distended gallbladder with several gallstones, but no defined gallbladder wall thickening.         Electronically signed by: John Paul Chicas   Date:    07/04/2025   Time:    10:52      CT Head Without Contrast   Final Result      1.  No acute large vessel territory infarct identified.      2.  New but nonacute appearing lacunar infarcts pk and the left internal capsule.  If clinically indicated, consideration may be given to further assessment with MRI of the brain.         Electronically signed by: Mat Sommers   Date:    07/04/2025   Time:    08:57      US Abdomen " Complete   Final Result      1. Hepatomegaly and hepatic steatosis.      2. Thrombosed main portal vein.      3.  Enlarged gallbladder containing sludge and multiple calculi.  Thickened gallbladder wall and positive Chang sign is suspected for acute cholecystitis.         Electronically signed by: Mat Sommers   Date:    07/04/2025   Time:    15:40      MRI MRCP Abdomen W WO Contrast 3D WO Independent WS XPD    (Results Pending)      I have reviewed all pertinent imaging results/findings within the past 24 hours.    Micro/Path/Other:  Microbiology Results (last 7 days)       ** No results found for the last 168 hours. **           Pathology Results  (Last 7 days)      None             Assessment & Plan:   64-year-old male history of liver cirrhosis, hepatitis-C chronic, hepatocellular carcinoma, acute on chronic portal vein thrombosis and moderate ascites presents as a transfer from [facility] ED for GI services and further workup after he was seen there and found to have scleral icterus, WBC 14, , , , T bili 4.0, direct bili 3.0, workup concerning for potential choledocholithiasis.  LFTs and bilirubin are worse than prior labs on 06/27 when he was admitted at [facility]. No signs of cholecystitis on prior imaging. MRCP is pending.  Afp 83,  824, CA 19-9 82     Will follow up MRCP  Will follow-up GI recommendations  No signs of peritonitis  Continue antibiotics  Tumor markers - AFP, CA 19-9,   CT AP pending, US pending     CT AP images showed distended gallbladder w/ cholelithiasis and ascites. Patient with known portal vein thrombosis and liver cirrhosis with presumed HCC. At this time, anticoagulating this patient and treating his thrombosis likely outweighs any surgical intervention at this time. He is a poor surgical candidate considering liver cirrhosis, esophageal varices, and unknown cancer status and portal thormbosis needing anticoagulation. Would recommend IR cholecystotomy tube at  this time due to patient's comorbidities and presumed HCC.     Surgery will sign off at this time, should the patient receive an IR drain, please reach out on discharge and we will schedule a follow up in our clinic.    Yosvany Trotter MD  [facility] General [facility] HO-1

## 2025-07-05 NOTE — PLAN OF CARE
Problem: Adult Inpatient Plan of Care  Goal: Plan of Care Review  Outcome: Progressing  Flowsheets (Taken 7/4/2025 2314)  Plan of Care Reviewed With: patient     Problem: Adult Inpatient Plan of Care  Goal: Absence of Hospital-Acquired Illness or Injury  Intervention: Identify and Manage Fall Risk  Flowsheets (Taken 7/4/2025 2314)  Safety Promotion/Fall Prevention:   assistive device/personal item within reach   Fall Risk reviewed with patient/family   medications reviewed   nonskid shoes/socks when out of bed   side rails raised x 2     Problem: Adult Inpatient Plan of Care  Goal: Absence of Hospital-Acquired Illness or Injury  Intervention: Prevent Skin Injury  Flowsheets (Taken 7/4/2025 2314)  Body Position:   position changed independently   side-lying   weight shifting  Skin Protection: incontinence pads utilized  Device Skin Pressure Protection: tubing/devices free from skin contact     Problem: Adult Inpatient Plan of Care  Goal: Absence of Hospital-Acquired Illness or Injury  Intervention: Prevent and Manage VTE (Venous Thromboembolism) Risk  Flowsheets (Taken 7/4/2025 2314)  VTE Prevention/Management:   bleeding risk assessed   ambulation promoted   intravenous hydration     Problem: Adult Inpatient Plan of Care  Goal: Absence of Hospital-Acquired Illness or Injury  Intervention: Prevent Infection  Flowsheets (Taken 7/4/2025 2314)  Infection Prevention:   rest/sleep promoted   single patient room provided   hand hygiene promoted     Problem: Adult Inpatient Plan of Care  Goal: Optimal Comfort and Wellbeing  Intervention: Monitor Pain and Promote Comfort  Flowsheets (Taken 7/4/2025 2314)  Pain Management Interventions: care clustered     Problem: Adult Inpatient Plan of Care  Goal: Optimal Comfort and Wellbeing  Intervention: Provide Person-Centered Care  Flowsheets (Taken 7/4/2025 2314)  Trust Relationship/Rapport:   care explained   questions encouraged   choices provided   reassurance provided    emotional support provided   thoughts/feelings acknowledged   empathic listening provided   questions answered

## 2025-07-05 NOTE — PROGRESS NOTES
[facility] MEDICINE ~ PROGRESS NOTE        CHIEF COMPLAINT   Hospital follow up    HOSPITAL COURSE   64-year-old male with a past medical history of carpal tunnel syndrome, HTN, sciatica, portal vein thrombosis, cocaine use, hepatitis-C, hepatic adenoma with concern of HCC, cirrhosis.  Presented to outside emergency department with complaints of abdominal pain x2 months and was found to have acute on chronic hyperbilirubinemia, leukocytosis and thus transferred to our facility.  At the time of my examination this morning he is drowsy, did not really answer much questions, awakens with stimulus.  Ultrasound abdomen shows hepatomegaly with hepatic steatosis, thrombosed main portal vein, enlarged gallbladder, thickened gallbladder wall with a positive Chang's sign concerning for acute cholecystitis.    Today  Still has right upper quadrant pain but wanting to eat.  Awaiting MRCP results.  He is more awake and responding appropriately this morning.  Reports being hungry.        OBJECTIVE/PHYSICAL EXAM     VITAL SIGNS (MOST RECENT):  Temp: 97.6 °F (36.4 °C) (07/05/25 0350)  Pulse: 80 (07/05/25 0350)  Resp: 20 (07/04/25 2019)  BP: 113/63 (07/05/25 0350)  SpO2: (!) 93 % (07/05/25 0350) VITAL SIGNS (24 HOUR RANGE):  Temp:  [97.6 °F (36.4 °C)-98 °F (36.7 °C)] 97.6 °F (36.4 °C)  Pulse:  [80-86] 80  Resp:  [18-20] 20  SpO2:  [93 %-100 %] 93 %  BP: ()/(52-66) 113/63   GENERAL: In no acute distress, afebrile  HEENT:  CHEST: Clear to auscultation bilaterally  HEART: S1, S2, no appreciable murmur  ABDOMEN: Soft, right upper quadrant tenderness, BS +  MSK: Warm, no lower extremity edema, no clubbing or cyanosis  NEUROLOGIC: Alert and oriented x4, moving all extremities with good strength   INTEGUMENTARY:  PSYCHIATRY:        ASSESSMENT/PLAN   Acute cholecystitis  Acute kidney injury   Acute on chronic hyperbilirubinemia  Recent known portal vein thrombosis  Hypoglycemia     History  of: As listed above        GI following.  IV fluids isotonic as well as dextrose for hypoglycemia  Zosyn  On full-dose Lovenox for portal vein thrombosis  Pending MRCP results  IR consulted for cholecystostomy tube given high-risk for cholecystectomy.    DVT prophylaxis:     Anticipated discharge and disposition:   __________________________________________________________________________    NUTRITIONAL STATUS     Patient meets ASPEN criteria for   malnutrition of   per RD assessment as evidenced by:                       A minimum of two characteristics is recommended for diagnosis of either severe or non-severe malnutrition.     LABS/MICRO/MEDS/DIAGNOSTICS       LABS  Recent Labs     07/05/25 0332   *   K 4.4   CO2 22*   BUN 45.3*   CREATININE 2.10*   CALCIUM 8.2*   ALKPHOS 334*   *   *   ALBUMIN 1.8*     Recent Labs     07/05/25 0332   WBC 13.19*   RBC 4.04*   HCT 36.2*   MCV 89.6          MICROBIOLOGY  Microbiology Results (last 7 days)       ** No results found for the last 168 hours. **               MEDICATIONS   ARIPiprazole  5 mg Oral Daily    enoxparin  1 mg/kg Subcutaneous Q12H (prophylaxis, 0900/2100)    EScitalopram oxalate  5 mg Oral Daily    folic acid  1 mg Oral Daily    nicotine  1 patch Transdermal Daily    pantoprazole  40 mg Oral Daily    piperacillin-tazobactam (Zosyn) IV (PEDS and ADULTS) (extended infusion is not appropriate)  4.5 g Intravenous Q8H    thiamine  100 mg Oral Daily         INFUSIONS   0.9% NaCl   Intravenous Continuous   Stopped at 07/04/25 0944    D10W   Intravenous Continuous 25 mL/hr at 07/05/25 0642 New Bag at 07/05/25 0642          DIAGNOSTIC TESTS  CT Abdomen Pelvis  Without Contrast   Final Result      Cirrhosis with small volume ascites.      Mild wall thickening of the proximal colon favored secondary to the ascites, but colitis also possible.      Distended gallbladder with several gallstones, but no defined gallbladder wall thickening.          Electronically signed by: John Paul Chicas   Date:    07/04/2025   Time:    10:52      CT Head Without Contrast   Final Result      1.  No acute large vessel territory infarct identified.      2.  New but nonacute appearing lacunar infarcts pk and the left internal capsule.  If clinically indicated, consideration may be given to further assessment with MRI of the brain.         Electronically signed by: Mat Sommers   Date:    07/04/2025   Time:    08:57      US Abdomen Complete   Final Result      1. Hepatomegaly and hepatic steatosis.      2. Thrombosed main portal vein.      3.  Enlarged gallbladder containing sludge and multiple calculi.  Thickened gallbladder wall and positive Chang sign is suspected for acute cholecystitis.         Electronically signed by: Mat Sommers   Date:    07/04/2025   Time:    15:40      MRI MRCP Abdomen W WO Contrast 3D WO Independent WS XPD    (Results Pending)        Echo  Result Date: 6/26/2025    Left Ventricle: The left ventricle is normal in size. Normal wall   thickness. There is normal systolic function with a visually estimated   ejection fraction of 60 - 65%.    Right Ventricle: The right ventricle is normal in size Systolic   function is reduced.TAPSE is 1.5 cm.    Aortic Valve: The aortic valve is a trileaflet valve. There is aortic   valve sclerosis.    IVC/SVC: Normal venous pressure at 3 mmHg.               Case related differential diagnoses have been reviewed; assessment and plan has been documented. I have personally reviewed the labs and test results that are currently available; I have reviewed the patients medication list. I have reviewed the consulting providers recommendations. I have reviewed or attempted to review medical records based upon their availability.  All of the patient's and/or family's questions have been addressed and answered to the best of my ability.  I will continue to monitor closely and make adjustments to medical management as  needed.  This document was created using M*Modal Fluency Direct.  Transcription errors may have been made.  Please contact me if any questions may rise regarding documentation to clarify transcription.        Ricardo Trotter MD   Internal Medicine  Department of Hospital Medicine [facility]

## 2025-07-05 NOTE — PROGRESS NOTES
Inpatient Nutrition Assessment    Admit Date: 7/3/2025   Total duration of encounter: 2 days   Patient Age: 64 y.o.    Nutrition Recommendation/Prescription     Advance diet when appropriate. Goal diet: GI soft vs regular (as tolerated.)  Add Boost Breeze (provides 250 kcal, 9 g protein per serving) once diet advanced.  May also need to consider PN if not able to advance diet in next 24 hours. Consult RD if PN to start.     Communication of Recommendations: reviewed with patient    Nutrition Assessment     Malnutrition Assessment/Nutrition-Focused Physical Exam    Malnutrition Context: chronic illness (07/05/25 1156)  Malnutrition Level: moderate (07/05/25 1156)  Energy Intake (Malnutrition): other (see comments) (Unable to assess) (07/05/25 1156)  Weight Loss (Malnutrition): greater than 7.5% in 3 months (07/05/25 1156)  Subcutaneous Fat (Malnutrition): mild depletion (07/05/25 1156)           Muscle Mass (Malnutrition): mild depletion (07/05/25 1156)  Muslim Region (Muscle Loss): mild depletion  Clavicle Bone Region (Muscle Loss): mild depletion                    Fluid Accumulation (Malnutrition): other (see comments) (Not present) (07/05/25 1156)        A minimum of two characteristics is recommended for diagnosis of either severe or non-severe malnutrition.    Chart Review    Reason Seen: malnutrition screening tool (MST)    Malnutrition Screening Tool Results   Have you recently lost weight without trying?: Yes: 24-33 lbs  Have you been eating poorly because of a decreased appetite?: Yes   MST Score: 4   Diagnosis:  Acute cholecystitis  Acute kidney injury   Acute on chronic hyperbilirubinemia  Recent known portal vein thrombosis  Hypoglycemia    Relevant Medical History: carpal tunnel syndrome, HTN, sciatica, portal vein thrombosis, cocaine use, hepatitis-C, hepatic adenoma     Scheduled Medications:  ARIPiprazole, 5 mg, Daily  enoxparin, 1 mg/kg, Q12H (prophylaxis, 0900/2100)  EScitalopram oxalate, 5 mg,  Daily  folic acid, 1 mg, Daily  nicotine, 1 patch, Daily  pantoprazole, 40 mg, Daily  piperacillin-tazobactam (Zosyn) IV (PEDS and ADULTS) (extended infusion is not appropriate), 4.5 g, Q8H  thiamine, 100 mg, Daily    Continuous Infusions:  0.9% NaCl, Last Rate: Stopped (07/04/25 0944)  D10W, Last Rate: 25 mL/hr at 07/05/25 0642    PRN Medications:  dextrose 50%, 12.5 g, PRN  dextrose 50%, 25 g, PRN  glucagon (human recombinant), 1 mg, PRN  glucose, 16 g, PRN  glucose, 24 g, PRN  lactulose 10 gram/15 ml, 20 g, TID PRN  melatonin, 6 mg, Nightly PRN  sodium chloride 0.9%, 10 mL, PRN    Calorie Containing IV Medications: D5 @ 25ml/hr provides:30gm dextrose, 102kcal/day     Recent Labs   Lab 07/03/25  0343 07/04/25  0312 07/04/25  0815 07/05/25  0332   * 136  --  135*   K 4.3 3.8  --  4.4   CALCIUM 8.2* 7.8*  --  8.2*   PHOS  --  5.8*  --   --    MG  --  1.80  --   --     102  --  103   CO2 24 18*  --  22*   BUN 18.2 29.1*  --  45.3*   CREATININE 1.12 1.83*  --  2.10*   EGFRNORACEVR >60 41  --  35    40*  --  61*   BILITOT 4.0* 4.9*  --  4.4*   ALKPHOS 400* 333*  --  334*   * 417*  --  510*   * 312*  --  401*   ALBUMIN 2.0* 1.6*  --  1.8*   AMMONIA  --   --  45.6  --    LIPASE 55  --   --   --    WBC 13.84* 15.86  15.86*  --  13.19*   HGB 10.8* 10.4*  --  12.0*   HCT 31.4* 31.5*  --  36.2*     Nutrition Orders:  Diet NPO Except for: Sips with Medication      Appetite/Oral Intake: NPO/NPO  Factors Affecting Nutritional Intake: NPO  Social Needs Impacting Access to Food: unable to assess at this time; will attempt on follow-up  Food/Holiness/Cultural Preferences: unable to obtain  Food Allergies: no known food allergies  Last Bowel Movement: 07/03/25  Wound(s):  None documented     Comments    7/5/25: Noted MST indicates wt loss and decreased appetite. Unable to verify with pt, pt confused and hard to understand at time of RD visit. Noted EMR wts confirm wt loss over past few months.  "Awaiting MRCP results per MD notes. Due to malnourished state and NPO, may need to consider starting PN. Currently receiving minimal D5.    Anthropometrics    Height: 5' 8.9" (175 cm), Height Method: Stated  Last Weight: 54.1 kg (119 lb 4.3 oz) (25 1153), Weight Method: Standard Scale  BMI (Calculated): 17.7  BMI Classification: underweight (BMI less than 18.5)        Ideal Body Weight (IBW), Male: 159.4 lb     % Ideal Body Weight, Male (lb): 74.82 %                 Usual Body Weight (UBW), k.09 kg (per EMR from 2025)  % Usual Body Weight: 91.75  % Weight Change From Usual Weight: -8.45 %  Usual Weight Provided By: EMR weight history    Wt Readings from Last 5 Encounters:   25 54.1 kg (119 lb 4.3 oz)   25 54.4 kg (120 lb)   25 59 kg (130 lb)   25 59 kg (130 lb)   23 63 kg (138 lb 14.2 oz)     Weight Change(s) Since Admission:   Wt Readings from Last 1 Encounters:   25 1153 54.1 kg (119 lb 4.3 oz)   25 1704 54.1 kg (119 lb 4.3 oz)   Admit Weight: 54.1 kg (119 lb 4.3 oz) (25 1704), Weight Method: Standard Scale    Estimated Needs    Weight Used For Calorie Calculations: 54.1 kg (119 lb 4.3 oz)  Energy Calorie Requirements (kcal): 1623-1894gm (30-35kcal/kg)  Energy Need Method: Kcal/kg  Weight Used For Protein Calculations: 54.1 kg (119 lb 4.3 oz)  Protein Requirements: 70-81gm (1.3-1.5g/kg)  Fluid Requirements (mL): 1623ml (1ml/kcal)  CHO Requirement: 160gm (at least 40% est kcal needs)     Enteral Nutrition     Patient not receiving enteral nutrition at this time.    Parenteral Nutrition     Patient not receiving parenteral nutrition support at this time.    Evaluation of Received Nutrient Intake    Calories: not meeting estimated needs  Protein: not meeting estimated needs    Patient Education     Not applicable.    Nutrition Diagnosis     PES: Inadequate oral intake related to acute illness as evidenced by NPO since admit. (new)     PES: Moderate " chronic disease or condition related malnutrition Related to chronic condition As Evidenced by:  - weight loss: > 7.5% in 3 months - muscle mass depletion: 2 areas of mild muscle loss (Clavicle, Temporalis) - loss of subcutaneous fat: 1 area of mild fat loss (Buccal) active    Nutrition Interventions     Intervention(s): general/healthful diet, modified composition of parenteral nutrition, modified rate of parenteral nutrition, commercial beverage, and collaboration with other providers  Intervention(s): Oral diet/nutrient modifications;Oral nutritional supplement    Goal: Meet greater than 80% of nutritional needs by follow-up. (new)  Goal: Consume % of meals/snacks by follow-up. (new)    Nutrition Goals & Monitoring     Dietitian will monitor: food and beverage intake and energy intake  Discharge planning: resume home regimen  Nutrition Risk/Follow-Up: patient at increased nutrition risk; dietitian will follow-up twice weekly   Please consult if re-assessment needed sooner.

## 2025-07-06 LAB
ALBUMIN SERPL-MCNC: 1.5 G/DL (ref 3.4–4.8)
ALBUMIN/GLOB SERPL: 0.2 RATIO (ref 1.1–2)
ALP SERPL-CCNC: 288 UNIT/L (ref 40–150)
ALT SERPL-CCNC: 380 UNIT/L (ref 0–55)
ANION GAP SERPL CALC-SCNC: 6 MEQ/L
AST SERPL-CCNC: 251 UNIT/L (ref 11–45)
BASOPHILS # BLD AUTO: 0.02 X10(3)/MCL
BASOPHILS NFR BLD AUTO: 0.1 %
BILIRUB SERPL-MCNC: 3 MG/DL
BUN SERPL-MCNC: 35 MG/DL (ref 8.4–25.7)
CALCIUM SERPL-MCNC: 7.6 MG/DL (ref 8.8–10)
CHLORIDE SERPL-SCNC: 109 MMOL/L (ref 98–107)
CO2 SERPL-SCNC: 21 MMOL/L (ref 23–31)
CREAT SERPL-MCNC: 1.52 MG/DL (ref 0.72–1.25)
CREAT/UREA NIT SERPL: 23
EOSINOPHIL # BLD AUTO: 0.02 X10(3)/MCL (ref 0–0.9)
EOSINOPHIL NFR BLD AUTO: 0.1 %
ERYTHROCYTE [DISTWIDTH] IN BLOOD BY AUTOMATED COUNT: 16.8 % (ref 11.5–17)
GFR SERPLBLD CREATININE-BSD FMLA CKD-EPI: 51 ML/MIN/1.73/M2
GLOBULIN SER-MCNC: 6.6 GM/DL (ref 2.4–3.5)
GLUCOSE SERPL-MCNC: 110 MG/DL (ref 82–115)
HCT VFR BLD AUTO: 30.9 % (ref 42–52)
HGB BLD-MCNC: 10 G/DL (ref 14–18)
IMM GRANULOCYTES # BLD AUTO: 0.09 X10(3)/MCL (ref 0–0.04)
IMM GRANULOCYTES NFR BLD AUTO: 0.6 %
LYMPHOCYTES # BLD AUTO: 1.25 X10(3)/MCL (ref 0.6–4.6)
LYMPHOCYTES NFR BLD AUTO: 8.7 %
MCH RBC QN AUTO: 29.7 PG (ref 27–31)
MCHC RBC AUTO-ENTMCNC: 32.4 G/DL (ref 33–36)
MCV RBC AUTO: 91.7 FL (ref 80–94)
MONOCYTES # BLD AUTO: 1.76 X10(3)/MCL (ref 0.1–1.3)
MONOCYTES NFR BLD AUTO: 12.2 %
NEUTROPHILS # BLD AUTO: 11.23 X10(3)/MCL (ref 2.1–9.2)
NEUTROPHILS NFR BLD AUTO: 78.3 %
NRBC BLD AUTO-RTO: 0 %
PLATELET # BLD AUTO: 291 X10(3)/MCL (ref 130–400)
PMV BLD AUTO: 9.7 FL (ref 7.4–10.4)
POCT GLUCOSE: 107 MG/DL (ref 70–110)
POTASSIUM SERPL-SCNC: 3.8 MMOL/L (ref 3.5–5.1)
PROT SERPL-MCNC: 8.1 GM/DL (ref 5.8–7.6)
RBC # BLD AUTO: 3.37 X10(6)/MCL (ref 4.7–6.1)
SODIUM SERPL-SCNC: 136 MMOL/L (ref 136–145)
WBC # BLD AUTO: 14.37 X10(3)/MCL (ref 4.5–11.5)

## 2025-07-06 PROCEDURE — 25000003 PHARM REV CODE 250: Performed by: HOSPITALIST

## 2025-07-06 PROCEDURE — 85025 COMPLETE CBC W/AUTO DIFF WBC: CPT | Performed by: INTERNAL MEDICINE

## 2025-07-06 PROCEDURE — 36415 COLL VENOUS BLD VENIPUNCTURE: CPT | Performed by: INTERNAL MEDICINE

## 2025-07-06 PROCEDURE — 80053 COMPREHEN METABOLIC PANEL: CPT | Performed by: INTERNAL MEDICINE

## 2025-07-06 PROCEDURE — 11000001 HC ACUTE MED/SURG PRIVATE ROOM

## 2025-07-06 PROCEDURE — 21400001 HC TELEMETRY ROOM

## 2025-07-06 PROCEDURE — S4991 NICOTINE PATCH NONLEGEND: HCPCS | Performed by: HOSPITALIST

## 2025-07-06 PROCEDURE — 63600175 PHARM REV CODE 636 W HCPCS: Performed by: HOSPITALIST

## 2025-07-06 PROCEDURE — 63600175 PHARM REV CODE 636 W HCPCS: Performed by: INTERNAL MEDICINE

## 2025-07-06 PROCEDURE — 25000003 PHARM REV CODE 250: Performed by: INTERNAL MEDICINE

## 2025-07-06 RX ADMIN — ARIPIPRAZOLE 5 MG: 5 TABLET ORAL at 09:07

## 2025-07-06 RX ADMIN — ESCITALOPRAM OXALATE 5 MG: 5 TABLET, FILM COATED ORAL at 09:07

## 2025-07-06 RX ADMIN — ENOXAPARIN SODIUM 50 MG: 60 INJECTION SUBCUTANEOUS at 09:07

## 2025-07-06 RX ADMIN — THIAMINE HCL TAB 100 MG 100 MG: 100 TAB at 09:07

## 2025-07-06 RX ADMIN — PIPERACILLIN SODIUM AND TAZOBACTAM SODIUM 4.5 G: 4; .5 INJECTION, POWDER, LYOPHILIZED, FOR SOLUTION INTRAVENOUS at 05:07

## 2025-07-06 RX ADMIN — SODIUM CHLORIDE: 9 INJECTION, SOLUTION INTRAVENOUS at 09:07

## 2025-07-06 RX ADMIN — PIPERACILLIN SODIUM AND TAZOBACTAM SODIUM 4.5 G: 4; .5 INJECTION, POWDER, LYOPHILIZED, FOR SOLUTION INTRAVENOUS at 02:07

## 2025-07-06 RX ADMIN — NICOTINE 1 PATCH: 14 PATCH TRANSDERMAL at 09:07

## 2025-07-06 RX ADMIN — PANTOPRAZOLE SODIUM 40 MG: 40 TABLET, DELAYED RELEASE ORAL at 09:07

## 2025-07-06 RX ADMIN — PIPERACILLIN SODIUM AND TAZOBACTAM SODIUM 4.5 G: 4; .5 INJECTION, POWDER, LYOPHILIZED, FOR SOLUTION INTRAVENOUS at 10:07

## 2025-07-06 RX ADMIN — FOLIC ACID 1 MG: 1 TABLET ORAL at 09:07

## 2025-07-06 RX ADMIN — SODIUM CHLORIDE: 9 INJECTION, SOLUTION INTRAVENOUS at 04:07

## 2025-07-06 NOTE — PLAN OF CARE
Problem: Adult Inpatient Plan of Care  Goal: Plan of Care Review  Outcome: Progressing  Flowsheets (Taken 7/5/2025 2223)  Plan of Care Reviewed With: patient  Goal: Patient-Specific Goal (Individualized)  Outcome: Progressing  Goal: Absence of Hospital-Acquired Illness or Injury  Outcome: Progressing  Intervention: Identify and Manage Fall Risk  Flowsheets (Taken 7/5/2025 2223)  Safety Promotion/Fall Prevention:   assistive device/personal item within reach   side rails raised x 2   medications reviewed   lighting adjusted   instructed to call staff for mobility   nonskid shoes/socks when out of bed   Fall Risk reviewed with patient/family   Fall Risk signage in place  Intervention: Prevent Skin Injury  Flowsheets (Taken 7/5/2025 2223)  Body Position: position changed independently  Skin Protection: incontinence pads utilized  Device Skin Pressure Protection: absorbent pad utilized/changed  Intervention: Prevent and Manage VTE (Venous Thromboembolism) Risk  Flowsheets (Taken 7/5/2025 2223)  VTE Prevention/Management:   fluids promoted   dorsiflexion/plantar flexion performed   bleeding risk assessed   ambulation promoted  Intervention: Prevent Infection  Flowsheets (Taken 7/5/2025 2223)  Infection Prevention:   single patient room provided   rest/sleep promoted   hand hygiene promoted  Goal: Optimal Comfort and Wellbeing  Outcome: Progressing  Intervention: Monitor Pain and Promote Comfort  Flowsheets (Taken 7/5/2025 2223)  Pain Management Interventions:   position adjusted   pillow support provided   medication offered   care clustered   quiet environment facilitated  Intervention: Provide Person-Centered Care  Flowsheets (Taken 7/5/2025 2223)  Trust Relationship/Rapport:   care explained   choices provided   thoughts/feelings acknowledged   empathic listening provided   emotional support provided   questions answered   questions encouraged   reassurance provided  Goal: Readiness for Transition of Care  Outcome:  Progressing     Problem: Skin Injury Risk Increased  Goal: Skin Health and Integrity  Outcome: Progressing  Intervention: Optimize Skin Protection  Flowsheets (Taken 7/5/2025 2223)  Pressure Reduction Techniques: frequent weight shift encouraged  Pressure Reduction Devices: positioning supports utilized  Skin Protection: incontinence pads utilized  Activity Management: Rolling - L1  Head of Bed (HOB) Positioning: HOB at 30-45 degrees

## 2025-07-06 NOTE — PROGRESS NOTES
[facility] - [facility] MEDICINE ~ PROGRESS NOTE        CHIEF COMPLAINT   Hospital follow up    HOSPITAL COURSE   64-year-old male with a past medical history of carpal tunnel syndrome, HTN, sciatica, portal vein thrombosis, cocaine use, hepatitis-C, hepatic adenoma with concern of HCC, cirrhosis.  Presented to outside emergency department with complaints of abdominal pain x2 months and was found to have acute on chronic hyperbilirubinemia, leukocytosis and thus transferred to our facility.  At the time of my examination this morning he is drowsy, did not really answer much questions, awakens with stimulus.  Ultrasound abdomen shows hepatomegaly with hepatic steatosis, thrombosed main portal vein, enlarged gallbladder, thickened gallbladder wall with a positive Chang's sign concerning for acute cholecystitis.  MRCP shows challenging evaluation due to underlying portal vein thrombosis, 15 mm area of arterial enhancement could be small HCC, distended gallbladder with underlying cholelithiasis, no extrahepatic biliary dilation.    Today  still has right upper quadrant tenderness, was agitated at adamant about eating yesterday.  Plan for cholecystostomy tube tomorrow.  Unfortunately he is not a good candidate for surgery or tube.  But tube to be the lesser of the 2 risks.        OBJECTIVE/PHYSICAL EXAM     VITAL SIGNS (MOST RECENT):  Temp: 98 °F (36.7 °C) (07/06/25 0803)  Pulse: 90 (07/06/25 0803)  Resp: 20 (07/06/25 0803)  BP: (!) 176/85 (07/06/25 0803)  SpO2: 97 % (07/06/25 0803) VITAL SIGNS (24 HOUR RANGE):  Temp:  [98 °F (36.7 °C)-99.4 °F (37.4 °C)] 98 °F (36.7 °C)  Pulse:  [76-90] 90  Resp:  [18-20] 20  SpO2:  [97 %-99 %] 97 %  BP: (105-176)/(61-85) 176/85   GENERAL: In no acute distress, afebrile  HEENT:  CHEST: Clear to auscultation bilaterally  HEART: S1, S2, no appreciable murmur  ABDOMEN: Soft, right upper quadrant tenderness, BS +  MSK: Warm, no lower extremity edema, no clubbing  or cyanosis  NEUROLOGIC: Alert and oriented x4, moving all extremities with good strength   INTEGUMENTARY:  PSYCHIATRY:        ASSESSMENT/PLAN   Acute cholecystitis  Acute kidney injury   Acute on chronic hyperbilirubinemia  Recent known portal vein thrombosis  Hypoglycemia     History of: As listed above        General surgery signed off.  Recommends IR.  GI following.  IV fluids isotonic as well as dextrose for hypoglycemia  Zosyn  On full-dose Lovenox for portal vein thrombosis  IR consulted for cholecystostomy tube given high-risk for cholecystectomy.  Hold full-dose Lovenox after this morning's dose.  Check PTT/PT INR in the morning.    DVT prophylaxis:     Anticipated discharge and disposition:   __________________________________________________________________________    NUTRITIONAL STATUS     Patient meets ASPEN criteria for moderate malnutrition of chronic illness per RD assessment as evidenced by:  Energy Intake (Malnutrition): other (see comments) (Unable to assess)  Weight Loss (Malnutrition): greater than 7.5% in 3 months  Subcutaneous Fat (Malnutrition): mild depletion  Muscle Mass (Malnutrition): mild depletion  Fluid Accumulation (Malnutrition): other (see comments) (Not present)        A minimum of two characteristics is recommended for diagnosis of either severe or non-severe malnutrition.     LABS/MICRO/MEDS/DIAGNOSTICS       LABS  Recent Labs     07/05/25  0332   *   K 4.4   CO2 22*   BUN 45.3*   CREATININE 2.10*   CALCIUM 8.2*   ALKPHOS 334*   *   *   ALBUMIN 1.8*     Recent Labs     07/05/25  0332   WBC 13.19*   RBC 4.04*   HCT 36.2*   MCV 89.6          MICROBIOLOGY  Microbiology Results (last 7 days)       ** No results found for the last 168 hours. **               MEDICATIONS   ARIPiprazole  5 mg Oral Daily    enoxparin  1 mg/kg Subcutaneous Q12H (prophylaxis, 0900/2100)    EScitalopram oxalate  5 mg Oral Daily    folic acid  1 mg Oral Daily    nicotine  1 patch  Transdermal Daily    pantoprazole  40 mg Oral Daily    piperacillin-tazobactam (Zosyn) IV (PEDS and ADULTS) (extended infusion is not appropriate)  4.5 g Intravenous Q8H    thiamine  100 mg Oral Daily         INFUSIONS   0.9% NaCl   Intravenous Continuous 90 mL/hr at 07/05/25 2006 New Bag at 07/05/25 2006          DIAGNOSTIC TESTS  MRI MRCP Abdomen W WO Contrast 3D WO Independent WS XPD   Final Result      Challenging evaluation due to changes from underlying portal vein thrombus.  15 mm area of arterial enhancement in the superior right liver could be small HCC.  Given exam limitations, 3 month follow-up MRI can be pursued to reassess.      Distended gallbladder with underlying cholelithiasis.  Areas of mild right intrahepatic biliary dilatation.  No extrahepatic biliary dilatation identified.         Electronically signed by: John Paul Chicas   Date:    07/05/2025   Time:    12:16      CT Abdomen Pelvis  Without Contrast   Final Result      Cirrhosis with small volume ascites.      Mild wall thickening of the proximal colon favored secondary to the ascites, but colitis also possible.      Distended gallbladder with several gallstones, but no defined gallbladder wall thickening.         Electronically signed by: John Paul Chicas   Date:    07/04/2025   Time:    10:52      CT Head Without Contrast   Final Result      1.  No acute large vessel territory infarct identified.      2.  New but nonacute appearing lacunar infarcts pk and the left internal capsule.  If clinically indicated, consideration may be given to further assessment with MRI of the brain.         Electronically signed by: Mat Sommers   Date:    07/04/2025   Time:    08:57      US Abdomen Complete   Final Result      1. Hepatomegaly and hepatic steatosis.      2. Thrombosed main portal vein.      3.  Enlarged gallbladder containing sludge and multiple calculi.  Thickened gallbladder wall and positive Chang sign is suspected for acute cholecystitis.          Electronically signed by: aMt Sommers   Date:    07/04/2025   Time:    15:40           Echo  Result Date: 6/26/2025    Left Ventricle: The left ventricle is normal in size. Normal wall   thickness. There is normal systolic function with a visually estimated   ejection fraction of 60 - 65%.    Right Ventricle: The right ventricle is normal in size Systolic   function is reduced.TAPSE is 1.5 cm.    Aortic Valve: The aortic valve is a trileaflet valve. There is aortic   valve sclerosis.    IVC/SVC: Normal venous pressure at 3 mmHg.               Case related differential diagnoses have been reviewed; assessment and plan has been documented. I have personally reviewed the labs and test results that are currently available; I have reviewed the patients medication list. I have reviewed the consulting providers recommendations. I have reviewed or attempted to review medical records based upon their availability.  All of the patient's and/or family's questions have been addressed and answered to the best of my ability.  I will continue to monitor closely and make adjustments to medical management as needed.  This document was created using M*Modal Fluency Direct.  Transcription errors may have been made.  Please contact me if any questions may rise regarding documentation to clarify transcription.        Ricardo Trotter MD   Internal Medicine  Department of Hospital Medicine [facility]  [facility]

## 2025-07-06 NOTE — PLAN OF CARE
Problem: Adult Inpatient Plan of Care  Goal: Plan of Care Review  Outcome: Progressing  Goal: Patient-Specific Goal (Individualized)  Outcome: Progressing  Goal: Absence of Hospital-Acquired Illness or Injury  Outcome: Progressing  Goal: Optimal Comfort and Wellbeing  Outcome: Progressing  Goal: Readiness for Transition of Care  Outcome: Progressing     Problem: Skin Injury Risk Increased  Goal: Skin Health and Integrity  Outcome: Progressing     Problem: Coping Ineffective  Goal: Effective Coping  Outcome: Not Progressing      Refill approved for 1 month. Jigar Marinelli needs to be seen for an appointment before further refills are given. Please call to schedule appt.

## 2025-07-06 NOTE — PROGRESS NOTES
"Gastroenterology Progress Note    Subjective/Interval History:  Patient states that he hurts all over.  Is a little more alert this morning.  Walking around to the bedside commode.  MRCP/MRI revealed distended gallbladder with underlying cholelithiasis.  No evidence of extrahepatic biliary dilatation or defined common bile duct filling defect.  Areas of mild intrahepatic biliary dilatation in the right liver.  Small volume ascites.  Thrombus in the main portal vein and right portal vein branches.  15 mm area of arterial enhancement in the superior right lobe could be a small HCC    ROS:  ROS    Vital Signs:  /66   Pulse 88   Temp 98.8 °F (37.1 °C) (Oral)   Resp 19   Ht 5' 8.9" (1.75 m)   Wt 54.1 kg (119 lb 4.3 oz)   SpO2 99%   BMI 17.67 kg/m²   Body mass index is 17.67 kg/m².    Physical Exam:  Physical Exam  In general:  Thin and chronically ill-appearing    Abdomen: Soft, nontender, no masses or organomegaly appreciated  Labs:  Recent Results (from the past 48 hours)   Ammonia    Collection Time: 07/04/25  8:15 AM   Result Value Ref Range    Ammonia Level 45.6 18.0 - 72.0 umol/L   Folate    Collection Time: 07/04/25  8:15 AM   Result Value Ref Range    Folate Level 13.2 7.0 - 31.4 ng/mL   Comprehensive Metabolic Panel    Collection Time: 07/05/25  3:32 AM   Result Value Ref Range    Sodium 135 (L) 136 - 145 mmol/L    Potassium 4.4 3.5 - 5.1 mmol/L    Chloride 103 98 - 107 mmol/L    CO2 22 (L) 23 - 31 mmol/L    Glucose 61 (L) 82 - 115 mg/dL    Blood Urea Nitrogen 45.3 (H) 8.4 - 25.7 mg/dL    Creatinine 2.10 (H) 0.72 - 1.25 mg/dL    Calcium 8.2 (L) 8.8 - 10.0 mg/dL    Protein Total 9.6 (H) 5.8 - 7.6 gm/dL    Albumin 1.8 (L) 3.4 - 4.8 g/dL    Globulin 7.8 (H) 2.4 - 3.5 gm/dL    Albumin/Globulin Ratio 0.2 (L) 1.1 - 2.0 ratio    Bilirubin Total 4.4 (H) <=1.5 mg/dL     (H) 40 - 150 unit/L     (H) 0 - 55 unit/L     (H) 11 - 45 unit/L    eGFR 35 mL/min/1.73/m2    Anion Gap 10.0 mEq/L    " BUN/Creatinine Ratio 22    Protime-INR    Collection Time: 07/05/25  3:32 AM   Result Value Ref Range    PT 18.8 (H) 12.5 - 14.5 seconds    INR 1.6 (H) <=1.3   CBC with Differential    Collection Time: 07/05/25  3:32 AM   Result Value Ref Range    WBC 13.19 (H) 4.50 - 11.50 x10(3)/mcL    RBC 4.04 (L) 4.70 - 6.10 x10(6)/mcL    Hgb 12.0 (L) 14.0 - 18.0 g/dL    Hct 36.2 (L) 42.0 - 52.0 %    MCV 89.6 80.0 - 94.0 fL    MCH 29.7 27.0 - 31.0 pg    MCHC 33.1 33.0 - 36.0 g/dL    RDW 16.9 11.5 - 17.0 %    Platelet 309 130 - 400 x10(3)/mcL    MPV 10.0 7.4 - 10.4 fL    Neut % 75.5 %    Lymph % 12.3 %    Mono % 11.3 %    Eos % 0.2 %    Basophil % 0.2 %    Imm Grans % 0.5 %    Neut # 9.96 (H) 2.1 - 9.2 x10(3)/mcL    Lymph # 1.62 0.6 - 4.6 x10(3)/mcL    Mono # 1.49 (H) 0.1 - 1.3 x10(3)/mcL    Eos # 0.02 0 - 0.9 x10(3)/mcL    Baso # 0.03 <=0.2 x10(3)/mcL    Imm Gran # 0.07 (H) 0.00 - 0.04 x10(3)/mcL    NRBC% 0.0 %   POCT glucose    Collection Time: 07/05/25  6:34 AM   Result Value Ref Range    POCT Glucose 69 (L) 70 - 110 mg/dL   POCT glucose    Collection Time: 07/05/25  8:13 AM   Result Value Ref Range    POCT Glucose 89 70 - 110 mg/dL   POCT glucose    Collection Time: 07/06/25  2:16 AM   Result Value Ref Range    POCT Glucose 107 70 - 110 mg/dL       Imaging:  MRI MRCP Abdomen W WO Contrast 3D WO Independent WS XPD  Result Date: 7/5/2025  EXAMINATION: MRI MRCP ABDOMEN W WO CONTRAST 3D WO INDEPENDENT WS XPD CLINICAL HISTORY: Cholelithiasis; TECHNIQUE: Multiplanar, multisequence MR imaging of the abdomen before and after IV contrast administration.  Dedicated MRCP sequences obtained. COMPARISON: CT 07/04/2025 MRI 06/21/2025 FINDINGS: Mildly limited assessment due to overall image quality. Distended gallbladder with small gallstones.  No extrahepatic biliary dilatation or defined CBD filling defect.  Areas of mild intrahepatic biliary dilatation in the right liver.  Small volume ascites. Cirrhosis.  Continued thrombus in the main  portal vein and right portal vein branches.  Heterogeneous T2 signal and enhancement in the right liver.  15 mm mildly T2 hyperintense area in the superior right liver with arterial enhancement and washout (series 1402, image 20 and series 1404, image 20).  No other measurable lesions with arterial enhancement and washout identified.  No appreciable portal vein thrombus enhancement. Normal pancreas, spleen and adrenal glands.  No enhancing renal lesion or hydronephrosis.     Challenging evaluation due to changes from underlying portal vein thrombus.  15 mm area of arterial enhancement in the superior right liver could be small HCC.  Given exam limitations, 3 month follow-up MRI can be pursued to reassess. Distended gallbladder with underlying cholelithiasis.  Areas of mild right intrahepatic biliary dilatation.  No extrahepatic biliary dilatation identified. Electronically signed by: John Paul Chicas Date:    07/05/2025 Time:    12:16    US Abdomen Complete  Result Date: 7/4/2025  EXAMINATION: US ABDOMEN COMPLETE CLINICAL HISTORY: worse elevated bili (known cirrhosis), nargis, previously known portal vein thrombsosi; TECHNIQUE: Multiple real-time transverse and longitudinal sections were performed of the abdomen by the sonographer. Select images were submitted for review. COMPARISON: CT abdomen pelvis same date. FINDINGS: Liver is enlarged in size with maximum diameter of 20.2 cm.  Hepatic parenchyma is echogenic consistent with steatosis.. There is no delineation of discrete hepatic cystic or solid space occupying mass.  There is no flow within the portal vein which is remarkable for intraluminal 2.12 cm echogenicity consistent with thrombosis.  Pancreas obscured by overlying bowel gas.  Spleen is of unremarkable echotexture with normal size and maximum diameter of 10.2 cm. No focal splenic lesion visualized. The inferior vena cava appears unremarkable. Visualized portion of the abdominal aorta is without aneurysmal  dilatation. Gallbladder is enlarged and measures 11.8 x 5.6 x 6.5 cm.  Gallbladder lumen contains sludge.  There are multiple gallstones and the largest measures 0.83 cm.  Gallbladder wall is thickened 4.7 mm.  Sonographer reported positive Chang's sign. Right kidney measures 12.5 x 5.6 x 6.0 cm and left kidney measures 11.2 x 6.2 x 5.2 cm.  Right kidney cortical volume is adequate and the corticomedullary differentiation is preserved.  There is limited visualized left kidney due to patient's body habitus.  No definite kidneys collecting system dilatation.     1. Hepatomegaly and hepatic steatosis. 2. Thrombosed main portal vein. 3.  Enlarged gallbladder containing sludge and multiple calculi.  Thickened gallbladder wall and positive Chang sign is suspected for acute cholecystitis. Electronically signed by: Mat Sommers Date:    07/04/2025 Time:    15:40    CT Abdomen Pelvis  Without Contrast  Result Date: 7/4/2025  EXAMINATION: CT ABDOMEN PELVIS WITHOUT CONTRAST CLINICAL HISTORY: Right upper quadrant pain, elevated bilirubin, history of cirrhosis and portal vein thrombosis; TECHNIQUE: CT imaging of the abdomen and pelvis without intravenous contrast. Axial, coronal and sagittal reformatted images reviewed. Dose length product is 486 mGycm. Automatic exposure control, adjustment of mA/kV or iterative reconstruction technique used to limit radiation dose. COMPARISON: CT 06/17/2025 FINDINGS: Assessment of the visceral organs and vasculature is limited by the lack of IV contrast. Liver/biliary: Cirrhosis.  Artifact through the posterior right liver due to arm positioning with limited overall evaluation of the hepatic parenchyma given this artifact and lack of IV contrast.  Gallbladder distended with multiple gallstones.  No biliary dilatation. Pancreas: Normal. Spleen: Normal. Adrenals: Normal. Genitourinary: 7 mm left renal stone.  No hydronephrosis.  Bladder within normal limits. Stomach/bowel: No bowel  obstruction. Normal appendix.  Mild proximal colonic wall thickening. Lymph nodes and peritoneum: Few borderline enlarged periportal lymph nodes similar to prior CT.  Small volume ascites. Vasculature: Numerous aortic and iliac artery calcifications. Abdominal wall: Normal. Lung bases: Mild dependent atelectasis. Bones: No acute osseous findings.     Cirrhosis with small volume ascites. Mild wall thickening of the proximal colon favored secondary to the ascites, but colitis also possible. Distended gallbladder with several gallstones, but no defined gallbladder wall thickening. Electronically signed by: John Paul Chicas Date:    07/04/2025 Time:    10:52    CT Head Without Contrast  Result Date: 7/4/2025  EXAMINATION: CT HEAD WITHOUT CONTRAST CLINICAL HISTORY: Mental status change, unknown cause; TECHNIQUE: Sequential axial images were performed of the brain without contrast. Dose product length of 1060 mGycm. Automated exposure control was utilized to minimize radiation dose. COMPARISON: June 2, 2017. FINDINGS: There is no intracranial mass effect, midline shift, hydrocephalus or hemorrhage. There is no sulcal effacement or low attenuation changes to suggest recent large vessel territory infarction.  There is left paramedian pontine lacunar infarct which is new since the previous exam.  There is also new left internal capsule posterior limb new lacunar infarct.  Chronic microangiopathic ischemic changes are mild.  The ventricular system and sulcal markings prominence is consistent with atrophy. There is no acute extra axial fluid collection. Mucoperiosteal thickening and fluid left maxillary sinus.  Otherwise, visualized paranasal sinuses are clear without mucosal thickening, polypoidal abnormality or air-fluid levels. Mastoid air cells aeration is optimal.     1.  No acute large vessel territory infarct identified. 2.  New but nonacute appearing lacunar infarcts pk and the left internal capsule.  If clinically  indicated, consideration may be given to further assessment with MRI of the brain. Electronically signed by: Mat Sommers Date:    07/04/2025 Time:    08:57    Echo  Result Date: 6/26/2025    Left Ventricle: The left ventricle is normal in size. Normal wall thickness. There is normal systolic function with a visually estimated ejection fraction of 60 - 65%.   Right Ventricle: The right ventricle is normal in size Systolic function is reduced.TAPSE is 1.5 cm.   Aortic Valve: The aortic valve is a trileaflet valve. There is aortic valve sclerosis.   IVC/SVC: Normal venous pressure at 3 mmHg.     MRI Abdomen W WO Contrast  Result Date: 6/21/2025  EXAMINATION: MRI ABDOMEN W WO CONTRAST CLINICAL HISTORY: Cirrhosis.Liver lesion, > 1cm; TECHNIQUE: Multiplanar multisequence MRI of the abdomen performed without and with gadolinium based IV contrast . COMPARISON: CT 17 June 2025, CT 18 October 2023 FINDINGS: This exam is nondiagnostic for HCC as only delayed scans obtained secondary to patient motion and inability to hold breath. The liver is cirrhotic and there is portal vein thrombus also seen on CT.  Heterogeneous enhancement of the liver likely relates to portal vein thrombus.  No discrete suspicious lesion seen on diffusion imaging.  Small volume ascites. There are gallstones.  No significant biliary ductal dilatation.  The spleen is not significantly enlarged.  No gross abnormality pancreas or adrenals.  No hydronephrosis. No dilated bowel loops.  Abdominal aorta normal in caliber.     1. Cirrhosis with portal vein thrombus and small volume ascites. 2. Nondiagnostic exam for HCC secondary to timing of contrast.  Recommend outpatient liver mass protocol CT. Electronically signed by: Andriy Laws Date:    06/21/2025 Time:    11:19    IR Paracentesis with Imaging  Result Date: 6/18/2025  PROCEDURE: Ultrasound Guided Paracentesis CLINICAL HISTORY: 64-year-old male with cirrhosis, portal vein thrombus, and ascites  ANESTHESIA: Local anesthesia PHYSICIANS: Neto Bird MD PROCEDURAL SUMMARY: After informed consent was obtained, the patient was placed supine on the ultrasound table. A limited ultrasound of the abdomen was performed with Dr. Bird present in the room.  This confirmed the presence of an appropriate volume of ascites for drainage.  The planned skin entry site and route for needle passage for paracentesis was then determined. The skin overlying the right lower quadrant of the abdomen was marked.  The site was then prepped and draped in the usual sterile fashion. The soft tissues were anesthetized with lidocaine and a dermatotomy was performed.  Under ultrasound guidance, a sheath needle was advanced from the skin entry site into the peritoneal cavity.  When the needle entered the peritoneal cavity, fluid was aspirated.  The sheath was then advanced over the needle into the cavity.  The needle was removed.  Aspiration was performed and a total of 2.1 L of serous fluid was drained from the peritoneal cavity.  The catheter was then removed.  A sterile dressing was applied. The patient tolerated the procedure well and was without any apparent complications.     Technically successful ultrasound-guided paracentesis. Electronically signed by: Neto Bird Date:    06/18/2025 Time:    15:02    CT Abdomen Pelvis With IV Contrast NO Oral Contrast  Result Date: 6/18/2025  EXAMINATION: CT ABDOMEN PELVIS WITH IV CONTRAST CLINICAL HISTORY: Abdominal pain and abdominal distension TECHNIQUE: Axial CT images were obtained through the abdomen and pelvis following IV administration of contrast.  100 mL Omnipaque 350.  Coronal and sagittal reconstructions submitted and interpreted.  Automated exposure control, dose radiation lowering technique, was utilized. COMPARISON: CT abdomen and pelvis without contrast from 10/18/2023 CT abdomen and pelvis from 10/16/2023 Abdominal sonogram from 06/02/2017 FINDINGS: Cirrhotic morphology to  the liver.  Thrombus is present within the distal segment of the main portal vein with extension into the right and left portal system.  Thrombus is occlusive at multiple segments of the right portal system.  Splenic vein and SMV appear patent. There is geographic area of slightly increased enhancement at segment 8 measuring 4.2 x 4.0 cm.  No other hypervascular areas.  There are multiple areas of patchy decreased density in the right hepatic lobe which is felt to be secondary to portal vein thrombosis. Spleen is nonenlarged.  Pancreas, adrenal glands and right kidney appear normal.  Nonobstructing left kidney stone measures 10 mm.  No hydronephrosis. Cholelithiasis is present.  The bowel is nonobstructed.  Air and stool are present throughout the colon which appears within normal limits.  Normal appendix is present.  There is moderate volume ascites.  No free air. Moderate atherosclerosis of the abdominal aorta and its branches is present.  Mild, diffuse bladder wall thickening is present.  No suspicious osteolytic or osteoblastic lesion.     1. Portal vein thrombosis which is occlusive within the right portal system.  There is some collaterals within the annette hepatis with suggests some degree of chronic component.  Given the appearance of the thrombus in addition to the small amount of portal venous collaterals, favor acute on chronic thrombosis. 2. Sequelae of cirrhosis with moderate volume ascites and small gastroesophageal varices. 3. Hypervascular area in segment 8 (image 31, series 1).  Favor perfusion abnormality related to portal vein thrombosis.  However, consider multiphase abdominal MRI to evaluate for potential tumor. 4. Cholelithiasis. 5. Nonobstructing left-sided kidney stone. Electronically signed by: Mukesh Lu MD Date:    06/18/2025 Time:    08:04    CTA Chest Non-Coronary (PE Studies)  Result Date: 6/18/2025  EXAMINATION: CTA CHEST NON CORONARY (PE STUDIES) CLINICAL HISTORY: Pulmonary  embolism suspected. 65 yo male presenting with feeling unwell for the past month and a half. He reports abdominal bloating, leg swelling, and decreased appetite. He reports generalized weakness as well. States he normally drinks alcohol daily but hasn't in the past 30 days or so. TECHNIQUE: Axial CT images were obtained through the chest after IV administration of contrast. 100 mL Omnipaque 350. MIP, coronal and sagittal reconstructions submitted and interpreted.  Automated exposure control, dose radiation lowering technique, was utilized. COMPARISON: None FINDINGS: Slightly limited exam secondary to contrast bolus timing. Soft Tissues: Unremarkable. Lines and Tubes: None. Neck: The visualized soft tissues of the neck appear unremarkable. Mediastinum: The mediastinal structures are within normal limits. Heart: The heart size is within normal limits. Mild coronary artery calcification is seen. Aorta: No aortic dissection or aneurysm is seen. Mild aortic calcification is seen in the thoracic aorta. Pulmonary Arteries: No filling defects are seen in the pulmonary arteries to suggest pulmonary embolus. Lungs: There is moderate non specific dependent change at the lung bases. Mild streaky linear opacity is seen consistent with scarring and subsegmental. No acute focal infiltrate or consolidation is seen. Pleura: No effusions or pneumothorax are identified. Bony Structures: Spine: Subtle spondylolytic changes are seen in the thoracic spine. Ribs: The ribs appear unremarkable.     Slightly limited exam of the pulmonary arteries secondary to contrast bolus timing. 1. No filling defects are seen in the pulmonary arteries to suggest pulmonary embolus. 2. No acute focal infiltrate or consolidation is seen. 3. Details and other findings as discussed above. [facility] concurrence Electronically signed by: Mukesh Lu MD Date:    06/18/2025 Time:    07:57         Assessment/Plan:  Impression:  Elevated liver function studies  probably multifactorial including underlying cirrhosis with possible worsening liver function, possible cholecystitis, portal vein thrombosis and possible small hepatocellular carcinoma.  There does not appear to be any evidence of choledocholithiasis or extrahepatic biliary dilation by MRCP/MRI .  Cholelithiasis    Recommendation:  No indication for ERCP in current setting.  Continue treatment for portal vein thrombosis.    Alec Borges MD

## 2025-07-07 LAB
ALBUMIN SERPL-MCNC: 1.4 G/DL (ref 3.4–4.8)
ALBUMIN/GLOB SERPL: 0.2 RATIO (ref 1.1–2)
ALP SERPL-CCNC: 295 UNIT/L (ref 40–150)
ALT SERPL-CCNC: 302 UNIT/L (ref 0–55)
ANION GAP SERPL CALC-SCNC: 4 MEQ/L
APTT PPP: 52.4 SECONDS (ref 23.2–33.7)
AST SERPL-CCNC: 179 UNIT/L (ref 11–45)
BASOPHILS # BLD AUTO: 0.02 X10(3)/MCL
BASOPHILS NFR BLD AUTO: 0.1 %
BILIRUB SERPL-MCNC: 2.6 MG/DL
BUN SERPL-MCNC: 27.5 MG/DL (ref 8.4–25.7)
CALCIUM SERPL-MCNC: 7.4 MG/DL (ref 8.8–10)
CHLORIDE SERPL-SCNC: 110 MMOL/L (ref 98–107)
CO2 SERPL-SCNC: 23 MMOL/L (ref 23–31)
CREAT SERPL-MCNC: 1.29 MG/DL (ref 0.72–1.25)
CREAT/UREA NIT SERPL: 21
EOSINOPHIL # BLD AUTO: 0.07 X10(3)/MCL (ref 0–0.9)
EOSINOPHIL NFR BLD AUTO: 0.5 %
ERYTHROCYTE [DISTWIDTH] IN BLOOD BY AUTOMATED COUNT: 16.1 % (ref 11.5–17)
GFR SERPLBLD CREATININE-BSD FMLA CKD-EPI: >60 ML/MIN/1.73/M2
GLOBULIN SER-MCNC: 6.2 GM/DL (ref 2.4–3.5)
GLUCOSE SERPL-MCNC: 91 MG/DL (ref 82–115)
HCT VFR BLD AUTO: 29.2 % (ref 42–52)
HGB BLD-MCNC: 9.5 G/DL (ref 14–18)
IMM GRANULOCYTES # BLD AUTO: 0.07 X10(3)/MCL (ref 0–0.04)
IMM GRANULOCYTES NFR BLD AUTO: 0.5 %
INR PPP: 1.6
LYMPHOCYTES # BLD AUTO: 1.21 X10(3)/MCL (ref 0.6–4.6)
LYMPHOCYTES NFR BLD AUTO: 8.5 %
MCH RBC QN AUTO: 29.7 PG (ref 27–31)
MCHC RBC AUTO-ENTMCNC: 32.5 G/DL (ref 33–36)
MCV RBC AUTO: 91.3 FL (ref 80–94)
MONOCYTES # BLD AUTO: 1.89 X10(3)/MCL (ref 0.1–1.3)
MONOCYTES NFR BLD AUTO: 13.2 %
NEUTROPHILS # BLD AUTO: 11.05 X10(3)/MCL (ref 2.1–9.2)
NEUTROPHILS NFR BLD AUTO: 77.2 %
NRBC BLD AUTO-RTO: 0 %
PLATELET # BLD AUTO: 292 X10(3)/MCL (ref 130–400)
PMV BLD AUTO: 9.6 FL (ref 7.4–10.4)
POTASSIUM SERPL-SCNC: 3.7 MMOL/L (ref 3.5–5.1)
PROT SERPL-MCNC: 7.6 GM/DL (ref 5.8–7.6)
PROTHROMBIN TIME: 18.8 SECONDS (ref 12.5–14.5)
RBC # BLD AUTO: 3.2 X10(6)/MCL (ref 4.7–6.1)
SODIUM SERPL-SCNC: 137 MMOL/L (ref 136–145)
WBC # BLD AUTO: 14.31 X10(3)/MCL (ref 4.5–11.5)

## 2025-07-07 PROCEDURE — 87075 CULTR BACTERIA EXCEPT BLOOD: CPT | Performed by: INTERNAL MEDICINE

## 2025-07-07 PROCEDURE — 21400001 HC TELEMETRY ROOM

## 2025-07-07 PROCEDURE — 85025 COMPLETE CBC W/AUTO DIFF WBC: CPT | Performed by: INTERNAL MEDICINE

## 2025-07-07 PROCEDURE — 87102 FUNGUS ISOLATION CULTURE: CPT | Performed by: INTERNAL MEDICINE

## 2025-07-07 PROCEDURE — 0W9G3ZZ DRAINAGE OF PERITONEAL CAVITY, PERCUTANEOUS APPROACH: ICD-10-PCS | Performed by: INTERNAL MEDICINE

## 2025-07-07 PROCEDURE — 63600175 PHARM REV CODE 636 W HCPCS: Performed by: HOSPITALIST

## 2025-07-07 PROCEDURE — S4991 NICOTINE PATCH NONLEGEND: HCPCS | Performed by: HOSPITALIST

## 2025-07-07 PROCEDURE — 87070 CULTURE OTHR SPECIMN AEROBIC: CPT | Performed by: INTERNAL MEDICINE

## 2025-07-07 PROCEDURE — 63600175 PHARM REV CODE 636 W HCPCS: Performed by: RADIOLOGY

## 2025-07-07 PROCEDURE — 80053 COMPREHEN METABOLIC PANEL: CPT | Performed by: INTERNAL MEDICINE

## 2025-07-07 PROCEDURE — 11000001 HC ACUTE MED/SURG PRIVATE ROOM

## 2025-07-07 PROCEDURE — 87205 SMEAR GRAM STAIN: CPT | Performed by: INTERNAL MEDICINE

## 2025-07-07 PROCEDURE — 25000003 PHARM REV CODE 250: Performed by: INTERNAL MEDICINE

## 2025-07-07 PROCEDURE — 25000003 PHARM REV CODE 250: Performed by: HOSPITALIST

## 2025-07-07 PROCEDURE — 25500020 PHARM REV CODE 255: Performed by: INTERNAL MEDICINE

## 2025-07-07 PROCEDURE — 85610 PROTHROMBIN TIME: CPT | Performed by: INTERNAL MEDICINE

## 2025-07-07 PROCEDURE — 85730 THROMBOPLASTIN TIME PARTIAL: CPT | Performed by: INTERNAL MEDICINE

## 2025-07-07 PROCEDURE — 63600175 PHARM REV CODE 636 W HCPCS

## 2025-07-07 PROCEDURE — 36415 COLL VENOUS BLD VENIPUNCTURE: CPT | Performed by: INTERNAL MEDICINE

## 2025-07-07 PROCEDURE — 0F9430Z DRAINAGE OF GALLBLADDER WITH DRAINAGE DEVICE, PERCUTANEOUS APPROACH: ICD-10-PCS | Performed by: INTERNAL MEDICINE

## 2025-07-07 RX ORDER — MIDAZOLAM HYDROCHLORIDE 1 MG/ML
INJECTION, SOLUTION INTRAMUSCULAR; INTRAVENOUS
Status: COMPLETED | OUTPATIENT
Start: 2025-07-07 | End: 2025-07-07

## 2025-07-07 RX ORDER — FENTANYL CITRATE 50 UG/ML
INJECTION, SOLUTION INTRAMUSCULAR; INTRAVENOUS
Status: DISPENSED
Start: 2025-07-07 | End: 2025-07-08

## 2025-07-07 RX ORDER — FENTANYL CITRATE 50 UG/ML
INJECTION, SOLUTION INTRAMUSCULAR; INTRAVENOUS
Status: COMPLETED | OUTPATIENT
Start: 2025-07-07 | End: 2025-07-07

## 2025-07-07 RX ORDER — MORPHINE SULFATE 4 MG/ML
2 INJECTION, SOLUTION INTRAMUSCULAR; INTRAVENOUS EVERY 6 HOURS PRN
Refills: 0 | Status: DISCONTINUED | OUTPATIENT
Start: 2025-07-07 | End: 2025-07-08

## 2025-07-07 RX ORDER — MIDAZOLAM HYDROCHLORIDE 2 MG/2ML
INJECTION, SOLUTION INTRAMUSCULAR; INTRAVENOUS
Status: DISPENSED
Start: 2025-07-07 | End: 2025-07-08

## 2025-07-07 RX ORDER — LIDOCAINE HYDROCHLORIDE 20 MG/ML
INJECTION, SOLUTION INFILTRATION; PERINEURAL
Status: COMPLETED | OUTPATIENT
Start: 2025-07-07 | End: 2025-07-07

## 2025-07-07 RX ADMIN — SODIUM CHLORIDE: 9 INJECTION, SOLUTION INTRAVENOUS at 02:07

## 2025-07-07 RX ADMIN — PANTOPRAZOLE SODIUM 40 MG: 40 TABLET, DELAYED RELEASE ORAL at 08:07

## 2025-07-07 RX ADMIN — MIDAZOLAM 0.5 MG: 1 INJECTION INTRAMUSCULAR; INTRAVENOUS at 04:07

## 2025-07-07 RX ADMIN — ESCITALOPRAM OXALATE 5 MG: 5 TABLET, FILM COATED ORAL at 08:07

## 2025-07-07 RX ADMIN — THIAMINE HCL TAB 100 MG 100 MG: 100 TAB at 08:07

## 2025-07-07 RX ADMIN — ARIPIPRAZOLE 5 MG: 5 TABLET ORAL at 08:07

## 2025-07-07 RX ADMIN — PIPERACILLIN SODIUM AND TAZOBACTAM SODIUM 4.5 G: 4; .5 INJECTION, POWDER, LYOPHILIZED, FOR SOLUTION INTRAVENOUS at 06:07

## 2025-07-07 RX ADMIN — FENTANYL CITRATE 25 MCG: 50 INJECTION, SOLUTION INTRAMUSCULAR; INTRAVENOUS at 04:07

## 2025-07-07 RX ADMIN — FOLIC ACID 1 MG: 1 TABLET ORAL at 08:07

## 2025-07-07 RX ADMIN — LIDOCAINE HYDROCHLORIDE 5 ML: 20 INJECTION, SOLUTION INFILTRATION; PERINEURAL at 04:07

## 2025-07-07 RX ADMIN — PIPERACILLIN SODIUM AND TAZOBACTAM SODIUM 4.5 G: 4; .5 INJECTION, POWDER, LYOPHILIZED, FOR SOLUTION INTRAVENOUS at 05:07

## 2025-07-07 RX ADMIN — IOHEXOL 100 ML: 350 INJECTION, SOLUTION INTRAVENOUS at 03:07

## 2025-07-07 RX ADMIN — NICOTINE 1 PATCH: 14 PATCH TRANSDERMAL at 08:07

## 2025-07-07 RX ADMIN — MORPHINE SULFATE 2 MG: 4 INJECTION, SOLUTION INTRAMUSCULAR; INTRAVENOUS at 01:07

## 2025-07-07 NOTE — PROGRESS NOTES
Inpatient Nutrition Assessment    Admit Date: 7/3/2025   Total duration of encounter: 4 days   Patient Age: 64 y.o.    Nutrition Recommendation/Prescription     Advance diet when appropriate. Goal diet: GI soft vs regular (as tolerated.)  Add Boost Breeze (provides 250 kcal, 9 g protein per serving) once diet advanced.  May also need to consider PN if not able to advance diet in next 24 hours. Consult RD if PN to start.     Communication of Recommendations: reviewed with patient    Nutrition Assessment     Malnutrition Assessment/Nutrition-Focused Physical Exam    Malnutrition Context: chronic illness (07/05/25 1156)  Malnutrition Level: moderate (07/05/25 1156)  Energy Intake (Malnutrition): other (see comments) (Unable to assess) (07/05/25 1156)  Weight Loss (Malnutrition): greater than 7.5% in 3 months (07/05/25 1156)  Subcutaneous Fat (Malnutrition): mild depletion (07/05/25 1156)           Muscle Mass (Malnutrition): mild depletion (07/05/25 1156)  Jew Region (Muscle Loss): mild depletion  Clavicle Bone Region (Muscle Loss): mild depletion                    Fluid Accumulation (Malnutrition): other (see comments) (Not present) (07/05/25 1156)        A minimum of two characteristics is recommended for diagnosis of either severe or non-severe malnutrition.    Chart Review    Reason Seen: malnutrition screening tool (MST) and follow-up    Malnutrition Screening Tool Results   Have you recently lost weight without trying?: Yes: 24-33 lbs  Have you been eating poorly because of a decreased appetite?: Yes   MST Score: 4   Diagnosis:  Acute cholecystitis  Acute kidney injury   Acute on chronic hyperbilirubinemia  Recent known portal vein thrombosis  Hypoglycemia    Relevant Medical History: carpal tunnel syndrome, HTN, sciatica, portal vein thrombosis, cocaine use, hepatitis-C, hepatic adenoma     Scheduled Medications:  ARIPiprazole, 5 mg, Daily  EScitalopram oxalate, 5 mg, Daily  folic acid, 1 mg,  Daily  nicotine, 1 patch, Daily  pantoprazole, 40 mg, Daily  piperacillin-tazobactam (Zosyn) IV (PEDS and ADULTS) (extended infusion is not appropriate), 4.5 g, Q8H  thiamine, 100 mg, Daily    Continuous Infusions:  0.9% NaCl, Last Rate: 50 mL/hr at 07/07/25 0952    PRN Medications:  dextrose 50%, 12.5 g, PRN  dextrose 50%, 25 g, PRN  glucagon (human recombinant), 1 mg, PRN  glucose, 16 g, PRN  glucose, 24 g, PRN  lactulose 10 gram/15 ml, 20 g, TID PRN  melatonin, 6 mg, Nightly PRN  morphine, 2 mg, Q6H PRN  sodium chloride 0.9%, 10 mL, PRN    Calorie Containing IV Medications: D5 @ 25ml/hr provides:30gm dextrose, 102kcal/day     Recent Labs   Lab 07/03/25  0343 07/04/25  0312 07/04/25  0815 07/05/25  0332 07/06/25  1054 07/07/25  0320   * 136  --  135* 136 137   K 4.3 3.8  --  4.4 3.8 3.7   CALCIUM 8.2* 7.8*  --  8.2* 7.6* 7.4*   PHOS  --  5.8*  --   --   --   --    MG  --  1.80  --   --   --   --     102  --  103 109* 110*   CO2 24 18*  --  22* 21* 23   BUN 18.2 29.1*  --  45.3* 35.0* 27.5*   CREATININE 1.12 1.83*  --  2.10* 1.52* 1.29*   EGFRNORACEVR >60 41  --  35 51 >60    40*  --  61* 110 91   BILITOT 4.0* 4.9*  --  4.4* 3.0* 2.6*   ALKPHOS 400* 333*  --  334* 288* 295*   * 417*  --  510* 380* 302*   * 312*  --  401* 251* 179*   ALBUMIN 2.0* 1.6*  --  1.8* 1.5* 1.4*   AMMONIA  --   --  45.6  --   --   --    LIPASE 55  --   --   --   --   --    WBC 13.84* 15.86  15.86*  --  13.19* 14.37* 14.31*   HGB 10.8* 10.4*  --  12.0* 10.0* 9.5*   HCT 31.4* 31.5*  --  36.2* 30.9* 29.2*     Nutrition Orders:  Diet NPO Except for: Sips with Medication      Appetite/Oral Intake: NPO/NPO  Factors Affecting Nutritional Intake: NPO  Social Needs Impacting Access to Food: unable to assess at this time; will attempt on follow-up  Food/Voodoo/Cultural Preferences: unable to obtain  Food Allergies: no known food allergies  Last Bowel Movement: 07/05/25  Wound(s):  None documented  "    Comments    25: Noted MST indicates wt loss and decreased appetite. Unable to verify with pt, pt confused and hard to understand at time of RD visit. Noted EMR wts confirm wt loss over past few months. Awaiting MRCP results per MD notes. Due to malnourished state and NPO, may need to consider starting PN. Currently receiving minimal D5.    2025: Pt NPO for IR today per nurse. Pt reportedly thirsty. The nurse denies N/V. Last BM noted. Will monitor.    Anthropometrics    Height: 5' 8.9" (175 cm), Height Method: Stated  Last Weight: 54.1 kg (119 lb 4.3 oz) (25 1153), Weight Method: Standard Scale  BMI (Calculated): 17.7  BMI Classification: underweight (BMI less than 18.5)        Ideal Body Weight (IBW), Male: 159.4 lb     % Ideal Body Weight, Male (lb): 74.82 %                 Usual Body Weight (UBW), k kg  % Usual Body Weight: 91.89  % Weight Change From Usual Weight: -8.45 %  Usual Weight Provided By: EMR weight history    Wt Readings from Last 5 Encounters:   25 54.1 kg (119 lb 4.3 oz)   25 54.4 kg (120 lb)   25 59 kg (130 lb)   25 59 kg (130 lb)   23 63 kg (138 lb 14.2 oz)   2025: 54.1 kg  Weight Change(s) Since Admission:   Wt Readings from Last 1 Encounters:   25 1153 54.1 kg (119 lb 4.3 oz)   25 1704 54.1 kg (119 lb 4.3 oz)   Admit Weight: 54.1 kg (119 lb 4.3 oz) (25 1704), Weight Method: Standard Scale    Estimated Needs    Weight Used For Calorie Calculations: 54.1 kg (119 lb 4.3 oz)  Energy Calorie Requirements (kcal): 1936-1721 (30-35 kcal/kg)  Energy Need Method: Kcal/kg  Weight Used For Protein Calculations: 54.1 kg (119 lb 4.3 oz)  Protein Requirements: 65 (1.2 g/kg)  Fluid Requirements (mL): 1623 (1 mL/kcal)  CHO Requirement: 182-223 g/day (~45-55% est min kcal needs)     Enteral Nutrition     Patient not receiving enteral nutrition at this time.    Parenteral Nutrition     Patient not receiving parenteral nutrition support at " this time.    Evaluation of Received Nutrient Intake    Calories: not meeting estimated needs  Protein: not meeting estimated needs    Patient Education     Not applicable.    Nutrition Diagnosis     PES: Inadequate oral intake related to acute illness as evidenced by NPO since admit. (active)     PES: Moderate chronic disease or condition related malnutrition Related to chronic condition As Evidenced by:  - weight loss: > 7.5% in 3 months - muscle mass depletion: 2 areas of mild muscle loss (Clavicle, Temporalis) - loss of subcutaneous fat: 1 area of mild fat loss (Buccal) active    Nutrition Interventions     Intervention(s): general/healthful diet, modified composition of parenteral nutrition, modified rate of parenteral nutrition, commercial beverage, and collaboration with other providers  Intervention(s): Oral diet/nutrient modifications;Oral nutritional supplement    Goal: Meet greater than 80% of nutritional needs by follow-up. (goal progressing)  Goal: Consume % of meals/snacks by follow-up. (goal progressing)    Nutrition Goals & Monitoring     Dietitian will monitor: food and beverage intake and energy intake  Discharge planning: resume home regimen  Nutrition Risk/Follow-Up: patient at increased nutrition risk; dietitian will follow-up twice weekly   Please consult if re-assessment needed sooner.

## 2025-07-07 NOTE — PROGRESS NOTES
[facility] MEDICINE ~ PROGRESS NOTE        CHIEF COMPLAINT   Hospital follow up    HOSPITAL COURSE   64-year-old male with a past medical history of carpal tunnel syndrome, HTN, sciatica, portal vein thrombosis, cocaine use, hepatitis-C, hepatic adenoma with concern of HCC, cirrhosis.  Presented to outside emergency department with complaints of abdominal pain x2 months and was found to have acute on chronic hyperbilirubinemia, leukocytosis and thus transferred to our facility.  At the time of my examination this morning he is drowsy, did not really answer much questions, awakens with stimulus.  Ultrasound abdomen shows hepatomegaly with hepatic steatosis, thrombosed main portal vein, enlarged gallbladder, thickened gallbladder wall with a positive Chang's sign concerning for acute cholecystitis.  MRCP shows challenging evaluation due to underlying portal vein thrombosis, 15 mm area of arterial enhancement could be small HCC, distended gallbladder with underlying cholelithiasis, no extrahepatic biliary dilation.    Today  Pending IR drain to the gallbladder, still with right upper quadrant abdominal tenderness, asking for something to drink this morning.        OBJECTIVE/PHYSICAL EXAM     VITAL SIGNS (MOST RECENT):  Temp: 97.7 °F (36.5 °C) (07/07/25 0700)  Pulse: 68 (07/07/25 0700)  Resp: 20 (07/07/25 0700)  BP: (!) 145/76 (07/07/25 0700)  SpO2: 99 % (07/07/25 0700) VITAL SIGNS (24 HOUR RANGE):  Temp:  [97.7 °F (36.5 °C)-98.5 °F (36.9 °C)] 97.7 °F (36.5 °C)  Pulse:  [67-74] 68  Resp:  [18-20] 20  SpO2:  [97 %-100 %] 99 %  BP: (145-182)/(71-93) 145/76   GENERAL: In no acute distress, afebrile  HEENT:  CHEST: Clear to auscultation bilaterally  HEART: S1, S2, no appreciable murmur  ABDOMEN: Soft, right upper quadrant tenderness, BS +  MSK: Warm, no lower extremity edema, no clubbing or cyanosis  NEUROLOGIC: Alert and oriented x4, moving all extremities with good strength    INTEGUMENTARY:  PSYCHIATRY:        ASSESSMENT/PLAN   Acute cholecystitis  Acute kidney injury   Acute on chronic hyperbilirubinemia  Recent known portal vein thrombosis  Hypoglycemia     History of: As listed above        General surgery signed off.  Recommends IR.  GI following.  IV fluids isotonic  Zosyn  On full-dose Lovenox for portal vein thrombosis, currently on hold for procedure  IR consulted for cholecystostomy tube given high-risk for cholecystectomy.    DVT prophylaxis:     Anticipated discharge and disposition:   __________________________________________________________________________    NUTRITIONAL STATUS     Patient meets ASPEN criteria for moderate malnutrition of chronic illness per RD assessment as evidenced by:  Energy Intake (Malnutrition): other (see comments) (Unable to assess)  Weight Loss (Malnutrition): greater than 7.5% in 3 months  Subcutaneous Fat (Malnutrition): mild depletion  Muscle Mass (Malnutrition): mild depletion  Fluid Accumulation (Malnutrition): other (see comments) (Not present)        A minimum of two characteristics is recommended for diagnosis of either severe or non-severe malnutrition.     LABS/MICRO/MEDS/DIAGNOSTICS       LABS  Recent Labs     07/07/25  0320      K 3.7   CO2 23   BUN 27.5*   CREATININE 1.29*   CALCIUM 7.4*   ALKPHOS 295*   *   *   ALBUMIN 1.4*     Recent Labs     07/07/25  0320   WBC 14.31*   RBC 3.20*   HCT 29.2*   MCV 91.3          MICROBIOLOGY  Microbiology Results (last 7 days)       ** No results found for the last 168 hours. **               MEDICATIONS   ARIPiprazole  5 mg Oral Daily    EScitalopram oxalate  5 mg Oral Daily    folic acid  1 mg Oral Daily    nicotine  1 patch Transdermal Daily    pantoprazole  40 mg Oral Daily    piperacillin-tazobactam (Zosyn) IV (PEDS and ADULTS) (extended infusion is not appropriate)  4.5 g Intravenous Q8H    thiamine  100 mg Oral Daily         INFUSIONS   0.9% NaCl   Intravenous  Continuous 90 mL/hr at 07/06/25 1633 New Bag at 07/06/25 1633          DIAGNOSTIC TESTS  MRI MRCP Abdomen W WO Contrast 3D WO Independent WS XPD   Final Result      Challenging evaluation due to changes from underlying portal vein thrombus.  15 mm area of arterial enhancement in the superior right liver could be small HCC.  Given exam limitations, 3 month follow-up MRI can be pursued to reassess.      Distended gallbladder with underlying cholelithiasis.  Areas of mild right intrahepatic biliary dilatation.  No extrahepatic biliary dilatation identified.         Electronically signed by: John Paul Chicas   Date:    07/05/2025   Time:    12:16      CT Abdomen Pelvis  Without Contrast   Final Result      Cirrhosis with small volume ascites.      Mild wall thickening of the proximal colon favored secondary to the ascites, but colitis also possible.      Distended gallbladder with several gallstones, but no defined gallbladder wall thickening.         Electronically signed by: John Paul Chicas   Date:    07/04/2025   Time:    10:52      CT Head Without Contrast   Final Result      1.  No acute large vessel territory infarct identified.      2.  New but nonacute appearing lacunar infarcts pk and the left internal capsule.  If clinically indicated, consideration may be given to further assessment with MRI of the brain.         Electronically signed by: Mat Sommers   Date:    07/04/2025   Time:    08:57      US Abdomen Complete   Final Result      1. Hepatomegaly and hepatic steatosis.      2. Thrombosed main portal vein.      3.  Enlarged gallbladder containing sludge and multiple calculi.  Thickened gallbladder wall and positive Chang sign is suspected for acute cholecystitis.         Electronically signed by: Mat Sommers   Date:    07/04/2025   Time:    15:40      IR CT Guidance    (Results Pending)        Echo  Result Date: 6/26/2025    Left Ventricle: The left ventricle is normal in size. Normal wall   thickness. There  is normal systolic function with a visually estimated   ejection fraction of 60 - 65%.    Right Ventricle: The right ventricle is normal in size Systolic   function is reduced.TAPSE is 1.5 cm.    Aortic Valve: The aortic valve is a trileaflet valve. There is aortic   valve sclerosis.    IVC/SVC: Normal venous pressure at 3 mmHg.               Case related differential diagnoses have been reviewed; assessment and plan has been documented. I have personally reviewed the labs and test results that are currently available; I have reviewed the patients medication list. I have reviewed the consulting providers recommendations. I have reviewed or attempted to review medical records based upon their availability.  All of the patient's and/or family's questions have been addressed and answered to the best of my ability.  I will continue to monitor closely and make adjustments to medical management as needed.  This document was created using [employer] Fluency Direct.  Transcription errors may have been made.  Please contact me if any questions may rise regarding documentation to clarify transcription.        Ricardo Trotter MD   Internal Medicine  Department of Hospital Medicine [facility]

## 2025-07-07 NOTE — PLAN OF CARE
07/07/25 1248   Discharge Assessment   Assessment Type Discharge Planning Assessment   Confirmed/corrected address, phone number and insurance Yes   Confirmed Demographics Correct on Facesheet   Source of Information family   Communicated LORRAINE with patient/caregiver Date not available/Unable to determine   People in Home friend(s)   Do you expect to return to your current living situation? Yes   Do you have help at home or someone to help you manage your care at home? Yes   Who are your caregiver(s) and their phone number(s)? sister Elizabeth Humphrey [phone], sister Christiana Jimenez [phone]   Prior to hospitilization cognitive status: Alert/Oriented   Current cognitive status: Alert/Oriented   Equipment Currently Used at Home none   Patient currently being followed by outpatient case management? No   Do you currently have service(s) that help you manage your care at home? No   Do you take prescription medications? Yes   Do you have prescription coverage? Yes   Do you have any problems affording any of your prescribed medications? No   Is the patient taking medications as prescribed? yes   Who is going to help you get home at discharge? family or [employer]   How do you get to doctors appointments? other (see comments)  (walks)   Are you on dialysis? No   Do you take coumadin? No   Discharge Plan A Home   Discharge Plan B Home   DME Needed Upon Discharge  walker, rolling   Discharge Plan discussed with: Patient;Sibling   Name(s) and Number(s) sister Christiana   Transition of Care Barriers Does not adhere to care plan;Substance Abuse     The patient is being uncooperative and not answering questions. His sister is present and answered the questions.  The patient lives with some friends.  His two sisters Elizabeth and Christiana are able to provide some assistance.

## 2025-07-07 NOTE — PROGRESS NOTES
"Gastroenterology Progress Note    Subjective/Interval History:  LFTs improving.  Going for IR cholecystostomy tube today per surgery recs.  No fevers.  Leukocytosis overall unchanged throughout admission.     He is lethargic and does not provide any history.  When asked if he has abdominal pain he says no but does not want me to palpate.  However, does not seen tender.     Vital Signs:  BP (!) 145/76   Pulse 68   Temp 97.7 °F (36.5 °C)   Resp 20   Ht 5' 8.9" (1.75 m)   Wt 54.1 kg (119 lb 4.3 oz)   SpO2 99%   BMI 17.67 kg/m²   Body mass index is 17.67 kg/m².    Physical Exam:  Physical Exam  Constitutional:       General: He is not in acute distress.     Appearance: He is ill-appearing (chronically).   HENT:      Head: Normocephalic and atraumatic.   Eyes:      General: No scleral icterus.     Extraocular Movements: Extraocular movements intact.   Cardiovascular:      Rate and Rhythm: Normal rate and regular rhythm.   Pulmonary:      Effort: Pulmonary effort is normal. No respiratory distress.   Abdominal:      General: Bowel sounds are normal. There is distension (mild).      Palpations: Abdomen is soft. There is no mass.      Tenderness: There is no abdominal tenderness. There is no guarding or rebound.   Musculoskeletal:      Right lower leg: No edema.      Left lower leg: No edema.   Skin:     General: Skin is warm and dry.      Coloration: Skin is not jaundiced.   Neurological:      Mental Status: He is lethargic.      Comments: No asterixis.    Psychiatric:         Mood and Affect: Mood normal.         Behavior: Behavior normal.         Labs:  Recent Results (from the past 48 hours)   POCT glucose    Collection Time: 07/06/25  2:16 AM   Result Value Ref Range    POCT Glucose 107 70 - 110 mg/dL   Comprehensive Metabolic Panel    Collection Time: 07/06/25 10:54 AM   Result Value Ref Range    Sodium 136 136 - 145 mmol/L    Potassium 3.8 3.5 - 5.1 mmol/L    Chloride 109 (H) 98 - 107 mmol/L    CO2 21 (L) 23 - " 31 mmol/L    Glucose 110 82 - 115 mg/dL    Blood Urea Nitrogen 35.0 (H) 8.4 - 25.7 mg/dL    Creatinine 1.52 (H) 0.72 - 1.25 mg/dL    Calcium 7.6 (L) 8.8 - 10.0 mg/dL    Protein Total 8.1 (H) 5.8 - 7.6 gm/dL    Albumin 1.5 (L) 3.4 - 4.8 g/dL    Globulin 6.6 (H) 2.4 - 3.5 gm/dL    Albumin/Globulin Ratio 0.2 (L) 1.1 - 2.0 ratio    Bilirubin Total 3.0 (H) <=1.5 mg/dL     (H) 40 - 150 unit/L     (H) 0 - 55 unit/L     (H) 11 - 45 unit/L    eGFR 51 mL/min/1.73/m2    Anion Gap 6.0 mEq/L    BUN/Creatinine Ratio 23    CBC with Differential    Collection Time: 07/06/25 10:54 AM   Result Value Ref Range    WBC 14.37 (H) 4.50 - 11.50 x10(3)/mcL    RBC 3.37 (L) 4.70 - 6.10 x10(6)/mcL    Hgb 10.0 (L) 14.0 - 18.0 g/dL    Hct 30.9 (L) 42.0 - 52.0 %    MCV 91.7 80.0 - 94.0 fL    MCH 29.7 27.0 - 31.0 pg    MCHC 32.4 (L) 33.0 - 36.0 g/dL    RDW 16.8 11.5 - 17.0 %    Platelet 291 130 - 400 x10(3)/mcL    MPV 9.7 7.4 - 10.4 fL    Neut % 78.3 %    Lymph % 8.7 %    Mono % 12.2 %    Eos % 0.1 %    Basophil % 0.1 %    Imm Grans % 0.6 %    Neut # 11.23 (H) 2.1 - 9.2 x10(3)/mcL    Lymph # 1.25 0.6 - 4.6 x10(3)/mcL    Mono # 1.76 (H) 0.1 - 1.3 x10(3)/mcL    Eos # 0.02 0 - 0.9 x10(3)/mcL    Baso # 0.02 <=0.2 x10(3)/mcL    Imm Gran # 0.09 (H) 0.00 - 0.04 x10(3)/mcL    NRBC% 0.0 %   Comprehensive Metabolic Panel    Collection Time: 07/07/25  3:20 AM   Result Value Ref Range    Sodium 137 136 - 145 mmol/L    Potassium 3.7 3.5 - 5.1 mmol/L    Chloride 110 (H) 98 - 107 mmol/L    CO2 23 23 - 31 mmol/L    Glucose 91 82 - 115 mg/dL    Blood Urea Nitrogen 27.5 (H) 8.4 - 25.7 mg/dL    Creatinine 1.29 (H) 0.72 - 1.25 mg/dL    Calcium 7.4 (L) 8.8 - 10.0 mg/dL    Protein Total 7.6 5.8 - 7.6 gm/dL    Albumin 1.4 (L) 3.4 - 4.8 g/dL    Globulin 6.2 (H) 2.4 - 3.5 gm/dL    Albumin/Globulin Ratio 0.2 (L) 1.1 - 2.0 ratio    Bilirubin Total 2.6 (H) <=1.5 mg/dL     (H) 40 - 150 unit/L     (H) 0 - 55 unit/L     (H) 11 - 45  unit/L    eGFR >60 mL/min/1.73/m2    Anion Gap 4.0 mEq/L    BUN/Creatinine Ratio 21    APTT    Collection Time: 07/07/25  3:20 AM   Result Value Ref Range    PTT 52.4 (H) 23.2 - 33.7 seconds   Protime-INR    Collection Time: 07/07/25  3:20 AM   Result Value Ref Range    PT 18.8 (H) 12.5 - 14.5 seconds    INR 1.6 (H) <=1.3   CBC with Differential    Collection Time: 07/07/25  3:20 AM   Result Value Ref Range    WBC 14.31 (H) 4.50 - 11.50 x10(3)/mcL    RBC 3.20 (L) 4.70 - 6.10 x10(6)/mcL    Hgb 9.5 (L) 14.0 - 18.0 g/dL    Hct 29.2 (L) 42.0 - 52.0 %    MCV 91.3 80.0 - 94.0 fL    MCH 29.7 27.0 - 31.0 pg    MCHC 32.5 (L) 33.0 - 36.0 g/dL    RDW 16.1 11.5 - 17.0 %    Platelet 292 130 - 400 x10(3)/mcL    MPV 9.6 7.4 - 10.4 fL    Neut % 77.2 %    Lymph % 8.5 %    Mono % 13.2 %    Eos % 0.5 %    Basophil % 0.1 %    Imm Grans % 0.5 %    Neut # 11.05 (H) 2.1 - 9.2 x10(3)/mcL    Lymph # 1.21 0.6 - 4.6 x10(3)/mcL    Mono # 1.89 (H) 0.1 - 1.3 x10(3)/mcL    Eos # 0.07 0 - 0.9 x10(3)/mcL    Baso # 0.02 <=0.2 x10(3)/mcL    Imm Gran # 0.07 (H) 0.00 - 0.04 x10(3)/mcL    NRBC% 0.0 %       Imaging:  MRI MRCP Abdomen W WO Contrast 3D WO Independent WS XPD  Result Date: 7/5/2025  EXAMINATION: MRI MRCP ABDOMEN W WO CONTRAST 3D WO INDEPENDENT WS XPD CLINICAL HISTORY: Cholelithiasis; TECHNIQUE: Multiplanar, multisequence MR imaging of the abdomen before and after IV contrast administration.  Dedicated MRCP sequences obtained. COMPARISON: CT 07/04/2025 MRI 06/21/2025 FINDINGS: Mildly limited assessment due to overall image quality. Distended gallbladder with small gallstones.  No extrahepatic biliary dilatation or defined CBD filling defect.  Areas of mild intrahepatic biliary dilatation in the right liver.  Small volume ascites. Cirrhosis.  Continued thrombus in the main portal vein and right portal vein branches.  Heterogeneous T2 signal and enhancement in the right liver.  15 mm mildly T2 hyperintense area in the superior right liver with  arterial enhancement and washout (series 1402, image 20 and series 1404, image 20).  No other measurable lesions with arterial enhancement and washout identified.  No appreciable portal vein thrombus enhancement. Normal pancreas, spleen and adrenal glands.  No enhancing renal lesion or hydronephrosis.     Challenging evaluation due to changes from underlying portal vein thrombus.  15 mm area of arterial enhancement in the superior right liver could be small HCC.  Given exam limitations, 3 month follow-up MRI can be pursued to reassess. Distended gallbladder with underlying cholelithiasis.  Areas of mild right intrahepatic biliary dilatation.  No extrahepatic biliary dilatation identified. Electronically signed by: John Paul Chicas Date:    07/05/2025 Time:    12:16    US Abdomen Complete  Result Date: 7/4/2025  EXAMINATION: US ABDOMEN COMPLETE CLINICAL HISTORY: worse elevated bili (known cirrhosis), nargis, previously known portal vein thrombsosi; TECHNIQUE: Multiple real-time transverse and longitudinal sections were performed of the abdomen by the sonographer. Select images were submitted for review. COMPARISON: CT abdomen pelvis same date. FINDINGS: Liver is enlarged in size with maximum diameter of 20.2 cm.  Hepatic parenchyma is echogenic consistent with steatosis.. There is no delineation of discrete hepatic cystic or solid space occupying mass.  There is no flow within the portal vein which is remarkable for intraluminal 2.12 cm echogenicity consistent with thrombosis.  Pancreas obscured by overlying bowel gas.  Spleen is of unremarkable echotexture with normal size and maximum diameter of 10.2 cm. No focal splenic lesion visualized. The inferior vena cava appears unremarkable. Visualized portion of the abdominal aorta is without aneurysmal dilatation. Gallbladder is enlarged and measures 11.8 x 5.6 x 6.5 cm.  Gallbladder lumen contains sludge.  There are multiple gallstones and the largest measures 0.83 cm.   Gallbladder wall is thickened 4.7 mm.  Sonographer reported positive Chang's sign. Right kidney measures 12.5 x 5.6 x 6.0 cm and left kidney measures 11.2 x 6.2 x 5.2 cm.  Right kidney cortical volume is adequate and the corticomedullary differentiation is preserved.  There is limited visualized left kidney due to patient's body habitus.  No definite kidneys collecting system dilatation.     1. Hepatomegaly and hepatic steatosis. 2. Thrombosed main portal vein. 3.  Enlarged gallbladder containing sludge and multiple calculi.  Thickened gallbladder wall and positive Chang sign is suspected for acute cholecystitis. Electronically signed by: Mat Sommers Date:    07/04/2025 Time:    15:40    CT Abdomen Pelvis  Without Contrast  Result Date: 7/4/2025  EXAMINATION: CT ABDOMEN PELVIS WITHOUT CONTRAST CLINICAL HISTORY: Right upper quadrant pain, elevated bilirubin, history of cirrhosis and portal vein thrombosis; TECHNIQUE: CT imaging of the abdomen and pelvis without intravenous contrast. Axial, coronal and sagittal reformatted images reviewed. Dose length product is 486 mGycm. Automatic exposure control, adjustment of mA/kV or iterative reconstruction technique used to limit radiation dose. COMPARISON: CT 06/17/2025 FINDINGS: Assessment of the visceral organs and vasculature is limited by the lack of IV contrast. Liver/biliary: Cirrhosis.  Artifact through the posterior right liver due to arm positioning with limited overall evaluation of the hepatic parenchyma given this artifact and lack of IV contrast.  Gallbladder distended with multiple gallstones.  No biliary dilatation. Pancreas: Normal. Spleen: Normal. Adrenals: Normal. Genitourinary: 7 mm left renal stone.  No hydronephrosis.  Bladder within normal limits. Stomach/bowel: No bowel obstruction. Normal appendix.  Mild proximal colonic wall thickening. Lymph nodes and peritoneum: Few borderline enlarged periportal lymph nodes similar to prior CT.  Small volume  ascites. Vasculature: Numerous aortic and iliac artery calcifications. Abdominal wall: Normal. Lung bases: Mild dependent atelectasis. Bones: No acute osseous findings.     Cirrhosis with small volume ascites. Mild wall thickening of the proximal colon favored secondary to the ascites, but colitis also possible. Distended gallbladder with several gallstones, but no defined gallbladder wall thickening. Electronically signed by: John Paul Chicas Date:    07/04/2025 Time:    10:52    CT Head Without Contrast  Result Date: 7/4/2025  EXAMINATION: CT HEAD WITHOUT CONTRAST CLINICAL HISTORY: Mental status change, unknown cause; TECHNIQUE: Sequential axial images were performed of the brain without contrast. Dose product length of 1060 mGycm. Automated exposure control was utilized to minimize radiation dose. COMPARISON: June 2, 2017. FINDINGS: There is no intracranial mass effect, midline shift, hydrocephalus or hemorrhage. There is no sulcal effacement or low attenuation changes to suggest recent large vessel territory infarction.  There is left paramedian pontine lacunar infarct which is new since the previous exam.  There is also new left internal capsule posterior limb new lacunar infarct.  Chronic microangiopathic ischemic changes are mild.  The ventricular system and sulcal markings prominence is consistent with atrophy. There is no acute extra axial fluid collection. Mucoperiosteal thickening and fluid left maxillary sinus.  Otherwise, visualized paranasal sinuses are clear without mucosal thickening, polypoidal abnormality or air-fluid levels. Mastoid air cells aeration is optimal.     1.  No acute large vessel territory infarct identified. 2.  New but nonacute appearing lacunar infarcts pk and the left internal capsule.  If clinically indicated, consideration may be given to further assessment with MRI of the brain. Electronically signed by: Mat Sommers Date:    07/04/2025 Time:    08:57    Echo  Result Date:  6/26/2025    Left Ventricle: The left ventricle is normal in size. Normal wall thickness. There is normal systolic function with a visually estimated ejection fraction of 60 - 65%.   Right Ventricle: The right ventricle is normal in size Systolic function is reduced.TAPSE is 1.5 cm.   Aortic Valve: The aortic valve is a trileaflet valve. There is aortic valve sclerosis.   IVC/SVC: Normal venous pressure at 3 mmHg.     MRI Abdomen W WO Contrast  Result Date: 6/21/2025  EXAMINATION: MRI ABDOMEN W WO CONTRAST CLINICAL HISTORY: Cirrhosis.Liver lesion, > 1cm; TECHNIQUE: Multiplanar multisequence MRI of the abdomen performed without and with gadolinium based IV contrast . COMPARISON: CT 17 June 2025, CT 18 October 2023 FINDINGS: This exam is nondiagnostic for HCC as only delayed scans obtained secondary to patient motion and inability to hold breath. The liver is cirrhotic and there is portal vein thrombus also seen on CT.  Heterogeneous enhancement of the liver likely relates to portal vein thrombus.  No discrete suspicious lesion seen on diffusion imaging.  Small volume ascites. There are gallstones.  No significant biliary ductal dilatation.  The spleen is not significantly enlarged.  No gross abnormality pancreas or adrenals.  No hydronephrosis. No dilated bowel loops.  Abdominal aorta normal in caliber.     1. Cirrhosis with portal vein thrombus and small volume ascites. 2. Nondiagnostic exam for HCC secondary to timing of contrast.  Recommend outpatient liver mass protocol CT. Electronically signed by: Andriy Laws Date:    06/21/2025 Time:    11:19    IR Paracentesis with Imaging  Result Date: 6/18/2025  PROCEDURE: Ultrasound Guided Paracentesis CLINICAL HISTORY: 64-year-old male with cirrhosis, portal vein thrombus, and ascites ANESTHESIA: Local anesthesia PHYSICIANS: Neto Bird MD PROCEDURAL SUMMARY: After informed consent was obtained, the patient was placed supine on the ultrasound table. A limited  ultrasound of the abdomen was performed with Dr. Bird present in the room.  This confirmed the presence of an appropriate volume of ascites for drainage.  The planned skin entry site and route for needle passage for paracentesis was then determined. The skin overlying the right lower quadrant of the abdomen was marked.  The site was then prepped and draped in the usual sterile fashion. The soft tissues were anesthetized with lidocaine and a dermatotomy was performed.  Under ultrasound guidance, a sheath needle was advanced from the skin entry site into the peritoneal cavity.  When the needle entered the peritoneal cavity, fluid was aspirated.  The sheath was then advanced over the needle into the cavity.  The needle was removed.  Aspiration was performed and a total of 2.1 L of serous fluid was drained from the peritoneal cavity.  The catheter was then removed.  A sterile dressing was applied. The patient tolerated the procedure well and was without any apparent complications.     Technically successful ultrasound-guided paracentesis. Electronically signed by: Neto Bird Date:    06/18/2025 Time:    15:02    CT Abdomen Pelvis With IV Contrast NO Oral Contrast  Result Date: 6/18/2025  EXAMINATION: CT ABDOMEN PELVIS WITH IV CONTRAST CLINICAL HISTORY: Abdominal pain and abdominal distension TECHNIQUE: Axial CT images were obtained through the abdomen and pelvis following IV administration of contrast.  100 mL Omnipaque 350.  Coronal and sagittal reconstructions submitted and interpreted.  Automated exposure control, dose radiation lowering technique, was utilized. COMPARISON: CT abdomen and pelvis without contrast from 10/18/2023 CT abdomen and pelvis from 10/16/2023 Abdominal sonogram from 06/02/2017 FINDINGS: Cirrhotic morphology to the liver.  Thrombus is present within the distal segment of the main portal vein with extension into the right and left portal system.  Thrombus is occlusive at multiple segments  of the right portal system.  Splenic vein and SMV appear patent. There is geographic area of slightly increased enhancement at segment 8 measuring 4.2 x 4.0 cm.  No other hypervascular areas.  There are multiple areas of patchy decreased density in the right hepatic lobe which is felt to be secondary to portal vein thrombosis. Spleen is nonenlarged.  Pancreas, adrenal glands and right kidney appear normal.  Nonobstructing left kidney stone measures 10 mm.  No hydronephrosis. Cholelithiasis is present.  The bowel is nonobstructed.  Air and stool are present throughout the colon which appears within normal limits.  Normal appendix is present.  There is moderate volume ascites.  No free air. Moderate atherosclerosis of the abdominal aorta and its branches is present.  Mild, diffuse bladder wall thickening is present.  No suspicious osteolytic or osteoblastic lesion.     1. Portal vein thrombosis which is occlusive within the right portal system.  There is some collaterals within the annette hepatis with suggests some degree of chronic component.  Given the appearance of the thrombus in addition to the small amount of portal venous collaterals, favor acute on chronic thrombosis. 2. Sequelae of cirrhosis with moderate volume ascites and small gastroesophageal varices. 3. Hypervascular area in segment 8 (image 31, series 1).  Favor perfusion abnormality related to portal vein thrombosis.  However, consider multiphase abdominal MRI to evaluate for potential tumor. 4. Cholelithiasis. 5. Nonobstructing left-sided kidney stone. Electronically signed by: Mukesh Lu MD Date:    06/18/2025 Time:    08:04    CTA Chest Non-Coronary (PE Studies)  Result Date: 6/18/2025  EXAMINATION: CTA CHEST NON CORONARY (PE STUDIES) CLINICAL HISTORY: Pulmonary embolism suspected. 65 yo male presenting with feeling unwell for the past month and a half. He reports abdominal bloating, leg swelling, and decreased appetite. He reports generalized  weakness as well. States he normally drinks alcohol daily but hasn't in the past 30 days or so. TECHNIQUE: Axial CT images were obtained through the chest after IV administration of contrast. 100 mL Omnipaque 350. MIP, coronal and sagittal reconstructions submitted and interpreted.  Automated exposure control, dose radiation lowering technique, was utilized. COMPARISON: None FINDINGS: Slightly limited exam secondary to contrast bolus timing. Soft Tissues: Unremarkable. Lines and Tubes: None. Neck: The visualized soft tissues of the neck appear unremarkable. Mediastinum: The mediastinal structures are within normal limits. Heart: The heart size is within normal limits. Mild coronary artery calcification is seen. Aorta: No aortic dissection or aneurysm is seen. Mild aortic calcification is seen in the thoracic aorta. Pulmonary Arteries: No filling defects are seen in the pulmonary arteries to suggest pulmonary embolus. Lungs: There is moderate non specific dependent change at the lung bases. Mild streaky linear opacity is seen consistent with scarring and subsegmental. No acute focal infiltrate or consolidation is seen. Pleura: No effusions or pneumothorax are identified. Bony Structures: Spine: Subtle spondylolytic changes are seen in the thoracic spine. Ribs: The ribs appear unremarkable.     Slightly limited exam of the pulmonary arteries secondary to contrast bolus timing. 1. No filling defects are seen in the pulmonary arteries to suggest pulmonary embolus. 2. No acute focal infiltrate or consolidation is seen. 3. Details and other findings as discussed above. [employer] concurrence Electronically signed by: Mukesh Lu MD Date:    06/18/2025 Time:    07:57         Assessment/Plan:  65 yo AAM known to Dr. Deleon from previous admission with PMH of HCV cirrhosis, hepatic lesion, elevated AFP, PVT dx 6/2025, HTN, cocaine use, and medical non compliance. Presented to OSH with abdominal pain. Transferred due to  elevated lfts.      HCV Cirrhosis  MELD 3.0: 15 at 6/18/2025  -- 20 at 7/7/2025  Ascites: +  Screening for varices: Grade 1 esoV on EGD 6/20/25.  Encephalopathy: no s/s at this time, no hx     Liver mass - concern for HCC  Liver mass noted on CT 10/2023 -- again seen 6/2025 -- present again now.   AFP 19 10/2023 -- 83.5 this admit    PVT  On levenox (held today for IR kirk tube)     4. Elevated LFTs - suspect multifactorial related to cirrhosis, suspect HCC, PVT, and possible cholecystitis.    MRCP 7/5/25: Distended gallbladder with underlying cholelithiasis. Areas of mild right intrahepatic biliary dilatation. No extrahepatic biliary dilatation identified.   - no indication for ERCP   - 3 month follow-up MRI can be pursued to reassess per radiology.  - monitor lft trend.     Dedra Wheeler PA-C

## 2025-07-07 NOTE — PLAN OF CARE
Problem: Adult Inpatient Plan of Care  Goal: Plan of Care Review  Outcome: Progressing  Goal: Patient-Specific Goal (Individualized)  Outcome: Progressing  Goal: Absence of Hospital-Acquired Illness or Injury  Outcome: Progressing  Goal: Optimal Comfort and Wellbeing  Outcome: Progressing  Goal: Readiness for Transition of Care  Outcome: Progressing     Problem: Skin Injury Risk Increased  Goal: Skin Health and Integrity  Outcome: Progressing     Problem: Coping Ineffective  Goal: Effective Coping  Outcome: Progressing     Problem: Wound  Goal: Optimal Coping  Outcome: Progressing  Goal: Optimal Functional Ability  Outcome: Progressing  Goal: Absence of Infection Signs and Symptoms  Outcome: Progressing  Goal: Improved Oral Intake  Outcome: Progressing  Goal: Optimal Pain Control and Function  Outcome: Progressing  Goal: Skin Health and Integrity  Outcome: Progressing  Goal: Optimal Wound Healing  Outcome: Progressing

## 2025-07-07 NOTE — OP NOTE
Radiology Post-Procedure Note    Pre Op Diagnosis: Cholecystitis    Post Op Diagnosis: Same    Secondary Diagnoses:   Problem List Items Addressed This Visit    None  Visit Diagnoses         Choledocholithiasis          Chest pain                 Procedure: CT Guided Cholecystostomy Tube    Procedure performed by: Neto Bird MD    Assistant: None    Written Informed Consent Obtained: Yes    Specimen Removed: None    Estimated Blood Loss: <10 cc    Condition: Stable    Outcome: The patient tolerated the procedure well and was without complications.    For further details please see the imaging report associated.    Disposition: Transfer back to inpatient unit.

## 2025-07-08 LAB
ALBUMIN SERPL-MCNC: 1.3 G/DL (ref 3.4–4.8)
ALBUMIN/GLOB SERPL: 0.2 RATIO (ref 1.1–2)
ALP SERPL-CCNC: 250 UNIT/L (ref 40–150)
ALT SERPL-CCNC: 224 UNIT/L (ref 0–55)
ANION GAP SERPL CALC-SCNC: 3 MEQ/L
AST SERPL-CCNC: 126 UNIT/L (ref 11–45)
BACTERIA BLD CULT: NORMAL
BACTERIA BLD CULT: NORMAL
BASOPHILS # BLD AUTO: 0.03 X10(3)/MCL
BASOPHILS NFR BLD AUTO: 0.2 %
BILIRUB SERPL-MCNC: 2.3 MG/DL
BUN SERPL-MCNC: 26.2 MG/DL (ref 8.4–25.7)
CALCIUM SERPL-MCNC: 7.4 MG/DL (ref 8.8–10)
CHLORIDE SERPL-SCNC: 111 MMOL/L (ref 98–107)
CO2 SERPL-SCNC: 23 MMOL/L (ref 23–31)
CREAT SERPL-MCNC: 1.2 MG/DL (ref 0.72–1.25)
CREAT/UREA NIT SERPL: 22
EOSINOPHIL # BLD AUTO: 0.09 X10(3)/MCL (ref 0–0.9)
EOSINOPHIL NFR BLD AUTO: 0.7 %
ERYTHROCYTE [DISTWIDTH] IN BLOOD BY AUTOMATED COUNT: 17.1 % (ref 11.5–17)
GFR SERPLBLD CREATININE-BSD FMLA CKD-EPI: >60 ML/MIN/1.73/M2
GLOBULIN SER-MCNC: 5.9 GM/DL (ref 2.4–3.5)
GLUCOSE SERPL-MCNC: 101 MG/DL (ref 82–115)
GRAM STN SPEC: NORMAL
HCT VFR BLD AUTO: 28.6 % (ref 42–52)
HGB BLD-MCNC: 9.2 G/DL (ref 14–18)
IMM GRANULOCYTES # BLD AUTO: 0.07 X10(3)/MCL (ref 0–0.04)
IMM GRANULOCYTES NFR BLD AUTO: 0.6 %
LYMPHOCYTES # BLD AUTO: 1.25 X10(3)/MCL (ref 0.6–4.6)
LYMPHOCYTES NFR BLD AUTO: 10.2 %
MCH RBC QN AUTO: 29.8 PG (ref 27–31)
MCHC RBC AUTO-ENTMCNC: 32.2 G/DL (ref 33–36)
MCV RBC AUTO: 92.6 FL (ref 80–94)
MONOCYTES # BLD AUTO: 1.66 X10(3)/MCL (ref 0.1–1.3)
MONOCYTES NFR BLD AUTO: 13.6 %
NEUTROPHILS # BLD AUTO: 9.14 X10(3)/MCL (ref 2.1–9.2)
NEUTROPHILS NFR BLD AUTO: 74.7 %
NRBC BLD AUTO-RTO: 0 %
PLATELET # BLD AUTO: 313 X10(3)/MCL (ref 130–400)
PMV BLD AUTO: 9.8 FL (ref 7.4–10.4)
POTASSIUM SERPL-SCNC: 3.8 MMOL/L (ref 3.5–5.1)
PROT SERPL-MCNC: 7.2 GM/DL (ref 5.8–7.6)
RBC # BLD AUTO: 3.09 X10(6)/MCL (ref 4.7–6.1)
SODIUM SERPL-SCNC: 137 MMOL/L (ref 136–145)
WBC # BLD AUTO: 12.24 X10(3)/MCL (ref 4.5–11.5)

## 2025-07-08 PROCEDURE — 85025 COMPLETE CBC W/AUTO DIFF WBC: CPT | Performed by: INTERNAL MEDICINE

## 2025-07-08 PROCEDURE — 80053 COMPREHEN METABOLIC PANEL: CPT | Performed by: INTERNAL MEDICINE

## 2025-07-08 PROCEDURE — 63600175 PHARM REV CODE 636 W HCPCS: Performed by: HOSPITALIST

## 2025-07-08 PROCEDURE — S4991 NICOTINE PATCH NONLEGEND: HCPCS | Performed by: HOSPITALIST

## 2025-07-08 PROCEDURE — 11000001 HC ACUTE MED/SURG PRIVATE ROOM

## 2025-07-08 PROCEDURE — 25000003 PHARM REV CODE 250: Performed by: INTERNAL MEDICINE

## 2025-07-08 PROCEDURE — 21400001 HC TELEMETRY ROOM

## 2025-07-08 PROCEDURE — 63600175 PHARM REV CODE 636 W HCPCS

## 2025-07-08 PROCEDURE — 36415 COLL VENOUS BLD VENIPUNCTURE: CPT | Performed by: INTERNAL MEDICINE

## 2025-07-08 PROCEDURE — 25000003 PHARM REV CODE 250: Performed by: HOSPITALIST

## 2025-07-08 RX ORDER — HYDROCODONE BITARTRATE AND ACETAMINOPHEN 5; 325 MG/1; MG/1
1 TABLET ORAL EVERY 6 HOURS PRN
Status: DISCONTINUED | OUTPATIENT
Start: 2025-07-08 | End: 2025-07-30 | Stop reason: HOSPADM

## 2025-07-08 RX ORDER — HYDROCODONE BITARTRATE AND ACETAMINOPHEN 10; 325 MG/1; MG/1
1 TABLET ORAL EVERY 6 HOURS PRN
Status: DISCONTINUED | OUTPATIENT
Start: 2025-07-08 | End: 2025-07-30 | Stop reason: HOSPADM

## 2025-07-08 RX ADMIN — PIPERACILLIN SODIUM AND TAZOBACTAM SODIUM 4.5 G: 4; .5 INJECTION, POWDER, LYOPHILIZED, FOR SOLUTION INTRAVENOUS at 12:07

## 2025-07-08 RX ADMIN — PIPERACILLIN SODIUM AND TAZOBACTAM SODIUM 4.5 G: 4; .5 INJECTION, POWDER, LYOPHILIZED, FOR SOLUTION INTRAVENOUS at 10:07

## 2025-07-08 RX ADMIN — RIVAROXABAN 20 MG: 10 TABLET, FILM COATED ORAL at 04:07

## 2025-07-08 RX ADMIN — PIPERACILLIN SODIUM AND TAZOBACTAM SODIUM 4.5 G: 4; .5 INJECTION, POWDER, LYOPHILIZED, FOR SOLUTION INTRAVENOUS at 04:07

## 2025-07-08 RX ADMIN — ARIPIPRAZOLE 5 MG: 5 TABLET ORAL at 10:07

## 2025-07-08 RX ADMIN — FOLIC ACID 1 MG: 1 TABLET ORAL at 10:07

## 2025-07-08 RX ADMIN — PANTOPRAZOLE SODIUM 40 MG: 40 TABLET, DELAYED RELEASE ORAL at 10:07

## 2025-07-08 RX ADMIN — THIAMINE HCL TAB 100 MG 100 MG: 100 TAB at 10:07

## 2025-07-08 RX ADMIN — HYDROCODONE BITARTRATE AND ACETAMINOPHEN 1 TABLET: 5; 325 TABLET ORAL at 04:07

## 2025-07-08 RX ADMIN — NICOTINE 1 PATCH: 14 PATCH TRANSDERMAL at 10:07

## 2025-07-08 RX ADMIN — ESCITALOPRAM OXALATE 5 MG: 5 TABLET, FILM COATED ORAL at 10:07

## 2025-07-08 RX ADMIN — MORPHINE SULFATE 2 MG: 4 INJECTION, SOLUTION INTRAMUSCULAR; INTRAVENOUS at 02:07

## 2025-07-08 RX ADMIN — PIPERACILLIN SODIUM AND TAZOBACTAM SODIUM 4.5 G: 4; .5 INJECTION, POWDER, LYOPHILIZED, FOR SOLUTION INTRAVENOUS at 11:07

## 2025-07-08 NOTE — PLAN OF CARE
Problem: Adult Inpatient Plan of Care  Goal: Plan of Care Review  Outcome: Progressing  Goal: Patient-Specific Goal (Individualized)  Outcome: Progressing  Goal: Absence of Hospital-Acquired Illness or Injury  Outcome: Progressing  Goal: Optimal Comfort and Wellbeing  Outcome: Progressing  Intervention: Monitor Pain and Promote Comfort  Flowsheets (Taken 7/8/2025 0051)  Pain Management Interventions:   quiet environment facilitated   pillow support provided   care clustered  Intervention: Provide Person-Centered Care  Flowsheets (Taken 7/8/2025 0051)  Trust Relationship/Rapport:   care explained   questions answered     Problem: Coping Ineffective  Goal: Effective Coping  Outcome: Progressing  Intervention: Support and Enhance Coping Strategies  Flowsheets (Taken 7/8/2025 0051)  Supportive Measures:   active listening utilized   self-care encouraged     Problem: Wound  Goal: Optimal Coping  Outcome: Progressing  Intervention: Support Patient and Family Response  Flowsheets (Taken 7/8/2025 0051)  Supportive Measures:   active listening utilized   self-care encouraged  Goal: Optimal Functional Ability  Outcome: Progressing  Intervention: Optimize Functional Ability  Flowsheets (Taken 7/8/2025 0051)  Activity Assistance Provided: assistance, 1 person  Goal: Improved Oral Intake  Outcome: Progressing

## 2025-07-08 NOTE — PROGRESS NOTES
"Gastroenterology Progress Note    Subjective/Interval History:  WBC 12.24 today. Tbili improved some to 2.3. , , .   CT triple phase yesterday showed enlarged nodular cirrhotic appearing liver with couple areas of nonspecific focal enhancement1.3 cm nodule in R lobe of liver, similar to prior exam but too small to characterize accurately. Other additional areas of subtle enhancement also seen in liver, non specific. Recommend short term follow-up.    Underwent IR cholecystostomy tube placement yesterday. 1.3 L intra abdominal fluid removed prior to placement.    Pt eating lunch on my arrival, asking me to open utensils and help him put it in his pudding because he is weak. Complaining that he got pudding instead of ice cream. Asked if he is having abdominal pain, nausea, etc. but patient said he doesn't want to talk to me anymore.    Vital Signs:  BP (!) 127/93   Pulse 67   Temp 97.5 °F (36.4 °C) (Oral)   Resp 18   Ht 5' 8.9" (1.75 m)   Wt 54.1 kg (119 lb 4.3 oz)   SpO2 99%   BMI 17.67 kg/m²   Body mass index is 17.67 kg/m².    Physical Exam:  Physical Exam  Constitutional:       General: He is not in acute distress.     Appearance: He is ill-appearing (chronically).   HENT:      Head: Normocephalic and atraumatic.   Eyes:      General: No scleral icterus.     Extraocular Movements: Extraocular movements intact.   Cardiovascular:      Rate and Rhythm: Normal rate and regular rhythm.   Pulmonary:      Effort: Pulmonary effort is normal. No respiratory distress.   Abdominal:      General: Bowel sounds are normal. There is distension (mild).      Comments: Pt refused abdominal palpation   Musculoskeletal:      Right lower leg: No edema.      Left lower leg: No edema.      Comments: Visibly weak trying to eat lunch   Skin:     General: Skin is warm and dry.      Coloration: Skin is not jaundiced.   Neurological:      Mental Status: He is alert. Mental status is at baseline.   Psychiatric:    "      Mood and Affect: Mood normal.         Behavior: Behavior normal.         Labs:  Recent Results (from the past 48 hours)   Comprehensive Metabolic Panel    Collection Time: 07/07/25  3:20 AM   Result Value Ref Range    Sodium 137 136 - 145 mmol/L    Potassium 3.7 3.5 - 5.1 mmol/L    Chloride 110 (H) 98 - 107 mmol/L    CO2 23 23 - 31 mmol/L    Glucose 91 82 - 115 mg/dL    Blood Urea Nitrogen 27.5 (H) 8.4 - 25.7 mg/dL    Creatinine 1.29 (H) 0.72 - 1.25 mg/dL    Calcium 7.4 (L) 8.8 - 10.0 mg/dL    Protein Total 7.6 5.8 - 7.6 gm/dL    Albumin 1.4 (L) 3.4 - 4.8 g/dL    Globulin 6.2 (H) 2.4 - 3.5 gm/dL    Albumin/Globulin Ratio 0.2 (L) 1.1 - 2.0 ratio    Bilirubin Total 2.6 (H) <=1.5 mg/dL     (H) 40 - 150 unit/L     (H) 0 - 55 unit/L     (H) 11 - 45 unit/L    eGFR >60 mL/min/1.73/m2    Anion Gap 4.0 mEq/L    BUN/Creatinine Ratio 21    APTT    Collection Time: 07/07/25  3:20 AM   Result Value Ref Range    PTT 52.4 (H) 23.2 - 33.7 seconds   Protime-INR    Collection Time: 07/07/25  3:20 AM   Result Value Ref Range    PT 18.8 (H) 12.5 - 14.5 seconds    INR 1.6 (H) <=1.3   CBC with Differential    Collection Time: 07/07/25  3:20 AM   Result Value Ref Range    WBC 14.31 (H) 4.50 - 11.50 x10(3)/mcL    RBC 3.20 (L) 4.70 - 6.10 x10(6)/mcL    Hgb 9.5 (L) 14.0 - 18.0 g/dL    Hct 29.2 (L) 42.0 - 52.0 %    MCV 91.3 80.0 - 94.0 fL    MCH 29.7 27.0 - 31.0 pg    MCHC 32.5 (L) 33.0 - 36.0 g/dL    RDW 16.1 11.5 - 17.0 %    Platelet 292 130 - 400 x10(3)/mcL    MPV 9.6 7.4 - 10.4 fL    Neut % 77.2 %    Lymph % 8.5 %    Mono % 13.2 %    Eos % 0.5 %    Basophil % 0.1 %    Imm Grans % 0.5 %    Neut # 11.05 (H) 2.1 - 9.2 x10(3)/mcL    Lymph # 1.21 0.6 - 4.6 x10(3)/mcL    Mono # 1.89 (H) 0.1 - 1.3 x10(3)/mcL    Eos # 0.07 0 - 0.9 x10(3)/mcL    Baso # 0.02 <=0.2 x10(3)/mcL    Imm Gran # 0.07 (H) 0.00 - 0.04 x10(3)/mcL    NRBC% 0.0 %   Body Fluid Culture    Collection Time: 07/07/25  4:39 PM    Specimen: Gallbladder;  Fluids   Result Value Ref Range    Body Fluid Culture No Growth At 24 Hours    Gram Stain    Collection Time: 07/07/25  4:39 PM    Specimen: Gallbladder; Aspirate   Result Value Ref Range    GRAM STAIN No WBCs, No bacteria seen    Comprehensive Metabolic Panel    Collection Time: 07/08/25  3:17 AM   Result Value Ref Range    Sodium 137 136 - 145 mmol/L    Potassium 3.8 3.5 - 5.1 mmol/L    Chloride 111 (H) 98 - 107 mmol/L    CO2 23 23 - 31 mmol/L    Glucose 101 82 - 115 mg/dL    Blood Urea Nitrogen 26.2 (H) 8.4 - 25.7 mg/dL    Creatinine 1.20 0.72 - 1.25 mg/dL    Calcium 7.4 (L) 8.8 - 10.0 mg/dL    Protein Total 7.2 5.8 - 7.6 gm/dL    Albumin 1.3 (L) 3.4 - 4.8 g/dL    Globulin 5.9 (H) 2.4 - 3.5 gm/dL    Albumin/Globulin Ratio 0.2 (L) 1.1 - 2.0 ratio    Bilirubin Total 2.3 (H) <=1.5 mg/dL     (H) 40 - 150 unit/L     (H) 0 - 55 unit/L     (H) 11 - 45 unit/L    eGFR >60 mL/min/1.73/m2    Anion Gap 3.0 mEq/L    BUN/Creatinine Ratio 22    CBC with Differential    Collection Time: 07/08/25  3:17 AM   Result Value Ref Range    WBC 12.24 (H) 4.50 - 11.50 x10(3)/mcL    RBC 3.09 (L) 4.70 - 6.10 x10(6)/mcL    Hgb 9.2 (L) 14.0 - 18.0 g/dL    Hct 28.6 (L) 42.0 - 52.0 %    MCV 92.6 80.0 - 94.0 fL    MCH 29.8 27.0 - 31.0 pg    MCHC 32.2 (L) 33.0 - 36.0 g/dL    RDW 17.1 (H) 11.5 - 17.0 %    Platelet 313 130 - 400 x10(3)/mcL    MPV 9.8 7.4 - 10.4 fL    Neut % 74.7 %    Lymph % 10.2 %    Mono % 13.6 %    Eos % 0.7 %    Basophil % 0.2 %    Imm Grans % 0.6 %    Neut # 9.14 2.1 - 9.2 x10(3)/mcL    Lymph # 1.25 0.6 - 4.6 x10(3)/mcL    Mono # 1.66 (H) 0.1 - 1.3 x10(3)/mcL    Eos # 0.09 0 - 0.9 x10(3)/mcL    Baso # 0.03 <=0.2 x10(3)/mcL    Imm Gran # 0.07 (H) 0.00 - 0.04 x10(3)/mcL    NRBC% 0.0 %       Imaging:  IR CT Guidance  Result Date: 7/7/2025  PROCEDURE: CT Guided Cholecystostomy Tube Placement & Paracentesis CLINICAL HISTORY: 64-year-old male with cholecystitis, cirrhosis, portal vein thrombus and ascites  ANESTHESIA: Level of sedation: Moderate sedation Medications: 1 mg Versed IV; 50 mcg Fentanyl IV; 0 mg Ativan IV; other: None Administration: Moderate sedation was administered during this procedure. Continuous monitoring of the patient's level of consciousness and physiological status was provided throughout the procedure by an independent trained observer under the direct supervision of the operating physician. Duration of sedation: 19 minutes PHYSICIANS: Neto Bird MD RADIATION DOSE: Total DLP = 916 mGy-cm CONTRAST: None PROCEDURAL SUMMARY: After informed consent was obtained, the patient was placed supine on the CT table. A limited CT scan of the right upper quadrant of abdomen was performed to determine a safe skin entry site and route of needle passage for the drainage and for paracentesis.  Targeting the gallbladder lumen. The skin overlying the entry site was marked. The site was then prepped and draped in the usual sterile fashion. Prior to cholecystostomy tube placement, a paracentesis was performed.  The soft tissues were anesthetized with lidocaine and a dermatotomy was performed.  Under CT guidance, a 5F Yueh sheath needle was advanced from the skin entry site towards the intra-abdominal fluid.  When the needle tip entered the fluid, serous fluid was aspirated.  The sheath was advanced over the needle into the collection and the needle was removed.  The cavity was aspirated to completion and the sheath was removed.  A total of 1.3 L was aspirated.  A sterile dressing was applied to the site. Attention was then turned back to the cholecystostomy tube.  The soft tissues were anesthetized with lidocaine and a dermatotomy was performed.  Under intermittent CT guidance, a 5F Yueh sheath needle was advanced from the skin entry site towards the gallbladder lumen.  When the needle tip entered the gallbladder, fluid was aspirated. The sheath was then advanced over the needle into the lumen and the needle was  removed. An Amplatz guidewire was then advanced through the sheath and coiled within the gallbladder.  The sheath was removed and the tract was serially dilated to accommodate a 8F pigtail drainage catheter.  The catheter was advanced over the wire and into the lumen.  The wire was then removed. The [place] loop was formed. A total of 10 cc of bilious fluid was aspirated. The drainage catheter was then attached to the skin.  A sterile dressing was applied and the catheter was connected to a gravity bag.  A final set of images were obtained demonstrating the drain in appropriate position and no evidence of active hemorrhage or other injury. The patient tolerated the procedure well and was without any apparent complications.     Technically successful CT-guided cholecystostomy tube placement and paracentesis. Electronically signed by: Neto Bird Date:    07/07/2025 Time:    17:41    CT Abdomen Pelvis w/o Contrast Plus Triphasic w/Contrast  Result Date: 7/7/2025  EXAMINATION: CT ABDOMEN PELVIS W/O CONTRAST PLUS TRIPHASIC W/CONTRAST CLINICAL HISTORY: evaluate for HCC as noted on MRCP this admit; TECHNIQUE: Low dose axial images, sagittal and coronal reformations were obtained from the lung bases to the pubic symphysis following the IV administration of  contrast.  Delayed imaging and pre contrasted imaging was performed as well. Automatic exposure control is utilized to reduce patient radiation exposure. COMPARISON: MRI dated 07/05/2025 FINDINGS: There is a right lower lobe interstitial pneumonia and patchy infiltrate developing in the left lower lobe. The liver is enlarged in size with a nodular cirrhotic contour.  There is evidence of portal vein thrombosis which is a known finding.  Some cavernous transformation is seen. There is a very subtle area of enhancement neck corresponds to a 1.3 cm nodule in the right lobe of the liver best seen on image 24 series 7.  This corresponds to the lesion seen on MRI.  The lesion  appears similar to the prior examination but is too small to accurately characterize.  Other additional areas of subtle enhancement also seen in the liver for example dome of the liver image 20 series 7 and segment 4 the liver image 33 series 9 and segment 7 of the liver image 36 series 7.  The findings are nonspecific at this time.  Short-term follow-up is recommended. There is large volume ascites. The gallbladder is distended and there are multiple gallstones in the gallbladder.. Pancreas appears unremarkable. No adrenal nodules are seen. Kidneys are normal in size.  There is a nonobstructing medullary calculus in the midpole the left kidney. Abdominal aorta appears normal in caliber with some atherosclerotic plaque. Visualized portion of the bowel appears unremarkable.     Enlarged nodular cirrhotic appearing liver with couple of areas of nonspecific focal enhancement as outlined above.  Short-term follow-up is recommended. Portal vein thrombosis with cavernous transformation of the portal vein which is a known finding Distended gallbladder with multiple gallstones Large volume ascites Right lower lobe interstitial pneumonia with questionable patchy infiltrate developing in the left lower lobe Electronically signed by: Julio César Navarro Date:    07/07/2025 Time:    16:30    MRI MRCP Abdomen W WO Contrast 3D WO Independent WS XPD  Result Date: 7/5/2025  EXAMINATION: MRI MRCP ABDOMEN W WO CONTRAST 3D WO INDEPENDENT WS XPD CLINICAL HISTORY: Cholelithiasis; TECHNIQUE: Multiplanar, multisequence MR imaging of the abdomen before and after IV contrast administration.  Dedicated MRCP sequences obtained. COMPARISON: CT 07/04/2025 MRI 06/21/2025 FINDINGS: Mildly limited assessment due to overall image quality. Distended gallbladder with small gallstones.  No extrahepatic biliary dilatation or defined CBD filling defect.  Areas of mild intrahepatic biliary dilatation in the right liver.  Small volume ascites. Cirrhosis.   Continued thrombus in the main portal vein and right portal vein branches.  Heterogeneous T2 signal and enhancement in the right liver.  15 mm mildly T2 hyperintense area in the superior right liver with arterial enhancement and washout (series 1402, image 20 and series 1404, image 20).  No other measurable lesions with arterial enhancement and washout identified.  No appreciable portal vein thrombus enhancement. Normal pancreas, spleen and adrenal glands.  No enhancing renal lesion or hydronephrosis.     Challenging evaluation due to changes from underlying portal vein thrombus.  15 mm area of arterial enhancement in the superior right liver could be small HCC.  Given exam limitations, 3 month follow-up MRI can be pursued to reassess. Distended gallbladder with underlying cholelithiasis.  Areas of mild right intrahepatic biliary dilatation.  No extrahepatic biliary dilatation identified. Electronically signed by: John Paul Chicas Date:    07/05/2025 Time:    12:16    US Abdomen Complete  Result Date: 7/4/2025  EXAMINATION: US ABDOMEN COMPLETE CLINICAL HISTORY: worse elevated bili (known cirrhosis), nargis, previously known portal vein thrombsosi; TECHNIQUE: Multiple real-time transverse and longitudinal sections were performed of the abdomen by the sonographer. Select images were submitted for review. COMPARISON: CT abdomen pelvis same date. FINDINGS: Liver is enlarged in size with maximum diameter of 20.2 cm.  Hepatic parenchyma is echogenic consistent with steatosis.. There is no delineation of discrete hepatic cystic or solid space occupying mass.  There is no flow within the portal vein which is remarkable for intraluminal 2.12 cm echogenicity consistent with thrombosis.  Pancreas obscured by overlying bowel gas.  Spleen is of unremarkable echotexture with normal size and maximum diameter of 10.2 cm. No focal splenic lesion visualized. The inferior vena cava appears unremarkable. Visualized portion of the abdominal  aorta is without aneurysmal dilatation. Gallbladder is enlarged and measures 11.8 x 5.6 x 6.5 cm.  Gallbladder lumen contains sludge.  There are multiple gallstones and the largest measures 0.83 cm.  Gallbladder wall is thickened 4.7 mm.  Sonographer reported positive Chang's sign. Right kidney measures 12.5 x 5.6 x 6.0 cm and left kidney measures 11.2 x 6.2 x 5.2 cm.  Right kidney cortical volume is adequate and the corticomedullary differentiation is preserved.  There is limited visualized left kidney due to patient's body habitus.  No definite kidneys collecting system dilatation.     1. Hepatomegaly and hepatic steatosis. 2. Thrombosed main portal vein. 3.  Enlarged gallbladder containing sludge and multiple calculi.  Thickened gallbladder wall and positive Chang sign is suspected for acute cholecystitis. Electronically signed by: Mat Sommers Date:    07/04/2025 Time:    15:40    CT Abdomen Pelvis  Without Contrast  Result Date: 7/4/2025  EXAMINATION: CT ABDOMEN PELVIS WITHOUT CONTRAST CLINICAL HISTORY: Right upper quadrant pain, elevated bilirubin, history of cirrhosis and portal vein thrombosis; TECHNIQUE: CT imaging of the abdomen and pelvis without intravenous contrast. Axial, coronal and sagittal reformatted images reviewed. Dose length product is 486 mGycm. Automatic exposure control, adjustment of mA/kV or iterative reconstruction technique used to limit radiation dose. COMPARISON: CT 06/17/2025 FINDINGS: Assessment of the visceral organs and vasculature is limited by the lack of IV contrast. Liver/biliary: Cirrhosis.  Artifact through the posterior right liver due to arm positioning with limited overall evaluation of the hepatic parenchyma given this artifact and lack of IV contrast.  Gallbladder distended with multiple gallstones.  No biliary dilatation. Pancreas: Normal. Spleen: Normal. Adrenals: Normal. Genitourinary: 7 mm left renal stone.  No hydronephrosis.  Bladder within normal limits.  Stomach/bowel: No bowel obstruction. Normal appendix.  Mild proximal colonic wall thickening. Lymph nodes and peritoneum: Few borderline enlarged periportal lymph nodes similar to prior CT.  Small volume ascites. Vasculature: Numerous aortic and iliac artery calcifications. Abdominal wall: Normal. Lung bases: Mild dependent atelectasis. Bones: No acute osseous findings.     Cirrhosis with small volume ascites. Mild wall thickening of the proximal colon favored secondary to the ascites, but colitis also possible. Distended gallbladder with several gallstones, but no defined gallbladder wall thickening. Electronically signed by: John Paul Chicas Date:    07/04/2025 Time:    10:52    CT Head Without Contrast  Result Date: 7/4/2025  EXAMINATION: CT HEAD WITHOUT CONTRAST CLINICAL HISTORY: Mental status change, unknown cause; TECHNIQUE: Sequential axial images were performed of the brain without contrast. Dose product length of 1060 mGycm. Automated exposure control was utilized to minimize radiation dose. COMPARISON: June 2, 2017. FINDINGS: There is no intracranial mass effect, midline shift, hydrocephalus or hemorrhage. There is no sulcal effacement or low attenuation changes to suggest recent large vessel territory infarction.  There is left paramedian pontine lacunar infarct which is new since the previous exam.  There is also new left internal capsule posterior limb new lacunar infarct.  Chronic microangiopathic ischemic changes are mild.  The ventricular system and sulcal markings prominence is consistent with atrophy. There is no acute extra axial fluid collection. Mucoperiosteal thickening and fluid left maxillary sinus.  Otherwise, visualized paranasal sinuses are clear without mucosal thickening, polypoidal abnormality or air-fluid levels. Mastoid air cells aeration is optimal.     1.  No acute large vessel territory infarct identified. 2.  New but nonacute appearing lacunar infarcts pk and the left internal  capsule.  If clinically indicated, consideration may be given to further assessment with MRI of the brain. Electronically signed by: Mat Sommers Date:    07/04/2025 Time:    08:57    Echo  Result Date: 6/26/2025    Left Ventricle: The left ventricle is normal in size. Normal wall thickness. There is normal systolic function with a visually estimated ejection fraction of 60 - 65%.   Right Ventricle: The right ventricle is normal in size Systolic function is reduced.TAPSE is 1.5 cm.   Aortic Valve: The aortic valve is a trileaflet valve. There is aortic valve sclerosis.   IVC/SVC: Normal venous pressure at 3 mmHg.     MRI Abdomen W WO Contrast  Result Date: 6/21/2025  EXAMINATION: MRI ABDOMEN W WO CONTRAST CLINICAL HISTORY: Cirrhosis.Liver lesion, > 1cm; TECHNIQUE: Multiplanar multisequence MRI of the abdomen performed without and with gadolinium based IV contrast . COMPARISON: CT 17 June 2025, CT 18 October 2023 FINDINGS: This exam is nondiagnostic for HCC as only delayed scans obtained secondary to patient motion and inability to hold breath. The liver is cirrhotic and there is portal vein thrombus also seen on CT.  Heterogeneous enhancement of the liver likely relates to portal vein thrombus.  No discrete suspicious lesion seen on diffusion imaging.  Small volume ascites. There are gallstones.  No significant biliary ductal dilatation.  The spleen is not significantly enlarged.  No gross abnormality pancreas or adrenals.  No hydronephrosis. No dilated bowel loops.  Abdominal aorta normal in caliber.     1. Cirrhosis with portal vein thrombus and small volume ascites. 2. Nondiagnostic exam for HCC secondary to timing of contrast.  Recommend outpatient liver mass protocol CT. Electronically signed by: Andriy Laws Date:    06/21/2025 Time:    11:19    IR Paracentesis with Imaging  Result Date: 6/18/2025  PROCEDURE: Ultrasound Guided Paracentesis CLINICAL HISTORY: 64-year-old male with cirrhosis, portal vein  thrombus, and ascites ANESTHESIA: Local anesthesia PHYSICIANS: Neto Bird MD PROCEDURAL SUMMARY: After informed consent was obtained, the patient was placed supine on the ultrasound table. A limited ultrasound of the abdomen was performed with Dr. Bird present in the room.  This confirmed the presence of an appropriate volume of ascites for drainage.  The planned skin entry site and route for needle passage for paracentesis was then determined. The skin overlying the right lower quadrant of the abdomen was marked.  The site was then prepped and draped in the usual sterile fashion. The soft tissues were anesthetized with lidocaine and a dermatotomy was performed.  Under ultrasound guidance, a sheath needle was advanced from the skin entry site into the peritoneal cavity.  When the needle entered the peritoneal cavity, fluid was aspirated.  The sheath was then advanced over the needle into the cavity.  The needle was removed.  Aspiration was performed and a total of 2.1 L of serous fluid was drained from the peritoneal cavity.  The catheter was then removed.  A sterile dressing was applied. The patient tolerated the procedure well and was without any apparent complications.     Technically successful ultrasound-guided paracentesis. Electronically signed by: Neto Bird Date:    06/18/2025 Time:    15:02    CT Abdomen Pelvis With IV Contrast NO Oral Contrast  Result Date: 6/18/2025  EXAMINATION: CT ABDOMEN PELVIS WITH IV CONTRAST CLINICAL HISTORY: Abdominal pain and abdominal distension TECHNIQUE: Axial CT images were obtained through the abdomen and pelvis following IV administration of contrast.  100 mL Omnipaque 350.  Coronal and sagittal reconstructions submitted and interpreted.  Automated exposure control, dose radiation lowering technique, was utilized. COMPARISON: CT abdomen and pelvis without contrast from 10/18/2023 CT abdomen and pelvis from 10/16/2023 Abdominal sonogram from 06/02/2017 FINDINGS:  Cirrhotic morphology to the liver.  Thrombus is present within the distal segment of the main portal vein with extension into the right and left portal system.  Thrombus is occlusive at multiple segments of the right portal system.  Splenic vein and SMV appear patent. There is geographic area of slightly increased enhancement at segment 8 measuring 4.2 x 4.0 cm.  No other hypervascular areas.  There are multiple areas of patchy decreased density in the right hepatic lobe which is felt to be secondary to portal vein thrombosis. Spleen is nonenlarged.  Pancreas, adrenal glands and right kidney appear normal.  Nonobstructing left kidney stone measures 10 mm.  No hydronephrosis. Cholelithiasis is present.  The bowel is nonobstructed.  Air and stool are present throughout the colon which appears within normal limits.  Normal appendix is present.  There is moderate volume ascites.  No free air. Moderate atherosclerosis of the abdominal aorta and its branches is present.  Mild, diffuse bladder wall thickening is present.  No suspicious osteolytic or osteoblastic lesion.     1. Portal vein thrombosis which is occlusive within the right portal system.  There is some collaterals within the annette hepatis with suggests some degree of chronic component.  Given the appearance of the thrombus in addition to the small amount of portal venous collaterals, favor acute on chronic thrombosis. 2. Sequelae of cirrhosis with moderate volume ascites and small gastroesophageal varices. 3. Hypervascular area in segment 8 (image 31, series 1).  Favor perfusion abnormality related to portal vein thrombosis.  However, consider multiphase abdominal MRI to evaluate for potential tumor. 4. Cholelithiasis. 5. Nonobstructing left-sided kidney stone. Electronically signed by: Mukesh Lu MD Date:    06/18/2025 Time:    08:04    CTA Chest Non-Coronary (PE Studies)  Result Date: 6/18/2025  EXAMINATION: CTA CHEST NON CORONARY (PE STUDIES) CLINICAL  HISTORY: Pulmonary embolism suspected. 63 yo male presenting with feeling unwell for the past month and a half. He reports abdominal bloating, leg swelling, and decreased appetite. He reports generalized weakness as well. States he normally drinks alcohol daily but hasn't in the past 30 days or so. TECHNIQUE: Axial CT images were obtained through the chest after IV administration of contrast. 100 mL Omnipaque 350. MIP, coronal and sagittal reconstructions submitted and interpreted.  Automated exposure control, dose radiation lowering technique, was utilized. COMPARISON: None FINDINGS: Slightly limited exam secondary to contrast bolus timing. Soft Tissues: Unremarkable. Lines and Tubes: None. Neck: The visualized soft tissues of the neck appear unremarkable. Mediastinum: The mediastinal structures are within normal limits. Heart: The heart size is within normal limits. Mild coronary artery calcification is seen. Aorta: No aortic dissection or aneurysm is seen. Mild aortic calcification is seen in the thoracic aorta. Pulmonary Arteries: No filling defects are seen in the pulmonary arteries to suggest pulmonary embolus. Lungs: There is moderate non specific dependent change at the lung bases. Mild streaky linear opacity is seen consistent with scarring and subsegmental. No acute focal infiltrate or consolidation is seen. Pleura: No effusions or pneumothorax are identified. Bony Structures: Spine: Subtle spondylolytic changes are seen in the thoracic spine. Ribs: The ribs appear unremarkable.     Slightly limited exam of the pulmonary arteries secondary to contrast bolus timing. 1. No filling defects are seen in the pulmonary arteries to suggest pulmonary embolus. 2. No acute focal infiltrate or consolidation is seen. 3. Details and other findings as discussed above. [facility] concurrence Electronically signed by: Mukesh Lu MD Date:    06/18/2025 Time:    07:57         Assessment/Plan:  63 yo AAM known to   [facility] from previous admission with PMH of HCV cirrhosis, hepatic lesion, elevated AFP, PVT dx 6/2025, HTN, cocaine use, and medical non compliance. Presented to OSH with abdominal pain. Transferred due to elevated lfts.      HCV Cirrhosis  MELD 3.0: 15 at 6/18/2025  -- 20 at 7/7/2025  Ascites: +  Screening for varices: Grade 1 esoV on EGD 6/20/25.  Encephalopathy: no s/s at this time, no hx     Liver mass - concern for HCC  Liver mass noted on CT 10/2023 -- again seen 6/2025 -- present again now.   AFP 19 10/2023 -- 83.5 this admit    PVT  On levenox (held today for IR kirk tube)     4. Elevated LFTs - suspect multifactorial related to cirrhosis, suspect HCC, PVT, and possible cholecystitis.    MRCP 7/5/25: Distended gallbladder with underlying cholelithiasis. Areas of mild right intrahepatic biliary dilatation. No extrahepatic biliary dilatation identified.   - no indication for ERCP   - 3 month follow-up MRI can be pursued to reassess per radiology.  - monitor lft trend.     Renetta Howard PA-C

## 2025-07-08 NOTE — PROGRESS NOTES
[facility] - On[facility] MEDICINE ~ PROGRESS NOTE        CHIEF COMPLAINT   Hospital follow up    HOSPITAL COURSE   64-year-old male with a past medical history of carpal tunnel syndrome, HTN, sciatica, portal vein thrombosis, cocaine use, hepatitis-C, hepatic adenoma with concern of HCC, cirrhosis.  Presented to outside emergency department with complaints of abdominal pain x2 months and was found to have acute on chronic hyperbilirubinemia, leukocytosis and thus transferred to our facility.  At the time of my examination this morning he is drowsy, did not really answer much questions, awakens with stimulus.  Ultrasound abdomen shows hepatomegaly with hepatic steatosis, thrombosed main portal vein, enlarged gallbladder, thickened gallbladder wall with a positive Chang's sign concerning for acute cholecystitis.  MRCP shows challenging evaluation due to underlying portal vein thrombosis, 15 mm area of arterial enhancement could be small HCC, distended gallbladder with underlying cholelithiasis, no extrahepatic biliary dilation.  Underwent cholecystostomy tube July 7th with Interventional Radiology.    Today  Pending cultures from the cholecystostomy tube.  Bile present in the tube bag.  Patient is complaining that he did not receive his bright.        OBJECTIVE/PHYSICAL EXAM     VITAL SIGNS (MOST RECENT):  Temp: 97.5 °F (36.4 °C) (07/08/25 0700)  Pulse: 67 (07/08/25 0713)  Resp: 18 (07/08/25 0218)  BP: (!) 127/93 (07/08/25 0700)  SpO2: 99 % (07/08/25 0700) VITAL SIGNS (24 HOUR RANGE):  Temp:  [97.5 °F (36.4 °C)-98.5 °F (36.9 °C)] 97.5 °F (36.4 °C)  Pulse:  [61-77] 67  Resp:  [12-20] 18  SpO2:  [97 %-100 %] 99 %  BP: (127-167)/(68-93) 127/93   GENERAL: In no acute distress, afebrile  HEENT:  CHEST: Clear to auscultation bilaterally  HEART: S1, S2, no appreciable murmur  ABDOMEN: Soft, right upper quadrant tenderness, BS +, biliary drain present  MSK: Warm, no lower extremity edema, no  clubbing or cyanosis  NEUROLOGIC: Alert and oriented x4, moving all extremities with good strength   INTEGUMENTARY:  PSYCHIATRY:        ASSESSMENT/PLAN   Acute cholecystitis  Acute kidney injury   Acute on chronic hyperbilirubinemia  Recent known portal vein thrombosis  Hypoglycemia     History of: As listed above        General surgery signed off.  Recommends IR.  GI following.  Discontinue IV fluids today  Zosyn follow cultures.  Plan for discharge on oral antibiotics.  Resume oral anticoagulation starting tonight  Plan for discharge once we have culture results back, general surgery will need to manage his tube.  We will reach out to them regarding this.    DVT prophylaxis:  Xarelto    Anticipated discharge and disposition:   __________________________________________________________________________    NUTRITIONAL STATUS     Patient meets ASPEN criteria for moderate malnutrition of chronic illness per RD assessment as evidenced by:  Energy Intake (Malnutrition): other (see comments) (Unable to assess)  Weight Loss (Malnutrition): greater than 7.5% in 3 months  Subcutaneous Fat (Malnutrition): mild depletion  Muscle Mass (Malnutrition): mild depletion  Fluid Accumulation (Malnutrition): other (see comments) (Not present)        A minimum of two characteristics is recommended for diagnosis of either severe or non-severe malnutrition.     LABS/MICRO/MEDS/DIAGNOSTICS       LABS  Recent Labs     07/08/25 0317      K 3.8   CO2 23   BUN 26.2*   CREATININE 1.20   CALCIUM 7.4*   ALKPHOS 250*   *   *   ALBUMIN 1.3*     Recent Labs     07/08/25 0317   WBC 12.24*   RBC 3.09*   HCT 28.6*   MCV 92.6          MICROBIOLOGY  Microbiology Results (last 7 days)       Procedure Component Value Units Date/Time    Body Fluid Culture [[id number]] Collected: 07/07/25 1636    Order Status: Completed Specimen: Fluids from Gallbladder Updated: 07/08/25 0679     Body Fluid Culture No Growth At 24 Hours    Gram  Stain [[id number]] Collected: 07/07/25 1639    Order Status: Completed Specimen: Aspirate from Gallbladder Updated: 07/08/25 0653     GRAM STAIN No WBCs, No bacteria seen    Anaerobic Culture [[id number]] Collected: 07/07/25 1639    Order Status: Sent Specimen: Aspirate from Gallbladder Updated: 07/07/25 1709    Fungal Culture [[id number]] Collected: 07/07/25 1639    Order Status: Sent Specimen: Aspirate from Gallbladder Updated: 07/07/25 1709               MEDICATIONS   ARIPiprazole  5 mg Oral Daily    EScitalopram oxalate  5 mg Oral Daily    folic acid  1 mg Oral Daily    nicotine  1 patch Transdermal Daily    pantoprazole  40 mg Oral Daily    piperacillin-tazobactam (Zosyn) IV (PEDS and ADULTS) (extended infusion is not appropriate)  4.5 g Intravenous Q8H    thiamine  100 mg Oral Daily         INFUSIONS   0.9% NaCl   Intravenous Continuous 50 mL/hr at 07/08/25 0716 Rate Verify at 07/08/25 0716          DIAGNOSTIC TESTS  IR CT Guidance   Final Result      Technically successful CT-guided cholecystostomy tube placement and paracentesis.         Electronically signed by: Neto Bird   Date:    07/07/2025   Time:    17:41      CT Abdomen Pelvis w/o Contrast Plus Triphasic w/Contrast   Final Result      Enlarged nodular cirrhotic appearing liver with couple of areas of nonspecific focal enhancement as outlined above.  Short-term follow-up is recommended.      Portal vein thrombosis with cavernous transformation of the portal vein which is a known finding      Distended gallbladder with multiple gallstones      Large volume ascites      Right lower lobe interstitial pneumonia with questionable patchy infiltrate developing in the left lower lobe         Electronically signed by: Julio César Navarro   Date:    07/07/2025   Time:    16:30      MRI MRCP Abdomen W WO Contrast 3D WO Independent WS XPD   Final Result      Challenging evaluation due to changes from underlying portal vein thrombus.  15 mm area of arterial  enhancement in the superior right liver could be small HCC.  Given exam limitations, 3 month follow-up MRI can be pursued to reassess.      Distended gallbladder with underlying cholelithiasis.  Areas of mild right intrahepatic biliary dilatation.  No extrahepatic biliary dilatation identified.         Electronically signed by: John Paul Chicas   Date:    07/05/2025   Time:    12:16      CT Abdomen Pelvis  Without Contrast   Final Result      Cirrhosis with small volume ascites.      Mild wall thickening of the proximal colon favored secondary to the ascites, but colitis also possible.      Distended gallbladder with several gallstones, but no defined gallbladder wall thickening.         Electronically signed by: John Paul Chicas   Date:    07/04/2025   Time:    10:52      CT Head Without Contrast   Final Result      1.  No acute large vessel territory infarct identified.      2.  New but nonacute appearing lacunar infarcts pk and the left internal capsule.  If clinically indicated, consideration may be given to further assessment with MRI of the brain.         Electronically signed by: Mat Sommers   Date:    07/04/2025   Time:    08:57      US Abdomen Complete   Final Result      1. Hepatomegaly and hepatic steatosis.      2. Thrombosed main portal vein.      3.  Enlarged gallbladder containing sludge and multiple calculi.  Thickened gallbladder wall and positive Chang sign is suspected for acute cholecystitis.         Electronically signed by: Mat Sommers   Date:    07/04/2025   Time:    15:40           Echo  Result Date: 6/26/2025    Left Ventricle: The left ventricle is normal in size. Normal wall   thickness. There is normal systolic function with a visually estimated   ejection fraction of 60 - 65%.    Right Ventricle: The right ventricle is normal in size Systolic   function is reduced.TAPSE is 1.5 cm.    Aortic Valve: The aortic valve is a trileaflet valve. There is aortic   valve sclerosis.    IVC/SVC:  Normal venous pressure at 3 mmHg.               Case related differential diagnoses have been reviewed; assessment and plan has been documented. I have personally reviewed the labs and test results that are currently available; I have reviewed the patients medication list. I have reviewed the consulting providers recommendations. I have reviewed or attempted to review medical records based upon their availability.  All of the patient's and/or family's questions have been addressed and answered to the best of my ability.  I will continue to monitor closely and make adjustments to medical management as needed.  This document was created using M*Modal Fluency Direct.  Transcription errors may have been made.  Please contact me if any questions may rise regarding documentation to clarify transcription.        Ricardo Trotter MD   Internal Medicine  Department of Hospital Medicine  [facility]  Oncology [facility]

## 2025-07-09 LAB
ALBUMIN SERPL-MCNC: 1.3 G/DL (ref 3.4–4.8)
ALBUMIN/GLOB SERPL: 0.2 RATIO (ref 1.1–2)
ALP SERPL-CCNC: 262 UNIT/L (ref 40–150)
ALT SERPL-CCNC: 193 UNIT/L (ref 0–55)
AMMONIA PLAS-MSCNC: 35.6 UMOL/L (ref 18–72)
ANION GAP SERPL CALC-SCNC: 6 MEQ/L
AST SERPL-CCNC: 112 UNIT/L (ref 11–45)
BASOPHILS # BLD AUTO: 0.03 X10(3)/MCL
BASOPHILS NFR BLD AUTO: 0.3 %
BILIRUB SERPL-MCNC: 2.9 MG/DL
BUN SERPL-MCNC: 22.9 MG/DL (ref 8.4–25.7)
CALCIUM SERPL-MCNC: 7.8 MG/DL (ref 8.8–10)
CHLORIDE SERPL-SCNC: 110 MMOL/L (ref 98–107)
CO2 SERPL-SCNC: 21 MMOL/L (ref 23–31)
CREAT SERPL-MCNC: 1.24 MG/DL (ref 0.72–1.25)
CREAT/UREA NIT SERPL: 18
EOSINOPHIL # BLD AUTO: 0.16 X10(3)/MCL (ref 0–0.9)
EOSINOPHIL NFR BLD AUTO: 1.3 %
ERYTHROCYTE [DISTWIDTH] IN BLOOD BY AUTOMATED COUNT: 17.1 % (ref 11.5–17)
GFR SERPLBLD CREATININE-BSD FMLA CKD-EPI: >60 ML/MIN/1.73/M2
GLOBULIN SER-MCNC: 6.7 GM/DL (ref 2.4–3.5)
GLUCOSE SERPL-MCNC: 104 MG/DL (ref 82–115)
HCT VFR BLD AUTO: 32 % (ref 42–52)
HGB BLD-MCNC: 10.1 G/DL (ref 14–18)
IMM GRANULOCYTES # BLD AUTO: 0.08 X10(3)/MCL (ref 0–0.04)
IMM GRANULOCYTES NFR BLD AUTO: 0.7 %
LYMPHOCYTES # BLD AUTO: 1.04 X10(3)/MCL (ref 0.6–4.6)
LYMPHOCYTES NFR BLD AUTO: 8.7 %
MCH RBC QN AUTO: 30.1 PG (ref 27–31)
MCHC RBC AUTO-ENTMCNC: 31.6 G/DL (ref 33–36)
MCV RBC AUTO: 95.5 FL (ref 80–94)
MONOCYTES # BLD AUTO: 1.1 X10(3)/MCL (ref 0.1–1.3)
MONOCYTES NFR BLD AUTO: 9.2 %
NEUTROPHILS # BLD AUTO: 9.57 X10(3)/MCL (ref 2.1–9.2)
NEUTROPHILS NFR BLD AUTO: 79.8 %
NRBC BLD AUTO-RTO: 0 %
PLATELET # BLD AUTO: 290 X10(3)/MCL (ref 130–400)
PMV BLD AUTO: 9.6 FL (ref 7.4–10.4)
POTASSIUM SERPL-SCNC: 3.7 MMOL/L (ref 3.5–5.1)
PROT SERPL-MCNC: 8 GM/DL (ref 5.8–7.6)
RBC # BLD AUTO: 3.35 X10(6)/MCL (ref 4.7–6.1)
SODIUM SERPL-SCNC: 137 MMOL/L (ref 136–145)
WBC # BLD AUTO: 11.98 X10(3)/MCL (ref 4.5–11.5)

## 2025-07-09 PROCEDURE — 85025 COMPLETE CBC W/AUTO DIFF WBC: CPT | Performed by: INTERNAL MEDICINE

## 2025-07-09 PROCEDURE — 25000003 PHARM REV CODE 250: Performed by: HOSPITALIST

## 2025-07-09 PROCEDURE — 25000003 PHARM REV CODE 250: Performed by: INTERNAL MEDICINE

## 2025-07-09 PROCEDURE — 36415 COLL VENOUS BLD VENIPUNCTURE: CPT | Performed by: INTERNAL MEDICINE

## 2025-07-09 PROCEDURE — 21400001 HC TELEMETRY ROOM

## 2025-07-09 PROCEDURE — 80053 COMPREHEN METABOLIC PANEL: CPT | Performed by: INTERNAL MEDICINE

## 2025-07-09 PROCEDURE — S4991 NICOTINE PATCH NONLEGEND: HCPCS | Performed by: HOSPITALIST

## 2025-07-09 PROCEDURE — 11000001 HC ACUTE MED/SURG PRIVATE ROOM

## 2025-07-09 PROCEDURE — 82140 ASSAY OF AMMONIA: CPT | Performed by: INTERNAL MEDICINE

## 2025-07-09 PROCEDURE — 63600175 PHARM REV CODE 636 W HCPCS: Performed by: HOSPITALIST

## 2025-07-09 RX ADMIN — PIPERACILLIN SODIUM AND TAZOBACTAM SODIUM 4.5 G: 4; .5 INJECTION, POWDER, LYOPHILIZED, FOR SOLUTION INTRAVENOUS at 04:07

## 2025-07-09 RX ADMIN — PANTOPRAZOLE SODIUM 40 MG: 40 TABLET, DELAYED RELEASE ORAL at 09:07

## 2025-07-09 RX ADMIN — FOLIC ACID 1 MG: 1 TABLET ORAL at 09:07

## 2025-07-09 RX ADMIN — THIAMINE HCL TAB 100 MG 100 MG: 100 TAB at 09:07

## 2025-07-09 RX ADMIN — NICOTINE 1 PATCH: 14 PATCH TRANSDERMAL at 09:07

## 2025-07-09 RX ADMIN — HYDROCODONE BITARTRATE AND ACETAMINOPHEN 1 TABLET: 5; 325 TABLET ORAL at 08:07

## 2025-07-09 RX ADMIN — PIPERACILLIN SODIUM AND TAZOBACTAM SODIUM 4.5 G: 4; .5 INJECTION, POWDER, LYOPHILIZED, FOR SOLUTION INTRAVENOUS at 08:07

## 2025-07-09 RX ADMIN — ARIPIPRAZOLE 5 MG: 5 TABLET ORAL at 09:07

## 2025-07-09 RX ADMIN — HYDROCODONE BITARTRATE AND ACETAMINOPHEN 1 TABLET: 10; 325 TABLET ORAL at 09:07

## 2025-07-09 RX ADMIN — ESCITALOPRAM OXALATE 5 MG: 5 TABLET, FILM COATED ORAL at 09:07

## 2025-07-09 RX ADMIN — RIVAROXABAN 20 MG: 10 TABLET, FILM COATED ORAL at 04:07

## 2025-07-09 NOTE — PROGRESS NOTES
"Gastroenterology Progress Note    Subjective/Interval History:  WBC 11.98 today. Tbili overall stable at 2.9. , , .     Pt continuing to complain of weakness. Some abdominal pain as well but not notably tender on my exam. Upset about having blood drawn.    Vital Signs:  BP (!) 148/71   Pulse 71   Temp 98.1 °F (36.7 °C) (Oral)   Resp 18   Ht 5' 8.9" (1.75 m)   Wt 54.1 kg (119 lb 4.3 oz)   SpO2 99%   BMI 17.67 kg/m²   Body mass index is 17.67 kg/m².    Physical Exam:  Physical Exam  Constitutional:       General: He is not in acute distress.     Appearance: He is ill-appearing (chronically).   HENT:      Head: Normocephalic and atraumatic.   Eyes:      General: No scleral icterus.     Extraocular Movements: Extraocular movements intact.   Cardiovascular:      Rate and Rhythm: Normal rate and regular rhythm.   Pulmonary:      Effort: Pulmonary effort is normal. No respiratory distress.   Abdominal:      General: Bowel sounds are normal. There is distension (mild).      Tenderness: There is abdominal tenderness (mild).   Musculoskeletal:      Right lower leg: No edema.      Left lower leg: No edema.   Skin:     General: Skin is warm and dry.      Coloration: Skin is not jaundiced.   Neurological:      Mental Status: He is alert. Mental status is at baseline.   Psychiatric:         Mood and Affect: Mood normal.         Behavior: Behavior normal.         Labs:  Recent Results (from the past 48 hours)   Body Fluid Culture    Collection Time: 07/07/25  4:39 PM    Specimen: Gallbladder; Fluids   Result Value Ref Range    Body Fluid Culture No Growth At 48 Hours    Anaerobic Culture    Collection Time: 07/07/25  4:39 PM    Specimen: Gallbladder; Aspirate   Result Value Ref Range    Anaerobe Culture No Anaerobes Isolated    Gram Stain    Collection Time: 07/07/25  4:39 PM    Specimen: Gallbladder; Aspirate   Result Value Ref Range    GRAM STAIN No WBCs, No bacteria seen    Comprehensive Metabolic " Panel    Collection Time: 07/08/25  3:17 AM   Result Value Ref Range    Sodium 137 136 - 145 mmol/L    Potassium 3.8 3.5 - 5.1 mmol/L    Chloride 111 (H) 98 - 107 mmol/L    CO2 23 23 - 31 mmol/L    Glucose 101 82 - 115 mg/dL    Blood Urea Nitrogen 26.2 (H) 8.4 - 25.7 mg/dL    Creatinine 1.20 0.72 - 1.25 mg/dL    Calcium 7.4 (L) 8.8 - 10.0 mg/dL    Protein Total 7.2 5.8 - 7.6 gm/dL    Albumin 1.3 (L) 3.4 - 4.8 g/dL    Globulin 5.9 (H) 2.4 - 3.5 gm/dL    Albumin/Globulin Ratio 0.2 (L) 1.1 - 2.0 ratio    Bilirubin Total 2.3 (H) <=1.5 mg/dL     (H) 40 - 150 unit/L     (H) 0 - 55 unit/L     (H) 11 - 45 unit/L    eGFR >60 mL/min/1.73/m2    Anion Gap 3.0 mEq/L    BUN/Creatinine Ratio 22    CBC with Differential    Collection Time: 07/08/25  3:17 AM   Result Value Ref Range    WBC 12.24 (H) 4.50 - 11.50 x10(3)/mcL    RBC 3.09 (L) 4.70 - 6.10 x10(6)/mcL    Hgb 9.2 (L) 14.0 - 18.0 g/dL    Hct 28.6 (L) 42.0 - 52.0 %    MCV 92.6 80.0 - 94.0 fL    MCH 29.8 27.0 - 31.0 pg    MCHC 32.2 (L) 33.0 - 36.0 g/dL    RDW 17.1 (H) 11.5 - 17.0 %    Platelet 313 130 - 400 x10(3)/mcL    MPV 9.8 7.4 - 10.4 fL    Neut % 74.7 %    Lymph % 10.2 %    Mono % 13.6 %    Eos % 0.7 %    Basophil % 0.2 %    Imm Grans % 0.6 %    Neut # 9.14 2.1 - 9.2 x10(3)/mcL    Lymph # 1.25 0.6 - 4.6 x10(3)/mcL    Mono # 1.66 (H) 0.1 - 1.3 x10(3)/mcL    Eos # 0.09 0 - 0.9 x10(3)/mcL    Baso # 0.03 <=0.2 x10(3)/mcL    Imm Gran # 0.07 (H) 0.00 - 0.04 x10(3)/mcL    NRBC% 0.0 %   Comprehensive Metabolic Panel    Collection Time: 07/09/25  8:59 AM   Result Value Ref Range    Sodium 137 136 - 145 mmol/L    Potassium 3.7 3.5 - 5.1 mmol/L    Chloride 110 (H) 98 - 107 mmol/L    CO2 21 (L) 23 - 31 mmol/L    Glucose 104 82 - 115 mg/dL    Blood Urea Nitrogen 22.9 8.4 - 25.7 mg/dL    Creatinine 1.24 0.72 - 1.25 mg/dL    Calcium 7.8 (L) 8.8 - 10.0 mg/dL    Protein Total 8.0 (H) 5.8 - 7.6 gm/dL    Albumin 1.3 (L) 3.4 - 4.8 g/dL    Globulin 6.7 (H) 2.4 - 3.5  gm/dL    Albumin/Globulin Ratio 0.2 (L) 1.1 - 2.0 ratio    Bilirubin Total 2.9 (H) <=1.5 mg/dL     (H) 40 - 150 unit/L     (H) 0 - 55 unit/L     (H) 11 - 45 unit/L    eGFR >60 mL/min/1.73/m2    Anion Gap 6.0 mEq/L    BUN/Creatinine Ratio 18    CBC with Differential    Collection Time: 07/09/25  8:59 AM   Result Value Ref Range    WBC 11.98 (H) 4.50 - 11.50 x10(3)/mcL    RBC 3.35 (L) 4.70 - 6.10 x10(6)/mcL    Hgb 10.1 (L) 14.0 - 18.0 g/dL    Hct 32.0 (L) 42.0 - 52.0 %    MCV 95.5 (H) 80.0 - 94.0 fL    MCH 30.1 27.0 - 31.0 pg    MCHC 31.6 (L) 33.0 - 36.0 g/dL    RDW 17.1 (H) 11.5 - 17.0 %    Platelet 290 130 - 400 x10(3)/mcL    MPV 9.6 7.4 - 10.4 fL    Neut % 79.8 %    Lymph % 8.7 %    Mono % 9.2 %    Eos % 1.3 %    Basophil % 0.3 %    Imm Grans % 0.7 %    Neut # 9.57 (H) 2.1 - 9.2 x10(3)/mcL    Lymph # 1.04 0.6 - 4.6 x10(3)/mcL    Mono # 1.10 0.1 - 1.3 x10(3)/mcL    Eos # 0.16 0 - 0.9 x10(3)/mcL    Baso # 0.03 <=0.2 x10(3)/mcL    Imm Gran # 0.08 (H) 0.00 - 0.04 x10(3)/mcL    NRBC% 0.0 %   Ammonia    Collection Time: 07/09/25 12:18 PM   Result Value Ref Range    Ammonia Level 35.6 18.0 - 72.0 umol/L       Imaging:  IR CT Guidance  Result Date: 7/7/2025  PROCEDURE: CT Guided Cholecystostomy Tube Placement & Paracentesis CLINICAL HISTORY: 64-year-old male with cholecystitis, cirrhosis, portal vein thrombus and ascites ANESTHESIA: Level of sedation: Moderate sedation Medications: 1 mg Versed IV; 50 mcg Fentanyl IV; 0 mg Ativan IV; other: None Administration: Moderate sedation was administered during this procedure. Continuous monitoring of the patient's level of consciousness and physiological status was provided throughout the procedure by an independent trained observer under the direct supervision of the operating physician. Duration of sedation: 19 minutes PHYSICIANS: Neto Bird MD RADIATION DOSE: Total DLP = 916 mGy-cm CONTRAST: None PROCEDURAL SUMMARY: After informed consent was obtained,  the patient was placed supine on the CT table. A limited CT scan of the right upper quadrant of abdomen was performed to determine a safe skin entry site and route of needle passage for the drainage and for paracentesis.  Targeting the gallbladder lumen. The skin overlying the entry site was marked. The site was then prepped and draped in the usual sterile fashion. Prior to cholecystostomy tube placement, a paracentesis was performed.  The soft tissues were anesthetized with lidocaine and a dermatotomy was performed.  Under CT guidance, a 5F Yueh sheath needle was advanced from the skin entry site towards the intra-abdominal fluid.  When the needle tip entered the fluid, serous fluid was aspirated.  The sheath was advanced over the needle into the collection and the needle was removed.  The cavity was aspirated to completion and the sheath was removed.  A total of 1.3 L was aspirated.  A sterile dressing was applied to the site. Attention was then turned back to the cholecystostomy tube.  The soft tissues were anesthetized with lidocaine and a dermatotomy was performed.  Under intermittent CT guidance, a 5F Yueh sheath needle was advanced from the skin entry site towards the gallbladder lumen.  When the needle tip entered the gallbladder, fluid was aspirated. The sheath was then advanced over the needle into the lumen and the needle was removed. An Amplatz guidewire was then advanced through the sheath and coiled within the gallbladder.  The sheath was removed and the tract was serially dilated to accommodate a 8F pigtail drainage catheter.  The catheter was advanced over the wire and into the lumen.  The wire was then removed. The [place] loop was formed. A total of 10 cc of bilious fluid was aspirated. The drainage catheter was then attached to the skin.  A sterile dressing was applied and the catheter was connected to a gravity bag.  A final set of images were obtained demonstrating the drain in appropriate position  and no evidence of active hemorrhage or other injury. The patient tolerated the procedure well and was without any apparent complications.     Technically successful CT-guided cholecystostomy tube placement and paracentesis. Electronically signed by: Neto Bird Date:    07/07/2025 Time:    17:41    CT Abdomen Pelvis w/o Contrast Plus Triphasic w/Contrast  Result Date: 7/7/2025  EXAMINATION: CT ABDOMEN PELVIS W/O CONTRAST PLUS TRIPHASIC W/CONTRAST CLINICAL HISTORY: evaluate for HCC as noted on MRCP this admit; TECHNIQUE: Low dose axial images, sagittal and coronal reformations were obtained from the lung bases to the pubic symphysis following the IV administration of  contrast.  Delayed imaging and pre contrasted imaging was performed as well. Automatic exposure control is utilized to reduce patient radiation exposure. COMPARISON: MRI dated 07/05/2025 FINDINGS: There is a right lower lobe interstitial pneumonia and patchy infiltrate developing in the left lower lobe. The liver is enlarged in size with a nodular cirrhotic contour.  There is evidence of portal vein thrombosis which is a known finding.  Some cavernous transformation is seen. There is a very subtle area of enhancement neck corresponds to a 1.3 cm nodule in the right lobe of the liver best seen on image 24 series 7.  This corresponds to the lesion seen on MRI.  The lesion appears similar to the prior examination but is too small to accurately characterize.  Other additional areas of subtle enhancement also seen in the liver for example dome of the liver image 20 series 7 and segment 4 the liver image 33 series 9 and segment 7 of the liver image 36 series 7.  The findings are nonspecific at this time.  Short-term follow-up is recommended. There is large volume ascites. The gallbladder is distended and there are multiple gallstones in the gallbladder.. Pancreas appears unremarkable. No adrenal nodules are seen. Kidneys are normal in size.  There is a  nonobstructing medullary calculus in the midpole the left kidney. Abdominal aorta appears normal in caliber with some atherosclerotic plaque. Visualized portion of the bowel appears unremarkable.     Enlarged nodular cirrhotic appearing liver with couple of areas of nonspecific focal enhancement as outlined above.  Short-term follow-up is recommended. Portal vein thrombosis with cavernous transformation of the portal vein which is a known finding Distended gallbladder with multiple gallstones Large volume ascites Right lower lobe interstitial pneumonia with questionable patchy infiltrate developing in the left lower lobe Electronically signed by: Julio César Navarro Date:    07/07/2025 Time:    16:30    MRI MRCP Abdomen W WO Contrast 3D WO Independent WS XPD  Result Date: 7/5/2025  EXAMINATION: MRI MRCP ABDOMEN W WO CONTRAST 3D WO INDEPENDENT WS XPD CLINICAL HISTORY: Cholelithiasis; TECHNIQUE: Multiplanar, multisequence MR imaging of the abdomen before and after IV contrast administration.  Dedicated MRCP sequences obtained. COMPARISON: CT 07/04/2025 MRI 06/21/2025 FINDINGS: Mildly limited assessment due to overall image quality. Distended gallbladder with small gallstones.  No extrahepatic biliary dilatation or defined CBD filling defect.  Areas of mild intrahepatic biliary dilatation in the right liver.  Small volume ascites. Cirrhosis.  Continued thrombus in the main portal vein and right portal vein branches.  Heterogeneous T2 signal and enhancement in the right liver.  15 mm mildly T2 hyperintense area in the superior right liver with arterial enhancement and washout (series 1402, image 20 and series 1404, image 20).  No other measurable lesions with arterial enhancement and washout identified.  No appreciable portal vein thrombus enhancement. Normal pancreas, spleen and adrenal glands.  No enhancing renal lesion or hydronephrosis.     Challenging evaluation due to changes from underlying portal vein thrombus.  15 mm  area of arterial enhancement in the superior right liver could be small HCC.  Given exam limitations, 3 month follow-up MRI can be pursued to reassess. Distended gallbladder with underlying cholelithiasis.  Areas of mild right intrahepatic biliary dilatation.  No extrahepatic biliary dilatation identified. Electronically signed by: John Paul Chicas Date:    07/05/2025 Time:    12:16    US Abdomen Complete  Result Date: 7/4/2025  EXAMINATION: US ABDOMEN COMPLETE CLINICAL HISTORY: worse elevated bili (known cirrhosis), nargis, previously known portal vein thrombsosi; TECHNIQUE: Multiple real-time transverse and longitudinal sections were performed of the abdomen by the sonographer. Select images were submitted for review. COMPARISON: CT abdomen pelvis same date. FINDINGS: Liver is enlarged in size with maximum diameter of 20.2 cm.  Hepatic parenchyma is echogenic consistent with steatosis.. There is no delineation of discrete hepatic cystic or solid space occupying mass.  There is no flow within the portal vein which is remarkable for intraluminal 2.12 cm echogenicity consistent with thrombosis.  Pancreas obscured by overlying bowel gas.  Spleen is of unremarkable echotexture with normal size and maximum diameter of 10.2 cm. No focal splenic lesion visualized. The inferior vena cava appears unremarkable. Visualized portion of the abdominal aorta is without aneurysmal dilatation. Gallbladder is enlarged and measures 11.8 x 5.6 x 6.5 cm.  Gallbladder lumen contains sludge.  There are multiple gallstones and the largest measures 0.83 cm.  Gallbladder wall is thickened 4.7 mm.  Sonographer reported positive Chang's sign. Right kidney measures 12.5 x 5.6 x 6.0 cm and left kidney measures 11.2 x 6.2 x 5.2 cm.  Right kidney cortical volume is adequate and the corticomedullary differentiation is preserved.  There is limited visualized left kidney due to patient's body habitus.  No definite kidneys collecting system dilatation.      1. Hepatomegaly and hepatic steatosis. 2. Thrombosed main portal vein. 3.  Enlarged gallbladder containing sludge and multiple calculi.  Thickened gallbladder wall and positive Chang sign is suspected for acute cholecystitis. Electronically signed by: Mat Sommers Date:    07/04/2025 Time:    15:40    CT Abdomen Pelvis  Without Contrast  Result Date: 7/4/2025  EXAMINATION: CT ABDOMEN PELVIS WITHOUT CONTRAST CLINICAL HISTORY: Right upper quadrant pain, elevated bilirubin, history of cirrhosis and portal vein thrombosis; TECHNIQUE: CT imaging of the abdomen and pelvis without intravenous contrast. Axial, coronal and sagittal reformatted images reviewed. Dose length product is 486 mGycm. Automatic exposure control, adjustment of mA/kV or iterative reconstruction technique used to limit radiation dose. COMPARISON: CT 06/17/2025 FINDINGS: Assessment of the visceral organs and vasculature is limited by the lack of IV contrast. Liver/biliary: Cirrhosis.  Artifact through the posterior right liver due to arm positioning with limited overall evaluation of the hepatic parenchyma given this artifact and lack of IV contrast.  Gallbladder distended with multiple gallstones.  No biliary dilatation. Pancreas: Normal. Spleen: Normal. Adrenals: Normal. Genitourinary: 7 mm left renal stone.  No hydronephrosis.  Bladder within normal limits. Stomach/bowel: No bowel obstruction. Normal appendix.  Mild proximal colonic wall thickening. Lymph nodes and peritoneum: Few borderline enlarged periportal lymph nodes similar to prior CT.  Small volume ascites. Vasculature: Numerous aortic and iliac artery calcifications. Abdominal wall: Normal. Lung bases: Mild dependent atelectasis. Bones: No acute osseous findings.     Cirrhosis with small volume ascites. Mild wall thickening of the proximal colon favored secondary to the ascites, but colitis also possible. Distended gallbladder with several gallstones, but no defined gallbladder wall  thickening. Electronically signed by: John Paul Chicas Date:    07/04/2025 Time:    10:52    CT Head Without Contrast  Result Date: 7/4/2025  EXAMINATION: CT HEAD WITHOUT CONTRAST CLINICAL HISTORY: Mental status change, unknown cause; TECHNIQUE: Sequential axial images were performed of the brain without contrast. Dose product length of 1060 mGycm. Automated exposure control was utilized to minimize radiation dose. COMPARISON: June 2, 2017. FINDINGS: There is no intracranial mass effect, midline shift, hydrocephalus or hemorrhage. There is no sulcal effacement or low attenuation changes to suggest recent large vessel territory infarction.  There is left paramedian pontine lacunar infarct which is new since the previous exam.  There is also new left internal capsule posterior limb new lacunar infarct.  Chronic microangiopathic ischemic changes are mild.  The ventricular system and sulcal markings prominence is consistent with atrophy. There is no acute extra axial fluid collection. Mucoperiosteal thickening and fluid left maxillary sinus.  Otherwise, visualized paranasal sinuses are clear without mucosal thickening, polypoidal abnormality or air-fluid levels. Mastoid air cells aeration is optimal.     1.  No acute large vessel territory infarct identified. 2.  New but nonacute appearing lacunar infarcts pk and the left internal capsule.  If clinically indicated, consideration may be given to further assessment with MRI of the brain. Electronically signed by: Mat Sommers Date:    07/04/2025 Time:    08:57    Echo  Result Date: 6/26/2025    Left Ventricle: The left ventricle is normal in size. Normal wall thickness. There is normal systolic function with a visually estimated ejection fraction of 60 - 65%.   Right Ventricle: The right ventricle is normal in size Systolic function is reduced.TAPSE is 1.5 cm.   Aortic Valve: The aortic valve is a trileaflet valve. There is aortic valve sclerosis.   IVC/SVC: Normal venous  pressure at 3 mmHg.     MRI Abdomen W WO Contrast  Result Date: 6/21/2025  EXAMINATION: MRI ABDOMEN W WO CONTRAST CLINICAL HISTORY: Cirrhosis.Liver lesion, > 1cm; TECHNIQUE: Multiplanar multisequence MRI of the abdomen performed without and with gadolinium based IV contrast . COMPARISON: CT 17 June 2025, CT 18 October 2023 FINDINGS: This exam is nondiagnostic for HCC as only delayed scans obtained secondary to patient motion and inability to hold breath. The liver is cirrhotic and there is portal vein thrombus also seen on CT.  Heterogeneous enhancement of the liver likely relates to portal vein thrombus.  No discrete suspicious lesion seen on diffusion imaging.  Small volume ascites. There are gallstones.  No significant biliary ductal dilatation.  The spleen is not significantly enlarged.  No gross abnormality pancreas or adrenals.  No hydronephrosis. No dilated bowel loops.  Abdominal aorta normal in caliber.     1. Cirrhosis with portal vein thrombus and small volume ascites. 2. Nondiagnostic exam for HCC secondary to timing of contrast.  Recommend outpatient liver mass protocol CT. Electronically signed by: Andriy Laws Date:    06/21/2025 Time:    11:19    IR Paracentesis with Imaging  Result Date: 6/18/2025  PROCEDURE: Ultrasound Guided Paracentesis CLINICAL HISTORY: 64-year-old male with cirrhosis, portal vein thrombus, and ascites ANESTHESIA: Local anesthesia PHYSICIANS: Neto Bird MD PROCEDURAL SUMMARY: After informed consent was obtained, the patient was placed supine on the ultrasound table. A limited ultrasound of the abdomen was performed with Dr. Bird present in the room.  This confirmed the presence of an appropriate volume of ascites for drainage.  The planned skin entry site and route for needle passage for paracentesis was then determined. The skin overlying the right lower quadrant of the abdomen was marked.  The site was then prepped and draped in the usual sterile fashion. The soft  tissues were anesthetized with lidocaine and a dermatotomy was performed.  Under ultrasound guidance, a sheath needle was advanced from the skin entry site into the peritoneal cavity.  When the needle entered the peritoneal cavity, fluid was aspirated.  The sheath was then advanced over the needle into the cavity.  The needle was removed.  Aspiration was performed and a total of 2.1 L of serous fluid was drained from the peritoneal cavity.  The catheter was then removed.  A sterile dressing was applied. The patient tolerated the procedure well and was without any apparent complications.     Technically successful ultrasound-guided paracentesis. Electronically signed by: Neto Bird Date:    06/18/2025 Time:    15:02    CT Abdomen Pelvis With IV Contrast NO Oral Contrast  Result Date: 6/18/2025  EXAMINATION: CT ABDOMEN PELVIS WITH IV CONTRAST CLINICAL HISTORY: Abdominal pain and abdominal distension TECHNIQUE: Axial CT images were obtained through the abdomen and pelvis following IV administration of contrast.  100 mL Omnipaque 350.  Coronal and sagittal reconstructions submitted and interpreted.  Automated exposure control, dose radiation lowering technique, was utilized. COMPARISON: CT abdomen and pelvis without contrast from 10/18/2023 CT abdomen and pelvis from 10/16/2023 Abdominal sonogram from 06/02/2017 FINDINGS: Cirrhotic morphology to the liver.  Thrombus is present within the distal segment of the main portal vein with extension into the right and left portal system.  Thrombus is occlusive at multiple segments of the right portal system.  Splenic vein and SMV appear patent. There is geographic area of slightly increased enhancement at segment 8 measuring 4.2 x 4.0 cm.  No other hypervascular areas.  There are multiple areas of patchy decreased density in the right hepatic lobe which is felt to be secondary to portal vein thrombosis. Spleen is nonenlarged.  Pancreas, adrenal glands and right kidney appear  normal.  Nonobstructing left kidney stone measures 10 mm.  No hydronephrosis. Cholelithiasis is present.  The bowel is nonobstructed.  Air and stool are present throughout the colon which appears within normal limits.  Normal appendix is present.  There is moderate volume ascites.  No free air. Moderate atherosclerosis of the abdominal aorta and its branches is present.  Mild, diffuse bladder wall thickening is present.  No suspicious osteolytic or osteoblastic lesion.     1. Portal vein thrombosis which is occlusive within the right portal system.  There is some collaterals within the annette hepatis with suggests some degree of chronic component.  Given the appearance of the thrombus in addition to the small amount of portal venous collaterals, favor acute on chronic thrombosis. 2. Sequelae of cirrhosis with moderate volume ascites and small gastroesophageal varices. 3. Hypervascular area in segment 8 (image 31, series 1).  Favor perfusion abnormality related to portal vein thrombosis.  However, consider multiphase abdominal MRI to evaluate for potential tumor. 4. Cholelithiasis. 5. Nonobstructing left-sided kidney stone. Electronically signed by: Mukesh Lu MD Date:    06/18/2025 Time:    08:04    CTA Chest Non-Coronary (PE Studies)  Result Date: 6/18/2025  EXAMINATION: CTA CHEST NON CORONARY (PE STUDIES) CLINICAL HISTORY: Pulmonary embolism suspected. 65 yo male presenting with feeling unwell for the past month and a half. He reports abdominal bloating, leg swelling, and decreased appetite. He reports generalized weakness as well. States he normally drinks alcohol daily but hasn't in the past 30 days or so. TECHNIQUE: Axial CT images were obtained through the chest after IV administration of contrast. 100 mL Omnipaque 350. MIP, coronal and sagittal reconstructions submitted and interpreted.  Automated exposure control, dose radiation lowering technique, was utilized. COMPARISON: None FINDINGS: Slightly  limited exam secondary to contrast bolus timing. Soft Tissues: Unremarkable. Lines and Tubes: None. Neck: The visualized soft tissues of the neck appear unremarkable. Mediastinum: The mediastinal structures are within normal limits. Heart: The heart size is within normal limits. Mild coronary artery calcification is seen. Aorta: No aortic dissection or aneurysm is seen. Mild aortic calcification is seen in the thoracic aorta. Pulmonary Arteries: No filling defects are seen in the pulmonary arteries to suggest pulmonary embolus. Lungs: There is moderate non specific dependent change at the lung bases. Mild streaky linear opacity is seen consistent with scarring and subsegmental. No acute focal infiltrate or consolidation is seen. Pleura: No effusions or pneumothorax are identified. Bony Structures: Spine: Subtle spondylolytic changes are seen in the thoracic spine. Ribs: The ribs appear unremarkable.     Slightly limited exam of the pulmonary arteries secondary to contrast bolus timing. 1. No filling defects are seen in the pulmonary arteries to suggest pulmonary embolus. 2. No acute focal infiltrate or consolidation is seen. 3. Details and other findings as discussed above. [employer] concurrence Electronically signed by: Mukesh Lu MD Date:    06/18/2025 Time:    07:57         Assessment/Plan:  63 yo AAM known to Dr. Deleon from previous admission with PMH of HCV cirrhosis, hepatic lesion, elevated AFP, PVT dx 6/2025, HTN, cocaine use, and medical non compliance. Presented to OSH with abdominal pain. Transferred due to elevated lfts.      HCV Cirrhosis  MELD 3.0: 15 at 6/18/2025  -- 20 at 7/7/2025  Ascites: +  Screening for varices: Grade 1 esoV on EGD 6/20/25.  Encephalopathy: no s/s at this time, no hx     Liver mass - concern for HCC  Liver mass noted on CT 10/2023 -- again seen 6/2025 -- present again now.   AFP 19 10/2023 -- 83.5 this admit    PVT  On levenox (held today for IR kirk tube)     4. Elevated  LFTs - suspect multifactorial related to cirrhosis, suspect HCC, PVT, and possible cholecystitis.    MRCP 7/5/25: Distended gallbladder with underlying cholelithiasis. Areas of mild right intrahepatic biliary dilatation. No extrahepatic biliary dilatation identified.     - no indication for ERCP   - 3 month CT triple phase for reassessment of liver lesion. Needs f/u with primary GI at [facility] for following this.  - Also recommend referral on discharge to [facility] hepatology for 2nd opinion such as Dr. Herbert Pickens in [place]  - pt seems to be very weak. Consider PT eval. Discussed with hospitalist.  - monitor lft trend.     Renetta Howard PA-C

## 2025-07-09 NOTE — PLAN OF CARE
Problem: Adult Inpatient Plan of Care  Goal: Plan of Care Review  Outcome: Progressing  Goal: Patient-Specific Goal (Individualized)  Outcome: Progressing  Goal: Absence of Hospital-Acquired Illness or Injury  Outcome: Progressing  Intervention: Identify and Manage Fall Risk  Flowsheets (Taken 7/9/2025 0027)  Safety Promotion/Fall Prevention:   assistive device/personal item within reach   Fall Risk reviewed with patient/family   side rails raised x 2   nonskid shoes/socks when out of bed  Intervention: Prevent Skin Injury  Flowsheets (Taken 7/9/2025 0027)  Body Position:   position changed independently   weight shifting  Skin Protection: incontinence pads utilized  Device Skin Pressure Protection:   absorbent pad utilized/changed   tubing/devices free from skin contact  Intervention: Prevent and Manage VTE (Venous Thromboembolism) Risk  Flowsheets (Taken 7/9/2025 0027)  VTE Prevention/Management:   ambulation promoted   fluids promoted  Intervention: Prevent Infection  Flowsheets (Taken 7/9/2025 0027)  Infection Prevention:   rest/sleep promoted   single patient room provided  Goal: Optimal Comfort and Wellbeing  Outcome: Progressing  Intervention: Monitor Pain and Promote Comfort  Flowsheets (Taken 7/9/2025 0027)  Pain Management Interventions:   quiet environment facilitated   pillow support provided   medication offered but refused   care clustered  Intervention: Provide Person-Centered Care  Flowsheets (Taken 7/9/2025 0027)  Trust Relationship/Rapport:   care explained   thoughts/feelings acknowledged   questions answered     Problem: Skin Injury Risk Increased  Goal: Skin Health and Integrity  Outcome: Progressing  Intervention: Optimize Skin Protection  Flowsheets (Taken 7/9/2025 0027)  Pressure Reduction Techniques: frequent weight shift encouraged  Skin Protection: incontinence pads utilized     Problem: Coping Ineffective  Goal: Effective Coping  Outcome: Progressing  Intervention: Support and Enhance  Coping Strategies  Flowsheets (Taken 7/9/2025 0027)  Supportive Measures:   active listening utilized   self-care encouraged   verbalization of feelings encouraged  Family/Support System Care:   self-care encouraged   support provided     Problem: Wound  Goal: Optimal Coping  Outcome: Progressing  Intervention: Support Patient and Family Response  Flowsheets (Taken 7/9/2025 0027)  Supportive Measures:   active listening utilized   self-care encouraged   verbalization of feelings encouraged  Family/Support System Care:   self-care encouraged   support provided

## 2025-07-09 NOTE — PROGRESS NOTES
[facility] Hospital Medicine Progress Note        Chief Complaint: Inpatient Follow-up    HPI:   64-year-old male with significant history of carpal tunnel syndrome, HTN, sciatica, portal vein thrombosis, cocaine use, chronic hep C, hepatic adenoma concerning for HCC, cirrhosis presented to the ED with complaints of progressively worsening abdominal pain for the past 2 months.  Patient was noted to have acute on chronic hyperbilirubinemia with leukocytosis.  He initially presented to outlying facility and was transferred to our hospital for higher level of care, patient was admitted to hospital medicine services, Gastroenterology, general surgery services consulted, MRCP was ordered to further evaluate worsening liver function test.  MRCP revealed findings concerning for HCC, distended gallbladder with cholelithiasis and mild intrahepatic biliary ductal dilation, evaluation was challenging secondary to underlying portal vein thrombus.  Suspicion for cholecystitis given clinical presentation.  No evidence of choledocholithiasis, no indication for ERCP per Gastroenterology, recommended anticoagulation for portal vein thrombosis.  Ultrasound with positive from Chang's sign and thickened gallbladder.  General surgery evaluated for possible cholecystitis, poor surgical candidate and therefore interventional radiology was consulted for percutaneous cholecystostomy tube placement and this was done on 7/7    Interval Hx:   Patient was seen at bedside in the morning, complaining of generalized weakness, fatigue and poor appetite, hemodynamics stable, afebrile, no abdominal pain, cholecystostomy tube in place    Objective/physical exam:  General: In no acute distress, afebrile  Chest: Clear to auscultation bilaterally  Heart: S1, S2, no appreciable murmur  Abdomen: Soft, nontender, BS +  MSK: Warm, no lower extremity edema, no clubbing or cyanosis  Neurologic: Alert and oriented x4, moving all  extremities with good strength     VITAL SIGNS: 24 HRS MIN & MAX LAST   Temp  Min: 97.9 °F (36.6 °C)  Max: 98.3 °F (36.8 °C) 98.2 °F (36.8 °C)   BP  Min: 132/83  Max: 152/78 (!) 145/82   Pulse  Min: 67  Max: 81  81   Resp  Min: 18  Max: 20 18   SpO2  Min: 95 %  Max: 98 % 98 %       Recent Labs   Lab 07/08/25 0317   WBC 12.24*   RBC 3.09*   HGB 9.2*   HCT 28.6*   MCV 92.6   MCH 29.8   MCHC 32.2*   RDW 17.1*      MPV 9.8         Recent Labs   Lab 07/04/25 0312 07/05/25 0332 07/08/25 0317      < > 137   K 3.8   < > 3.8      < > 111*   CO2 18*   < > 23   BUN 29.1*   < > 26.2*   CREATININE 1.83*   < > 1.20   GLU 40*   < > 101   CALCIUM 7.8*   < > 7.4*   MG 1.80  --   --    ALBUMIN 1.6*   < > 1.3*   PROT 8.5*   < > 7.2   ALKPHOS 333*   < > 250*   *   < > 224*   *   < > 126*   BILITOT 4.9*   < > 2.3*    < > = values in this interval not displayed.          Microbiology Results (last 7 days)       Procedure Component Value Units Date/Time    Anaerobic Culture [[id number]] Collected: 07/07/25 1639    Order Status: Completed Specimen: Aspirate from Gallbladder Updated: 07/09/25 0841     Anaerobe Culture No Anaerobes Isolated    Body Fluid Culture [[id number]] Collected: 07/07/25 1639    Order Status: Completed Specimen: Fluids from Gallbladder Updated: 07/08/25 0654     Body Fluid Culture No Growth At 24 Hours    Gram Stain [[id number]] Collected: 07/07/25 1639    Order Status: Completed Specimen: Aspirate from Gallbladder Updated: 07/08/25 0653     GRAM STAIN No WBCs, No bacteria seen    Fungal Culture [[id number]] Collected: 07/07/25 1639    Order Status: Sent Specimen: Aspirate from Gallbladder Updated: 07/07/25 170             Scheduled Med:   ARIPiprazole  5 mg Oral Daily    EScitalopram oxalate  5 mg Oral Daily    folic acid  1 mg Oral Daily    nicotine  1 patch Transdermal Daily    pantoprazole  40 mg Oral Daily    piperacillin-tazobactam (Zosyn) IV (PEDS and ADULTS) (extended  infusion is not appropriate)  4.5 g Intravenous Q8H    rivaroxaban  20 mg Oral Daily with dinner    thiamine  100 mg Oral Daily          Assessment/Plan:    Symptomatic cholelithiasis with suspected cholecystitis status post CT-guided percutaneous cholecystostomy tube placement 7/7  Sepsis secondary to above-improving   Acute on chronic hyperbilirubinemia with transaminitis-slowly improving  Decompensated cirrhosis with large volume ascites   Portal vein thrombosis  Suspected HCC  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  Suspected bilateral pneumonia-HA P  Substance use disorder  Chronic hep C   History of essential HTN  Sciatica   History of CTS   Physical deconditioning  Prophylaxis    Continue Zosyn   I contacted general surgery resident as far as recommendations for cholecystostomy tube placement   Afebrile with stable hemodynamics and leukocytosis is improving   Hyperbilirubinemia with transaminitis also slowly improving  Gastroenterology also on board   Planning for hepatology referral to [place] and also to repeat triple phase scan in 3 months  Latest imaging with large volume ascites, I will quantify ascites with ultrasound tomorrow and consider paracentesis  Continue Xarelto for portal vein thrombosis   Continue aripiprazole, citalopram, folic acid, nicotine patch, ppi and thiamine   Given generalized weakness and mild drowsiness in the morning ordered CT head and this indicated no acute changes  Ammonia is also within normal limits  I will initiate lactulose, daily to start with and then titrate to 2-3 bowel movements a day  Previous CT had concerns for pontine/left internal capsular CVA, I will obtain MRI brain  Consult PT/OT for physical deconditioning   DVT prophylaxis-Susan Beckett MD   07/09/2025     Later in the evening I was told by the nursing staff that the patient is suicidal   Placed on one-to-one observation, psych consulted   Psych is placed him on pec

## 2025-07-09 NOTE — CARE UPDATE
Plan of Care  Patient will be discharged with his drain.  Please instruct to monitor output daily including quantity and quality.  Please note specifically the amount and mL in the color of the drainage.  Please reach out to the office for any changes in color or large changes in quantity.  He will be followed up in our clinic in 6 weeks with a cholangiogram on an outpatient basis.  Return precautions include change in color, substantial increase in drainage, recurrence of symptoms, fevers, chills, intractable abdominal pain, and inability to tolerate oral intake.    Yosvany Trotter MD  [employer] [facility] HO-1

## 2025-07-09 NOTE — PROGRESS NOTES
"7/9/2025  Aman Humphrey   1960   [id number]            Psychiatry Inpatient Admission Note    Date of Admission: 7/3/2025  4:52 PM    Current Legal Status: Physician's Emergency Certificate    Chief Complaint: suicidal ideation    SUBJECTIVE:   History of Present Illness:   64-year-old male with a past medical history of carpal tunnel syndrome, HTN, sciatica, portal vein thrombosis, cocaine use, hepatitis-C, hepatic adenoma with concern of HCC, cirrhosis.  Presented to outside emergency department with complaints of abdominal pain x2 months and was found to have acute on chronic hyperbilirubinemia, leukocytosis and thus transferred to our facility.  At the time of my examination this morning he is drowsy, did not really answer much questions, awakens with stimulus.  Ultrasound abdomen shows hepatomegaly with hepatic steatosis, thrombosed main portal vein, enlarged gallbladder, thickened gallbladder wall with a positive Chang's sign concerning for acute cholecystitis.  MRCP shows challenging evaluation due to underlying portal vein thrombosis, 15 mm area of arterial enhancement could be small HCC, distended gallbladder with underlying cholelithiasis, no extrahepatic biliary dilation.  Underwent cholecystostomy tube July 7th with Interventional Radiology    7/9/25: Pt seen today for psychiatric evaluation seen in assigned room, pt states mood is "down." Pt currently on lexapro 5mg po q daily, and abilify 5mg po q daily. Pt AAOx4 speech is soft. Staff reports pt was defecating and urinating on the floor intentionally the past few days, also cursing out staff members and presenting agitated and irritable. Security was called to pt room for assistance and this is when pt started crying and expressed suicidal ideation without plan. Pt currently depressed, denying H.I., and hallucinations at this time. Pt reports excessive sleep "all I do is sleep," low energy, irritability, and dysphoric mood. Will review " medications and adjust accordingly.        Past Psychiatric History:   Previous Psychiatric Hospitalizations: denies   Previous Medication Trials: denies  Previous Suicide Attempts: denies   Outpatient psychiatrist: denies    Current Medications:   Home Psychiatric Meds: abilify    Past Medical/Surgical History:   Past Medical History:   Diagnosis Date    Carpal tunnel syndrome     HTN (hypertension)     Sciatica      Past Surgical History:   Procedure Laterality Date    ESOPHAGOGASTRODUODENOSCOPY N/A 6/20/2025    Procedure: EGD;  Surgeon: Jalyn Day MD;  Location: [facility] ENDOSCOPY;  Service: Gastroenterology;  Laterality: N/A;    SKIN GRAFT           Family Psychiatric History:   denies     Allergies:   Review of patient's allergies indicates:  No Known Allergies    Substance Abuse History:   Tobacco: denies  Alcohol: denies  Illicit Substances: cocaine abuse   Treatment: denies        Scheduled Meds:    ARIPiprazole  5 mg Oral Daily    EScitalopram oxalate  5 mg Oral Daily    folic acid  1 mg Oral Daily    nicotine  1 patch Transdermal Daily    pantoprazole  40 mg Oral Daily    piperacillin-tazobactam (Zosyn) IV (PEDS and ADULTS) (extended infusion is not appropriate)  4.5 g Intravenous Q8H    rivaroxaban  20 mg Oral Daily with dinner    thiamine  100 mg Oral Daily      PRN Meds:   Current Facility-Administered Medications:     dextrose 50%, 12.5 g, Intravenous, PRN    dextrose 50%, 25 g, Intravenous, PRN    glucagon (human recombinant), 1 mg, Intramuscular, PRN    glucose, 16 g, Oral, PRN    glucose, 24 g, Oral, PRN    HYDROcodone-acetaminophen, 1 tablet, Oral, Q6H PRN    HYDROcodone-acetaminophen, 1 tablet, Oral, Q6H PRN    lactulose 10 gram/15 ml, 20 g, Oral, TID PRN    melatonin, 6 mg, Oral, Nightly PRN    sodium chloride 0.9%, 10 mL, Intravenous, PRN   Psychotherapeutics (From admission, onward)      Start     Stop Route Frequency Ordered    07/04/25 0900  ARIPiprazole tablet 5 mg         -- Oral Daily  07/03/25 1708    07/04/25 0900  EScitalopram oxalate tablet 5 mg         -- Oral Daily 07/03/25 1708              Social History:  Housing Status[place] in [place]/hom[place]?  Relationship Status/Sexual Orientation: single   Children: denies  Education: HS   Employment Status/Info: retired musician    history: denies  History of physical/sexual abuse: denies   Access to gun: denies       Legal History:   Past Charges/Incarcerations: denies   Pending charges: denies      OBJECTIVE:   Medical Review Of Systems:  defer    Vitals   Vitals:    07/09/25 1609   BP: (!) 146/76   Pulse: 67   Resp: 18   Temp: 97.9 °F (36.6 °C)        Labs/Imaging/Studies:   Recent Results (from the past 48 hours)   Comprehensive Metabolic Panel    Collection Time: 07/08/25  3:17 AM   Result Value Ref Range    Sodium 137 136 - 145 mmol/L    Potassium 3.8 3.5 - 5.1 mmol/L    Chloride 111 (H) 98 - 107 mmol/L    CO2 23 23 - 31 mmol/L    Glucose 101 82 - 115 mg/dL    Blood Urea Nitrogen 26.2 (H) 8.4 - 25.7 mg/dL    Creatinine 1.20 0.72 - 1.25 mg/dL    Calcium 7.4 (L) 8.8 - 10.0 mg/dL    Protein Total 7.2 5.8 - 7.6 gm/dL    Albumin 1.3 (L) 3.4 - 4.8 g/dL    Globulin 5.9 (H) 2.4 - 3.5 gm/dL    Albumin/Globulin Ratio 0.2 (L) 1.1 - 2.0 ratio    Bilirubin Total 2.3 (H) <=1.5 mg/dL     (H) 40 - 150 unit/L     (H) 0 - 55 unit/L     (H) 11 - 45 unit/L    eGFR >60 mL/min/1.73/m2    Anion Gap 3.0 mEq/L    BUN/Creatinine Ratio 22    CBC with Differential    Collection Time: 07/08/25  3:17 AM   Result Value Ref Range    WBC 12.24 (H) 4.50 - 11.50 x10(3)/mcL    RBC 3.09 (L) 4.70 - 6.10 x10(6)/mcL    Hgb 9.2 (L) 14.0 - 18.0 g/dL    Hct 28.6 (L) 42.0 - 52.0 %    MCV 92.6 80.0 - 94.0 fL    MCH 29.8 27.0 - 31.0 pg    MCHC 32.2 (L) 33.0 - 36.0 g/dL    RDW 17.1 (H) 11.5 - 17.0 %    Platelet 313 130 - 400 x10(3)/mcL    MPV 9.8 7.4 - 10.4 fL    Neut % 74.7 %    Lymph % 10.2 %    Mono % 13.6 %    Eos % 0.7 %    Basophil % 0.2 %     "Imm Grans % 0.6 %    Neut # 9.14 2.1 - 9.2 x10(3)/mcL    Lymph # 1.25 0.6 - 4.6 x10(3)/mcL    Mono # 1.66 (H) 0.1 - 1.3 x10(3)/mcL    Eos # 0.09 0 - 0.9 x10(3)/mcL    Baso # 0.03 <=0.2 x10(3)/mcL    Imm Gran # 0.07 (H) 0.00 - 0.04 x10(3)/mcL    NRBC% 0.0 %   Comprehensive Metabolic Panel    Collection Time: 07/09/25  8:59 AM   Result Value Ref Range    Sodium 137 136 - 145 mmol/L    Potassium 3.7 3.5 - 5.1 mmol/L    Chloride 110 (H) 98 - 107 mmol/L    CO2 21 (L) 23 - 31 mmol/L    Glucose 104 82 - 115 mg/dL    Blood Urea Nitrogen 22.9 8.4 - 25.7 mg/dL    Creatinine 1.24 0.72 - 1.25 mg/dL    Calcium 7.8 (L) 8.8 - 10.0 mg/dL    Protein Total 8.0 (H) 5.8 - 7.6 gm/dL    Albumin 1.3 (L) 3.4 - 4.8 g/dL    Globulin 6.7 (H) 2.4 - 3.5 gm/dL    Albumin/Globulin Ratio 0.2 (L) 1.1 - 2.0 ratio    Bilirubin Total 2.9 (H) <=1.5 mg/dL     (H) 40 - 150 unit/L     (H) 0 - 55 unit/L     (H) 11 - 45 unit/L    eGFR >60 mL/min/1.73/m2    Anion Gap 6.0 mEq/L    BUN/Creatinine Ratio 18    CBC with Differential    Collection Time: 07/09/25  8:59 AM   Result Value Ref Range    WBC 11.98 (H) 4.50 - 11.50 x10(3)/mcL    RBC 3.35 (L) 4.70 - 6.10 x10(6)/mcL    Hgb 10.1 (L) 14.0 - 18.0 g/dL    Hct 32.0 (L) 42.0 - 52.0 %    MCV 95.5 (H) 80.0 - 94.0 fL    MCH 30.1 27.0 - 31.0 pg    MCHC 31.6 (L) 33.0 - 36.0 g/dL    RDW 17.1 (H) 11.5 - 17.0 %    Platelet 290 130 - 400 x10(3)/mcL    MPV 9.6 7.4 - 10.4 fL    Neut % 79.8 %    Lymph % 8.7 %    Mono % 9.2 %    Eos % 1.3 %    Basophil % 0.3 %    Imm Grans % 0.7 %    Neut # 9.57 (H) 2.1 - 9.2 x10(3)/mcL    Lymph # 1.04 0.6 - 4.6 x10(3)/mcL    Mono # 1.10 0.1 - 1.3 x10(3)/mcL    Eos # 0.16 0 - 0.9 x10(3)/mcL    Baso # 0.03 <=0.2 x10(3)/mcL    Imm Gran # 0.08 (H) 0.00 - 0.04 x10(3)/mcL    NRBC% 0.0 %   Ammonia    Collection Time: 07/09/25 12:18 PM   Result Value Ref Range    Ammonia Level 35.6 18.0 - 72.0 umol/L      No results found for: "PHENYTOIN", "PHENOBARB", "VALPROATE", " ""CBMZ"        General Appearance: appears stated age, well-developed, well-nourished  Arousal: alert  Behavior: cooperative  Movements and Motor Activity: no abnormal involuntary movements noted  Orientation: oriented to person, place, time, and situation  Speech: normal rate, normal rhythm, normal volume, normal tone  Mood: "Ok"  Affect: constricted  Thought Process: linear  Associations: intact  Thought Content and Perceptions: positive suicidal ideation, no homicidal ideation, no auditory hallucinations, no visual hallucinations, no paranoid ideation, no ideas of reference  Recent and Remote Memory: recent memory intact, remote memory intact; per interview/observation with patient  Attention and Concentration: intact, attentive to conversation; per interview/observation with patient  Fund of Knowledge: intact, aware of current events, vocabulary appropriate; based on history, vocabulary, fund of knowledge, syntax, grammar, and content  Insight: questionable; based on understanding of severity of illness and HPI  Judgment: questionable; based on patient's behavior and HPI       Patient Strengths:  Access to care and Able to verbalize needs      Patient Liabilities:  Complicated medical illness and Chronic psychiatric illness          ASSESSMENT/PLAN:   Diagnoses:  F32.A Depression, unspecified  F39.0 Mood disorder NOS    Past Medical History:   Diagnosis Date    Carpal tunnel syndrome     HTN (hypertension)     Sciatica           Problem lists and Management Plans:  Medication management  Lexapro 5mg po q daily  Abilify 5mg po q daily  Will reevaluate and adjust psychotropic medications as appropriate  Will attempt to obtain outside psychiatric records if available   to assist with aftercare planning and collateral  Continue PEC and 1:1 at this time.  Evaluate if pt candidate for inpatient psychiatric hospitalization if medically able/cleared  Psych will follow, please reach out if " needed.                Ming Haney

## 2025-07-10 LAB
ALBUMIN SERPL-MCNC: 1.4 G/DL (ref 3.4–4.8)
ALBUMIN/GLOB SERPL: 0.2 RATIO (ref 1.1–2)
ALP SERPL-CCNC: 263 UNIT/L (ref 40–150)
ALT SERPL-CCNC: 159 UNIT/L (ref 0–55)
ANION GAP SERPL CALC-SCNC: 5 MEQ/L
AST SERPL-CCNC: 100 UNIT/L (ref 11–45)
BACTERIA SPEC ANAEROBE CULT: NORMAL
BASOPHILS # BLD AUTO: 0.03 X10(3)/MCL
BASOPHILS NFR BLD AUTO: 0.3 %
BILIRUB SERPL-MCNC: 2.8 MG/DL
BUN SERPL-MCNC: 21.8 MG/DL (ref 8.4–25.7)
CALCIUM SERPL-MCNC: 7.8 MG/DL (ref 8.8–10)
CHLORIDE SERPL-SCNC: 110 MMOL/L (ref 98–107)
CO2 SERPL-SCNC: 21 MMOL/L (ref 23–31)
CREAT SERPL-MCNC: 1.19 MG/DL (ref 0.72–1.25)
CREAT/UREA NIT SERPL: 18
EOSINOPHIL # BLD AUTO: 0.13 X10(3)/MCL (ref 0–0.9)
EOSINOPHIL NFR BLD AUTO: 1.2 %
ERYTHROCYTE [DISTWIDTH] IN BLOOD BY AUTOMATED COUNT: 16.8 % (ref 11.5–17)
GFR SERPLBLD CREATININE-BSD FMLA CKD-EPI: >60 ML/MIN/1.73/M2
GLOBULIN SER-MCNC: 6.6 GM/DL (ref 2.4–3.5)
GLUCOSE SERPL-MCNC: 97 MG/DL (ref 82–115)
HCT VFR BLD AUTO: 31 % (ref 42–52)
HGB BLD-MCNC: 9.9 G/DL (ref 14–18)
IMM GRANULOCYTES # BLD AUTO: 0.08 X10(3)/MCL (ref 0–0.04)
IMM GRANULOCYTES NFR BLD AUTO: 0.7 %
LYMPHOCYTES # BLD AUTO: 1.24 X10(3)/MCL (ref 0.6–4.6)
LYMPHOCYTES NFR BLD AUTO: 11.5 %
MCH RBC QN AUTO: 29.8 PG (ref 27–31)
MCHC RBC AUTO-ENTMCNC: 31.9 G/DL (ref 33–36)
MCV RBC AUTO: 93.4 FL (ref 80–94)
MONOCYTES # BLD AUTO: 0.96 X10(3)/MCL (ref 0.1–1.3)
MONOCYTES NFR BLD AUTO: 8.9 %
MRSA PCR SCRN (OHS): NOT DETECTED
NEUTROPHILS # BLD AUTO: 8.32 X10(3)/MCL (ref 2.1–9.2)
NEUTROPHILS NFR BLD AUTO: 77.4 %
NRBC BLD AUTO-RTO: 0 %
PLATELET # BLD AUTO: 301 X10(3)/MCL (ref 130–400)
PMV BLD AUTO: 9.5 FL (ref 7.4–10.4)
POTASSIUM SERPL-SCNC: 3.9 MMOL/L (ref 3.5–5.1)
PROT SERPL-MCNC: 8 GM/DL (ref 5.8–7.6)
RBC # BLD AUTO: 3.32 X10(6)/MCL (ref 4.7–6.1)
SODIUM SERPL-SCNC: 136 MMOL/L (ref 136–145)
WBC # BLD AUTO: 10.76 X10(3)/MCL (ref 4.5–11.5)

## 2025-07-10 PROCEDURE — 11000001 HC ACUTE MED/SURG PRIVATE ROOM

## 2025-07-10 PROCEDURE — 25000003 PHARM REV CODE 250: Performed by: INTERNAL MEDICINE

## 2025-07-10 PROCEDURE — 87641 MR-STAPH DNA AMP PROBE: CPT | Performed by: INTERNAL MEDICINE

## 2025-07-10 PROCEDURE — 97162 PT EVAL MOD COMPLEX 30 MIN: CPT

## 2025-07-10 PROCEDURE — 63600175 PHARM REV CODE 636 W HCPCS: Performed by: INTERNAL MEDICINE

## 2025-07-10 PROCEDURE — 85025 COMPLETE CBC W/AUTO DIFF WBC: CPT | Performed by: INTERNAL MEDICINE

## 2025-07-10 PROCEDURE — 97166 OT EVAL MOD COMPLEX 45 MIN: CPT

## 2025-07-10 PROCEDURE — 63600175 PHARM REV CODE 636 W HCPCS: Performed by: HOSPITALIST

## 2025-07-10 PROCEDURE — 80053 COMPREHEN METABOLIC PANEL: CPT | Performed by: INTERNAL MEDICINE

## 2025-07-10 PROCEDURE — 21400001 HC TELEMETRY ROOM

## 2025-07-10 PROCEDURE — 36415 COLL VENOUS BLD VENIPUNCTURE: CPT | Performed by: INTERNAL MEDICINE

## 2025-07-10 PROCEDURE — 25000003 PHARM REV CODE 250: Performed by: HOSPITALIST

## 2025-07-10 RX ORDER — ONDANSETRON HYDROCHLORIDE 2 MG/ML
4 INJECTION, SOLUTION INTRAVENOUS EVERY 6 HOURS PRN
Status: DISCONTINUED | OUTPATIENT
Start: 2025-07-10 | End: 2025-07-30 | Stop reason: HOSPADM

## 2025-07-10 RX ORDER — ACETAMINOPHEN 500 MG
500 TABLET ORAL EVERY 6 HOURS PRN
Status: DISCONTINUED | OUTPATIENT
Start: 2025-07-10 | End: 2025-07-30 | Stop reason: HOSPADM

## 2025-07-10 RX ORDER — ARIPIPRAZOLE 5 MG/1
10 TABLET ORAL DAILY
Status: DISCONTINUED | OUTPATIENT
Start: 2025-07-11 | End: 2025-07-18

## 2025-07-10 RX ORDER — ESCITALOPRAM OXALATE 10 MG/1
10 TABLET ORAL DAILY
Status: DISCONTINUED | OUTPATIENT
Start: 2025-07-11 | End: 2025-07-18

## 2025-07-10 RX ADMIN — PIPERACILLIN SODIUM AND TAZOBACTAM SODIUM 4.5 G: 4; .5 INJECTION, POWDER, LYOPHILIZED, FOR SOLUTION INTRAVENOUS at 09:07

## 2025-07-10 RX ADMIN — ESCITALOPRAM OXALATE 5 MG: 5 TABLET, FILM COATED ORAL at 09:07

## 2025-07-10 RX ADMIN — ONDANSETRON 4 MG: 2 INJECTION INTRAMUSCULAR; INTRAVENOUS at 12:07

## 2025-07-10 RX ADMIN — ARIPIPRAZOLE 5 MG: 5 TABLET ORAL at 09:07

## 2025-07-10 RX ADMIN — FOLIC ACID 1 MG: 1 TABLET ORAL at 09:07

## 2025-07-10 RX ADMIN — PIPERACILLIN SODIUM AND TAZOBACTAM SODIUM 4.5 G: 4; .5 INJECTION, POWDER, LYOPHILIZED, FOR SOLUTION INTRAVENOUS at 12:07

## 2025-07-10 RX ADMIN — THIAMINE HCL TAB 100 MG 100 MG: 100 TAB at 09:07

## 2025-07-10 RX ADMIN — RIVAROXABAN 20 MG: 10 TABLET, FILM COATED ORAL at 04:07

## 2025-07-10 RX ADMIN — ACETAMINOPHEN 500 MG: 500 TABLET ORAL at 06:07

## 2025-07-10 RX ADMIN — LACTULOSE 15 G: 10 SOLUTION ORAL at 09:07

## 2025-07-10 RX ADMIN — PIPERACILLIN SODIUM AND TAZOBACTAM SODIUM 4.5 G: 4; .5 INJECTION, POWDER, LYOPHILIZED, FOR SOLUTION INTRAVENOUS at 04:07

## 2025-07-10 RX ADMIN — PANTOPRAZOLE SODIUM 40 MG: 40 TABLET, DELAYED RELEASE ORAL at 09:07

## 2025-07-10 NOTE — PROGRESS NOTES
[facility]  Hospital Medicine Progress Note        Chief Complaint: Inpatient Follow-up    HPI:     64-year-old male with significant history of carpal tunnel syndrome, HTN, sciatica, portal vein thrombosis, cocaine use, chronic hep C, hepatic adenoma concerning for HCC, cirrhosis presented to the ED with complaints of progressively worsening abdominal pain for the past 2 months.  Patient was noted to have acute on chronic hyperbilirubinemia with leukocytosis.  He initially presented to outlying facility and was transferred to our hospital for higher level of care, patient was admitted to hospital medicine services, Gastroenterology, general surgery services consulted, MRCP was ordered to further evaluate worsening liver function test.  MRCP revealed findings concerning for HCC, distended gallbladder with cholelithiasis and mild intrahepatic biliary ductal dilation, evaluation was challenging secondary to underlying portal vein thrombus.  Suspicion for cholecystitis given clinical presentation.  No evidence of choledocholithiasis, no indication for ERCP per Gastroenterology, recommended anticoagulation for portal vein thrombosis.  Ultrasound with positive from Chang's sign and thickened gallbladder.  General surgery evaluated for possible cholecystitis, poor surgical candidate and therefore interventional radiology was consulted for percutaneous cholecystostomy tube placement and this was done on 7/7.  [place], General surgery Plan to follow him in clinic as outpatient, GI planning for repeating triple phase as outpatient in 3 months and also arrange follow up with hepatology in [place].  Patient reported suicidal ideation on 07/09 and therefore psych consulted and he was placed under pec/one-to-one observation.  Previous CT with concerns for pontine/left internal capsule ischemic CVA, MRI was ordered to further evaluate    Interval Hx:   Patient was seen at bedside in the  morning, his mood waxes and wanes, calm at the time of my rounds, but prior to my rounds when therapy visited he threatened them of calling his  if they attempt to do therapy, hemodynamics stable, no pain, afebrile    Objective/physical exam:  General: In no acute distress, afebrile  Chest: Clear to auscultation bilaterally  Heart: S1, S2, no appreciable murmur  Abdomen: Soft, nontender, BS +  MSK: Warm, no lower extremity edema, no clubbing or cyanosis  Neurologic: Alert and oriented x4, moving all extremities with good strength     VITAL SIGNS: 24 HRS MIN & MAX LAST   Temp  Min: 97.8 °F (36.6 °C)  Max: 98.1 °F (36.7 °C) 97.8 °F (36.6 °C)   BP  Min: 138/81  Max: 148/71 138/81   Pulse  Min: 67  Max: 72  72   Resp  Min: 16  Max: 18 18   SpO2  Min: 97 %  Max: 99 % 97 %       Recent Labs   Lab 07/09/25  0859   WBC 11.98*   RBC 3.35*   HGB 10.1*   HCT 32.0*   MCV 95.5*   MCH 30.1   MCHC 31.6*   RDW 17.1*      MPV 9.6         Recent Labs   Lab 07/04/25  0312 07/05/25  0332 07/09/25  0859      < > 137   K 3.8   < > 3.7      < > 110*   CO2 18*   < > 21*   BUN 29.1*   < > 22.9   CREATININE 1.83*   < > 1.24   GLU 40*   < > 104   CALCIUM 7.8*   < > 7.8*   MG 1.80  --   --    ALBUMIN 1.6*   < > 1.3*   PROT 8.5*   < > 8.0*   ALKPHOS 333*   < > 262*   *   < > 193*   *   < > 112*   BILITOT 4.9*   < > 2.9*    < > = values in this interval not displayed.          Microbiology Results (last 7 days)       Procedure Component Value Units Date/Time    Anaerobic Culture [[id number]] Collected: 07/07/25 1639    Order Status: Completed Specimen: Aspirate from Gallbladder Updated: 07/10/25 0748     Anaerobe Culture No Anaerobes Isolated    Body Fluid Culture [[id number]] Collected: 07/07/25 1639    Order Status: Completed Specimen: Fluids from Gallbladder Updated: 07/09/25 0925     Body Fluid Culture No Growth At 48 Hours    Gram Stain [[id number]] Collected: 07/07/25 1639    Order Status: Completed  Specimen: Aspirate from Gallbladder Updated: 07/08/25 0653     GRAM STAIN No WBCs, No bacteria seen    Fungal Culture [[id number]] Collected: 07/07/25 1639    Order Status: Sent Specimen: Aspirate from Gallbladder Updated: 07/07/25 1709             Scheduled Med:   ARIPiprazole  5 mg Oral Daily    EScitalopram oxalate  5 mg Oral Daily    folic acid  1 mg Oral Daily    lactulose 10 gram/15 ml  15 g Oral Daily    nicotine  1 patch Transdermal Daily    pantoprazole  40 mg Oral Daily    piperacillin-tazobactam (Zosyn) IV (PEDS and ADULTS) (extended infusion is not appropriate)  4.5 g Intravenous Q8H    rivaroxaban  20 mg Oral Daily with dinner    thiamine  100 mg Oral Daily          Assessment/Plan:    Suicidal ideation placed under pec 7/9  Symptomatic cholelithiasis with suspected cholecystitis status post CT-guided percutaneous cholecystostomy tube placement 7/7  Sepsis secondary to above-improving   Acute on chronic hyperbilirubinemia with transaminitis-slowly improving  Decompensated cirrhosis with large volume ascites   Portal vein thrombosis  Suspected HCC  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  Suspected bilateral pneumonia-HA P  Substance use disorder  Chronic hep C   History of essential HTN  Sciatica   History of CTS   Physical deconditioning  Prophylaxis      Psych team following, for now continue pec and one-to-one observation   On Lexapro/Abilify, dose modified  Continue Zosyn   General surgery will see him as outpatient in 6 weeks and consider cholangiogram as outpatient  Continue cholecystostomy tube, monitor output/color closely  Afebrile with stable hemodynamics and leukocytosis is improving   Hyperbilirubinemia with transaminitis also slowly improving  Gastroenterology signed off  Planning for hepatology referral to [place] and also to repeat triple phase scan in 3 months  Latest imaging with large volume ascites, I will repeat ultrasound to quantify ascites   Continue Xarelto for  portal vein thrombosis   Continue aripiprazole, citalopram, folic acid, nicotine patch, ppi and thiamine   Continue lactulose to prevent hepatic encephalopathy  Latest ammonia within normal limits  Previous CT had concerns for pontine/left internal capsular CVA, MRI brain was ordered, follow up results  DVT prophylaxis-Xarelto    Patient was living with a friend prior to this hospitalization, family is unable to care for him   Plan is for nursing home placement   Follow psych recs as far as revoking PEC when mentation is stable and he is no more suicidal  Beth Beckett MD   07/10/2025

## 2025-07-10 NOTE — PROGRESS NOTES
"7/10/2025  Aman Humphrey   1960   [id number]        Psychiatry Progress Note     Chief Complaint: depression    SUBJECTIVE:   64-year-old male with a past medical history of carpal tunnel syndrome, HTN, sciatica, portal vein thrombosis, cocaine use, hepatitis-C, hepatic adenoma with concern of HCC, cirrhosis.  Presented to outside emergency department with complaints of abdominal pain x2 months and was found to have acute on chronic hyperbilirubinemia, leukocytosis and thus transferred to our facility.  At the time of my examination this morning he is drowsy, did not really answer much questions, awakens with stimulus.  Ultrasound abdomen shows hepatomegaly with hepatic steatosis, thrombosed main portal vein, enlarged gallbladder, thickened gallbladder wall with a positive Chang's sign concerning for acute cholecystitis.  MRCP shows challenging evaluation due to underlying portal vein thrombosis, 15 mm area of arterial enhancement could be small HCC, distended gallbladder with underlying cholelithiasis, no extrahepatic biliary dilation.  Underwent cholecystostomy tube July 7th with Interventional Radiology     7/9/25: Pt seen today for psychiatric evaluation seen in assigned room, pt states mood is "down." Pt currently on lexapro 5mg po q daily, and abilify 5mg po q daily. Pt AAOx4 speech is soft. Staff reports pt was defecating and urinating on the floor intentionally the past few days, also cursing out staff members and presenting agitated and irritable. Security was called to pt room for assistance and this is when pt started crying and expressed suicidal ideation without plan. Pt currently depressed, denying H.I., and hallucinations at this time. Pt reports excessive sleep "all I do is sleep," low energy, irritability, and dysphoric mood. Will review medications and adjust accordingly.    7/10/25: Pt seen today for psychiatric evaluation seen in assigned room, pt states mood is "down." Pt currently on " lexapro 5mg po q daily, and abilify 5mg po q daily. Pt AAOx4 speech is soft, staff reports pt refusing physical therapy today and stated he would throw himself on the floor. When asked pt denies this, appears calm no apparent agitation at this time. Will adjust medications to address mood/agitation.          Current Medications:   Scheduled Meds:    ARIPiprazole  5 mg Oral Daily    EScitalopram oxalate  5 mg Oral Daily    folic acid  1 mg Oral Daily    lactulose 10 gram/15 ml  15 g Oral Daily    nicotine  1 patch Transdermal Daily    pantoprazole  40 mg Oral Daily    piperacillin-tazobactam (Zosyn) IV (PEDS and ADULTS) (extended infusion is not appropriate)  4.5 g Intravenous Q8H    rivaroxaban  20 mg Oral Daily with dinner    thiamine  100 mg Oral Daily      PRN Meds:   Current Facility-Administered Medications:     dextrose 50%, 12.5 g, Intravenous, PRN    dextrose 50%, 25 g, Intravenous, PRN    glucagon (human recombinant), 1 mg, Intramuscular, PRN    glucose, 16 g, Oral, PRN    glucose, 24 g, Oral, PRN    HYDROcodone-acetaminophen, 1 tablet, Oral, Q6H PRN    HYDROcodone-acetaminophen, 1 tablet, Oral, Q6H PRN    lactulose 10 gram/15 ml, 20 g, Oral, TID PRN    melatonin, 6 mg, Oral, Nightly PRN    ondansetron, 4 mg, Intravenous, Q6H PRN    sodium chloride 0.9%, 10 mL, Intravenous, PRN   Psychotherapeutics (From admission, onward)      Start     Stop Route Frequency Ordered    07/04/25 0900  ARIPiprazole tablet 5 mg         -- Oral Daily 07/03/25 1708    07/04/25 0900  EScitalopram oxalate tablet 5 mg         -- Oral Daily 07/03/25 1708            Allergies:   Review of patient's allergies indicates:  No Known Allergies     OBJECTIVE:   Vitals   Vitals:    07/10/25 1528   BP: (!) 140/75   Pulse: 71   Resp:    Temp: 98.1 °F (36.7 °C)        Labs/Imaging/Studies:   Recent Results (from the past 36 hours)   Comprehensive Metabolic Panel    Collection Time: 07/09/25  8:59 AM   Result Value Ref Range    Sodium 137 136 -  145 mmol/L    Potassium 3.7 3.5 - 5.1 mmol/L    Chloride 110 (H) 98 - 107 mmol/L    CO2 21 (L) 23 - 31 mmol/L    Glucose 104 82 - 115 mg/dL    Blood Urea Nitrogen 22.9 8.4 - 25.7 mg/dL    Creatinine 1.24 0.72 - 1.25 mg/dL    Calcium 7.8 (L) 8.8 - 10.0 mg/dL    Protein Total 8.0 (H) 5.8 - 7.6 gm/dL    Albumin 1.3 (L) 3.4 - 4.8 g/dL    Globulin 6.7 (H) 2.4 - 3.5 gm/dL    Albumin/Globulin Ratio 0.2 (L) 1.1 - 2.0 ratio    Bilirubin Total 2.9 (H) <=1.5 mg/dL     (H) 40 - 150 unit/L     (H) 0 - 55 unit/L     (H) 11 - 45 unit/L    eGFR >60 mL/min/1.73/m2    Anion Gap 6.0 mEq/L    BUN/Creatinine Ratio 18    CBC with Differential    Collection Time: 07/09/25  8:59 AM   Result Value Ref Range    WBC 11.98 (H) 4.50 - 11.50 x10(3)/mcL    RBC 3.35 (L) 4.70 - 6.10 x10(6)/mcL    Hgb 10.1 (L) 14.0 - 18.0 g/dL    Hct 32.0 (L) 42.0 - 52.0 %    MCV 95.5 (H) 80.0 - 94.0 fL    MCH 30.1 27.0 - 31.0 pg    MCHC 31.6 (L) 33.0 - 36.0 g/dL    RDW 17.1 (H) 11.5 - 17.0 %    Platelet 290 130 - 400 x10(3)/mcL    MPV 9.6 7.4 - 10.4 fL    Neut % 79.8 %    Lymph % 8.7 %    Mono % 9.2 %    Eos % 1.3 %    Basophil % 0.3 %    Imm Grans % 0.7 %    Neut # 9.57 (H) 2.1 - 9.2 x10(3)/mcL    Lymph # 1.04 0.6 - 4.6 x10(3)/mcL    Mono # 1.10 0.1 - 1.3 x10(3)/mcL    Eos # 0.16 0 - 0.9 x10(3)/mcL    Baso # 0.03 <=0.2 x10(3)/mcL    Imm Gran # 0.08 (H) 0.00 - 0.04 x10(3)/mcL    NRBC% 0.0 %   Ammonia    Collection Time: 07/09/25 12:18 PM   Result Value Ref Range    Ammonia Level 35.6 18.0 - 72.0 umol/L   MRSA PCR    Collection Time: 07/10/25  6:58 AM   Result Value Ref Range    MRSA PCR Screen Not Detected Not Detected   Comprehensive Metabolic Panel    Collection Time: 07/10/25 10:04 AM   Result Value Ref Range    Sodium 136 136 - 145 mmol/L    Potassium 3.9 3.5 - 5.1 mmol/L    Chloride 110 (H) 98 - 107 mmol/L    CO2 21 (L) 23 - 31 mmol/L    Glucose 97 82 - 115 mg/dL    Blood Urea Nitrogen 21.8 8.4 - 25.7 mg/dL    Creatinine 1.19 0.72 - 1.25  "mg/dL    Calcium 7.8 (L) 8.8 - 10.0 mg/dL    Protein Total 8.0 (H) 5.8 - 7.6 gm/dL    Albumin 1.4 (L) 3.4 - 4.8 g/dL    Globulin 6.6 (H) 2.4 - 3.5 gm/dL    Albumin/Globulin Ratio 0.2 (L) 1.1 - 2.0 ratio    Bilirubin Total 2.8 (H) <=1.5 mg/dL     (H) 40 - 150 unit/L     (H) 0 - 55 unit/L     (H) 11 - 45 unit/L    eGFR >60 mL/min/1.73/m2    Anion Gap 5.0 mEq/L    BUN/Creatinine Ratio 18    CBC with Differential    Collection Time: 07/10/25 10:04 AM   Result Value Ref Range    WBC 10.76 4.50 - 11.50 x10(3)/mcL    RBC 3.32 (L) 4.70 - 6.10 x10(6)/mcL    Hgb 9.9 (L) 14.0 - 18.0 g/dL    Hct 31.0 (L) 42.0 - 52.0 %    MCV 93.4 80.0 - 94.0 fL    MCH 29.8 27.0 - 31.0 pg    MCHC 31.9 (L) 33.0 - 36.0 g/dL    RDW 16.8 11.5 - 17.0 %    Platelet 301 130 - 400 x10(3)/mcL    MPV 9.5 7.4 - 10.4 fL    Neut % 77.4 %    Lymph % 11.5 %    Mono % 8.9 %    Eos % 1.2 %    Basophil % 0.3 %    Imm Grans % 0.7 %    Neut # 8.32 2.1 - 9.2 x10(3)/mcL    Lymph # 1.24 0.6 - 4.6 x10(3)/mcL    Mono # 0.96 0.1 - 1.3 x10(3)/mcL    Eos # 0.13 0 - 0.9 x10(3)/mcL    Baso # 0.03 <=0.2 x10(3)/mcL    Imm Gran # 0.08 (H) 0.00 - 0.04 x10(3)/mcL    NRBC% 0.0 %          Medical Review Of Systems:  defer      Psychiatric Mental Status Exam:  General Appearance: appears stated age, well-developed, well-nourished  Arousal: alert  Behavior: cooperative  Movements and Motor Activity: no abnormal involuntary movements noted  Orientation: oriented to person, place, time, and situation  Speech: normal rate, normal rhythm, normal volume, normal tone  Mood: "Ok"  Affect: constricted  Thought Process: linear  Associations: intact  Thought Content and Perceptions: no suicidal ideation, no homicidal ideation, no auditory hallucinations, no visual hallucinations, no paranoid ideation, no ideas of reference  Recent and Remote Memory: recent memory intact, remote memory intact; per interview/observation with patient  Attention and Concentration: intact, " attentive to conversation; per interview/observation with patient  Fund of Knowledge: intact, aware of current events, vocabulary appropriate; based on history, vocabulary, fund of knowledge, syntax, grammar, and content  Insight: questionable; based on understanding of severity of illness and HPI  Judgment: questionable; based on patient's behavior and HPI     ASSESSMENT/PLAN:   Problems Addressed/Diagnoses:  F32.A Depression, unspecified  F39.0 Mood disorder NOS    Past Medical History:   Diagnosis Date    Carpal tunnel syndrome     HTN (hypertension)     Sciatica         Plan:  Medication management  Increase Lexapro 10mg po q daily  Increase Abilify 10mg po q daily  Will attempt to obtain outside psychiatric records if available   to assist with aftercare planning and collateral  Continue PEC and 1:1 at this time.  Evaluate if pt candidate for inpatient psychiatric hospitalization if medically able/cleared  Psych will follow, please reach out if needed            Ming Haney

## 2025-07-10 NOTE — PLAN OF CARE
Spoke to the patient's sister Elizabeth.  She stated that the patient lives with friends but they are unable to take care of him.  She also stated that his siblings are unable to take care of him.  She also stated that he has an estranged daughter.  Spoke to her about nursing home placement.  She stated that their sister works at [employer].     Spoke to the patient about nursing home placement.  He stated that he is ok with going to a nursing home for a few months.  He stated that he does not want to go the nursing home where his sister works ([place].  [place] of choice for nursing homes given to the patient and he will give me his selections later.    The patient is currently PEC'd.  Unsure if the patient will discharge to a psych facility or nursing home at this time.     1420  The patient selected the following nursing homes:   #1 [facility]  #2 [facility] [place]  #3 [facility]    Will send referrals after psych assesses the patient tomorrow and has a plan for discharge.     1425  The patient's friend/co-worker Ashley at bedside.  She stated that she will help the patient to get his bank statements for nursing home placement.

## 2025-07-10 NOTE — PT/OT/SLP EVAL
"Occupational Therapy   Evaluation and Discharge Note    Name: Aman Humphrey  MRN: [id number]  Admitting Diagnosis:   1. Choledocholithiasis    2. Chest pain        Recent Surgery: * No surgery found *      Recommendations:     Discharge therapy intensity: No Therapy Indicated (care home)   Discharge Equipment Recommendations:    Barriers to discharge:       Assessment:     Aman Humphrey is a 64 y.o. male with a medical diagnosis of abdominal pain x 2 mo, leukocytosis, portal vein thrombosis. On eval, patient presents with very poor participation, was able to stand with SBA at EOB but not compliant with therapy instructions/safety, threatening to get , not appropriate for skilled, progressive therapy and OT to sign off with rec for care home care. Of note, per sitter bedside pt is able to get to bedside commode without assist. No further Skilled OT services are not warranted at this time.    DME Justification:     Plan:     OT to sign off as acute OT services are not warranted at this time.  Please re-consult if situation changes during this hospitalization.    Plan of Care Reviewed with: patient    Subjective     Chief Complaint: multiple complaints  Patient/Family Comments/goals: "I'll get my , yall are rushing me"    Occupational Profile:  Living Environment: unclear, pt not able to state  Previous level of function: unknown  Roles and Routines:   Equipment Used at Home: none  Assistance upon Discharge: unknown, mentioned family??    Pain/Comfort:  Pain Rating 1: 0/10    Patients cultural, spiritual, Ep[employer]opalian conflicts given the current situation:      Objective:     OT communicated with nsg prior to session.      Patient was found supine with  (cholecystostomy tube) upon OT entry to room.    General Precautions: Standard, fall  Orthopedic Precautions:    Braces:      Vital Signs:     Functional Mobility/Transfers:  Sup to sit with SBA  Sit to stand with SbA    Activities of Daily Living:  Per " sitter pt is getting on/off bedside commode with SBA/spvn only    AMPAC 6 Click ADL:  AMPA Total Score:      Functional Cognition:  Poor safety, poor insight, poor compliance with instruction and safety     Visual Perceptual Skills:  Appears intact    Upper Extremity Function:  Right Upper Extremity:   Appears intact per observation    Left Upper Extremity:  Appears intact per observation    Balance:   Standing sBA        Co-Treatment: Yes, due to Limited activity tolerance    Patient Education:  Patient provided with verbal education education regarding OT role/goals/POC and Discharge/DME recommendations.  Understanding was verbalized.     Patient left supine with all lines intact and call button in reach.    History:     Past Medical History:   Diagnosis Date    Carpal tunnel syndrome     HTN (hypertension)     Sciatica          Past Surgical History:   Procedure Laterality Date    ESOPHAGOGASTRODUODENOSCOPY N/A 6/20/2025    Procedure: EGD;  Surgeon: Jalyn Day MD;  Location: [facility] ENDOSCOPY;  Service: Gastroenterology;  Laterality: N/A;    SKIN GRAFT         Time Tracking:     OT Date of Treatment: 07/10/25  OT Start Time: 0815  OT Stop Time: 0830  OT Total Time (min): 15 min    Billable Minutes:Evaluation mod complexity    7/10/2025

## 2025-07-10 NOTE — PT/OT/SLP EVAL
Physical Therapy Evaluation    Patient Name:  Aman Humphrey   MRN:  [id number]    Recommendations:     Discharge therapy intensity: No Therapy Indicated (assisted NH)   Discharge Equipment Recommendations: none   Barriers to discharge: Decreased caregiver support    Assessment:     Aman Humphrey is a 64 y.o. male admitted with a medical diagnosis of  abdominal pain x 2 mo, leukocytosis, portal vein thrombosis. On eval, patient presents with very poor participation, was able to stand with CGA at EOB but not compliant with therapy instructions/safety and threatening to get . He is not appropriate for therapy services. Recommending assisted NH placement. We are signing off.     DME Justification:  No DME recommended requiring DME justifications    Rehab Prognosis: Poor; patient would benefit from acute skilled PT services to address these deficits and reach maximum level of function.    Recent Surgery: * No surgery found *      Plan:     During this hospitalization, patient would benefit from acute PT services   to address the identified rehab impairments via   and progress toward the following goals:    Plan of Care Expires:       Subjective     Chief Complaint: a pleathora  Patient/Family Comments/goals: none  Pain/Comfort:       Patients cultural, spiritual, Tenriism conflicts given the current situation: no    Living Environment:    Prior to admission, patients level of function was unknown.  Equipment used at home: none.  DME owned (not currently used): unknown.  Upon discharge, patient will have assistance from nursing home staff.    Objective:     Communicated with nurse prior to session.  Patient found supine with  (cholecystostomy tube)  upon PT entry to room.    General Precautions: Standard, fall  Orthopedic Precautions:N/A   Braces: N/A  Respiratory Status: Room air    Exams:  Cognitive Exam:  Patient is oriented to Person and Place  Sensation: -       Intact  RLE ROM: WFL  RLE Strength:  WFL  LLE ROM: WFL  LLE Strength: WFL  Skin integrity: Visible skin intact      Functional Mobility:  Bed Mobility:  Supine to Sit: stand by assistance  Sit to Supine: stand by assistance  Transfers:  Sit to Stand:  contact guard assistance with rolling walker  Balance: fair      AM-PAC 6 CLICK MOBILITY  Total Score:22     Education Provided:  Role and goals of PT, transfer training, bed mobility, gait training, balance training, safety awareness, assistive device, strengthening exercises, and importance of participating in PT to return to PLOF.      Patient left supine with all lines intact, call button in reach, and 1:1 present.      GOALS:   Multidisciplinary Problems       Physical Therapy Goals       Not on file                    History:     Past Medical History:   Diagnosis Date    Carpal tunnel syndrome     HTN (hypertension)     Sciatica        Past Surgical History:   Procedure Laterality Date    ESOPHAGOGASTRODUODENOSCOPY N/A 6/20/2025    Procedure: EGD;  Surgeon: Jalyn Day MD;  Location: [facility] ENDOSCOPY;  Service: Gastroenterology;  Laterality: N/A;    SKIN GRAFT         Time Tracking:     PT Received On: 07/17/25  PT Start Time: 0810     PT Stop Time: 0835  PT Total Time (min): 25 min     Billable Minutes: Evaluation 25 minutes      07/10/2025

## 2025-07-10 NOTE — PROGRESS NOTES
Inpatient Nutrition Assessment    Admit Date: 7/3/2025   Total duration of encounter: 7 days   Patient Age: 64 y.o.    Nutrition Recommendation/Prescription     Soft and Bite sized ordered  Add Boost Plus (provides 360 kcal, 14 g protein per serving) once diet advanced.  May also need to consider PN if not able to advance diet in next 24 hours. Consult RD if PN to start.     Communication of Recommendations: reviewed with patient    Nutrition Assessment     Malnutrition Assessment/Nutrition-Focused Physical Exam    Malnutrition Context: chronic illness (07/05/25 1156)  Malnutrition Level: moderate (07/05/25 1156)  Energy Intake (Malnutrition): other (see comments) (Unable to assess) (07/05/25 1156)  Weight Loss (Malnutrition): greater than 7.5% in 3 months (07/05/25 1156)  Subcutaneous Fat (Malnutrition): mild depletion (07/05/25 1156)           Muscle Mass (Malnutrition): mild depletion (07/05/25 1156)  [place] Region (Muscle Loss): mild depletion  Clavicle Bone Region (Muscle Loss): mild depletion                    Fluid Accumulation (Malnutrition): other (see comments) (Not present) (07/05/25 1156)        A minimum of two characteristics is recommended for diagnosis of either severe or non-severe malnutrition.    Chart Review    Reason Seen: malnutrition screening tool (MST) and follow-up    Malnutrition Screening Tool Results   Have you recently lost weight without trying?: Yes: 24-33 lbs  Have you been eating poorly because of a decreased appetite?: Yes   MST Score: 4   Diagnosis:  Acute cholecystitis  Acute kidney injury   Acute on chronic hyperbilirubinemia  Recent known portal vein thrombosis  Hypoglycemia    Relevant Medical History: carpal tunnel syndrome, HTN, sciatica, portal vein thrombosis, cocaine use, hepatitis-C, hepatic adenoma     Scheduled Medications:  ARIPiprazole, 5 mg, Daily  EScitalopram oxalate, 5 mg, Daily  folic acid, 1 mg, Daily  lactulose 10 gram/15 ml, 15 g, Daily  nicotine, 1 patch,  Daily  pantoprazole, 40 mg, Daily  piperacillin-tazobactam (Zosyn) IV (PEDS and ADULTS) (extended infusion is not appropriate), 4.5 g, Q8H  rivaroxaban, 20 mg, Daily with dinner  thiamine, 100 mg, Daily    Continuous Infusions:     PRN Medications:  dextrose 50%, 12.5 g, PRN  dextrose 50%, 25 g, PRN  glucagon (human recombinant), 1 mg, PRN  glucose, 16 g, PRN  glucose, 24 g, PRN  HYDROcodone-acetaminophen, 1 tablet, Q6H PRN  HYDROcodone-acetaminophen, 1 tablet, Q6H PRN  lactulose 10 gram/15 ml, 20 g, TID PRN  melatonin, 6 mg, Nightly PRN  ondansetron, 4 mg, Q6H PRN  sodium chloride 0.9%, 10 mL, PRN    Calorie Containing IV Medications: no significant kcals from medications at this time    Recent Labs   Lab 07/04/25  0312 07/04/25  0815 07/05/25  0332 07/06/25  1054 07/07/25  0320 07/08/25  0317 07/09/25  0859 07/09/25  1218 07/10/25  1004     --  135* 136 137 137 137  --  136   K 3.8  --  4.4 3.8 3.7 3.8 3.7  --  3.9   CALCIUM 7.8*  --  8.2* 7.6* 7.4* 7.4* 7.8*  --  7.8*   PHOS 5.8*  --   --   --   --   --   --   --   --    MG 1.80  --   --   --   --   --   --   --   --      --  103 109* 110* 111* 110*  --  110*   CO2 18*  --  22* 21* 23 23 21*  --  21*   BUN 29.1*  --  45.3* 35.0* 27.5* 26.2* 22.9  --  21.8   CREATININE 1.83*  --  2.10* 1.52* 1.29* 1.20 1.24  --  1.19   EGFRNORACEVR 41  --  35 51 >60 >60 >60  --  >60   GLU 40*  --  61* 110 91 101 104  --  97   BILITOT 4.9*  --  4.4* 3.0* 2.6* 2.3* 2.9*  --  2.8*   ALKPHOS 333*  --  334* 288* 295* 250* 262*  --  263*   *  --  510* 380* 302* 224* 193*  --  159*   *  --  401* 251* 179* 126* 112*  --  100*   ALBUMIN 1.6*  --  1.8* 1.5* 1.4* 1.3* 1.3*  --  1.4*   AMMONIA  --  45.6  --   --   --   --   --  35.6  --    WBC 15.86  15.86*  --  13.19* 14.37* 14.31* 12.24* 11.98*  --  10.76   HGB 10.4*  --  12.0* 10.0* 9.5* 9.2* 10.1*  --  9.9*   HCT 31.5*  --  36.2* 30.9* 29.2* 28.6* 32.0*  --  31.0*     Nutrition Orders:  Diet Soft & Bite Sized  "(IDDSI Level 6) Safety Tray      Appetite/Oral Intake: poor/0-25% of meals  Factors Affecting Nutritional Intake: decreased appetite  Social Needs Impacting Access to Food: unable to assess at this time; will attempt on follow-up  Food/Sabianism/Cultural Preferences: unable to obtain  Food Allergies: no known food allergies  Last Bowel Movement: 07/10/25  Wound(s):  None documented     Comments    25: Noted MST indicates wt loss and decreased appetite. Unable to verify with pt, pt confused and hard to understand at time of RD visit. Noted EMR wts confirm wt loss over past few months. Awaiting MRCP results per MD notes. Due to malnourished state and NPO, may need to consider starting PN. Currently receiving minimal D5.    2025: Pt NPO for IR today per nurse. Pt reportedly thirsty. The nurse denies N/V. Last BM noted. Will monitor.    7/10/2025: The sitter reports a poor appetite/PO intake and denies N/V/D/C. Will add ONS to help meet needs. Will monitor.    Anthropometrics    Height: 5' 8.9" (175 cm), Height Method: Stated  Last Weight: 54.1 kg (119 lb 4.3 oz) (25 1153), Weight Method: Standard Scale  BMI (Calculated): 17.7  BMI Classification: underweight (BMI less than 18.5)        Ideal Body Weight (IBW), Male: 159.4 lb     % Ideal Body Weight, Male (lb): 74.82 %                 Usual Body Weight (UBW), k kg  % Usual Body Weight: 91.89  % Weight Change From Usual Weight: -8.45 %  Usual Weight Provided By: EMR weight history    Wt Readings from Last 5 Encounters:   25 54.1 kg (119 lb 4.3 oz)   25 54.4 kg (120 lb)   25 59 kg (130 lb)   25 59 kg (130 lb)   23 63 kg (138 lb 14.2 oz)   2025: 54.1 kg  7/10/2025: no new wts  Weight Change(s) Since Admission:   Wt Readings from Last 1 Encounters:   25 1153 54.1 kg (119 lb 4.3 oz)   25 54.1 kg (119 lb 4.3 oz)   Admit Weight: 54.1 kg (119 lb 4.3 oz) (25), Weight Method: Standard " Scale    Estimated Needs    Weight Used For Calorie Calculations: 54.1 kg (119 lb 4.3 oz)  Energy Calorie Requirements (kcal): 0025-8488 (30-35 kcal/kg)  Energy Need Method: Kcal/kg  Weight Used For Protein Calculations: 54.1 kg (119 lb 4.3 oz)  Protein Requirements: 65 (1.2 g/kg)  Fluid Requirements (mL): 1623 (1 mL/kcal)  CHO Requirement: 182-223 g/day (~45-55% est min kcal needs)     Enteral Nutrition     Patient not receiving enteral nutrition at this time.    Parenteral Nutrition     Patient not receiving parenteral nutrition support at this time.    Evaluation of Received Nutrient Intake    Calories: not meeting estimated needs  Protein: not meeting estimated needs    Patient Education     Not applicable.    Nutrition Diagnosis     PES: Inadequate oral intake related to acute illness as evidenced by NPO since admit. (active)     PES: Moderate chronic disease or condition related malnutrition Related to chronic condition As Evidenced by:  - weight loss: > 7.5% in 3 months - muscle mass depletion: 2 areas of mild muscle loss (Clavicle, Temporalis) - loss of subcutaneous fat: 1 area of mild fat loss (Buccal) active    Nutrition Interventions     Intervention(s): general/healthful diet, modified composition of parenteral nutrition, modified rate of parenteral nutrition, commercial beverage, and collaboration with other providers  Intervention(s): Oral diet/nutrient modifications;Oral nutritional supplement    Goal: Meet greater than 80% of nutritional needs by follow-up. (goal progressing)  Goal: Consume % of meals/snacks by follow-up. (goal progressing)    Nutrition Goals & Monitoring     Dietitian will monitor: food and beverage intake and energy intake  Discharge planning: resume home regimen  Nutrition Risk/Follow-Up: patient at increased nutrition risk; dietitian will follow-up twice weekly   Please consult if re-assessment needed sooner.

## 2025-07-11 PROBLEM — I63.9 STROKE: Status: ACTIVE | Noted: 2025-07-11

## 2025-07-11 LAB
ALBUMIN SERPL-MCNC: 1.3 G/DL (ref 3.4–4.8)
ALBUMIN/GLOB SERPL: 0.2 RATIO (ref 1.1–2)
ALP SERPL-CCNC: 233 UNIT/L (ref 40–150)
ALT SERPL-CCNC: 140 UNIT/L (ref 0–55)
ANION GAP SERPL CALC-SCNC: 5 MEQ/L
AST SERPL-CCNC: 90 UNIT/L (ref 11–45)
BASOPHILS # BLD AUTO: 0.04 X10(3)/MCL
BASOPHILS NFR BLD AUTO: 0.4 %
BILIRUB SERPL-MCNC: 2.5 MG/DL
BUN SERPL-MCNC: 21.7 MG/DL (ref 8.4–25.7)
CALCIUM SERPL-MCNC: 7.8 MG/DL (ref 8.8–10)
CHLORIDE SERPL-SCNC: 109 MMOL/L (ref 98–107)
CHOLEST SERPL-MCNC: 58 MG/DL
CHOLEST/HDLC SERPL: 10 {RATIO} (ref 0–5)
CO2 SERPL-SCNC: 21 MMOL/L (ref 23–31)
CREAT SERPL-MCNC: 1.32 MG/DL (ref 0.72–1.25)
CREAT/UREA NIT SERPL: 16
EOSINOPHIL # BLD AUTO: 0.18 X10(3)/MCL (ref 0–0.9)
EOSINOPHIL NFR BLD AUTO: 2 %
ERYTHROCYTE [DISTWIDTH] IN BLOOD BY AUTOMATED COUNT: 16.7 % (ref 11.5–17)
GFR SERPLBLD CREATININE-BSD FMLA CKD-EPI: >60 ML/MIN/1.73/M2
GLOBULIN SER-MCNC: 6.7 GM/DL (ref 2.4–3.5)
GLUCOSE SERPL-MCNC: 86 MG/DL (ref 82–115)
HCT VFR BLD AUTO: 28.9 % (ref 42–52)
HDLC SERPL-MCNC: 6 MG/DL (ref 35–60)
HGB BLD-MCNC: 9.2 G/DL (ref 14–18)
IMM GRANULOCYTES # BLD AUTO: 0.06 X10(3)/MCL (ref 0–0.04)
IMM GRANULOCYTES NFR BLD AUTO: 0.7 %
LDLC SERPL CALC-MCNC: 44 MG/DL (ref 50–140)
LYMPHOCYTES # BLD AUTO: 1.37 X10(3)/MCL (ref 0.6–4.6)
LYMPHOCYTES NFR BLD AUTO: 15.4 %
MCH RBC QN AUTO: 29.9 PG (ref 27–31)
MCHC RBC AUTO-ENTMCNC: 31.8 G/DL (ref 33–36)
MCV RBC AUTO: 93.8 FL (ref 80–94)
MONOCYTES # BLD AUTO: 0.89 X10(3)/MCL (ref 0.1–1.3)
MONOCYTES NFR BLD AUTO: 10 %
NEUTROPHILS # BLD AUTO: 6.37 X10(3)/MCL (ref 2.1–9.2)
NEUTROPHILS NFR BLD AUTO: 71.5 %
NRBC BLD AUTO-RTO: 0 %
PLATELET # BLD AUTO: 274 X10(3)/MCL (ref 130–400)
PMV BLD AUTO: 9.7 FL (ref 7.4–10.4)
POTASSIUM SERPL-SCNC: 3.7 MMOL/L (ref 3.5–5.1)
PROT SERPL-MCNC: 8 GM/DL (ref 5.8–7.6)
RBC # BLD AUTO: 3.08 X10(6)/MCL (ref 4.7–6.1)
SODIUM SERPL-SCNC: 135 MMOL/L (ref 136–145)
TRIGL SERPL-MCNC: 42 MG/DL (ref 34–140)
VLDLC SERPL CALC-MCNC: 8 MG/DL
WBC # BLD AUTO: 8.91 X10(3)/MCL (ref 4.5–11.5)

## 2025-07-11 PROCEDURE — 25000003 PHARM REV CODE 250: Performed by: HOSPITALIST

## 2025-07-11 PROCEDURE — 85025 COMPLETE CBC W/AUTO DIFF WBC: CPT | Performed by: INTERNAL MEDICINE

## 2025-07-11 PROCEDURE — 25000003 PHARM REV CODE 250: Performed by: INTERNAL MEDICINE

## 2025-07-11 PROCEDURE — S4991 NICOTINE PATCH NONLEGEND: HCPCS | Performed by: HOSPITALIST

## 2025-07-11 PROCEDURE — 0W9G3ZZ DRAINAGE OF PERITONEAL CAVITY, PERCUTANEOUS APPROACH: ICD-10-PCS | Performed by: INTERNAL MEDICINE

## 2025-07-11 PROCEDURE — 27000221 HC OXYGEN, UP TO 24 HOURS

## 2025-07-11 PROCEDURE — 80053 COMPREHEN METABOLIC PANEL: CPT | Performed by: INTERNAL MEDICINE

## 2025-07-11 PROCEDURE — 63600175 PHARM REV CODE 636 W HCPCS: Performed by: HOSPITALIST

## 2025-07-11 PROCEDURE — 21400001 HC TELEMETRY ROOM

## 2025-07-11 PROCEDURE — 92523 SPEECH SOUND LANG COMPREHEN: CPT

## 2025-07-11 PROCEDURE — 36415 COLL VENOUS BLD VENIPUNCTURE: CPT | Performed by: INTERNAL MEDICINE

## 2025-07-11 PROCEDURE — 25500020 PHARM REV CODE 255: Performed by: INTERNAL MEDICINE

## 2025-07-11 PROCEDURE — 11000001 HC ACUTE MED/SURG PRIVATE ROOM

## 2025-07-11 PROCEDURE — 25000003 PHARM REV CODE 250: Performed by: LICENSED PRACTICAL NURSE

## 2025-07-11 PROCEDURE — 80061 LIPID PANEL: CPT | Performed by: INTERNAL MEDICINE

## 2025-07-11 RX ORDER — LABETALOL HYDROCHLORIDE 5 MG/ML
10 INJECTION, SOLUTION INTRAVENOUS EVERY 4 HOURS PRN
Status: DISCONTINUED | OUTPATIENT
Start: 2025-07-11 | End: 2025-07-30 | Stop reason: HOSPADM

## 2025-07-11 RX ORDER — ATORVASTATIN CALCIUM 40 MG/1
40 TABLET, FILM COATED ORAL DAILY
Status: DISCONTINUED | OUTPATIENT
Start: 2025-07-11 | End: 2025-07-11

## 2025-07-11 RX ADMIN — PIPERACILLIN SODIUM AND TAZOBACTAM SODIUM 4.5 G: 4; .5 INJECTION, POWDER, LYOPHILIZED, FOR SOLUTION INTRAVENOUS at 12:07

## 2025-07-11 RX ADMIN — FOLIC ACID 1 MG: 1 TABLET ORAL at 08:07

## 2025-07-11 RX ADMIN — THIAMINE HCL TAB 100 MG 100 MG: 100 TAB at 08:07

## 2025-07-11 RX ADMIN — HYDROCODONE BITARTRATE AND ACETAMINOPHEN 1 TABLET: 10; 325 TABLET ORAL at 11:07

## 2025-07-11 RX ADMIN — PANTOPRAZOLE SODIUM 40 MG: 40 TABLET, DELAYED RELEASE ORAL at 08:07

## 2025-07-11 RX ADMIN — LACTULOSE 15 G: 10 SOLUTION ORAL at 08:07

## 2025-07-11 RX ADMIN — PIPERACILLIN SODIUM AND TAZOBACTAM SODIUM 4.5 G: 4; .5 INJECTION, POWDER, LYOPHILIZED, FOR SOLUTION INTRAVENOUS at 11:07

## 2025-07-11 RX ADMIN — IOHEXOL 100 ML: 350 INJECTION, SOLUTION INTRAVENOUS at 09:07

## 2025-07-11 RX ADMIN — NICOTINE 1 PATCH: 14 PATCH TRANSDERMAL at 08:07

## 2025-07-11 RX ADMIN — PIPERACILLIN SODIUM AND TAZOBACTAM SODIUM 4.5 G: 4; .5 INJECTION, POWDER, LYOPHILIZED, FOR SOLUTION INTRAVENOUS at 03:07

## 2025-07-11 RX ADMIN — PIPERACILLIN SODIUM AND TAZOBACTAM SODIUM 4.5 G: 4; .5 INJECTION, POWDER, LYOPHILIZED, FOR SOLUTION INTRAVENOUS at 08:07

## 2025-07-11 RX ADMIN — ESCITALOPRAM OXALATE 10 MG: 10 TABLET ORAL at 08:07

## 2025-07-11 RX ADMIN — RIVAROXABAN 20 MG: 10 TABLET, FILM COATED ORAL at 04:07

## 2025-07-11 RX ADMIN — ARIPIPRAZOLE 10 MG: 5 TABLET ORAL at 08:07

## 2025-07-11 NOTE — CONSULTS
[facility]  Neurology  Consult Note    Patient Name: Aman Humphrey  MRN: [id number]  Admission Date: 7/3/2025  Hospital Length of Stay: 8 days  Code Status: Full Code   Attending Provider: Ricardo Trotter MD   Consulting Provider: FANTA Riggins  Primary Care Physician: Stalin Landin DO  Principal Problem:<principal problem not specified>    Inpatient consult to Neurology Services (Vascular Neurology)  Consult performed by: Liz Quinonez AGACNP-BC  Consult ordered by: Beth Beckett MD         Subjective:     Chief Complaint:  No chief complaint on file.         HPI:   64-year-old male with a past medical history of carpal tunnel syndrome, HTN, sciatica, portal vein thrombosis, cocaine use, hepatitis-C, hepatic adenoma with concern of HCC, cirrhosis. Patient presented to OSH with complaints of abdominal pain x 2 months and was found to have acute on chronic hyperbilirubinemia and leukocytosis. Patient transferred for higher level of care.  GI consulted for suspicion of possible HCC, Decompensated Liver Cirrhosis, and PVT (on xarelto). They recommend follow up imaging, referral to hepatology, and IR cholecystostomy tube placement, and GI follow up.  Patient seen by Dr. Beckett on 7/10/2025. Continued generalized weakness. Previous CT head had concerns for pontine/left internal capsular CVA. MRI brain was ordered, which revealing an acute lacunar infarct of the left thalamus, along with chronic lacunar infarcts in the left paramedian pk and bilateral thalami, mild chronic small-vessel ischemic changes and a chronic micro hemorrhage in the left thalamus. No acute intracranial hemorrhage.Vascular Neurology consulted for stroke workup. CTA head and neck pending.         Past Medical History:   Diagnosis Date    Carpal tunnel syndrome     HTN (hypertension)     Sciatica        Past Surgical History:   Procedure Laterality Date    ESOPHAGOGASTRODUODENOSCOPY N/A  6/20/2025    Procedure: EGD;  Surgeon: Jalyn Day MD;  Location: [facility] ENDOSCOPY;  Service: Gastroenterology;  Laterality: N/A;    SKIN GRAFT         Review of patient's allergies indicates:  No Known Allergies    Current Neurological Medications:     No current facility-administered medications on file prior to encounter.     Current Outpatient Medications on File Prior to Encounter   Medication Sig    ARIPiprazole (ABILIFY) 5 MG Tab Take 1 tablet (5 mg total) by mouth once daily.    EScitalopram oxalate (LEXAPRO) 5 MG Tab Take 1 tablet (5 mg total) by mouth once daily.    folic acid (FOLVITE) 1 MG tablet Take 1 tablet (1 mg total) by mouth once daily.    furosemide (LASIX) 20 MG tablet Take 1 tablet (20 mg total) by mouth once daily.    lactulose 10 gram/15 ml (CHRONULAC) 10 gram/15 mL (15 mL) solution Take 30 mLs (20 g total) by mouth 3 (three) times daily as needed.    multivitamin Tab Take 1 tablet by mouth once daily.    nicotine (NICODERM CQ) 14 mg/24 hr Place 1 patch onto the skin once daily.    pantoprazole (PROTONIX) 40 MG tablet Take 1 tablet (40 mg total) by mouth once daily.    rivaroxaban (XARELTO) 20 mg Tab Take 1 tablet (20 mg total) by mouth daily with dinner or evening meal.    spironolactone (ALDACTONE) 25 MG tablet Take 1 tablet (25 mg total) by mouth once daily.    thiamine 100 MG tablet Take 1 tablet (100 mg total) by mouth once daily.     Family History    None       Tobacco Use    Smoking status: Some Days     Types: Cigarettes    Smokeless tobacco: Never   Substance and Sexual Activity    Alcohol use: Yes     Comment: daily    Drug use: Not Currently    Sexual activity: Not on file     Review of Systems   Reason unable to perform ROS: poor historian.     Objective:     Vital Signs (Most Recent):  Temp: 97.8 °F (36.6 °C) (07/11/25 0324)  Pulse: 70 (07/11/25 0726)  Resp: 16 (07/10/25 2129)  BP: (!) 160/83 (07/11/25 0712)  SpO2: 97 % (07/11/25 0712) Vital Signs (24h Range):  Temp:  [97.8  °F (36.6 °C)-98.1 °F (36.7 °C)] 97.8 °F (36.6 °C)  Pulse:  [63-71] 70  Resp:  [16] 16  SpO2:  [97 %-98 %] 97 %  BP: (129-160)/(74-83) 160/83     Weight: 54.1 kg (119 lb 4.3 oz)  Body mass index is 17.67 kg/m².     Physical Exam  Constitutional:       Appearance: He is ill-appearing.   HENT:      Nose: Nose normal.      Mouth/Throat:      Mouth: Mucous membranes are moist.   Eyes:      Extraocular Movements: Extraocular movements intact.      Pupils: Pupils are equal, round, and reactive to light.   Pulmonary:      Effort: Pulmonary effort is normal.   Musculoskeletal:         General: Normal range of motion.      Cervical back: Normal range of motion.   Skin:     General: Skin is warm.      Coloration: Skin is jaundiced.      Findings: Bruising present.   Neurological:      Mental Status: He is alert and oriented to person, place, and time.      Cranial Nerves: No cranial nerve deficit.      Sensory: Sensory deficit (right leg) present.      Motor: Weakness (antigravity in all extremities, poor resistance, without drift in all 4s) present.          NEUROLOGICAL EXAMINATION:     MENTAL STATUS   Oriented to person, place, and time.     CRANIAL NERVES     CN III, IV, VI   Pupils are equal, round, and reactive to light.      Significant Labs: All pertinent lab results from the past 24 hours have been reviewed.    Significant Imaging: I have reviewed all pertinent imaging results/findings within the past 24 hours.  Assessment and Plan:     Stroke  - presented with abdominal pain and generalized weakness  - Stroke RF: HTN, UDS positive, and possible HCC  - Intervention: OOW for intervention  - Etiology: small vessel disease    Stroke workup:  -CTh: Previous CT had concerns for pontine/left internal capsular CVA, MRI brain was ordered   -CTA h/n:  No evidence of hemodynamically significant stenosis or large vessel occlusion.   Mild stenosis of the carotid bulbs bilaterally.   Mild to moderate stenosis of the cavernous  through communicating segments of the intracranial internal carotid arteries bilaterally.   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  -MRI brain: Chronic lacunar infarcts in the left paramedian pk and bilateral thalami. Mild chronic small-vessel ischemic changes in the supratentorial periventricular and subcortical white matter. There is a chronic microhemorrhage in the left thalamus. No acute intracranial hemorrhage.   -ECHO: 6/26/25 right atrium is normal in size.estimated ejection fraction of 60 - 65%. BS pending  -LDL: pending  -TSH: 1.710   -home medications include: Xarelto      Plan:  Stroke workup completed with exception of bubble study. Will follow up peripherally. Etiology concerning for small vessel disease. Currently on max medical management with Xarelto.  - Medications for secondary stroke prevention.   -continue Anticoagulants: Rivaroxaban 20 mg daily   - No statin to due to HCC and worsening Cirrhosis. Could consider Zetia if no contraindications  - PT/OT/speech therapy evaluation and recommendations  - Based on AHA/ACC 2021 guidelines, patient primary care doctor should target BP goal < 130 mm/hg, LDL-C < 70 mg/dl, and HBA1c of < 7% to minimize the risk of future strokes.  - SCDs for DVT prophylaxis   -  normalize blood pressure  - Patient will need follow up in stroke clinic with Angelita Johnson NP in 3 months.   - Thank you for allowing us to participate in this patient's care, we will sign-off.          Patient/Family Stroke Education:  -I have discussed the patient's stroke risk factors and the importance to modify them in order to reduce the risk of future events.  Also, the importance of a healthy diet and daily exercise in order to reduce the risk of future strokes.    --Encourage medication compliance  --Life Style Factors: healthy diet and physical activity, encourage low salt, and Mediterranean Diet  --Smoking Cessation  --Avoid Excessive Alcohol  consumption  --Evaluate/encourage compliance related to PERNELL  --**To include other RF such as: CAD, LEONEL, AF, and hypercoagulable states as applicable.  -I went over the usual stroke warning signs and symptoms, and the need to activate EMS (call 911) as soon as the symptoms present.  Sudden onset numbness or weakness of the face, arm, or leg; especially on one side of the body  Sudden confusion, trouble speaking, or understanding  Sudden trouble seeing in one or both eyes  Sudden trouble walking, dizziness, loss of coordination  Sudden severe headache with no known cause  -Discussed prescribed medication regime with the patient, including the indication for the medications as well as their potential side effects, and what to do in case they appear.               VTE Risk Mitigation (From admission, onward)           Ordered     rivaroxaban tablet 20 mg  With dinner         07/08/25 0948     enoxaparin injection 50 mg  Every 12 hours         07/03/25 1713     IP VTE HIGH RISK PATIENT  Once         07/03/25 1711     Place sequential compression device  Until discontinued         07/03/25 1711                    Thank you for your consult. I will sign off. Please contact us if you have any additional questions.    Liz Quinonez, [facility]-BC  Neurology  [facility]

## 2025-07-11 NOTE — SUBJECTIVE & OBJECTIVE
Past Medical History:   Diagnosis Date    Carpal tunnel syndrome     HTN (hypertension)     Sciatica        Past Surgical History:   Procedure Laterality Date    ESOPHAGOGASTRODUODENOSCOPY N/A 6/20/2025    Procedure: EGD;  Surgeon: Jalyn Day MD;  Location: [facility] ENDOSCOPY;  Service: Gastroenterology;  Laterality: N/A;    SKIN GRAFT         Review of patient's allergies indicates:  No Known Allergies    Current Neurological Medications:     No current facility-administered medications on file prior to encounter.     Current Outpatient Medications on File Prior to Encounter   Medication Sig    ARIPiprazole (ABILIFY) 5 MG Tab Take 1 tablet (5 mg total) by mouth once daily.    EScitalopram oxalate (LEXAPRO) 5 MG Tab Take 1 tablet (5 mg total) by mouth once daily.    folic acid (FOLVITE) 1 MG tablet Take 1 tablet (1 mg total) by mouth once daily.    furosemide (LASIX) 20 MG tablet Take 1 tablet (20 mg total) by mouth once daily.    lactulose 10 gram/15 ml (CHRONULAC) 10 gram/15 mL (15 mL) solution Take 30 mLs (20 g total) by mouth 3 (three) times daily as needed.    multivitamin Tab Take 1 tablet by mouth once daily.    nicotine (NICODERM CQ) 14 mg/24 hr Place 1 patch onto the skin once daily.    pantoprazole (PROTONIX) 40 MG tablet Take 1 tablet (40 mg total) by mouth once daily.    rivaroxaban (XARELTO) 20 mg Tab Take 1 tablet (20 mg total) by mouth daily with dinner or evening meal.    spironolactone (ALDACTONE) 25 MG tablet Take 1 tablet (25 mg total) by mouth once daily.    thiamine 100 MG tablet Take 1 tablet (100 mg total) by mouth once daily.     Family History    None       Tobacco Use    Smoking status: Some Days     Types: Cigarettes    Smokeless tobacco: Never   Substance and Sexual Activity    Alcohol use: Yes     Comment: daily    Drug use: Not Currently    Sexual activity: Not on file     Review of Systems   Reason unable to perform ROS: poor historian.     Objective:     Vital Signs (Most  Recent):  Temp: 97.8 °F (36.6 °C) (07/11/25 0324)  Pulse: 70 (07/11/25 0726)  Resp: 16 (07/10/25 2129)  BP: (!) 160/83 (07/11/25 0712)  SpO2: 97 % (07/11/25 0712) Vital Signs (24h Range):  Temp:  [97.8 °F (36.6 °C)-98.1 °F (36.7 °C)] 97.8 °F (36.6 °C)  Pulse:  [63-71] 70  Resp:  [16] 16  SpO2:  [97 %-98 %] 97 %  BP: (129-160)/(74-83) 160/83     Weight: 54.1 kg (119 lb 4.3 oz)  Body mass index is 17.67 kg/m².     Physical Exam  Constitutional:       Appearance: He is ill-appearing.   HENT:      Nose: Nose normal.      Mouth/Throat:      Mouth: Mucous membranes are moist.   Eyes:      Extraocular Movements: Extraocular movements intact.      Pupils: Pupils are equal, round, and reactive to light.   Pulmonary:      Effort: Pulmonary effort is normal.   Musculoskeletal:         General: Normal range of motion.      Cervical back: Normal range of motion.   Skin:     General: Skin is warm.      Coloration: Skin is jaundiced.      Findings: Bruising present.   Neurological:      Mental Status: He is alert and oriented to person, place, and time.      Cranial Nerves: No cranial nerve deficit.      Sensory: Sensory deficit (right leg) present.      Motor: Weakness (antigravity in all extremities, poor resistance, without drift in all 4s) present.          NEUROLOGICAL EXAMINATION:     MENTAL STATUS   Oriented to person, place, and time.     CRANIAL NERVES     CN III, IV, VI   Pupils are equal, round, and reactive to light.      Significant Labs: All pertinent lab results from the past 24 hours have been reviewed.    Significant Imaging: I have reviewed all pertinent imaging results/findings within the past 24 hours.

## 2025-07-11 NOTE — ASSESSMENT & PLAN NOTE
- presented with abdominal pain and generalized weakness  - Stroke RF: HTN, UDS positive, and possible HCC  - Intervention: OOW for intervention  - Etiology: small vessel disease    Stroke workup:  -CTh: Previous CT had concerns for pontine/left internal capsular CVA, MRI brain was ordered   -CTA h/n:  No evidence of hemodynamically significant stenosis or large vessel occlusion.   Mild stenosis of the carotid bulbs bilaterally.   Mild to moderate stenosis of the cavernous through communicating segments of the intracranial internal carotid arteries bilaterally.   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  -MRI brain: Chronic lacunar infarcts in the left paramedian pk and bilateral thalami. Mild chronic small-vessel ischemic changes in the supratentorial periventricular and subcortical white matter. There is a chronic microhemorrhage in the left thalamus. No acute intracranial hemorrhage.   -ECHO: 6/26/25 right atrium is normal in size.estimated ejection fraction of 60 - 65%. BS pending  -LDL: pending  -TSH: 1.710   -home medications include: Xarelto      Plan:  Stroke workup completed with exception of bubble study. Will follow up peripherally. Etiology concerning for small vessel disease. Currently on max medical management with Xarelto.  - Medications for secondary stroke prevention.   -continue Anticoagulants: Rivaroxaban 20 mg daily   - No statin to due to HCC and worsening Cirrhosis. Could consider Zetia if no contraindications  - PT/OT/speech therapy evaluation and recommendations  - Based on AHA/ACC 2021 guidelines, patient primary care doctor should target BP goal < 130 mm/hg, LDL-C < 70 mg/dl, and HBA1c of < 7% to minimize the risk of future strokes.  - SCDs for DVT prophylaxis   -  normalize blood pressure  - Patient will need follow up in stroke clinic with Angelita Johnson NP in 3 months.   - Thank you for allowing us to participate in this patient's care, we will  sign-off.          Patient/Family Stroke Education:  -I have discussed the patient's stroke risk factors and the importance to modify them in order to reduce the risk of future events.  Also, the importance of a healthy diet and daily exercise in order to reduce the risk of future strokes.    --Encourage medication compliance  --Life Style Factors: healthy diet and physical activity, encourage low salt, and Mediterranean Diet  --Smoking Cessation  --Avoid Excessive Alcohol consumption  --Evaluate/encourage compliance related to PERNELL  --**To include other RF such as: CAD, LEONEL, AF, and hypercoagulable states as applicable.  -I went over the usual stroke warning signs and symptoms, and the need to activate EMS (call 911) as soon as the symptoms present.  Sudden onset numbness or weakness of the face, arm, or leg; especially on one side of the body  Sudden confusion, trouble speaking, or understanding  Sudden trouble seeing in one or both eyes  Sudden trouble walking, dizziness, loss of coordination  Sudden severe headache with no known cause  -Discussed prescribed medication regime with the patient, including the indication for the medications as well as their potential side effects, and what to do in case they appear.

## 2025-07-11 NOTE — PT/OT/SLP PROGRESS
Received new orders. Pt out of room for paracentesis. Messaged Dr. Beckett who stated pt is in a better mood and has a stroke L thalamus. Will attempt PT re-eval when able.

## 2025-07-11 NOTE — PT/OT/SLP EVAL
[facility]  Speech Language Pathology Department  Cognitive-Communication Evaluation    Patient Name:  Aman Humphrey   MRN:  [id number]    Recommendations     General recommendations:  SLP intervention not indicated  Communication strategies:  none    Discharge therapy intensity:  Intervention not indicated  Barriers to safe discharge: none    History     Aman Humphrey is a/n 64 y.o. male admitted with complaints of abdominal pain x2 months. On 7/10, pt continued with generalized weakness. MRI brain revealed acute lacunar infarct of the left thalamus, chronic lacunar infarcts in the left paramedian pk and bilateral thalami, mild chronic small vessel ischemic changes, and a chronic micro hemorrhage in the left thalamus. Pt passed swallow screen.    Past Medical History:   Diagnosis Date    Carpal tunnel syndrome     HTN (hypertension)     Sciatica      Past Surgical History:   Procedure Laterality Date    ESOPHAGOGASTRODUODENOSCOPY N/A 6/20/2025    Procedure: EGD;  Surgeon: Jalyn Day MD;  Location: [facility] ENDOSCOPY;  Service: Gastroenterology;  Laterality: N/A;    SKIN GRAFT       Imaging   Results for orders placed during the hospital encounter of 07/03/25    MRI Brain Without Contrast    Narrative  EXAMINATION:  MRI BRAIN WITHOUT CONTRAST    CLINICAL HISTORY:  Pontine lacunar infarcts based on previous CT;    TECHNIQUE:  Multiplanar multisequence MR imaging of the brain was performed without contrast.    COMPARISON:  CT head without contrast 07/09/2025., 07/04/2025    FINDINGS:  Subcentimeter focus of diffusion restriction in the left thalamus on series 3, image 17 most consistent with acute lacunar infarct.  Chronic lacunar infarcts in the left paramedian pk and bilateral thalami.  Mild chronic small-vessel ischemic changes in the supratentorial periventricular and subcortical white matter.  There is a chronic microhemorrhage in the left thalamus.  No acute intracranial  hemorrhage.  No evidence of extra-axial fluid collection.  No hydrocephalus.  No herniation.  No significant intracranial mass effect.  Normal course and caliber of the major intracranial arterial flow voids.    Orbits are unremarkable.  Moderate mucosal thickening of the left maxillary sinus.  Mastoid air cells are clear.    Impression  Acute lacunar infarct of the left thalamus.  Findings discussed with Dr. Beckett at 06:48 on 07/11/2025    Additional chronic ischemic changes of the brain as detailed above.      Electronically signed by: Priyank Tyler  Date:    07/11/2025  Time:    06:48    Subjective     Patient awake, alert, and cooperative.  Patient goals: to get better   Spiritual/Cultural/Mu-ism Beliefs/Practices that affect care:  no    Pain/Comfort:  0/10    Respiratory Status:  room air    Objective     SPEECH PRODUCTION  Phoneme Production: adequate  Voice Quality: adequate  Voice Production: adequate  Speech Rate: appropriate  Loudness: acceptable  Respiration: WFL for speech  Resonance: adequate  Prosody: adequate  Speech Intelligibility  Known Context: Greater that 90%  Unknown Context: Greater that 90%    AUDITORY COMPREHENSION  Following Directions:  1-Step: 100%  2-Step: 100%  Yes/No Questions:  Biographical: 100%  Environmental: 100%  Simple: 100%  Complex: 100%    VERBAL EXPRESSION  Phrase Completion: 100%  Confrontation Naming  Body Parts: 100%  Objects: 100%  Wh- Questions:  Object name: 100%  Object function: 100%    COGNITION  Orientation:  Person: yes  Place: yes  Time: yes  Situation: yes   Attention:  Focused: Within Functional Limits  Sustained: Within Functional Limits  Memory:  Immediate: Within Functional Limits  Delayed: Within Functional Limits  Long Term: Within Functional Limits  Problem Solving  Functional simple: Within Functional Limits  Organization:  Convergent thinking: Within Functional Limits  Divergent thinking: Within Functional Limits    Assessment     Pt presents with  functional speech and language skills. Cognitive linguistic skills reported to be at baseline. Skilled SLP intervention not warranted at this time. SLP to sign off. Please reconsult as warranted.     Patient Education     Patient provided with verbal education regarding SLP POC.  Understanding was verbalized.    Plan     SLP Follow-Up:   No   Plan of Care reviewed with:    patient     Time Tracking     SLP Treatment Date:   07/11/25  Speech Start Time:  1430  Speech Stop Time:  1447     Speech Total Time (min):  17 min    Billable minutes:  Evaluation of Speech Sound Production with Comprehension and Expression, 17 minutes     07/11/2025

## 2025-07-11 NOTE — PLAN OF CARE
Problem: Adult Inpatient Plan of Care  Goal: Plan of Care Review  Outcome: Progressing  Goal: Patient-Specific Goal (Individualized)  Outcome: Progressing  Goal: Absence of Hospital-Acquired Illness or Injury  Outcome: Progressing  Goal: Optimal Comfort and Wellbeing  Outcome: Progressing  Goal: Readiness for Transition of Care  Outcome: Progressing     Problem: Skin Injury Risk Increased  Goal: Skin Health and Integrity  Outcome: Progressing     Problem: Coping Ineffective  Goal: Effective Coping  Outcome: Progressing     Problem: Wound  Goal: Optimal Coping  Outcome: Progressing  Goal: Optimal Functional Ability  Outcome: Progressing  Goal: Absence of Infection Signs and Symptoms  Outcome: Progressing  Goal: Improved Oral Intake  Outcome: Progressing  Goal: Optimal Pain Control and Function  Outcome: Progressing  Goal: Skin Health and Integrity  Outcome: Progressing  Goal: Optimal Wound Healing  Outcome: Progressing     Problem: Suicide Risk  Goal: Absence of Self-Harm  Outcome: Progressing

## 2025-07-11 NOTE — PROGRESS NOTES
[facility] Hospital Medicine Progress Note        Chief Complaint: Inpatient Follow-up    HPI:     64-year-old male with significant history of carpal tunnel syndrome, HTN, sciatica, portal vein thrombosis, cocaine use, chronic hep C, hepatic adenoma concerning for HCC, cirrhosis presented to the ED with complaints of progressively worsening abdominal pain for the past 2 months.  Patient was noted to have acute on chronic hyperbilirubinemia with leukocytosis.  He initially presented to outlying facility and was transferred to our hospital for higher level of care, patient was admitted to hospital medicine services, Gastroenterology, general surgery services consulted, MRCP was ordered to further evaluate worsening liver function test.  MRCP revealed findings concerning for HCC, distended gallbladder with cholelithiasis and mild intrahepatic biliary ductal dilation, evaluation was challenging secondary to underlying portal vein thrombus.  Suspicion for cholecystitis given clinical presentation.  No evidence of choledocholithiasis, no indication for ERCP per Gastroenterology, recommended anticoagulation for portal vein thrombosis.  Ultrasound with positive from Chang's sign and thickened gallbladder.  General surgery evaluated for possible cholecystitis, poor surgical candidate and therefore interventional radiology was consulted for percutaneous cholecystostomy tube placement and this was done on 7/7.  Zosyn sky, General surgery Plan to follow him in clinic as outpatient, GI planning for repeating triple phase as outpatient in 3 months and also arrange follow up with hepatology in [place].  Patient reported suicidal ideation on 07/09 and therefore psych consulted and he was placed under pec/one-to-one observation.  Previous CT with concerns for pontine/left internal capsule ischemic CVA, MRI was ordered to further evaluate.  Ultrasound abdomen ordered to quantify ascites on  07/10.  Pec and one-to-one observation continued    Interval Hx:   Patient was seen at bedside in the morning, comfortably laying in bed, he was frustrated that he could not call his, he has no more having suicidal ideations, no acute events overnight, he is hemodynamically stable except that BP is accelerated at times    Objective/physical exam:  General: In no acute distress, afebrile  Chest: Clear to auscultation bilaterally  Heart: S1, S2, no appreciable murmur  Abdomen: Soft, nontender, BS +  MSK: Warm, no lower extremity edema, no clubbing or cyanosis  Neurologic: Alert and oriented x4, moving all extremities with good strength     VITAL SIGNS: 24 HRS MIN & MAX LAST   Temp  Min: 97.8 °F (36.6 °C)  Max: 98.1 °F (36.7 °C) 97.8 °F (36.6 °C)   BP  Min: 129/74  Max: 160/83 (!) 160/83   Pulse  Min: 63  Max: 71  70   Resp  Min: 16  Max: 16 16   SpO2  Min: 97 %  Max: 98 % 97 %       Recent Labs   Lab 07/11/25  0336   WBC 8.91   RBC 3.08*   HGB 9.2*   HCT 28.9*   MCV 93.8   MCH 29.9   MCHC 31.8*   RDW 16.7      MPV 9.7         Recent Labs   Lab 07/11/25  0336   *   K 3.7   *   CO2 21*   BUN 21.7   CREATININE 1.32*   GLU 86   CALCIUM 7.8*   ALBUMIN 1.3*   PROT 8.0*   ALKPHOS 233*   *   AST 90*   BILITOT 2.5*          Microbiology Results (last 7 days)       Procedure Component Value Units Date/Time    Body Fluid Culture [[id number]] Collected: 07/07/25 1639    Order Status: Completed Specimen: Fluids from Gallbladder Updated: 07/10/25 1019     Body Fluid Culture No Growth At 72 Hours    Anaerobic Culture [[id number]] Collected: 07/07/25 1639    Order Status: Completed Specimen: Aspirate from Gallbladder Updated: 07/10/25 0748     Anaerobe Culture No Anaerobes Isolated    Gram Stain [[id number]] Collected: 07/07/25 1639    Order Status: Completed Specimen: Aspirate from Gallbladder Updated: 07/08/25 0653     GRAM STAIN No WBCs, No bacteria seen    Fungal Culture [[id number]] Collected: 07/07/25  1639    Order Status: Sent Specimen: Aspirate from Gallbladder Updated: 07/07/25 1704             Scheduled Med:   ARIPiprazole  10 mg Oral Daily    EScitalopram oxalate  10 mg Oral Daily    folic acid  1 mg Oral Daily    lactulose 10 gram/15 ml  15 g Oral Daily    nicotine  1 patch Transdermal Daily    pantoprazole  40 mg Oral Daily    piperacillin-tazobactam (Zosyn) IV (PEDS and ADULTS) (extended infusion is not appropriate)  4.5 g Intravenous Q8H    rivaroxaban  20 mg Oral Daily with dinner    thiamine  100 mg Oral Daily          Assessment/Plan:    Acute ischemic CVA left thalamus  Suicidal ideation placed under pec 7/9  Symptomatic cholelithiasis with suspected cholecystitis status post CT-guided percutaneous cholecystostomy tube placement 7/7  Sepsis secondary to above-improving   Acute on chronic hyperbilirubinemia with transaminitis-slowly improving  Decompensated cirrhosis with large volume ascites   Portal vein thrombosis  Suspected HCC  Large volume ascites status post paracentesis 7/11, removed 2.2 L  Suspected bilateral pneumonia-HA P  Substance use disorder  Chronic hep C   History of essential HTN  Sciatica   History of CTS   Physical deconditioning  Prophylaxis    MRI confirmed left thalamic CVA  Patient on Xarelto   Neurology agrees with this and in case he has to come off Xarelto for some reason Neurology is recommending monotherapy with antiplatelets  No statin given transaminitis  No large vessel occlusion  Bubble study pending  Re-evaluated by speech therapy and is cleared for regular diet  Psych Team following, patient is no more suicidal   On Lexapro and Abilify   Psych will consider revoking pec   Continue one-to-one observation for now  Continue Zosyn , cholecystostomy tube output is significantly high today,   Large volume ascites might be contributing   IR consulted and patient underwent paracentesis today, removed 2.2 L  Patient has had cytology of peritoneal fluid before which was  negative for malignancy  General surgery will see him as outpatient in 6 weeks and consider cholangiogram as outpatient  Continue cholecystostomy tube, monitor output/color closely  Afebrile with stable hemodynamics and leukocytosis resolved  Hyperbilirubinemia with transaminitis also slowly improving  Gastroenterology signed off  Planning for hepatology referral to [facility] and also to repeat triple phase scan in 3 months   Continue Xarelto for portal vein thrombosis   Continue aripiprazole, citalopram, folic acid, nicotine patch, ppi and thiamine   Continue lactulose to prevent hepatic encephalopathy  Latest ammonia within normal limits  DVT prophylaxis-Xarelto    Patient was living with a friend prior to this hospitalization, family is unable to care for him   Plan is for nursing home placement   Follow psych recs as far as revoking PEC , no more suicidal  Beth Beckett MD   07/11/2025

## 2025-07-11 NOTE — HPI
64-year-old male with a past medical history of carpal tunnel syndrome, HTN, sciatica, portal vein thrombosis, cocaine use, hepatitis-C, hepatic adenoma with concern of HCC, cirrhosis. Patient presented to OSH with complaints of abdominal pain x 2 months and was found to have acute on chronic hyperbilirubinemia and leukocytosis. Patient transferred for higher level of care.  GI consulted for suspicion of possible HCC, Decompensated Liver Cirrhosis, and PVT (on xarelto). They recommend follow up imaging, referral to hepatology, and IR cholecystostomy tube placement, and GI follow up.  Patient seen by Dr. Beckett on 7/10/2025. Continued generalized weakness. Previous CT head had concerns for pontine/left internal capsular CVA. MRI brain was ordered, which revealing an acute lacunar infarct of the left thalamus, along with chronic lacunar infarcts in the left paramedian pk and bilateral thalami, mild chronic small-vessel ischemic changes and a chronic micro hemorrhage in the left thalamus. No acute intracranial hemorrhage.Vascular Neurology consulted for stroke workup. CTA head and neck pending.

## 2025-07-12 LAB
ALBUMIN SERPL-MCNC: 1.3 G/DL (ref 3.4–4.8)
ALBUMIN/GLOB SERPL: 0.2 RATIO (ref 1.1–2)
ALP SERPL-CCNC: 247 UNIT/L (ref 40–150)
ALT SERPL-CCNC: 123 UNIT/L (ref 0–55)
ANION GAP SERPL CALC-SCNC: 4 MEQ/L
APTT PPP: 71.1 SECONDS (ref 23.2–33.7)
AST SERPL-CCNC: 93 UNIT/L (ref 11–45)
BACTERIA FLD CULT: NORMAL
BASOPHILS # BLD AUTO: 0.02 X10(3)/MCL
BASOPHILS NFR BLD AUTO: 0.2 %
BILIRUB SERPL-MCNC: 2.4 MG/DL
BSA FOR ECHO PROCEDURE: 1.62 M2
BUN SERPL-MCNC: 21.5 MG/DL (ref 8.4–25.7)
CALCIUM SERPL-MCNC: 7.6 MG/DL (ref 8.8–10)
CHLORIDE SERPL-SCNC: 108 MMOL/L (ref 98–107)
CO2 SERPL-SCNC: 21 MMOL/L (ref 23–31)
CREAT SERPL-MCNC: 1.25 MG/DL (ref 0.72–1.25)
CREAT/UREA NIT SERPL: 17
EOSINOPHIL # BLD AUTO: 0.09 X10(3)/MCL (ref 0–0.9)
EOSINOPHIL NFR BLD AUTO: 0.9 %
ERYTHROCYTE [DISTWIDTH] IN BLOOD BY AUTOMATED COUNT: 16.5 % (ref 11.5–17)
GFR SERPLBLD CREATININE-BSD FMLA CKD-EPI: >60 ML/MIN/1.73/M2
GLOBULIN SER-MCNC: 6.7 GM/DL (ref 2.4–3.5)
GLUCOSE SERPL-MCNC: 93 MG/DL (ref 82–115)
HCT VFR BLD AUTO: 28.4 % (ref 42–52)
HGB BLD-MCNC: 9.2 G/DL (ref 14–18)
IMM GRANULOCYTES # BLD AUTO: 0.05 X10(3)/MCL (ref 0–0.04)
IMM GRANULOCYTES NFR BLD AUTO: 0.5 %
INR PPP: 5.2
INR PPP: 5.2
LYMPHOCYTES # BLD AUTO: 1.53 X10(3)/MCL (ref 0.6–4.6)
LYMPHOCYTES NFR BLD AUTO: 15.5 %
MAGNESIUM SERPL-MCNC: 1.8 MG/DL (ref 1.6–2.6)
MCH RBC QN AUTO: 29.7 PG (ref 27–31)
MCHC RBC AUTO-ENTMCNC: 32.4 G/DL (ref 33–36)
MCV RBC AUTO: 91.6 FL (ref 80–94)
MONOCYTES # BLD AUTO: 0.96 X10(3)/MCL (ref 0.1–1.3)
MONOCYTES NFR BLD AUTO: 9.8 %
NEUTROPHILS # BLD AUTO: 7.19 X10(3)/MCL (ref 2.1–9.2)
NEUTROPHILS NFR BLD AUTO: 73.1 %
NRBC BLD AUTO-RTO: 0 %
PHOSPHATE SERPL-MCNC: 2.8 MG/DL (ref 2.3–4.7)
PLATELET # BLD AUTO: 283 X10(3)/MCL (ref 130–400)
PMV BLD AUTO: 9.6 FL (ref 7.4–10.4)
POTASSIUM SERPL-SCNC: 4.4 MMOL/L (ref 3.5–5.1)
PROT SERPL-MCNC: 8 GM/DL (ref 5.8–7.6)
PROTHROMBIN TIME: 46.6 SECONDS (ref 12.5–14.5)
PROTHROMBIN TIME: 47 SECONDS (ref 12.5–14.5)
RBC # BLD AUTO: 3.1 X10(6)/MCL (ref 4.7–6.1)
SODIUM SERPL-SCNC: 133 MMOL/L (ref 136–145)
WBC # BLD AUTO: 9.84 X10(3)/MCL (ref 4.5–11.5)

## 2025-07-12 PROCEDURE — 83735 ASSAY OF MAGNESIUM: CPT | Performed by: INTERNAL MEDICINE

## 2025-07-12 PROCEDURE — 21400001 HC TELEMETRY ROOM

## 2025-07-12 PROCEDURE — 25000003 PHARM REV CODE 250: Performed by: LICENSED PRACTICAL NURSE

## 2025-07-12 PROCEDURE — S4991 NICOTINE PATCH NONLEGEND: HCPCS | Performed by: HOSPITALIST

## 2025-07-12 PROCEDURE — 63600175 PHARM REV CODE 636 W HCPCS: Performed by: HOSPITALIST

## 2025-07-12 PROCEDURE — 85610 PROTHROMBIN TIME: CPT | Performed by: INTERNAL MEDICINE

## 2025-07-12 PROCEDURE — 85730 THROMBOPLASTIN TIME PARTIAL: CPT | Performed by: INTERNAL MEDICINE

## 2025-07-12 PROCEDURE — 25000003 PHARM REV CODE 250: Performed by: INTERNAL MEDICINE

## 2025-07-12 PROCEDURE — 97535 SELF CARE MNGMENT TRAINING: CPT

## 2025-07-12 PROCEDURE — 97530 THERAPEUTIC ACTIVITIES: CPT

## 2025-07-12 PROCEDURE — 80053 COMPREHEN METABOLIC PANEL: CPT | Performed by: INTERNAL MEDICINE

## 2025-07-12 PROCEDURE — 84100 ASSAY OF PHOSPHORUS: CPT | Performed by: INTERNAL MEDICINE

## 2025-07-12 PROCEDURE — 97164 PT RE-EVAL EST PLAN CARE: CPT

## 2025-07-12 PROCEDURE — 36415 COLL VENOUS BLD VENIPUNCTURE: CPT | Performed by: INTERNAL MEDICINE

## 2025-07-12 PROCEDURE — 11000001 HC ACUTE MED/SURG PRIVATE ROOM

## 2025-07-12 PROCEDURE — 85025 COMPLETE CBC W/AUTO DIFF WBC: CPT | Performed by: INTERNAL MEDICINE

## 2025-07-12 PROCEDURE — 25000003 PHARM REV CODE 250: Performed by: HOSPITALIST

## 2025-07-12 PROCEDURE — 97168 OT RE-EVAL EST PLAN CARE: CPT

## 2025-07-12 RX ADMIN — LACTULOSE 15 G: 10 SOLUTION ORAL at 09:07

## 2025-07-12 RX ADMIN — FOLIC ACID 1 MG: 1 TABLET ORAL at 09:07

## 2025-07-12 RX ADMIN — NICOTINE 1 PATCH: 14 PATCH TRANSDERMAL at 09:07

## 2025-07-12 RX ADMIN — PIPERACILLIN SODIUM AND TAZOBACTAM SODIUM 4.5 G: 4; .5 INJECTION, POWDER, LYOPHILIZED, FOR SOLUTION INTRAVENOUS at 09:07

## 2025-07-12 RX ADMIN — ESCITALOPRAM OXALATE 10 MG: 10 TABLET ORAL at 09:07

## 2025-07-12 RX ADMIN — ARIPIPRAZOLE 10 MG: 5 TABLET ORAL at 09:07

## 2025-07-12 RX ADMIN — PANTOPRAZOLE SODIUM 40 MG: 40 TABLET, DELAYED RELEASE ORAL at 09:07

## 2025-07-12 RX ADMIN — THIAMINE HCL TAB 100 MG 100 MG: 100 TAB at 09:07

## 2025-07-12 RX ADMIN — HYDROCODONE BITARTRATE AND ACETAMINOPHEN 1 TABLET: 10; 325 TABLET ORAL at 06:07

## 2025-07-12 RX ADMIN — PIPERACILLIN SODIUM AND TAZOBACTAM SODIUM 4.5 G: 4; .5 INJECTION, POWDER, LYOPHILIZED, FOR SOLUTION INTRAVENOUS at 04:07

## 2025-07-12 NOTE — PROGRESS NOTES
[facility] Hospital Medicine Progress Note        Chief Complaint: Inpatient Follow-up    HPI:     64-year-old male with significant history of carpal tunnel syndrome, HTN, sciatica, portal vein thrombosis, cocaine use, chronic hep C, hepatic adenoma concerning for HCC, cirrhosis presented to the ED with complaints of progressively worsening abdominal pain for the past 2 months.  Patient was noted to have acute on chronic hyperbilirubinemia with leukocytosis.  He initially presented to outlying facility and was transferred to our hospital for higher level of care, patient was admitted to hospital medicine services, Gastroenterology, general surgery services consulted, MRCP was ordered to further evaluate worsening liver function test.  MRCP revealed findings concerning for HCC, distended gallbladder with cholelithiasis and mild intrahepatic biliary ductal dilation, evaluation was challenging secondary to underlying portal vein thrombus.  Suspicion for cholecystitis given clinical presentation.  No evidence of choledocholithiasis, no indication for ERCP per Gastroenterology, recommended anticoagulation for portal vein thrombosis.  Ultrasound with positive from Chang's sign and thickened gallbladder.  General surgery evaluated for possible cholecystitis, poor surgical candidate and therefore interventional radiology was consulted for percutaneous cholecystostomy tube placement and this was done on 7/7.  Zosyn sky, General surgery Plan to follow him in clinic as outpatient, GI planning for repeating triple phase as outpatient in 3 months and also arrange follow up with hepatology in [place].  Patient reported suicidal ideation on 07/09 and therefore psych consulted and he was placed under pec/one-to-one observation.  Previous CT with concerns for pontine/left internal capsule ischemic CVA, MRI was ordered to further evaluate.  Ultrasound abdomen ordered to quantify ascites on  07/10.  Pec and one-to-one observation continued.  Large volume ascites noted and therefore IR consulted for paracentesis on 07/11, MRI came back positive for thalamic CVA, Neurology consulted, neurology recommended to continue anticoagulation, no statin given transaminitis, pec continued as of 7/11    Interval Hx:   Patient was seen at bedside in the morning, patient is no more suicidal or homicidal, mood is stable, not agitated, requesting to talk to family over phone, hemodynamics stable, no change in neurological status, no overt bleeding     Objective/physical exam:  General: In no acute distress, afebrile  Chest: Clear to auscultation bilaterally  Heart: S1, S2, no appreciable murmur  Abdomen: Soft, nontender, BS +  MSK: Warm, no lower extremity edema, no clubbing or cyanosis  Neurologic: Alert and oriented x4, moving all extremities with good strength     VITAL SIGNS: 24 HRS MIN & MAX LAST   Temp  Min: 97.6 °F (36.4 °C)  Max: 98.3 °F (36.8 °C) 98.3 °F (36.8 °C)   BP  Min: 116/59  Max: 139/76 119/69   Pulse  Min: 62  Max: 79  62   Resp  Min: 18  Max: 18 18   SpO2  Min: 94 %  Max: 98 % 97 %       Recent Labs   Lab 07/12/25  0308   WBC 9.84   RBC 3.10*   HGB 9.2*   HCT 28.4*   MCV 91.6   MCH 29.7   MCHC 32.4*   RDW 16.5      MPV 9.6         Recent Labs   Lab 07/12/25  0308   *   K 4.4   *   CO2 21*   BUN 21.5   CREATININE 1.25   GLU 93   CALCIUM 7.6*   MG 1.80   ALBUMIN 1.3*   PROT 8.0*   ALKPHOS 247*   *   AST 93*   BILITOT 2.4*          Microbiology Results (last 7 days)       Procedure Component Value Units Date/Time    Body Fluid Culture [[id number]] Collected: 07/07/25 1639    Order Status: Completed Specimen: Fluids from Gallbladder Updated: 07/12/25 0909     Body Fluid Culture Final Report: At 5 days. No growth    Anaerobic Culture [[id number]] Collected: 07/07/25 1639    Order Status: Completed Specimen: Aspirate from Gallbladder Updated: 07/10/25 0748     Anaerobe Culture No  Anaerobes Isolated    Gram Stain [[id number]] Collected: 07/07/25 1639    Order Status: Completed Specimen: Aspirate from Gallbladder Updated: 07/08/25 0653     GRAM STAIN No WBCs, No bacteria seen    Fungal Culture [[id number]] Collected: 07/07/25 1639    Order Status: Sent Specimen: Aspirate from Gallbladder Updated: 07/07/25 1709             Scheduled Med:   ARIPiprazole  10 mg Oral Daily    EScitalopram oxalate  10 mg Oral Daily    folic acid  1 mg Oral Daily    lactulose 10 gram/15 ml  15 g Oral Daily    nicotine  1 patch Transdermal Daily    pantoprazole  40 mg Oral Daily    piperacillin-tazobactam (Zosyn) IV (PEDS and ADULTS) (extended infusion is not appropriate)  4.5 g Intravenous Q8H    thiamine  100 mg Oral Daily          Assessment/Plan:    Acute ischemic CVA -left thalamus  Positive bubble study  Suicidal ideation placed under pec 7/9, revoked 7/12  Symptomatic cholelithiasis with suspected cholecystitis status post CT-guided percutaneous cholecystostomy tube placement 7/7  Sepsis secondary to above-improving   Acute on chronic hyperbilirubinemia with transaminitis-slowly improving  Decompensated cirrhosis with large volume ascites status post paracentesis 7/11, removed 2.2 L  Coagulopathy with INR 5.2   Portal vein thrombosis  Suspected HCC  Suspected bilateral pneumonia-HA P  Substance use disorder  Chronic hep C   History of essential HTN  Sciatica   History of CTS   Physical deconditioning  Prophylaxis      Evaluated by Neurology for left thalamic CVA and recommended to continue Xarelto which he was on for portal vein thrombosis   No statin given transaminitis  Patient's INR has returned 5.2 today   I repeated to confirm accuracy, previous INR was 1.6   Given coagulopathy I have discontinued Xarelto   No overt bleeding and therefore will hold off on FFP, continue close monitoring  No large vessel occlusion  Bubble study has come back positive, anticoagulation will be restarted once INR is less than  2  Arrange outpatient follow up with Cardiology for OLAMIDE  Re-evaluated by speech therapy and is cleared for regular diet  Psych Team following, patient is no more suicidal   On Lexapro and Abilify   Psych team is okay to cancel pec, revoked today  Continue Zosyn ,   Underwent paracentesis for large volume ascites on 07/11, high cholecystostomy tube output might have been secondary to ascites, better today   Patient has had cytology of peritoneal fluid before which was negative for malignancy  General surgery will see him as outpatient in 6 weeks and consider cholangiogram as outpatient  Continue cholecystostomy tube, monitor output/color closely  Afebrile with stable hemodynamics and leukocytosis resolved  Hyperbilirubinemia with transaminitis also slowly improving  Gastroenterology signed off  Planning for hepatology referral to [facility] and also to repeat triple phase scan in 3 months  Continue aripiprazole, citalopram, folic acid, nicotine patch, ppi and thiamine   Continue lactulose to prevent hepatic encephalopathy  Latest ammonia within normal limits  DVT prophylaxis-Xarelto    Patient was living with a friend prior to this hospitalization, family is unable to care for him   Plan is for nursing home placement   Pec revoked  Beth Beckett MD   07/12/2025

## 2025-07-12 NOTE — PT/OT/SLP RE-EVAL
"Physical Therapy Re-Evaluation    Patient Name:  Aman Humphrey   MRN:  [id number]    Recommendations:     Discharge therapy intensity: Moderate Intensity Therapy (vs. low intensity therapy with 24/7 supervision)   Discharge Equipment Recommendations: to be determined by next level of care   Barriers to discharge: Decreased caregiver support and Ongoing medical needs    Assessment:     Aman Humphrey is a 64 y.o. male admitted with a medical diagnosis of L thalamus CVA, cholelithiasis with suspected cholecystitis s/p cholecystectomy tube placement 7/7.  He presents with the following impairments/functional limitations: weakness, impaired endurance, impaired self care skills, impaired functional mobility, gait instability, impaired balance, decreased safety awareness, impaired cognition.    Pt agreeable to partic in PT eval, however becomes easily agitated when feeling rushed or when given structured commands. Overall, he is performing all mobility with SBA and ambulating to/from the bathroom with a RW. Unable to formally assess MMT as pt became agitated, but strength appears WFL and with no asymmetries noted to BLE. Will continue ot f/u to improve strength, endurance, and functional mobility. Recommending moderate intensity therapy vs low intensity therapy with 24/7 supervision at d/c.      Rehab Prognosis: Fair; patient would benefit from acute skilled PT services to address these deficits and reach maximum level of function.    Recent Surgery: * No surgery found *      Plan:     During this hospitalization, patient would benefit from acute PT services 3 x/week to address the identified rehab impairments via gait training, therapeutic exercises, therapeutic activities and progress toward the following goals:    Plan of Care Expires:  08/12/25      Subjective     Chief Complaint: "I need to change places, this floor can't do anything for me and it gave me a stroke"  Patient/Family Comments/goals: to get " stronger  Pain/Comfort:  Pain Rating 1: 0/10    Patients cultural, spiritual, Catholic conflicts given the current situation: no    Objective:     Communicated with RN prior to session.  Patient found HOB elevated with  (cholecystectomy tube, 1:1)  upon PT entry to room.    General Precautions: Standard, fall  Orthopedic Precautions:N/A   Braces: N/A  Respiratory Status: Room air      Exams:  BLE ROM: WFL  BLE Strength: unable to formally assess 2/2 decr pt cooperation, however appears WFL and knee ext was 4+/5 bilaterally  Skin integrity: Visible skin intact      Functional Mobility:  Bed Mobility:     Supine to Sit: stand by assistance and incr time  Transfers:     Sit to Stand:  stand by assistance with rolling walker  Gait: Pt amb 2 x ~18' with RW and spv-SBA, no LOB and one cue to maintain safe proximity to RW. Pt declined to ambulate in hallway.  Balance: WFL      AM-PAC 6 CLICK MOBILITY  Total Score:22       Treatment & Education:  Pt stood at sink with UE support on sink and spv-SBA for balance while performing ADL tasks x 7-10 mins    Patient provided with verbal education education regarding PT role/goals/POC, fall prevention, safety awareness, and discharge/DME recommendations.  Understanding was verbalized, however additional teaching warranted.     Patient left up in chair with all lines intact, call button in reach, and 1:1 present.      GOALS:   Multidisciplinary Problems       Physical Therapy Goals          Problem: Physical Therapy    Goal Priority Disciplines Outcome Interventions   Physical Therapy Goal     PT, PT/OT Progressing    Description: Goals to be met by: 25     Patient will increase functional independence with mobility by performin. Sit to stand transfer with Modified [place]  2. Bed to chair transfer with Modified [place] using LRAD  3. Gait  x 300 feet with Modified [place] using LRAD.                          History:     Past Medical History:    Diagnosis Date    Carpal tunnel syndrome     HTN (hypertension)     Sciatica        Past Surgical History:   Procedure Laterality Date    ESOPHAGOGASTRODUODENOSCOPY N/A 6/20/2025    Procedure: EGD;  Surgeon: Jalyn Day MD;  Location: [facility] ENDOSCOPY;  Service: Gastroenterology;  Laterality: N/A;    SKIN GRAFT         Time Tracking:     PT Received On: 07/12/25  PT Start Time: 0828     PT Stop Time: 0850  PT Total Time (min): 22 min     Billable Minutes: Re-eval 10 and Therapeutic Activity 12      07/12/2025

## 2025-07-12 NOTE — PROGRESS NOTES
"7/11/2025  Aman Humphrey   1960   [id number]        Psychiatry Progress Note     Chief Complaint: depression    SUBJECTIVE:   4-year-old male with a past medical history of carpal tunnel syndrome, HTN, sciatica, portal vein thrombosis, cocaine use, hepatitis-C, hepatic adenoma with concern of HCC, cirrhosis.  Presented to outside emergency department with complaints of abdominal pain x2 months and was found to have acute on chronic hyperbilirubinemia, leukocytosis and thus transferred to our facility.  At the time of my examination this morning he is drowsy, did not really answer much questions, awakens with stimulus.  Ultrasound abdomen shows hepatomegaly with hepatic steatosis, thrombosed main portal vein, enlarged gallbladder, thickened gallbladder wall with a positive Chang's sign concerning for acute cholecystitis.  MRCP shows challenging evaluation due to underlying portal vein thrombosis, 15 mm area of arterial enhancement could be small HCC, distended gallbladder with underlying cholelithiasis, no extrahepatic biliary dilation.  Underwent cholecystostomy tube July 7th with Interventional Radiology     7/9/25: Pt seen today for psychiatric evaluation seen in assigned room, pt states mood is "down." Pt currently on lexapro 5mg po q daily, and abilify 5mg po q daily. Pt AAOx4 speech is soft. Staff reports pt was defecating and urinating on the floor intentionally the past few days, also cursing out staff members and presenting agitated and irritable. Security was called to pt room for assistance and this is when pt started crying and expressed suicidal ideation without plan. Pt currently depressed, denying H.I., and hallucinations at this time. Pt reports excessive sleep "all I do is sleep," low energy, irritability, and dysphoric mood. Will review medications and adjust accordingly.     7/10/25: Pt seen today for psychiatric evaluation seen in assigned room, pt states mood is "down." Pt currently on " "lexapro 5mg po q daily, and abilify 5mg po q daily. Pt AAOx4 speech is soft, staff reports pt refusing physical therapy today and stated he would throw himself on the floor. When asked pt denies this, appears calm no apparent agitation at this time. Will adjust medications to address mood/agitation.    7/11/25:  Pt seen today for psychiatric evaluation seen in assigned room, pt states mood is "okay." Pt AAOx4.  PT is amenable to go to a assited living facility. Pt not endorsing suicidal or homicidal ideation at this time. Pt denies hallucinations. Pt currently on lexapro 10mg po q daily, and abilify 10mg po q daily. Pt tolerating medications, denies side effects.        Current Medications:   Scheduled Meds:    ARIPiprazole  10 mg Oral Daily    EScitalopram oxalate  10 mg Oral Daily    folic acid  1 mg Oral Daily    lactulose 10 gram/15 ml  15 g Oral Daily    nicotine  1 patch Transdermal Daily    pantoprazole  40 mg Oral Daily    piperacillin-tazobactam (Zosyn) IV (PEDS and ADULTS) (extended infusion is not appropriate)  4.5 g Intravenous Q8H    rivaroxaban  20 mg Oral Daily with dinner    thiamine  100 mg Oral Daily      PRN Meds:   Current Facility-Administered Medications:     acetaminophen, 500 mg, Oral, Q6H PRN    dextrose 50%, 12.5 g, Intravenous, PRN    dextrose 50%, 25 g, Intravenous, PRN    glucagon (human recombinant), 1 mg, Intramuscular, PRN    glucose, 16 g, Oral, PRN    glucose, 24 g, Oral, PRN    HYDROcodone-acetaminophen, 1 tablet, Oral, Q6H PRN    HYDROcodone-acetaminophen, 1 tablet, Oral, Q6H PRN    labetalol, 10 mg, Intravenous, Q4H PRN    lactulose 10 gram/15 ml, 20 g, Oral, TID PRN    melatonin, 6 mg, Oral, Nightly PRN    ondansetron, 4 mg, Intravenous, Q6H PRN    sodium chloride 0.9%, 10 mL, Intravenous, PRN   Psychotherapeutics (From admission, onward)      Start     Stop Route Frequency Ordered    07/11/25 0900  ARIPiprazole tablet 10 mg         -- Oral Daily 07/10/25 1626    07/11/25 0900  " EScitalopram oxalate tablet 10 mg         -- Oral Daily 07/10/25 1626            Allergies:   Review of patient's allergies indicates:  No Known Allergies     OBJECTIVE:   Vitals   Vitals:    07/11/25 1931   BP: 139/76   Pulse: 79   Resp:    Temp: 98.3 °F (36.8 °C)        Labs/Imaging/Studies:   Recent Results (from the past 36 hours)   Comprehensive Metabolic Panel    Collection Time: 07/10/25 10:04 AM   Result Value Ref Range    Sodium 136 136 - 145 mmol/L    Potassium 3.9 3.5 - 5.1 mmol/L    Chloride 110 (H) 98 - 107 mmol/L    CO2 21 (L) 23 - 31 mmol/L    Glucose 97 82 - 115 mg/dL    Blood Urea Nitrogen 21.8 8.4 - 25.7 mg/dL    Creatinine 1.19 0.72 - 1.25 mg/dL    Calcium 7.8 (L) 8.8 - 10.0 mg/dL    Protein Total 8.0 (H) 5.8 - 7.6 gm/dL    Albumin 1.4 (L) 3.4 - 4.8 g/dL    Globulin 6.6 (H) 2.4 - 3.5 gm/dL    Albumin/Globulin Ratio 0.2 (L) 1.1 - 2.0 ratio    Bilirubin Total 2.8 (H) <=1.5 mg/dL     (H) 40 - 150 unit/L     (H) 0 - 55 unit/L     (H) 11 - 45 unit/L    eGFR >60 mL/min/1.73/m2    Anion Gap 5.0 mEq/L    BUN/Creatinine Ratio 18    CBC with Differential    Collection Time: 07/10/25 10:04 AM   Result Value Ref Range    WBC 10.76 4.50 - 11.50 x10(3)/mcL    RBC 3.32 (L) 4.70 - 6.10 x10(6)/mcL    Hgb 9.9 (L) 14.0 - 18.0 g/dL    Hct 31.0 (L) 42.0 - 52.0 %    MCV 93.4 80.0 - 94.0 fL    MCH 29.8 27.0 - 31.0 pg    MCHC 31.9 (L) 33.0 - 36.0 g/dL    RDW 16.8 11.5 - 17.0 %    Platelet 301 130 - 400 x10(3)/mcL    MPV 9.5 7.4 - 10.4 fL    Neut % 77.4 %    Lymph % 11.5 %    Mono % 8.9 %    Eos % 1.2 %    Basophil % 0.3 %    Imm Grans % 0.7 %    Neut # 8.32 2.1 - 9.2 x10(3)/mcL    Lymph # 1.24 0.6 - 4.6 x10(3)/mcL    Mono # 0.96 0.1 - 1.3 x10(3)/mcL    Eos # 0.13 0 - 0.9 x10(3)/mcL    Baso # 0.03 <=0.2 x10(3)/mcL    Imm Gran # 0.08 (H) 0.00 - 0.04 x10(3)/mcL    NRBC% 0.0 %   Comprehensive Metabolic Panel    Collection Time: 07/11/25  3:36 AM   Result Value Ref Range    Sodium 135 (L) 136 - 145 mmol/L     Potassium 3.7 3.5 - 5.1 mmol/L    Chloride 109 (H) 98 - 107 mmol/L    CO2 21 (L) 23 - 31 mmol/L    Glucose 86 82 - 115 mg/dL    Blood Urea Nitrogen 21.7 8.4 - 25.7 mg/dL    Creatinine 1.32 (H) 0.72 - 1.25 mg/dL    Calcium 7.8 (L) 8.8 - 10.0 mg/dL    Protein Total 8.0 (H) 5.8 - 7.6 gm/dL    Albumin 1.3 (L) 3.4 - 4.8 g/dL    Globulin 6.7 (H) 2.4 - 3.5 gm/dL    Albumin/Globulin Ratio 0.2 (L) 1.1 - 2.0 ratio    Bilirubin Total 2.5 (H) <=1.5 mg/dL     (H) 40 - 150 unit/L     (H) 0 - 55 unit/L    AST 90 (H) 11 - 45 unit/L    eGFR >60 mL/min/1.73/m2    Anion Gap 5.0 mEq/L    BUN/Creatinine Ratio 16    CBC with Differential    Collection Time: 07/11/25  3:36 AM   Result Value Ref Range    WBC 8.91 4.50 - 11.50 x10(3)/mcL    RBC 3.08 (L) 4.70 - 6.10 x10(6)/mcL    Hgb 9.2 (L) 14.0 - 18.0 g/dL    Hct 28.9 (L) 42.0 - 52.0 %    MCV 93.8 80.0 - 94.0 fL    MCH 29.9 27.0 - 31.0 pg    MCHC 31.8 (L) 33.0 - 36.0 g/dL    RDW 16.7 11.5 - 17.0 %    Platelet 274 130 - 400 x10(3)/mcL    MPV 9.7 7.4 - 10.4 fL    Neut % 71.5 %    Lymph % 15.4 %    Mono % 10.0 %    Eos % 2.0 %    Basophil % 0.4 %    Imm Grans % 0.7 %    Neut # 6.37 2.1 - 9.2 x10(3)/mcL    Lymph # 1.37 0.6 - 4.6 x10(3)/mcL    Mono # 0.89 0.1 - 1.3 x10(3)/mcL    Eos # 0.18 0 - 0.9 x10(3)/mcL    Baso # 0.04 <=0.2 x10(3)/mcL    Imm Gran # 0.06 (H) 0.00 - 0.04 x10(3)/mcL    NRBC% 0.0 %   Lipid panel    Collection Time: 07/11/25 10:25 AM   Result Value Ref Range    Cholesterol Total 58 <=200 mg/dL    HDL Cholesterol 6 (L) 35 - 60 mg/dL    Triglyceride 42 34 - 140 mg/dL    Cholesterol/HDL Ratio 10 (H) 0 - 5    Very Low Density Lipoprotein 8     LDL Cholesterol 44.00 (L) 50.00 - 140.00 mg/dL   Echo Saline Bubble? Yes    Collection Time: 07/11/25 12:36 PM   Result Value Ref Range    BSA 1.62 m2          Medical Review Of Systems:  defer      Psychiatric Mental Status Exam:  General Appearance: appears stated age, well-developed, well-nourished  Arousal:  "alert  Behavior: cooperative  Movements and Motor Activity: no abnormal involuntary movements noted  Orientation: oriented to person, place, time, and situation  Speech: normal rate, normal rhythm, normal volume, normal tone  Mood: "Ok"  Affect: constricted  Thought Process: linear  Associations: intact  Thought Content and Perceptions: no suicidal ideation, no homicidal ideation, no auditory hallucinations, no visual hallucinations, no paranoid ideation, no ideas of reference  Recent and Remote Memory: recent memory intact, remote memory intact; per interview/observation with patient  Attention and Concentration: intact, attentive to conversation; per interview/observation with patient  Fund of Knowledge: intact, aware of current events, vocabulary appropriate; based on history, vocabulary, fund of knowledge, syntax, grammar, and content  Insight: questionable; based on understanding of severity of illness and HPI  Judgment: questionable; based on patient's behavior and HP    ASSESSMENT/PLAN:   Problems Addressed/Diagnoses:  F32.A Depression, unspecified  F39.0 Mood disorder NOS    Past Medical History:   Diagnosis Date    Carpal tunnel syndrome     HTN (hypertension)     Sciatica         Plan:  Medication management  Lexapro 10mg po q daily  Abilify 10mg po q daily  Will attempt to obtain outside psychiatric records if available   to assist with aftercare planning and collateral  Okay with rescinding physician's emergency committment pt has not endorsed S.I., or H.I. Does not present as threat to self or others.  Follow up with psychiatric provider outpatient for further medications management/depression when discharged/medically cleared.  Psych will follow, please reach out if needed          Ming Haney    "

## 2025-07-12 NOTE — PLAN OF CARE
Problem: Physical Therapy  Goal: Physical Therapy Goal  Description: Goals to be met by: 25     Patient will increase functional independence with mobility by performin. Sit to stand transfer with Modified [place]  2. Bed to chair transfer with Modified [place] using LRAD  3. Gait  x 300 feet with Modified [place] using LRAD.     Outcome: Progressing

## 2025-07-12 NOTE — PLAN OF CARE
Problem: Adult Inpatient Plan of Care  Goal: Plan of Care Review  7/12/2025 1439 by Rigo Contreras RN  Outcome: Progressing  Flowsheets (Taken 7/12/2025 1439)  Plan of Care Reviewed With: patient  7/12/2025 1429 by Rigo Contreras RN  Outcome: Progressing  Flowsheets (Taken 7/12/2025 1429)  Plan of Care Reviewed With: patient  Goal: Patient-Specific Goal (Individualized)  7/12/2025 1439 by Rigo Contreras RN  Outcome: Progressing  7/12/2025 1429 by Rigo Contreras RN  Outcome: Progressing  Goal: Absence of Hospital-Acquired Illness or Injury  7/12/2025 1439 by Rigo Contreras RN  Outcome: Progressing  7/12/2025 1429 by Rigo Contreras RN  Outcome: Progressing  Intervention: Identify and Manage Fall Risk  7/12/2025 1439 by Rigo Contreras RN  Flowsheets (Taken 7/12/2025 1439)  Safety Promotion/Fall Prevention:   assistive device/personal item within reach   instructed to call staff for mobility   lighting adjusted   medications reviewed   nonskid shoes/socks when out of bed   side rails raised x 2  7/12/2025 1429 by Rigo Contreras RN  Flowsheets (Taken 7/12/2025 1429)  Safety Promotion/Fall Prevention:   assistive device/personal item within reach   instructed to call staff for mobility   lighting adjusted   medications reviewed   nonskid shoes/socks when out of bed   patient expresses understanding of fall risk and prevention   side rails raised x 2  Intervention: Prevent Skin Injury  7/12/2025 1439 by Rigo Contreras RN  Flowsheets (Taken 7/12/2025 1439)  Body Position:   position changed independently   weight shifting  Skin Protection: incontinence pads utilized  Device Skin Pressure Protection:   absorbent pad utilized/changed   tubing/devices free from skin contact  7/12/2025 1429 by Rigo Contreras RN  Flowsheets (Taken 7/12/2025 1429)  Body Position:   position changed independently   weight shifting  Skin Protection: incontinence pads utilized  Device Skin Pressure Protection:   absorbent pad utilized/changed    tubing/devices free from skin contact  Intervention: Prevent and Manage VTE (Venous Thromboembolism) Risk  7/12/2025 1439 by Rigo Contreras RN  Flowsheets (Taken 7/12/2025 1439)  VTE Prevention/Management:   ambulation promoted   bleeding precautions maintained   bleeding risk assessed   fluids promoted   ROM (active) performed   ROM (passive) performed  7/12/2025 1429 by Rigo Contreras RN  Flowsheets (Taken 7/12/2025 1429)  VTE Prevention/Management:   ambulation promoted   bleeding risk assessed   ROM (active) performed  Intervention: Prevent Infection  7/12/2025 1439 by Rigo Contreras RN  Flowsheets (Taken 7/12/2025 1439)  Infection Prevention:   rest/sleep promoted   single patient room provided  7/12/2025 1429 by Rigo Contreras RN  Flowsheets (Taken 7/12/2025 1429)  Infection Prevention:   rest/sleep promoted   single patient room provided  Goal: Optimal Comfort and Wellbeing  7/12/2025 1439 by Rigo Contreras RN  Outcome: Progressing  7/12/2025 1429 by Rigo Contreras RN  Outcome: Progressing  Intervention: Monitor Pain and Promote Comfort  Flowsheets (Taken 7/12/2025 1439)  Pain Management Interventions:   care clustered   medication offered   pain management plan reviewed with patient/caregiver   pillow support provided   position adjusted   relaxation techniques promoted  Intervention: Provide Person-Centered Care  Flowsheets (Taken 7/12/2025 1439)  Trust Relationship/Rapport: care explained  Goal: Readiness for Transition of Care  7/12/2025 1439 by Rigo Contreras RN  Outcome: Progressing  7/12/2025 1429 by Rigo Contreras RN  Outcome: Progressing     Problem: Skin Injury Risk Increased  Goal: Skin Health and Integrity  7/12/2025 1439 by Rigo Contreras RN  Outcome: Progressing  7/12/2025 1429 by Rigo Contreras RN  Outcome: Progressing  Intervention: Optimize Skin Protection  Flowsheets (Taken 7/12/2025 1439)  Pressure Reduction Techniques:   frequent weight shift encouraged   weight shift assistance  provided  Pressure Reduction Devices: positioning supports utilized  Skin Protection: incontinence pads utilized  Activity Management: Rolling - L1  Head of Bed (HOB) Positioning: HOB at 30-45 degrees  Intervention: Promote and Optimize Oral Intake  Flowsheets (Taken 7/12/2025 1439)  Oral Nutrition Promotion: rest periods promoted     Problem: Coping Ineffective  Goal: Effective Coping  7/12/2025 1439 by Rigo Contreras RN  Outcome: Progressing  7/12/2025 1429 by Rigo Contreras RN  Outcome: Progressing  Intervention: Support and Enhance Coping Strategies  Flowsheets (Taken 7/12/2025 1439)  Supportive Measures:   active listening utilized   relaxation techniques promoted   self-care encouraged   verbalization of feelings encouraged  Family/Support System Care: self-care encouraged  Environmental Support: calm environment promoted     Problem: Wound  Goal: Optimal Coping  7/12/2025 1439 by Rigo Contreras RN  Outcome: Progressing  7/12/2025 1429 by Rigo Contreras RN  Outcome: Progressing  Intervention: Support Patient and Family Response  Flowsheets (Taken 7/12/2025 1439)  Supportive Measures:   active listening utilized   relaxation techniques promoted   self-care encouraged   verbalization of feelings encouraged  Family/Support System Care: self-care encouraged  Goal: Optimal Functional Ability  7/12/2025 1439 by Rigo Contreras RN  Outcome: Progressing  7/12/2025 1429 by Rigo Contreras RN  Outcome: Progressing  Intervention: Optimize Functional Ability  Flowsheets (Taken 7/12/2025 1439)  Activity Management: Rolling - L1  Goal: Absence of Infection Signs and Symptoms  7/12/2025 1439 by Rigo Contreras RN  Outcome: Progressing  7/12/2025 1429 by Rigo Contreras RN  Outcome: Progressing  Intervention: Prevent or Manage Infection  Flowsheets (Taken 7/12/2025 1439)  Infection Management: aseptic technique maintained  Goal: Improved Oral Intake  7/12/2025 1439 by Rigo Contreras RN  Outcome: Progressing  7/12/2025 1429 by  Contreras, Rigo, RN  Outcome: Progressing  Intervention: Promote and Optimize Oral Intake  Flowsheets (Taken 7/12/2025 1439)  Oral Nutrition Promotion: rest periods promoted  Goal: Optimal Pain Control and Function  7/12/2025 1439 by Rigo Contreras RN  Outcome: Progressing  7/12/2025 1429 by Rigo Contreras RN  Outcome: Progressing  Intervention: Prevent or Manage Pain  Flowsheets (Taken 7/12/2025 1439)  Sleep/Rest Enhancement:   regular sleep/rest pattern promoted   relaxation techniques promoted  Pain Management Interventions:   care clustered   medication offered   pain management plan reviewed with patient/caregiver   pillow support provided   position adjusted   relaxation techniques promoted  Goal: Skin Health and Integrity  7/12/2025 1439 by Rigo Contreras RN  Outcome: Progressing  7/12/2025 1429 by Rigo Contreras RN  Outcome: Progressing  Intervention: Optimize Skin Protection  Flowsheets (Taken 7/12/2025 1439)  Pressure Reduction Techniques:   frequent weight shift encouraged   weight shift assistance provided  Pressure Reduction Devices: positioning supports utilized  Skin Protection: incontinence pads utilized  Activity Management: Rolling - L1  Head of Bed (HOB) Positioning: HOB at 30-45 degrees  Goal: Optimal Wound Healing  7/12/2025 1439 by Rigo Contreras RN  Outcome: Progressing  7/12/2025 1429 by Rigo Contreras RN  Outcome: Progressing  Intervention: Promote Wound Healing  Flowsheets (Taken 7/12/2025 1439)  Sleep/Rest Enhancement:   regular sleep/rest pattern promoted   relaxation techniques promoted     Problem: Suicide Risk  Goal: Absence of Self-Harm  7/12/2025 1439 by Rigo Contreras RN  Outcome: Progressing  7/12/2025 1429 by Rigo Contreras RN  Outcome: Progressing  Intervention: Promote Psychosocial Wellbeing  Flowsheets (Taken 7/12/2025 1439)  Sleep/Rest Enhancement:   regular sleep/rest pattern promoted   relaxation techniques promoted  Supportive Measures:   active listening utilized    relaxation techniques promoted   self-care encouraged   verbalization of feelings encouraged  Family/Support System Care: self-care encouraged     Problem: Infection  Goal: Absence of Infection Signs and Symptoms  7/12/2025 1439 by Rigo Contreras RN  Outcome: Progressing  7/12/2025 1429 by Rigo Contreras RN  Outcome: Progressing  Intervention: Prevent or Manage Infection  Flowsheets (Taken 7/12/2025 1439)  Infection Management: aseptic technique maintained     Problem: Stroke, Ischemic (Includes Transient Ischemic Attack)  Goal: Optimal Coping  7/12/2025 1439 by Rigo Contreras RN  Outcome: Progressing  7/12/2025 1429 by Rigo Contreras RN  Outcome: Progressing  Intervention: Support Psychosocial Response to Stroke  Flowsheets (Taken 7/12/2025 1439)  Supportive Measures:   active listening utilized   relaxation techniques promoted   self-care encouraged   verbalization of feelings encouraged  Family/Support System Care: self-care encouraged  Goal: Effective Bowel Elimination  7/12/2025 1439 by Rigo Contreras RN  Outcome: Progressing  7/12/2025 1429 by Rigo Contreras RN  Outcome: Progressing  Goal: Optimal Cerebral Tissue Perfusion  7/12/2025 1439 by Rigo Contreras RN  Outcome: Progressing  7/12/2025 1429 by Rgio Contreras RN  Outcome: Progressing  Goal: Optimal Cognitive Function  7/12/2025 1439 by Rigo Contreras RN  Outcome: Progressing  7/12/2025 1429 by Rigo Contreras RN  Outcome: Progressing  Goal: Improved Communication Skills  7/12/2025 1439 by Rigo Contreras RN  Outcome: Progressing  7/12/2025 1429 by Rigo Contreras RN  Outcome: Progressing  Goal: Optimal Functional Ability  7/12/2025 1439 by iRgo Contreras RN  Outcome: Progressing  7/12/2025 1429 by Rigo Contreras RN  Outcome: Progressing  Intervention: Optimize Functional Ability  Flowsheets (Taken 7/12/2025 1439)  Self-Care Promotion: independence encouraged  Activity Management: Rolling - L1  Goal: Optimal Nutrition Intake  7/12/2025 1439 by Rigo Contreras  RN  Outcome: Progressing  7/12/2025 1429 by Rigo Contreras RN  Outcome: Progressing  Intervention: Promote and Optimize Fluid and Food Intake  Flowsheets (Taken 7/12/2025 1439)  Oral Nutrition Promotion: rest periods promoted  Goal: Effective Oxygenation and Ventilation  7/12/2025 1439 by Rigo Contreras RN  Outcome: Progressing  7/12/2025 1429 by Rigo Contreras RN  Outcome: Progressing  Intervention: Optimize Oxygenation and Ventilation  Flowsheets (Taken 7/12/2025 1439)  Airway/Ventilation Management: airway patency maintained  Head of Bed (HOB) Positioning: HOB at 30-45 degrees  Goal: Improved Sensorimotor Function  7/12/2025 1439 by Rigo Contreras RN  Outcome: Progressing  7/12/2025 1429 by Rigo Contreras RN  Outcome: Progressing  Intervention: Optimize Range of Motion, Motor Control and Function  Flowsheets (Taken 7/12/2025 1439)  Positioning/Transfer Devices: pillows  Range of Motion:   active ROM (range of motion) encouraged   ROM (range of motion) performed  Intervention: Optimize Sensory and Perceptual Ability  Flowsheets (Taken 7/12/2025 1439)  Pressure Reduction Techniques:   frequent weight shift encouraged   weight shift assistance provided  Pressure Reduction Devices: positioning supports utilized  Goal: Safe and Effective Swallow  7/12/2025 1439 by Rigo Contreras RN  Outcome: Progressing  7/12/2025 1429 by Rigo Contreras RN  Outcome: Progressing  Goal: Effective Urinary Elimination  7/12/2025 1439 by Rigo Contreras RN  Outcome: Progressing  7/12/2025 1429 by Rigo Contreras RN  Outcome: Progressing

## 2025-07-12 NOTE — PROGRESS NOTES
"7/12/2025  Aman Humphrey   1960   [id number]        Psychiatry Progress Note     Chief Complaint: "I'm okay"    SUBJECTIVE:   Aman Humphrey is a 64 y.o. male with a past medical history of carpal tunnel syndrome, HTN, sciatica, portal vein thrombosis, cocaine use, hepatitis-C, hepatic adenoma with concern of HCC, cirrhosis. Presented to outside emergency department with complaints of abdominal pain x2 months and was found to have acute on chronic hyperbilirubinemia, leukocytosis and thus transferred to our facility. Ultrasound abdomen shows hepatomegaly with hepatic steatosis, thrombosed main portal vein, enlarged gallbladder, thickened gallbladder wall with a positive Chang's sign concerning for acute cholecystitis. MRCP shows challenging evaluation due to underlying portal vein thrombosis, 15 mm area of arterial enhancement could be small HCC, distended gallbladder with underlying cholelithiasis, no extrahepatic biliary dilation. Underwent cholecystostomy tube July 7th with Interventional Radiology. Psych was consulted after patient was urinating and defecating on the floor intentionally and presenting with agitation and irritability. Once security was called, he then voiced increased depression and SI without a plan, ultimately leading to a PEC which was rescinded yesterday by psych.    Today, pt is seen a the bedside without family present. Nursing staff deny any overt behavioral concerns at this time. Pt reports that his mood is "I'm making it". He adamantly denies symptoms of depression. He also denies suicidal and homicidal ideations today. There is no evidence of psychosis present and he denies AVH. Compliant with medication regiment and denies any adverse effects to them.       Current Medications:   Scheduled Meds:    ARIPiprazole  10 mg Oral Daily    EScitalopram oxalate  10 mg Oral Daily    folic acid  1 mg Oral Daily    lactulose 10 gram/15 ml  15 g Oral Daily    nicotine  1 patch Transdermal " Daily    pantoprazole  40 mg Oral Daily    piperacillin-tazobactam (Zosyn) IV (PEDS and ADULTS) (extended infusion is not appropriate)  4.5 g Intravenous Q8H    thiamine  100 mg Oral Daily      PRN Meds:   Current Facility-Administered Medications:     acetaminophen, 500 mg, Oral, Q6H PRN    dextrose 50%, 12.5 g, Intravenous, PRN    dextrose 50%, 25 g, Intravenous, PRN    glucagon (human recombinant), 1 mg, Intramuscular, PRN    glucose, 16 g, Oral, PRN    glucose, 24 g, Oral, PRN    HYDROcodone-acetaminophen, 1 tablet, Oral, Q6H PRN    HYDROcodone-acetaminophen, 1 tablet, Oral, Q6H PRN    labetalol, 10 mg, Intravenous, Q4H PRN    lactulose 10 gram/15 ml, 20 g, Oral, TID PRN    melatonin, 6 mg, Oral, Nightly PRN    ondansetron, 4 mg, Intravenous, Q6H PRN    sodium chloride 0.9%, 10 mL, Intravenous, PRN   Psychotherapeutics (From admission, onward)      Start     Stop Route Frequency Ordered    07/11/25 0900  ARIPiprazole tablet 10 mg         -- Oral Daily 07/10/25 1626    07/11/25 0900  EScitalopram oxalate tablet 10 mg         -- Oral Daily 07/10/25 1626            Allergies:   Review of patient's allergies indicates:  No Known Allergies     OBJECTIVE:   Vitals   Vitals:    07/12/25 1801   BP:    Pulse:    Resp: 18   Temp:         Labs/Imaging/Studies:   Recent Results (from the past 36 hours)   Lipid panel    Collection Time: 07/11/25 10:25 AM   Result Value Ref Range    Cholesterol Total 58 <=200 mg/dL    HDL Cholesterol 6 (L) 35 - 60 mg/dL    Triglyceride 42 34 - 140 mg/dL    Cholesterol/HDL Ratio 10 (H) 0 - 5    Very Low Density Lipoprotein 8     LDL Cholesterol 44.00 (L) 50.00 - 140.00 mg/dL   Echo Saline Bubble? Yes    Collection Time: 07/11/25 12:36 PM   Result Value Ref Range    BSA 1.62 m2   Magnesium    Collection Time: 07/12/25  3:08 AM   Result Value Ref Range    Magnesium Level 1.80 1.60 - 2.60 mg/dL   Phosphorus    Collection Time: 07/12/25  3:08 AM   Result Value Ref Range    Phosphorus Level 2.8 2.3  - 4.7 mg/dL   APTT    Collection Time: 07/12/25  3:08 AM   Result Value Ref Range    PTT 71.1 (H) 23.2 - 33.7 seconds   Protime-INR    Collection Time: 07/12/25  3:08 AM   Result Value Ref Range    PT 46.6 (H) 12.5 - 14.5 seconds    INR 5.2 (HH) <=1.3   Comprehensive Metabolic Panel    Collection Time: 07/12/25  3:08 AM   Result Value Ref Range    Sodium 133 (L) 136 - 145 mmol/L    Potassium 4.4 3.5 - 5.1 mmol/L    Chloride 108 (H) 98 - 107 mmol/L    CO2 21 (L) 23 - 31 mmol/L    Glucose 93 82 - 115 mg/dL    Blood Urea Nitrogen 21.5 8.4 - 25.7 mg/dL    Creatinine 1.25 0.72 - 1.25 mg/dL    Calcium 7.6 (L) 8.8 - 10.0 mg/dL    Protein Total 8.0 (H) 5.8 - 7.6 gm/dL    Albumin 1.3 (L) 3.4 - 4.8 g/dL    Globulin 6.7 (H) 2.4 - 3.5 gm/dL    Albumin/Globulin Ratio 0.2 (L) 1.1 - 2.0 ratio    Bilirubin Total 2.4 (H) <=1.5 mg/dL     (H) 40 - 150 unit/L     (H) 0 - 55 unit/L    AST 93 (H) 11 - 45 unit/L    eGFR >60 mL/min/1.73/m2    Anion Gap 4.0 mEq/L    BUN/Creatinine Ratio 17    CBC with Differential    Collection Time: 07/12/25  3:08 AM   Result Value Ref Range    WBC 9.84 4.50 - 11.50 x10(3)/mcL    RBC 3.10 (L) 4.70 - 6.10 x10(6)/mcL    Hgb 9.2 (L) 14.0 - 18.0 g/dL    Hct 28.4 (L) 42.0 - 52.0 %    MCV 91.6 80.0 - 94.0 fL    MCH 29.7 27.0 - 31.0 pg    MCHC 32.4 (L) 33.0 - 36.0 g/dL    RDW 16.5 11.5 - 17.0 %    Platelet 283 130 - 400 x10(3)/mcL    MPV 9.6 7.4 - 10.4 fL    Neut % 73.1 %    Lymph % 15.5 %    Mono % 9.8 %    Eos % 0.9 %    Basophil % 0.2 %    Imm Grans % 0.5 %    Neut # 7.19 2.1 - 9.2 x10(3)/mcL    Lymph # 1.53 0.6 - 4.6 x10(3)/mcL    Mono # 0.96 0.1 - 1.3 x10(3)/mcL    Eos # 0.09 0 - 0.9 x10(3)/mcL    Baso # 0.02 <=0.2 x10(3)/mcL    Imm Gran # 0.05 (H) 0.00 - 0.04 x10(3)/mcL    NRBC% 0.0 %   Protime-INR    Collection Time: 07/12/25  9:37 AM   Result Value Ref Range    PT 47.0 (H) 12.5 - 14.5 seconds    INR 5.2 (HH) <=1.3          Medical Review Of Systems:  Pertinent items are noted in  "HPI.      Psychiatric Mental Status Exam:  General Appearance: appears stated age, well-developed, well-nourished  Arousal: alert  Behavior: cooperative  Movements and Motor Activity: no abnormal involuntary movements noted  Orientation: oriented to person, place, time, and situation  Speech: normal rate, normal rhythm, normal volume, normal tone  Mood: "I'm making it"  Affect: constricted  Thought Process: linear  Associations: intact  Thought Content and Perceptions: no suicidal ideation, no homicidal ideation, no auditory hallucinations, no visual hallucinations, no paranoid ideation, no ideas of reference  Recent and Remote Memory: recent memory intact, remote memory intact; per interview/observation with patient  Attention and Concentration: intact, attentive to conversation; per interview/observation with patient  Fund of Knowledge: intact, aware of current events, vocabulary appropriate; based on history, vocabulary, fund of knowledge, syntax, grammar, and content  Insight: questionable; based on understanding of severity of illness and HPI  Judgment: questionable; based on patient's behavior and HP    ASSESSMENT/PLAN:   Problems Addressed/Diagnoses:  Unspecified Mood Disorder (F39)      Past Medical History:   Diagnosis Date    Carpal tunnel syndrome     HTN (hypertension)     Sciatica         Plan:    Medication management   Lexapro 10mg Daily  Abilify 10mg Daily     Legal  PEC rescinded yesterday. Low risk of imminent harm to self or others. Not currently gravely disabled due to acute psychiatric condition.      Psychiatry will sign off       ANAI Quinones     "

## 2025-07-12 NOTE — PT/OT/SLP RE-EVAL
"Occupational Therapy   Re-evaluation    Name: Aman Humphrey  MRN: [id number]  Admitting Diagnosis:  <principal problem not specified>  Recent Surgery: * No surgery found *      Recommendations:     Discharge Recommendations: Moderate Intensity Therapy (vs. low intensity therapy with 24/7 superivison)  Discharge Equipment Recommendations: to be determined by next level of care  Barriers to discharge:  Decreased caregiver support    Assessment:     Aman Humphrey is a 64 y.o. male with a medical diagnosis of  abdominal pain x 2 mo, leukocytosis, portal vein thrombosis.  He presents with fair tolerance for today's session.  Performance deficits affecting function are impaired endurance, gait instability, impaired cognition, decreased safety awareness, impaired balance, impaired self care skills. Patient presents with primary limitation of decreased safety awareness, cognitive limitations, decreased activity tolerance/endurance, fatigue, high risk for falls, and requires supervision-SBA for all mobility with RW for improved safety.      Rehab Prognosis:  Fair; patient would benefit from acute skilled OT services to address these deficits and reach maximum level of function.       Plan:     Patient to be seen 3 x/week to address the above listed problems via self-care/home management, therapeutic activities, therapeutic exercises  Plan of Care Expires: 07/26/25  Plan of Care Reviewed with: patient    Subjective     Chief Complaint: Patient reporting frustrated due to decreased mobility and therapy intervention stating "y'all are rushing me."  Patient/Family stated goals: Return home   Communicated with: RN prior to session.  Pain/Comfort:  Pain Rating 1: 0/10    Objective:     Communicated with: RN prior to session.  Patient found HOB elevated with:  (Cholecystectomy tube) upon OT entry to room.    General Precautions: Standard, fall, other (see comments) (1:1, PEC)  Orthopedic Precautions: N/A  Braces: N/A  Respiratory " Status: Room air    Occupational Performance:    Bed Mobility:    Patient completed Supine to Sit with supervision    Functional Mobility/Transfers:  Patient completed Sit <> Stand Transfer with stand by assistance  with  rolling walker   Patient completed Toilet Transfer Step Transfer technique with stand by assistance with  rolling walker  Functional Mobility: Patient complete functional mobility form EOB > restroom w/ RW req SBA for safety/balance and min vc's for positioning of self/AD for improved safety     Cognitive/Visual Perceptual:  Patient alert, oriented to person/place, disgruntled by prolonged hospitalization and with tangential speech throughout     Physical Exam:  B UE functional AROM/MMT appear intact per observation, unable to complete assessment secondary to patient frustration   Balance: standing and sitting SBA    AMPA 6 Click:  AMPA Total Score: 21    Treatment & Education:  Patient complete toileting at commode level with supervision for safety, self-feeding in supported short sitting in recliner with setup for item retrieval, and oral hygiene/ hand hygiene/ face washing at sink level in standing with B UE forearm propped at sink level with setup for item retrieval/ container management.     Patient left up in chair with all lines intact, call button in reach, and sitter  present    GOALS:   Multidisciplinary Problems       Occupational Therapy Goals          Problem: Occupational Therapy    Goal Priority Disciplines Outcome Interventions   Occupational Therapy Goal     OT, PT/OT     Description: LTG: Pt will perform basic ADLs and ADL transfers with Modified independence using LRAD by discharge.                       History:     Past Medical History:   Diagnosis Date    Carpal tunnel syndrome     HTN (hypertension)     Sciatica          Past Surgical History:   Procedure Laterality Date    ESOPHAGOGASTRODUODENOSCOPY N/A 6/20/2025    Procedure: EGD;  Surgeon: Jalyn Day MD;  Location:  [facility];  Service: Gastroenterology;  Laterality: N/A;    SKIN GRAFT         Time Tracking:     OT Date of Treatment: 07/12/25  OT Start Time: 0828  OT Stop Time: 0852  OT Total Time (min): 24 min    Billable Minutes:Re-eval 14  Self Care/Home Management 10    7/12/2025

## 2025-07-13 LAB
ALBUMIN SERPL-MCNC: 1.3 G/DL (ref 3.4–4.8)
ALBUMIN/GLOB SERPL: 0.2 RATIO (ref 1.1–2)
ALP SERPL-CCNC: 284 UNIT/L (ref 40–150)
ALT SERPL-CCNC: 124 UNIT/L (ref 0–55)
ANION GAP SERPL CALC-SCNC: 5 MEQ/L
AST SERPL-CCNC: 107 UNIT/L (ref 11–45)
BACTERIA #/AREA URNS AUTO: ABNORMAL /HPF
BASOPHILS # BLD AUTO: 0.05 X10(3)/MCL
BASOPHILS NFR BLD AUTO: 0.7 %
BILIRUB SERPL-MCNC: 2.2 MG/DL
BILIRUB UR QL STRIP.AUTO: NEGATIVE
BUN SERPL-MCNC: 21.7 MG/DL (ref 8.4–25.7)
CALCIUM SERPL-MCNC: 7.9 MG/DL (ref 8.8–10)
CHLORIDE SERPL-SCNC: 109 MMOL/L (ref 98–107)
CLARITY UR: CLEAR
CO2 SERPL-SCNC: 20 MMOL/L (ref 23–31)
COLOR UR AUTO: ABNORMAL
CREAT SERPL-MCNC: 1.3 MG/DL (ref 0.72–1.25)
CREAT/UREA NIT SERPL: 17
EOSINOPHIL # BLD AUTO: 0.19 X10(3)/MCL (ref 0–0.9)
EOSINOPHIL NFR BLD AUTO: 2.5 %
ERYTHROCYTE [DISTWIDTH] IN BLOOD BY AUTOMATED COUNT: 16.2 % (ref 11.5–17)
GFR SERPLBLD CREATININE-BSD FMLA CKD-EPI: >60 ML/MIN/1.73/M2
GLOBULIN SER-MCNC: 6.7 GM/DL (ref 2.4–3.5)
GLUCOSE SERPL-MCNC: 76 MG/DL (ref 82–115)
GLUCOSE UR QL STRIP: NEGATIVE
HCT VFR BLD AUTO: 28.8 % (ref 42–52)
HGB BLD-MCNC: 9.3 G/DL (ref 14–18)
HGB UR QL STRIP: ABNORMAL
IMM GRANULOCYTES # BLD AUTO: 0.04 X10(3)/MCL (ref 0–0.04)
IMM GRANULOCYTES NFR BLD AUTO: 0.5 %
INR PPP: 3.7
KETONES UR QL STRIP: ABNORMAL
LEUKOCYTE ESTERASE UR QL STRIP: NEGATIVE
LYMPHOCYTES # BLD AUTO: 1.37 X10(3)/MCL (ref 0.6–4.6)
LYMPHOCYTES NFR BLD AUTO: 17.8 %
MCH RBC QN AUTO: 30 PG (ref 27–31)
MCHC RBC AUTO-ENTMCNC: 32.3 G/DL (ref 33–36)
MCV RBC AUTO: 92.9 FL (ref 80–94)
MONOCYTES # BLD AUTO: 0.89 X10(3)/MCL (ref 0.1–1.3)
MONOCYTES NFR BLD AUTO: 11.6 %
MUCOUS THREADS URNS QL MICRO: ABNORMAL /LPF
NEUTROPHILS # BLD AUTO: 5.15 X10(3)/MCL (ref 2.1–9.2)
NEUTROPHILS NFR BLD AUTO: 66.9 %
NITRITE UR QL STRIP: NEGATIVE
NRBC BLD AUTO-RTO: 0 %
PH UR STRIP: 6 [PH]
PLATELET # BLD AUTO: 246 X10(3)/MCL (ref 130–400)
PMV BLD AUTO: 9.7 FL (ref 7.4–10.4)
POTASSIUM SERPL-SCNC: 4.6 MMOL/L (ref 3.5–5.1)
PROT SERPL-MCNC: 8 GM/DL (ref 5.8–7.6)
PROT UR QL STRIP: ABNORMAL
PROTHROMBIN TIME: 36.4 SECONDS (ref 12.5–14.5)
RBC # BLD AUTO: 3.1 X10(6)/MCL (ref 4.7–6.1)
RBC #/AREA URNS AUTO: ABNORMAL /HPF
SODIUM SERPL-SCNC: 134 MMOL/L (ref 136–145)
SP GR UR STRIP.AUTO: 1.02 (ref 1–1.03)
SQUAMOUS #/AREA URNS LPF: ABNORMAL /HPF
UROBILINOGEN UR STRIP-ACNC: 0.2
WBC # BLD AUTO: 7.69 X10(3)/MCL (ref 4.5–11.5)
WBC #/AREA URNS AUTO: ABNORMAL /HPF

## 2025-07-13 PROCEDURE — 63600175 PHARM REV CODE 636 W HCPCS: Performed by: HOSPITALIST

## 2025-07-13 PROCEDURE — 25000003 PHARM REV CODE 250: Performed by: INTERNAL MEDICINE

## 2025-07-13 PROCEDURE — 36415 COLL VENOUS BLD VENIPUNCTURE: CPT | Performed by: INTERNAL MEDICINE

## 2025-07-13 PROCEDURE — 85610 PROTHROMBIN TIME: CPT | Performed by: INTERNAL MEDICINE

## 2025-07-13 PROCEDURE — 80053 COMPREHEN METABOLIC PANEL: CPT | Performed by: INTERNAL MEDICINE

## 2025-07-13 PROCEDURE — S4991 NICOTINE PATCH NONLEGEND: HCPCS | Performed by: HOSPITALIST

## 2025-07-13 PROCEDURE — 11000001 HC ACUTE MED/SURG PRIVATE ROOM

## 2025-07-13 PROCEDURE — 81001 URINALYSIS AUTO W/SCOPE: CPT | Performed by: INTERNAL MEDICINE

## 2025-07-13 PROCEDURE — 25000003 PHARM REV CODE 250: Performed by: LICENSED PRACTICAL NURSE

## 2025-07-13 PROCEDURE — 25000003 PHARM REV CODE 250: Performed by: HOSPITALIST

## 2025-07-13 PROCEDURE — 85025 COMPLETE CBC W/AUTO DIFF WBC: CPT | Performed by: INTERNAL MEDICINE

## 2025-07-13 PROCEDURE — 21400001 HC TELEMETRY ROOM

## 2025-07-13 RX ADMIN — HYDROCODONE BITARTRATE AND ACETAMINOPHEN 1 TABLET: 10; 325 TABLET ORAL at 07:07

## 2025-07-13 RX ADMIN — PANTOPRAZOLE SODIUM 40 MG: 40 TABLET, DELAYED RELEASE ORAL at 08:07

## 2025-07-13 RX ADMIN — ESCITALOPRAM OXALATE 10 MG: 10 TABLET ORAL at 08:07

## 2025-07-13 RX ADMIN — ARIPIPRAZOLE 10 MG: 5 TABLET ORAL at 08:07

## 2025-07-13 RX ADMIN — FOLIC ACID 1 MG: 1 TABLET ORAL at 08:07

## 2025-07-13 RX ADMIN — PIPERACILLIN SODIUM AND TAZOBACTAM SODIUM 4.5 G: 4; .5 INJECTION, POWDER, LYOPHILIZED, FOR SOLUTION INTRAVENOUS at 08:07

## 2025-07-13 RX ADMIN — NICOTINE 1 PATCH: 14 PATCH TRANSDERMAL at 08:07

## 2025-07-13 RX ADMIN — PIPERACILLIN SODIUM AND TAZOBACTAM SODIUM 4.5 G: 4; .5 INJECTION, POWDER, LYOPHILIZED, FOR SOLUTION INTRAVENOUS at 12:07

## 2025-07-13 RX ADMIN — PIPERACILLIN SODIUM AND TAZOBACTAM SODIUM 4.5 G: 4; .5 INJECTION, POWDER, LYOPHILIZED, FOR SOLUTION INTRAVENOUS at 03:07

## 2025-07-13 RX ADMIN — THIAMINE HCL TAB 100 MG 100 MG: 100 TAB at 08:07

## 2025-07-13 RX ADMIN — LACTULOSE 15 G: 10 SOLUTION ORAL at 08:07

## 2025-07-13 NOTE — PLAN OF CARE
Problem: Adult Inpatient Plan of Care  Goal: Plan of Care Review  Outcome: Progressing  Goal: Patient-Specific Goal (Individualized)  Outcome: Progressing  Goal: Absence of Hospital-Acquired Illness or Injury  Outcome: Progressing  Goal: Optimal Comfort and Wellbeing  Outcome: Progressing  Goal: Readiness for Transition of Care  Outcome: Progressing     Problem: Skin Injury Risk Increased  Goal: Skin Health and Integrity  Outcome: Progressing     Problem: Coping Ineffective  Goal: Effective Coping  Outcome: Progressing     Problem: Wound  Goal: Optimal Coping  Outcome: Progressing  Goal: Optimal Functional Ability  Outcome: Progressing  Goal: Absence of Infection Signs and Symptoms  Outcome: Progressing  Goal: Improved Oral Intake  Outcome: Progressing  Goal: Optimal Pain Control and Function  Outcome: Progressing  Goal: Skin Health and Integrity  Outcome: Progressing  Goal: Optimal Wound Healing  Outcome: Progressing     Problem: Suicide Risk  Goal: Absence of Self-Harm  Outcome: Progressing     Problem: Infection  Goal: Absence of Infection Signs and Symptoms  Outcome: Progressing     Problem: Stroke, Ischemic (Includes Transient Ischemic Attack)  Goal: Optimal Coping  Outcome: Progressing  Goal: Effective Bowel Elimination  Outcome: Progressing  Goal: Optimal Cerebral Tissue Perfusion  Outcome: Progressing  Goal: Optimal Cognitive Function  Outcome: Progressing  Goal: Improved Communication Skills  Outcome: Progressing  Goal: Optimal Functional Ability  Outcome: Progressing  Goal: Optimal Nutrition Intake  Outcome: Progressing  Goal: Effective Oxygenation and Ventilation  Outcome: Progressing  Goal: Improved Sensorimotor Function  Outcome: Progressing  Goal: Safe and Effective Swallow  Outcome: Progressing  Goal: Effective Urinary Elimination  Outcome: Progressing

## 2025-07-13 NOTE — PROGRESS NOTES
[facility] Hospital Medicine Progress Note        Chief Complaint: Inpatient Follow-up    HPI:     64-year-old male with significant history of carpal tunnel syndrome, HTN, sciatica, portal vein thrombosis, cocaine use, chronic hep C, hepatic adenoma concerning for HCC, cirrhosis presented to the ED with complaints of progressively worsening abdominal pain for the past 2 months.  Patient was noted to have acute on chronic hyperbilirubinemia with leukocytosis.  He initially presented to outlying facility and was transferred to our hospital for higher level of care, patient was admitted to hospital medicine services, Gastroenterology, general surgery services consulted, MRCP was ordered to further evaluate worsening liver function test.  MRCP revealed findings concerning for HCC, distended gallbladder with cholelithiasis and mild intrahepatic biliary ductal dilation, evaluation was challenging secondary to underlying portal vein thrombus.  Suspicion for cholecystitis given clinical presentation.  No evidence of choledocholithiasis, no indication for ERCP per Gastroenterology, recommended anticoagulation for portal vein thrombosis.  Ultrasound with positive from Chang's sign and thickened gallbladder.  General surgery evaluated for possible cholecystitis, poor surgical candidate and therefore interventional radiology was consulted for percutaneous cholecystostomy tube placement and this was done on 7/7.  Zosyn sky, General surgery Plan to follow him in clinic as outpatient, GI planning for repeating triple phase as outpatient in 3 months and also arrange follow up with hepatology in [place].  Patient reported suicidal ideation on 07/09 and therefore psych consulted and he was placed under pec/one-to-one observation.  Previous CT with concerns for pontine/left internal capsule ischemic CVA, MRI was ordered to further evaluate.  Ultrasound abdomen ordered to quantify ascites on  07/10.  Pec and one-to-one observation continued.  Large volume ascites noted and therefore IR consulted for paracentesis on 07/11, MRI came back positive for thalamic CVA, Neurology consulted, neurology recommended to continue anticoagulation, no statin given transaminitis, pec continued as of 7/11.  Patient with no more suicidal or homicidal ideation and therefore Pec was revoked on 07/12.  INR was more than 5 on 07/12, Xarelto held, no overt bleeding    Interval Hx:   Patient was seen at bedside in the morning, he is off pec, and off one-to-one observation, no new complaints except that he wanted orange juice and also upset that he had an argument with his brother, hemodynamics stable, no abdominal symptoms, tolerating by mouth   Objective/physical exam:  General: In no acute distress, afebrile  Chest: Clear to auscultation bilaterally  Heart: S1, S2, no appreciable murmur  Abdomen: Soft, nontender, BS +  MSK: Warm, no lower extremity edema, no clubbing or cyanosis  Neurologic: Alert and oriented x4, moving all extremities with good strength     VITAL SIGNS: 24 HRS MIN & MAX LAST   Temp  Min: 97.5 °F (36.4 °C)  Max: 98.3 °F (36.8 °C) 97.5 °F (36.4 °C)   BP  Min: 128/67  Max: 141/76 (!) 140/73   Pulse  Min: 65  Max: 68  68   Resp  Min: 18  Max: 20 19   SpO2  Min: 99 %  Max: 100 % 99 %       Recent Labs   Lab 07/13/25  0357   WBC 7.69   RBC 3.10*   HGB 9.3*   HCT 28.8*   MCV 92.9   MCH 30.0   MCHC 32.3*   RDW 16.2      MPV 9.7         Recent Labs   Lab 07/12/25  0308 07/13/25  0357   * 134*   K 4.4 4.6   * 109*   CO2 21* 20*   BUN 21.5 21.7   CREATININE 1.25 1.30*   GLU 93 76*   CALCIUM 7.6* 7.9*   MG 1.80  --    ALBUMIN 1.3* 1.3*   PROT 8.0* 8.0*   ALKPHOS 247* 284*   * 124*   AST 93* 107*   BILITOT 2.4* 2.2*          Microbiology Results (last 7 days)       Procedure Component Value Units Date/Time    Body Fluid Culture [[id number]] Collected: 07/07/25 1911    Order Status: Completed  Specimen: Fluids from Gallbladder Updated: 07/12/25 0909     Body Fluid Culture Final Report: At 5 days. No growth    Anaerobic Culture [[id number]] Collected: 07/07/25 1639    Order Status: Completed Specimen: Aspirate from Gallbladder Updated: 07/10/25 0748     Anaerobe Culture No Anaerobes Isolated    Gram Stain [[id number]] Collected: 07/07/25 1639    Order Status: Completed Specimen: Aspirate from Gallbladder Updated: 07/08/25 0653     GRAM STAIN No WBCs, No bacteria seen    Fungal Culture [[id number]] Collected: 07/07/25 1639    Order Status: Sent Specimen: Aspirate from Gallbladder Updated: 07/07/25 1709             Scheduled Med:   ARIPiprazole  10 mg Oral Daily    EScitalopram oxalate  10 mg Oral Daily    folic acid  1 mg Oral Daily    lactulose 10 gram/15 ml  15 g Oral Daily    nicotine  1 patch Transdermal Daily    pantoprazole  40 mg Oral Daily    piperacillin-tazobactam (Zosyn) IV (PEDS and ADULTS) (extended infusion is not appropriate)  4.5 g Intravenous Q8H    thiamine  100 mg Oral Daily          Assessment/Plan:    Acute ischemic CVA -left thalamus  Positive bubble study  Suicidal ideation placed under pec 7/9, revoked 7/12  Symptomatic cholelithiasis with suspected cholecystitis status post CT-guided percutaneous cholecystostomy tube placement 7/7  Sepsis secondary to above-improving   Acute on chronic hyperbilirubinemia with transaminitis-slowly improving  Decompensated cirrhosis with large volume ascites status post paracentesis 7/11, removed 2.2 L  Coagulopathy/supratherapeutic with INR 5.2   Portal vein thrombosis  Suspected HCC  Suspected bilateral pneumonia-HA P  Substance use disorder  Chronic hep C   History of essential HTN  Sciatica   History of CTS   Physical deconditioning  Prophylaxis      Evaluated by Neurology for left thalamic CVA and recommended to continue Xarelto which he was on for portal vein thrombosis   No statin given transaminitis  Xarelto had to be held since 07/12 given  elevated INR  INR has trended down to 3.7 today, continue to monitor and will resume Xarelto once INR is less than 2  No overt bleeding and therefore will hold off on FFP, continue close monitoring  No large vessel occlusion  Bubble study has come back positive, anticoagulation will be restarted once INR is less than 2  Arrange outpatient follow up with Cardiology for OLAMIDE  Re-evaluated by speech therapy and is cleared for regular diet  Psych Team following, patient is no more suicidal   On Lexapro and Abilify   Psych team is okay to cancel pec, revoked 7/12  Continue Zosyn ,   Underwent paracentesis for large volume ascites on 07/11, high cholecystostomy tube output might have been secondary to ascites, better since paracentesis   Patient has had cytology of peritoneal fluid before which was negative for malignancy  General surgery will see him as outpatient in 6 weeks and consider cholangiogram as outpatient  Continue cholecystostomy tube, monitor output/color closely  Afebrile with stable hemodynamics and leukocytosis resolved  Hyperbilirubinemia with transaminitis also slowly improving  Gastroenterology signed off  Planning for hepatology referral to [place] and also to repeat triple phase scan in 3 months  Continue aripiprazole, citalopram, folic acid, nicotine patch, ppi and thiamine   Continue lactulose to prevent hepatic encephalopathy  Latest ammonia within normal limits  DVT prophylaxis-Xarelto    Patient was living with a friend prior to this hospitalization, family is unable to care for him   Plan is for nursing home placement   Pec revoked  Beth Beckett MD   07/13/2025

## 2025-07-14 LAB
ALBUMIN SERPL-MCNC: 1.3 G/DL (ref 3.4–4.8)
ALBUMIN/GLOB SERPL: 0.2 RATIO (ref 1.1–2)
ALP SERPL-CCNC: 252 UNIT/L (ref 40–150)
ALT SERPL-CCNC: 108 UNIT/L (ref 0–55)
ANION GAP SERPL CALC-SCNC: 5 MEQ/L
AST SERPL-CCNC: 98 UNIT/L (ref 11–45)
BASOPHILS # BLD AUTO: 0.03 X10(3)/MCL
BASOPHILS NFR BLD AUTO: 0.4 %
BILIRUB SERPL-MCNC: 2 MG/DL
BUN SERPL-MCNC: 23.6 MG/DL (ref 8.4–25.7)
CALCIUM SERPL-MCNC: 7.7 MG/DL (ref 8.8–10)
CHLORIDE SERPL-SCNC: 107 MMOL/L (ref 98–107)
CO2 SERPL-SCNC: 19 MMOL/L (ref 23–31)
CREAT SERPL-MCNC: 1.32 MG/DL (ref 0.72–1.25)
CREAT/UREA NIT SERPL: 18
EOSINOPHIL # BLD AUTO: 0.14 X10(3)/MCL (ref 0–0.9)
EOSINOPHIL NFR BLD AUTO: 2 %
ERYTHROCYTE [DISTWIDTH] IN BLOOD BY AUTOMATED COUNT: 16.2 % (ref 11.5–17)
GFR SERPLBLD CREATININE-BSD FMLA CKD-EPI: >60 ML/MIN/1.73/M2
GLOBULIN SER-MCNC: 6.6 GM/DL (ref 2.4–3.5)
GLUCOSE SERPL-MCNC: 77 MG/DL (ref 82–115)
HCT VFR BLD AUTO: 29.3 % (ref 42–52)
HGB BLD-MCNC: 9.3 G/DL (ref 14–18)
IMM GRANULOCYTES # BLD AUTO: 0.04 X10(3)/MCL (ref 0–0.04)
IMM GRANULOCYTES NFR BLD AUTO: 0.6 %
INR PPP: 1.7
LYMPHOCYTES # BLD AUTO: 1.47 X10(3)/MCL (ref 0.6–4.6)
LYMPHOCYTES NFR BLD AUTO: 21 %
MCH RBC QN AUTO: 29.5 PG (ref 27–31)
MCHC RBC AUTO-ENTMCNC: 31.7 G/DL (ref 33–36)
MCV RBC AUTO: 93 FL (ref 80–94)
MONOCYTES # BLD AUTO: 1.04 X10(3)/MCL (ref 0.1–1.3)
MONOCYTES NFR BLD AUTO: 14.8 %
NEUTROPHILS # BLD AUTO: 4.29 X10(3)/MCL (ref 2.1–9.2)
NEUTROPHILS NFR BLD AUTO: 61.2 %
NRBC BLD AUTO-RTO: 0 %
PLATELET # BLD AUTO: 217 X10(3)/MCL (ref 130–400)
PMV BLD AUTO: 9.7 FL (ref 7.4–10.4)
POTASSIUM SERPL-SCNC: 4.5 MMOL/L (ref 3.5–5.1)
PROT SERPL-MCNC: 7.9 GM/DL (ref 5.8–7.6)
PROTHROMBIN TIME: 19.7 SECONDS (ref 12.5–14.5)
RBC # BLD AUTO: 3.15 X10(6)/MCL (ref 4.7–6.1)
SODIUM SERPL-SCNC: 131 MMOL/L (ref 136–145)
WBC # BLD AUTO: 7.01 X10(3)/MCL (ref 4.5–11.5)

## 2025-07-14 PROCEDURE — 25000003 PHARM REV CODE 250: Performed by: INTERNAL MEDICINE

## 2025-07-14 PROCEDURE — 85610 PROTHROMBIN TIME: CPT | Performed by: INTERNAL MEDICINE

## 2025-07-14 PROCEDURE — 21400001 HC TELEMETRY ROOM

## 2025-07-14 PROCEDURE — 80053 COMPREHEN METABOLIC PANEL: CPT | Performed by: INTERNAL MEDICINE

## 2025-07-14 PROCEDURE — 11000001 HC ACUTE MED/SURG PRIVATE ROOM

## 2025-07-14 PROCEDURE — 63600175 PHARM REV CODE 636 W HCPCS: Performed by: HOSPITALIST

## 2025-07-14 PROCEDURE — 97530 THERAPEUTIC ACTIVITIES: CPT | Mod: CQ

## 2025-07-14 PROCEDURE — 36415 COLL VENOUS BLD VENIPUNCTURE: CPT | Performed by: INTERNAL MEDICINE

## 2025-07-14 PROCEDURE — S4991 NICOTINE PATCH NONLEGEND: HCPCS | Performed by: HOSPITALIST

## 2025-07-14 PROCEDURE — 25000003 PHARM REV CODE 250: Performed by: HOSPITALIST

## 2025-07-14 PROCEDURE — 85025 COMPLETE CBC W/AUTO DIFF WBC: CPT | Performed by: INTERNAL MEDICINE

## 2025-07-14 PROCEDURE — 25000003 PHARM REV CODE 250: Performed by: LICENSED PRACTICAL NURSE

## 2025-07-14 RX ORDER — SODIUM BICARBONATE 650 MG/1
1300 TABLET ORAL 2 TIMES DAILY
Status: COMPLETED | OUTPATIENT
Start: 2025-07-14 | End: 2025-07-16

## 2025-07-14 RX ORDER — FUROSEMIDE 20 MG/1
20 TABLET ORAL DAILY
Status: DISCONTINUED | OUTPATIENT
Start: 2025-07-14 | End: 2025-07-15

## 2025-07-14 RX ADMIN — SODIUM BICARBONATE 1300 MG: 650 TABLET ORAL at 04:07

## 2025-07-14 RX ADMIN — PIPERACILLIN SODIUM AND TAZOBACTAM SODIUM 4.5 G: 4; .5 INJECTION, POWDER, LYOPHILIZED, FOR SOLUTION INTRAVENOUS at 11:07

## 2025-07-14 RX ADMIN — ARIPIPRAZOLE 10 MG: 5 TABLET ORAL at 08:07

## 2025-07-14 RX ADMIN — PANTOPRAZOLE SODIUM 40 MG: 40 TABLET, DELAYED RELEASE ORAL at 08:07

## 2025-07-14 RX ADMIN — RIVAROXABAN 20 MG: 10 TABLET, FILM COATED ORAL at 04:07

## 2025-07-14 RX ADMIN — ESCITALOPRAM OXALATE 10 MG: 10 TABLET ORAL at 08:07

## 2025-07-14 RX ADMIN — THIAMINE HCL TAB 100 MG 100 MG: 100 TAB at 08:07

## 2025-07-14 RX ADMIN — HYDROCODONE BITARTRATE AND ACETAMINOPHEN 1 TABLET: 10; 325 TABLET ORAL at 11:07

## 2025-07-14 RX ADMIN — FUROSEMIDE 20 MG: 20 TABLET ORAL at 08:07

## 2025-07-14 RX ADMIN — PIPERACILLIN SODIUM AND TAZOBACTAM SODIUM 4.5 G: 4; .5 INJECTION, POWDER, LYOPHILIZED, FOR SOLUTION INTRAVENOUS at 12:07

## 2025-07-14 RX ADMIN — NICOTINE 1 PATCH: 14 PATCH TRANSDERMAL at 08:07

## 2025-07-14 RX ADMIN — LACTULOSE 15 G: 10 SOLUTION ORAL at 08:07

## 2025-07-14 RX ADMIN — PIPERACILLIN SODIUM AND TAZOBACTAM SODIUM 4.5 G: 4; .5 INJECTION, POWDER, LYOPHILIZED, FOR SOLUTION INTRAVENOUS at 04:07

## 2025-07-14 RX ADMIN — FOLIC ACID 1 MG: 1 TABLET ORAL at 08:07

## 2025-07-14 RX ADMIN — PIPERACILLIN SODIUM AND TAZOBACTAM SODIUM 4.5 G: 4; .5 INJECTION, POWDER, LYOPHILIZED, FOR SOLUTION INTRAVENOUS at 07:07

## 2025-07-14 NOTE — PT/OT/SLP PROGRESS
"Physical Therapy Treatment    Patient Name:  Aman Humphrey   MRN:  [id number]    Recommendations:     Discharge therapy intensity: Moderate Intensity Therapy (vs. low intensity therapy with 24/7 supervision)   Discharge Equipment Recommendations: to be determined by next level of care  Barriers to discharge: Impaired mobility    Assessment:     Aman Humphrey is a 64 y.o. male admitted with a medical diagnosis of  L thalamus CVA, cholelithiasis with suspected cholecystitis s/p cholecystectomy tube placement 7/7.  He presents with the following impairments/functional limitations: weakness, impaired endurance, impaired self care skills, impaired functional mobility, gait instability, impaired balance, decreased safety awareness, impaired cognition     Pt asleep upon arrival and easily awakened with tactile stimulus. Pt agitated and asking PTA to return later, MD arrived for rounding and education was given on importance of participation with therapy services. Pt still agitated but agreeable to sit on EOB. Pt CGA to sit on EOB where he then stated that he did not wish to continue, "all you people want to do is rush me, I know what I need to do to get better, I need to go to the [employer] store for some better food." Pt then returning to supine, refusing any further mobility. Pt then became tearful stating that "no on understands me, everyone thinks I am lying about all this." Offered reflective listening but when offering possible solutions to pts complaints he became agitated again asking PTA to leave him alone. Noted some leaking from cholecystectomy tube, nsg notified, Pt left with all needs in reach and in no distress.     DME Justification:  No DME recommended requiring DME justifications    Rehab Prognosis: Fair; patient would benefit from acute skilled PT services to address these deficits and reach maximum level of function.    Recent Surgery: * No surgery found *      Plan:     During this hospitalization, " patient would benefit from acute PT services 3 x/week to address the identified rehab impairments via gait training, therapeutic exercises, therapeutic activities and progress toward the following goals:    Plan of Care Expires:  25    Subjective     Chief Complaint: many complaints throughout session  Patient/Family Comments/goals:   Pain/Comfort:  Pain Rating 1: 0/10      Objective:     Communicated with nsg prior to session.  Patient found HOB elevated with telemetry (cholecystectomy tube) upon PT entry to room.     General Precautions: Standard, fall  Orthopedic Precautions: N/A  Braces: N/A  Respiratory Status: Room air  Blood Pressure:   Skin Integrity: Visible skin intact      Functional Mobility:  Bed Mobility:     Supine to Sit: contact guard assistance  Sit to Supine: contact guard assistance    Therapeutic Activities/Exercises:  Static sitting with head down on walker x5min before returning self to supine; declines further mobility    Co-Treatment: No    Education:  Patient provided with verbal education education regarding PT role/goals/POC, fall prevention, safety awareness, and discharge/DME recommendations.  Additional teaching is warranted.     Patient left HOB elevated with all lines intact, call button in reach, and nsg notified      GOALS:   Multidisciplinary Problems       Physical Therapy Goals          Problem: Physical Therapy    Goal Priority Disciplines Outcome Interventions   Physical Therapy Goal     PT, PT/OT Progressing    Description: Goals to be met by: 25     Patient will increase functional independence with mobility by performin. Sit to stand transfer with Modified Jerome  2. Bed to chair transfer with Modified Jerome using LRAD  3. Gait  x 300 feet with Modified Jerome using LRAD.                          Time Tracking:     PT Received On: 25  PT Start Time: 915     PT Stop Time: 933  PT Total Time (min): 18 min     Billable Minutes:  Therapeutic Activity 18    Treatment Type: Treatment  PT/PTA: PTA     Number of PTA visits since last PT visit: 1 07/14/2025

## 2025-07-14 NOTE — PLAN OF CARE
Problem: Adult Inpatient Plan of Care  Goal: Plan of Care Review  Outcome: Progressing  Goal: Patient-Specific Goal (Individualized)  Outcome: Progressing  Goal: Absence of Hospital-Acquired Illness or Injury  Outcome: Progressing  Goal: Optimal Comfort and Wellbeing  Outcome: Progressing  Goal: Readiness for Transition of Care  Outcome: Progressing     Problem: Skin Injury Risk Increased  Goal: Skin Health and Integrity  Outcome: Progressing     Problem: Coping Ineffective  Goal: Effective Coping  Outcome: Progressing     Problem: Wound  Goal: Optimal Coping  Outcome: Progressing  Goal: Optimal Functional Ability  Outcome: Progressing  Goal: Absence of Infection Signs and Symptoms  Outcome: Progressing  Goal: Improved Oral Intake  Outcome: Progressing  Goal: Optimal Pain Control and Function  Outcome: Progressing  Goal: Skin Health and Integrity  Outcome: Progressing  Goal: Optimal Wound Healing  Outcome: Progressing     Problem: Infection  Goal: Absence of Infection Signs and Symptoms  Outcome: Progressing     Problem: Suicide Risk  Goal: Absence of Self-Harm  Outcome: Progressing     Problem: Stroke, Ischemic (Includes Transient Ischemic Attack)  Goal: Optimal Coping  Outcome: Progressing  Goal: Effective Bowel Elimination  Outcome: Progressing  Goal: Optimal Cerebral Tissue Perfusion  Outcome: Progressing  Goal: Optimal Cognitive Function  Outcome: Progressing  Goal: Improved Communication Skills  Outcome: Progressing  Goal: Optimal Functional Ability  Outcome: Progressing  Goal: Optimal Nutrition Intake  Outcome: Progressing  Goal: Effective Oxygenation and Ventilation  Outcome: Progressing  Goal: Improved Sensorimotor Function  Outcome: Progressing  Goal: Safe and Effective Swallow  Outcome: Progressing  Goal: Effective Urinary Elimination  Outcome: Progressing

## 2025-07-14 NOTE — PROGRESS NOTES
[facility] Hospital Medicine Progress Note        Chief Complaint: Inpatient Follow-up    HPI:     64-year-old male with significant history of carpal tunnel syndrome, HTN, sciatica, portal vein thrombosis, cocaine use, chronic hep C, hepatic adenoma concerning for HCC, cirrhosis presented to the ED with complaints of progressively worsening abdominal pain for the past 2 months.  Patient was noted to have acute on chronic hyperbilirubinemia with leukocytosis.  He initially presented to outlying facility and was transferred to our hospital for higher level of care, patient was admitted to hospital medicine services, Gastroenterology, general surgery services consulted, MRCP was ordered to further evaluate worsening liver function test.  MRCP revealed findings concerning for HCC, distended gallbladder with cholelithiasis and mild intrahepatic biliary ductal dilation, evaluation was challenging secondary to underlying portal vein thrombus.  Suspicion for cholecystitis given clinical presentation.  No evidence of choledocholithiasis, no indication for ERCP per Gastroenterology, recommended anticoagulation for portal vein thrombosis.  Ultrasound with positive from Chang's sign and thickened gallbladder.  General surgery evaluated for possible cholecystitis, poor surgical candidate and therefore interventional radiology was consulted for percutaneous cholecystostomy tube placement and this was done on 7/7.  Zosyn sky, General surgery Plan to follow him in clinic as outpatient, GI planning for repeating triple phase as outpatient in 3 months and also arrange follow up with hepatology in [place].  Patient reported suicidal ideation on 07/09 and therefore psych consulted and he was placed under pec/one-to-one observation.  Previous CT with concerns for pontine/left internal capsule ischemic CVA, MRI was ordered to further evaluate.  Ultrasound abdomen ordered to quantify ascites on  07/10.  Pec and one-to-one observation continued.  Large volume ascites noted and therefore IR consulted for paracentesis on 07/11, MRI came back positive for thalamic CVA, Neurology consulted, neurology recommended to continue anticoagulation, no statin given transaminitis, pec continued as of 7/11.  Patient with no more suicidal or homicidal ideation and therefore Pec was revoked on 07/12.  INR was more than 5 on 07/12, Xarelto held, no overt bleeding, trended down to 3.7 on 07/13    Interval Hx:   Patient was seen at bedside in the morning, patient upset with his family that they are not acknowledging that he had a stroke.  Hemodynamics stable, no other new complaints, initially refused therapy, but was able to convince him to participate     Objective/physical exam:  General: In no acute distress, afebrile  Chest: Clear to auscultation bilaterally  Heart: S1, S2, no appreciable murmur  Abdomen: Soft, nontender, BS +  MSK: Warm, no lower extremity edema, no clubbing or cyanosis  Neurologic: Alert and oriented x4, moving all extremities with good strength     VITAL SIGNS: 24 HRS MIN & MAX LAST   Temp  Min: 97.3 °F (36.3 °C)  Max: 98.8 °F (37.1 °C) 97.6 °F (36.4 °C)   BP  Min: 117/62  Max: 145/76 (!) 145/76   Pulse  Min: 65  Max: 74  69   Resp  Min: 18  Max: 19 18   SpO2  Min: 97 %  Max: 100 % 99 %       Recent Labs   Lab 07/14/25  0354   WBC 7.01   RBC 3.15*   HGB 9.3*   HCT 29.3*   MCV 93.0   MCH 29.5   MCHC 31.7*   RDW 16.2      MPV 9.7         Recent Labs   Lab 07/12/25  0308 07/13/25  0357 07/14/25  0354   *   < > 131*   K 4.4   < > 4.5   *   < > 107   CO2 21*   < > 19*   BUN 21.5   < > 23.6   CREATININE 1.25   < > 1.32*   GLU 93   < > 77*   CALCIUM 7.6*   < > 7.7*   MG 1.80  --   --    ALBUMIN 1.3*   < > 1.3*   PROT 8.0*   < > 7.9*   ALKPHOS 247*   < > 252*   *   < > 108*   AST 93*   < > 98*   BILITOT 2.4*   < > 2.0*    < > = values in this interval not displayed.           Microbiology Results (last 7 days)       Procedure Component Value Units Date/Time    Body Fluid Culture [[id number]] Collected: 07/07/25 1639    Order Status: Completed Specimen: Fluids from Gallbladder Updated: 07/12/25 0909     Body Fluid Culture Final Report: At 5 days. No growth    Anaerobic Culture [[id number]] Collected: 07/07/25 1639    Order Status: Completed Specimen: Aspirate from Gallbladder Updated: 07/10/25 0748     Anaerobe Culture No Anaerobes Isolated    Gram Stain [[id number]] Collected: 07/07/25 1639    Order Status: Completed Specimen: Aspirate from Gallbladder Updated: 07/08/25 0653     GRAM STAIN No WBCs, No bacteria seen    Fungal Culture [[id number]] Collected: 07/07/25 1639    Order Status: Sent Specimen: Aspirate from Gallbladder Updated: 07/07/25 1709             Scheduled Med:   ARIPiprazole  10 mg Oral Daily    EScitalopram oxalate  10 mg Oral Daily    folic acid  1 mg Oral Daily    furosemide  20 mg Oral Daily    lactulose 10 gram/15 ml  15 g Oral Daily    nicotine  1 patch Transdermal Daily    pantoprazole  40 mg Oral Daily    piperacillin-tazobactam (Zosyn) IV (PEDS and ADULTS) (extended infusion is not appropriate)  4.5 g Intravenous Q8H    thiamine  100 mg Oral Daily          Assessment/Plan:    Acute ischemic CVA -left thalamus  Positive bubble study  Suicidal ideation placed under pec 7/9, revoked 7/12  Symptomatic cholelithiasis with suspected cholecystitis status post CT-guided percutaneous cholecystostomy tube placement 7/7  Sepsis secondary to above-improving   Acute on chronic hyperbilirubinemia with transaminitis-slowly improving  Decompensated cirrhosis with large volume ascites status post paracentesis 7/11, removed 2.2 L  Supratherapeutic INR-improved as of 7/14   Hypervolemic hyponatremia   Mild metabolic acidosis  Portal vein thrombosis  Suspected HCC  Suspected bilateral pneumonia-HA P  Substance use disorder  Chronic hep C   History of essential HTN  Sciatica    History of CTS   Physical deconditioning  Prophylaxis      Evaluated by Neurology for left thalamic CVA and recommended to continue Xarelto which he was on for portal vein thrombosis   No statin given transaminitis  Xarelto had to be held since 07/12 given elevated INR  INR has now trended down to 1.7 and therefore I will resume Xarelto today evening and recheck INR tomorrow  No large vessel occlusion  Bubble study has come back positive, anticoagulation restarted today   Arrange outpatient follow up with Cardiology for OLAMIDE  Re-evaluated by speech therapy and is cleared for regular diet  Psych Team following, patient is no more suicidal   On Lexapro and Abilify   Psych team is okay to cancel pec, revoked 7/12  Continue Zosyn ,   Underwent paracentesis for large volume ascites on 07/11, high cholecystostomy tube output might have been secondary to ascites, better since paracentesis   Patient has had cytology of peritoneal fluid before which was negative for malignancy  I added low-dose Lasix to prevent ascites reaccumulation, add Aldactone as hemodynamics and renal function tolerates   Creatinine is 1.32 today   P.o. bicarb for metabolic acidosis  General surgery will see him as outpatient in 6 weeks and consider cholangiogram as outpatient  Continue cholecystostomy tube, monitor output/color closely  Afebrile with stable hemodynamics and leukocytosis resolved  Hyperbilirubinemia with transaminitis also slowly improving  Gastroenterology signed off  Planning for hepatology referral to [place] and also to repeat triple phase scan in 3 months  Continue aripiprazole, citalopram, folic acid, nicotine patch, ppi and thiamine   Continue lactulose to prevent hepatic encephalopathy  Latest ammonia within normal limits  DVT prophylaxis-Xarelto    Patient was living with a friend prior to this hospitalization, family is unable to care for him   Plan is for nursing home placement , might need level 2 per ,  discussed today  Pec revoked  Beth Beckett MD   07/14/2025

## 2025-07-14 NOTE — PROGRESS NOTES
Inpatient Nutrition Assessment    Admit Date: 7/3/2025   Total duration of encounter: 11 days   Patient Age: 64 y.o.    Nutrition Recommendation/Prescription     Diet Heart Healthy Standard Tray ordered  Folic acid and thiamine ordered per MAR  Lactulose ordered per MAR  Consider adding appetite stimulant if medically feasible  May also need to consider PN if not able to advance diet in next 24 hours. Consult RD if PN to start.     Communication of Recommendations: reviewed with nurse and reviewed with patient    Nutrition Assessment     Malnutrition Assessment/Nutrition-Focused Physical Exam    Malnutrition Context: chronic illness (07/05/25 1156)  Malnutrition Level: moderate (07/05/25 1156)  Energy Intake (Malnutrition): other (see comments) (Unable to assess) (07/05/25 1156)  Weight Loss (Malnutrition): greater than 7.5% in 3 months (07/05/25 1156)  Subcutaneous Fat (Malnutrition): mild depletion (07/05/25 1156)           Muscle Mass (Malnutrition): mild depletion (07/05/25 1156)  [place] Region (Muscle Loss): mild depletion  Clavicle Bone Region (Muscle Loss): mild depletion                    Fluid Accumulation (Malnutrition): other (see comments) (Not present) (07/05/25 1156)        A minimum of two characteristics is recommended for diagnosis of either severe or non-severe malnutrition.    Chart Review    Reason Seen: malnutrition screening tool (MST) and follow-up    Malnutrition Screening Tool Results   Have you recently lost weight without trying?: Yes: 24-33 lbs  Have you been eating poorly because of a decreased appetite?: Yes   MST Score: 4   Diagnosis:  Acute cholecystitis  Acute kidney injury   Acute on chronic hyperbilirubinemia  Recent known portal vein thrombosis  Hypoglycemia    Relevant Medical History: carpal tunnel syndrome, HTN, sciatica, portal vein thrombosis, cocaine use, hepatitis-C, hepatic adenoma     Scheduled Medications:  ARIPiprazole, 10 mg, Daily  EScitalopram oxalate, 10 mg,  Daily  folic acid, 1 mg, Daily  furosemide, 20 mg, Daily  lactulose 10 gram/15 ml, 15 g, Daily  nicotine, 1 patch, Daily  pantoprazole, 40 mg, Daily  piperacillin-tazobactam (Zosyn) IV (PEDS and ADULTS) (extended infusion is not appropriate), 4.5 g, Q8H  thiamine, 100 mg, Daily    Continuous Infusions:     PRN Medications:  acetaminophen, 500 mg, Q6H PRN  dextrose 50%, 12.5 g, PRN  dextrose 50%, 25 g, PRN  glucagon (human recombinant), 1 mg, PRN  glucose, 16 g, PRN  glucose, 24 g, PRN  HYDROcodone-acetaminophen, 1 tablet, Q6H PRN  HYDROcodone-acetaminophen, 1 tablet, Q6H PRN  labetalol, 10 mg, Q4H PRN  lactulose 10 gram/15 ml, 20 g, TID PRN  melatonin, 6 mg, Nightly PRN  ondansetron, 4 mg, Q6H PRN  sodium chloride 0.9%, 10 mL, PRN    Calorie Containing IV Medications: no significant kcals from medications at this time    Recent Labs   Lab 07/08/25  0317 07/09/25  0859 07/09/25  1218 07/10/25  1004 07/11/25  0336 07/11/25  1025 07/12/25  0308 07/13/25  0357 07/14/25  0354    137  --  136 135*  --  133* 134* 131*   K 3.8 3.7  --  3.9 3.7  --  4.4 4.6 4.5   CALCIUM 7.4* 7.8*  --  7.8* 7.8*  --  7.6* 7.9* 7.7*   PHOS  --   --   --   --   --   --  2.8  --   --    MG  --   --   --   --   --   --  1.80  --   --    * 110*  --  110* 109*  --  108* 109* 107   CO2 23 21*  --  21* 21*  --  21* 20* 19*   BUN 26.2* 22.9  --  21.8 21.7  --  21.5 21.7 23.6   CREATININE 1.20 1.24  --  1.19 1.32*  --  1.25 1.30* 1.32*   EGFRNORACEVR >60 >60  --  >60 >60  --  >60 >60 >60    104  --  97 86  --  93 76* 77*   BILITOT 2.3* 2.9*  --  2.8* 2.5*  --  2.4* 2.2* 2.0*   ALKPHOS 250* 262*  --  263* 233*  --  247* 284* 252*   * 193*  --  159* 140*  --  123* 124* 108*   * 112*  --  100* 90*  --  93* 107* 98*   ALBUMIN 1.3* 1.3*  --  1.4* 1.3*  --  1.3* 1.3* 1.3*   TRIG  --   --   --   --   --  42  --   --   --    AMMONIA  --   --  35.6  --   --   --   --   --   --    WBC 12.24* 11.98*  --  10.76 8.91  --  9.84 7.69  "7.01   HGB 9.2* 10.1*  --  9.9* 9.2*  --  9.2* 9.3* 9.3*   HCT 28.6* 32.0*  --  31.0* 28.9*  --  28.4* 28.8* 29.3*     Nutrition Orders:  Diet Heart Healthy Standard Tray      Appetite/Oral Intake: poor/0-25% of meals  Factors Affecting Nutritional Intake: decreased appetite  Social Needs Impacting Access to Food: unable to assess at this time; will attempt on follow-up  Food/Restorationism/Cultural Preferences: unable to obtain  Food Allergies: no known food allergies  Last Bowel Movement: 25  Wound(s):  None documented     Comments    25: Noted MST indicates wt loss and decreased appetite. Unable to verify with pt, pt confused and hard to understand at time of RD visit. Noted EMR wts confirm wt loss over past few months. Awaiting MRCP results per MD notes. Due to malnourished state and NPO, may need to consider starting PN. Currently receiving minimal D5.    2025: Pt NPO for IR today per nurse. Pt reportedly thirsty. The nurse denies N/V. Last BM noted. Will monitor.    7/10/2025: The sitter reports a poor appetite/PO intake and denies N/V/D/C. Will add ONS to help meet needs. Will monitor.    2025: Pt's poor appetite/PO intake continues. Pt may benefit from appetite stimulant if medically feasible.  No reported N/V. Last BM noted. Will monitor.    Anthropometrics    Height: 5' 8.9" (175 cm), Height Method: Stated  Last Weight: 54.1 kg (119 lb 4.3 oz) (25 1153), Weight Method: Standard Scale  BMI (Calculated): 17.7  BMI Classification: underweight (BMI less than 18.5)        Ideal Body Weight (IBW), Male: 159.4 lb     % Ideal Body Weight, Male (lb): 74.82 %                 Usual Body Weight (UBW), k kg  % Usual Body Weight: 91.89  % Weight Change From Usual Weight: -8.45 %  Usual Weight Provided By: EMR weight history    Wt Readings from Last 5 Encounters:   25 54.1 kg (119 lb 4.3 oz)   25 54.4 kg (120 lb)   25 59 kg (130 lb)   25 59 kg (130 lb)   23 63 kg " (138 lb 14.2 oz)   7/5/2025: 54.1 kg  7/10/2025: no new wts  7/14/2025: no new wts  Weight Change(s) Since Admission:   Wt Readings from Last 1 Encounters:   07/05/25 1153 54.1 kg (119 lb 4.3 oz)   07/03/25 1704 54.1 kg (119 lb 4.3 oz)   Admit Weight: 54.1 kg (119 lb 4.3 oz) (07/03/25 1704), Weight Method: Standard Scale    Estimated Needs    Weight Used For Calorie Calculations: 54.1 kg (119 lb 4.3 oz)  Energy Calorie Requirements (kcal): 1047-4400 (30-35 kcal/kg)  Energy Need Method: Kcal/kg  Weight Used For Protein Calculations: 54.1 kg (119 lb 4.3 oz)  Protein Requirements: 65 (1.2 g/kg)  Fluid Requirements (mL): 1623 (1 mL/kcal)  CHO Requirement: 182-223 g/day (~45-55% est min kcal needs)     Enteral Nutrition     Patient not receiving enteral nutrition at this time.    Parenteral Nutrition     Patient not receiving parenteral nutrition support at this time.    Evaluation of Received Nutrient Intake    Calories: not meeting estimated needs  Protein: not meeting estimated needs    Patient Education     Not applicable.    Nutrition Diagnosis     PES: Inadequate oral intake related to acute illness as evidenced by NPO since admit. (active)     PES: Moderate chronic disease or condition related malnutrition Related to chronic condition As Evidenced by:  - weight loss: > 7.5% in 3 months - muscle mass depletion: 2 areas of mild muscle loss (Clavicle, Temporalis) - loss of subcutaneous fat: 1 area of mild fat loss (Buccal) active    Nutrition Interventions     Intervention(s): general/healthful diet, modified composition of parenteral nutrition, modified rate of parenteral nutrition, commercial beverage, and collaboration with other providers  Intervention(s): Oral diet/nutrient modifications;Oral nutritional supplement    Goal: Meet greater than 80% of nutritional needs by follow-up. (goal progressing)  Goal: Consume % of meals/snacks by follow-up. (goal progressing)    Nutrition Goals & Monitoring      Dietitian will monitor: food and beverage intake and energy intake  Discharge planning: resume home regimen  Nutrition Risk/Follow-Up: patient at increased nutrition risk; dietitian will follow-up twice weekly   Please consult if re-assessment needed sooner.

## 2025-07-14 NOTE — PLAN OF CARE
[facility] [place] via [employer] to    . Christiana-Talked to Eliana, admissions coordinator and notified her referral is for NH placement  Charline Adame- Talked to Nova with [facility] she stated patient was denied-cannot meet needs  Halina Eduardo-Talked to Narayan and patient's request for facility was denied-due to social reasons.

## 2025-07-15 LAB
ALBUMIN SERPL-MCNC: 1.3 G/DL (ref 3.4–4.8)
ALBUMIN/GLOB SERPL: 0.2 RATIO (ref 1.1–2)
ALP SERPL-CCNC: 264 UNIT/L (ref 40–150)
ALT SERPL-CCNC: 101 UNIT/L (ref 0–55)
ANION GAP SERPL CALC-SCNC: 5 MEQ/L
AST SERPL-CCNC: 106 UNIT/L (ref 11–45)
BASOPHILS # BLD AUTO: 0.04 X10(3)/MCL
BASOPHILS NFR BLD AUTO: 0.6 %
BILIRUB SERPL-MCNC: 2 MG/DL
BUN SERPL-MCNC: 21.1 MG/DL (ref 8.4–25.7)
CALCIUM SERPL-MCNC: 7.5 MG/DL (ref 8.8–10)
CHLORIDE SERPL-SCNC: 105 MMOL/L (ref 98–107)
CO2 SERPL-SCNC: 22 MMOL/L (ref 23–31)
CREAT SERPL-MCNC: 1.39 MG/DL (ref 0.72–1.25)
CREAT/UREA NIT SERPL: 15
EOSINOPHIL # BLD AUTO: 0.13 X10(3)/MCL (ref 0–0.9)
EOSINOPHIL NFR BLD AUTO: 2.1 %
ERYTHROCYTE [DISTWIDTH] IN BLOOD BY AUTOMATED COUNT: 16 % (ref 11.5–17)
GFR SERPLBLD CREATININE-BSD FMLA CKD-EPI: 57 ML/MIN/1.73/M2
GLOBULIN SER-MCNC: 6.3 GM/DL (ref 2.4–3.5)
GLUCOSE SERPL-MCNC: 74 MG/DL (ref 82–115)
HCT VFR BLD AUTO: 27 % (ref 42–52)
HGB BLD-MCNC: 8.7 G/DL (ref 14–18)
IMM GRANULOCYTES # BLD AUTO: 0.03 X10(3)/MCL (ref 0–0.04)
IMM GRANULOCYTES NFR BLD AUTO: 0.5 %
INR PPP: 2.7
INR PPP: 4.1
LYMPHOCYTES # BLD AUTO: 1.88 X10(3)/MCL (ref 0.6–4.6)
LYMPHOCYTES NFR BLD AUTO: 29.7 %
MCH RBC QN AUTO: 29.5 PG (ref 27–31)
MCHC RBC AUTO-ENTMCNC: 32.2 G/DL (ref 33–36)
MCV RBC AUTO: 91.5 FL (ref 80–94)
MONOCYTES # BLD AUTO: 1.1 X10(3)/MCL (ref 0.1–1.3)
MONOCYTES NFR BLD AUTO: 17.4 %
NEUTROPHILS # BLD AUTO: 3.15 X10(3)/MCL (ref 2.1–9.2)
NEUTROPHILS NFR BLD AUTO: 49.7 %
NRBC BLD AUTO-RTO: 0 %
PLATELET # BLD AUTO: 221 X10(3)/MCL (ref 130–400)
PMV BLD AUTO: 9.3 FL (ref 7.4–10.4)
POTASSIUM SERPL-SCNC: 4.4 MMOL/L (ref 3.5–5.1)
PROT SERPL-MCNC: 7.6 GM/DL (ref 5.8–7.6)
PROTHROMBIN TIME: 28.2 SECONDS (ref 12.5–14.5)
PROTHROMBIN TIME: 39.3 SECONDS (ref 12.5–14.5)
RBC # BLD AUTO: 2.95 X10(6)/MCL (ref 4.7–6.1)
SODIUM SERPL-SCNC: 132 MMOL/L (ref 136–145)
WBC # BLD AUTO: 6.33 X10(3)/MCL (ref 4.5–11.5)

## 2025-07-15 PROCEDURE — 36415 COLL VENOUS BLD VENIPUNCTURE: CPT | Performed by: INTERNAL MEDICINE

## 2025-07-15 PROCEDURE — 25000003 PHARM REV CODE 250: Performed by: LICENSED PRACTICAL NURSE

## 2025-07-15 PROCEDURE — S4991 NICOTINE PATCH NONLEGEND: HCPCS | Performed by: HOSPITALIST

## 2025-07-15 PROCEDURE — 25000003 PHARM REV CODE 250: Performed by: INTERNAL MEDICINE

## 2025-07-15 PROCEDURE — 80053 COMPREHEN METABOLIC PANEL: CPT | Performed by: INTERNAL MEDICINE

## 2025-07-15 PROCEDURE — 85025 COMPLETE CBC W/AUTO DIFF WBC: CPT | Performed by: INTERNAL MEDICINE

## 2025-07-15 PROCEDURE — 97535 SELF CARE MNGMENT TRAINING: CPT

## 2025-07-15 PROCEDURE — 25000003 PHARM REV CODE 250: Performed by: HOSPITALIST

## 2025-07-15 PROCEDURE — 11000001 HC ACUTE MED/SURG PRIVATE ROOM

## 2025-07-15 PROCEDURE — 85610 PROTHROMBIN TIME: CPT | Performed by: INTERNAL MEDICINE

## 2025-07-15 PROCEDURE — 63600175 PHARM REV CODE 636 W HCPCS: Performed by: HOSPITALIST

## 2025-07-15 PROCEDURE — 97530 THERAPEUTIC ACTIVITIES: CPT

## 2025-07-15 RX ORDER — FUROSEMIDE 20 MG/1
20 TABLET ORAL 2 TIMES DAILY
Status: DISCONTINUED | OUTPATIENT
Start: 2025-07-15 | End: 2025-07-17

## 2025-07-15 RX ORDER — SPIRONOLACTONE 25 MG/1
25 TABLET ORAL DAILY
Status: DISCONTINUED | OUTPATIENT
Start: 2025-07-16 | End: 2025-07-16

## 2025-07-15 RX ADMIN — FUROSEMIDE 20 MG: 20 TABLET ORAL at 05:07

## 2025-07-15 RX ADMIN — ARIPIPRAZOLE 10 MG: 5 TABLET ORAL at 09:07

## 2025-07-15 RX ADMIN — SODIUM BICARBONATE 1300 MG: 650 TABLET ORAL at 09:07

## 2025-07-15 RX ADMIN — PIPERACILLIN SODIUM AND TAZOBACTAM SODIUM 4.5 G: 4; .5 INJECTION, POWDER, LYOPHILIZED, FOR SOLUTION INTRAVENOUS at 05:07

## 2025-07-15 RX ADMIN — FOLIC ACID 1 MG: 1 TABLET ORAL at 09:07

## 2025-07-15 RX ADMIN — THIAMINE HCL TAB 100 MG 100 MG: 100 TAB at 09:07

## 2025-07-15 RX ADMIN — HYDROCODONE BITARTRATE AND ACETAMINOPHEN 1 TABLET: 10; 325 TABLET ORAL at 08:07

## 2025-07-15 RX ADMIN — SODIUM BICARBONATE 1300 MG: 650 TABLET ORAL at 08:07

## 2025-07-15 RX ADMIN — PIPERACILLIN SODIUM AND TAZOBACTAM SODIUM 4.5 G: 4; .5 INJECTION, POWDER, LYOPHILIZED, FOR SOLUTION INTRAVENOUS at 09:07

## 2025-07-15 RX ADMIN — FUROSEMIDE 20 MG: 20 TABLET ORAL at 09:07

## 2025-07-15 RX ADMIN — PANTOPRAZOLE SODIUM 40 MG: 40 TABLET, DELAYED RELEASE ORAL at 09:07

## 2025-07-15 RX ADMIN — ESCITALOPRAM OXALATE 10 MG: 10 TABLET ORAL at 09:07

## 2025-07-15 RX ADMIN — NICOTINE 1 PATCH: 14 PATCH TRANSDERMAL at 09:07

## 2025-07-15 NOTE — PLAN OF CARE
Spoke with patient's sister Elizabeth and she stated that it's ok to send a referral to [facility].

## 2025-07-15 NOTE — PLAN OF CARE
[facility] sent new referral to [facility] late yesterday evening.     A message was left to Emelina with [facility] regarding referral status.    Level II documentation faxed over to [facility] for 142 appvl.

## 2025-07-15 NOTE — PLAN OF CARE
Acute Care Surgery   Progress Note  Admit Date: 7/3/2025  HD#12  POD#* No surgery found *  Room#845/845 A   Subjective:   Interval history:  Surgery has to re-evaluate given increased drainage from around the tube.  This appears to worsen when ascites level increases.  It was noted to be serosanguineous in color it was inside, surgery was asked to re-evaluate.  The drain itself is outputting bile with a slight tinge of blood.  Output over the past 12 hours was 400 cc then has had a significant amount of output since placement.  This indicates that it is likely in the correct position.  On evaluation, the patient reports that he began to feel wetness down his right flank as the day progresses.  Otherwise, the patient denies any significant abdominal pain, fevers, or chills.  Last paracentesis was 7/7.  He feels his abdomen has increased in size and this is correlated with the increase in drainage around the tube.  Home Meds:  Current Outpatient Medications   Medication Instructions    ARIPiprazole (ABILIFY) 5 mg, Oral, Daily    EScitalopram oxalate (LEXAPRO) 5 mg, Oral, Daily    folic acid (FOLVITE) 1 mg, Oral, Daily    furosemide (LASIX) 20 mg, Oral, Daily    lactulose 10 gram/15 ml (CHRONULAC) 20 g, Oral, 3 times daily PRN    multivitamin Tab 1 tablet, Oral, Daily    nicotine (NICODERM CQ) 14 mg/24 hr 1 patch, Transdermal, Daily    pantoprazole (PROTONIX) 40 mg, Oral, Daily    rivaroxaban (XARELTO) 20 mg, Oral, With dinner    spironolactone (ALDACTONE) 25 mg, Oral, Daily    thiamine 100 mg, Oral, Daily      Scheduled Meds:   ARIPiprazole  10 mg Oral Daily    EScitalopram oxalate  10 mg Oral Daily    folic acid  1 mg Oral Daily    furosemide  20 mg Oral Daily    lactulose 10 gram/15 ml  15 g Oral Daily    nicotine  1 patch Transdermal Daily    pantoprazole  40 mg Oral Daily    piperacillin-tazobactam (Zosyn) IV (PEDS and ADULTS) (extended infusion is not appropriate)  4.5 g Intravenous Q8H    rivaroxaban  20 mg  "Oral Daily with dinner    sodium bicarbonate  1,300 mg Oral BID    thiamine  100 mg Oral Daily     Continuous Infusions:  PRN Meds:  Current Facility-Administered Medications:     acetaminophen, 500 mg, Oral, Q6H PRN    dextrose 50%, 12.5 g, Intravenous, PRN    dextrose 50%, 25 g, Intravenous, PRN    glucagon (human recombinant), 1 mg, Intramuscular, PRN    glucose, 16 g, Oral, PRN    glucose, 24 g, Oral, PRN    HYDROcodone-acetaminophen, 1 tablet, Oral, Q6H PRN    HYDROcodone-acetaminophen, 1 tablet, Oral, Q6H PRN    labetalol, 10 mg, Intravenous, Q4H PRN    lactulose 10 gram/15 ml, 20 g, Oral, TID PRN    melatonin, 6 mg, Oral, Nightly PRN    ondansetron, 4 mg, Intravenous, Q6H PRN    sodium chloride 0.9%, 10 mL, Intravenous, PRN     Objective:     VITAL SIGNS: 24 HR MIN & MAX LAST   Temp  Min: 97.7 °F (36.5 °C)  Max: 98.2 °F (36.8 °C)  97.7 °F (36.5 °C)   BP  Min: 116/65  Max: 139/74  134/76    Pulse  Min: 68  Max: 80  77    Resp  Min: 18  Max: 23  20    SpO2  Min: 98 %  Max: 100 %  99 %      HT: 5' 8.9" (175 cm)  WT: 54.1 kg (119 lb 4.3 oz)  BMI: 17.7     Intake/output:  Intake/Output - Last 3 Shifts         07/13 0700  07/14 0659 07/14 0700  07/15 0659 07/15 0700  07/16 0659    IV Piggyback 307.9      Total Intake(mL/kg) 307.9 (5.7)      Urine (mL/kg/hr) 400 (0.3)      Drains 775 250 400    Stool 0      Total Output 1175 250 400    Net -867.1 -250 -400           Unmeasured Stool Occurrence 1 x 1 x             Intake/Output Summary (Last 24 hours) at 7/15/2025 1333  Last data filed at 7/15/2025 1251  Gross per 24 hour   Intake --   Output 650 ml   Net -650 ml         Lines/drains/airway:  Peripheral IV 07/06/25 1500 20 G Anterior;Left Forearm (Active)   Site Assessment Clean;Dry;Intact;No redness;No swelling 07/15/25 1113   Line Securement Device Secured with sutureless device 07/15/25 0400   Extremity Assessment Distal to IV No abnormal discoloration;No redness;No swelling;No warmth 07/15/25 0400   Line Status " "Saline locked 07/15/25 1113   Dressing Status Clean;Dry;Intact 07/15/25 0400   Dressing Intervention Integrity maintained 07/15/25 0400   Number of days: 8            Closed/Suction Drain 07/07/25 1638 Anterior RUQ  8 Fr. (Active)   Site Description Leaking at site 07/14/25 2000   Dressing Type Gauze;Transparent (Tegaderm) 07/14/25 2000   Dressing Status New drainage 07/14/25 2000   Dressing Intervention Sterile dressing change 07/14/25 2000   Drainage Brown;Yellow 07/14/25 2000   Status Open to gravity drainage 07/14/25 2000   Output (mL) 400 mL 07/15/25 1251   Number of days: 7       Physical examination:  Gen: NAD, AAOx3, answering questions appropriately  HEENT: NC  CV: RR  Resp: NWOB  Abd:  Soft, nontender, mildly distended; percutaneous cholecystostomy tube in place with bilious drainage and dressing with serosanguineous strike through  Ext: moving all extremities spontaneously and purposefully  Neuro: CN II-XII grossly intact    Labs:  Renal:  Recent Labs     07/13/25  0357 07/14/25  0354 07/15/25  0421   BUN 21.7 23.6 21.1   CREATININE 1.30* 1.32* 1.39*     No results for input(s): "LACTIC" in the last 72 hours.  FENGI:  Recent Labs     07/13/25  0357 07/14/25  0354 07/15/25  0421   * 131* 132*   K 4.6 4.5 4.4   * 107 105   CO2 20* 19* 22*   CALCIUM 7.9* 7.7* 7.5*   PROT 8.0* 7.9* 7.6   ALBUMIN 1.3* 1.3* 1.3*   BILITOT 2.2* 2.0* 2.0*   * 98* 106*   ALKPHOS 284* 252* 264*   * 108* 101*     Heme:  Recent Labs     07/13/25 0357 07/14/25 0354 07/14/25  0959 07/15/25  0421 07/15/25  0734   HGB 9.3* 9.3*  --  8.7*  --    HCT 28.8* 29.3*  --  27.0*  --     217  --  221  --    INR 3.7*  --  1.7*  --  4.1*     ID:  Recent Labs     07/13/25  0357 07/14/25  0354 07/15/25  0421   WBC 7.69 7.01 6.33     CBG:  No results for input(s): "GLUCOSE" in the last 72 hours.   Cardiovascular:  No results for input(s): "TROPONINI", "CKTOTAL", "CKMB", "BNP" in the last 168 hours.  ABG:  No results " "for input(s): "PH", "PO2", "PCO2", "HCO3", "BE" in the last 168 hours.   I have reviewed all pertinent lab results within the past 24 hours.    Imaging:  US Abdomen Limited   Final Result      Targeted scanning of the abdomen demonstrates moderate ascites in the right lower quadrant.  Volume of fluid similar to the prior CT.         Electronically signed by: Andriy Laws   Date:    07/15/2025   Time:    13:10      IR Paracentesis with Imaging   Final Result      Successful paracentesis, returning 2.2 liters of clear yellow fluid.         Electronically signed by: Laurence Lucero   Date:    07/11/2025   Time:    14:18      CTA Head and Neck (xpd)   Final Result      No evidence of hemodynamically significant stenosis or large vessel occlusion.      Mild stenosis of the carotid bulbs bilaterally.      Mild to moderate stenosis of the cavernous through communicating segments of the intracranial internal carotid arteries bilaterally.      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         Electronically signed by: Priyank Tyler   Date:    07/11/2025   Time:    09:43      MRI Brain Without Contrast   Final Result      Acute lacunar infarct of the left thalamus.  Findings discussed with Dr. Beckett at 06:48 on 07/11/2025      Additional chronic ischemic changes of the brain as detailed above.         Electronically signed by: Priyank Tyler   Date:    07/11/2025   Time:    06:48      US Abdomen Limited   Final Result      Large drainable ascites right upper and lower abdomen.         Electronically signed by: Mat Sommers   Date:    07/10/2025   Time:    18:16      CT Head Without Contrast   Final Result      Mildly limited assessment with no acute intracranial process identified.         Electronically signed by: John Paul Chicas   Date:    07/09/2025   Time:    19:19      IR CT Guidance   Final Result      Technically successful CT-guided cholecystostomy tube placement and paracentesis.         Electronically " signed by: Neto Bird   Date:    07/07/2025   Time:    17:41      CT Abdomen Pelvis w/o Contrast Plus Triphasic w/Contrast   Final Result      Enlarged nodular cirrhotic appearing liver with couple of areas of nonspecific focal enhancement as outlined above.  Short-term follow-up is recommended.      Portal vein thrombosis with cavernous transformation of the portal vein which is a known finding      Distended gallbladder with multiple gallstones      Large volume ascites      Right lower lobe interstitial pneumonia with questionable patchy infiltrate developing in the left lower lobe         Electronically signed by: Julio César Navarro   Date:    07/07/2025   Time:    16:30      MRI MRCP Abdomen W WO Contrast 3D WO Independent WS XPD   Final Result      Challenging evaluation due to changes from underlying portal vein thrombus.  15 mm area of arterial enhancement in the superior right liver could be small HCC.  Given exam limitations, 3 month follow-up MRI can be pursued to reassess.      Distended gallbladder with underlying cholelithiasis.  Areas of mild right intrahepatic biliary dilatation.  No extrahepatic biliary dilatation identified.         Electronically signed by: John Paul Chicas   Date:    07/05/2025   Time:    12:16      CT Abdomen Pelvis  Without Contrast   Final Result      Cirrhosis with small volume ascites.      Mild wall thickening of the proximal colon favored secondary to the ascites, but colitis also possible.      Distended gallbladder with several gallstones, but no defined gallbladder wall thickening.         Electronically signed by: John Paul Chicas   Date:    07/04/2025   Time:    10:52      CT Head Without Contrast   Final Result      1.  No acute large vessel territory infarct identified.      2.  New but nonacute appearing lacunar infarcts pk and the left internal capsule.  If clinically indicated, consideration may be given to further assessment with MRI of the brain.          Electronically signed by: Mat Sommers   Date:    07/04/2025   Time:    08:57      US Abdomen Complete   Final Result      1. Hepatomegaly and hepatic steatosis.      2. Thrombosed main portal vein.      3.  Enlarged gallbladder containing sludge and multiple calculi.  Thickened gallbladder wall and positive Chang sign is suspected for acute cholecystitis.         Electronically signed by: Mat Sommres   Date:    07/04/2025   Time:    15:40         I have reviewed all pertinent imaging results/findings within the past 24 hours.    Micro/Path/Other:  Microbiology Results (last 7 days)       Procedure Component Value Units Date/Time    Body Fluid Culture [[id number]] Collected: 07/07/25 1639    Order Status: Completed Specimen: Fluids from Gallbladder Updated: 07/12/25 0909     Body Fluid Culture Final Report: At 5 days. No growth    Anaerobic Culture [[id number]] Collected: 07/07/25 1639    Order Status: Completed Specimen: Aspirate from Gallbladder Updated: 07/10/25 0748     Anaerobe Culture No Anaerobes Isolated           Specimens (From admission, onward)      None           Pathology Results  (Last 365 days)      None             Assessment & Plan:   This is a 64-year-old male with history of liver cirrhosis, hepatitis-C chronic, hepatocellular carcinoma, acute on chronic portal vein thrombosis and moderate ascites who was consulted for evaluation for GI and choledocholithiasis with possible cholecystitis.  Patient received a percutaneous cholecystostomy tube and surgery had signed off with outpatient follow up.  We are asked to re-evaluate given new drainage around the tube.  The drain continues to have bilious output, indicating that it is in the adequate place.  No indication for repeat imaging at this time.  There is leakage around the site which is likely attributed to worsening ascitic fluid.    - no acute surgical intervention at this time  -reinforced and replace dressing around tube as  needed  -continue to monitor output including quantity and color of output  -reach out if there is a substantial change in the color or quantity of output   -surgery will sign off, please reach out with any further questions or concerns    Yosvany Trotter MD  [employer] General Surgery [id number]

## 2025-07-15 NOTE — PROGRESS NOTES
[facility] Hospital Medicine Progress Note        Chief Complaint: Inpatient Follow-up    HPI:     64-year-old male with significant history of carpal tunnel syndrome, HTN, sciatica, portal vein thrombosis, cocaine use, chronic hep C, hepatic adenoma concerning for HCC, cirrhosis presented to the ED with complaints of progressively worsening abdominal pain for the past 2 months.  Patient was noted to have acute on chronic hyperbilirubinemia with leukocytosis.  He initially presented to outlying facility and was transferred to our hospital for higher level of care, patient was admitted to hospital medicine services, Gastroenterology, general surgery services consulted, MRCP was ordered to further evaluate worsening liver function test.  MRCP revealed findings concerning for HCC, distended gallbladder with cholelithiasis and mild intrahepatic biliary ductal dilation, evaluation was challenging secondary to underlying portal vein thrombus.  Suspicion for cholecystitis given clinical presentation.  No evidence of choledocholithiasis, no indication for ERCP per Gastroenterology, recommended anticoagulation for portal vein thrombosis.  Ultrasound with positive from Chang's sign and thickened gallbladder.  General surgery evaluated for possible cholecystitis, poor surgical candidate and therefore interventional radiology was consulted for percutaneous cholecystostomy tube placement and this was done on 7/7.  Zosyn sky, General surgery Plan to follow him in clinic as outpatient, GI planning for repeating triple phase as outpatient in 3 months and also arrange follow up with hepatology in [place].  Patient reported suicidal ideation on 07/09 and therefore psych consulted and he was placed under pec/one-to-one observation.  Previous CT with concerns for pontine/left internal capsule ischemic CVA, MRI was ordered to further evaluate.  Ultrasound abdomen ordered to quantify ascites on  07/10.  Pec and one-to-one observation continued.  Large volume ascites noted and therefore IR consulted for paracentesis on 07/11, MRI came back positive for thalamic CVA, Neurology consulted, neurology recommended to continue anticoagulation, no statin given transaminitis, pec continued as of 7/11.  Patient with no more suicidal or homicidal ideation and therefore Pec was revoked on 07/12.  INR was more than 5 on 07/12, Xarelto held, no overt bleeding, trended down to 3.7 on 07/13.  INR trended down to less than 2 on 07/14 and therefore Xarelto resumed    Interval Hx:     Patient was seen at bedside in the morning, increased cholecystostomy output and concern for bloody output, dressing around tube is soaked with blood, hemodynamics stable, no other new complaints, patient is still upset about his family not acknowledging his sickness.     Objective/physical exam:  General: In no acute distress, afebrile  Chest: Clear to auscultation bilaterally  Heart: S1, S2, no appreciable murmur  Abdomen: Soft, nontender, BS +  MSK: Warm, no lower extremity edema, no clubbing or cyanosis  Neurologic: Alert and oriented x4, moving all extremities with good strength     VITAL SIGNS: 24 HRS MIN & MAX LAST   Temp  Min: 97.9 °F (36.6 °C)  Max: 98.2 °F (36.8 °C) 97.9 °F (36.6 °C)   BP  Min: 116/65  Max: 139/74 116/65   Pulse  Min: 68  Max: 80  68   Resp  Min: 18  Max: 23 (!) 23   SpO2  Min: 98 %  Max: 100 % 98 %       Recent Labs   Lab 07/15/25  0421   WBC 6.33   RBC 2.95*   HGB 8.7*   HCT 27.0*   MCV 91.5   MCH 29.5   MCHC 32.2*   RDW 16.0      MPV 9.3         Recent Labs   Lab 07/12/25  0308 07/13/25  0357 07/15/25  0421   *   < > 132*   K 4.4   < > 4.4   *   < > 105   CO2 21*   < > 22*   BUN 21.5   < > 21.1   CREATININE 1.25   < > 1.39*   GLU 93   < > 74*   CALCIUM 7.6*   < > 7.5*   MG 1.80  --   --    ALBUMIN 1.3*   < > 1.3*   PROT 8.0*   < > 7.6   ALKPHOS 247*   < > 264*   *   < > 101*   AST 93*   < >  106*   BILITOT 2.4*   < > 2.0*    < > = values in this interval not displayed.          Microbiology Results (last 7 days)       Procedure Component Value Units Date/Time    Body Fluid Culture [[id number]] Collected: 07/07/25 1639    Order Status: Completed Specimen: Fluids from Gallbladder Updated: 07/12/25 0909     Body Fluid Culture Final Report: At 5 days. No growth    Anaerobic Culture [[id number]] Collected: 07/07/25 1639    Order Status: Completed Specimen: Aspirate from Gallbladder Updated: 07/10/25 0748     Anaerobe Culture No Anaerobes Isolated             Scheduled Med:   ARIPiprazole  10 mg Oral Daily    EScitalopram oxalate  10 mg Oral Daily    folic acid  1 mg Oral Daily    furosemide  20 mg Oral Daily    lactulose 10 gram/15 ml  15 g Oral Daily    nicotine  1 patch Transdermal Daily    pantoprazole  40 mg Oral Daily    piperacillin-tazobactam (Zosyn) IV (PEDS and ADULTS) (extended infusion is not appropriate)  4.5 g Intravenous Q8H    rivaroxaban  20 mg Oral Daily with dinner    sodium bicarbonate  1,300 mg Oral BID    thiamine  100 mg Oral Daily          Assessment/Plan:    Acute ischemic CVA -left thalamus  Positive bubble study  Suicidal ideation placed under pec 7/9, revoked 7/12  Symptomatic cholelithiasis with suspected cholecystitis status post CT-guided percutaneous cholecystostomy tube placement 7/7  Sepsis secondary to above-improving   Acute on chronic hyperbilirubinemia with transaminitis-slowly improving  Decompensated cirrhosis with large volume recurrent ascites status post paracentesis 7/11, removed 2.2 L, reaccumulating now  Supratherapeutic INR  Hypervolemic hyponatremia   Mild metabolic acidosis  Portal vein thrombosis  Suspected HCC  Suspected bilateral pneumonia-HA P  Substance use disorder  Chronic hep C   History of essential HTN  Sciatica   History of CTS   Physical deconditioning  Prophylaxis      Evaluated by Neurology for left thalamic CVA and recommended to continue Xarelto  which he was on for portal vein thrombosis   No statin given transaminitis  Xarelto had to be held since 07/12 given elevated INR, resumed on 07/14 since INR improved to less than 2   But INR is 4.1 today and therefore I have to hold Xarelto again   Recheck a.m.  No large vessel occlusion  Bubble study has come back positive, unable to do anticoagulation given elevated INR   Arrange outpatient follow up with Cardiology for OLAMIDE  Re-evaluated by speech therapy and is cleared for regular diet  Psych Team following, patient is no more suicidal   On Lexapro and Abilify   Psych team is okay to cancel pec, revoked 7/12  Continue Zosyn for cholecystitis needing cholecystostomy tube placement, patient was deemed poor surgical candidate   Bloody output through cholecystostomy and output is also increased most likely secondary to leaking ascitic fluid  Reconsulted general surgery, no plans for intervention  Plan for outpatient cholangiogram  Monitor closely for increasing bloody output, monitor hemoglobin   8.7 today   Holding Xarelto given elevated INR   Hold off on FFP given recent CVA, will give in case of profound bleeding  Underwent paracentesis for large volume ascites on 07/11, reaccumulating quickly   I have increased Lasix to 20 mg b.i.d.   Also will start Aldactone from tomorrow   Monitor sodium closely on diuretics   Patient has had cytology of peritoneal fluid before which was negative for malignancy  P.o. bicarb for metabolic acidosis, improving   Creatinine is 1.39, closely monitor on Lasix and Aldactone  Hyperbilirubinemia with transaminitis also slowly improving after cholecystostomy tube placement  Gastroenterology signed off  Planning for hepatology referral to [place] and also to repeat triple phase scan in 3 months  Continue aripiprazole, citalopram, folic acid, nicotine patch, ppi and thiamine   Continue lactulose to prevent hepatic encephalopathy  Latest ammonia within normal limits  DVT  prophylaxis-Xarelto    Patient was living with a friend prior to this hospitalization, family is unable to care for him   Plan is for nursing home placement , might need level 2 per ,   Negar Beckett MD   07/15/2025

## 2025-07-15 NOTE — PROGRESS NOTES
Acute Care Surgery   Progress Note  Admit Date: 7/3/2025  HD#12  POD#* No surgery found *  Room#845/845 A   Subjective:   Interval history:  Surgery has to re-evaluate given increased drainage from around the tube.  This appears to worsen when ascites level increases.  It was noted to be serosanguineous in color it was inside, surgery was asked to re-evaluate.  The drain itself is outputting bile with a slight tinge of blood.  Output over the past 12 hours was 400 cc then has had a significant amount of output since placement.  This indicates that it is likely in the correct position.  On evaluation, the patient reports that he began to feel wetness down his right flank as the day progresses.  Otherwise, the patient denies any significant abdominal pain, fevers, or chills.  Last paracentesis was 7/7.  He feels his abdomen has increased in size and this is correlated with the increase in drainage around the tube.  Home Meds:  Current Outpatient Medications   Medication Instructions    ARIPiprazole (ABILIFY) 5 mg, Oral, Daily    EScitalopram oxalate (LEXAPRO) 5 mg, Oral, Daily    folic acid (FOLVITE) 1 mg, Oral, Daily    furosemide (LASIX) 20 mg, Oral, Daily    lactulose 10 gram/15 ml (CHRONULAC) 20 g, Oral, 3 times daily PRN    multivitamin Tab 1 tablet, Oral, Daily    nicotine (NICODERM CQ) 14 mg/24 hr 1 patch, Transdermal, Daily    pantoprazole (PROTONIX) 40 mg, Oral, Daily    rivaroxaban (XARELTO) 20 mg, Oral, With dinner    spironolactone (ALDACTONE) 25 mg, Oral, Daily    thiamine 100 mg, Oral, Daily      Scheduled Meds:   ARIPiprazole  10 mg Oral Daily    EScitalopram oxalate  10 mg Oral Daily    folic acid  1 mg Oral Daily    furosemide  20 mg Oral Daily    lactulose 10 gram/15 ml  15 g Oral Daily    nicotine  1 patch Transdermal Daily    pantoprazole  40 mg Oral Daily    piperacillin-tazobactam (Zosyn) IV (PEDS and ADULTS) (extended infusion is not appropriate)  4.5 g Intravenous Q8H    rivaroxaban  20 mg  "Oral Daily with dinner    sodium bicarbonate  1,300 mg Oral BID    thiamine  100 mg Oral Daily     Continuous Infusions:  PRN Meds:  Current Facility-Administered Medications:     acetaminophen, 500 mg, Oral, Q6H PRN    dextrose 50%, 12.5 g, Intravenous, PRN    dextrose 50%, 25 g, Intravenous, PRN    glucagon (human recombinant), 1 mg, Intramuscular, PRN    glucose, 16 g, Oral, PRN    glucose, 24 g, Oral, PRN    HYDROcodone-acetaminophen, 1 tablet, Oral, Q6H PRN    HYDROcodone-acetaminophen, 1 tablet, Oral, Q6H PRN    labetalol, 10 mg, Intravenous, Q4H PRN    lactulose 10 gram/15 ml, 20 g, Oral, TID PRN    melatonin, 6 mg, Oral, Nightly PRN    ondansetron, 4 mg, Intravenous, Q6H PRN    sodium chloride 0.9%, 10 mL, Intravenous, PRN     Objective:     VITAL SIGNS: 24 HR MIN & MAX LAST   Temp  Min: 97.7 °F (36.5 °C)  Max: 98.2 °F (36.8 °C)  97.7 °F (36.5 °C)   BP  Min: 116/65  Max: 139/74  134/76    Pulse  Min: 68  Max: 80  77    Resp  Min: 18  Max: 23  20    SpO2  Min: 98 %  Max: 100 %  99 %      HT: 5' 8.9" (175 cm)  WT: 54.1 kg (119 lb 4.3 oz)  BMI: 17.7     Intake/output:  Intake/Output - Last 3 Shifts         07/13 0700  07/14 0659 07/14 0700  07/15 0659 07/15 0700  07/16 0659    IV Piggyback 307.9      Total Intake(mL/kg) 307.9 (5.7)      Urine (mL/kg/hr) 400 (0.3)      Drains 775 250 400    Stool 0      Total Output 1175 250 400    Net -867.1 -250 -400           Unmeasured Stool Occurrence 1 x 1 x             Intake/Output Summary (Last 24 hours) at 7/15/2025 1346  Last data filed at 7/15/2025 1251  Gross per 24 hour   Intake --   Output 650 ml   Net -650 ml         Lines/drains/airway:  Peripheral IV 07/06/25 1500 20 G Anterior;Left Forearm (Active)   Site Assessment Clean;Dry;Intact;No redness;No swelling 07/15/25 1113   Line Securement Device Secured with sutureless device 07/15/25 0400   Extremity Assessment Distal to IV No abnormal discoloration;No redness;No swelling;No warmth 07/15/25 0400   Line Status " "Saline locked 07/15/25 1113   Dressing Status Clean;Dry;Intact 07/15/25 0400   Dressing Intervention Integrity maintained 07/15/25 0400   Number of days: 8            Closed/Suction Drain 07/07/25 1638 Anterior RUQ  8 Fr. (Active)   Site Description Leaking at site 07/14/25 2000   Dressing Type Gauze;Transparent (Tegaderm) 07/14/25 2000   Dressing Status New drainage 07/14/25 2000   Dressing Intervention Sterile dressing change 07/14/25 2000   Drainage Brown;Yellow 07/14/25 2000   Status Open to gravity drainage 07/14/25 2000   Output (mL) 400 mL 07/15/25 1251   Number of days: 7       Physical examination:  Gen: NAD, AAOx3, answering questions appropriately  HEENT: NC  CV: RR  Resp: NWOB  Abd:  Soft, nontender, mildly distended; percutaneous cholecystostomy tube in place with bilious drainage and dressing with serosanguineous strike through  Ext: moving all extremities spontaneously and purposefully  Neuro: CN II-XII grossly intact    Labs:  Renal:  Recent Labs     07/13/25  0357 07/14/25  0354 07/15/25  0421   BUN 21.7 23.6 21.1   CREATININE 1.30* 1.32* 1.39*     No results for input(s): "LACTIC" in the last 72 hours.  FENGI:  Recent Labs     07/13/25  0357 07/14/25  0354 07/15/25  0421   * 131* 132*   K 4.6 4.5 4.4   * 107 105   CO2 20* 19* 22*   CALCIUM 7.9* 7.7* 7.5*   PROT 8.0* 7.9* 7.6   ALBUMIN 1.3* 1.3* 1.3*   BILITOT 2.2* 2.0* 2.0*   * 98* 106*   ALKPHOS 284* 252* 264*   * 108* 101*     Heme:  Recent Labs     07/13/25 0357 07/14/25 0354 07/14/25  0959 07/15/25  0421 07/15/25  0734   HGB 9.3* 9.3*  --  8.7*  --    HCT 28.8* 29.3*  --  27.0*  --     217  --  221  --    INR 3.7*  --  1.7*  --  4.1*     ID:  Recent Labs     07/13/25  0357 07/14/25  0354 07/15/25  0421   WBC 7.69 7.01 6.33     CBG:  No results for input(s): "GLUCOSE" in the last 72 hours.   Cardiovascular:  No results for input(s): "TROPONINI", "CKTOTAL", "CKMB", "BNP" in the last 168 hours.  ABG:  No results " "for input(s): "PH", "PO2", "PCO2", "HCO3", "BE" in the last 168 hours.   I have reviewed all pertinent lab results within the past 24 hours.    Imaging:  US Abdomen Limited   Final Result      Targeted scanning of the abdomen demonstrates moderate ascites in the right lower quadrant.  Volume of fluid similar to the prior CT.         Electronically signed by: Andriy Laws   Date:    07/15/2025   Time:    13:10      IR Paracentesis with Imaging   Final Result      Successful paracentesis, returning 2.2 liters of clear yellow fluid.         Electronically signed by: Laurence Lucero   Date:    07/11/2025   Time:    14:18      CTA Head and Neck (xpd)   Final Result      No evidence of hemodynamically significant stenosis or large vessel occlusion.      Mild stenosis of the carotid bulbs bilaterally.      Mild to moderate stenosis of the cavernous through communicating segments of the intracranial internal carotid arteries bilaterally.      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         Electronically signed by: Priyank Tyler   Date:    07/11/2025   Time:    09:43      MRI Brain Without Contrast   Final Result      Acute lacunar infarct of the left thalamus.  Findings discussed with Dr. Beckett at 06:48 on 07/11/2025      Additional chronic ischemic changes of the brain as detailed above.         Electronically signed by: Priyank Tyler   Date:    07/11/2025   Time:    06:48      US Abdomen Limited   Final Result      Large drainable ascites right upper and lower abdomen.         Electronically signed by: Mat Sommers   Date:    07/10/2025   Time:    18:16      CT Head Without Contrast   Final Result      Mildly limited assessment with no acute intracranial process identified.         Electronically signed by: John Paul Chicas   Date:    07/09/2025   Time:    19:19      IR CT Guidance   Final Result      Technically successful CT-guided cholecystostomy tube placement and paracentesis.         Electronically " signed by: Neto Bird   Date:    07/07/2025   Time:    17:41      CT Abdomen Pelvis w/o Contrast Plus Triphasic w/Contrast   Final Result      Enlarged nodular cirrhotic appearing liver with couple of areas of nonspecific focal enhancement as outlined above.  Short-term follow-up is recommended.      Portal vein thrombosis with cavernous transformation of the portal vein which is a known finding      Distended gallbladder with multiple gallstones      Large volume ascites      Right lower lobe interstitial pneumonia with questionable patchy infiltrate developing in the left lower lobe         Electronically signed by: Julio César Navarro   Date:    07/07/2025   Time:    16:30      MRI MRCP Abdomen W WO Contrast 3D WO Independent WS XPD   Final Result      Challenging evaluation due to changes from underlying portal vein thrombus.  15 mm area of arterial enhancement in the superior right liver could be small HCC.  Given exam limitations, 3 month follow-up MRI can be pursued to reassess.      Distended gallbladder with underlying cholelithiasis.  Areas of mild right intrahepatic biliary dilatation.  No extrahepatic biliary dilatation identified.         Electronically signed by: John Paul Chicas   Date:    07/05/2025   Time:    12:16      CT Abdomen Pelvis  Without Contrast   Final Result      Cirrhosis with small volume ascites.      Mild wall thickening of the proximal colon favored secondary to the ascites, but colitis also possible.      Distended gallbladder with several gallstones, but no defined gallbladder wall thickening.         Electronically signed by: John Paul Chicas   Date:    07/04/2025   Time:    10:52      CT Head Without Contrast   Final Result      1.  No acute large vessel territory infarct identified.      2.  New but nonacute appearing lacunar infarcts pk and the left internal capsule.  If clinically indicated, consideration may be given to further assessment with MRI of the brain.          Electronically signed by: Mat Sommers   Date:    07/04/2025   Time:    08:57      US Abdomen Complete   Final Result      1. Hepatomegaly and hepatic steatosis.      2. Thrombosed main portal vein.      3.  Enlarged gallbladder containing sludge and multiple calculi.  Thickened gallbladder wall and positive Chang sign is suspected for acute cholecystitis.         Electronically signed by: Mat Sommers   Date:    07/04/2025   Time:    15:40         I have reviewed all pertinent imaging results/findings within the past 24 hours.    Micro/Path/Other:  Microbiology Results (last 7 days)       Procedure Component Value Units Date/Time    Body Fluid Culture [[id number]] Collected: 07/07/25 1639    Order Status: Completed Specimen: Fluids from Gallbladder Updated: 07/12/25 0909     Body Fluid Culture Final Report: At 5 days. No growth    Anaerobic Culture [[id number]] Collected: 07/07/25 1639    Order Status: Completed Specimen: Aspirate from Gallbladder Updated: 07/10/25 0748     Anaerobe Culture No Anaerobes Isolated           Specimens (From admission, onward)      None           Pathology Results  (Last 365 days)      None             Assessment & Plan:   This is a 64-year-old male with history of liver cirrhosis, hepatitis-C chronic, hepatocellular carcinoma, acute on chronic portal vein thrombosis and moderate ascites who was consulted for evaluation for GI and choledocholithiasis with possible cholecystitis.  Patient received a percutaneous cholecystostomy tube and surgery had signed off with outpatient follow up.  We are asked to re-evaluate given new drainage around the tube.  The drain continues to have bilious output, indicating that it is in the adequate place.  No indication for repeat imaging at this time.  There is leakage around the site which is likely attributed to worsening ascitic fluid.    - no acute surgical intervention at this time  -reinforced and replace dressing around tube as  needed  -continue to monitor output including quantity and color of output  -reach out if there is a substantial change in the color or quantity of output   -surgery will sign off, please reach out with any further questions or concerns    Yosvany Trotter MD  [employer] General Surgery [id number]

## 2025-07-15 NOTE — PLAN OF CARE
Problem: Adult Inpatient Plan of Care  Goal: Plan of Care Review  Outcome: Progressing  Flowsheets (Taken 7/14/2025 1909)  Plan of Care Reviewed With: patient  Goal: Patient-Specific Goal (Individualized)  Outcome: Progressing  Goal: Absence of Hospital-Acquired Illness or Injury  Outcome: Progressing  Intervention: Identify and Manage Fall Risk  Flowsheets (Taken 7/14/2025 1909)  Safety Promotion/Fall Prevention:   assistive device/personal item within reach   side rails raised x 2   observed patient noncompliance with fall prevention instructions   nonskid shoes/socks when out of bed   Fall Risk reviewed with patient/family   Fall Risk signage in place   instructed to call staff for mobility   lighting adjusted   medications reviewed  Intervention: Prevent Skin Injury  Flowsheets (Taken 7/14/2025 1909)  Body Position: position changed independently  Skin Protection: incontinence pads utilized  Device Skin Pressure Protection: positioning supports utilized  Intervention: Prevent and Manage VTE (Venous Thromboembolism) Risk  Flowsheets (Taken 7/14/2025 1909)  VTE Prevention/Management:   bleeding risk assessed   fluids promoted   dorsiflexion/plantar flexion performed  Intervention: Prevent Infection  Flowsheets (Taken 7/14/2025 1909)  Infection Prevention:   rest/sleep promoted   single patient room provided   hand hygiene promoted  Goal: Optimal Comfort and Wellbeing  Outcome: Progressing  Intervention: Monitor Pain and Promote Comfort  Flowsheets (Taken 7/14/2025 1909)  Pain Management Interventions:   position adjusted   pillow support provided   medication offered   care clustered   quiet environment facilitated  Intervention: Provide Person-Centered Care  Flowsheets (Taken 7/14/2025 1909)  Trust Relationship/Rapport:   care explained   choices provided   emotional support provided   empathic listening provided   questions answered   questions encouraged   thoughts/feelings acknowledged   reassurance  provided  Goal: Readiness for Transition of Care  Outcome: Progressing     Problem: Skin Injury Risk Increased  Goal: Skin Health and Integrity  Outcome: Progressing  Intervention: Optimize Skin Protection  Flowsheets (Taken 7/14/2025 1909)  Pressure Reduction Techniques: frequent weight shift encouraged  Pressure Reduction Devices: positioning supports utilized  Skin Protection: incontinence pads utilized  Activity Management: Sitting at edge of bed - L2  Head of Bed (HOB) Positioning: HOB at 30-45 degrees     Problem: Coping Ineffective  Goal: Effective Coping  Outcome: Progressing  Intervention: Support and Enhance Coping Strategies  Flowsheets (Taken 7/14/2025 1909)  Supportive Measures:   active listening utilized   decision-making supported   self-care encouraged  Family/Support System Care: self-care encouraged  Environmental Support: calm environment promoted     Problem: Wound  Goal: Optimal Coping  Outcome: Progressing  Intervention: Support Patient and Family Response  Flowsheets (Taken 7/14/2025 1909)  Supportive Measures:   active listening utilized   decision-making supported   self-care encouraged  Family/Support System Care: self-care encouraged  Goal: Optimal Functional Ability  Outcome: Progressing  Intervention: Optimize Functional Ability  Flowsheets (Taken 7/14/2025 1909)  Activity Management: Sitting at edge of bed - L2  Activity Assistance Provided: assistance, 1 person  Goal: Absence of Infection Signs and Symptoms  Outcome: Progressing  Intervention: Prevent or Manage Infection  Flowsheets (Taken 7/14/2025 1909)  Infection Management: aseptic technique maintained  Goal: Improved Oral Intake  Outcome: Progressing  Goal: Optimal Pain Control and Function  Outcome: Progressing  Intervention: Prevent or Manage Pain  Flowsheets (Taken 7/14/2025 1909)  Sleep/Rest Enhancement: awakenings minimized  Pain Management Interventions:   position adjusted   pillow support provided   medication offered    care clustered   quiet environment facilitated  Goal: Skin Health and Integrity  Outcome: Progressing  Intervention: Optimize Skin Protection  Flowsheets (Taken 7/14/2025 1909)  Pressure Reduction Techniques: frequent weight shift encouraged  Pressure Reduction Devices: positioning supports utilized  Skin Protection: incontinence pads utilized  Activity Management: Sitting at edge of bed - L2  Head of Bed (HOB) Positioning: HOB at 30-45 degrees  Goal: Optimal Wound Healing  Outcome: Progressing  Intervention: Promote Wound Healing  Flowsheets (Taken 7/14/2025 1909)  Sleep/Rest Enhancement: awakenings minimized     Problem: Suicide Risk  Goal: Absence of Self-Harm  Outcome: Progressing  Intervention: Promote Psychosocial Wellbeing  Flowsheets (Taken 7/14/2025 1909)  Sleep/Rest Enhancement: awakenings minimized  Supportive Measures:   active listening utilized   decision-making supported   self-care encouraged  Family/Support System Care: self-care encouraged     Problem: Infection  Goal: Absence of Infection Signs and Symptoms  Outcome: Progressing  Intervention: Prevent or Manage Infection  Flowsheets (Taken 7/14/2025 1909)  Infection Management: aseptic technique maintained     Problem: Stroke, Ischemic (Includes Transient Ischemic Attack)  Goal: Optimal Coping  Outcome: Progressing  Intervention: Support Psychosocial Response to Stroke  Flowsheets (Taken 7/14/2025 1909)  Supportive Measures:   active listening utilized   decision-making supported   self-care encouraged  Family/Support System Care: self-care encouraged  Goal: Effective Bowel Elimination  Outcome: Progressing  Goal: Optimal Cerebral Tissue Perfusion  Outcome: Progressing  Intervention: Protect and Optimize Cerebral Perfusion  Flowsheets (Taken 7/14/2025 1909)  Cerebral Perfusion Promotion: blood pressure monitored  Goal: Optimal Cognitive Function  Outcome: Progressing  Goal: Improved Communication Skills  Outcome: Progressing  Goal: Optimal  Functional Ability  Outcome: Progressing  Intervention: Optimize Functional Ability  Flowsheets (Taken 7/14/2025 1909)  Self-Care Promotion: independence encouraged  Activity Management: Sitting at edge of bed - L2  Goal: Optimal Nutrition Intake  Outcome: Progressing  Goal: Effective Oxygenation and Ventilation  Outcome: Progressing  Intervention: Optimize Oxygenation and Ventilation  Flowsheets (Taken 7/14/2025 1909)  Head of Bed (HOB) Positioning: HOB at 30-45 degrees  Goal: Improved Sensorimotor Function  Outcome: Progressing  Intervention: Optimize Range of Motion, Motor Control and Function  Flowsheets (Taken 7/14/2025 1909)  Positioning/Transfer Devices: pillows  Intervention: Optimize Sensory and Perceptual Ability  Flowsheets (Taken 7/14/2025 1909)  Pressure Reduction Techniques: frequent weight shift encouraged  Pressure Reduction Devices: positioning supports utilized  Goal: Safe and Effective Swallow  Outcome: Progressing  Goal: Effective Urinary Elimination  Outcome: Progressing

## 2025-07-15 NOTE — PT/OT/SLP PROGRESS
Occupational Therapy   Treatment    Name: Aman Humphrey  MRN: [id number]    Recommendations:     Recommended therapy intensity at discharge: Moderate Intensity Therapy (vs. low intensity therapy with 24/7 supervision)   Discharge Equipment Recommendations:  to be determined by next level of care  Barriers to discharge:  Decreased caregiver support    Assessment:     Aman Humphrey is a 64 y.o. male with a medical diagnosis of  abdominal pain x 2 mo, leukocytosis, portal vein thrombosis.  He presents with fair tolerance for today's session. Performance deficits affecting function are weakness, impaired endurance, impaired self care skills, impaired functional mobility, gait instability, decreased safety awareness, impaired cognition. Patient presents with primary limitation of decreased safety awareness, cognitive limitations, decreased activity tolerance/endurance, fatigue, high risk for falls, and requires supervision-SBA for all mobility with/without RW for improved safety.  Patient demo's improvement from previous session as evidenced by completing stand pivot transfer EOB > recliner chair without AD with close supervision for safety.    DME Justification:  Aman's mobility limitation cannot be sufficiently resolved by the use of a cane. His functional mobility deficit can be sufficiently resolved with the use of a Rolling Walker. Patient's mobility limitation significantly impairs their ability to participate in one of more activities of daily living.  The use of a RW will significantly improve the patient's ability to participate in MRADLS and the patient will use it on regular basis in the home.    Rehab Prognosis:  Fair; patient would benefit from acute skilled OT services to address these deficits and reach maximum level of function.       Plan:     Patient to be seen 3 x/week to address the above listed problems via self-care/home management, therapeutic activities, therapeutic exercises  Plan of Care  "Expires: 07/26/25  Plan of Care Reviewed with: patient    Subjective     Pain/Comfort:  Pain Rating 1: 0/10    Chief Complaint:  Patient modified sidelying in bed upon entry argumentative with therapist throughout session stating "I can't walk, I had a stroke," "therapy needs to get me in a car and take me to the graveyard," and perseverating on "I can't do anything for myself, you're rushing me."    Objective:     Communicated with: RN following session.  Patient found supine with telemetry (cholecystectomy drain) upon OT entry to room.    General Precautions: Standard, fall    Orthopedic Precautions:N/A  Braces: N/A  Respiratory Status: Room air  Vital Signs: NT     Occupational Performance:     Functional Mobility/Transfers:  Bed mobility:    Supine to Sit: moderate assistance  Transfers: Sit to Stand: supervision with no AD  Bed to Chair: supervision with no AD using Stand Pivot    Activities of Daily Living:  Upper Body Dressing: minimum assistance Patient don anterior and posterior hospital gown req min A for adjusting around posterior trunk   Lower body dressing: patient req total assist to don B socks and shoes seated EOB; unsure of patient effort     Balance:   Static Sitting Balance: No UE support: Good: Patient able to maintain balance without handhold support, limited postural sway.    Therapeutic Exercise:  Patient declining engagement in all therex/ theract despite maximal verbal cues for encouragement.     [facility] 6 Click ADL: 20      Patient Education:  Patient provided with verbal education education regarding OT role/goals/POC, fall prevention, and safety awareness.  Additional teaching is warranted.      Patient left up in chair with all lines intact, call button in reach, and RN notified.    GOALS:   Multidisciplinary Problems       Occupational Therapy Goals          Problem: Occupational Therapy    Goal Priority Disciplines Outcome Interventions   Occupational Therapy Goal     OT, PT/OT   "   Description: LTG: Pt will perform basic ADLs and ADL transfers with Modified independence using LRAD by discharge.                       Time Tracking:     OT Date of Treatment: 07/15/25  OT Start Time: 1016  OT Stop Time: 1039  OT Total Time (min): 23 min    Billable Minutes:Self Care/Home Management 13  Therapeutic Activity 10    OT/DION: OT     Number of DION visits since last OT visit: 0    7/15/2025

## 2025-07-16 LAB
ALBUMIN SERPL-MCNC: 1.3 G/DL (ref 3.4–4.8)
ALBUMIN/GLOB SERPL: 0.2 RATIO (ref 1.1–2)
ALP SERPL-CCNC: 244 UNIT/L (ref 40–150)
ALT SERPL-CCNC: 105 UNIT/L (ref 0–55)
ANION GAP SERPL CALC-SCNC: 6 MEQ/L
AST SERPL-CCNC: 109 UNIT/L (ref 11–45)
BILIRUB SERPL-MCNC: 2.2 MG/DL
BUN SERPL-MCNC: 28.4 MG/DL (ref 8.4–25.7)
CALCIUM SERPL-MCNC: 7.7 MG/DL (ref 8.8–10)
CHLORIDE SERPL-SCNC: 104 MMOL/L (ref 98–107)
CO2 SERPL-SCNC: 22 MMOL/L (ref 23–31)
CREAT SERPL-MCNC: 2.09 MG/DL (ref 0.72–1.25)
CREAT/UREA NIT SERPL: 14
ERYTHROCYTE [DISTWIDTH] IN BLOOD BY AUTOMATED COUNT: 16.3 % (ref 11.5–17)
GFR SERPLBLD CREATININE-BSD FMLA CKD-EPI: 35 ML/MIN/1.73/M2
GLOBULIN SER-MCNC: 6.6 GM/DL (ref 2.4–3.5)
GLUCOSE SERPL-MCNC: 75 MG/DL (ref 82–115)
HCT VFR BLD AUTO: 25.7 % (ref 42–52)
HGB BLD-MCNC: 8.3 G/DL (ref 14–18)
INR PPP: 2.5
MCH RBC QN AUTO: 30.1 PG (ref 27–31)
MCHC RBC AUTO-ENTMCNC: 32.3 G/DL (ref 33–36)
MCV RBC AUTO: 93.1 FL (ref 80–94)
NRBC BLD AUTO-RTO: 0 %
PLATELET # BLD AUTO: 257 X10(3)/MCL (ref 130–400)
PMV BLD AUTO: 10.2 FL (ref 7.4–10.4)
POTASSIUM SERPL-SCNC: 4.9 MMOL/L (ref 3.5–5.1)
PROT SERPL-MCNC: 7.9 GM/DL (ref 5.8–7.6)
PROTHROMBIN TIME: 26.6 SECONDS (ref 12.5–14.5)
RBC # BLD AUTO: 2.76 X10(6)/MCL (ref 4.7–6.1)
SODIUM SERPL-SCNC: 132 MMOL/L (ref 136–145)
WBC # BLD AUTO: 7.87 X10(3)/MCL (ref 4.5–11.5)

## 2025-07-16 PROCEDURE — 63600175 PHARM REV CODE 636 W HCPCS: Performed by: INTERNAL MEDICINE

## 2025-07-16 PROCEDURE — 85027 COMPLETE CBC AUTOMATED: CPT | Performed by: STUDENT IN AN ORGANIZED HEALTH CARE EDUCATION/TRAINING PROGRAM

## 2025-07-16 PROCEDURE — 11000001 HC ACUTE MED/SURG PRIVATE ROOM

## 2025-07-16 PROCEDURE — 25000003 PHARM REV CODE 250: Performed by: INTERNAL MEDICINE

## 2025-07-16 PROCEDURE — S4991 NICOTINE PATCH NONLEGEND: HCPCS | Performed by: HOSPITALIST

## 2025-07-16 PROCEDURE — 36415 COLL VENOUS BLD VENIPUNCTURE: CPT | Performed by: INTERNAL MEDICINE

## 2025-07-16 PROCEDURE — 63600175 PHARM REV CODE 636 W HCPCS: Performed by: HOSPITALIST

## 2025-07-16 PROCEDURE — 25000003 PHARM REV CODE 250: Performed by: LICENSED PRACTICAL NURSE

## 2025-07-16 PROCEDURE — 80053 COMPREHEN METABOLIC PANEL: CPT | Performed by: STUDENT IN AN ORGANIZED HEALTH CARE EDUCATION/TRAINING PROGRAM

## 2025-07-16 PROCEDURE — 25000003 PHARM REV CODE 250: Performed by: STUDENT IN AN ORGANIZED HEALTH CARE EDUCATION/TRAINING PROGRAM

## 2025-07-16 PROCEDURE — 25000003 PHARM REV CODE 250: Performed by: HOSPITALIST

## 2025-07-16 PROCEDURE — 85610 PROTHROMBIN TIME: CPT | Performed by: INTERNAL MEDICINE

## 2025-07-16 RX ADMIN — PIPERACILLIN SODIUM AND TAZOBACTAM SODIUM 4.5 G: 4; .5 INJECTION, POWDER, LYOPHILIZED, FOR SOLUTION INTRAVENOUS at 09:07

## 2025-07-16 RX ADMIN — PIPERACILLIN SODIUM AND TAZOBACTAM SODIUM 4.5 G: 4; .5 INJECTION, POWDER, LYOPHILIZED, FOR SOLUTION INTRAVENOUS at 05:07

## 2025-07-16 RX ADMIN — HYDROCODONE BITARTRATE AND ACETAMINOPHEN 1 TABLET: 5; 325 TABLET ORAL at 08:07

## 2025-07-16 RX ADMIN — HYDROCODONE BITARTRATE AND ACETAMINOPHEN 1 TABLET: 10; 325 TABLET ORAL at 03:07

## 2025-07-16 RX ADMIN — ONDANSETRON 4 MG: 2 INJECTION INTRAMUSCULAR; INTRAVENOUS at 08:07

## 2025-07-16 RX ADMIN — PIPERACILLIN SODIUM AND TAZOBACTAM SODIUM 4.5 G: 4; .5 INJECTION, POWDER, LYOPHILIZED, FOR SOLUTION INTRAVENOUS at 01:07

## 2025-07-16 RX ADMIN — SODIUM BICARBONATE 1300 MG: 650 TABLET ORAL at 09:07

## 2025-07-16 RX ADMIN — ACETAMINOPHEN 500 MG: 500 TABLET ORAL at 06:07

## 2025-07-16 NOTE — PT/OT/SLP PROGRESS
Physical Therapy Treatment    Patient Name:  Aman Humphrey   MRN:  [id number]    Pt refused x 2 to get OOB despite 2 different staff member attempts.

## 2025-07-16 NOTE — PLAN OF CARE
[facility] sent clinical updates to [facility] via [facility].     [facility] with [facility] [place] declined-unable to meet needs.

## 2025-07-17 LAB
ANION GAP SERPL CALC-SCNC: 11 MEQ/L
BUN SERPL-MCNC: 41.2 MG/DL (ref 8.4–25.7)
CALCIUM SERPL-MCNC: 8 MG/DL (ref 8.8–10)
CHLORIDE SERPL-SCNC: 100 MMOL/L (ref 98–107)
CO2 SERPL-SCNC: 21 MMOL/L (ref 23–31)
CREAT SERPL-MCNC: 3.44 MG/DL (ref 0.72–1.25)
CREAT/UREA NIT SERPL: 12
GFR SERPLBLD CREATININE-BSD FMLA CKD-EPI: 19 ML/MIN/1.73/M2
GLUCOSE SERPL-MCNC: 80 MG/DL (ref 82–115)
HCT VFR BLD AUTO: 24.2 % (ref 42–52)
HGB BLD-MCNC: 7.6 G/DL (ref 14–18)
INR PPP: 2
POTASSIUM SERPL-SCNC: 5.6 MMOL/L (ref 3.5–5.1)
PROTHROMBIN TIME: 22.1 SECONDS (ref 12.5–14.5)
SODIUM SERPL-SCNC: 132 MMOL/L (ref 136–145)

## 2025-07-17 PROCEDURE — S4991 NICOTINE PATCH NONLEGEND: HCPCS | Performed by: HOSPITALIST

## 2025-07-17 PROCEDURE — 85610 PROTHROMBIN TIME: CPT | Performed by: STUDENT IN AN ORGANIZED HEALTH CARE EDUCATION/TRAINING PROGRAM

## 2025-07-17 PROCEDURE — 25000003 PHARM REV CODE 250: Performed by: HOSPITALIST

## 2025-07-17 PROCEDURE — 11000001 HC ACUTE MED/SURG PRIVATE ROOM

## 2025-07-17 PROCEDURE — 25000003 PHARM REV CODE 250: Performed by: INTERNAL MEDICINE

## 2025-07-17 PROCEDURE — 85018 HEMOGLOBIN: CPT | Performed by: STUDENT IN AN ORGANIZED HEALTH CARE EDUCATION/TRAINING PROGRAM

## 2025-07-17 PROCEDURE — 25000003 PHARM REV CODE 250: Performed by: STUDENT IN AN ORGANIZED HEALTH CARE EDUCATION/TRAINING PROGRAM

## 2025-07-17 PROCEDURE — 36415 COLL VENOUS BLD VENIPUNCTURE: CPT | Performed by: STUDENT IN AN ORGANIZED HEALTH CARE EDUCATION/TRAINING PROGRAM

## 2025-07-17 PROCEDURE — 25000003 PHARM REV CODE 250: Performed by: LICENSED PRACTICAL NURSE

## 2025-07-17 PROCEDURE — 80048 BASIC METABOLIC PNL TOTAL CA: CPT | Performed by: STUDENT IN AN ORGANIZED HEALTH CARE EDUCATION/TRAINING PROGRAM

## 2025-07-17 PROCEDURE — 63600175 PHARM REV CODE 636 W HCPCS: Performed by: HOSPITALIST

## 2025-07-17 PROCEDURE — 63600175 PHARM REV CODE 636 W HCPCS: Performed by: STUDENT IN AN ORGANIZED HEALTH CARE EDUCATION/TRAINING PROGRAM

## 2025-07-17 RX ORDER — FUROSEMIDE 10 MG/ML
20 INJECTION INTRAMUSCULAR; INTRAVENOUS ONCE
Status: COMPLETED | OUTPATIENT
Start: 2025-07-17 | End: 2025-07-17

## 2025-07-17 RX ADMIN — PIPERACILLIN SODIUM AND TAZOBACTAM SODIUM 4.5 G: 4; .5 INJECTION, POWDER, LYOPHILIZED, FOR SOLUTION INTRAVENOUS at 12:07

## 2025-07-17 RX ADMIN — SODIUM ZIRCONIUM CYCLOSILICATE 10 G: 10 POWDER, FOR SUSPENSION ORAL at 10:07

## 2025-07-17 RX ADMIN — ARIPIPRAZOLE 10 MG: 5 TABLET ORAL at 09:07

## 2025-07-17 RX ADMIN — HYDROCODONE BITARTRATE AND ACETAMINOPHEN 1 TABLET: 10; 325 TABLET ORAL at 04:07

## 2025-07-17 RX ADMIN — PANTOPRAZOLE SODIUM 40 MG: 40 TABLET, DELAYED RELEASE ORAL at 09:07

## 2025-07-17 RX ADMIN — FOLIC ACID 1 MG: 1 TABLET ORAL at 09:07

## 2025-07-17 RX ADMIN — HYDROCODONE BITARTRATE AND ACETAMINOPHEN 1 TABLET: 10; 325 TABLET ORAL at 09:07

## 2025-07-17 RX ADMIN — NICOTINE 1 PATCH: 14 PATCH TRANSDERMAL at 09:07

## 2025-07-17 RX ADMIN — THIAMINE HCL TAB 100 MG 100 MG: 100 TAB at 09:07

## 2025-07-17 RX ADMIN — FUROSEMIDE 20 MG: 10 INJECTION, SOLUTION INTRAMUSCULAR; INTRAVENOUS at 10:07

## 2025-07-17 RX ADMIN — RIVAROXABAN 20 MG: 10 TABLET, FILM COATED ORAL at 04:07

## 2025-07-17 RX ADMIN — ESCITALOPRAM OXALATE 10 MG: 10 TABLET ORAL at 09:07

## 2025-07-17 NOTE — PLAN OF CARE
[facility] sent clinical updates to [facility] via [employer]. Left message to Emelina with [facility] regarding status of referral. PASSR/[id number] pending.    @[phone] Emelina with [facility] stated she is waiting for family to get back with her.

## 2025-07-17 NOTE — PROGRESS NOTES
[facility] Hospital Medicine Progress Note        Chief Complaint: Inpatient Follow-up    HPI:     64-year-old male with significant history of carpal tunnel syndrome, HTN, sciatica, portal vein thrombosis, cocaine use, chronic hep C, hepatic adenoma concerning for HCC, cirrhosis presented to the ED with complaints of progressively worsening abdominal pain for the past 2 months.  Patient was noted to have acute on chronic hyperbilirubinemia with leukocytosis.  He initially presented to outlying facility and was transferred to our hospital for higher level of care, patient was admitted to hospital medicine services, Gastroenterology, general surgery services consulted, MRCP was ordered to further evaluate worsening liver function test.  MRCP revealed findings concerning for HCC, distended gallbladder with cholelithiasis and mild intrahepatic biliary ductal dilation, evaluation was challenging secondary to underlying portal vein thrombus.  Suspicion for cholecystitis given clinical presentation.  No evidence of choledocholithiasis, no indication for ERCP per Gastroenterology, recommended anticoagulation for portal vein thrombosis.  Ultrasound with positive from Chang's sign and thickened gallbladder.  General surgery evaluated for possible cholecystitis, poor surgical candidate and therefore interventional radiology was consulted for percutaneous cholecystostomy tube placement and this was done on 7/7.  Zosyn sky, General surgery Plan to follow him in clinic as outpatient, GI planning for repeating triple phase as outpatient in 3 months and also arrange follow up with hepatology in [place].  Patient reported suicidal ideation on 07/09 and therefore psych consulted and he was placed under pec/one-to-one observation.  Previous CT with concerns for pontine/left internal capsule ischemic CVA, MRI was ordered to further evaluate.  Ultrasound abdomen ordered to quantify ascites on  07/10.  Pec and one-to-one observation continued.  Large volume ascites noted and therefore IR consulted for paracentesis on 07/11, MRI came back positive for thalamic CVA, Neurology consulted, neurology recommended to continue anticoagulation, no statin given transaminitis, pec continued as of 7/11.  Patient with no more suicidal or homicidal ideation and therefore Pec was revoked on 07/12.  INR was more than 5 on 07/12, Xarelto held, no overt bleeding, trended down to 3.7 on 07/13.  INR trended down to less than 2 on 07/14 and therefore Xarelto resumed    Interval Hx:   Patient seen and examined bedside.  No acute overnight events.  Continues to have some leakage around 2.  Patient's abdomen does not appear heart or distended at all or is tender.  We will continue to monitor      Objective/physical exam:  General: In no acute distress, afebrile  Chest: Clear to auscultation bilaterally  Heart: S1, S2, no appreciable murmur  Abdomen: Soft, nontender, BS +  MSK: Warm, no lower extremity edema, no clubbing or cyanosis  Neurologic: Alert and oriented x4, moving all extremities with good strength     VITAL SIGNS: 24 HRS MIN & MAX LAST   Temp  Min: 97.6 °F (36.4 °C)  Max: 98.2 °F (36.8 °C) 97.6 °F (36.4 °C)   BP  Min: 107/66  Max: 122/76 116/65   Pulse  Min: 82  Max: 91  82   Resp  Min: 20  Max: 20 20   SpO2  Min: 95 %  Max: 100 % 99 %       Recent Labs   Lab 07/16/25 0441 07/17/25 0353   WBC 7.87  --    RBC 2.76*  --    HGB 8.3* 7.6*   HCT 25.7* 24.2*   MCV 93.1  --    MCH 30.1  --    MCHC 32.3*  --    RDW 16.3  --      --    MPV 10.2  --          Recent Labs   Lab 07/12/25  0308 07/13/25  0357 07/16/25 0441 07/17/25 0353   *   < > 132* 132*   K 4.4   < > 4.9 5.6*   *   < > 104 100   CO2 21*   < > 22* 21*   BUN 21.5   < > 28.4* 41.2*   CREATININE 1.25   < > 2.09* 3.44*   GLU 93   < > 75* 80*   CALCIUM 7.6*   < > 7.7* 8.0*   MG 1.80  --   --   --    ALBUMIN 1.3*   < > 1.3*  --    PROT 8.0*   < >  7.9*  --    ALKPHOS 247*   < > 244*  --    *   < > 105*  --    AST 93*   < > 109*  --    BILITOT 2.4*   < > 2.2*  --     < > = values in this interval not displayed.          Microbiology Results (last 7 days)       Procedure Component Value Units Date/Time    Body Fluid Culture [[id number]] Collected: 07/07/25 1639    Order Status: Completed Specimen: Fluids from Gallbladder Updated: 07/12/25 0909     Body Fluid Culture Final Report: At 5 days. No growth             Scheduled Med:   ARIPiprazole  10 mg Oral Daily    EScitalopram oxalate  10 mg Oral Daily    folic acid  1 mg Oral Daily    lactulose 10 gram/15 ml  15 g Oral Daily    nicotine  1 patch Transdermal Daily    pantoprazole  40 mg Oral Daily    rivaroxaban  20 mg Oral Daily with dinner    sodium zirconium cyclosilicate  10 g Oral Once    thiamine  100 mg Oral Daily          Assessment/Plan:    Acute ischemic CVA -left thalamus  Positive bubble study  Suicidal ideation placed under pec 7/9, revoked 7/12  Symptomatic cholelithiasis with suspected cholecystitis status post CT-guided percutaneous cholecystostomy tube placement 7/7  Sepsis secondary to above-improving   Acute on chronic hyperbilirubinemia with transaminitis-slowly improving  Decompensated cirrhosis with large volume recurrent ascites status post paracentesis 7/11, removed 2.2 L, reaccumulating now  Supratherapeutic INR  Hypervolemic hyponatremia   Mild metabolic acidosis  Portal vein thrombosis  Suspected HCC  Suspected bilateral pneumonia-HA P  Substance use disorder  Chronic hep C   History of essential HTN  Sciatica   History of CTS   Physical deconditioning  Prophylaxis      Evaluated by Neurology for left thalamic CVA and recommended to continue Xarelto which he was on for portal vein thrombosis   No statin given transaminitis  Xarelto resumed today as INR is 2.5  No large vessel occlusion  Bubble study has come back positive, Arrange outpatient follow up with Cardiology for  OLAMIDE  Re-evaluated by speech therapy and is cleared for regular diet  Psych Team following, patient is no more suicidal   On Lexapro and Abilify   Psych team is okay to cancel pec, revoked 7/12  Continue Zosyn for cholecystitis needing cholecystostomy tube placement, patient was deemed poor surgical candidate , on day 14  We will DC dose Zosyn today as patient has received more than 2 weeks of antibiotic therapy and has had normal white count along with remaining afebrile.  Cultures have all been negative so far  Bloody output through cholecystostomy and output is also increased most likely secondary to leaking ascitic fluid  Reconsulted general surgery, no plans for intervention  Plan for outpatient cholangiogram  Monitor closely for increasing bloody output, monitor hemoglobin   Hold off on FFP given recent CVA, will give in case of profound bleeding  Underwent paracentesis for large volume ascites on 07/11, reaccumulating quickly   Was started on Lasix 20 mg b.i.d. and Aldactone however was not given due to patient refusal.  We will give 1 time dose of IV Lasix due to worsening SOCORRO concerning for hepatorenal  Monitor sodium closely on diuretics  If SOCORRO continues to worsen, will need to involve nephrology   May need consult IR for paracentesis again however abdomen currently not distended or hard, may need PleurX due to reaccumulation quickly   Patient has had cytology of peritoneal fluid before which was negative for malignancy  P.o. bicarb for metabolic acidosis, improving   Hyperbilirubinemia with transaminitis also slowly improving after cholecystostomy tube placement  Gastroenterology signed off  Planning for hepatology referral to [place] and also to repeat triple phase scan in 3 months  Continue aripiprazole, citalopram, folic acid, nicotine patch, ppi and thiamine   Continue lactulose to prevent hepatic encephalopathy  Latest ammonia within normal limits  Plan for nursing home placement, case management  working on it  DVT prophylaxis-Susan Griggs DO  Department of Hospital Medicine  [facility]  07/17/2025

## 2025-07-17 NOTE — PROGRESS NOTES
[facility] Hospital Medicine Progress Note        Chief Complaint: Inpatient Follow-up    HPI:     64-year-old male with significant history of carpal tunnel syndrome, HTN, sciatica, portal vein thrombosis, cocaine use, chronic hep C, hepatic adenoma concerning for HCC, cirrhosis presented to the ED with complaints of progressively worsening abdominal pain for the past 2 months.  Patient was noted to have acute on chronic hyperbilirubinemia with leukocytosis.  He initially presented to outlying facility and was transferred to our hospital for higher level of care, patient was admitted to hospital medicine services, Gastroenterology, general surgery services consulted, MRCP was ordered to further evaluate worsening liver function test.  MRCP revealed findings concerning for HCC, distended gallbladder with cholelithiasis and mild intrahepatic biliary ductal dilation, evaluation was challenging secondary to underlying portal vein thrombus.  Suspicion for cholecystitis given clinical presentation.  No evidence of choledocholithiasis, no indication for ERCP per Gastroenterology, recommended anticoagulation for portal vein thrombosis.  Ultrasound with positive from Chang's sign and thickened gallbladder.  General surgery evaluated for possible cholecystitis, poor surgical candidate and therefore interventional radiology was consulted for percutaneous cholecystostomy tube placement and this was done on 7/7.  Zosyn sky, General surgery Plan to follow him in clinic as outpatient, GI planning for repeating triple phase as outpatient in 3 months and also arrange follow up with hepatology in [place].  Patient reported suicidal ideation on 07/09 and therefore psych consulted and he was placed under pec/one-to-one observation.  Previous CT with concerns for pontine/left internal capsule ischemic CVA, MRI was ordered to further evaluate.  Ultrasound abdomen ordered to quantify ascites on  07/10.  Pec and one-to-one observation continued.  Large volume ascites noted and therefore IR consulted for paracentesis on 07/11, MRI came back positive for thalamic CVA, Neurology consulted, neurology recommended to continue anticoagulation, no statin given transaminitis, pec continued as of 7/11.  Patient with no more suicidal or homicidal ideation and therefore Pec was revoked on 07/12.  INR was more than 5 on 07/12, Xarelto held, no overt bleeding, trended down to 3.7 on 07/13.  INR trended down to less than 2 on 07/14 and therefore Xarelto resumed    Interval Hx:   Patient seen and examined by bedside.  Largely drowsy.  Does arouse to verbal stimuli.  Per nursing, has been aggressive with staff this morning.  Hemodynamically stable.  No new complaints     Objective/physical exam:  General: In no acute distress, afebrile  Chest: Clear to auscultation bilaterally  Heart: S1, S2, no appreciable murmur  Abdomen: Soft, nontender, BS +  MSK: Warm, no lower extremity edema, no clubbing or cyanosis  Neurologic: Alert and oriented x4, moving all extremities with good strength     VITAL SIGNS: 24 HRS MIN & MAX LAST   Temp  Min: 97.5 °F (36.4 °C)  Max: 98.2 °F (36.8 °C) 98.2 °F (36.8 °C)   BP  Min: 112/66  Max: 130/78 122/76   Pulse  Min: 79  Max: 91  91   Resp  Min: 17  Max: 23 20   SpO2  Min: 97 %  Max: 100 % 99 %       Recent Labs   Lab 07/16/25  0441   WBC 7.87   RBC 2.76*   HGB 8.3*   HCT 25.7*   MCV 93.1   MCH 30.1   MCHC 32.3*   RDW 16.3      MPV 10.2         Recent Labs   Lab 07/12/25  0308 07/13/25  0357 07/16/25  0441   *   < > 132*   K 4.4   < > 4.9   *   < > 104   CO2 21*   < > 22*   BUN 21.5   < > 28.4*   CREATININE 1.25   < > 2.09*   GLU 93   < > 75*   CALCIUM 7.6*   < > 7.7*   MG 1.80  --   --    ALBUMIN 1.3*   < > 1.3*   PROT 8.0*   < > 7.9*   ALKPHOS 247*   < > 244*   *   < > 105*   AST 93*   < > 109*   BILITOT 2.4*   < > 2.2*    < > = values in this interval not displayed.           Microbiology Results (last 7 days)       Procedure Component Value Units Date/Time    Body Fluid Culture [[id number]] Collected: 07/07/25 1639    Order Status: Completed Specimen: Fluids from Gallbladder Updated: 07/12/25 0909     Body Fluid Culture Final Report: At 5 days. No growth    Anaerobic Culture [[id number]] Collected: 07/07/25 1639    Order Status: Completed Specimen: Aspirate from Gallbladder Updated: 07/10/25 0748     Anaerobe Culture No Anaerobes Isolated             Scheduled Med:   ARIPiprazole  10 mg Oral Daily    EScitalopram oxalate  10 mg Oral Daily    folic acid  1 mg Oral Daily    furosemide  20 mg Oral BID    lactulose 10 gram/15 ml  15 g Oral Daily    nicotine  1 patch Transdermal Daily    pantoprazole  40 mg Oral Daily    piperacillin-tazobactam (Zosyn) IV (PEDS and ADULTS) (extended infusion is not appropriate)  4.5 g Intravenous Q8H    rivaroxaban  20 mg Oral Daily with dinner    spironolactone  25 mg Oral Daily    thiamine  100 mg Oral Daily          Assessment/Plan:    Acute ischemic CVA -left thalamus  Positive bubble study  Suicidal ideation placed under pec 7/9, revoked 7/12  Symptomatic cholelithiasis with suspected cholecystitis status post CT-guided percutaneous cholecystostomy tube placement 7/7  Sepsis secondary to above-improving   Acute on chronic hyperbilirubinemia with transaminitis-slowly improving  Decompensated cirrhosis with large volume recurrent ascites status post paracentesis 7/11, removed 2.2 L, reaccumulating now  Supratherapeutic INR  Hypervolemic hyponatremia   Mild metabolic acidosis  Portal vein thrombosis  Suspected HCC  Suspected bilateral pneumonia-HA P  Substance use disorder  Chronic hep C   History of essential HTN  Sciatica   History of CTS   Physical deconditioning  Prophylaxis      Evaluated by Neurology for left thalamic CVA and recommended to continue Xarelto which he was on for portal vein thrombosis   No statin given transaminitis  Xarelto  resumed today as INR is 2.5  No large vessel occlusion  Bubble study has come back positive, Arrange outpatient follow up with Cardiology for OLAMIDE  Re-evaluated by speech therapy and is cleared for regular diet  Psych Team following, patient is no more suicidal   On Lexapro and Abilify   Psych team is okay to cancel pec, revoked 7/12  Continue Zosyn for cholecystitis needing cholecystostomy tube placement, patient was deemed poor surgical candidate , on day 14  May need to consider DC has cultures all negative and patient has remained afebrile with no leukocytosis for several days  Bloody output through cholecystostomy and output is also increased most likely secondary to leaking ascitic fluid  Reconsulted general surgery, no plans for intervention  Plan for outpatient cholangiogram  Monitor closely for increasing bloody output, monitor hemoglobin   8.3 today   Hold off on FFP given recent CVA, will give in case of profound bleeding  Underwent paracentesis for large volume ascites on 07/11, reaccumulating quickly   Was started on Lasix 20 mg b.i.d. and Aldactone   SOCORRO worsened today, discontinue Aldactone and continue Lasix  Monitor sodium closely on diuretics  If SOCORRO continues to worsen, may need to discontinue diuretics altogether and consult IR for paracentesis again, may need PleurX due to reaccumulation quickly   Patient has had cytology of peritoneal fluid before which was negative for malignancy  P.o. bicarb for metabolic acidosis, improving   Hyperbilirubinemia with transaminitis also slowly improving after cholecystostomy tube placement  Gastroenterology signed off  Planning for hepatology referral to [facility] and also to repeat triple phase scan in 3 months  Continue aripiprazole, citalopram, folic acid, nicotine patch, ppi and thiamine   Continue lactulose to prevent hepatic encephalopathy  Latest ammonia within normal limits  Plan for nursing home placement, case management working on it  DVT  prophylaxis-Susan Griggs DO  Department of Hospital Medicine  [facility]  07/16/2025

## 2025-07-17 NOTE — PT/OT/SLP PROGRESS
"Occupational Therapy      Patient Name:  Aman Humphrey   MRN:  [id number]    Patient not seen today secondary to Patient unwilling to participate. Patient supine in bed upon entry presenting emotionally labile regarding recent phone call with daughter and prolonged hospitalization. Reflective listening offered to patient, yet patient with continued perseverance on contacting daughter. Patient declining engagement in all BADLs and mobility at this time despite vc's for encouragement and education on purpose. Patient states "I can't move, I'm too weak," and "If no one believes me, let me die." Will follow-up as appropriate.    7/17/2025  "

## 2025-07-17 NOTE — PLAN OF CARE
Problem: Adult Inpatient Plan of Care  Goal: Plan of Care Review  Outcome: Progressing  Goal: Patient-Specific Goal (Individualized)  Outcome: Progressing  Goal: Absence of Hospital-Acquired Illness or Injury  Outcome: Progressing  Goal: Optimal Comfort and Wellbeing  Outcome: Progressing  Goal: Readiness for Transition of Care  Outcome: Progressing     Problem: Skin Injury Risk Increased  Goal: Skin Health and Integrity  Outcome: Progressing     Problem: Coping Ineffective  Goal: Effective Coping  Outcome: Progressing     Problem: Wound  Goal: Optimal Coping  Outcome: Progressing  Goal: Optimal Functional Ability  Outcome: Progressing  Goal: Absence of Infection Signs and Symptoms  Outcome: Progressing  Goal: Improved Oral Intake  Outcome: Progressing  Goal: Optimal Pain Control and Function  Outcome: Progressing  Goal: Skin Health and Integrity  Outcome: Progressing  Goal: Optimal Wound Healing  Outcome: Progressing     Problem: Suicide Risk  Goal: Absence of Self-Harm  Outcome: Progressing     Problem: Infection  Goal: Absence of Infection Signs and Symptoms  Outcome: Progressing     Problem: Stroke, Ischemic (Includes Transient Ischemic Attack)  Goal: Optimal Coping  Outcome: Progressing  Goal: Effective Bowel Elimination  Outcome: Progressing  Goal: Optimal Cerebral Tissue Perfusion  Outcome: Progressing  Goal: Optimal Cognitive Function  Outcome: Progressing  Goal: Improved Communication Skills  Outcome: Progressing  Goal: Optimal Functional Ability  Outcome: Progressing  Goal: Optimal Nutrition Intake  Outcome: Progressing  Goal: Effective Oxygenation and Ventilation  Outcome: Progressing  Goal: Improved Sensorimotor Function  Outcome: Progressing  Goal: Safe and Effective Swallow  Outcome: Progressing  Goal: Effective Urinary Elimination  Outcome: Progressing     Problem: Fall Injury Risk  Goal: Absence of Fall and Fall-Related Injury  Outcome: Progressing

## 2025-07-17 NOTE — PT/OT/SLP PROGRESS
Physical Therapy Treatment    Patient Name:  Aman Humphrey   MRN:  [id number]    Pt refused despite persuasion.

## 2025-07-17 NOTE — PLAN OF CARE
Problem: Adult Inpatient Plan of Care  Goal: Plan of Care Review  Outcome: Progressing  Flowsheets (Taken 7/16/2025 2313)  Plan of Care Reviewed With: patient  Goal: Patient-Specific Goal (Individualized)  Outcome: Progressing  Goal: Absence of Hospital-Acquired Illness or Injury  Outcome: Progressing  Intervention: Identify and Manage Fall Risk  Flowsheets (Taken 7/16/2025 2313)  Safety Promotion/Fall Prevention:   assistive device/personal item within reach   Fall Risk signage in place   side rails raised x 2   medications reviewed  Intervention: Prevent Skin Injury  Flowsheets (Taken 7/16/2025 2313)  Body Position: position changed independently  Skin Protection: incontinence pads utilized  Device Skin Pressure Protection: positioning supports utilized  Intervention: Prevent and Manage VTE (Venous Thromboembolism) Risk  Flowsheets (Taken 7/16/2025 2313)  VTE Prevention/Management:   bleeding precautions maintained   bleeding risk assessed   fluids promoted  Intervention: Prevent Infection  Flowsheets (Taken 7/16/2025 2313)  Infection Prevention:   rest/sleep promoted   hand hygiene promoted  Goal: Optimal Comfort and Wellbeing  Outcome: Progressing  Intervention: Monitor Pain and Promote Comfort  Flowsheets (Taken 7/16/2025 2313)  Pain Management Interventions:   care clustered   quiet environment facilitated   relaxation techniques promoted   medication offered  Intervention: Provide Person-Centered Care  Flowsheets (Taken 7/16/2025 2313)  Trust Relationship/Rapport:   care explained   thoughts/feelings acknowledged  Goal: Readiness for Transition of Care  Outcome: Progressing     Problem: Skin Injury Risk Increased  Goal: Skin Health and Integrity  Outcome: Progressing  Intervention: Optimize Skin Protection  Flowsheets (Taken 7/16/2025 2313)  Pressure Reduction Techniques: frequent weight shift encouraged  Pressure Reduction Devices: positioning supports utilized  Skin Protection: incontinence pads  utilized  Activity Management:   Leg kicks - L2   Arm raise - L1   Rolling - L1  Head of Bed (HOB) Positioning: HOB at 20-30 degrees  Intervention: Promote and Optimize Oral Intake  Flowsheets (Taken 7/16/2025 2313)  Oral Nutrition Promotion: rest periods promoted     Problem: Coping Ineffective  Goal: Effective Coping  Outcome: Progressing  Intervention: Support and Enhance Coping Strategies  Flowsheets (Taken 7/16/2025 2313)  Supportive Measures:   active listening utilized   self-care encouraged   verbalization of feelings encouraged  Family/Support System Care: self-care encouraged  Environmental Support: calm environment promoted     Problem: Wound  Goal: Optimal Coping  Outcome: Progressing  Intervention: Support Patient and Family Response  Flowsheets (Taken 7/16/2025 2313)  Supportive Measures:   active listening utilized   self-care encouraged   verbalization of feelings encouraged  Family/Support System Care: self-care encouraged  Goal: Optimal Functional Ability  Outcome: Progressing  Intervention: Optimize Functional Ability  Flowsheets (Taken 7/16/2025 2313)  Activity Management:   Leg kicks - L2   Arm raise - L1   Rolling - L1  Activity Assistance Provided: assistance, 1 person  Goal: Absence of Infection Signs and Symptoms  Outcome: Progressing  Intervention: Prevent or Manage Infection  Flowsheets (Taken 7/16/2025 2313)  Infection Management: aseptic technique maintained  Isolation Precautions: precautions maintained  Goal: Improved Oral Intake  Outcome: Progressing  Intervention: Promote and Optimize Oral Intake  Flowsheets (Taken 7/16/2025 2313)  Oral Nutrition Promotion: rest periods promoted  Goal: Optimal Pain Control and Function  Outcome: Progressing  Intervention: Prevent or Manage Pain  Flowsheets (Taken 7/16/2025 2313)  Sleep/Rest Enhancement:   awakenings minimized   relaxation techniques promoted  Pain Management Interventions:   care clustered   quiet environment facilitated   relaxation  techniques promoted   medication offered  Goal: Skin Health and Integrity  Outcome: Progressing  Intervention: Optimize Skin Protection  Flowsheets (Taken 7/16/2025 2313)  Pressure Reduction Techniques: frequent weight shift encouraged  Pressure Reduction Devices: positioning supports utilized  Skin Protection: incontinence pads utilized  Activity Management:   Leg kicks - L2   Arm raise - L1   Rolling - L1  Head of Bed (HOB) Positioning: HOB at 20-30 degrees  Goal: Optimal Wound Healing  Outcome: Progressing  Intervention: Promote Wound Healing  Flowsheets (Taken 7/16/2025 2313)  Sleep/Rest Enhancement:   awakenings minimized   relaxation techniques promoted     Problem: Suicide Risk  Goal: Absence of Self-Harm  Outcome: Progressing  Intervention: Assess Risk to Self and Maintain Safety  Flowsheets (Taken 7/16/2025 2313)  Enhanced Safety Measures: bed alarm set  Self-Harm Prevention: environmental self-harm risks assessed  Intervention: Promote Psychosocial Wellbeing  Flowsheets (Taken 7/16/2025 2313)  Sleep/Rest Enhancement:   awakenings minimized   relaxation techniques promoted  Supportive Measures:   active listening utilized   self-care encouraged   verbalization of feelings encouraged  Family/Support System Care: self-care encouraged     Problem: Infection  Goal: Absence of Infection Signs and Symptoms  Outcome: Progressing  Intervention: Prevent or Manage Infection  Flowsheets (Taken 7/16/2025 2313)  Infection Management: aseptic technique maintained  Isolation Precautions: precautions maintained     Problem: Stroke, Ischemic (Includes Transient Ischemic Attack)  Goal: Optimal Coping  Outcome: Progressing  Intervention: Support Psychosocial Response to Stroke  Flowsheets (Taken 7/16/2025 2313)  Supportive Measures:   active listening utilized   self-care encouraged   verbalization of feelings encouraged  Family/Support System Care: self-care encouraged  Goal: Effective Bowel Elimination  Outcome:  Progressing  Goal: Optimal Cerebral Tissue Perfusion  Outcome: Progressing  Intervention: Protect and Optimize Cerebral Perfusion  Flowsheets (Taken 7/16/2025 2313)  Cerebral Perfusion Promotion: blood pressure monitored  Goal: Optimal Cognitive Function  Outcome: Progressing  Goal: Improved Communication Skills  Outcome: Progressing  Goal: Optimal Functional Ability  Outcome: Progressing  Intervention: Optimize Functional Ability  Flowsheets (Taken 7/16/2025 2313)  Self-Care Promotion: independence encouraged  Activity Management:   Leg kicks - L2   Arm raise - L1   Rolling - L1  Goal: Optimal Nutrition Intake  Outcome: Progressing  Intervention: Promote and Optimize Fluid and Food Intake  Flowsheets (Taken 7/16/2025 2313)  Oral Nutrition Promotion: rest periods promoted  Goal: Effective Oxygenation and Ventilation  Outcome: Progressing  Intervention: Optimize Oxygenation and Ventilation  Flowsheets (Taken 7/16/2025 2313)  Airway/Ventilation Management: airway patency maintained  Head of Bed (HOB) Positioning: HOB at 20-30 degrees  Goal: Improved Sensorimotor Function  Outcome: Progressing  Intervention: Optimize Range of Motion, Motor Control and Function  Flowsheets (Taken 7/16/2025 2313)  Positioning/Transfer Devices: pillows  Range of Motion: active ROM (range of motion) encouraged  Goal: Safe and Effective Swallow  Outcome: Progressing  Goal: Effective Urinary Elimination  Outcome: Progressing

## 2025-07-17 NOTE — PROGRESS NOTES
Inpatient Nutrition Assessment    Admit Date: 7/3/2025   Total duration of encounter: 14 days   Patient Age: 64 y.o.    Nutrition Recommendation/Prescription     Consider liberalizing diet 2/2 poor PO intake  Trial Boost Plus BID (360 kcal and 14 gm protein per serving)  Continue folic acid and thiamine, consider MVI  Consider appetite stimulant as medically appropriate   Monitor PO intake, labs and weight    Communication of Recommendations: EMR    Nutrition Assessment     Malnutrition Assessment/Nutrition-Focused Physical Exam    Malnutrition Context: chronic illness (07/05/25 1156)  Malnutrition Level: moderate (07/05/25 1156)  Energy Intake (Malnutrition): other (see comments) (Unable to assess) (07/05/25 1156)  Weight Loss (Malnutrition): greater than 7.5% in 3 months (07/05/25 1156)  Subcutaneous Fat (Malnutrition): mild depletion (07/05/25 1156)           Muscle Mass (Malnutrition): mild depletion (07/05/25 1156)  [place] Region (Muscle Loss): mild depletion  Clavicle Bone Region (Muscle Loss): mild depletion                    Fluid Accumulation (Malnutrition): other (see comments) (Not present) (07/05/25 1156)        A minimum of two characteristics is recommended for diagnosis of either severe or non-severe malnutrition.    Chart Review    Reason Seen: follow-up    Malnutrition Screening Tool Results   Have you recently lost weight without trying?: Yes: 24-33 lbs  Have you been eating poorly because of a decreased appetite?: Yes   MST Score: 4   Diagnosis:  Acute ischemic CVA -left thalamus  Positive bubble study  Suicidal ideation placed under pec 7/9, revoked 7/12  Symptomatic cholelithiasis with suspected cholecystitis status post CT-guided percutaneous cholecystostomy tube placement 7/7  Sepsis secondary to above-improving   Acute on chronic hyperbilirubinemia with transaminitis-slowly improving  Decompensated cirrhosis with large volume recurrent ascites status post paracentesis 7/11, removed 2.2 L,  reaccumulating now  Supratherapeutic INR  Hypervolemic hyponatremia   Mild metabolic acidosis  Portal vein thrombosis  Suspected HCC  Suspected bilateral pneumonia-HA P  Substance use disorder  Chronic hep C   History of essential HTN  Sciatica   History of CTS   Physical deconditioning  Prophylaxis    Relevant Medical History: carpal tunnel syndrome, HTN, sciatica, portal vein thrombosis, cocaine use, hepatitis-C, hepatic adenoma     Scheduled Medications:  ARIPiprazole, 10 mg, Daily  EScitalopram oxalate, 10 mg, Daily  folic acid, 1 mg, Daily  lactulose 10 gram/15 ml, 15 g, Daily  nicotine, 1 patch, Daily  pantoprazole, 40 mg, Daily  rivaroxaban, 20 mg, Daily with dinner  thiamine, 100 mg, Daily    Continuous Infusions:   PRN Medications:  acetaminophen, 500 mg, Q6H PRN  dextrose 50%, 12.5 g, PRN  dextrose 50%, 25 g, PRN  glucagon (human recombinant), 1 mg, PRN  glucose, 16 g, PRN  glucose, 24 g, PRN  HYDROcodone-acetaminophen, 1 tablet, Q6H PRN  HYDROcodone-acetaminophen, 1 tablet, Q6H PRN  labetalol, 10 mg, Q4H PRN  lactulose 10 gram/15 ml, 20 g, TID PRN  melatonin, 6 mg, Nightly PRN  ondansetron, 4 mg, Q6H PRN  sodium chloride 0.9%, 10 mL, PRN    Calorie Containing IV Medications: no significant kcals from medications at this time    Recent Labs   Lab 07/11/25  0336 07/11/25  1025 07/12/25  0308 07/13/25  0357 07/14/25  0354 07/15/25  0421 07/16/25  0441 07/17/25  0353   *  --  133* 134* 131* 132* 132* 132*   K 3.7  --  4.4 4.6 4.5 4.4 4.9 5.6*   CALCIUM 7.8*  --  7.6* 7.9* 7.7* 7.5* 7.7* 8.0*   PHOS  --   --  2.8  --   --   --   --   --    MG  --   --  1.80  --   --   --   --   --    *  --  108* 109* 107 105 104 100   CO2 21*  --  21* 20* 19* 22* 22* 21*   BUN 21.7  --  21.5 21.7 23.6 21.1 28.4* 41.2*   CREATININE 1.32*  --  1.25 1.30* 1.32* 1.39* 2.09* 3.44*   EGFRNORACEVR >60  --  >60 >60 >60 57 35 19   GLU 86  --  93 76* 77* 74* 75* 80*   BILITOT 2.5*  --  2.4* 2.2* 2.0* 2.0* 2.2*  --    ALKPHOS  "233*  --  247* 284* 252* 264* 244*  --    *  --  123* 124* 108* 101* 105*  --    AST 90*  --  93* 107* 98* 106* 109*  --    ALBUMIN 1.3*  --  1.3* 1.3* 1.3* 1.3* 1.3*  --    TRIG  --  42  --   --   --   --   --   --    WBC 8.91  --  9.84 7.69 7.01 6.33 7.87  --    HGB 9.2*  --  9.2* 9.3* 9.3* 8.7* 8.3* 7.6*   HCT 28.9*  --  28.4* 28.8* 29.3* 27.0* 25.7* 24.2*     Nutrition Orders:  Diet Heart Healthy Standard Tray      Appetite/Oral Intake: poor/25-50% of meals  Factors Affecting Nutritional Intake: decreased appetite  Social Needs Impacting Access to Food: unable to assess at this time; will attempt on follow-up  Food/Zoroastrianism/Cultural Preferences: unable to obtain  Food Allergies: no known food allergies  Last Bowel Movement: 07/17/25  Wound(s):  none documented    Comments    7/5/25: Noted MST indicates wt loss and decreased appetite. Unable to verify with pt, pt confused and hard to understand at time of RD visit. Noted EMR wts confirm wt loss over past few months. Awaiting MRCP results per MD notes. Due to malnourished state and NPO, may need to consider starting PN. Currently receiving minimal D5.     7/7/2025: Pt NPO for IR today per nurse. Pt reportedly thirsty. The nurse denies N/V. Last BM noted. Will monitor.     7/10/2025: The sitter reports a poor appetite/PO intake and denies N/V/D/C. Will add ONS to help meet needs. Will monitor.     7/14/2025: Pt's poor appetite/PO intake continues. Pt may benefit from appetite stimulant if medically feasible.  No reported N/V. Last BM noted. Will monitor.    7/17/25: Pt continues with decreased appetite. Will trial ONS BID to aid with nutrition intake. Pt may benefit from liberalized diet and appetite stimulant. No reports of n/v/d/c; LBM 7/17. Will monitor.    Anthropometrics    Height: 5' 8.9" (175 cm), Height Method: Stated  Last Weight: 54.1 kg (119 lb 4.3 oz) (07/05/25 1153), Weight Method: Standard Scale  BMI (Calculated): 17.7  BMI Classification: " underweight (BMI less than 18.5)        Ideal Body Weight (IBW), Male: 159.4 lb     % Ideal Body Weight, Male (lb): 74.82 %                 Usual Body Weight (UBW), k kg  % Usual Body Weight: 91.89  % Weight Change From Usual Weight: -8.45 %  Usual Weight Provided By: EMR weight history    Wt Readings from Last 5 Encounters:   25 54.1 kg (119 lb 4.3 oz)   25 54.4 kg (120 lb)   25 59 kg (130 lb)   25 59 kg (130 lb)   23 63 kg (138 lb 14.2 oz)     Weight Change(s) Since Admission:   Wt Readings from Last 1 Encounters:   25 1153 54.1 kg (119 lb 4.3 oz)   25 1704 54.1 kg (119 lb 4.3 oz)   Admit Weight: 54.1 kg (119 lb 4.3 oz) (25 1704), Weight Method: Standard Scale    2025: 54.1 kg  7/10/2025: no new wts  2025: no new wts  2025: no new weights     Estimated Needs    Weight Used For Calorie Calculations: 54.1 kg (119 lb 4.3 oz)  Energy Calorie Requirements (kcal): 7437-2547 (30-35 kcal/kg)  Energy Need Method: Kcal/kg  Weight Used For Protein Calculations: 54.1 kg (119 lb 4.3 oz)  Protein Requirements: 65 (1.2 g/kg)  Fluid Requirements (mL): 1623 (1 mL/kcal)  CHO Requirement: 182-223 g/day (~45-55% est min kcal needs)     Enteral Nutrition     Patient not receiving enteral nutrition at this time.    Parenteral Nutrition     Patient not receiving parenteral nutrition support at this time.    Evaluation of Received Nutrient Intake    Calories: not meeting estimated needs  Protein: not meeting estimated needs    Patient Education     Not applicable.    Nutrition Diagnosis     PES: Inadequate oral intake related to acute illness as evidenced by poor PO intake. (active)     PES: Moderate chronic disease or condition related malnutrition Related to chronic condition As Evidenced by:  - weight loss: > 7.5% in 3 months - muscle mass depletion: 2 areas of mild muscle loss (Clavicle, Temporalis) - loss of subcutaneous fat: 1 area of mild fat loss (Buccal)  active    Nutrition Interventions     Intervention(s): General/healthful diet, Medical food supplement therapy, Vitamin and mineral supplement therapy, and Collaboration and referral of nutrition care  Intervention(s):      Goal: Meet greater than 80% of nutritional needs by follow-up. (goal progressing)  Goal: Consume % of meals/snacks by follow-up. (goal progressing)    Nutrition Goals & Monitoring     Dietitian will monitor: food and beverage intake, weight, and gastrointestinal profile  Discharge planning: continue regular vs heart healthy diet with boost or similar oral supplements  Nutrition Risk/Follow-Up: patient at increased nutrition risk; dietitian will follow-up twice weekly   Please consult if re-assessment needed sooner.

## 2025-07-18 LAB
ABO + RH BLD: NORMAL
ABORH RETYPE: NORMAL
ALBUMIN SERPL-MCNC: 1.3 G/DL (ref 3.4–4.8)
ALBUMIN/GLOB SERPL: 0.2 RATIO (ref 1.1–2)
ALP SERPL-CCNC: 207 UNIT/L (ref 40–150)
ALT SERPL-CCNC: 204 UNIT/L (ref 0–55)
AMORPH URATE CRY URNS QL MICRO: ABNORMAL /UL
ANION GAP SERPL CALC-SCNC: 9 MEQ/L
AST SERPL-CCNC: 224 UNIT/L (ref 11–45)
BACTERIA #/AREA URNS AUTO: ABNORMAL /HPF
BILIRUB SERPL-MCNC: 2.1 MG/DL
BILIRUB UR QL STRIP.AUTO: NEGATIVE
BLD PROD TYP BPU: NORMAL
BLOOD UNIT EXPIRATION DATE: NORMAL
BLOOD UNIT TYPE CODE: 7300
BUN SERPL-MCNC: 59 MG/DL (ref 8.4–25.7)
CALCIUM SERPL-MCNC: 7.6 MG/DL (ref 8.8–10)
CHLORIDE SERPL-SCNC: 99 MMOL/L (ref 98–107)
CLARITY UR: ABNORMAL
CO2 SERPL-SCNC: 23 MMOL/L (ref 23–31)
COLOR UR AUTO: YELLOW
CREAT SERPL-MCNC: 4.73 MG/DL (ref 0.72–1.25)
CREAT UR-MCNC: 177.6 MG/DL (ref 63–166)
CREAT/UREA NIT SERPL: 12
CROSSMATCH INTERPRETATION: NORMAL
DISPENSE STATUS: NORMAL
ERYTHROCYTE [DISTWIDTH] IN BLOOD BY AUTOMATED COUNT: 16.4 % (ref 11.5–17)
GFR SERPLBLD CREATININE-BSD FMLA CKD-EPI: 13 ML/MIN/1.73/M2
GLOBULIN SER-MCNC: 6.8 GM/DL (ref 2.4–3.5)
GLUCOSE SERPL-MCNC: 78 MG/DL (ref 82–115)
GLUCOSE UR QL STRIP: NORMAL
GROUP & RH: NORMAL
HCT VFR BLD AUTO: 21.1 % (ref 42–52)
HGB BLD-MCNC: 6.9 G/DL (ref 14–18)
HGB UR QL STRIP: ABNORMAL
INDIRECT COOMBS: NORMAL
KETONES UR QL STRIP: NEGATIVE
LEUKOCYTE ESTERASE UR QL STRIP: NEGATIVE
MCH RBC QN AUTO: 30.9 PG (ref 27–31)
MCHC RBC AUTO-ENTMCNC: 32.7 G/DL (ref 33–36)
MCV RBC AUTO: 94.6 FL (ref 80–94)
NITRITE UR QL STRIP: NEGATIVE
NRBC BLD AUTO-RTO: 0 %
OSMOLALITY SERPL: 304 MOSM/KG (ref 280–300)
OSMOLALITY UR: 353 MOSM/KG (ref 300–1300)
PH UR STRIP: 5 [PH]
PLATELET # BLD AUTO: 269 X10(3)/MCL (ref 130–400)
PMV BLD AUTO: 9.4 FL (ref 7.4–10.4)
POCT GLUCOSE: 102 MG/DL (ref 70–110)
POTASSIUM SERPL-SCNC: 5 MMOL/L (ref 3.5–5.1)
PROT SERPL-MCNC: 8.1 GM/DL (ref 5.8–7.6)
PROT UR QL STRIP: ABNORMAL
RBC # BLD AUTO: 2.23 X10(6)/MCL (ref 4.7–6.1)
RBC #/AREA URNS AUTO: ABNORMAL /HPF
SODIUM SERPL-SCNC: 131 MMOL/L (ref 136–145)
SODIUM UR-SCNC: <20 MMOL/L
SP GR UR STRIP.AUTO: 1.02 (ref 1–1.03)
SPECIMEN OUTDATE: NORMAL
SQUAMOUS #/AREA URNS LPF: ABNORMAL /HPF
UNIT NUMBER: NORMAL
UROBILINOGEN UR STRIP-ACNC: NORMAL
WBC # BLD AUTO: 10.77 X10(3)/MCL (ref 4.5–11.5)
WBC #/AREA URNS AUTO: ABNORMAL /HPF

## 2025-07-18 PROCEDURE — 36430 TRANSFUSION BLD/BLD COMPNT: CPT

## 2025-07-18 PROCEDURE — S4991 NICOTINE PATCH NONLEGEND: HCPCS | Performed by: HOSPITALIST

## 2025-07-18 PROCEDURE — 83935 ASSAY OF URINE OSMOLALITY: CPT | Performed by: STUDENT IN AN ORGANIZED HEALTH CARE EDUCATION/TRAINING PROGRAM

## 2025-07-18 PROCEDURE — 25000003 PHARM REV CODE 250: Performed by: STUDENT IN AN ORGANIZED HEALTH CARE EDUCATION/TRAINING PROGRAM

## 2025-07-18 PROCEDURE — 25000003 PHARM REV CODE 250: Performed by: LICENSED PRACTICAL NURSE

## 2025-07-18 PROCEDURE — 30233N1 TRANSFUSION OF NONAUTOLOGOUS RED BLOOD CELLS INTO PERIPHERAL VEIN, PERCUTANEOUS APPROACH: ICD-10-PCS | Performed by: INTERNAL MEDICINE

## 2025-07-18 PROCEDURE — 80053 COMPREHEN METABOLIC PANEL: CPT | Performed by: STUDENT IN AN ORGANIZED HEALTH CARE EDUCATION/TRAINING PROGRAM

## 2025-07-18 PROCEDURE — P9016 RBC LEUKOCYTES REDUCED: HCPCS | Performed by: STUDENT IN AN ORGANIZED HEALTH CARE EDUCATION/TRAINING PROGRAM

## 2025-07-18 PROCEDURE — 81015 MICROSCOPIC EXAM OF URINE: CPT | Performed by: STUDENT IN AN ORGANIZED HEALTH CARE EDUCATION/TRAINING PROGRAM

## 2025-07-18 PROCEDURE — 85027 COMPLETE CBC AUTOMATED: CPT | Performed by: STUDENT IN AN ORGANIZED HEALTH CARE EDUCATION/TRAINING PROGRAM

## 2025-07-18 PROCEDURE — 36415 COLL VENOUS BLD VENIPUNCTURE: CPT | Performed by: STUDENT IN AN ORGANIZED HEALTH CARE EDUCATION/TRAINING PROGRAM

## 2025-07-18 PROCEDURE — 86900 BLOOD TYPING SEROLOGIC ABO: CPT | Performed by: STUDENT IN AN ORGANIZED HEALTH CARE EDUCATION/TRAINING PROGRAM

## 2025-07-18 PROCEDURE — 25000003 PHARM REV CODE 250: Performed by: HOSPITALIST

## 2025-07-18 PROCEDURE — 82570 ASSAY OF URINE CREATININE: CPT | Performed by: STUDENT IN AN ORGANIZED HEALTH CARE EDUCATION/TRAINING PROGRAM

## 2025-07-18 PROCEDURE — 83930 ASSAY OF BLOOD OSMOLALITY: CPT | Performed by: STUDENT IN AN ORGANIZED HEALTH CARE EDUCATION/TRAINING PROGRAM

## 2025-07-18 PROCEDURE — 86923 COMPATIBILITY TEST ELECTRIC: CPT | Performed by: STUDENT IN AN ORGANIZED HEALTH CARE EDUCATION/TRAINING PROGRAM

## 2025-07-18 PROCEDURE — 11000001 HC ACUTE MED/SURG PRIVATE ROOM

## 2025-07-18 PROCEDURE — 25000003 PHARM REV CODE 250: Performed by: INTERNAL MEDICINE

## 2025-07-18 PROCEDURE — 84300 ASSAY OF URINE SODIUM: CPT | Performed by: STUDENT IN AN ORGANIZED HEALTH CARE EDUCATION/TRAINING PROGRAM

## 2025-07-18 RX ORDER — ARIPIPRAZOLE 5 MG/1
10 TABLET ORAL DAILY
Status: DISCONTINUED | OUTPATIENT
Start: 2025-07-19 | End: 2025-07-30 | Stop reason: HOSPADM

## 2025-07-18 RX ORDER — ESCITALOPRAM OXALATE 10 MG/1
20 TABLET ORAL DAILY
Status: DISCONTINUED | OUTPATIENT
Start: 2025-07-19 | End: 2025-07-30 | Stop reason: HOSPADM

## 2025-07-18 RX ORDER — HYDROCODONE BITARTRATE AND ACETAMINOPHEN 500; 5 MG/1; MG/1
TABLET ORAL
Status: DISCONTINUED | OUTPATIENT
Start: 2025-07-18 | End: 2025-07-30 | Stop reason: HOSPADM

## 2025-07-18 RX ORDER — SODIUM CHLORIDE 9 MG/ML
INJECTION, SOLUTION INTRAVENOUS CONTINUOUS
Status: ACTIVE | OUTPATIENT
Start: 2025-07-18 | End: 2025-07-19

## 2025-07-18 RX ADMIN — ESCITALOPRAM OXALATE 10 MG: 10 TABLET ORAL at 09:07

## 2025-07-18 RX ADMIN — SODIUM CHLORIDE: 9 INJECTION, SOLUTION INTRAVENOUS at 12:07

## 2025-07-18 RX ADMIN — NICOTINE 1 PATCH: 14 PATCH TRANSDERMAL at 09:07

## 2025-07-18 RX ADMIN — FOLIC ACID 1 MG: 1 TABLET ORAL at 09:07

## 2025-07-18 RX ADMIN — THIAMINE HCL TAB 100 MG 100 MG: 100 TAB at 09:07

## 2025-07-18 RX ADMIN — ARIPIPRAZOLE 10 MG: 5 TABLET ORAL at 09:07

## 2025-07-18 RX ADMIN — RIVAROXABAN 20 MG: 10 TABLET, FILM COATED ORAL at 06:07

## 2025-07-18 RX ADMIN — PANTOPRAZOLE SODIUM 40 MG: 40 TABLET, DELAYED RELEASE ORAL at 09:07

## 2025-07-18 RX ADMIN — HYDROCODONE BITARTRATE AND ACETAMINOPHEN 1 TABLET: 10; 325 TABLET ORAL at 03:07

## 2025-07-18 NOTE — PROGRESS NOTES
[facility] Hospital Medicine Progress Note        Chief Complaint: Inpatient Follow-up    HPI:     64-year-old male with significant history of carpal tunnel syndrome, HTN, sciatica, portal vein thrombosis, cocaine use, chronic hep C, hepatic adenoma concerning for HCC, cirrhosis presented to the ED with complaints of progressively worsening abdominal pain for the past 2 months.  Patient was noted to have acute on chronic hyperbilirubinemia with leukocytosis.  He initially presented to outlying facility and was transferred to our hospital for higher level of care, patient was admitted to hospital medicine services, Gastroenterology, general surgery services consulted, MRCP was ordered to further evaluate worsening liver function test.  MRCP revealed findings concerning for HCC, distended gallbladder with cholelithiasis and mild intrahepatic biliary ductal dilation, evaluation was challenging secondary to underlying portal vein thrombus.  Suspicion for cholecystitis given clinical presentation.  No evidence of choledocholithiasis, no indication for ERCP per Gastroenterology, recommended anticoagulation for portal vein thrombosis.  Ultrasound with positive from Chang's sign and thickened gallbladder.  General surgery evaluated for possible cholecystitis, poor surgical candidate and therefore interventional radiology was consulted for percutaneous cholecystostomy tube placement and this was done on 7/7.  Zosyn sky, General surgery Plan to follow him in clinic as outpatient, GI planning for repeating triple phase as outpatient in 3 months and also arrange follow up with hepatology in [place].  Patient reported suicidal ideation on 07/09 and therefore psych consulted and he was placed under pec/one-to-one observation.  Previous CT with concerns for pontine/left internal capsule ischemic CVA, MRI was ordered to further evaluate.  Ultrasound abdomen ordered to quantify ascites on  07/10.  Pec and one-to-one observation continued.  Large volume ascites noted and therefore IR consulted for paracentesis on 07/11, MRI came back positive for thalamic CVA, Neurology consulted, neurology recommended to continue anticoagulation, no statin given transaminitis, pec continued as of 7/11.  Patient with no more suicidal or homicidal ideation and therefore Pec was revoked on 07/12.  INR was more than 5 on 07/12, Xarelto held, no overt bleeding, trended down to 3.7 on 07/13.  INR trended down to less than 2 on 07/14 and therefore Xarelto resumed    Interval Hx:   Pain and examined by bedside.  Appears deconditioned and cachectic.  States has not had much to eat or drink.  Code fast was called overnight.  CT head was done which was unremarkable.  Patient noted to have numbness and tingling in the right extremity.     Objective/physical exam:  General: In no acute distress, afebrile  Chest: Clear to auscultation bilaterally  Heart: S1, S2, no appreciable murmur  Abdomen: Soft, nontender, BS +  MSK: Warm, no lower extremity edema, no clubbing or cyanosis  Neurologic: Alert and oriented x4, moving all extremities with good strength     VITAL SIGNS: 24 HRS MIN & MAX LAST   Temp  Min: 97.4 °F (36.3 °C)  Max: 97.9 °F (36.6 °C) 97.9 °F (36.6 °C)   BP  Min: 112/61  Max: 127/65 120/67   Pulse  Min: 76  Max: 92  90   Resp  Min: 16  Max: 18 17   SpO2  Min: 98 %  Max: 99 % 99 %       Recent Labs   Lab 07/18/25  0434   WBC 10.77   RBC 2.23*   HGB 6.9*   HCT 21.1*   MCV 94.6*   MCH 30.9   MCHC 32.7*   RDW 16.4      MPV 9.4         Recent Labs   Lab 07/12/25  0308 07/13/25  0357 07/18/25  0434   *   < > 131*   K 4.4   < > 5.0   *   < > 99   CO2 21*   < > 23   BUN 21.5   < > 59.0*   CREATININE 1.25   < > 4.73*   GLU 93   < > 78*   CALCIUM 7.6*   < > 7.6*   MG 1.80  --   --    ALBUMIN 1.3*   < > 1.3*   PROT 8.0*   < > 8.1*   ALKPHOS 247*   < > 207*   *   < > 204*   AST 93*   < > 224*   BILITOT 2.4*    < > 2.1*    < > = values in this interval not displayed.          Microbiology Results (last 7 days)       Procedure Component Value Units Date/Time    Body Fluid Culture [[id number]] Collected: 07/07/25 1639    Order Status: Completed Specimen: Fluids from Gallbladder Updated: 07/12/25 0909     Body Fluid Culture Final Report: At 5 days. No growth             Scheduled Med:   [START ON 7/19/2025] EScitalopram oxalate  20 mg Oral Daily    folic acid  1 mg Oral Daily    lactulose 10 gram/15 ml  15 g Oral Daily    nicotine  1 patch Transdermal Daily    pantoprazole  40 mg Oral Daily    rivaroxaban  20 mg Oral Daily with dinner    thiamine  100 mg Oral Daily          Assessment/Plan:    Acute ischemic CVA -left thalamus  Positive bubble study  Suicidal ideation placed under pec 7/9, revoked 7/12  Symptomatic cholelithiasis with suspected cholecystitis status post CT-guided percutaneous cholecystostomy tube placement 7/7  Sepsis secondary to above-improving   Acute on chronic hyperbilirubinemia with transaminitis-slowly improving  Decompensated cirrhosis with large volume recurrent ascites status post paracentesis 7/11, removed 2.2 L, reaccumulating now  Supratherapeutic INR  Hypervolemic hyponatremia   Mild metabolic acidosis  Portal vein thrombosis  Suspected HCC  Suspected bilateral pneumonia-HA P  Substance use disorder  Chronic hep C   History of essential HTN  Sciatica   History of CTS   Physical deconditioning  Prophylaxis      Evaluated by Neurology for left thalamic CVA and recommended to continue Xarelto which he was on for portal vein thrombosis   No statin given transaminitis  No large vessel occlusion  Bubble study has come back positive, Arrange outpatient follow up with Cardiology for OLAMIDE  Lexapro increased to 20 mg.  Continue Abilify  Psych team is okay to cancel pec, revoked 7/12  Continue Zosyn for cholecystitis needing cholecystostomy tube placement, patient was deemed poor surgical candidate   Zosyn  was discontinued on 07/17 as patient has received more than 2 weeks of antibiotic therapy and has had normal white count along with remaining afebrile.  Cultures have all been negative so far  Bloody output through cholecystostomy and output is also increased most likely secondary to leaking ascitic fluid  Reconsulted general surgery, no plans for intervention  Plan for outpatient cholangiogram  Hgb slightly trending down, 6.9 today, give one unit of PRBC  Hold xarelto tonight  Underwent paracentesis for large volume ascites on 07/11, reaccumulating quickly   Worsening renal function urine studies ordered, prerenal etiology  Start IV fluids for 1 day and monitor renal function  Renal US unremarkable  If SOCORRO continues to worsen, will need to involve nephrology   May need consult IR for paracentesis again however abdomen currently not distended or hard, may need PleurX due if reaccumulation quickly  Patient has had cytology of peritoneal fluid before which was negative for malignancy  P.o. bicarb for metabolic acidosis, improving   Hyperbilirubinemia with transaminitis noted, initially improving however noted to be slight worse today  Gastroenterology signed off  Planning for hepatology referral to [place] and also to repeat triple phase scan in 3 months  Continue aripiprazole, citalopram, folic acid, nicotine patch, ppi and thiamine   Continue lactulose to prevent hepatic encephalopathy  Latest ammonia within normal limits  Case management working on nursing home placement however patient adamantly refusing.  Wants to go to SNF/rehab however has not not been working with therapy here therefore will likely not get accepted.  DVT prophylaxis: scds    Critical care note:  Critical care diagnosis:  Acute blood loss anemia requiring PRBC  Critical care interventions: Hands-on evaluation, review of labs/radiographs/records and discussion with patient and family if present  Critical care time spent: 35  minutes        Xiomy Griggs DO  Department of Hospital Medicine  [facility]  07/18/2025

## 2025-07-18 NOTE — PLAN OF CARE
SSC talked to Lorna with [facility]. They'll be able to clinically accept patient if he can be taken off of Ambilify 5 mg.    @1962 talked to Lorna with [facility] to notify her patient does not care to disclose financials for placement per CM.

## 2025-07-18 NOTE — PLAN OF CARE
"[facility] is willing to accept the patient. But they will need the abilify to be d/c'd. They stated, "If he's on the Abilify for sleep or depression it can be changed to something that is non-psychotic. Psychotic meds need to have an actual diagnosis, like major depression, schizophrenia, bipolar. If he can be taken off the med then the nursing home can re-assess once he's here and have physician treat". The patient's current diagnosis is mood disorder. Will message MD.    The patient and I spoke to Lorna at [facility] on speaker phone. Now the patient is refusing to go to a nursing home because he doesn't want them to take his social security check. He stated that he wants to go to a SNF/rehab for 30 days ,but he has been refusing therapy here. He also stated that he can go back home with his friends. According to his sister the patient can live with the friends, but they can't take care of him. And his siblings work so they can't take care of him either. The patient is refusing nursing home placement and not participating in therapy so I'm unable to find placement for him at this time.  I spoke to his sister Elizabeth and explained this to her.     1326  Spoke with the patient and he would be ok with home health or palliatve care at home, but he will not have 24 hour care.   "

## 2025-07-18 NOTE — NURSING
Response Team - Patient Flow Sheet     Date: 2025  Time: 0500  Location: Primary Care  Name: Aman Humphrey  : 1960  MRN: [id number]  PCP: Stalin Landin DO  Allergies: Review of patient's allergies indicates:  No Known Allergies  Past Medical History:    Past Medical History:   Diagnosis Date    Carpal tunnel syndrome     HTN (hypertension)     Sciatica        Reason for response team call: Stroke symptoms numbness and tingling on right upper and lower extremities and weaker on right side of body    Injury:  as described above       Time Notes   0504    0510 NIH 3   0515 Patient cursing staff members and being uncooperative, code fast called 0515   0520 hospitalist on call notified, [facility], code fast called 0515   0545 Liz Quinonez NP (on call neuro stroke) was called and discussed patient neuro presentation and she said no to CTA and she will come by and assess early this am. Patient returned with no additional neuro change to his room.

## 2025-07-18 NOTE — PLAN OF CARE
Problem: Adult Inpatient Plan of Care  Goal: Plan of Care Review  Outcome: Progressing  Goal: Patient-Specific Goal (Individualized)  Outcome: Progressing  Goal: Absence of Hospital-Acquired Illness or Injury  Outcome: Progressing  Goal: Optimal Comfort and Wellbeing  Outcome: Progressing  Goal: Readiness for Transition of Care  Outcome: Progressing     Problem: Skin Injury Risk Increased  Goal: Skin Health and Integrity  Outcome: Progressing     Problem: Coping Ineffective  Goal: Effective Coping  Outcome: Progressing     Problem: Stroke, Ischemic (Includes Transient Ischemic Attack)  Goal: Optimal Coping  Outcome: Progressing  Goal: Effective Bowel Elimination  Outcome: Progressing  Goal: Optimal Cerebral Tissue Perfusion  Outcome: Progressing  Goal: Optimal Cognitive Function  Outcome: Progressing  Goal: Improved Communication Skills  Outcome: Progressing  Goal: Optimal Functional Ability  Outcome: Progressing  Goal: Optimal Nutrition Intake  Outcome: Progressing  Goal: Effective Oxygenation and Ventilation  Outcome: Progressing  Goal: Improved Sensorimotor Function  Outcome: Progressing  Goal: Safe and Effective Swallow  Outcome: Progressing  Goal: Effective Urinary Elimination  Outcome: Progressing

## 2025-07-19 LAB
ABO + RH BLD: NORMAL
ALBUMIN SERPL-MCNC: 1.4 G/DL (ref 3.4–4.8)
ALBUMIN/GLOB SERPL: 0.2 RATIO (ref 1.1–2)
ALP SERPL-CCNC: 206 UNIT/L (ref 40–150)
ALT SERPL-CCNC: 353 UNIT/L (ref 0–55)
ANION GAP SERPL CALC-SCNC: 10 MEQ/L
AST SERPL-CCNC: 362 UNIT/L (ref 11–45)
BILIRUB SERPL-MCNC: 2.5 MG/DL
BLD PROD TYP BPU: NORMAL
BLOOD UNIT EXPIRATION DATE: NORMAL
BLOOD UNIT TYPE CODE: 7300
BUN SERPL-MCNC: 69.5 MG/DL (ref 8.4–25.7)
CALCIUM SERPL-MCNC: 7.3 MG/DL (ref 8.8–10)
CHLORIDE SERPL-SCNC: 102 MMOL/L (ref 98–107)
CO2 SERPL-SCNC: 20 MMOL/L (ref 23–31)
CREAT SERPL-MCNC: 5.14 MG/DL (ref 0.72–1.25)
CREAT/UREA NIT SERPL: 14
CROSSMATCH INTERPRETATION: NORMAL
DISPENSE STATUS: NORMAL
FIBRINOGEN PPP-MCNC: 237 MG/DL (ref 210–463)
GFR SERPLBLD CREATININE-BSD FMLA CKD-EPI: 12 ML/MIN/1.73/M2
GLOBULIN SER-MCNC: 6.6 GM/DL (ref 2.4–3.5)
GLUCOSE SERPL-MCNC: 72 MG/DL (ref 82–115)
HCT VFR BLD AUTO: 23.2 % (ref 42–52)
HGB BLD-MCNC: 7.4 G/DL (ref 14–18)
INR PPP: 3.4
INR PPP: 4.9
INR PPP: 8.6
POTASSIUM SERPL-SCNC: 5.1 MMOL/L (ref 3.5–5.1)
PROT SERPL-MCNC: 8 GM/DL (ref 5.8–7.6)
PROTHROMBIN TIME: 33.7 SECONDS (ref 12.5–14.5)
PROTHROMBIN TIME: 44.7 SECONDS (ref 12.5–14.5)
PROTHROMBIN TIME: 68.8 SECONDS (ref 12.5–14.5)
SODIUM SERPL-SCNC: 132 MMOL/L (ref 136–145)
UNIT NUMBER: NORMAL

## 2025-07-19 PROCEDURE — 25000003 PHARM REV CODE 250: Performed by: STUDENT IN AN ORGANIZED HEALTH CARE EDUCATION/TRAINING PROGRAM

## 2025-07-19 PROCEDURE — 85610 PROTHROMBIN TIME: CPT | Performed by: STUDENT IN AN ORGANIZED HEALTH CARE EDUCATION/TRAINING PROGRAM

## 2025-07-19 PROCEDURE — 63600175 PHARM REV CODE 636 W HCPCS: Performed by: STUDENT IN AN ORGANIZED HEALTH CARE EDUCATION/TRAINING PROGRAM

## 2025-07-19 PROCEDURE — 25000003 PHARM REV CODE 250: Performed by: INTERNAL MEDICINE

## 2025-07-19 PROCEDURE — 85384 FIBRINOGEN ACTIVITY: CPT | Performed by: STUDENT IN AN ORGANIZED HEALTH CARE EDUCATION/TRAINING PROGRAM

## 2025-07-19 PROCEDURE — S4991 NICOTINE PATCH NONLEGEND: HCPCS | Performed by: HOSPITALIST

## 2025-07-19 PROCEDURE — 85014 HEMATOCRIT: CPT | Performed by: STUDENT IN AN ORGANIZED HEALTH CARE EDUCATION/TRAINING PROGRAM

## 2025-07-19 PROCEDURE — 80053 COMPREHEN METABOLIC PANEL: CPT | Performed by: STUDENT IN AN ORGANIZED HEALTH CARE EDUCATION/TRAINING PROGRAM

## 2025-07-19 PROCEDURE — 63600531 PHARM REV CODE 636 NO ALT 250 W HCPCS: Mod: JZ,TB | Performed by: STUDENT IN AN ORGANIZED HEALTH CARE EDUCATION/TRAINING PROGRAM

## 2025-07-19 PROCEDURE — P9016 RBC LEUKOCYTES REDUCED: HCPCS | Performed by: STUDENT IN AN ORGANIZED HEALTH CARE EDUCATION/TRAINING PROGRAM

## 2025-07-19 PROCEDURE — 25000003 PHARM REV CODE 250: Performed by: HOSPITALIST

## 2025-07-19 PROCEDURE — 86923 COMPATIBILITY TEST ELECTRIC: CPT | Performed by: STUDENT IN AN ORGANIZED HEALTH CARE EDUCATION/TRAINING PROGRAM

## 2025-07-19 PROCEDURE — 36415 COLL VENOUS BLD VENIPUNCTURE: CPT | Performed by: STUDENT IN AN ORGANIZED HEALTH CARE EDUCATION/TRAINING PROGRAM

## 2025-07-19 PROCEDURE — 11000001 HC ACUTE MED/SURG PRIVATE ROOM

## 2025-07-19 RX ORDER — PHYTONADIONE 5 MG/1
5 TABLET ORAL ONCE
Status: COMPLETED | OUTPATIENT
Start: 2025-07-19 | End: 2025-07-19

## 2025-07-19 RX ORDER — HYDROCODONE BITARTRATE AND ACETAMINOPHEN 500; 5 MG/1; MG/1
TABLET ORAL
Status: DISCONTINUED | OUTPATIENT
Start: 2025-07-19 | End: 2025-07-30 | Stop reason: HOSPADM

## 2025-07-19 RX ORDER — PANTOPRAZOLE SODIUM 40 MG/10ML
40 INJECTION, POWDER, LYOPHILIZED, FOR SOLUTION INTRAVENOUS 2 TIMES DAILY
Status: DISCONTINUED | OUTPATIENT
Start: 2025-07-19 | End: 2025-07-24

## 2025-07-19 RX ADMIN — ESCITALOPRAM OXALATE 20 MG: 10 TABLET ORAL at 09:07

## 2025-07-19 RX ADMIN — NICOTINE 1 PATCH: 14 PATCH TRANSDERMAL at 09:07

## 2025-07-19 RX ADMIN — THIAMINE HCL TAB 100 MG 100 MG: 100 TAB at 09:07

## 2025-07-19 RX ADMIN — PHYTONADIONE 5 MG: 5 TABLET ORAL at 03:07

## 2025-07-19 RX ADMIN — FOLIC ACID 1 MG: 1 TABLET ORAL at 09:07

## 2025-07-19 RX ADMIN — ARIPIPRAZOLE 10 MG: 5 TABLET ORAL at 09:07

## 2025-07-19 RX ADMIN — PANTOPRAZOLE SODIUM 40 MG: 40 INJECTION, POWDER, FOR SOLUTION INTRAVENOUS at 09:07

## 2025-07-19 RX ADMIN — Medication 2798 UNITS: at 09:07

## 2025-07-19 RX ADMIN — HYDROCODONE BITARTRATE AND ACETAMINOPHEN 1 TABLET: 5; 325 TABLET ORAL at 11:07

## 2025-07-19 NOTE — PLAN OF CARE
Re consulted on this pt by attending - Dr. Bux for melena and decrease in Hgb from 7.6 to 6.9 - transfused one unit and is now at 7.4.     However - pt has INR of 8.6    There are no endoscopic interventions that GI can do with INR of 8.6 as we will simply make the patient bleed more any where that the endoscope would make contact with his mucosa, elevated INR and mucosal irritation is the reason pt is having melena.    Recommend Vitamin K, FFP, Transfuse as needed.     Debbie New NP as scribe for Dr. Valeriy Blancas.

## 2025-07-19 NOTE — PLAN OF CARE
Received secure chat in regards to family wanting to meet in regards information on possible transfer to [facility]. I did call into room and speak to sister Grisel via phone and they would like to have a meeting to discuss hospice. I did make her aware that he would have to meet criteria. She verbalized understanding. Referral sent and spoke to Izzy.

## 2025-07-19 NOTE — PLAN OF CARE
Received message from [facility] with [facility]. Patient does not meet criteria for inpatient hospice at this time.

## 2025-07-19 NOTE — CONSULTS
Nephrology  Inpatient consult to Nephrology  Consult performed by: Noe Lindsey MD  Consult ordered by: Xiomy Griggs DO        No chief complaint on file.    HPI: 63 yo male consulted for SOCORRO.  He has h/o HTN, cocaine use, chronic hep C, hepatic adenoma concerning for HCC, cirrhosis, portal vein thrombosis, presented to the ED from outlying facility 7/3 c/o worsening abdominal pain x 2 months, found to have worsening hyperbilirubinemia with leukocytosis.  He was admitted, MRCP revealed findings concerning for HCC, distended gallbladder with cholelithiasis and mild intrahepatic biliary ductal dilation, portal vein thrombus. He was started on zosyn, GI recommended anticoagulation for portal vein thrombosis. General surgery felt he was poor surgical candidate and therefore IR placed percutaneous cholecystostomy tube on 7/7. Patient reported suicidal ideation on 07/09, psych was consulted and he was pec'ed. Previous CT with concerns for pontine/left internal capsule ischemic CVA, MRI showed thalamic CVA. Ultrasound abdomen showed large volume ascites, he had 2.2 L paracentesis on 07/11.  Pec was revoked on 07/12. INR > 5 on 07/12, Xarelto held, was resumed and now INR is 4.9 today.  PT also developed SOCORRO with creatinine rising from a.4 on 7/15 to 2.1 on 7/16, now up to 5.14 today despite IV NS at 100 cc/hr started yesterday.  Renal US unremarkable, Uout 300 cc on day shift yesterday.  U Na < 20 yesterday.  He has suffered sig weight loss, is cachectic, with re-accumulating ascites.  Today he is receiving additional RBC transfusion, appears emaciated, is profoundly weak, not eating.  He denies any SOB/CP/N/V at this time     Review of Systems   Constitutional:  Positive for fatigue.   HENT: Negative.     Respiratory: Negative.     Cardiovascular: Negative.    Gastrointestinal:  Positive for abdominal distention.   Genitourinary:  Positive for decreased urine volume.   Musculoskeletal: Negative.     Neurological:  Positive for weakness.   Psychiatric/Behavioral:  Positive for dysphoric mood.       All other systems negative except for HPI      Past Medical History:   Diagnosis Date    Carpal tunnel syndrome     HTN (hypertension)     Sciatica         Past Surgical History:   Procedure Laterality Date    ESOPHAGOGASTRODUODENOSCOPY N/A 6/20/2025    Procedure: EGD;  Surgeon: Jalyn Day MD;  Location: [facility] ENDOSCOPY;  Service: Gastroenterology;  Laterality: N/A;    SKIN GRAFT        No family history on file.   Social History[1]   Review of patient's allergies indicates:  No Known Allergies     Current Outpatient Medications   Medication Instructions    ARIPiprazole (ABILIFY) 5 mg, Oral, Daily    EScitalopram oxalate (LEXAPRO) 5 mg, Oral, Daily    folic acid (FOLVITE) 1 mg, Oral, Daily    furosemide (LASIX) 20 mg, Oral, Daily    lactulose 10 gram/15 ml (CHRONULAC) 20 g, Oral, 3 times daily PRN    multivitamin Tab 1 tablet, Oral, Daily    nicotine (NICODERM CQ) 14 mg/24 hr 1 patch, Transdermal, Daily    pantoprazole (PROTONIX) 40 mg, Oral, Daily    rivaroxaban (XARELTO) 20 mg, Oral, With dinner    spironolactone (ALDACTONE) 25 mg, Oral, Daily    thiamine 100 mg, Oral, Daily     Objective   VITAL SIGNS: 24 HR MIN & MAX LAST    Temp  Min: 97.7 °F (36.5 °C)  Max: 98.3 °F (36.8 °C)  97.7 °F (36.5 °C)        BP  Min: 107/53  Max: 133/73  122/68     Pulse  Min: 77  Max: 92  77     Resp  Min: 15  Max: 18  16    SpO2  Min: 96 %  Max: 100 %  99 %      Wt Readings from Last 3 Encounters:   07/05/25 54.1 kg (119 lb 4.3 oz)   07/03/25 54.4 kg (120 lb)   06/18/25 59 kg (130 lb)      Intake/Output - Last 3 Shifts         07/17 0700 07/18 0659 07/18 0700 07/19 0659 07/19 0700 07/20 0659    P.O.  420     Blood  380.4     IV Piggyback       Total Intake(mL/kg)  800.4 (14.8)     Urine (mL/kg/hr) 0 (0) 300 (0.2)     Emesis/NG output       Drains 275 750 100    Stool 0 0 0    Total Output 275 1050 100    Net -275 -249.6  -100           Unmeasured Urine Occurrence 4 x      Unmeasured Stool Occurrence 3 x 0 x 1 x           Physical Exam  Constitutional:       General: He is in acute distress.      Appearance: He is ill-appearing.   HENT:      Head:      Comments: Severe cachexia noted  Cardiovascular:      Rate and Rhythm: Normal rate and regular rhythm.   Pulmonary:      Effort: Pulmonary effort is normal. No respiratory distress.   Abdominal:      General: Bowel sounds are normal. There is distension.      Palpations: Abdomen is soft.      Tenderness: There is no abdominal tenderness.      Comments: Perc cholecystostomy in place   Musculoskeletal:         General: No swelling.   Neurological:      Mental Status: He is alert and oriented to person, place, and time.      Motor: Weakness present.          Recent Results (from the past 24 hours)   Prepare RBC 1 Unit    Collection Time: 07/18/25  7:01 PM   Result Value Ref Range    UNIT NUMBER [id number]     UNIT ABO/RH B POS     DISPENSE STATUS Transfused     Unit Expiration [id number]     Product Code M2041V97     Unit Blood Type Code 7300     CROSSMATCH INTERPRETATION Compatible    Type & Screen    Collection Time: 07/18/25  7:01 PM   Result Value Ref Range    Group & Rh B POS     Indirect Lyubov GEL NEG     Specimen Outdate 07/21/2025 23:59    Prepare RBC 1 Unit    Collection Time: 07/18/25  7:01 PM   Result Value Ref Range    UNIT NUMBER [id number]     UNIT ABO/RH B POS     DISPENSE STATUS Issued     Unit Expiration [id number]     Product Code K1840A35     Unit Blood Type Code 7300     CROSSMATCH INTERPRETATION Compatible    ABORH RETYPE    Collection Time: 07/18/25  9:05 PM   Result Value Ref Range    ABORH Retype B POS    Protime-INR    Collection Time: 07/19/25  5:42 AM   Result Value Ref Range    PT 68.8 (H) 12.5 - 14.5 seconds    INR 8.6 (HH) <=1.3   Hemoglobin and Hematocrit    Collection Time: 07/19/25  5:42 AM   Result Value Ref Range    Hgb 7.4 (L) 14.0 - 18.0  g/dL    Hct 23.2 (L) 42.0 - 52.0 %   Comprehensive Metabolic Panel    Collection Time: 07/19/25  5:42 AM   Result Value Ref Range    Sodium 132 (L) 136 - 145 mmol/L    Potassium 5.1 3.5 - 5.1 mmol/L    Chloride 102 98 - 107 mmol/L    CO2 20 (L) 23 - 31 mmol/L    Glucose 72 (L) 82 - 115 mg/dL    Blood Urea Nitrogen 69.5 (H) 8.4 - 25.7 mg/dL    Creatinine 5.14 (H) 0.72 - 1.25 mg/dL    Calcium 7.3 (L) 8.8 - 10.0 mg/dL    Protein Total 8.0 (H) 5.8 - 7.6 gm/dL    Albumin 1.4 (L) 3.4 - 4.8 g/dL    Globulin 6.6 (H) 2.4 - 3.5 gm/dL    Albumin/Globulin Ratio 0.2 (L) 1.1 - 2.0 ratio    Bilirubin Total 2.5 (H) <=1.5 mg/dL     (H) 40 - 150 unit/L     (H) 0 - 55 unit/L     (H) 11 - 45 unit/L    eGFR 12 mL/min/1.73/m2    Anion Gap 10.0 mEq/L    BUN/Creatinine Ratio 14    Fibrinogen    Collection Time: 07/19/25  5:42 AM   Result Value Ref Range    Fibrinogen 237 210 - 463 mg/dL   Protime-INR    Collection Time: 07/19/25 10:43 AM   Result Value Ref Range    PT 44.7 (H) 12.5 - 14.5 seconds    INR 4.9 (H) <=1.3      CT Abdomen Pelvis  Without Contrast  Result Date: 7/19/2025  EXAMINATION: CT ABDOMEN PELVIS WITHOUT CONTRAST CLINICAL HISTORY: GI bleed;Epigastric pain;. TECHNIQUE: Helical acquisition through the abdomen and pelvis without IV contrast.  Lack of contrast limits evaluation of solid organs and vascular structures .  Three plane reconstructions were provided for review.  mGycm. Automatic exposure control, adjustment of mA/kV or iterative reconstruction technique was used to reduce radiation. COMPARISON: 7 July 2025 FINDINGS: There is atelectasis right lung base. Cirrhotic liver and portal vein thrombus poorly evaluated on this noncontrast scan.  There is a cholecystostomy catheter in place.  No acute findings spleen, pancreas or adrenals.  No hydronephrosis. No bowel obstruction.  Similar gastric wall thickening.  No significant inflammatory changes of the small or large bowel.  No free air.   Similar moderate volume ascites. Urinary bladder is unremarkable. No free fluid. Aorta normal in caliber.  There is dense atherosclerotic disease. Moderate degenerative change of the spine.     1. Similar gastric wall thickening, question gastritis. 2. Similar moderate volume ascites. 3. Other findings above. Electronically signed by: Andriy Laws Date:    07/19/2025 Time:    11:02    MRI Brain Limited Without Contrast  Result Date: 7/18/2025  EXAMINATION: MRI brain limited without contrast CLINICAL HISTORY: Stroke-like symptoms, increased right sided numbness and weakness TECHNIQUE: Limited axial diffusion-weighted, ADC map, FLAIR and gradient echo sequences are performed of the brain.  No contrast administered COMPARISON: CT brain without contrast July 18, 2025 FINDINGS: There is no diffusion weighted restriction with signal dropout on the ADC map to reflect an acute infarct.  Old lacunar infarcts involve the left thalamus and the left paramedian location of the pk. Chronic microvascular ischemic changes are mild with periventricular and deep white matter T2 FLAIR hyperintense signals.  Chronic ischemia also involves the pk.  There is generalized cerebral and cerebellar cortical volume loss. There is no acute intracranial hemorrhage, hydrocephalus, midline shift or mass effect.  No acute extra-axial fluid collection. There is moderate mucoperiosteal thickening of the left maxillary sinus and mildly the right maxillary and the ethmoidal air cells.     1.  No acute brain infarcts. 2.  Old infarcts, chronic microangiopathic ischemia and atrophy. Electronically signed by: Mat Sommers Date:    07/18/2025 Time:    17:41    US Retroperitoneal Complete  Result Date: 7/18/2025  EXAMINATION: US RETROPERITONEAL COMPLETE CLINICAL HISTORY: SOCORRO; COMPARISON: CT 7 July 2025 FINDINGS: Grayscale and color Doppler sonographic evaluation of the kidneys and urinary bladder. The right kidney measures 11 cm. The left kidney  measures 10 cm.   No hydronephrosis.  Grossly normal renal parenchymal echogenicity. No significant abnormality of the urinary bladder. Cirrhotic liver incidentally noted.  Small volume ascites.     No hydronephrosis. Electronically signed by: Andriy Laws Date:    07/18/2025 Time:    10:00    CT HEAD FOR CODE STROKE  Result Date: 7/18/2025  EXAMINATION: CT HEAD FOR CODE STROKE CLINICAL HISTORY: Neuro deficit, acute, stroke suspected; TECHNIQUE: Axial scans were obtained from skull base to the vertex. Coronal and sagittal reconstructions obtained from the axial data. Automatic exposure control was utilized to limit radiation dose. Contrast: None Radiation Dose: Total DLP: 931 mGy*cm COMPARISON: CTA head neck dated 07/11/2025 FINDINGS: There is no acute intracranial hemorrhage or edema. The gray-white matter differentiation is preserved.  Patchy hypodensities in the subcortical and periventricular white matter likely represent chronic microvascular ischemic changes. There is no mass effect or midline shift. The ventricles and sulci are normal in size. The basal cisterns are patent. There is no abnormal extra-axial fluid collection. The calvarium and skull base are intact.  There is fluid layering in the left maxillary sinus.     1. No acute intracranial abnormality. 2. Chronic microvascular ischemic changes. No significant change [facility] the [facility]. Electronically signed by: Laurence Lucero Date:    07/18/2025 Time:    06:21    US Abdomen Limited  Result Date: 7/15/2025  EXAMINATION: US ABDOMEN LIMITED CLINICAL HISTORY: Quantify ascites, bloody output from cholecystostomy tube; COMPARISON: CT 7 July 2024.     Targeted scanning of the abdomen demonstrates moderate ascites in the right lower quadrant.  Volume of fluid similar to the prior CT. Electronically signed by: Andriy Laws Date:    07/15/2025 Time:    13:10    Echo Saline Bubble? Yes  Result Date: 7/12/2025    Left Atrium: Agitated saline study  of the atrial septum is positive, consistent with an intracardiac shunt at the atrial level. Limited echo done to perform bubble study     IR Paracentesis with Imaging  Result Date: 7/11/2025  EXAMINATION: IR PARACENTESIS WITH IMAGING CLINICAL HISTORY: ascites; TECHNIQUE: After the risks, benefits and alternatives were discussed, consent was obtained. A time out was performed to verify the patient's identity and procedure. The patient was placed supine. Limited ultrasound the abdomen demonstrated ascitic fluid in the right lower quadrant. A static image was obtained. The right lower quadrant was cleaned and prepped in normal sterile fashion. The subcutaneous tissues were anesthetized with lidocaine solution. A small dermatotomy was made using a #11 blade scalpel. A centesis needle was advanced into the abdominal cavity with return of ascitic fluid. The catheter was advanced and a total of 2.2 liters of clear yellow fluid was aspirated. The patient tolerated the procedure well. There were no immediate complications. ESTIMATED BLOOD LOSS: None     Successful paracentesis, returning 2.2 liters of clear yellow fluid. Electronically signed by: Laurence Lucero Date:    07/11/2025 Time:    14:18    CTA Head and Neck (xpd)  Result Date: 7/11/2025  EXAMINATION: CTA HEAD AND NECK (XPD) CLINICAL HISTORY: Acute CVA; TECHNIQUE: Non contrast low dose axial images were obtained thought the head. CT angiogram was performed from the level of the magaly to the top of the head following the IV administration of 100mL of Omnipaque 350.   Sagittal and coronal, 3D and maximum intensity projection reconstructions were performed. Two additional phases of immediate post-contrast CT images were performed through the head alone. Arterial stenosis percentages are based on NASCET measurement criteria.  Dose reduction techniques including automatic exposure control (AEC) were utilized. Dose (DLP): 2832 mGycm COMPARISON: CT head without contrast  07/09/2025.  MR brain without contrast 07/10/2025. FINDINGS: CTA HEAD: VASCULATURE: No proximal branch occlusion or severe focal stenosis in the major intracranial cerebral arteries.  Mild-to-moderate calcific atherosclerotic stenosis of the distal cavernous through proximal communicating segments of both internal carotid arteries.  No intracranial aneurysm.  No vascular malformation.  Deep cerebral veins and dural venous sinuses enhance normally.  No abnormal intracranial enhancement. CTA NECK: AORTIC ARCH: Bovine configuration of the aortic arch.  Mild calcific atherosclerosis of the aortic arch. LEFT CAROTID COMMON CAROTID: No occlusion, hemodynamically significant stenosis, dissection or aneurysm. INTERNAL CAROTID: Calcific plaque along the left carotid bulb with less than 40% luminal stenosis per NASCET criteria.  No occlusion, hemodynamically significant stenosis, dissection or aneurysm. RIGHT CAROTID COMMON CAROTID: No occlusion, hemodynamically significant stenosis, dissection or aneurysm. INTERNAL CAROTID: Calcific and soft atherosclerotic plaque along the right carotid bulb with less than 40% luminal stenosis per NASCET criteria.  No occlusion, hemodynamically significant stenosis, dissection or aneurysm. VERTEBRAL ARTERIES LEFT VERTEBRAL: No occlusion, hemodynamically significant stenosis, dissection or aneurysm. RIGHT VERTEBRAL: Congenitally hypoplastic. OTHER: Small linear opacity in the right upper lobe likely representing scarring.  Severe disc degeneration at C5-C6 and moderate disc degeneration at C6-C7.  No acute fracture or aggressive osseous lesion.  No significant pathology in the visualized cervical soft tissues.     No evidence of hemodynamically significant stenosis or large vessel occlusion. Mild stenosis of the carotid bulbs bilaterally. Mild to moderate stenosis of the cavernous through communicating segments of the intracranial internal carotid arteries bilaterally. 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 Electronically signed by: Priyank Tyler Date:    07/11/2025 Time:    09:43    MRI Brain Without Contrast  Result Date: 7/11/2025  EXAMINATION: MRI BRAIN WITHOUT CONTRAST CLINICAL HISTORY: Pontine lacunar infarcts based on previous CT; TECHNIQUE: Multiplanar multisequence MR imaging of the brain was performed without contrast. COMPARISON: CT head without contrast 07/09/2025., 07/04/2025 FINDINGS: Subcentimeter focus of diffusion restriction in the left thalamus on series 3, image 17 most consistent with acute lacunar infarct.  Chronic lacunar infarcts in the left paramedian pk and bilateral thalami.  Mild chronic small-vessel ischemic changes in the supratentorial periventricular and subcortical white matter.  There is a chronic microhemorrhage in the left thalamus.  No acute intracranial hemorrhage.  No evidence of extra-axial fluid collection.  No hydrocephalus.  No herniation.  No significant intracranial mass effect.  Normal course and caliber of the major intracranial arterial flow voids. Orbits are unremarkable.  Moderate mucosal thickening of the left maxillary sinus.  Mastoid air cells are clear.     Acute lacunar infarct of the left thalamus.  Findings discussed with Dr. Beckett at 06:48 on 07/11/2025 Additional chronic ischemic changes of the brain as detailed above. Electronically signed by: Priyank Tyler Date:    07/11/2025 Time:    06:48    US Abdomen Limited  Result Date: 7/10/2025  EXAMINATION: US ABDOMEN LIMITED CLINICAL HISTORY: Quantify ascites TECHNIQUE: Limited images are performed of the abdomen to assess ascites FINDINGS: There is large ascitic fluid within right upper quadrant and the right lower quadrant of the abdomen.  There is minimal fluid visualized within left upper quadrant and the left lower quadrant of abdomen.     Large drainable ascites right upper and lower abdomen. Electronically signed by: Mat Sommers Date:    07/10/2025 Time:    18:16    CT Head  Without Contrast  Result Date: 7/9/2025  EXAMINATION: CT HEAD WITHOUT CONTRAST CLINICAL HISTORY: Mental status change, unknown cause; TECHNIQUE: CT images of the head without IV contrast. Axial, coronal and sagittal images reviewed. Dose length product 2129 mGycm. Automatic exposure control, adjustment of mA/kV or iterative reconstruction technique used to limit radiation dose. COMPARISON: CT 07/04/2025 FINDINGS: Overall evaluation mildly degraded due to motion. Extra-axial spaces/ventricular system: Normal for age. Intracranial hemorrhage: None identified. Cerebral parenchyma: No acute large vessel territory infarct or mass effect identified. Cerebellum: Normal. Sella: Normal. Included paranasal sinuses and mastoid air cells: Well-aerated. Scalp/Calvarium: No depressed skull fracture.     Mildly limited assessment with no acute intracranial process identified. Electronically signed by: John Paul Chicas Date:    07/09/2025 Time:    19:19    IR CT Guidance  Result Date: 7/7/2025  PROCEDURE: CT Guided Cholecystostomy Tube Placement & Paracentesis CLINICAL HISTORY: 64-year-old male with cholecystitis, cirrhosis, portal vein thrombus and ascites ANESTHESIA: Level of sedation: Moderate sedation Medications: 1 mg Versed IV; 50 mcg Fentanyl IV; 0 mg Ativan IV; other: None Administration: Moderate sedation was administered during this procedure. Continuous monitoring of the patient's level of consciousness and physiological status was provided throughout the procedure by an independent trained observer under the direct supervision of the operating physician. Duration of sedation: 19 minutes PHYSICIANS: Neto Bird MD RADIATION DOSE: Total DLP = 916 mGy-cm CONTRAST: None PROCEDURAL SUMMARY: After informed consent was obtained, the patient was placed supine on the CT table. A limited CT scan of the right upper quadrant of abdomen was performed to determine a safe skin entry site and route of needle passage for the drainage and  for paracentesis.  Targeting the gallbladder lumen. The skin overlying the entry site was marked. The site was then prepped and draped in the usual sterile fashion. Prior to cholecystostomy tube placement, a paracentesis was performed.  The soft tissues were anesthetized with lidocaine and a dermatotomy was performed.  Under CT guidance, a 5F Yueh sheath needle was advanced from the skin entry site towards the intra-abdominal fluid.  When the needle tip entered the fluid, serous fluid was aspirated.  The sheath was advanced over the needle into the collection and the needle was removed.  The cavity was aspirated to completion and the sheath was removed.  A total of 1.3 L was aspirated.  A sterile dressing was applied to the site. Attention was then turned back to the cholecystostomy tube.  The soft tissues were anesthetized with lidocaine and a dermatotomy was performed.  Under intermittent CT guidance, a 5F Yueh sheath needle was advanced from the skin entry site towards the gallbladder lumen.  When the needle tip entered the gallbladder, fluid was aspirated. The sheath was then advanced over the needle into the lumen and the needle was removed. An Amplatz guidewire was then advanced through the sheath and coiled within the gallbladder.  The sheath was removed and the tract was serially dilated to accommodate a 8F pigtail drainage catheter.  The catheter was advanced over the wire and into the lumen.  The wire was then removed. The [place] loop was formed. A total of 10 cc of bilious fluid was aspirated. The drainage catheter was then attached to the skin.  A sterile dressing was applied and the catheter was connected to a gravity bag.  A final set of images were obtained demonstrating the drain in appropriate position and no evidence of active hemorrhage or other injury. The patient tolerated the procedure well and was without any apparent complications.     Technically successful CT-guided cholecystostomy tube  placement and paracentesis. Electronically signed by: Neto Bird Date:    07/07/2025 Time:    17:41    CT Abdomen Pelvis w/o Contrast Plus Triphasic w/Contrast  Result Date: 7/7/2025  EXAMINATION: CT ABDOMEN PELVIS W/O CONTRAST PLUS TRIPHASIC W/CONTRAST CLINICAL HISTORY: evaluate for HCC as noted on MRCP this admit; TECHNIQUE: Low dose axial images, sagittal and coronal reformations were obtained from the lung bases to the pubic symphysis following the IV administration of  contrast.  Delayed imaging and pre contrasted imaging was performed as well. Automatic exposure control is utilized to reduce patient radiation exposure. COMPARISON: MRI dated 07/05/2025 FINDINGS: There is a right lower lobe interstitial pneumonia and patchy infiltrate developing in the left lower lobe. The liver is enlarged in size with a nodular cirrhotic contour.  There is evidence of portal vein thrombosis which is a known finding.  Some cavernous transformation is seen. There is a very subtle area of enhancement neck corresponds to a 1.3 cm nodule in the right lobe of the liver best seen on image 24 series 7.  This corresponds to the lesion seen on MRI.  The lesion appears similar to the prior examination but is too small to accurately characterize.  Other additional areas of subtle enhancement also seen in the liver for example dome of the liver image 20 series 7 and segment 4 the liver image 33 series 9 and segment 7 of the liver image 36 series 7.  The findings are nonspecific at this time.  Short-term follow-up is recommended. There is large volume ascites. The gallbladder is distended and there are multiple gallstones in the gallbladder.. Pancreas appears unremarkable. No adrenal nodules are seen. Kidneys are normal in size.  There is a nonobstructing medullary calculus in the midpole the left kidney. Abdominal aorta appears normal in caliber with some atherosclerotic plaque. Visualized portion of the bowel appears unremarkable.      Enlarged nodular cirrhotic appearing liver with couple of areas of nonspecific focal enhancement as outlined above.  Short-term follow-up is recommended. Portal vein thrombosis with cavernous transformation of the portal vein which is a known finding Distended gallbladder with multiple gallstones Large volume ascites Right lower lobe interstitial pneumonia with questionable patchy infiltrate developing in the left lower lobe Electronically signed by: Julio César Navarro Date:    07/07/2025 Time:    16:30    MRI MRCP Abdomen W WO Contrast 3D WO Independent WS XPD  Result Date: 7/5/2025  EXAMINATION: MRI MRCP ABDOMEN W WO CONTRAST 3D WO INDEPENDENT WS XPD CLINICAL HISTORY: Cholelithiasis; TECHNIQUE: Multiplanar, multisequence MR imaging of the abdomen before and after IV contrast administration.  Dedicated MRCP sequences obtained. COMPARISON: CT 07/04/2025 MRI 06/21/2025 FINDINGS: Mildly limited assessment due to overall image quality. Distended gallbladder with small gallstones.  No extrahepatic biliary dilatation or defined CBD filling defect.  Areas of mild intrahepatic biliary dilatation in the right liver.  Small volume ascites. Cirrhosis.  Continued thrombus in the main portal vein and right portal vein branches.  Heterogeneous T2 signal and enhancement in the right liver.  15 mm mildly T2 hyperintense area in the superior right liver with arterial enhancement and washout (series 1402, image 20 and series 1404, image 20).  No other measurable lesions with arterial enhancement and washout identified.  No appreciable portal vein thrombus enhancement. Normal pancreas, spleen and adrenal glands.  No enhancing renal lesion or hydronephrosis.     Challenging evaluation due to changes from underlying portal vein thrombus.  15 mm area of arterial enhancement in the superior right liver could be small HCC.  Given exam limitations, 3 month follow-up MRI can be pursued to reassess. Distended gallbladder with underlying  cholelithiasis.  Areas of mild right intrahepatic biliary dilatation.  No extrahepatic biliary dilatation identified. Electronically signed by: John Paul Chicas Date:    07/05/2025 Time:    12:16    US Abdomen Complete  Result Date: 7/4/2025  EXAMINATION: US ABDOMEN COMPLETE CLINICAL HISTORY: worse elevated bili (known cirrhosis), nargis, previously known portal vein thrombsosi; TECHNIQUE: Multiple real-time transverse and longitudinal sections were performed of the abdomen by the sonographer. Select images were submitted for review. COMPARISON: CT abdomen pelvis same date. FINDINGS: Liver is enlarged in size with maximum diameter of 20.2 cm.  Hepatic parenchyma is echogenic consistent with steatosis.. There is no delineation of discrete hepatic cystic or solid space occupying mass.  There is no flow within the portal vein which is remarkable for intraluminal 2.12 cm echogenicity consistent with thrombosis.  Pancreas obscured by overlying bowel gas.  Spleen is of unremarkable echotexture with normal size and maximum diameter of 10.2 cm. No focal splenic lesion visualized. The inferior vena cava appears unremarkable. Visualized portion of the abdominal aorta is without aneurysmal dilatation. Gallbladder is enlarged and measures 11.8 x 5.6 x 6.5 cm.  Gallbladder lumen contains sludge.  There are multiple gallstones and the largest measures 0.83 cm.  Gallbladder wall is thickened 4.7 mm.  Sonographer reported positive Chang's sign. Right kidney measures 12.5 x 5.6 x 6.0 cm and left kidney measures 11.2 x 6.2 x 5.2 cm.  Right kidney cortical volume is adequate and the corticomedullary differentiation is preserved.  There is limited visualized left kidney due to patient's body habitus.  No definite kidneys collecting system dilatation.     1. Hepatomegaly and hepatic steatosis. 2. Thrombosed main portal vein. 3.  Enlarged gallbladder containing sludge and multiple calculi.  Thickened gallbladder wall and positive Chang sign  is suspected for acute cholecystitis. Electronically signed by: Mat Sommers Date:    07/04/2025 Time:    15:40    CT Abdomen Pelvis  Without Contrast  Result Date: 7/4/2025  EXAMINATION: CT ABDOMEN PELVIS WITHOUT CONTRAST CLINICAL HISTORY: Right upper quadrant pain, elevated bilirubin, history of cirrhosis and portal vein thrombosis; TECHNIQUE: CT imaging of the abdomen and pelvis without intravenous contrast. Axial, coronal and sagittal reformatted images reviewed. Dose length product is 486 mGycm. Automatic exposure control, adjustment of mA/kV or iterative reconstruction technique used to limit radiation dose. COMPARISON: CT 06/17/2025 FINDINGS: Assessment of the visceral organs and vasculature is limited by the lack of IV contrast. Liver/biliary: Cirrhosis.  Artifact through the posterior right liver due to arm positioning with limited overall evaluation of the hepatic parenchyma given this artifact and lack of IV contrast.  Gallbladder distended with multiple gallstones.  No biliary dilatation. Pancreas: Normal. Spleen: Normal. Adrenals: Normal. Genitourinary: 7 mm left renal stone.  No hydronephrosis.  Bladder within normal limits. Stomach/bowel: No bowel obstruction. Normal appendix.  Mild proximal colonic wall thickening. Lymph nodes and peritoneum: Few borderline enlarged periportal lymph nodes similar to prior CT.  Small volume ascites. Vasculature: Numerous aortic and iliac artery calcifications. Abdominal wall: Normal. Lung bases: Mild dependent atelectasis. Bones: No acute osseous findings.     Cirrhosis with small volume ascites. Mild wall thickening of the proximal colon favored secondary to the ascites, but colitis also possible. Distended gallbladder with several gallstones, but no defined gallbladder wall thickening. Electronically signed by: John Paul Chicas Date:    07/04/2025 Time:    10:52    CT Head Without Contrast  Result Date: 7/4/2025  EXAMINATION: CT HEAD WITHOUT CONTRAST CLINICAL HISTORY:  Mental status change, unknown cause; TECHNIQUE: Sequential axial images were performed of the brain without contrast. Dose product length of 1060 mGycm. Automated exposure control was utilized to minimize radiation dose. COMPARISON: June 2, 2017. FINDINGS: There is no intracranial mass effect, midline shift, hydrocephalus or hemorrhage. There is no sulcal effacement or low attenuation changes to suggest recent large vessel territory infarction.  There is left paramedian pontine lacunar infarct which is new since the previous exam.  There is also new left internal capsule posterior limb new lacunar infarct.  Chronic microangiopathic ischemic changes are mild.  The ventricular system and sulcal markings prominence is consistent with atrophy. There is no acute extra axial fluid collection. Mucoperiosteal thickening and fluid left maxillary sinus.  Otherwise, visualized paranasal sinuses are clear without mucosal thickening, polypoidal abnormality or air-fluid levels. Mastoid air cells aeration is optimal.     1.  No acute large vessel territory infarct identified. 2.  New but nonacute appearing lacunar infarcts pk and the left internal capsule.  If clinically indicated, consideration may be given to further assessment with MRI of the brain. Electronically signed by: Mat Sommers Date:    07/04/2025 Time:    08:57    Echo  Result Date: 6/26/2025    Left Ventricle: The left ventricle is normal in size. Normal wall thickness. There is normal systolic function with a visually estimated ejection fraction of 60 - 65%.   Right Ventricle: The right ventricle is normal in size Systolic function is reduced.TAPSE is 1.5 cm.   Aortic Valve: The aortic valve is a trileaflet valve. There is aortic valve sclerosis.   IVC/SVC: Normal venous pressure at 3 mmHg.     MRI Abdomen W WO Contrast  Result Date: 6/21/2025  EXAMINATION: MRI ABDOMEN W WO CONTRAST CLINICAL HISTORY: Cirrhosis.Liver lesion, > 1cm; TECHNIQUE: Multiplanar  multisequence MRI of the abdomen performed without and with gadolinium based IV contrast . COMPARISON: CT 17 June 2025, CT 18 October 2023 FINDINGS: This exam is nondiagnostic for HCC as only delayed scans obtained secondary to patient motion and inability to hold breath. The liver is cirrhotic and there is portal vein thrombus also seen on CT.  Heterogeneous enhancement of the liver likely relates to portal vein thrombus.  No discrete suspicious lesion seen on diffusion imaging.  Small volume ascites. There are gallstones.  No significant biliary ductal dilatation.  The spleen is not significantly enlarged.  No gross abnormality pancreas or adrenals.  No hydronephrosis. No dilated bowel loops.  Abdominal aorta normal in caliber.     1. Cirrhosis with portal vein thrombus and small volume ascites. 2. Nondiagnostic exam for HCC secondary to timing of contrast.  Recommend outpatient liver mass protocol CT. Electronically signed by: Andriy Laws Date:    06/21/2025 Time:    11:19     ASSESSMENT PLAN    SOCORRO - FENA low, prerenal, possibly hepatorenal physiology.  Pt has terminal prognosis from his advanced cirrhosis, portal vein thrombosis, coagulopathy, now renal failure in setting of profound malnutrition, muscle wasting, he is not a an appropriate dialysis candidate.  He was counseled on grave prognosis.  Palliative care was discussed, he is interested in inpt hospice at [facility], will ask case mngt to contact [facility] hospice to discuss with him and his family  Anemia - Hb up 6.9 to 7.4 p  U RBC's yesterday, he is receiving additional RBC's today  Coagulopathy - INR up to 4.9, anticoagulants on hold, agree not an endoscopy candidate at this time  Cirrhosis, decompensated with ascites  Cholecystitis s/p perc cholecystostomy  NAGMA, stable         [1]   Social History  Socioeconomic History    Marital status: Single   Tobacco Use    Smoking status: Some Days     Types: Cigarettes    Smokeless tobacco: Never    Substance and Sexual Activity    Alcohol use: Yes     Comment: daily    Drug use: Not Currently     Social Drivers of Health     Financial Resource Strain: Low Risk  (7/3/2025)    Overall Financial Resource Strain (CARDIA)     Difficulty of Paying Living Expenses: Not hard at all   Food Insecurity: No Food Insecurity (7/3/2025)    Hunger Vital Sign     Worried About Running Out of Food in the Last Year: Never true     Ran Out of Food in the Last Year: Never true   Transportation Needs: No Transportation Needs (7/3/2025)    PRAPARE - Transportation     Lack of Transportation (Medical): No     Lack of Transportation (Non-Medical): No   Stress: No Stress Concern Present (7/3/2025)    [place] [employer] - Occupational Stress Questionnaire     Feeling of Stress : Not at all   Housing Stability: Low Risk  (7/3/2025)    Housing Stability Vital Sign     Unable to Pay for Housing in the Last Year: No     Number of Times Moved in the Last Year: 0     Homeless in the Last Year: No

## 2025-07-19 NOTE — PLAN OF CARE
Problem: Adult Inpatient Plan of Care  Goal: Plan of Care Review  Outcome: Progressing  Flowsheets (Taken 7/18/2025 1924)  Plan of Care Reviewed With: patient  Goal: Patient-Specific Goal (Individualized)  Outcome: Progressing  Goal: Absence of Hospital-Acquired Illness or Injury  Outcome: Progressing  Intervention: Identify and Manage Fall Risk  Flowsheets (Taken 7/18/2025 1924)  Safety Promotion/Fall Prevention:   assistive device/personal item within reach   side rails raised x 2   medications reviewed   instructed to call staff for mobility   lighting adjusted   nonskid shoes/socks when out of bed  Intervention: Prevent Skin Injury  Flowsheets (Taken 7/18/2025 1924)  Body Position: position changed independently  Skin Protection: incontinence pads utilized  Device Skin Pressure Protection: absorbent pad utilized/changed  Intervention: Prevent and Manage VTE (Venous Thromboembolism) Risk  Flowsheets (Taken 7/18/2025 1924)  VTE Prevention/Management:   fluids promoted   ambulation promoted   bleeding risk assessed   dorsiflexion/plantar flexion performed  Intervention: Prevent Infection  Flowsheets (Taken 7/18/2025 1924)  Infection Prevention:   rest/sleep promoted   single patient room provided   hand hygiene promoted  Goal: Optimal Comfort and Wellbeing  Outcome: Progressing  Intervention: Monitor Pain and Promote Comfort  Flowsheets (Taken 7/18/2025 1924)  Pain Management Interventions:   cold applied   position adjusted   pillow support provided   medication offered   care clustered   quiet environment facilitated  Intervention: Provide Person-Centered Care  Flowsheets (Taken 7/18/2025 1924)  Trust Relationship/Rapport:   care explained   choices provided   emotional support provided   empathic listening provided   questions answered   questions encouraged   thoughts/feelings acknowledged   reassurance provided  Goal: Readiness for Transition of Care  Outcome: Progressing     Problem: Skin Injury Risk  Increased  Goal: Skin Health and Integrity  Outcome: Progressing  Intervention: Optimize Skin Protection  Flowsheets (Taken 7/18/2025 1924)  Pressure Reduction Techniques: frequent weight shift encouraged  Pressure Reduction Devices: positioning supports utilized  Skin Protection: incontinence pads utilized  Activity Management: Rolling - L1  Head of Bed (HOB) Positioning: HOB at 20-30 degrees     Problem: Coping Ineffective  Goal: Effective Coping  Outcome: Progressing  Intervention: Support and Enhance Coping Strategies  Flowsheets (Taken 7/18/2025 1924)  Supportive Measures:   active listening utilized   decision-making supported   self-care encouraged  Family/Support System Care: self-care encouraged  Environmental Support: calm environment promoted     Problem: Wound  Goal: Optimal Coping  Outcome: Progressing  Intervention: Support Patient and Family Response  Flowsheets (Taken 7/18/2025 1924)  Supportive Measures:   active listening utilized   decision-making supported   self-care encouraged  Family/Support System Care: self-care encouraged  Goal: Optimal Functional Ability  Outcome: Progressing  Intervention: Optimize Functional Ability  Flowsheets (Taken 7/18/2025 1924)  Activity Management: Rolling - L1  Goal: Absence of Infection Signs and Symptoms  Outcome: Progressing  Intervention: Prevent or Manage Infection  Flowsheets (Taken 7/18/2025 1924)  Infection Management: aseptic technique maintained  Goal: Improved Oral Intake  Outcome: Progressing  Goal: Optimal Pain Control and Function  Outcome: Progressing  Intervention: Prevent or Manage Pain  Flowsheets (Taken 7/18/2025 1924)  Sleep/Rest Enhancement: awakenings minimized  Pain Management Interventions:   cold applied   position adjusted   pillow support provided   medication offered   care clustered   quiet environment facilitated  Goal: Skin Health and Integrity  Outcome: Progressing  Intervention: Optimize Skin Protection  Flowsheets (Taken  7/18/2025 1924)  Pressure Reduction Techniques: frequent weight shift encouraged  Pressure Reduction Devices: positioning supports utilized  Skin Protection: incontinence pads utilized  Activity Management: Rolling - L1  Head of Bed (HOB) Positioning: HOB at 20-30 degrees  Goal: Optimal Wound Healing  Outcome: Progressing  Intervention: Promote Wound Healing  Flowsheets (Taken 7/18/2025 1924)  Sleep/Rest Enhancement: awakenings minimized     Problem: Suicide Risk  Goal: Absence of Self-Harm  Outcome: Progressing  Intervention: Promote Psychosocial Wellbeing  Flowsheets (Taken 7/18/2025 1924)  Sleep/Rest Enhancement: awakenings minimized  Supportive Measures:   active listening utilized   decision-making supported   self-care encouraged  Family/Support System Care: self-care encouraged     Problem: Infection  Goal: Absence of Infection Signs and Symptoms  Outcome: Progressing  Intervention: Prevent or Manage Infection  Flowsheets (Taken 7/18/2025 1924)  Infection Management: aseptic technique maintained     Problem: Stroke, Ischemic (Includes Transient Ischemic Attack)  Goal: Optimal Coping  Outcome: Progressing  Intervention: Support Psychosocial Response to Stroke  Flowsheets (Taken 7/18/2025 1924)  Supportive Measures:   active listening utilized   decision-making supported   self-care encouraged  Family/Support System Care: self-care encouraged  Goal: Effective Bowel Elimination  Outcome: Progressing  Goal: Optimal Cerebral Tissue Perfusion  Outcome: Progressing  Intervention: Protect and Optimize Cerebral Perfusion  Flowsheets (Taken 7/18/2025 1924)  Sensory Stimulation Regulation: quiet environment promoted  Cerebral Perfusion Promotion: blood pressure monitored  Goal: Optimal Cognitive Function  Outcome: Progressing  Intervention: Optimize Cognitive Function  Flowsheets (Taken 7/18/2025 1924)  Sensory Stimulation Regulation: quiet environment promoted  Goal: Improved Communication Skills  Outcome:  Progressing  Intervention: Optimize Communication Skills  Flowsheets (Taken 7/18/2025 1924)  Communication Enhancement Strategies: call light answered in person  Goal: Optimal Functional Ability  Outcome: Progressing  Intervention: Optimize Functional Ability  Flowsheets (Taken 7/18/2025 1924)  Self-Care Promotion: independence encouraged  Activity Management: Rolling - L1  Goal: Optimal Nutrition Intake  Outcome: Progressing  Goal: Effective Oxygenation and Ventilation  Outcome: Progressing  Intervention: Optimize Oxygenation and Ventilation  Flowsheets (Taken 7/18/2025 1924)  Head of Bed (HOB) Positioning: HOB at 20-30 degrees  Goal: Improved Sensorimotor Function  Outcome: Progressing  Intervention: Optimize Range of Motion, Motor Control and Function  Flowsheets (Taken 7/18/2025 1924)  Positioning/Transfer Devices:   pillows   in use  Intervention: Optimize Sensory and Perceptual Ability  Flowsheets (Taken 7/18/2025 1924)  Pressure Reduction Techniques: frequent weight shift encouraged  Pressure Reduction Devices: positioning supports utilized  Goal: Safe and Effective Swallow  Outcome: Progressing  Goal: Effective Urinary Elimination  Outcome: Progressing     Problem: Fall Injury Risk  Goal: Absence of Fall and Fall-Related Injury  Outcome: Progressing  Intervention: Identify and Manage Contributors  Flowsheets (Taken 7/18/2025 1924)  Self-Care Promotion: independence encouraged  Medication Review/Management: medications reviewed  Intervention: Promote Injury-Free Environment  Flowsheets (Taken 7/18/2025 1924)  Safety Promotion/Fall Prevention:   assistive device/personal item within reach   side rails raised x 2   medications reviewed   instructed to call staff for mobility   lighting adjusted   nonskid shoes/socks when out of bed

## 2025-07-19 NOTE — PROGRESS NOTES
[facility] Hospital Medicine Progress Note        Chief Complaint: Inpatient Follow-up    HPI:     64-year-old male with significant history of carpal tunnel syndrome, HTN, sciatica, portal vein thrombosis, cocaine use, chronic hep C, hepatic adenoma concerning for HCC, cirrhosis presented to the ED with complaints of progressively worsening abdominal pain for the past 2 months.  Patient was noted to have acute on chronic hyperbilirubinemia with leukocytosis.  He initially presented to outlying facility and was transferred to our hospital for higher level of care, patient was admitted to hospital medicine services, Gastroenterology, general surgery services consulted, MRCP was ordered to further evaluate worsening liver function test.  MRCP revealed findings concerning for HCC, distended gallbladder with cholelithiasis and mild intrahepatic biliary ductal dilation, evaluation was challenging secondary to underlying portal vein thrombus.  Suspicion for cholecystitis given clinical presentation.  No evidence of choledocholithiasis, no indication for ERCP per Gastroenterology, recommended anticoagulation for portal vein thrombosis.  Ultrasound with positive from Chang's sign and thickened gallbladder.  General surgery evaluated for possible cholecystitis, poor surgical candidate and therefore interventional radiology was consulted for percutaneous cholecystostomy tube placement and this was done on 7/7.  Zosyn sky, General surgery Plan to follow him in clinic as outpatient, GI planning for repeating triple phase as outpatient in 3 months and also arrange follow up with hepatology in [place].  Patient reported suicidal ideation on 07/09 and therefore psych consulted and he was placed under pec/one-to-one observation.  Previous CT with concerns for pontine/left internal capsule ischemic CVA, MRI was ordered to further evaluate.  Ultrasound abdomen ordered to quantify ascites on  07/10.  Pec and one-to-one observation continued.  Large volume ascites noted and therefore IR consulted for paracentesis on 07/11, MRI came back positive for thalamic CVA, Neurology consulted, neurology recommended to continue anticoagulation, no statin given transaminitis, pec continued as of 7/11.  Patient with no more suicidal or homicidal ideation and therefore Pec was revoked on 07/12.  INR was more than 5 on 07/12, Xarelto held, no overt bleeding, trended down to 3.7 on 07/13.  INR trended down to less than 2 on 07/14 and therefore Xarelto resumed    Interval Hx:   Patient seen and examined by bedside.  Supratherapeutic INR noted along with melena this morning.  Continues to not eat and drink as much.  Cachectic; discussed renal function, patient not being a good candidate for dialysis.     Objective/physical exam:  General: In no acute distress, afebrile, cachectic  Chest: Clear to auscultation bilaterally  Heart: S1, S2, no appreciable murmur  Abdomen: Soft, nontender, BS +  MSK: Warm, no lower extremity edema, no clubbing or cyanosis  Neurologic: Alert and oriented x4, moving all extremities with good strength     VITAL SIGNS: 24 HRS MIN & MAX LAST   Temp  Min: 97.7 °F (36.5 °C)  Max: 98.3 °F (36.8 °C) 97.7 °F (36.5 °C)   BP  Min: 107/53  Max: 133/73 122/68   Pulse  Min: 77  Max: 92  77   Resp  Min: 15  Max: 18 16   SpO2  Min: 96 %  Max: 100 % 99 %       Recent Labs   Lab 07/18/25  0434 07/19/25  0542   WBC 10.77  --    RBC 2.23*  --    HGB 6.9* 7.4*   HCT 21.1* 23.2*   MCV 94.6*  --    MCH 30.9  --    MCHC 32.7*  --    RDW 16.4  --      --    MPV 9.4  --          Recent Labs   Lab 07/19/25  0542   *   K 5.1      CO2 20*   BUN 69.5*   CREATININE 5.14*   GLU 72*   CALCIUM 7.3*   ALBUMIN 1.4*   PROT 8.0*   ALKPHOS 206*   *   *   BILITOT 2.5*          Microbiology Results (last 7 days)       ** No results found for the last 168 hours. **             Scheduled Med:    ARIPiprazole  10 mg Oral Daily    EScitalopram oxalate  20 mg Oral Daily    folic acid  1 mg Oral Daily    lactulose 10 gram/15 ml  15 g Oral Daily    nicotine  1 patch Transdermal Daily    pantoprazole  40 mg Intravenous BID    phytonadione (vitamin K1)  5 mg Oral Once    thiamine  100 mg Oral Daily      Patient meets ASPEN criteria for moderate malnutrition of chronic illness per RD assessment as evidenced by:  Energy Intake (Malnutrition): other (see comments) (Unable to assess)  Weight Loss (Malnutrition): greater than 7.5% in 3 months  Subcutaneous Fat (Malnutrition): mild depletion  Muscle Mass (Malnutrition): mild depletion  Fluid Accumulation (Malnutrition): other (see comments) (Not present)        A minimum of two characteristics is recommended for diagnosis of either severe or non-severe malnutrition.      Assessment/Plan:    Acute ischemic CVA -left thalamus  Positive bubble study  Suicidal ideation placed under pec 7/9, revoked 7/12  Symptomatic cholelithiasis with suspected cholecystitis status post CT-guided percutaneous cholecystostomy tube placement 7/7  Sepsis secondary to above-improving   Acute on chronic hyperbilirubinemia with transaminitis-slowly improving  Decompensated cirrhosis with large volume recurrent ascites status post paracentesis 7/11, removed 2.2 L, reaccumulating now  Supratherapeutic INR secondary to advanced liver disease and Xarelto  Hypervolemic hyponatremia   Mild metabolic acidosis  Portal vein thrombosis  Suspected HCC  Suspected bilateral pneumonia-HA P  Substance use disorder  Chronic hep C   History of essential HTN  Sciatica   History of CTS   Physical deconditioning  Cancer related fatigue      Evaluated by Neurology for left thalamic CVA and recommended to continue Xarelto which he was on for portal vein thrombosis   No statin given transaminitis  No large vessel occlusion  Bubble study has come back positive, Arrange outpatient follow up with Cardiology for  OLAMIDE  Lexapro increased to 20 mg.  Continue Abilify  Psych team is okay to cancel pec, revoked 7/12  For cholecystitis needing cholecystostomy tube placement, patient was deemed poor surgical candidate   Zosyn was discontinued on 07/17 as patient has received more than 2 weeks of antibiotic therapy and has had normal white count along with remaining afebrile.  Cultures have all been negative so far  Bloody output through cholecystostomy and output is also increased most likely secondary to leaking ascitic fluid  Reconsulted general surgery, no plans for intervention  Plan for outpatient cholangiogram  Hemoglobin slightly better HGB 7.4 after 1 unit of blood yesterday.  Noted to have melena today along with supratherapeutic INR at 8.6  Give 1 time dose of Kcentra.  Repeat INR  We will also give 1 additional unit of PRBC  GI was consulted, recommends no intervention at this time  Underwent paracentesis for large volume ascites on 07/11, reaccumulating quickly   Renal function continues to be worsen.  Urine studies were obtained yesterday and indicate prerenal etiology.  Hepatorenal could also be playing a factor.  Nephrology consulted and recommended hospice.  Patient is poor candidate for dialysis  May need IR to place PleurX catheter for comfort measures  Patient appears to be agreeable with Renal for hospice measures   Case management consulted for inpatient hospice  Hyperbilirubinemia with transaminitis noted, ej  Gastroenterology signed off  Planning for hepatology referral to [place] and also to repeat triple phase scan in 3 months  Continue aripiprazole, citalopram, folic acid, nicotine patch, ppi and thiamine   Continue lactulose to prevent hepatic encephalopathy  Latest ammonia within normal limits  Patient has very poor prognosis  DVT prophylaxis: scds    Critical care note:  Critical care diagnosis:  Acute blood loss anemia requiring PRBC and supratherapeutic INR requiring Kcentra  Critical care  interventions: Hands-on evaluation, review of labs/radiographs/records and discussion with patient and family if present  Critical care time spent: 35 minutes        Xiomy Griggs DO  Department of Hospital Medicine  [facility]  07/19/2025

## 2025-07-20 LAB
ALBUMIN SERPL-MCNC: 1.4 G/DL (ref 3.4–4.8)
ALBUMIN/GLOB SERPL: 0.2 RATIO (ref 1.1–2)
ALP SERPL-CCNC: 216 UNIT/L (ref 40–150)
ALT SERPL-CCNC: 405 UNIT/L (ref 0–55)
ANION GAP SERPL CALC-SCNC: 8 MEQ/L
AST SERPL-CCNC: 340 UNIT/L (ref 11–45)
BILIRUB SERPL-MCNC: 3.5 MG/DL
BUN SERPL-MCNC: 70.8 MG/DL (ref 8.4–25.7)
CALCIUM SERPL-MCNC: 7.4 MG/DL (ref 8.8–10)
CHLORIDE SERPL-SCNC: 104 MMOL/L (ref 98–107)
CO2 SERPL-SCNC: 21 MMOL/L (ref 23–31)
CREAT SERPL-MCNC: 4.62 MG/DL (ref 0.72–1.25)
CREAT/UREA NIT SERPL: 15
ERYTHROCYTE [DISTWIDTH] IN BLOOD BY AUTOMATED COUNT: 21.1 % (ref 11.5–17)
GFR SERPLBLD CREATININE-BSD FMLA CKD-EPI: 13 ML/MIN/1.73/M2
GLOBULIN SER-MCNC: 6.3 GM/DL (ref 2.4–3.5)
GLUCOSE SERPL-MCNC: 70 MG/DL (ref 82–115)
HCT VFR BLD AUTO: 27.8 % (ref 42–52)
HGB BLD-MCNC: 9.1 G/DL (ref 14–18)
INR PPP: 2.4
MCH RBC QN AUTO: 28.1 PG (ref 27–31)
MCHC RBC AUTO-ENTMCNC: 32.7 G/DL (ref 33–36)
MCV RBC AUTO: 85.8 FL (ref 80–94)
NRBC BLD AUTO-RTO: 0 %
PLATELET # BLD AUTO: 205 X10(3)/MCL (ref 130–400)
PMV BLD AUTO: 8.9 FL (ref 7.4–10.4)
POTASSIUM SERPL-SCNC: 4.7 MMOL/L (ref 3.5–5.1)
PROT SERPL-MCNC: 7.7 GM/DL (ref 5.8–7.6)
PROTHROMBIN TIME: 25.6 SECONDS (ref 12.5–14.5)
RBC # BLD AUTO: 3.24 X10(6)/MCL (ref 4.7–6.1)
SODIUM SERPL-SCNC: 133 MMOL/L (ref 136–145)
WBC # BLD AUTO: 14.48 X10(3)/MCL (ref 4.5–11.5)

## 2025-07-20 PROCEDURE — 80053 COMPREHEN METABOLIC PANEL: CPT | Performed by: STUDENT IN AN ORGANIZED HEALTH CARE EDUCATION/TRAINING PROGRAM

## 2025-07-20 PROCEDURE — 25000003 PHARM REV CODE 250: Performed by: HOSPITALIST

## 2025-07-20 PROCEDURE — P9047 ALBUMIN (HUMAN), 25%, 50ML: HCPCS | Performed by: INTERNAL MEDICINE

## 2025-07-20 PROCEDURE — 25000003 PHARM REV CODE 250: Performed by: INTERNAL MEDICINE

## 2025-07-20 PROCEDURE — 25000003 PHARM REV CODE 250: Performed by: STUDENT IN AN ORGANIZED HEALTH CARE EDUCATION/TRAINING PROGRAM

## 2025-07-20 PROCEDURE — 63600175 PHARM REV CODE 636 W HCPCS: Performed by: STUDENT IN AN ORGANIZED HEALTH CARE EDUCATION/TRAINING PROGRAM

## 2025-07-20 PROCEDURE — 85027 COMPLETE CBC AUTOMATED: CPT | Performed by: STUDENT IN AN ORGANIZED HEALTH CARE EDUCATION/TRAINING PROGRAM

## 2025-07-20 PROCEDURE — 63600175 PHARM REV CODE 636 W HCPCS: Performed by: INTERNAL MEDICINE

## 2025-07-20 PROCEDURE — 36415 COLL VENOUS BLD VENIPUNCTURE: CPT | Performed by: STUDENT IN AN ORGANIZED HEALTH CARE EDUCATION/TRAINING PROGRAM

## 2025-07-20 PROCEDURE — 85610 PROTHROMBIN TIME: CPT | Performed by: STUDENT IN AN ORGANIZED HEALTH CARE EDUCATION/TRAINING PROGRAM

## 2025-07-20 PROCEDURE — 11000001 HC ACUTE MED/SURG PRIVATE ROOM

## 2025-07-20 PROCEDURE — S4991 NICOTINE PATCH NONLEGEND: HCPCS | Performed by: HOSPITALIST

## 2025-07-20 RX ORDER — ALBUMIN HUMAN 250 G/1000ML
25 SOLUTION INTRAVENOUS ONCE
Status: COMPLETED | OUTPATIENT
Start: 2025-07-20 | End: 2025-07-20

## 2025-07-20 RX ADMIN — PANTOPRAZOLE SODIUM 40 MG: 40 INJECTION, POWDER, FOR SOLUTION INTRAVENOUS at 10:07

## 2025-07-20 RX ADMIN — ARIPIPRAZOLE 10 MG: 5 TABLET ORAL at 10:07

## 2025-07-20 RX ADMIN — ALBUMIN (HUMAN) 25 G: 12.5 SOLUTION INTRAVENOUS at 02:07

## 2025-07-20 RX ADMIN — FOLIC ACID 1 MG: 1 TABLET ORAL at 10:07

## 2025-07-20 RX ADMIN — PANTOPRAZOLE SODIUM 40 MG: 40 INJECTION, POWDER, FOR SOLUTION INTRAVENOUS at 08:07

## 2025-07-20 RX ADMIN — NICOTINE 1 PATCH: 14 PATCH TRANSDERMAL at 10:07

## 2025-07-20 RX ADMIN — THIAMINE HCL TAB 100 MG 100 MG: 100 TAB at 10:07

## 2025-07-20 RX ADMIN — HYDROCODONE BITARTRATE AND ACETAMINOPHEN 1 TABLET: 5; 325 TABLET ORAL at 08:07

## 2025-07-20 RX ADMIN — LACTULOSE 15 G: 10 SOLUTION ORAL at 10:07

## 2025-07-20 RX ADMIN — ESCITALOPRAM OXALATE 20 MG: 10 TABLET ORAL at 10:07

## 2025-07-20 NOTE — PLAN OF CARE
Problem: Adult Inpatient Plan of Care  Goal: Plan of Care Review  7/20/2025 0456 by Tracy Burnette RN  Outcome: Progressing  Flowsheets (Taken 7/20/2025 0456)  Plan of Care Reviewed With: patient  7/19/2025 2234 by Tracy Burnette RN  Outcome: Progressing  Flowsheets (Taken 7/19/2025 2234)  Plan of Care Reviewed With: patient  Goal: Patient-Specific Goal (Individualized)  7/20/2025 0456 by Tracy Burnette RN  Outcome: Progressing  7/19/2025 2234 by Tracy Burnette RN  Outcome: Progressing  Goal: Absence of Hospital-Acquired Illness or Injury  7/20/2025 0456 by Tracy Burnette RN  Outcome: Progressing  7/19/2025 2234 by Tracy Burnette RN  Outcome: Progressing  Intervention: Identify and Manage Fall Risk  7/20/2025 0456 by Tracy Burnette RN  Flowsheets (Taken 7/20/2025 0456)  Safety Promotion/Fall Prevention:   assistive device/personal item within reach   Fall Risk signage in place   medications reviewed   side rails raised x 2  7/19/2025 2234 by Tracy Burnette RN  Flowsheets (Taken 7/19/2025 2234)  Safety Promotion/Fall Prevention:   assistive device/personal item within reach   Fall Risk signage in place   /camera at bedside   medications reviewed   side rails raised x 3  Intervention: Prevent Skin Injury  7/20/2025 0456 by Tracy Burnette RN  Flowsheets (Taken 7/20/2025 0456)  Body Position: position changed independently  Skin Protection: transparent dressing maintained  Device Skin Pressure Protection: absorbent pad utilized/changed  7/19/2025 2234 by Tracy Burnette RN  Flowsheets (Taken 7/19/2025 2234)  Body Position: position changed independently  Skin Protection:   transparent dressing maintained   incontinence pads utilized  Device Skin Pressure Protection: absorbent pad utilized/changed  Intervention: Prevent and Manage VTE (Venous Thromboembolism) Risk  7/20/2025 0456 by Tracy Burnette RN  Flowsheets (Taken 7/20/2025 0456)  VTE  Prevention/Management:   bleeding risk assessed   fluids promoted  7/19/2025 2234 by Tracy Burnette RN  Flowsheets (Taken 7/19/2025 2234)  VTE Prevention/Management:   bleeding precautions maintained   bleeding risk assessed   fluids promoted   intravenous hydration  Intervention: Prevent Infection  7/20/2025 0456 by Tracy Burnette RN  Flowsheets (Taken 7/20/2025 0456)  Infection Prevention:   hand hygiene promoted   rest/sleep promoted  7/19/2025 2234 by Tracy Burnette RN  Flowsheets (Taken 7/19/2025 2234)  Infection Prevention:   rest/sleep promoted   hand hygiene promoted  Goal: Optimal Comfort and Wellbeing  7/20/2025 0456 by Tracy Burnette RN  Outcome: Progressing  7/19/2025 2234 by Tracy Burnette RN  Outcome: Progressing  Intervention: Monitor Pain and Promote Comfort  7/20/2025 0456 by Tracy Burnette RN  Flowsheets (Taken 7/20/2025 0456)  Pain Management Interventions:   care clustered   medication offered   relaxation techniques promoted   quiet environment facilitated  7/19/2025 2234 by Tracy Burnette RN  Flowsheets (Taken 7/19/2025 2234)  Pain Management Interventions:   medication offered   care clustered   relaxation techniques promoted   quiet environment facilitated  Intervention: Provide Person-Centered Care  7/20/2025 0456 by Tracy Burnette RN  Flowsheets (Taken 7/20/2025 0456)  Trust Relationship/Rapport:   care explained   thoughts/feelings acknowledged  7/19/2025 2234 by Tracy Burnette RN  Flowsheets (Taken 7/19/2025 2234)  Trust Relationship/Rapport:   care explained   thoughts/feelings acknowledged  Goal: Readiness for Transition of Care  7/20/2025 0456 by Tracy Burnette RN  Outcome: Progressing  7/19/2025 2234 by Tracy Burnette RN  Outcome: Progressing     Problem: Skin Injury Risk Increased  Goal: Skin Health and Integrity  7/20/2025 0456 by Tracy Burnette RN  Outcome: Progressing  7/19/2025 2234 by Tracy Burnette RN  Outcome:  Progressing  Intervention: Optimize Skin Protection  7/20/2025 0456 by Tracy Burnette RN  Flowsheets (Taken 7/20/2025 0456)  Pressure Reduction Techniques: frequent weight shift encouraged  Pressure Reduction Devices: positioning supports utilized  Skin Protection: transparent dressing maintained  Activity Management:   Rolling - L1   Leg kicks - L2   Arm raise - L1  Head of Bed (HOB) Positioning: HOB elevated  7/19/2025 2234 by Tracy Burnette RN  Flowsheets (Taken 7/19/2025 2234)  Pressure Reduction Techniques: frequent weight shift encouraged  Pressure Reduction Devices: positioning supports utilized  Skin Protection:   transparent dressing maintained   incontinence pads utilized  Activity Management:   Rolling - L1   Leg kicks - L2   Arm raise - L1  Head of Bed (HOB) Positioning: HOB at 20-30 degrees  Intervention: Promote and Optimize Oral Intake  7/20/2025 0456 by Tracy Burnette RN  Flowsheets (Taken 7/20/2025 0456)  Oral Nutrition Promotion: rest periods promoted  7/19/2025 2234 by Tracy Burnette RN  Flowsheets (Taken 7/19/2025 2234)  Oral Nutrition Promotion: rest periods promoted     Problem: Coping Ineffective  Goal: Effective Coping  7/20/2025 0456 by Tracy Burnette RN  Outcome: Progressing  7/19/2025 2234 by Tracy Burnette RN  Outcome: Progressing  Intervention: Support and Enhance Coping Strategies  7/20/2025 0456 by Tracy Burnette RN  Flowsheets (Taken 7/20/2025 0456)  Supportive Measures:   active listening utilized   verbalization of feelings encouraged   relaxation techniques promoted  Family/Support System Care: self-care encouraged  Environmental Support: calm environment promoted  7/19/2025 2234 by Tracy Burnette RN  Flowsheets (Taken 7/19/2025 2234)  Supportive Measures:   active listening utilized   verbalization of feelings encouraged   relaxation techniques promoted  Family/Support System Care: self-care encouraged  Environmental Support: calm environment  promoted     Problem: Wound  Goal: Optimal Coping  7/20/2025 0456 by Tracy Burnette RN  Outcome: Progressing  7/19/2025 2234 by Tracy Burnette RN  Outcome: Progressing  Intervention: Support Patient and Family Response  7/20/2025 0456 by Tracy Burnette RN  Flowsheets (Taken 7/20/2025 0456)  Supportive Measures:   active listening utilized   verbalization of feelings encouraged   relaxation techniques promoted  Family/Support System Care: self-care encouraged  7/19/2025 2234 by Tracy Burnette RN  Flowsheets (Taken 7/19/2025 2234)  Supportive Measures:   active listening utilized   verbalization of feelings encouraged   relaxation techniques promoted  Family/Support System Care: self-care encouraged  Goal: Optimal Functional Ability  7/20/2025 0456 by Tracy Burnette RN  Outcome: Progressing  7/19/2025 2234 by Tracy Burnette RN  Outcome: Progressing  Intervention: Optimize Functional Ability  7/20/2025 0456 by Tracy Burnette RN  Flowsheets (Taken 7/20/2025 0456)  Assistive Device Utilized: walker  Activity Management:   Rolling - L1   Leg kicks - L2   Arm raise - L1  Activity Assistance Provided: assistance, 1 person  7/19/2025 2234 by Tracy Burnette RN  Flowsheets (Taken 7/19/2025 2234)  Assistive Device Utilized: walker  Activity Management:   Rolling - L1   Leg kicks - L2   Arm raise - L1  Activity Assistance Provided: assistance, 1 person  Goal: Absence of Infection Signs and Symptoms  7/20/2025 0456 by Tracy Burnette RN  Outcome: Progressing  7/19/2025 2234 by Tracy Burnette RN  Outcome: Progressing  Intervention: Prevent or Manage Infection  7/20/2025 0456 by Tracy Burnette RN  Flowsheets (Taken 7/20/2025 0456)  Infection Management: aseptic technique maintained  Isolation Precautions: precautions maintained  7/19/2025 2234 by Tracy Burnette RN  Flowsheets (Taken 7/19/2025 2234)  Infection Management: aseptic technique maintained  Isolation Precautions: precautions  maintained  Goal: Improved Oral Intake  7/20/2025 0456 by Tracy Burnette RN  Outcome: Progressing  7/19/2025 2234 by Tracy Burnette RN  Outcome: Progressing  Intervention: Promote and Optimize Oral Intake  7/20/2025 0456 by Tracy Burnette RN  Flowsheets (Taken 7/20/2025 0456)  Oral Nutrition Promotion: rest periods promoted  7/19/2025 2234 by Tracy Burnette RN  Flowsheets (Taken 7/19/2025 2234)  Oral Nutrition Promotion: rest periods promoted  Goal: Optimal Pain Control and Function  7/20/2025 0456 by Tracy Burnette RN  Outcome: Progressing  7/19/2025 2234 by Tracy Burnette RN  Outcome: Progressing  Intervention: Prevent or Manage Pain  7/20/2025 0456 by Tracy Burnette RN  Flowsheets (Taken 7/20/2025 0456)  Sleep/Rest Enhancement:   awakenings minimized   relaxation techniques promoted  Pain Management Interventions:   care clustered   medication offered   relaxation techniques promoted   quiet environment facilitated  7/19/2025 2234 by Tracy Burnette RN  Flowsheets (Taken 7/19/2025 2234)  Sleep/Rest Enhancement:   awakenings minimized   relaxation techniques promoted  Pain Management Interventions:   medication offered   care clustered   relaxation techniques promoted   quiet environment facilitated  Goal: Skin Health and Integrity  7/20/2025 0456 by Tracy Burnette RN  Outcome: Progressing  7/19/2025 2234 by Tracy Burnette RN  Outcome: Progressing  Intervention: Optimize Skin Protection  7/20/2025 0456 by Tracy Burnette RN  Flowsheets (Taken 7/20/2025 0456)  Pressure Reduction Techniques: frequent weight shift encouraged  Pressure Reduction Devices: positioning supports utilized  Skin Protection: transparent dressing maintained  Activity Management:   Rolling - L1   Leg kicks - L2   Arm raise - L1  Head of Bed (HOB) Positioning: HOB elevated  7/19/2025 2234 by Tracy Burnette RN  Flowsheets (Taken 7/19/2025 2234)  Pressure Reduction Techniques: frequent weight shift  encouraged  Pressure Reduction Devices: positioning supports utilized  Skin Protection:   transparent dressing maintained   incontinence pads utilized  Activity Management:   Rolling - L1   Leg kicks - L2   Arm raise - L1  Head of Bed (HOB) Positioning: HOB at 20-30 degrees  Goal: Optimal Wound Healing  7/20/2025 0456 by Tracy Burnette RN  Outcome: Progressing  7/19/2025 2234 by Tracy Burnette RN  Outcome: Progressing  Intervention: Promote Wound Healing  7/20/2025 0456 by Tracy Burnette RN  Flowsheets (Taken 7/20/2025 0456)  Sleep/Rest Enhancement:   awakenings minimized   relaxation techniques promoted  7/19/2025 2234 by Tracy Burnette RN  Flowsheets (Taken 7/19/2025 2234)  Sleep/Rest Enhancement:   awakenings minimized   relaxation techniques promoted     Problem: Suicide Risk  Goal: Absence of Self-Harm  7/20/2025 0456 by Tracy Burnette RN  Outcome: Progressing  7/19/2025 2234 by Tracy Burnette RN  Outcome: Progressing  Intervention: Assess Risk to Self and Maintain Safety  7/20/2025 0456 by Tracy Burnette RN  Flowsheets (Taken 7/20/2025 0456)  Enhanced Safety Measures:  at bedside  Self-Harm Prevention: environmental self-harm risks assessed  7/19/2025 2234 by Tracy Burnette RN  Flowsheets (Taken 7/19/2025 2234)  Enhanced Safety Measures:  at bedside  Self-Harm Prevention: environmental self-harm risks assessed  Intervention: Promote Psychosocial Wellbeing  7/20/2025 0456 by Tracy Burnette RN  Flowsheets (Taken 7/20/2025 0456)  Sleep/Rest Enhancement:   awakenings minimized   relaxation techniques promoted  Supportive Measures:   active listening utilized   verbalization of feelings encouraged   relaxation techniques promoted  Family/Support System Care: self-care encouraged  7/19/2025 2234 by Tracy Burnette RN  Flowsheets (Taken 7/19/2025 2234)  Sleep/Rest Enhancement:   awakenings minimized   relaxation techniques promoted  Supportive  Measures:   active listening utilized   verbalization of feelings encouraged   relaxation techniques promoted  Family/Support System Care: self-care encouraged     Problem: Infection  Goal: Absence of Infection Signs and Symptoms  7/20/2025 0456 by Tracy Burnette RN  Outcome: Progressing  7/19/2025 2234 by Tracy Burnette RN  Outcome: Progressing  Intervention: Prevent or Manage Infection  7/20/2025 0456 by Tracy Burnette RN  Flowsheets (Taken 7/20/2025 0456)  Infection Management: aseptic technique maintained  Isolation Precautions: precautions maintained  7/19/2025 2234 by Tracy Burnette RN  Flowsheets (Taken 7/19/2025 2234)  Infection Management: aseptic technique maintained  Isolation Precautions: precautions maintained     Problem: Stroke, Ischemic (Includes Transient Ischemic Attack)  Goal: Optimal Coping  7/20/2025 0456 by Tracy Burnette RN  Outcome: Progressing  7/19/2025 2234 by Tracy Burnette RN  Outcome: Progressing  Intervention: Support Psychosocial Response to Stroke  7/20/2025 0456 by Tracy Burnette RN  Flowsheets (Taken 7/20/2025 0456)  Supportive Measures:   active listening utilized   verbalization of feelings encouraged   relaxation techniques promoted  Family/Support System Care: self-care encouraged  7/19/2025 2234 by Tracy Burnette RN  Flowsheets (Taken 7/19/2025 2234)  Supportive Measures:   active listening utilized   verbalization of feelings encouraged   relaxation techniques promoted  Family/Support System Care: self-care encouraged  Goal: Effective Bowel Elimination  7/20/2025 0456 by Tracy Burnette RN  Outcome: Progressing  7/19/2025 2234 by Tracy Burnette RN  Outcome: Progressing  Goal: Optimal Cerebral Tissue Perfusion  7/20/2025 0456 by Tracy Burnette RN  Outcome: Progressing  7/19/2025 2234 by Tracy Burnette RN  Outcome: Progressing  Intervention: Protect and Optimize Cerebral Perfusion  7/20/2025 0456 by Tracy Burnette  RN  Flowsheets (Taken 7/20/2025 0456)  Sensory Stimulation Regulation: quiet environment promoted  Cerebral Perfusion Promotion: blood pressure monitored  7/19/2025 2234 by Tracy Burnette RN  Flowsheets (Taken 7/19/2025 2234)  Sensory Stimulation Regulation: quiet environment promoted  Cerebral Perfusion Promotion: blood pressure monitored  Goal: Optimal Cognitive Function  7/20/2025 0456 by Tracy Burnette RN  Outcome: Progressing  7/19/2025 2234 by Tracy Burnette RN  Outcome: Progressing  Intervention: Optimize Cognitive Function  7/20/2025 0456 by Tracy Burnette RN  Flowsheets (Taken 7/20/2025 0456)  Sensory Stimulation Regulation: quiet environment promoted  7/19/2025 2234 by Tracy Burnette RN  Flowsheets (Taken 7/19/2025 2234)  Sensory Stimulation Regulation: quiet environment promoted  Goal: Improved Communication Skills  7/20/2025 0456 by Tracy Burnette RN  Outcome: Progressing  7/19/2025 2234 by Tracy Burnette RN  Outcome: Progressing  Intervention: Optimize Communication Skills  7/20/2025 0456 by Tracy Burnette RN  Flowsheets (Taken 7/20/2025 0456)  Communication Enhancement Strategies: call light answered in person  7/19/2025 2234 by Tracy Burnette RN  Flowsheets (Taken 7/19/2025 2234)  Communication Enhancement Strategies: call light answered in person  Goal: Optimal Functional Ability  7/20/2025 0456 by Tracy Burnette RN  Outcome: Progressing  7/19/2025 2234 by Tracy Burnette RN  Outcome: Progressing  Intervention: Optimize Functional Ability  Flowsheets (Taken 7/20/2025 0456)  Self-Care Promotion:   independence encouraged   BADL personal objects within reach  Activity Management:   Rolling - L1   Leg kicks - L2   Arm raise - L1  Goal: Optimal Nutrition Intake  7/20/2025 0456 by Tracy Burnette RN  Outcome: Progressing  7/19/2025 2234 by Tracy Burnette RN  Outcome: Progressing  Intervention: Promote and Optimize Fluid and Food Intake  7/20/2025 0456 by  Tracy Burnette RN  Flowsheets (Taken 7/20/2025 0456)  Oral Nutrition Promotion: rest periods promoted  7/19/2025 2234 by Tracy Burnette RN  Flowsheets (Taken 7/19/2025 2234)  Oral Nutrition Promotion: rest periods promoted  Goal: Effective Oxygenation and Ventilation  7/20/2025 0456 by Tracy Burnette RN  Outcome: Progressing  7/19/2025 2234 by Tracy Burnette RN  Outcome: Progressing  Intervention: Optimize Oxygenation and Ventilation  7/20/2025 0456 by Tracy Burnette RN  Flowsheets (Taken 7/20/2025 0456)  Airway/Ventilation Management: airway patency maintained  Head of Bed (HOB) Positioning: HOB elevated  7/19/2025 2234 by Tracy Burnette RN  Flowsheets (Taken 7/19/2025 2234)  Airway/Ventilation Management: airway patency maintained  Head of Bed (HOB) Positioning: HOB at 20-30 degrees  Goal: Improved Sensorimotor Function  7/20/2025 0456 by Tracy Burnette RN  Outcome: Progressing  7/19/2025 2234 by Tracy Burnette RN  Outcome: Progressing  Intervention: Optimize Range of Motion, Motor Control and Function  Flowsheets (Taken 7/20/2025 0456)  Positioning/Transfer Devices: pillows  Range of Motion: active ROM (range of motion) encouraged  Intervention: Optimize Sensory and Perceptual Ability  Flowsheets (Taken 7/20/2025 0456)  Pressure Reduction Techniques: frequent weight shift encouraged  Pressure Reduction Devices: positioning supports utilized  Goal: Safe and Effective Swallow  7/20/2025 0456 by Tracy Burnette RN  Outcome: Progressing  7/19/2025 2234 by Tracy Burnette RN  Outcome: Progressing  Intervention: Promote and Optimize Fluid and Food Intake  Flowsheets (Taken 7/20/2025 0456)  Aspiration Precautions:   awake/alert before oral intake   upright posture maintained  Goal: Effective Urinary Elimination  7/20/2025 0456 by Tracy Burnette RN  Outcome: Progressing  7/19/2025 2234 by Tracy Burnette RN  Outcome: Progressing     Problem: Fall Injury Risk  Goal: Absence of  Fall and Fall-Related Injury  7/20/2025 0456 by Tracy Burnette RN  Outcome: Progressing  7/19/2025 2234 by Tracy Burnette RN  Outcome: Progressing  Intervention: Identify and Manage Contributors  7/20/2025 0456 by Tracy Burnette RN  Flowsheets (Taken 7/20/2025 0456)  Self-Care Promotion:   independence encouraged   BADL personal objects within reach  Medication Review/Management: medications reviewed  7/19/2025 2234 by Tracy Burnette RN  Flowsheets (Taken 7/19/2025 2234)  Self-Care Promotion:   independence encouraged   BADL personal objects within reach  Medication Review/Management: medications reviewed  Intervention: Promote Injury-Free Environment  7/20/2025 0456 by Tracy Burnette RN  Flowsheets (Taken 7/20/2025 0456)  Safety Promotion/Fall Prevention:   assistive device/personal item within reach   Fall Risk signage in place   medications reviewed   side rails raised x 2  7/19/2025 2234 by Tracy Burnette RN  Flowsheets (Taken 7/19/2025 2234)  Safety Promotion/Fall Prevention:   assistive device/personal item within reach   Fall Risk signage in place   /camera at bedside   medications reviewed   side rails raised x 3

## 2025-07-20 NOTE — PLAN OF CARE
Problem: Adult Inpatient Plan of Care  Goal: Plan of Care Review  7/20/2025 1843 by Tracy Burnette RN  Outcome: Progressing  Flowsheets (Taken 7/20/2025 1843)  Plan of Care Reviewed With: patient  7/20/2025 0456 by Tracy Burnette RN  Outcome: Progressing  Flowsheets (Taken 7/20/2025 0456)  Plan of Care Reviewed With: patient  Goal: Patient-Specific Goal (Individualized)  7/20/2025 1843 by Tracy Burnette RN  Outcome: Progressing  7/20/2025 0456 by Tracy Burnette RN  Outcome: Progressing  Goal: Absence of Hospital-Acquired Illness or Injury  7/20/2025 1843 by Tracy Burnette RN  Outcome: Progressing  7/20/2025 0456 by Tracy Burnette RN  Outcome: Progressing  Intervention: Identify and Manage Fall Risk  7/20/2025 1843 by Tracy Burnette RN  Flowsheets (Taken 7/20/2025 1843)  Safety Promotion/Fall Prevention:   assistive device/personal item within reach   Fall Risk signage in place   medications reviewed   /camera at bedside   side rails raised x 3  7/20/2025 0456 by Tracy Burnette RN  Flowsheets (Taken 7/20/2025 0456)  Safety Promotion/Fall Prevention:   assistive device/personal item within reach   Fall Risk signage in place   medications reviewed   side rails raised x 2  Intervention: Prevent Skin Injury  7/20/2025 1843 by Tracy Burnette RN  Flowsheets (Taken 7/20/2025 1843)  Body Position: position changed independently  Skin Protection: transparent dressing maintained  Device Skin Pressure Protection:   absorbent pad utilized/changed   tubing/devices free from skin contact  7/20/2025 0456 by Tracy Burnette RN  Flowsheets (Taken 7/20/2025 0456)  Body Position: position changed independently  Skin Protection: transparent dressing maintained  Device Skin Pressure Protection: absorbent pad utilized/changed  Intervention: Prevent and Manage VTE (Venous Thromboembolism) Risk  7/20/2025 1843 by Tracy Burnette RN  Flowsheets (Taken 7/20/2025 1843)  VTE  Prevention/Management:   bleeding risk assessed   bleeding precautions maintained   fluids promoted  7/20/2025 0456 by Tracy Burnette RN  Flowsheets (Taken 7/20/2025 0456)  VTE Prevention/Management:   bleeding risk assessed   fluids promoted  Intervention: Prevent Infection  7/20/2025 1843 by Tracy Burnette RN  Flowsheets (Taken 7/20/2025 1843)  Infection Prevention:   hand hygiene promoted   rest/sleep promoted  7/20/2025 0456 by Tracy Burnette RN  Flowsheets (Taken 7/20/2025 0456)  Infection Prevention:   hand hygiene promoted   rest/sleep promoted  Goal: Optimal Comfort and Wellbeing  7/20/2025 1843 by Tracy Burnette RN  Outcome: Progressing  7/20/2025 0456 by Tracy Burnette RN  Outcome: Progressing  Intervention: Monitor Pain and Promote Comfort  7/20/2025 1843 by Tracy Burnette RN  Flowsheets (Taken 7/20/2025 1843)  Pain Management Interventions:   care clustered   medication offered   relaxation techniques promoted   quiet environment facilitated  7/20/2025 0456 by Tracy Burnette RN  Flowsheets (Taken 7/20/2025 0456)  Pain Management Interventions:   care clustered   medication offered   relaxation techniques promoted   quiet environment facilitated  Intervention: Provide Person-Centered Care  7/20/2025 1843 by Tracy Burentte RN  Flowsheets (Taken 7/20/2025 1843)  Trust Relationship/Rapport:   care explained   thoughts/feelings acknowledged  7/20/2025 0456 by Tracy Burnette RN  Flowsheets (Taken 7/20/2025 0456)  Trust Relationship/Rapport:   care explained   thoughts/feelings acknowledged  Goal: Readiness for Transition of Care  7/20/2025 1843 by Tracy Burnette RN  Outcome: Progressing  7/20/2025 0456 by Tracy Burnette RN  Outcome: Progressing     Problem: Skin Injury Risk Increased  Goal: Skin Health and Integrity  7/20/2025 1843 by Tracy Burnette RN  Outcome: Progressing  7/20/2025 0456 by Tracy Burnette RN  Outcome: Progressing  Intervention: Optimize  Skin Protection  7/20/2025 1843 by Tracy Burnette RN  Flowsheets (Taken 7/20/2025 1843)  Pressure Reduction Techniques: frequent weight shift encouraged  Pressure Reduction Devices: positioning supports utilized  Skin Protection: transparent dressing maintained  Activity Management: Rolling - L1  Head of Bed (HOB) Positioning: HOB elevated  7/20/2025 0456 by Tracy Burnette RN  Flowsheets (Taken 7/20/2025 0456)  Pressure Reduction Techniques: frequent weight shift encouraged  Pressure Reduction Devices: positioning supports utilized  Skin Protection: transparent dressing maintained  Activity Management:   Rolling - L1   Leg kicks - L2   Arm raise - L1  Head of Bed (HOB) Positioning: HOB elevated  Intervention: Promote and Optimize Oral Intake  7/20/2025 1843 by Tracy Burnette RN  Flowsheets (Taken 7/20/2025 1843)  Oral Nutrition Promotion: rest periods promoted  7/20/2025 0456 by Tracy Burnette RN  Flowsheets (Taken 7/20/2025 0456)  Oral Nutrition Promotion: rest periods promoted     Problem: Coping Ineffective  Goal: Effective Coping  7/20/2025 1843 by Tracy Burnette RN  Outcome: Progressing  7/20/2025 0456 by Tracy Burnette RN  Outcome: Progressing  Intervention: Support and Enhance Coping Strategies  7/20/2025 1843 by Tracy Burnette RN  Flowsheets (Taken 7/20/2025 1843)  Supportive Measures:   active listening utilized   verbalization of feelings encouraged   relaxation techniques promoted  Family/Support System Care: support provided  Environmental Support: calm environment promoted  7/20/2025 0456 by Trcay Burnette RN  Flowsheets (Taken 7/20/2025 0456)  Supportive Measures:   active listening utilized   verbalization of feelings encouraged   relaxation techniques promoted  Family/Support System Care: self-care encouraged  Environmental Support: calm environment promoted     Problem: Suicide Risk  Goal: Absence of Self-Harm  7/20/2025 1843 by Tracy Burnette RN  Outcome:  Progressing  7/20/2025 0456 by Tracy Burnette RN  Outcome: Progressing  Intervention: Assess Risk to Self and Maintain Safety  7/20/2025 1843 by Tracy Burnette RN  Flowsheets (Taken 7/20/2025 1843)  Enhanced Safety Measures:  at bedside  Self-Harm Prevention: environmental self-harm risks assessed  7/20/2025 0456 by Tracy Burnette RN  Flowsheets (Taken 7/20/2025 0456)  Enhanced Safety Measures:  at bedside  Self-Harm Prevention: environmental self-harm risks assessed  Intervention: Promote Psychosocial Wellbeing  7/20/2025 1843 by Tracy Burnette RN  Flowsheets (Taken 7/20/2025 1843)  Sleep/Rest Enhancement:   awakenings minimized   relaxation techniques promoted  Supportive Measures:   active listening utilized   verbalization of feelings encouraged   relaxation techniques promoted  Family/Support System Care: support provided  7/20/2025 0456 by Tracy Burnette RN  Flowsheets (Taken 7/20/2025 0456)  Sleep/Rest Enhancement:   awakenings minimized   relaxation techniques promoted  Supportive Measures:   active listening utilized   verbalization of feelings encouraged   relaxation techniques promoted  Family/Support System Care: self-care encouraged     Problem: Infection  Goal: Absence of Infection Signs and Symptoms  7/20/2025 1843 by Tracy Burnette RN  Outcome: Progressing  7/20/2025 0456 by Tracy Burnette RN  Outcome: Progressing  Intervention: Prevent or Manage Infection  7/20/2025 1843 by Tracy Burnette RN  Flowsheets (Taken 7/20/2025 1843)  Infection Management: aseptic technique maintained  Isolation Precautions: precautions maintained  7/20/2025 0456 by Tracy Burnette RN  Flowsheets (Taken 7/20/2025 0456)  Infection Management: aseptic technique maintained  Isolation Precautions: precautions maintained     Problem: Fall Injury Risk  Goal: Absence of Fall and Fall-Related Injury  7/20/2025 1843 by Tracy Burnette RN  Outcome:  Progressing  7/20/2025 0456 by Tracy Burnette RN  Outcome: Progressing  Intervention: Identify and Manage Contributors  7/20/2025 1843 by Tracy Burnette RN  Flowsheets (Taken 7/20/2025 1843)  Self-Care Promotion:   independence encouraged   BADL personal objects within reach  Medication Review/Management: medications reviewed  7/20/2025 0456 by Trcay Burnette RN  Flowsheets (Taken 7/20/2025 0456)  Self-Care Promotion:   independence encouraged   BADL personal objects within reach  Medication Review/Management: medications reviewed  Intervention: Promote Injury-Free Environment  7/20/2025 1843 by Tracy Burnette RN  Flowsheets (Taken 7/20/2025 1843)  Safety Promotion/Fall Prevention:   assistive device/personal item within reach   Fall Risk signage in place   medications reviewed   /camera at bedside   side rails raised x 3  7/20/2025 0456 by Tracy Burnette RN  Flowsheets (Taken 7/20/2025 0456)  Safety Promotion/Fall Prevention:   assistive device/personal item within reach   Fall Risk signage in place   medications reviewed   side rails raised x 2

## 2025-07-20 NOTE — PLAN OF CARE
Problem: Adult Inpatient Plan of Care  Goal: Plan of Care Review  Outcome: Progressing  Flowsheets (Taken 7/19/2025 2234)  Plan of Care Reviewed With: patient  Goal: Patient-Specific Goal (Individualized)  Outcome: Progressing  Goal: Absence of Hospital-Acquired Illness or Injury  Outcome: Progressing  Intervention: Identify and Manage Fall Risk  Flowsheets (Taken 7/19/2025 2234)  Safety Promotion/Fall Prevention:   assistive device/personal item within reach   Fall Risk signage in place   /camera at bedside   medications reviewed   side rails raised x 3  Intervention: Prevent Skin Injury  Flowsheets (Taken 7/19/2025 2234)  Body Position: position changed independently  Skin Protection:   transparent dressing maintained   incontinence pads utilized  Device Skin Pressure Protection: absorbent pad utilized/changed  Intervention: Prevent and Manage VTE (Venous Thromboembolism) Risk  Flowsheets (Taken 7/19/2025 2234)  VTE Prevention/Management:   bleeding precautions maintained   bleeding risk assessed   fluids promoted   intravenous hydration  Intervention: Prevent Infection  Flowsheets (Taken 7/19/2025 2234)  Infection Prevention:   rest/sleep promoted   hand hygiene promoted  Goal: Optimal Comfort and Wellbeing  Outcome: Progressing  Intervention: Monitor Pain and Promote Comfort  Flowsheets (Taken 7/19/2025 2234)  Pain Management Interventions:   medication offered   care clustered   relaxation techniques promoted   quiet environment facilitated  Intervention: Provide Person-Centered Care  Flowsheets (Taken 7/19/2025 2234)  Trust Relationship/Rapport:   care explained   thoughts/feelings acknowledged  Goal: Readiness for Transition of Care  Outcome: Progressing     Problem: Skin Injury Risk Increased  Goal: Skin Health and Integrity  Outcome: Progressing  Intervention: Optimize Skin Protection  Flowsheets (Taken 7/19/2025 2234)  Pressure Reduction Techniques: frequent weight shift  encouraged  Pressure Reduction Devices: positioning supports utilized  Skin Protection:   transparent dressing maintained   incontinence pads utilized  Activity Management:   Rolling - L1   Leg kicks - L2   Arm raise - L1  Head of Bed (HOB) Positioning: HOB at 20-30 degrees  Intervention: Promote and Optimize Oral Intake  Flowsheets (Taken 7/19/2025 2234)  Oral Nutrition Promotion: rest periods promoted     Problem: Coping Ineffective  Goal: Effective Coping  Outcome: Progressing  Intervention: Support and Enhance Coping Strategies  Flowsheets (Taken 7/19/2025 2234)  Supportive Measures:   active listening utilized   verbalization of feelings encouraged   relaxation techniques promoted  Family/Support System Care: self-care encouraged  Environmental Support: calm environment promoted     Problem: Wound  Goal: Optimal Coping  Outcome: Progressing  Intervention: Support Patient and Family Response  Flowsheets (Taken 7/19/2025 2234)  Supportive Measures:   active listening utilized   verbalization of feelings encouraged   relaxation techniques promoted  Family/Support System Care: self-care encouraged  Goal: Optimal Functional Ability  Outcome: Progressing  Intervention: Optimize Functional Ability  Flowsheets (Taken 7/19/2025 2234)  Assistive Device Utilized: walker  Activity Management:   Rolling - L1   Leg kicks - L2   Arm raise - L1  Activity Assistance Provided: assistance, 1 person  Goal: Absence of Infection Signs and Symptoms  Outcome: Progressing  Intervention: Prevent or Manage Infection  Flowsheets (Taken 7/19/2025 2234)  Infection Management: aseptic technique maintained  Isolation Precautions: precautions maintained  Goal: Improved Oral Intake  Outcome: Progressing  Intervention: Promote and Optimize Oral Intake  Flowsheets (Taken 7/19/2025 2234)  Oral Nutrition Promotion: rest periods promoted  Goal: Optimal Pain Control and Function  Outcome: Progressing  Intervention: Prevent or Manage Pain  Flowsheets  (Taken 7/19/2025 2234)  Sleep/Rest Enhancement:   awakenings minimized   relaxation techniques promoted  Pain Management Interventions:   medication offered   care clustered   relaxation techniques promoted   quiet environment facilitated  Goal: Skin Health and Integrity  Outcome: Progressing  Intervention: Optimize Skin Protection  Flowsheets (Taken 7/19/2025 2234)  Pressure Reduction Techniques: frequent weight shift encouraged  Pressure Reduction Devices: positioning supports utilized  Skin Protection:   transparent dressing maintained   incontinence pads utilized  Activity Management:   Rolling - L1   Leg kicks - L2   Arm raise - L1  Head of Bed (HOB) Positioning: HOB at 20-30 degrees  Goal: Optimal Wound Healing  Outcome: Progressing  Intervention: Promote Wound Healing  Flowsheets (Taken 7/19/2025 2234)  Sleep/Rest Enhancement:   awakenings minimized   relaxation techniques promoted     Problem: Suicide Risk  Goal: Absence of Self-Harm  Outcome: Progressing  Intervention: Assess Risk to Self and Maintain Safety  Flowsheets (Taken 7/19/2025 2234)  Enhanced Safety Measures:  at bedside  Self-Harm Prevention: environmental self-harm risks assessed  Intervention: Promote Psychosocial Wellbeing  Flowsheets (Taken 7/19/2025 2234)  Sleep/Rest Enhancement:   awakenings minimized   relaxation techniques promoted  Supportive Measures:   active listening utilized   verbalization of feelings encouraged   relaxation techniques promoted  Family/Support System Care: self-care encouraged     Problem: Infection  Goal: Absence of Infection Signs and Symptoms  Outcome: Progressing  Intervention: Prevent or Manage Infection  Flowsheets (Taken 7/19/2025 2234)  Infection Management: aseptic technique maintained  Isolation Precautions: precautions maintained     Problem: Stroke, Ischemic (Includes Transient Ischemic Attack)  Goal: Optimal Coping  Outcome: Progressing  Intervention: Support Psychosocial Response to  Stroke  Flowsheets (Taken 7/19/2025 2234)  Supportive Measures:   active listening utilized   verbalization of feelings encouraged   relaxation techniques promoted  Family/Support System Care: self-care encouraged  Goal: Effective Bowel Elimination  Outcome: Progressing  Goal: Optimal Cerebral Tissue Perfusion  Outcome: Progressing  Intervention: Protect and Optimize Cerebral Perfusion  Flowsheets (Taken 7/19/2025 2234)  Sensory Stimulation Regulation: quiet environment promoted  Cerebral Perfusion Promotion: blood pressure monitored  Goal: Optimal Cognitive Function  Outcome: Progressing  Intervention: Optimize Cognitive Function  Flowsheets (Taken 7/19/2025 2234)  Sensory Stimulation Regulation: quiet environment promoted  Goal: Improved Communication Skills  Outcome: Progressing  Intervention: Optimize Communication Skills  Flowsheets (Taken 7/19/2025 2234)  Communication Enhancement Strategies: call light answered in person  Goal: Optimal Functional Ability  Outcome: Progressing  Goal: Optimal Nutrition Intake  Outcome: Progressing  Intervention: Promote and Optimize Fluid and Food Intake  Flowsheets (Taken 7/19/2025 2234)  Oral Nutrition Promotion: rest periods promoted  Goal: Effective Oxygenation and Ventilation  Outcome: Progressing  Intervention: Optimize Oxygenation and Ventilation  Flowsheets (Taken 7/19/2025 2234)  Airway/Ventilation Management: airway patency maintained  Head of Bed (HOB) Positioning: HOB at 20-30 degrees  Goal: Improved Sensorimotor Function  Outcome: Progressing  Goal: Safe and Effective Swallow  Outcome: Progressing  Goal: Effective Urinary Elimination  Outcome: Progressing     Problem: Fall Injury Risk  Goal: Absence of Fall and Fall-Related Injury  Outcome: Progressing  Intervention: Identify and Manage Contributors  Flowsheets (Taken 7/19/2025 2234)  Self-Care Promotion:   independence encouraged   BADL personal objects within reach  Medication Review/Management: medications  reviewed  Intervention: Promote Injury-Free Environment  Flowsheets (Taken 7/19/2025 7622)  Safety Promotion/Fall Prevention:   assistive device/personal item within reach   Fall Risk signage in place   /camera at bedside   medications reviewed   side rails raised x 3

## 2025-07-20 NOTE — PROGRESS NOTES
Chief complaint: none    Interval History:  No acute c/o.  Family meeting to discuss hospice options.  Pt denies any acute SOB/CP/N/V though remains profoundly weak, bedbound, no appetite at all    Review of Systems   Constitutional:  Positive for appetite change and fatigue.   HENT: Negative.     Respiratory: Negative.     Cardiovascular: Negative.    Gastrointestinal:  Positive for abdominal distention.   Genitourinary: Negative.    Musculoskeletal: Negative.    Neurological:  Positive for weakness.   Psychiatric/Behavioral:  Positive for dysphoric mood.       all else Neg     ARIPiprazole  10 mg Oral Daily    EScitalopram oxalate  20 mg Oral Daily    folic acid  1 mg Oral Daily    lactulose 10 gram/15 ml  15 g Oral Daily    nicotine  1 patch Transdermal Daily    pantoprazole  40 mg Intravenous BID    thiamine  100 mg Oral Daily       Objective     VITAL SIGNS: 24 HR MIN & MAX LAST    Temp  Min: 97.5 °F (36.4 °C)  Max: 98.2 °F (36.8 °C)  97.6 °F (36.4 °C)    BP  Min: 100/52  Max: 133/73  130/71     Pulse  Min: 67  Max: 80  74     Resp  Min: 16  Max: 20  18    SpO2  Min: 96 %  Max: 100 %  97 %      Wt Readings from Last 3 Encounters:   07/05/25 54.1 kg (119 lb 4.3 oz)   07/03/25 54.4 kg (120 lb)   06/18/25 59 kg (130 lb)       Intake/Output Summary (Last 24 hours) at 7/20/2025 1009  Last data filed at 7/20/2025 0630  Gross per 24 hour   Intake 691.67 ml   Output 1175 ml   Net -483.33 ml       Physical Exam  Constitutional:       General: He is not in acute distress.     Appearance: He is ill-appearing.   Cardiovascular:      Rate and Rhythm: Normal rate.   Pulmonary:      Effort: Pulmonary effort is normal. No respiratory distress.   Abdominal:      General: Bowel sounds are normal. There is distension.      Palpations: Abdomen is soft.      Tenderness: There is no abdominal tenderness.   Musculoskeletal:         General: No swelling.      Cervical back: Normal range of motion.   Neurological:      Mental  Status: He is alert and oriented to person, place, and time.      Motor: Weakness present.   Psychiatric:         Thought Content: Thought content normal.          Recent Labs     07/18/25  0434 07/19/25  0542 07/20/25  0609   * 132* 133*   K 5.0 5.1 4.7   CO2 23 20* 21*   BUN 59.0* 69.5* 70.8*   CREATININE 4.73* 5.14* 4.62*   CALCIUM 7.6* 7.3* 7.4*   ALBUMIN 1.3* 1.4* 1.4*      Recent Labs     07/18/25  0434 07/19/25  0542 07/20/25  0609   WBC 10.77  --  14.48*   HGB 6.9*   < > 9.1*   HCT 21.1*   < > 27.8*     --  205    < > = values in this interval not displayed.         Assessment & Plan     SOCORRO - FENA low, prerenal, possibly hepatorenal physiology.  GFR improved slightly p RBC transfusion supporting prerenal etiology.  Pt has terminal prognosis from his advanced cirrhosis, portal vein thrombosis, coagulopathy, now renal failure in setting of profound malnutrition, muscle wasting, he is not a an appropriate dialysis candidate.  Palliative care was discussed, he is interested in hospice, has options for home hospice vs NH hospice.  For now will give IV albumin 25 gm which may improve renal perfusion, GFR.  VSS  Anemia - Hb up 6.9 to 9.1 p additional RBC's yesterday, no sign of active bleeding  Coagulopathy - INR improved to 2.4 after holding anticoagulants, poor candidate for anticoagulant therapy   Cirrhosis, decompensated with ascites  Cholecystitis s/p perc cholecystostomy  NAGMA, stable    Will sign off, please call for questions / concerns

## 2025-07-21 LAB
ALBUMIN SERPL-MCNC: 1.6 G/DL (ref 3.4–4.8)
ALBUMIN/GLOB SERPL: 0.3 RATIO (ref 1.1–2)
ALP SERPL-CCNC: 223 UNIT/L (ref 40–150)
ALT SERPL-CCNC: 332 UNIT/L (ref 0–55)
ANION GAP SERPL CALC-SCNC: 10 MEQ/L
AST SERPL-CCNC: 251 UNIT/L (ref 11–45)
BILIRUB SERPL-MCNC: 3.8 MG/DL
BUN SERPL-MCNC: 80.2 MG/DL (ref 8.4–25.7)
CALCIUM SERPL-MCNC: 7.7 MG/DL (ref 8.8–10)
CHLORIDE SERPL-SCNC: 103 MMOL/L (ref 98–107)
CO2 SERPL-SCNC: 20 MMOL/L (ref 23–31)
CREAT SERPL-MCNC: 4.06 MG/DL (ref 0.72–1.25)
CREAT/UREA NIT SERPL: 20
ERYTHROCYTE [DISTWIDTH] IN BLOOD BY AUTOMATED COUNT: 21.9 % (ref 11.5–17)
FUNGUS SPEC CULT: NORMAL
GFR SERPLBLD CREATININE-BSD FMLA CKD-EPI: 16 ML/MIN/1.73/M2
GLOBULIN SER-MCNC: 6.4 GM/DL (ref 2.4–3.5)
GLUCOSE SERPL-MCNC: 76 MG/DL (ref 82–115)
HCT VFR BLD AUTO: 26.3 % (ref 42–52)
HGB BLD-MCNC: 8.7 G/DL (ref 14–18)
INR PPP: 1.7
MCH RBC QN AUTO: 28.6 PG (ref 27–31)
MCHC RBC AUTO-ENTMCNC: 33.1 G/DL (ref 33–36)
MCV RBC AUTO: 86.5 FL (ref 80–94)
NRBC BLD AUTO-RTO: 0 %
PLATELET # BLD AUTO: 163 X10(3)/MCL (ref 130–400)
PMV BLD AUTO: 11.3 FL (ref 7.4–10.4)
POTASSIUM SERPL-SCNC: 5 MMOL/L (ref 3.5–5.1)
PROT SERPL-MCNC: 8 GM/DL (ref 5.8–7.6)
PROTHROMBIN TIME: 20.1 SECONDS (ref 12.5–14.5)
RBC # BLD AUTO: 3.04 X10(6)/MCL (ref 4.7–6.1)
SODIUM SERPL-SCNC: 133 MMOL/L (ref 136–145)
WBC # BLD AUTO: 15.99 X10(3)/MCL (ref 4.5–11.5)

## 2025-07-21 PROCEDURE — S4991 NICOTINE PATCH NONLEGEND: HCPCS | Performed by: HOSPITALIST

## 2025-07-21 PROCEDURE — 25000003 PHARM REV CODE 250: Performed by: HOSPITALIST

## 2025-07-21 PROCEDURE — 85027 COMPLETE CBC AUTOMATED: CPT | Performed by: STUDENT IN AN ORGANIZED HEALTH CARE EDUCATION/TRAINING PROGRAM

## 2025-07-21 PROCEDURE — 25000003 PHARM REV CODE 250: Performed by: INTERNAL MEDICINE

## 2025-07-21 PROCEDURE — 11000001 HC ACUTE MED/SURG PRIVATE ROOM

## 2025-07-21 PROCEDURE — 36415 COLL VENOUS BLD VENIPUNCTURE: CPT | Performed by: STUDENT IN AN ORGANIZED HEALTH CARE EDUCATION/TRAINING PROGRAM

## 2025-07-21 PROCEDURE — 85610 PROTHROMBIN TIME: CPT | Performed by: STUDENT IN AN ORGANIZED HEALTH CARE EDUCATION/TRAINING PROGRAM

## 2025-07-21 PROCEDURE — 25000003 PHARM REV CODE 250: Performed by: STUDENT IN AN ORGANIZED HEALTH CARE EDUCATION/TRAINING PROGRAM

## 2025-07-21 PROCEDURE — 63600175 PHARM REV CODE 636 W HCPCS: Performed by: STUDENT IN AN ORGANIZED HEALTH CARE EDUCATION/TRAINING PROGRAM

## 2025-07-21 PROCEDURE — 80053 COMPREHEN METABOLIC PANEL: CPT | Performed by: STUDENT IN AN ORGANIZED HEALTH CARE EDUCATION/TRAINING PROGRAM

## 2025-07-21 PROCEDURE — 99223 1ST HOSP IP/OBS HIGH 75: CPT | Mod: ,,,

## 2025-07-21 RX ADMIN — PANTOPRAZOLE SODIUM 40 MG: 40 INJECTION, POWDER, FOR SOLUTION INTRAVENOUS at 09:07

## 2025-07-21 RX ADMIN — LACTULOSE 15 G: 10 SOLUTION ORAL at 09:07

## 2025-07-21 RX ADMIN — ARIPIPRAZOLE 10 MG: 5 TABLET ORAL at 09:07

## 2025-07-21 RX ADMIN — NICOTINE 1 PATCH: 14 PATCH TRANSDERMAL at 09:07

## 2025-07-21 RX ADMIN — FOLIC ACID 1 MG: 1 TABLET ORAL at 09:07

## 2025-07-21 RX ADMIN — HYDROCODONE BITARTRATE AND ACETAMINOPHEN 1 TABLET: 10; 325 TABLET ORAL at 01:07

## 2025-07-21 RX ADMIN — THIAMINE HCL TAB 100 MG 100 MG: 100 TAB at 09:07

## 2025-07-21 RX ADMIN — ESCITALOPRAM OXALATE 20 MG: 10 TABLET ORAL at 09:07

## 2025-07-21 RX ADMIN — HYDROCODONE BITARTRATE AND ACETAMINOPHEN 1 TABLET: 5; 325 TABLET ORAL at 12:07

## 2025-07-21 NOTE — PLAN OF CARE
Spoke with the patient and his sisters.  The patient changed his mind and is Ok with going to the nursing home with hospice.  Informed the sisters that we will send mass referrals.

## 2025-07-21 NOTE — CONSULTS
Inpatient consult to Palliative Care  Consult performed by: Mirna Chicas NP  Consult ordered by: Xiomy Griggs DO      Patient Name: Aman Humphrey   MRN: [id number]   Admission Date: 7/3/2025   Hospital Length of Stay: 18   Attending Provider: Xiomy Griggs DO   Consulting Provider: JARROD Arizmendi  Reason for Consult: Goals of Care  Primary Care Physician: Stalin Landin DO     Principal Problem: <principal problem not specified>     Patient information was obtained from patient, relative(s), and ER records.      Final diagnoses:  [K80.50] Choledocholithiasis     Assessment/Plan:     I reviewed the patient and family's understanding of the seriousness of the illness and its expected prognosis. We discussed the patient's goals of care and treatment preferences. We discussed the difference between palliative and curative medicine. I clarified current code status. I identified the surrogate decision maker or health care POA. I answered all questions and we formulated a plan including recommendations for symptom management and how to best achieve goals of care.       Patient is resting comfortably in bed on RA. He is AAOx4. He reports some abdominal pain with sitting up, which improves with repositioning and laying back. Multiple sisters and cousins at bedside. Introduced service. Patient and family explain that he is not  and has 2 adult children. His sisters are a main support and he would elect for his sisters to make medical decisions on his behalf before his children.    Reviewed current condition, including concerns for HCC, thrombus, cirrhosis, renal dysfunction, ascites, and overall deconditioned state. Patient appears to have poor insight and leans on his sisters for significant support. Had long conversation regarding his overall prognosis with multiorgan dysfunction, difficulty with anticoagulation, and suspected HCC. Family understands he is very frail and likely not a candidate for  aggressive treatment options. Explored patient and family's goals of care. Discussed that Dmitri cheney was consulted over the weekend. Explained the role of hospice to focus on comfort, quality of life, and symptom management. Discussed options to continue aggressive treatment or seek more comfort focused approach. Family explains that Austin cheney is pending discussions after lunch today. Patient and family confirm wanting to pursue comfort focused approach given multiple comorbidities. Discussed hospice in home, in nursing home, and inpatient. Family reports the patient was intermittently homeless and do not feel they are able to care for him in home. Will await [place] meeting today, and consider nursing home with hospice if deemed not to be a candidate for inpatient services. After long discussion with patient, family, and HCPOAs at bedside, all decision makers are in favor of Do Not Resuscitate and inpatient hospice services. Discussed with nursing and attending.    Advance Care Planning     Date: 07/21/2025    Power of   I initiated the process of voluntary advance care planning today and explained the importance of this process to the patient.  I introduced the concept of advance directives to the patient, as well. Then the patient received detailed information about the importance of designating a Health Care Power of  (HCPOA). He was also instructed to communicate with this person about their wishes for future healthcare, should he become sick and lose decision-making capacity. The patient has not previously appointed a HCPOA. After our discussion, the patient has decided to complete a HCPOA and has appointed his sister, health care agent: Grisel Humphrey, Aidee Humphrey, & Christiana Jimenez & health care agent number: [phone], [phone], & [phone]. I encouraged him to communicate with this person about their wishes for future healthcare, should he become sick and  lose decision-making capacity.    Original and copies provided to patient and HCPOAs. Copies placed on chart and uploaded to Epic.    Code Status  In light of the patients advanced and life limiting illness,I engaged the the patient and healthcare power of   in a voluntary conversation about the patient's preferences for care  at the very end of life. Reviewed risks of resuscitation measures, including but not limited to chest wall injury and anoxic brain injury. The patient wishes to have a natural, peaceful death.  Along those lines, the patient does not wish to have CPR or other invasive treatments performed when his heart and/or breathing stops. I communicated to the patient and healthcare power of   that a DNR order would be placed in his medical record to reflect this preference.    [facility]  I engaged the patient and healthcare power of   in a voluntary conversation about advance care planning and we specifically addressed what the goals of care would be moving forward, in light of the patient's change in clinical status, specifically current condition.  We did specifically address the patient's likely prognosis, which is poor.  We explored the patient's values and preferences for future care.  The patient and healthcare power of   endorses that what is most important right now is to focus on symptom/pain control and comfort and QOL     Accordingly, we have decided that the best plan to meet the patient's goals includes enrolling in hospice care         Recommendations:    Do Not Resuscitate order placed  HCPOA completed, placed on chart, and family provided with copies/original  Meeting with [facility] to determine inpatient hospice services      History of Present Illness:     64-year-old male with PMH of carpal tunnel syndrome, hypertension, sciatica, portal vein thrombosis, cocaine use, chronic hepatitis-C, hepatic adenoma concerning for HCC, cirrhosis.  Presented to the ED with  complaints of progressively worsening abdominal pain for the past 2 months.  He was noted to have acute on chronic hyperbilirubinemia with leukocytosis.  Initially presented to an outlying facility and was transferred to our hospital for higher level of care.  GI and General surgery consulted.  MRCP was ordered to further evaluate worsening of liver function which revealed concerns for HCC, distended gallbladder with cholelithiasis and mild intrahepatic biliary ductal dilation, evaluation was challenging secondary to underlying portal vein thrombus.  Suspicion for cholecystitis given clinical presentation.  No evidence of choledocholithiasis and no indication for ERCP per GI.  Recommended anticoagulation for portal vein thrombosis.  Ultrasound positive Chang sign and thickened gallbladder.  General surgery evaluated for possible cholecystitis and patient was deemed to be a poor surgical candidate.  Therefore Interventional Radiology was consulted for percutaneous cholecystostomy tube placement which was performed on 07/07.  He completed antibiotic course.  GI planning for repeating triple phase as outpatient in 3 months and also arranging hepatology follow up in [place].  On 07/09 patient reported suicidal ideation and therefore psych was consulted and placed under pec/one-to-one observation.  Previous CT with concerns for pontine/left internal capsule ischemic CVA.  MRI revealed thymic CVA for which Neurology was consulted.  Neurology recommendations include to continue anticoagulation, hold statin given transaminitis.  Follow up outpatient with Cardiology for OLAMIDE given positive bubble study.  Ultrasound abdomen to quantify ascites on 07/10, which revealed large volume ascites and IR was consulted for paracentesis on 07/11.  Pec was revoked on 7/12 with no further reports of no further suicidal or homicidal ideation.  Psych adjusted medications including Lexapro and Abilify.  INR was greater than 5 on 07/12,  therefore Xarelto was held with no overt signs of bleeding.  INR has become therapeutic and Xarelto was resumed.  Nephrology consulted who deemed the patient a poor candidate for dialysis and signed off.  Inpatient hospice was attempted over the weekend, however patient did not meet criteria at that time.  Palliative medicine is consulted for goals of care discussions.      Active Ambulatory Problems     Diagnosis Date Noted    HCC (hepatocellular carcinoma) 10/23/2023    Encephalopathy, metabolic 10/23/2023    Essential hypertension 10/23/2023    Chronic liver disease 10/23/2023    Hepatitis C, chronic 10/23/2023    Tobacco dependency 06/18/2025    Portal vein thrombosis 06/23/2025    Moderate malnutrition 06/27/2025     Resolved Ambulatory Problems     Diagnosis Date Noted    SOCORRO (acute kidney injury) 10/23/2023     Past Medical History:   Diagnosis Date    Carpal tunnel syndrome     HTN (hypertension)     Sciatica         Past Surgical History:   Procedure Laterality Date    ESOPHAGOGASTRODUODENOSCOPY N/A 6/20/2025    Procedure: EGD;  Surgeon: Jalyn Day MD;  Location: [facility] ENDOSCOPY;  Service: Gastroenterology;  Laterality: N/A;    SKIN GRAFT          Review of patient's allergies indicates:  No Known Allergies     Current Medications[1]       Current Facility-Administered Medications:     0.9%  NaCl infusion (for blood administration), , Intravenous, Q24H PRN    0.9%  NaCl infusion (for blood administration), , Intravenous, Q24H PRN    acetaminophen, 500 mg, Oral, Q6H PRN    dextrose 50%, 12.5 g, Intravenous, PRN    dextrose 50%, 25 g, Intravenous, PRN    glucagon (human recombinant), 1 mg, Intramuscular, PRN    glucose, 16 g, Oral, PRN    glucose, 24 g, Oral, PRN    HYDROcodone-acetaminophen, 1 tablet, Oral, Q6H PRN    HYDROcodone-acetaminophen, 1 tablet, Oral, Q6H PRN    labetalol, 10 mg, Intravenous, Q4H PRN    lactulose 10 gram/15 ml, 20 g, Oral, TID PRN    melatonin, 6 mg, Oral, Nightly PRN     "ondansetron, 4 mg, Intravenous, Q6H PRN    sodium chloride 0.9%, 10 mL, Intravenous, PRN     No family history on file.     Review of Systems   Constitutional:  Positive for fatigue.   Respiratory: Negative.     Cardiovascular: Negative.    Gastrointestinal:  Positive for abdominal pain.   Neurological:  Positive for weakness.            Objective:   /63 (BP Location: Left arm, Patient Position: Lying)   Pulse 73   Temp 97.8 °F (36.6 °C) (Oral)   Resp 17   Ht 5' 8.9" (1.75 m)   Wt 54.1 kg (119 lb 4.3 oz)   SpO2 99%   BMI 17.67 kg/m²      Physical Exam  Constitutional:       General: He is not in acute distress.     Appearance: He is ill-appearing.   HENT:      Mouth/Throat:      Mouth: Mucous membranes are dry.   Cardiovascular:      Rate and Rhythm: Normal rate.   Pulmonary:      Effort: Pulmonary effort is normal. No respiratory distress.   Musculoskeletal:      Comments: Generalized deconditioning   Skin:     General: Skin is warm and dry.   Neurological:      General: No focal deficit present.      Mental Status: He is alert and oriented to person, place, and time.            Review of Symptoms  Review of Symptoms        Living Arrangements:  Lives alone    Psychosocial/Cultural:   See Palliative Psychosocial Note: Yes  Not . Has 2 adult children, who are somewhat estranged. Has multiple sisters and family support.   **Primary  to Follow**  Palliative Care  Consult: No      Advance Care Planning   Advance Directives:     Decision Making:  Patient answered questions and Family answered questions  Goals of Care: The patient and family endorses that what is most important right now is to focus on symptom/pain control and comfort and QOL     Accordingly, we have decided that the best plan to meet the patient's goals includes enrolling in hospice care and meeting with inpatient hospice services.          PAINAD: NA    Caregiver burden formerly assessed: Yes    > 50% of 80 " min of encounter was spent in chart review, face to face discussion of goals of care, symptom assessment, coordination of care and emotional support.    JARROD Arizmendi-BC  Palliative Medicine  [facility]         [1]   Current Facility-Administered Medications:     0.9%  NaCl infusion (for blood administration), , Intravenous, Q24H PRN, Bux, Xiomy, DO    0.9%  NaCl infusion (for blood administration), , Intravenous, Q24H PRN, Bux, Xiomy, DO    acetaminophen tablet 500 mg, 500 mg, Oral, Q6H PRN, Beth Beckett MD, 500 mg at 07/16/25 1847    ARIPiprazole tablet 10 mg, 10 mg, Oral, Daily, Bux, Xiomy, DO, 10 mg at 07/21/25 0955    dextrose 50% injection 12.5 g, 12.5 g, Intravenous, PRN, Berkley Arthursa, FNP    dextrose 50% injection 25 g, 25 g, Intravenous, PRN, Berkley Arthursa, MICHAELP, 25 g at 07/04/25 0529    EScitalopram oxalate tablet 20 mg, 20 mg, Oral, Daily, Bux, Xiomy, DO, 20 mg at 07/21/25 0955    folic acid tablet 1 mg, 1 mg, Oral, Daily, Luis Diaz MD, 1 mg at 07/21/25 0955    glucagon (human recombinant) injection 1 mg, 1 mg, Intramuscular, PRN, Berkley Arthursa, FNP    glucose chewable tablet 16 g, 16 g, Oral, PRN, Berkley Arthursa, MICHAELP    glucose chewable tablet 24 g, 24 g, Oral, PRN, Vandana Arthur, FNP    HYDROcodone-acetaminophen  mg per tablet 1 tablet, 1 tablet, Oral, Q6H PRN, Beth Beckett MD, 1 tablet at 07/18/25 1500    HYDROcodone-acetaminophen 5-325 mg per tablet 1 tablet, 1 tablet, Oral, Q6H PRN, Beth Beckett MD, 1 tablet at 07/20/25 2034    labetaloL injection 10 mg, 10 mg, Intravenous, Q4H PRN, Beth Beckett MD    lactulose 10 gram/15 ml solution 15 g, 15 g, Oral, Daily, Beth Beckett MD, 15 g at 07/21/25 0956    lactulose 10 gram/15 ml solution 20 g, 20 g, Oral, TID PRN, Luis Diaz MD    melatonin tablet 6 mg, 6 mg, Oral, Nightly PRN, Luis Diaz MD    nicotine 14 mg/24 hr 1 patch, 1 patch, Transdermal, Daily, Luis Diaz MD, 1 patch at 07/21/25  0955    ondansetron injection 4 mg, 4 mg, Intravenous, Q6H PRN, Beth Beckett MD, 4 mg at 07/16/25 2045    pantoprazole injection 40 mg, 40 mg, Intravenous, BID, Xiomy Griggs DO, 40 mg at 07/21/25 0956    sodium chloride 0.9% flush 10 mL, 10 mL, Intravenous, PRN, Luis Diaz MD    thiamine tablet 100 mg, 100 mg, Oral, Daily, Luis Diaz MD, 100 mg at 07/21/25 0955

## 2025-07-21 NOTE — PROGRESS NOTES
[facility] Hospital Medicine Progress Note        Chief Complaint: Inpatient Follow-up    HPI:     64-year-old male with significant history of carpal tunnel syndrome, HTN, sciatica, portal vein thrombosis, cocaine use, chronic hep C, hepatic adenoma concerning for HCC, cirrhosis presented to the ED with complaints of progressively worsening abdominal pain for the past 2 months.  Patient was noted to have acute on chronic hyperbilirubinemia with leukocytosis.  He initially presented to outlying facility and was transferred to our hospital for higher level of care, patient was admitted to hospital medicine services, Gastroenterology, general surgery services consulted, MRCP was ordered to further evaluate worsening liver function test.  MRCP revealed findings concerning for HCC, distended gallbladder with cholelithiasis and mild intrahepatic biliary ductal dilation, evaluation was challenging secondary to underlying portal vein thrombus.  Suspicion for cholecystitis given clinical presentation.  No evidence of choledocholithiasis, no indication for ERCP per Gastroenterology, recommended anticoagulation for portal vein thrombosis.  Ultrasound with positive from Chang's sign and thickened gallbladder.  General surgery evaluated for possible cholecystitis, poor surgical candidate and therefore interventional radiology was consulted for percutaneous cholecystostomy tube placement and this was done on 7/7.  Zosyn sky, General surgery Plan to follow him in clinic as outpatient, GI planning for repeating triple phase as outpatient in 3 months and also arrange follow up with hepatology in [place].  Patient reported suicidal ideation on 07/09 and therefore psych consulted and he was placed under pec/one-to-one observation.  Previous CT with concerns for pontine/left internal capsule ischemic CVA, MRI was ordered to further evaluate.  Ultrasound abdomen ordered to quantify ascites on  07/10.  Pec and one-to-one observation continued.  Large volume ascites noted and therefore IR consulted for paracentesis on 07/11, MRI came back positive for thalamic CVA, Neurology consulted, neurology recommended to continue anticoagulation, no statin given transaminitis, pec continued as of 7/11.  Patient with no more suicidal or homicidal ideation and therefore Pec was revoked on 07/12.  INR was more than 5 on 07/12, Xarelto held, no overt bleeding, trended down to 3.7 on 07/13.  INR trended down to less than 2 on 07/14 and therefore Xarelto resumed    Interval Hx:   Patient seen and examined by bedside.  Family by bedside.  No acute overnight events.  Patient appears cachectic, tearful during conversation.      Objective/physical exam:  General:  Acutely ill-appearing male afebrile, cachectic  Chest: Clear to auscultation bilaterally  Heart: S1, S2, no appreciable murmur  Abdomen: Soft, nontender, BS +  MSK: Warm, no lower extremity edema, no clubbing or cyanosis  Neurologic: Alert and oriented x4, moving all extremities with good strength     VITAL SIGNS: 24 HRS MIN & MAX LAST   Temp  Min: 97.5 °F (36.4 °C)  Max: 97.9 °F (36.6 °C) 97.6 °F (36.4 °C)   BP  Min: 100/52  Max: 130/71 (!) 107/59   Pulse  Min: 67  Max: 74  73   Resp  Min: 15  Max: 20 18   SpO2  Min: 96 %  Max: 100 % 97 %       Recent Labs   Lab 07/20/25  0609   WBC 14.48*   RBC 3.24*   HGB 9.1*   HCT 27.8*   MCV 85.8   MCH 28.1   MCHC 32.7*   RDW 21.1*      MPV 8.9         Recent Labs   Lab 07/20/25  0609   *   K 4.7      CO2 21*   BUN 70.8*   CREATININE 4.62*   GLU 70*   CALCIUM 7.4*   ALBUMIN 1.4*   PROT 7.7*   ALKPHOS 216*   *   *   BILITOT 3.5*          Microbiology Results (last 7 days)       ** No results found for the last 168 hours. **             Scheduled Med:   ARIPiprazole  10 mg Oral Daily    EScitalopram oxalate  20 mg Oral Daily    folic acid  1 mg Oral Daily    lactulose 10 gram/15 ml  15 g Oral Daily     nicotine  1 patch Transdermal Daily    pantoprazole  40 mg Intravenous BID    thiamine  100 mg Oral Daily      Patient meets ASPEN criteria for moderate malnutrition of chronic illness per RD assessment as evidenced by:  Energy Intake (Malnutrition): other (see comments) (Unable to assess)  Weight Loss (Malnutrition): greater than 7.5% in 3 months  Subcutaneous Fat (Malnutrition): mild depletion  Muscle Mass (Malnutrition): mild depletion  Fluid Accumulation (Malnutrition): other (see comments) (Not present)        A minimum of two characteristics is recommended for diagnosis of either severe or non-severe malnutrition.      Assessment/Plan:    Acute ischemic CVA -left thalamus  Positive bubble study  Suicidal ideation placed under pec 7/9, revoked 7/12  Symptomatic cholelithiasis with suspected cholecystitis status post CT-guided percutaneous cholecystostomy tube placement 7/7  Sepsis secondary to above-improving   Acute on chronic hyperbilirubinemia with transaminitis-slowly improving  Decompensated cirrhosis with large volume recurrent ascites status post paracentesis 7/11, removed 2.2 L, reaccumulating now  Supratherapeutic INR secondary to advanced liver disease and Xarelto  Hypervolemic hyponatremia   Mild metabolic acidosis  Portal vein thrombosis  Suspected HCC  Suspected bilateral pneumonia-HA P  Substance use disorder  Chronic hep C   History of essential HTN  Sciatica   History of CTS   Physical deconditioning  Cancer related fatigue      Evaluated by Neurology for left thalamic CVA and recommended to continue Xarelto which he was on for portal vein thrombosis   No statin given transaminitis  No large vessel occlusion  Bubble study has come back positive, Arrange outpatient follow up with Cardiology for OLAMIDE  Lexapro increased to 20 mg.  Continue Abilify  Psych team is okay to cancel pec, revoked 7/12  For cholecystitis needing cholecystostomy tube placement, patient was deemed poor surgical candidate    Zosyn was discontinued on 07/17 as patient has received more than 2 weeks of antibiotic therapy and has had normal white count along with remaining afebrile.  Cultures have all been negative so far  Bloody output through cholecystostomy and output is also increased most likely secondary to leaking ascitic fluid  Reconsulted general surgery, no plans for intervention  Plan for outpatient cholangiogram  Hemoglobin stable today at 9.1 after receiving 1 unit of PRBC on 7/18  Supratherapeutic INR resolved; we will continue to hold Xarelto as patient is very high-risk for becoming coagulopathic again due to his advanced disease compounded with use of Xarelto  GI was consulted, recommends no intervention at this time  Underwent paracentesis for large volume ascites on 07/11, reaccumulating quickly   Renal function continues to be worsen.  Urine studies were obtained yesterday and indicate prerenal etiology.  Hepatorenal could also be playing a factor.  Nephrology consulted and recommended hospice.  Patient is poor candidate for dialysis; signed off  May need IR to place PleurX catheter for comfort measures  Inpatient hospice was attempted however patient does not meet criteria.  Case management palliative care consulted for discussing hospice and nursing home versus home  Hyperbilirubinemia with transaminitis noted, worsening  Continue lactulose to prevent hepatic encephalopathy  Patient has very poor prognosis and very high-risk of decompensation   Discussed code status, family would like to wait to discuss with palliative care and each other before deciding on code status  DVT prophylaxis: kayce Griggs DO  Department of Hospital Medicine  [facility]  07/20/2025

## 2025-07-21 NOTE — PLAN OF CARE
The patient was declined for inpatient hospice a the [facility] this weekend.  The patient has had a decline.  The patient's family is at bedside.  Spoke with his sister Hope.  She stated that it is ok if I send a referral to [facility].  Spoke with Tanika at the [facility] and she will assess the patient today.

## 2025-07-21 NOTE — PLAN OF CARE
Carmen with [facility] assessed the patient and he is not appropriate for inpatient hospice.  He is appropriate for home hospice. The patient's sisters are present.  I asked the patient again if he wants to go to a nursing home.  He stated that he will talk to his sisters.  I informed the sisters that there are two options. #1 The patient can go to a nursing home with hospice.  The patient already refused the one nursing home that was willing to accept him so we will have to send referrals to places farther away.  #2 If the patient refuses nursing home placement then he will have to go home with family and they will have to pay for sitters. The sisters stated that they will talk to the patient and give me their decision later.

## 2025-07-21 NOTE — H&P (VIEW-ONLY)
Inpatient consult to Palliative Care  Consult performed by: Mirna Chicas NP  Consult ordered by: Xiomy Griggs DO      Patient Name: Aman Humphrey   MRN: [id number]   Admission Date: 7/3/2025   Hospital Length of Stay: 18   Attending Provider: Xiomy Griggs DO   Consulting Provider: JARROD Arizmendi  Reason for Consult: Goals of Care  Primary Care Physician: Stalin Landin DO     Principal Problem: <principal problem not specified>     Patient information was obtained from patient, relative(s), and ER records.      Final diagnoses:  [K80.50] Choledocholithiasis     Assessment/Plan:     I reviewed the patient and family's understanding of the seriousness of the illness and its expected prognosis. We discussed the patient's goals of care and treatment preferences. We discussed the difference between palliative and curative medicine. I clarified current code status. I identified the surrogate decision maker or health care POA. I answered all questions and we formulated a plan including recommendations for symptom management and how to best achieve goals of care.       Patient is resting comfortably in bed on RA. He is AAOx4. He reports some abdominal pain with sitting up, which improves with repositioning and laying back. Multiple sisters and cousins at bedside. Introduced service. Patient and family explain that he is not  and has 2 adult children. His sisters are a main support and he would elect for his sisters to make medical decisions on his behalf before his children.    Reviewed current condition, including concerns for HCC, thrombus, cirrhosis, renal dysfunction, ascites, and overall deconditioned state. Patient appears to have poor insight and leans on his sisters for significant support. Had long conversation regarding his overall prognosis with multiorgan dysfunction, difficulty with anticoagulation, and suspected HCC. Family understands he is very frail and likely not a candidate for  aggressive treatment options. Explored patient and family's goals of care. Discussed that Dmitri cheney was consulted over the weekend. Explained the role of hospice to focus on comfort, quality of life, and symptom management. Discussed options to continue aggressive treatment or seek more comfort focused approach. Family explains that Austin cheney is pending discussions after lunch today. Patient and family confirm wanting to pursue comfort focused approach given multiple comorbidities. Discussed hospice in home, in nursing home, and inpatient. Family reports the patient was intermittently homeless and do not feel they are able to care for him in home. Will await [place] [facility] meeting today, and consider nursing home with hospice if deemed not to be a candidate for inpatient services. After long discussion with patient, family, and HCPOAs at bedside, all decision makers are in favor of Do Not Resuscitate and inpatient hospice services. Discussed with nursing and attending.    Advance Care Planning     Date: 07/21/2025    Power of   I initiated the process of voluntary advance care planning today and explained the importance of this process to the patient.  I introduced the concept of advance directives to the patient, as well. Then the patient received detailed information about the importance of designating a Health Care Power of  (HCPOA). He was also instructed to communicate with this person about their wishes for future healthcare, should he become sick and lose decision-making capacity. The patient has not previously appointed a HCPOA. After our discussion, the patient has decided to complete a HCPOA and has appointed his sister, health care agent: Grisel Humphrey, Aidee Humphrey, & Christiana Jimenez & health care agent number: [phone], [phone], & [phone]. I encouraged him to communicate with this person about their wishes for future healthcare, should he become sick and  lose decision-making capacity.    Original and copies provided to patient and HCPOAs. Copies placed on chart and uploaded to Epic.    Code Status  In light of the patients advanced and life limiting illness,I engaged the the patient and healthcare power of   in a voluntary conversation about the patient's preferences for care  at the very end of life. Reviewed risks of resuscitation measures, including but not limited to chest wall injury and anoxic brain injury. The patient wishes to have a natural, peaceful death.  Along those lines, the patient does not wish to have CPR or other invasive treatments performed when his heart and/or breathing stops. I communicated to the patient and healthcare power of   that a DNR order would be placed in his medical record to reflect this preference.    [facility]  I engaged the patient and healthcare power of   in a voluntary conversation about advance care planning and we specifically addressed what the goals of care would be moving forward, in light of the patient's change in clinical status, specifically current condition.  We did specifically address the patient's likely prognosis, which is poor.  We explored the patient's values and preferences for future care.  The patient and healthcare power of   endorses that what is most important right now is to focus on symptom/pain control and comfort and QOL     Accordingly, we have decided that the best plan to meet the patient's goals includes enrolling in hospice care         Recommendations:    Do Not Resuscitate order placed  HCPOA completed, placed on chart, and family provided with copies/original  Meeting with [facility] to determine inpatient hospice services      History of Present Illness:     64-year-old male with PMH of carpal tunnel syndrome, hypertension, sciatica, portal vein thrombosis, cocaine use, chronic hepatitis-C, hepatic adenoma concerning for HCC, cirrhosis.  Presented to the ED with  complaints of progressively worsening abdominal pain for the past 2 months.  He was noted to have acute on chronic hyperbilirubinemia with leukocytosis.  Initially presented to an outlying facility and was transferred to our hospital for higher level of care.  GI and General surgery consulted.  MRCP was ordered to further evaluate worsening of liver function which revealed concerns for HCC, distended gallbladder with cholelithiasis and mild intrahepatic biliary ductal dilation, evaluation was challenging secondary to underlying portal vein thrombus.  Suspicion for cholecystitis given clinical presentation.  No evidence of choledocholithiasis and no indication for ERCP per GI.  Recommended anticoagulation for portal vein thrombosis.  Ultrasound positive Chang sign and thickened gallbladder.  General surgery evaluated for possible cholecystitis and patient was deemed to be a poor surgical candidate.  Therefore Interventional Radiology was consulted for percutaneous cholecystostomy tube placement which was performed on 07/07.  He completed antibiotic course.  GI planning for repeating triple phase as outpatient in 3 months and also arranging hepatology follow up in [place].  On 07/09 patient reported suicidal ideation and therefore psych was consulted and placed under pec/one-to-one observation.  Previous CT with concerns for pontine/left internal capsule ischemic CVA.  MRI revealed thymic CVA for which Neurology was consulted.  Neurology recommendations include to continue anticoagulation, hold statin given transaminitis.  Follow up outpatient with Cardiology for OLAMIDE given positive bubble study.  Ultrasound abdomen to quantify ascites on 07/10, which revealed large volume ascites and IR was consulted for paracentesis on 07/11.  Pec was revoked on 7/12 with no further reports of no further suicidal or homicidal ideation.  Psych adjusted medications including Lexapro and Abilify.  INR was greater than 5 on 07/12,  therefore Xarelto was held with no overt signs of bleeding.  INR has become therapeutic and Xarelto was resumed.  Nephrology consulted who deemed the patient a poor candidate for dialysis and signed off.  Inpatient hospice was attempted over the weekend, however patient did not meet criteria at that time.  Palliative medicine is consulted for goals of care discussions.      Active Ambulatory Problems     Diagnosis Date Noted    HCC (hepatocellular carcinoma) 10/23/2023    Encephalopathy, metabolic 10/23/2023    Essential hypertension 10/23/2023    Chronic liver disease 10/23/2023    Hepatitis C, chronic 10/23/2023    Tobacco dependency 06/18/2025    Portal vein thrombosis 06/23/2025    Moderate malnutrition 06/27/2025     Resolved Ambulatory Problems     Diagnosis Date Noted    SOCORRO (acute kidney injury) 10/23/2023     Past Medical History:   Diagnosis Date    Carpal tunnel syndrome     HTN (hypertension)     Sciatica         Past Surgical History:   Procedure Laterality Date    ESOPHAGOGASTRODUODENOSCOPY N/A 6/20/2025    Procedure: EGD;  Surgeon: Jalyn Day MD;  Location: [facility] ENDOSCOPY;  Service: Gastroenterology;  Laterality: N/A;    SKIN GRAFT          Review of patient's allergies indicates:  No Known Allergies     Current Medications[1]       Current Facility-Administered Medications:     0.9%  NaCl infusion (for blood administration), , Intravenous, Q24H PRN    0.9%  NaCl infusion (for blood administration), , Intravenous, Q24H PRN    acetaminophen, 500 mg, Oral, Q6H PRN    dextrose 50%, 12.5 g, Intravenous, PRN    dextrose 50%, 25 g, Intravenous, PRN    glucagon (human recombinant), 1 mg, Intramuscular, PRN    glucose, 16 g, Oral, PRN    glucose, 24 g, Oral, PRN    HYDROcodone-acetaminophen, 1 tablet, Oral, Q6H PRN    HYDROcodone-acetaminophen, 1 tablet, Oral, Q6H PRN    labetalol, 10 mg, Intravenous, Q4H PRN    lactulose 10 gram/15 ml, 20 g, Oral, TID PRN    melatonin, 6 mg, Oral, Nightly PRN     "ondansetron, 4 mg, Intravenous, Q6H PRN    sodium chloride 0.9%, 10 mL, Intravenous, PRN     No family history on file.     Review of Systems   Constitutional:  Positive for fatigue.   Respiratory: Negative.     Cardiovascular: Negative.    Gastrointestinal:  Positive for abdominal pain.   Neurological:  Positive for weakness.            Objective:   /63 (BP Location: Left arm, Patient Position: Lying)   Pulse 73   Temp 97.8 °F (36.6 °C) (Oral)   Resp 17   Ht 5' 8.9" (1.75 m)   Wt 54.1 kg (119 lb 4.3 oz)   SpO2 99%   BMI 17.67 kg/m²      Physical Exam  Constitutional:       General: He is not in acute distress.     Appearance: He is ill-appearing.   HENT:      Mouth/Throat:      Mouth: Mucous membranes are dry.   Cardiovascular:      Rate and Rhythm: Normal rate.   Pulmonary:      Effort: Pulmonary effort is normal. No respiratory distress.   Musculoskeletal:      Comments: Generalized deconditioning   Skin:     General: Skin is warm and dry.   Neurological:      General: No focal deficit present.      Mental Status: He is alert and oriented to person, place, and time.            Review of Symptoms  Review of Symptoms        Living Arrangements:  Lives alone    Psychosocial/Cultural:   See Palliative Psychosocial Note: Yes  Not . Has 2 adult children, who are somewhat estranged. Has multiple sisters and family support.   **Primary  to Follow**  Palliative Care  Consult: No      Advance Care Planning   Advance Directives:     Decision Making:  Patient answered questions and Family answered questions  Goals of Care: The patient and family endorses that what is most important right now is to focus on symptom/pain control and comfort and QOL     Accordingly, we have decided that the best plan to meet the patient's goals includes enrolling in hospice care and meeting with inpatient hospice services.          PAINAD: NA    Caregiver burden formerly assessed: Yes    > 50% of 80 " min of encounter was spent in chart review, face to face discussion of goals of care, symptom assessment, coordination of care and emotional support.    JARROD Arizmendi-BC  Palliative Medicine  [facility]         [1]   Current Facility-Administered Medications:     0.9%  NaCl infusion (for blood administration), , Intravenous, Q24H PRN, Bux, Xiomy, DO    0.9%  NaCl infusion (for blood administration), , Intravenous, Q24H PRN, Bux, Xiomy, DO    acetaminophen tablet 500 mg, 500 mg, Oral, Q6H PRN, Beth Beckett MD, 500 mg at 07/16/25 1847    ARIPiprazole tablet 10 mg, 10 mg, Oral, Daily, Bux, Xiomy, DO, 10 mg at 07/21/25 0955    dextrose 50% injection 12.5 g, 12.5 g, Intravenous, PRN, Berkley Arthursa, FNP    dextrose 50% injection 25 g, 25 g, Intravenous, PRN, Berkley Arthursa, MICHAELP, 25 g at 07/04/25 0529    EScitalopram oxalate tablet 20 mg, 20 mg, Oral, Daily, Bux, Ximoy, DO, 20 mg at 07/21/25 0955    folic acid tablet 1 mg, 1 mg, Oral, Daily, Luis Diaz MD, 1 mg at 07/21/25 0955    glucagon (human recombinant) injection 1 mg, 1 mg, Intramuscular, PRN, Berkley Arthursa, FNP    glucose chewable tablet 16 g, 16 g, Oral, PRN, Berkley Arthursa, MICHAELP    glucose chewable tablet 24 g, 24 g, Oral, PRN, Vandana Arthur, FNP    HYDROcodone-acetaminophen  mg per tablet 1 tablet, 1 tablet, Oral, Q6H PRN, Bteh Beckett MD, 1 tablet at 07/18/25 1500    HYDROcodone-acetaminophen 5-325 mg per tablet 1 tablet, 1 tablet, Oral, Q6H PRN, Beth Beckett MD, 1 tablet at 07/20/25 2034    labetaloL injection 10 mg, 10 mg, Intravenous, Q4H PRN, Beth Beckett MD    lactulose 10 gram/15 ml solution 15 g, 15 g, Oral, Daily, Beth Beckett MD, 15 g at 07/21/25 0956    lactulose 10 gram/15 ml solution 20 g, 20 g, Oral, TID PRN, Luis Diaz MD    melatonin tablet 6 mg, 6 mg, Oral, Nightly PRN, Luis Diaz MD    nicotine 14 mg/24 hr 1 patch, 1 patch, Transdermal, Daily, Luis Diaz MD, 1 patch at 07/21/25  0955    ondansetron injection 4 mg, 4 mg, Intravenous, Q6H PRN, Beth Beckett MD, 4 mg at 07/16/25 2045    pantoprazole injection 40 mg, 40 mg, Intravenous, BID, Xiomy Griggs DO, 40 mg at 07/21/25 0956    sodium chloride 0.9% flush 10 mL, 10 mL, Intravenous, PRN, Luis Diaz MD    thiamine tablet 100 mg, 100 mg, Oral, Daily, Luis Diaz MD, 100 mg at 07/21/25 0955

## 2025-07-21 NOTE — PLAN OF CARE
[facility] sent clinical updates via Epic to:    Johana Manzanares  [facility]-denied-cannot meet needs

## 2025-07-21 NOTE — PLAN OF CARE
Problem: Adult Inpatient Plan of Care  Goal: Plan of Care Review  7/21/2025 0441 by Tracy Burnette RN  Outcome: Progressing  Flowsheets (Taken 7/21/2025 0441)  Plan of Care Reviewed With: patient  7/20/2025 1843 by Tracy Burnette RN  Outcome: Progressing  Flowsheets (Taken 7/20/2025 1843)  Plan of Care Reviewed With: patient  Goal: Patient-Specific Goal (Individualized)  7/21/2025 0441 by Tracy Burnette RN  Outcome: Progressing  7/20/2025 1843 by Tracy Burnette RN  Outcome: Progressing  Goal: Absence of Hospital-Acquired Illness or Injury  7/21/2025 0441 by Tracy Burnette RN  Outcome: Progressing  7/20/2025 1843 by Tracy Burnette RN  Outcome: Progressing  Intervention: Identify and Manage Fall Risk  7/21/2025 0441 by Tracy Burnette RN  Flowsheets (Taken 7/21/2025 0441)  Safety Promotion/Fall Prevention:   assistive device/personal item within reach   Fall Risk signage in place   medications reviewed   side rails raised x 3   /camera at bedside  7/20/2025 1843 by Tracy Burnette RN  Flowsheets (Taken 7/20/2025 1843)  Safety Promotion/Fall Prevention:   assistive device/personal item within reach   Fall Risk signage in place   medications reviewed   /camera at bedside   side rails raised x 3  Intervention: Prevent Skin Injury  7/21/2025 0441 by Tracy Burnette RN  Flowsheets (Taken 7/21/2025 0441)  Body Position: position changed independently  Skin Protection: transparent dressing maintained  Device Skin Pressure Protection: absorbent pad utilized/changed  7/20/2025 1843 by Tracy Burnette RN  Flowsheets (Taken 7/20/2025 1843)  Body Position: position changed independently  Skin Protection: transparent dressing maintained  Device Skin Pressure Protection:   absorbent pad utilized/changed   tubing/devices free from skin contact  Intervention: Prevent and Manage VTE (Venous Thromboembolism) Risk  7/21/2025 0441 by Tracy Burnette RN  Flowsheets  (Taken 7/21/2025 0441)  VTE Prevention/Management:   bleeding precautions maintained   bleeding risk assessed   fluids promoted  7/20/2025 1843 by Tracy Burnette RN  Flowsheets (Taken 7/20/2025 1843)  VTE Prevention/Management:   bleeding risk assessed   bleeding precautions maintained   fluids promoted  Intervention: Prevent Infection  7/21/2025 0441 by Tracy Burnette RN  Flowsheets (Taken 7/21/2025 0441)  Infection Prevention:   hand hygiene promoted   rest/sleep promoted  7/20/2025 1843 by Tracy Burnette RN  Flowsheets (Taken 7/20/2025 1843)  Infection Prevention:   hand hygiene promoted   rest/sleep promoted  Goal: Optimal Comfort and Wellbeing  7/21/2025 0441 by Tracy Burnette RN  Outcome: Progressing  7/20/2025 1843 by Tracy Burnette RN  Outcome: Progressing  Intervention: Monitor Pain and Promote Comfort  7/21/2025 0441 by Tracy Burnette RN  Flowsheets (Taken 7/21/2025 0441)  Pain Management Interventions:   care clustered   medication offered   quiet environment facilitated   relaxation techniques promoted  7/20/2025 1843 by Tracy Burnette RN  Flowsheets (Taken 7/20/2025 1843)  Pain Management Interventions:   care clustered   medication offered   relaxation techniques promoted   quiet environment facilitated  Intervention: Provide Person-Centered Care  7/21/2025 0441 by Tracy Burnette RN  Flowsheets (Taken 7/21/2025 0441)  Trust Relationship/Rapport:   care explained   thoughts/feelings acknowledged  7/20/2025 1843 by Tracy Burnette RN  Flowsheets (Taken 7/20/2025 1843)  Trust Relationship/Rapport:   care explained   thoughts/feelings acknowledged  Goal: Readiness for Transition of Care  7/21/2025 0441 by Tracy Burnette RN  Outcome: Progressing  7/20/2025 1843 by Tracy Burnette RN  Outcome: Progressing     Problem: Skin Injury Risk Increased  Goal: Skin Health and Integrity  7/21/2025 0441 by Tracy Burnette RN  Outcome: Progressing  7/20/2025 1843 by Vasile  Tracy Alicea RN  Outcome: Progressing  Intervention: Optimize Skin Protection  7/21/2025 0441 by Tracy Burnette RN  Flowsheets (Taken 7/21/2025 0441)  Pressure Reduction Techniques: frequent weight shift encouraged  Pressure Reduction Devices: positioning supports utilized  Skin Protection: transparent dressing maintained  Activity Management: Rolling - L1  Head of Bed (HOB) Positioning: HOB at 20-30 degrees  7/20/2025 1843 by Tracy Burnette RN  Flowsheets (Taken 7/20/2025 1843)  Pressure Reduction Techniques: frequent weight shift encouraged  Pressure Reduction Devices: positioning supports utilized  Skin Protection: transparent dressing maintained  Activity Management: Rolling - L1  Head of Bed (HOB) Positioning: HOB elevated  Intervention: Promote and Optimize Oral Intake  7/21/2025 0441 by Tracy Burnette RN  Flowsheets (Taken 7/21/2025 0441)  Oral Nutrition Promotion: rest periods promoted  7/20/2025 1843 by Tracy Burnette RN  Flowsheets (Taken 7/20/2025 1843)  Oral Nutrition Promotion: rest periods promoted     Problem: Coping Ineffective  Goal: Effective Coping  7/21/2025 0441 by Tracy Burnette RN  Outcome: Progressing  7/20/2025 1843 by Tracy Burnette RN  Outcome: Progressing  Intervention: Support and Enhance Coping Strategies  7/21/2025 0441 by Tracy Burnette RN  Flowsheets (Taken 7/21/2025 0441)  Supportive Measures:   active listening utilized   relaxation techniques promoted   verbalization of feelings encouraged  Family/Support System Care: support provided  Environmental Support: calm environment promoted  7/20/2025 1843 by Tracy Burnette RN  Flowsheets (Taken 7/20/2025 1843)  Supportive Measures:   active listening utilized   verbalization of feelings encouraged   relaxation techniques promoted  Family/Support System Care: support provided  Environmental Support: calm environment promoted     Problem: Wound  Goal: Optimal Coping  7/21/2025 0441 by Tracy Burnette  RN  Outcome: Progressing  7/20/2025 1843 by Tracy Burnette RN  Outcome: Progressing  Intervention: Support Patient and Family Response  7/21/2025 0441 by Tracy Burnette RN  Flowsheets (Taken 7/21/2025 0441)  Supportive Measures:   active listening utilized   relaxation techniques promoted   verbalization of feelings encouraged  Family/Support System Care: support provided  7/20/2025 1843 by Tracy Burnette RN  Flowsheets (Taken 7/20/2025 1843)  Supportive Measures:   active listening utilized   verbalization of feelings encouraged   relaxation techniques promoted  Family/Support System Care: support provided  Goal: Optimal Functional Ability  7/21/2025 0441 by Tracy Burnette RN  Outcome: Progressing  7/20/2025 1843 by Tracy Burnette RN  Outcome: Progressing  Intervention: Optimize Functional Ability  Flowsheets (Taken 7/20/2025 1843)  Activity Management: Rolling - L1  Activity Assistance Provided: assistance, 1 person  Goal: Absence of Infection Signs and Symptoms  7/21/2025 0441 by Tracy Burnette RN  Outcome: Progressing  7/20/2025 1843 by Tracy Burnette RN  Outcome: Progressing  Intervention: Prevent or Manage Infection  7/21/2025 0441 by Tracy Burnette RN  Flowsheets (Taken 7/21/2025 0441)  Infection Management: aseptic technique maintained  Isolation Precautions: precautions maintained  7/20/2025 1843 by Tracy Burnette RN  Flowsheets (Taken 7/20/2025 1843)  Infection Management: aseptic technique maintained  Isolation Precautions: precautions maintained  Goal: Improved Oral Intake  7/21/2025 0441 by Tracy Burnette RN  Outcome: Progressing  7/20/2025 1843 by Tracy Burnette RN  Outcome: Progressing  Intervention: Promote and Optimize Oral Intake  7/21/2025 0441 by Tracy Burnette RN  Flowsheets (Taken 7/21/2025 0441)  Oral Nutrition Promotion: rest periods promoted  7/20/2025 1843 by Tracy Burnette RN  Flowsheets (Taken 7/20/2025 1843)  Oral Nutrition Promotion:  rest periods promoted  Goal: Optimal Pain Control and Function  7/21/2025 0441 by Tracy Burnette RN  Outcome: Progressing  7/20/2025 1843 by Tracy Burnette RN  Outcome: Progressing  Intervention: Prevent or Manage Pain  7/21/2025 0441 by Tracy Burnette RN  Flowsheets (Taken 7/21/2025 0441)  Sleep/Rest Enhancement:   awakenings minimized   relaxation techniques promoted  Pain Management Interventions:   care clustered   medication offered   quiet environment facilitated   relaxation techniques promoted  7/20/2025 1843 by Tracy Burnette RN  Flowsheets (Taken 7/20/2025 1843)  Sleep/Rest Enhancement:   awakenings minimized   relaxation techniques promoted  Pain Management Interventions:   care clustered   medication offered   relaxation techniques promoted   quiet environment facilitated  Goal: Skin Health and Integrity  7/21/2025 0441 by Tracy Burnette RN  Outcome: Progressing  7/20/2025 1843 by Tracy Burnette RN  Outcome: Progressing  Intervention: Optimize Skin Protection  7/21/2025 0441 by Tracy Burnette RN  Flowsheets (Taken 7/21/2025 0441)  Pressure Reduction Techniques: frequent weight shift encouraged  Pressure Reduction Devices: positioning supports utilized  Skin Protection: transparent dressing maintained  Activity Management: Rolling - L1  Head of Bed (HOB) Positioning: HOB at 20-30 degrees  7/20/2025 1843 by Tracy Burnette RN  Flowsheets (Taken 7/20/2025 1843)  Pressure Reduction Techniques: frequent weight shift encouraged  Pressure Reduction Devices: positioning supports utilized  Skin Protection: transparent dressing maintained  Activity Management: Rolling - L1  Head of Bed (HOB) Positioning: HOB elevated  Goal: Optimal Wound Healing  7/21/2025 0441 by Tracy Burnette RN  Outcome: Progressing  7/20/2025 1843 by Tracy Burnette RN  Outcome: Progressing  Intervention: Promote Wound Healing  7/21/2025 0441 by Tracy Burnette RN  Flowsheets (Taken 7/21/2025  0441)  Sleep/Rest Enhancement:   awakenings minimized   relaxation techniques promoted  7/20/2025 1843 by Tracy Burnette RN  Flowsheets (Taken 7/20/2025 1843)  Sleep/Rest Enhancement:   awakenings minimized   relaxation techniques promoted     Problem: Suicide Risk  Goal: Absence of Self-Harm  7/21/2025 0441 by Tracy Burnette RN  Outcome: Progressing  7/20/2025 1843 by Tracy Burnette RN  Outcome: Progressing  Intervention: Assess Risk to Self and Maintain Safety  7/21/2025 0441 by Tracy Burnette RN  Flowsheets (Taken 7/21/2025 0441)  Enhanced Safety Measures:  at bedside  Self-Harm Prevention: environmental self-harm risks assessed  7/20/2025 1843 by Tracy Burnette RN  Flowsheets (Taken 7/20/2025 1843)  Enhanced Safety Measures:  at bedside  Self-Harm Prevention: environmental self-harm risks assessed  Intervention: Promote Psychosocial Wellbeing  7/21/2025 0441 by Tracy Burnette RN  Flowsheets (Taken 7/21/2025 0441)  Sleep/Rest Enhancement:   awakenings minimized   relaxation techniques promoted  Supportive Measures:   active listening utilized   relaxation techniques promoted   verbalization of feelings encouraged  Family/Support System Care: support provided  7/20/2025 1843 by Tracy Burnette RN  Flowsheets (Taken 7/20/2025 1843)  Sleep/Rest Enhancement:   awakenings minimized   relaxation techniques promoted  Supportive Measures:   active listening utilized   verbalization of feelings encouraged   relaxation techniques promoted  Family/Support System Care: support provided     Problem: Infection  Goal: Absence of Infection Signs and Symptoms  7/21/2025 0441 by Tracy Burnette RN  Outcome: Progressing  7/20/2025 1843 by Tracy Burnette RN  Outcome: Progressing  Intervention: Prevent or Manage Infection  7/21/2025 0441 by Tracy Burnette RN  Flowsheets (Taken 7/21/2025 0441)  Infection Management: aseptic technique maintained  Isolation  Precautions: precautions maintained  7/20/2025 1843 by Tracy Burnette RN  Flowsheets (Taken 7/20/2025 1843)  Infection Management: aseptic technique maintained  Isolation Precautions: precautions maintained     Problem: Stroke, Ischemic (Includes Transient Ischemic Attack)  Goal: Optimal Coping  7/21/2025 0441 by Tracy Burnette RN  Outcome: Progressing  7/20/2025 1843 by Tracy Burnette RN  Outcome: Progressing  Intervention: Support Psychosocial Response to Stroke  7/21/2025 0441 by Tracy Burnette RN  Flowsheets (Taken 7/21/2025 0441)  Supportive Measures:   active listening utilized   relaxation techniques promoted   verbalization of feelings encouraged  Family/Support System Care: support provided  7/20/2025 1843 by Tracy Burnette RN  Flowsheets (Taken 7/20/2025 1843)  Supportive Measures:   active listening utilized   verbalization of feelings encouraged   relaxation techniques promoted  Family/Support System Care: support provided  Goal: Effective Bowel Elimination  7/21/2025 0441 by Tracy Burnette RN  Outcome: Progressing  7/20/2025 1843 by Tracy Burnette RN  Outcome: Progressing  Goal: Optimal Cerebral Tissue Perfusion  7/21/2025 0441 by Tracy Burnette RN  Outcome: Progressing  7/20/2025 1843 by Tracy Burnette RN  Outcome: Progressing  Intervention: Protect and Optimize Cerebral Perfusion  Flowsheets (Taken 7/20/2025 1843)  Sensory Stimulation Regulation: quiet environment promoted  Cerebral Perfusion Promotion: blood pressure monitored  Goal: Optimal Cognitive Function  7/21/2025 0441 by Tracy Burnette RN  Outcome: Progressing  7/20/2025 1843 by Tracy Burnette RN  Outcome: Progressing  Intervention: Optimize Cognitive Function  Flowsheets (Taken 7/20/2025 1843)  Sensory Stimulation Regulation: quiet environment promoted  Goal: Improved Communication Skills  7/21/2025 0441 by Tracy Burnette RN  Outcome: Progressing  7/20/2025 1843 by Tracy Burnette  RN  Outcome: Progressing  Intervention: Optimize Communication Skills  Flowsheets (Taken 7/20/2025 1843)  Communication Enhancement Strategies: call light answered in person  Goal: Optimal Functional Ability  7/21/2025 0441 by Tracy Burnette RN  Outcome: Progressing  7/20/2025 1843 by Tracy Burnette RN  Outcome: Progressing  Intervention: Optimize Functional Ability  Flowsheets (Taken 7/20/2025 1843)  Self-Care Promotion:   independence encouraged   BADL personal objects within reach  Activity Management: Rolling - L1  Goal: Optimal Nutrition Intake  7/21/2025 0441 by Tracy Burnette RN  Outcome: Progressing  7/20/2025 1843 by Tracy Burnette RN  Outcome: Progressing  Intervention: Promote and Optimize Fluid and Food Intake  Flowsheets (Taken 7/20/2025 1843)  Oral Nutrition Promotion: rest periods promoted  Goal: Effective Oxygenation and Ventilation  7/21/2025 0441 by Tracy Burnette RN  Outcome: Progressing  7/20/2025 1843 by Tracy Burnette RN  Outcome: Progressing  Intervention: Optimize Oxygenation and Ventilation  Flowsheets (Taken 7/20/2025 1843)  Airway/Ventilation Management: airway patency maintained  Head of Bed (HOB) Positioning: HOB elevated  Goal: Improved Sensorimotor Function  7/21/2025 0441 by Tracy Burnette RN  Outcome: Progressing  7/20/2025 1843 by Tracy Burnette RN  Outcome: Progressing  Intervention: Optimize Range of Motion, Motor Control and Function  Flowsheets (Taken 7/20/2025 1843)  Positioning/Transfer Devices: pillows  Range of Motion: active ROM (range of motion) encouraged  Intervention: Optimize Sensory and Perceptual Ability  Flowsheets (Taken 7/20/2025 1843)  Pressure Reduction Techniques: frequent weight shift encouraged  Pressure Reduction Devices: positioning supports utilized  Goal: Safe and Effective Swallow  7/21/2025 0441 by Tracy Burnette RN  Outcome: Progressing  7/20/2025 1843 by Tracy Burnette RN  Outcome: Progressing  Intervention:  Promote and Optimize Fluid and Food Intake  Flowsheets (Taken 7/20/2025 1843)  Aspiration Precautions: awake/alert before oral intake  Goal: Effective Urinary Elimination  7/21/2025 0441 by Tracy Burnette RN  Outcome: Progressing  7/20/2025 1843 by Tracy Burnette RN  Outcome: Progressing     Problem: Fall Injury Risk  Goal: Absence of Fall and Fall-Related Injury  7/21/2025 0441 by Tracy Burnette RN  Outcome: Progressing  7/20/2025 1843 by Tracy Burnette RN  Outcome: Progressing  Intervention: Identify and Manage Contributors  7/21/2025 0441 by Tracy Burnette RN  Flowsheets (Taken 7/21/2025 0441)  Self-Care Promotion:   independence encouraged   BADL personal objects within reach  Medication Review/Management: medications reviewed  7/20/2025 1843 by Tracy Burnette RN  Flowsheets (Taken 7/20/2025 1843)  Self-Care Promotion:   independence encouraged   BADL personal objects within reach  Medication Review/Management: medications reviewed  Intervention: Promote Injury-Free Environment  7/21/2025 0441 by Tracy Burnette RN  Flowsheets (Taken 7/21/2025 0441)  Safety Promotion/Fall Prevention:   assistive device/personal item within reach   Fall Risk signage in place   medications reviewed   side rails raised x 3   /camera at bedside  7/20/2025 1843 by Tracy Burnette RN  Flowsheets (Taken 7/20/2025 1843)  Safety Promotion/Fall Prevention:   assistive device/personal item within reach   Fall Risk signage in place   medications reviewed   /camera at bedside   side rails raised x 3

## 2025-07-22 PROBLEM — E43 SEVERE MALNUTRITION: Status: ACTIVE | Noted: 2025-06-27

## 2025-07-22 PROCEDURE — 63600175 PHARM REV CODE 636 W HCPCS: Performed by: STUDENT IN AN ORGANIZED HEALTH CARE EDUCATION/TRAINING PROGRAM

## 2025-07-22 PROCEDURE — 11000001 HC ACUTE MED/SURG PRIVATE ROOM

## 2025-07-22 PROCEDURE — 97535 SELF CARE MNGMENT TRAINING: CPT | Mod: CO

## 2025-07-22 PROCEDURE — 99231 SBSQ HOSP IP/OBS SF/LOW 25: CPT | Mod: ,,,

## 2025-07-22 PROCEDURE — 25000003 PHARM REV CODE 250: Performed by: INTERNAL MEDICINE

## 2025-07-22 PROCEDURE — 97530 THERAPEUTIC ACTIVITIES: CPT | Mod: CQ

## 2025-07-22 PROCEDURE — 25000003 PHARM REV CODE 250: Performed by: HOSPITALIST

## 2025-07-22 PROCEDURE — S4991 NICOTINE PATCH NONLEGEND: HCPCS | Performed by: HOSPITALIST

## 2025-07-22 PROCEDURE — 25000003 PHARM REV CODE 250: Performed by: STUDENT IN AN ORGANIZED HEALTH CARE EDUCATION/TRAINING PROGRAM

## 2025-07-22 RX ADMIN — PANTOPRAZOLE SODIUM 40 MG: 40 INJECTION, POWDER, FOR SOLUTION INTRAVENOUS at 08:07

## 2025-07-22 RX ADMIN — HYDROCODONE BITARTRATE AND ACETAMINOPHEN 1 TABLET: 5; 325 TABLET ORAL at 03:07

## 2025-07-22 RX ADMIN — ESCITALOPRAM OXALATE 20 MG: 10 TABLET ORAL at 09:07

## 2025-07-22 RX ADMIN — PANTOPRAZOLE SODIUM 40 MG: 40 INJECTION, POWDER, FOR SOLUTION INTRAVENOUS at 09:07

## 2025-07-22 RX ADMIN — FOLIC ACID 1 MG: 1 TABLET ORAL at 09:07

## 2025-07-22 RX ADMIN — LACTULOSE 15 G: 10 SOLUTION ORAL at 09:07

## 2025-07-22 RX ADMIN — ARIPIPRAZOLE 10 MG: 5 TABLET ORAL at 09:07

## 2025-07-22 RX ADMIN — NICOTINE 1 PATCH: 14 PATCH TRANSDERMAL at 09:07

## 2025-07-22 RX ADMIN — THIAMINE HCL TAB 100 MG 100 MG: 100 TAB at 09:07

## 2025-07-22 NOTE — PROGRESS NOTES
Patient Name: Aman Humphrey   MRN: [id number]   Admission Date: 7/3/2025   Hospital Length of Stay: 19   Attending Provider: Xiomy Griggs DO   Consulting Provider: JARROD Arizmendi  Reason for Consult: Goals of Care  Primary Care Physician:  Stalin Landin DO     Principal Problem: <principal problem not specified>     Patient information was obtained from patient, relative(s), and ER records.     Final diagnoses:  [K80.50] Choledocholithiasis      Assessment/Plan:     Patient is resting comfortably in bed on RA. Lethargic, evidence of clinical decline compared to yesterday. Family at bedside including HCPOA/sister Grisel and brother Nick. Reviewed decline in mentation and activity since yesterday. Discussed that as patient continues to have poor PO intake, he will continue to have worsening renal dysfunction and likely progressive decline. Explained that per nursing report, he is pending IR evaluation of drain d/t complications at site. She expresses disappointment regarding him not qualifying for inpatient hospice services and confirms they are now seeking nursing home with hospice. Explained that inpatient hospice will continue to follow patient and if he becomes a candidate for services prior to discharge to nursing home with hospice, then he could transition to their services at that time. She verbalizes understanding and again expresses that their main focus is for patient's comfort. Discussed option to initiate comfort focused treatment in the hospital. Explained comfort focused measures and symptom management with medications. She is considering this option, but would like to discuss further with her sisters/additional HCPOAs prior to making any decisions. She will discuss with family and notify nursing of any decisions being made. Offered support. Encouraged to call with questions or concerns. Palliative Medicine will continue to follow. Discussed with  and nursing.    Surrogate  decision makers/HCPOA include 3 sisters: Grisel Humphrey, Aidee Humphrey, & Christiana Jimenez.    Advance Care Planning     Date: 07/22/2025    [facility]  I engaged the patient, family, and healthcare power of   in a voluntary conversation about advance care planning and we specifically addressed what the goals of care would be moving forward, in light of the patient's change in clinical status, specifically current condition.  We did specifically address the patient's likely prognosis, which is poor.  We explored the patient's values and preferences for future care.  The family and healthcare power of   endorses that what is most important right now is to focus on symptom/pain control and comfort and QOL     Accordingly, we have decided that the best plan to meet the patient's goals includes enrolling in hospice care         Interval History:     No acute events overnight. Patient was deemed not to be a candidate for inpatient hospice services.  now working on nursing home with hospice.      Active Ambulatory Problems     Diagnosis Date Noted    HCC (hepatocellular carcinoma) 10/23/2023    Encephalopathy, metabolic 10/23/2023    Essential hypertension 10/23/2023    Chronic liver disease 10/23/2023    Hepatitis C, chronic 10/23/2023    Tobacco dependency 06/18/2025    Portal vein thrombosis 06/23/2025    Moderate malnutrition 06/27/2025     Resolved Ambulatory Problems     Diagnosis Date Noted    SOCORRO (acute kidney injury) 10/23/2023     Past Medical History:   Diagnosis Date    Carpal tunnel syndrome     HTN (hypertension)     Sciatica         Past Surgical History:   Procedure Laterality Date    ESOPHAGOGASTRODUODENOSCOPY N/A 6/20/2025    Procedure: EGD;  Surgeon: Jalyn Day MD;  Location: [facility] ENDOSCOPY;  Service: Gastroenterology;  Laterality: N/A;    SKIN GRAFT          Review of patient's allergies indicates:  No Known Allergies     Current Medications[1]       Current  "Facility-Administered Medications:     0.9%  NaCl infusion (for blood administration), , Intravenous, Q24H PRN    0.9%  NaCl infusion (for blood administration), , Intravenous, Q24H PRN    acetaminophen, 500 mg, Oral, Q6H PRN    dextrose 50%, 12.5 g, Intravenous, PRN    dextrose 50%, 25 g, Intravenous, PRN    glucagon (human recombinant), 1 mg, Intramuscular, PRN    glucose, 16 g, Oral, PRN    glucose, 24 g, Oral, PRN    HYDROcodone-acetaminophen, 1 tablet, Oral, Q6H PRN    HYDROcodone-acetaminophen, 1 tablet, Oral, Q6H PRN    labetalol, 10 mg, Intravenous, Q4H PRN    lactulose 10 gram/15 ml, 20 g, Oral, TID PRN    melatonin, 6 mg, Oral, Nightly PRN    ondansetron, 4 mg, Intravenous, Q6H PRN    sodium chloride 0.9%, 10 mL, Intravenous, PRN     No family history on file.       Review of Systems   Unable to perform ROS: Other (Lethargic)            Objective:   /62   Pulse 77   Temp 97 °F (36.1 °C)   Resp 16   Ht 5' 8.9" (1.75 m)   Wt 57.2 kg (126 lb)   SpO2 96%   BMI 18.66 kg/m²      Physical Exam   Constitutional: No distress. He appears ill.   HENT:   Mouth/Throat: Mucous membranes are dry.   Cardiovascular: Normal rate. Pulmonary:      Effort: Pulmonary effort is normal. No respiratory distress.     Abdominal: He exhibits distension.   Musculoskeletal:      Right lower leg: No edema.      Left lower leg: No edema.   Neurological:   Arouses easily to voice and quickly returns to sleep  Does not participate in conversation   Skin: Skin is warm and dry.          Review of Symptoms  Review of Symptoms      Symptom Assessment (ESAS 0-10 Scale)  Unable to complete assessment due to Other         Pain Assessment in Advanced Demential Scale (PAINAD)   Breathing - Independent of vocalization:  0  Negative vocalization:  0  Facial expression:  0  Body language:  0  Consolability:  0  Total:  0    Psychosocial/Cultural:   See Palliative Psychosocial Note: Yes  **Primary  to Follow**  Palliative Care "  Consult: No      Advance Care Planning   Advance Directives:   Medical Power of : Yes    Goals of Care: What is most important right now is to focus on symptom/pain control, comfort and QOL . Accordingly, we have decided that the best plan to meet the patient's goals includes enrolling in hospice care.          PAINAD: 0    Caregiver burden formerly assessed: Yes    > 50% of 30 min of encounter was spent in chart review, face to face discussion of goals of care, symptom assessment, coordination of care and emotional support.    JARROD Arizmendi-BC  Palliative Medicine  [facility]         [1]   Current Facility-Administered Medications:     0.9%  NaCl infusion (for blood administration), , Intravenous, Q24H PRN, Bux, Xiomy, DO    0.9%  NaCl infusion (for blood administration), , Intravenous, Q24H PRN, Bux, Xiomy, DO    acetaminophen tablet 500 mg, 500 mg, Oral, Q6H PRN, Beth Beckett MD, 500 mg at 07/16/25 1847    ARIPiprazole tablet 10 mg, 10 mg, Oral, Daily, Bux, Xiomy, DO, 10 mg at 07/21/25 0955    dextrose 50% injection 12.5 g, 12.5 g, Intravenous, PRN, Tylerton, Vandana, FNP    dextrose 50% injection 25 g, 25 g, Intravenous, PRN, Jerson, Vandana, FNP, 25 g at 07/04/25 0529    EScitalopram oxalate tablet 20 mg, 20 mg, Oral, Daily, Bux, Xiomy, DO, 20 mg at 07/21/25 0955    folic acid tablet 1 mg, 1 mg, Oral, Daily, Luis Diaz MD, 1 mg at 07/21/25 0955    glucagon (human recombinant) injection 1 mg, 1 mg, Intramuscular, PRN, Jerson, Vandana, FNP    glucose chewable tablet 16 g, 16 g, Oral, PRN, Tylerton, Vandana, FNP    glucose chewable tablet 24 g, 24 g, Oral, PRN, Jerson Vandana, FNP    HYDROcodone-acetaminophen  mg per tablet 1 tablet, 1 tablet, Oral, Q6H PRN, Beth Beckett MD, 1 tablet at 07/21/25 1355    HYDROcodone-acetaminophen 5-325 mg per tablet 1 tablet, 1 tablet, Oral, Q6H PRN, Beth Beckett MD, 1 tablet at 07/21/25 1242    labetaloL injection 10  mg, 10 mg, Intravenous, Q4H PRN, Beth Beckett MD    lactulose 10 gram/15 ml solution 15 g, 15 g, Oral, Daily, Beth Beckett MD, 15 g at 07/21/25 0956    lactulose 10 gram/15 ml solution 20 g, 20 g, Oral, TID PRN, Luis Diaz MD    melatonin tablet 6 mg, 6 mg, Oral, Nightly PRN, Luis Diaz MD    nicotine 14 mg/24 hr 1 patch, 1 patch, Transdermal, Daily, Luis Diaz MD, 1 patch at 07/21/25 0955    ondansetron injection 4 mg, 4 mg, Intravenous, Q6H PRN, Beth Beckett MD, 4 mg at 07/16/25 2045    pantoprazole injection 40 mg, 40 mg, Intravenous, BID, Xiomy Griggs DO, 40 mg at 07/21/25 2138    sodium chloride 0.9% flush 10 mL, 10 mL, Intravenous, PRN, Luis Diaz MD    thiamine tablet 100 mg, 100 mg, Oral, Daily, Luis Diaz MD, 100 mg at 07/21/25 0955

## 2025-07-22 NOTE — PROGRESS NOTES
[facility] Hospital Medicine Progress Note        Chief Complaint: Inpatient Follow-up    HPI:     64-year-old male with significant history of carpal tunnel syndrome, HTN, sciatica, portal vein thrombosis, cocaine use, chronic hep C, hepatic adenoma concerning for HCC, cirrhosis presented to the ED with complaints of progressively worsening abdominal pain for the past 2 months.  Patient was noted to have acute on chronic hyperbilirubinemia with leukocytosis.  He initially presented to outlying facility and was transferred to our hospital for higher level of care, patient was admitted to hospital medicine services, Gastroenterology, general surgery services consulted, MRCP was ordered to further evaluate worsening liver function test.  MRCP revealed findings concerning for HCC, distended gallbladder with cholelithiasis and mild intrahepatic biliary ductal dilation, evaluation was challenging secondary to underlying portal vein thrombus.  Suspicion for cholecystitis given clinical presentation.  No evidence of choledocholithiasis, no indication for ERCP per Gastroenterology, recommended anticoagulation for portal vein thrombosis.  Ultrasound with positive from Chang's sign and thickened gallbladder.  General surgery evaluated for possible cholecystitis, poor surgical candidate and therefore interventional radiology was consulted for percutaneous cholecystostomy tube placement and this was done on 7/7.  Zosyn sky, General surgery Plan to follow him in clinic as outpatient, GI planning for repeating triple phase as outpatient in 3 months and also arrange follow up with hepatology in [place].  Patient reported suicidal ideation on 07/09 and therefore psych consulted and he was placed under pec/one-to-one observation.  Previous CT with concerns for pontine/left internal capsule ischemic CVA, MRI was ordered to further evaluate.  Ultrasound abdomen ordered to quantify ascites on  07/10.  Pec and one-to-one observation continued.  Large volume ascites noted and therefore IR consulted for paracentesis on 07/11, MRI came back positive for thalamic CVA, Neurology consulted, neurology recommended to continue anticoagulation, no statin given transaminitis, pec continued as of 7/11.  Patient with no more suicidal or homicidal ideation and therefore Pec was revoked on 07/12.  INR was more than 5 on 07/12, Xarelto held, no overt bleeding, trended down to 3.7 on 07/13.  INR trended down to less than 2 on 07/14 and therefore Xarelto resumed.  Patient was on Xarelto for 2 days before supratherapeutic INR again had resulted at 8 point 4.  Patient was given Kcentra and vitamin K and Xarelto was on hold.  CT abdomen and pelvis done with no acute bleeding seen.  Nephrology was consulted due to worsening SOCORRO.  Patient deemed poor dialysis candidate.  Recommended hospice.  Palliative Care was consulted.  Family discussion was placed and family is choosing hospice    Interval Hx:   Patient seen and examined by bedside.  No acute overnight events.  Sisters by bedside.  Discussed and reviewed all course.  Family is interested in hospice.     Objective/physical exam:  General:  Acutely ill-appearing male afebrile, cachectic  Chest: Clear to auscultation bilaterally  Heart: S1, S2, no appreciable murmur  Abdomen: Soft, nontender, BS +  MSK: Warm, no lower extremity edema, no clubbing or cyanosis  Neurologic: Alert and oriented x4, moving all extremities with good strength     VITAL SIGNS: 24 HRS MIN & MAX LAST   Temp  Min: 97.6 °F (36.4 °C)  Max: 97.9 °F (36.6 °C) 97.9 °F (36.6 °C)   BP  Min: 101/60  Max: 110/56 (!) 105/59   Pulse  Min: 70  Max: 74  74   Resp  Min: 15  Max: 18 15   SpO2  Min: 95 %  Max: 99 % 96 %       Recent Labs   Lab 07/21/25 0317   WBC 15.99*   RBC 3.04*   HGB 8.7*   HCT 26.3*   MCV 86.5   MCH 28.6   MCHC 33.1   RDW 21.9*      MPV 11.3*         Recent Labs   Lab 07/21/25 0317   NA  133*   K 5.0      CO2 20*   BUN 80.2*   CREATININE 4.06*   GLU 76*   CALCIUM 7.7*   ALBUMIN 1.6*   PROT 8.0*   ALKPHOS 223*   *   *   BILITOT 3.8*          Microbiology Results (last 7 days)       Procedure Component Value Units Date/Time    Fungal Culture [[id number]]  (Normal) Collected: 07/07/25 1639    Order Status: Completed Specimen: Aspirate from Gallbladder Updated: 07/21/25 1801     Fungal Culture No Fungus Isolated at 2 Weeks             Scheduled Med:   ARIPiprazole  10 mg Oral Daily    EScitalopram oxalate  20 mg Oral Daily    folic acid  1 mg Oral Daily    lactulose 10 gram/15 ml  15 g Oral Daily    nicotine  1 patch Transdermal Daily    pantoprazole  40 mg Intravenous BID    thiamine  100 mg Oral Daily      Patient meets ASPEN criteria for moderate malnutrition of chronic illness per RD assessment as evidenced by:  Energy Intake (Malnutrition): other (see comments) (Unable to assess)  Weight Loss (Malnutrition): greater than 7.5% in 3 months  Subcutaneous Fat (Malnutrition): mild depletion  Muscle Mass (Malnutrition): mild depletion  Fluid Accumulation (Malnutrition): other (see comments) (Not present)        A minimum of two characteristics is recommended for diagnosis of either severe or non-severe malnutrition.      Assessment/Plan:    Acute ischemic CVA -left thalamus  Positive bubble study  Suicidal ideation placed under pec 7/9, revoked 7/12  Symptomatic cholelithiasis with suspected cholecystitis status post CT-guided percutaneous cholecystostomy tube placement 7/7  Sepsis secondary to above-improving   Acute on chronic hyperbilirubinemia with transaminitis-slowly improving  Decompensated cirrhosis with large volume recurrent ascites status post paracentesis 7/11, removed 2.2 L, reaccumulating now  Supratherapeutic INR secondary to advanced liver disease and Xarelto  Hypervolemic hyponatremia   Mild metabolic acidosis  Portal vein thrombosis  Suspected HCC  Suspected  bilateral pneumonia-HA P  Substance use disorder  Chronic hep C   History of essential HTN  Sciatica   History of CTS   Physical deconditioning  Cancer related fatigue  Comfort Measures      Evaluated by Neurology for left thalamic CVA and recommended to continue Xarelto which he was on for portal vein thrombosis   No statin given transaminitis  No large vessel occlusion  Bubble study has come back positive, Arrange outpatient follow up with Cardiology for OLAMIDE  Lexapro increased to 20 mg.  Continue Abilify  Psych team is okay to cancel pec, revoked 7/12  For cholecystitis needing cholecystostomy tube placement, patient was deemed poor surgical candidate   Zosyn was discontinued on 07/17 as patient has received more than 2 weeks of antibiotic therapy and has had normal white count along with remaining afebrile.  Cultures have all been negative so far  Bloody output through cholecystostomy and output is also increased most likely secondary to leaking ascitic fluid  Reconsulted general surgery, no plans for intervention  Supratherapeutic INR resolved; will continue to hold xarelto as patient is hospice now  Underwent paracentesis for large volume ascites on 07/11, reaccumulating quickly   Renal function continues to be worsen.  Urine studies were obtained yesterday and indicate prerenal etiology.  Hepatorenal could also be playing a factor.  Nephrology consulted and recommended hospice.  Patient is poor candidate for dialysis; signed off  We will consult IR for plerux catheter for comfort measures  Inpatient hospice was attempted however patient does not meet criteria.  Case management palliative care consulted for discussing hospice and nursing home versus home  Hyperbilirubinemia with transaminitis noted, worsening  Continue lactulose to prevent hepatic encephalopathy  Patient has very poor prognosis and very high-risk of decompensation   Palliative care have family discussion today and hospice was decided.  Patient  will be going to nursing home with hospice.  Case management consulted  DVT prophylaxis: kayce Griggs DO  Department of Hospital Medicine  [facility]  07/21/2025

## 2025-07-22 NOTE — PT/OT/SLP PROGRESS
Physical Therapy Treatment    Patient Name:  Aman Humphrey   MRN:  [id number]    Recommendations:     Discharge therapy intensity: Moderate Intensity Therapy (or home with 24hr care)   Discharge Equipment Recommendations: to be determined by next level of care  Barriers to discharge: Impaired mobility and Ongoing medical needs    Assessment:     Aman Humphrey is a 64 y.o. male admitted with a medical diagnosis of L thalamus CVA, cholelithiasis with suspected cholecystitis s/p cholecystectomy tube placement 7/7. .  He presents with the following impairments/functional limitations: weakness, impaired endurance, impaired self care skills, impaired functional mobility, gait instability, impaired balance, decreased safety awareness, impaired cognition .    Pt declining to participate in PT tx but agreeable to sit EOB to attempt to eat breakfast. While sitting EOB NSG changing pt bandage over drain site. Pt refusing to stay sitting EOB and adamant about laying down. Assist pt in laying back down and left with all needs in reach. Per NSG pt going to NH with hospice. Plan to speak to supervising PT in regards to signing off pt's case.     Rehab Prognosis: Fair; patient would benefit from acute skilled PT services to address these deficits and reach maximum level of function.    Recent Surgery: * No surgery found *      Plan:     During this hospitalization, patient would benefit from acute PT services 3 x/week to address the identified rehab impairments via gait training, therapeutic exercises, therapeutic activities and progress toward the following goals:    Plan of Care Expires:  08/12/25    Subjective     Chief Complaint:   Patient/Family Comments/goals:   Pain/Comfort:  Pain Rating 1: 0/10      Objective:     Communicated with NSG prior to session.  Patient found HOB elevated with Other (comments) (cholecystectomy drain) upon PT entry to room.     General Precautions: Standard, fall  Orthopedic Precautions:  N/A  Braces: N/A  Respiratory Status: Room air  Blood Pressure:   Skin Integrity: Visible skin intact      Functional Mobility:  Bed Mobility:     Scooting: moderate assistance  Supine to Sit: moderate assistance  Sit to Supine: moderate assistance  Stat/dyn sitting: pt sat EOB for roughly 8min. Pt varied CGA-modA with R lateral lean.       Co-Treatment: Yes, due to Limited activity tolerance      Patient left HOB elevated with all lines intact and call button in reach      GOALS:   Multidisciplinary Problems       Physical Therapy Goals          Problem: Physical Therapy    Goal Priority Disciplines Outcome Interventions   Physical Therapy Goal     PT, PT/OT Progressing    Description: Goals to be met by: 25     Patient will increase functional independence with mobility by performin. Sit to stand transfer with Modified [place]  2. Bed to chair transfer with Modified [place] using LRAD  3. Gait  x 300 feet with Modified [place] using LRAD.                          Time Tracking:     PT Received On: 25  PT Start Time: 08     PT Stop Time: 0852  PT Total Time (min): 17 min     Billable Minutes: Therapeutic Activity 17    Treatment Type: Treatment  PT/PTA: PTA     Number of PTA visits since last PT visit: 2     2025

## 2025-07-22 NOTE — PT/OT/SLP PROGRESS
Physical Therapy      Patient Name:  Aman Humphrey   MRN:  [id number]    Conference held with MADI Gutiérrez. Patient with poor participation in therapy and is now going to NH with hospice. PT signing off at this time.

## 2025-07-22 NOTE — PLAN OF CARE
Problem: Adult Inpatient Plan of Care  Goal: Plan of Care Review  Outcome: Progressing  Goal: Patient-Specific Goal (Individualized)  Outcome: Progressing  Goal: Absence of Hospital-Acquired Illness or Injury  Outcome: Progressing  Goal: Optimal Comfort and Wellbeing  Outcome: Progressing  Intervention: Monitor Pain and Promote Comfort  Flowsheets (Taken 7/22/2025 0411)  Pain Management Interventions:   care clustered   quiet environment facilitated   pillow support provided  Intervention: Provide Person-Centered Care  Flowsheets (Taken 7/22/2025 0411)  Trust Relationship/Rapport:   care explained   choices provided   thoughts/feelings acknowledged   questions answered  Goal: Readiness for Transition of Care  Outcome: Progressing     Problem: Skin Injury Risk Increased  Goal: Skin Health and Integrity  Outcome: Progressing  Intervention: Optimize Skin Protection  Flowsheets (Taken 7/22/2025 0411)  Pressure Reduction Techniques: frequent weight shift encouraged  Skin Protection: incontinence pads utilized  Activity Management: Rolling - L1  Head of Bed (HOB) Positioning: HOB at 20-30 degrees     Problem: Coping Ineffective  Goal: Effective Coping  Outcome: Progressing  Intervention: Support and Enhance Coping Strategies  Flowsheets (Taken 7/22/2025 0411)  Supportive Measures:   active listening utilized   self-care encouraged  Family/Support System Care: self-care encouraged  Environmental Support: calm environment promoted     Problem: Wound  Goal: Optimal Coping  Outcome: Progressing  Intervention: Support Patient and Family Response  Flowsheets (Taken 7/22/2025 0411)  Supportive Measures:   active listening utilized   self-care encouraged  Family/Support System Care: self-care encouraged

## 2025-07-22 NOTE — ASSESSMENT & PLAN NOTE
Patient has been screened and assessed by RD.    Malnutrition Type:  Context:  chronic illness  Level: severe    Malnutrition Characteristic Summary:  Weight Loss (Malnutrition): greater than 7.5% in 3 months  Energy Intake (Malnutrition): other (see comments) (Unable to assess)  Subcutaneous Fat (Malnutrition): severe depletion  Muscle Mass (Malnutrition): severe depletion  Fluid Accumulation (Malnutrition): other (see comments) (Not present)    Interventions/Recommendations (treatment strategy):  Oral diet/nutrient modifications;Oral nutritional supplement

## 2025-07-22 NOTE — PROGRESS NOTES
Inpatient Nutrition Assessment    Admit Date: 7/3/2025   Total duration of encounter: 19 days   Patient Age: 64 y.o.    Nutrition Recommendation/Prescription     Liberalize diet to Regular diet  Continue Boost Plus TID (360 kcal and 14 gm protein per serving)  Continue folic acid and thiamine, consider MVI  Monitor PO intake, labs and weight  Honor pt/family wishes    Communication of Recommendations: reviewed with family and EMR    Nutrition Assessment     Malnutrition Assessment/Nutrition-Focused Physical Exam    Malnutrition Context: chronic illness (07/22/25 1319)  Malnutrition Level: severe (07/22/25 1319)  Energy Intake (Malnutrition): other (see comments) (Unable to assess) (07/22/25 1319)  Weight Loss (Malnutrition): greater than 7.5% in 3 months (07/22/25 1319)  Subcutaneous Fat (Malnutrition): severe depletion (07/22/25 1319)  Orbital Region (Subcutaneous Fat Loss): moderate depletion  Upper Arm Region (Subcutaneous Fat Loss): severe depletion     Muscle Mass (Malnutrition): severe depletion (07/22/25 1319)  [place] Region (Muscle Loss): severe depletion  Clavicle Bone Region (Muscle Loss): severe depletion                    Fluid Accumulation (Malnutrition): other (see comments) (Not present) (07/22/25 1319)        A minimum of two characteristics is recommended for diagnosis of either severe or non-severe malnutrition.    Chart Review    Reason Seen: follow-up    Malnutrition Screening Tool Results   Have you recently lost weight without trying?: Yes: 24-33 lbs  Have you been eating poorly because of a decreased appetite?: Yes   MST Score: 4   Diagnosis:  Acute ischemic CVA -left thalamus  Positive bubble study  Suicidal ideation placed under pec 7/9, revoked 7/12  Symptomatic cholelithiasis with suspected cholecystitis status post CT-guided percutaneous cholecystostomy tube placement 7/7  Sepsis secondary to above-improving   Acute on chronic hyperbilirubinemia with transaminitis-slowly  improving  Decompensated cirrhosis with large volume recurrent ascites status post paracentesis 7/11, removed 2.2 L, reaccumulating now  Supratherapeutic INR  Hypervolemic hyponatremia   Mild metabolic acidosis  Portal vein thrombosis  Suspected HCC  Suspected bilateral pneumonia-HA P  Substance use disorder  Chronic hep C   History of essential HTN  Sciatica   History of CTS   Physical deconditioning  Prophylaxis    Relevant Medical History: carpal tunnel syndrome, HTN, sciatica, portal vein thrombosis, cocaine use, hepatitis-C, hepatic adenoma     Scheduled Medications:  ARIPiprazole, 10 mg, Daily  EScitalopram oxalate, 20 mg, Daily  folic acid, 1 mg, Daily  lactulose 10 gram/15 ml, 15 g, Daily  nicotine, 1 patch, Daily  pantoprazole, 40 mg, BID  thiamine, 100 mg, Daily    Continuous Infusions:   PRN Medications:  0.9%  NaCl infusion (for blood administration), , Q24H PRN  0.9%  NaCl infusion (for blood administration), , Q24H PRN  acetaminophen, 500 mg, Q6H PRN  dextrose 50%, 12.5 g, PRN  dextrose 50%, 25 g, PRN  glucagon (human recombinant), 1 mg, PRN  glucose, 16 g, PRN  glucose, 24 g, PRN  HYDROcodone-acetaminophen, 1 tablet, Q6H PRN  HYDROcodone-acetaminophen, 1 tablet, Q6H PRN  labetalol, 10 mg, Q4H PRN  lactulose 10 gram/15 ml, 20 g, TID PRN  melatonin, 6 mg, Nightly PRN  ondansetron, 4 mg, Q6H PRN  sodium chloride 0.9%, 10 mL, PRN    Calorie Containing IV Medications: no significant kcals from medications at this time    Recent Labs   Lab 07/16/25  0441 07/17/25  0353 07/18/25  0434 07/19/25  0542 07/20/25  0609 07/21/25  0317   * 132* 131* 132* 133* 133*   K 4.9 5.6* 5.0 5.1 4.7 5.0   CALCIUM 7.7* 8.0* 7.6* 7.3* 7.4* 7.7*    100 99 102 104 103   CO2 22* 21* 23 20* 21* 20*   BUN 28.4* 41.2* 59.0* 69.5* 70.8* 80.2*   CREATININE 2.09* 3.44* 4.73* 5.14* 4.62* 4.06*   EGFRNORACEVR 35 19 13 12 13 16   GLU 75* 80* 78* 72* 70* 76*   BILITOT 2.2*  --  2.1* 2.5* 3.5* 3.8*   ALKPHOS 244*  --  207* 206*  216* 223*   *  --  204* 353* 405* 332*   *  --  224* 362* 340* 251*   ALBUMIN 1.3*  --  1.3* 1.4* 1.4* 1.6*   WBC 7.87  --  10.77  --  14.48* 15.99*   HGB 8.3* 7.6* 6.9* 7.4* 9.1* 8.7*   HCT 25.7* 24.2* 21.1* 23.2* 27.8* 26.3*     Nutrition Orders:  Diet Heart Healthy Standard Tray  Dietary nutrition supplements BID; Boost Plus Nutritional Drink - Very Vanilla    Appetite/Oral Intake: poor/25-50% of meals  Factors Affecting Nutritional Intake: impaired cognitive status/motor control, decreased appetite, and inability to feed self  Social Needs Impacting Access to Food: will be going to [facility] home with hospice  Food/Sabianism/Cultural Preferences: unable to obtain  Food Allergies: no known food allergies  Last Bowel Movement: 07/21/25  Wound(s):  none documented    Comments    7/5/25: Noted MST indicates wt loss and decreased appetite. Unable to verify with pt, pt confused and hard to understand at time of RD visit. Noted EMR wts confirm wt loss over past few months. Awaiting MRCP results per MD notes. Due to malnourished state and NPO, may need to consider starting PN. Currently receiving minimal D5.     7/7/2025: Pt NPO for IR today per nurse. Pt reportedly thirsty. The nurse denies N/V. Last BM noted. Will monitor.     7/10/2025: The sitter reports a poor appetite/PO intake and denies N/V/D/C. Will add ONS to help meet needs. Will monitor.     7/14/2025: Pt's poor appetite/PO intake continues. Pt may benefit from appetite stimulant if medically feasible.  No reported N/V. Last BM noted. Will monitor.    7/17/25: Pt continues with decreased appetite. Will trial ONS BID to aid with nutrition intake. Pt may benefit from liberalized diet and appetite stimulant. No reports of n/v/d/c; LBM 7/17. Will monitor.    7/22/25: Pt continues with decline in status. Per Nephrology, patient poor dialysis candidate. On lactulose for hepatic encephalopathy. Currently pt getting supervision with meals and an assist feed.  " Family at bedside stated he is eating only bites of food and sips of water. Requested Sprite with meals. Also providing oral supplement. Muscle and fat wasting noted on assessment. Family and patient wanting nsg home with hospice. Family noted no other needs at this time. Will liberalize diet.     Anthropometrics    Height: 5' 8.9" (175 cm), Height Method: Stated  Last Weight: 57.2 kg (126 lb) (25 0927), Weight Method: Bed Scale  BMI (Calculated): 18.7  BMI Classification: normal (BMI 18.5-24.9)        Ideal Body Weight (IBW), Male: 159.4 lb     % Ideal Body Weight, Male (lb): 74.82 %                 Usual Body Weight (UBW), k kg  % Usual Body Weight: 91.89  % Weight Change From Usual Weight: -8.45 %  Usual Weight Provided By: EMR weight history    Wt Readings from Last 5 Encounters:   25 57.2 kg (126 lb)   25 54.4 kg (120 lb)   25 59 kg (130 lb)   25 59 kg (130 lb)   23 63 kg (138 lb 14.2 oz)     Weight Change(s) Since Admission:   Wt Readings from Last 1 Encounters:   25 0927 57.2 kg (126 lb)   25 1153 54.1 kg (119 lb 4.3 oz)   25 1704 54.1 kg (119 lb 4.3 oz)   Admit Weight: 54.1 kg (119 lb 4.3 oz) (25 1704), Weight Method: Standard Scale    2025: 54.1 kg  7/10/2025: no new wts  2025: no new wts  2025: no new weights   : 57.2kg noted per EMR    Estimated Needs    Weight Used For Calorie Calculations: 54.1 kg (119 lb 4.3 oz)  Energy Calorie Requirements (kcal): 0409-5233 (30-35 kcal/kg)  Energy Need Method: Kcal/kg  Weight Used For Protein Calculations: 54.1 kg (119 lb 4.3 oz)  Protein Requirements: 65 (1.2 g/kg)  Fluid Requirements (mL): 1623 (1 mL/kcal)  CHO Requirement: 182-223 g/day (~45-55% est min kcal needs)     Enteral Nutrition     Patient not receiving enteral nutrition at this time.    Parenteral Nutrition     Patient not receiving parenteral nutrition support at this time.    Evaluation of Received Nutrient " Intake    Calories: not meeting estimated needs  Protein: not meeting estimated needs    Patient Education     Not applicable.    Nutrition Diagnosis     PES: Inadequate oral intake related to acute illness as evidenced by poor PO intake. (active)     PES: Severe chronic disease or condition related malnutrition Related to chronic condition As Evidenced by:  - weight loss: > 7.5% in 3 months - muscle mass depletion: 2 areas of mild muscle loss (Clavicle, Temporalis) - loss of subcutaneous fat: 1 area of mild fat loss (Buccal) active    Nutrition Interventions     Intervention(s): General/healthful diet, Medical food supplement therapy, and Vitamin and mineral supplement therapy  Intervention(s): Vitamin and mineral supplement therapy, General healthful diet, Medical food supplement therapy    Goal: Meet greater than 80% of nutritional needs by follow-up. (goal not met)  Goal: Consume % of meals/snacks by follow-up. (goal not met)    Nutrition Goals & Monitoring     Dietitian will monitor: food and beverage intake, weight, electrolyte/renal panel, and gastrointestinal profile  Discharge planning: continue regular vs heart healthy diet with boost or similar oral supplements  Nutrition Risk/Follow-Up: patient at increased nutrition risk; dietitian will follow-up twice weekly   Please consult if re-assessment needed sooner.

## 2025-07-22 NOTE — PT/OT/SLP PROGRESS
Occupational Therapy   Treatment    Name: Aman Humphrey  MRN: [id number]    Recommendations:     Recommended therapy intensity at discharge: Moderate Intensity Therapy (vs. low intensity therapy with 24/7 supervision)   Discharge Equipment Recommendations:  to be determined by next level of care  Barriers to discharge:       Assessment:     Aman Humphrey is a 64 y.o. male with a medical diagnosis of L thalamus CVA, cholelithiasis with suspected cholecystitis s/p cholecystectomy tube placement 7/7. Performance deficits affecting function are weakness, impaired endurance, impaired self care skills, impaired functional mobility, gait instability, decreased safety awareness, impaired cognition.     Pt with poor tolerance to OT session. Pt presents with significant weakness and limited willingness to participate. Was agreeable to sitting EOB to eat however unable to tolerate long. Plans for d/c to nursing home with hospice and pt no longer interested in therapy. Will speak with OT to sign off.     Rehab Prognosis:  Good; patient would benefit from acute skilled OT services to address these deficits and reach maximum level of function.       Plan:     Patient to be seen 3 x/week to address the above listed problems via self-care/home management, therapeutic activities, therapeutic exercises  Plan of Care Expires: 07/26/25  Plan of Care Reviewed with: patient    Subjective     Pain/Comfort:  Pain Rating 1: 0/10    Objective:     Communicated with: RN prior to session.  Patient found HOB elevated with  (cholecystectomy drain) upon OT entry to room.    General Precautions: Standard, fall    Orthopedic Precautions:N/A  Braces: N/A  Respiratory Status: Room air     Occupational Performance:     Functional Mobility/Transfers:  Bed mobility:    Rolling Left:  moderate assistance  Rolling Right: moderate assistance  Supine to Sit: moderate assistance  Sit to Supine: moderate assistance    Activities of Daily Living:  Feeding:   moderate assistance to eat breakfast while seated EOB. Required assist to open all packages/containers. Also required assist to scoop food and grasp utensils. Able to bring to mouth twice before fatigued.   Upper Body Dressing: moderate assistance to don gown.   Lower Body Dressing: maximal assistance to don brief in supine.     Balance:   Static Sitting Balance: Bilateral UE support: Fair: Patient able to maintain balance with handhold support; may require occasional minimal assistance.    Therapeutic Positioning    OT interventions performed during the course of today's session in an effort to prevent and/or reduce acquired pressure injuries:   Education was provided on benefits of and recommendations for therapeutic positioning    [facility] 6 Click ADL: 12    Co-Treatment: Yes, due to Limited activity tolerance    Patient Education:  Patient provided with verbal education education regarding OT role/goals/POC and safety awareness.  Understanding was verbalized, however additional teaching warranted.      Patient left HOB elevated with all lines intact and call button in reach.    GOALS:   Multidisciplinary Problems       Occupational Therapy Goals          Problem: Occupational Therapy    Goal Priority Disciplines Outcome Interventions   Occupational Therapy Goal     OT, PT/OT     Description: LTG: Pt will perform basic ADLs and ADL transfers with Modified independence using LRAD by discharge.                       Time Tracking:     OT Date of Treatment: 07/22/25  OT Start Time: 0836  OT Stop Time: 0853  OT Total Time (min): 17 min    Billable Minutes:Self Care/Home Management 17    OTR/L readily available for conference at the time of the provision of services: Alicia Lejeune, LOTR  OT/DION: DION     Number of DION visits since last OT visit: 1 7/22/2025

## 2025-07-22 NOTE — PLAN OF CARE
[facility] [place] updates to [facility] as family requested. Emelina with [employer] just called to say they are speaking to family about submitting financials.   Abdomen soft, non-tender, no guarding.

## 2025-07-23 LAB
ERYTHROCYTE [DISTWIDTH] IN BLOOD BY AUTOMATED COUNT: 21.7 % (ref 11.5–17)
HCT VFR BLD AUTO: 28.3 % (ref 42–52)
HGB BLD-MCNC: 9.3 G/DL (ref 14–18)
MCH RBC QN AUTO: 28.7 PG (ref 27–31)
MCHC RBC AUTO-ENTMCNC: 32.9 G/DL (ref 33–36)
MCV RBC AUTO: 87.3 FL (ref 80–94)
NRBC BLD AUTO-RTO: 0 %
PLATELET # BLD AUTO: 203 X10(3)/MCL (ref 130–400)
PMV BLD AUTO: 8.9 FL (ref 7.4–10.4)
RBC # BLD AUTO: 3.24 X10(6)/MCL (ref 4.7–6.1)
WBC # BLD AUTO: 13.03 X10(3)/MCL (ref 4.5–11.5)

## 2025-07-23 PROCEDURE — 11000001 HC ACUTE MED/SURG PRIVATE ROOM

## 2025-07-23 PROCEDURE — 36415 COLL VENOUS BLD VENIPUNCTURE: CPT | Performed by: STUDENT IN AN ORGANIZED HEALTH CARE EDUCATION/TRAINING PROGRAM

## 2025-07-23 PROCEDURE — 25000003 PHARM REV CODE 250: Performed by: STUDENT IN AN ORGANIZED HEALTH CARE EDUCATION/TRAINING PROGRAM

## 2025-07-23 PROCEDURE — 25000003 PHARM REV CODE 250: Performed by: INTERNAL MEDICINE

## 2025-07-23 PROCEDURE — 63600175 PHARM REV CODE 636 W HCPCS: Performed by: STUDENT IN AN ORGANIZED HEALTH CARE EDUCATION/TRAINING PROGRAM

## 2025-07-23 PROCEDURE — 0W9G3ZZ DRAINAGE OF PERITONEAL CAVITY, PERCUTANEOUS APPROACH: ICD-10-PCS | Performed by: INTERNAL MEDICINE

## 2025-07-23 PROCEDURE — S4991 NICOTINE PATCH NONLEGEND: HCPCS | Performed by: HOSPITALIST

## 2025-07-23 PROCEDURE — 25000003 PHARM REV CODE 250: Performed by: HOSPITALIST

## 2025-07-23 PROCEDURE — 85027 COMPLETE CBC AUTOMATED: CPT | Performed by: STUDENT IN AN ORGANIZED HEALTH CARE EDUCATION/TRAINING PROGRAM

## 2025-07-23 RX ADMIN — HYDROCODONE BITARTRATE AND ACETAMINOPHEN 1 TABLET: 5; 325 TABLET ORAL at 09:07

## 2025-07-23 RX ADMIN — THIAMINE HCL TAB 100 MG 100 MG: 100 TAB at 09:07

## 2025-07-23 RX ADMIN — LACTULOSE 15 G: 10 SOLUTION ORAL at 09:07

## 2025-07-23 RX ADMIN — ESCITALOPRAM OXALATE 20 MG: 10 TABLET ORAL at 09:07

## 2025-07-23 RX ADMIN — NICOTINE 1 PATCH: 14 PATCH TRANSDERMAL at 09:07

## 2025-07-23 RX ADMIN — FOLIC ACID 1 MG: 1 TABLET ORAL at 09:07

## 2025-07-23 RX ADMIN — ARIPIPRAZOLE 10 MG: 5 TABLET ORAL at 09:07

## 2025-07-23 RX ADMIN — PANTOPRAZOLE SODIUM 40 MG: 40 INJECTION, POWDER, FOR SOLUTION INTRAVENOUS at 10:07

## 2025-07-23 NOTE — PROGRESS NOTES
[facility] Hospital Medicine Progress Note        Chief Complaint: Inpatient Follow-up    HPI:     64-year-old male with significant history of carpal tunnel syndrome, HTN, sciatica, portal vein thrombosis, cocaine use, chronic hep C, hepatic adenoma concerning for HCC, cirrhosis presented to the ED with complaints of progressively worsening abdominal pain for the past 2 months.  Patient was noted to have acute on chronic hyperbilirubinemia with leukocytosis.  He initially presented to outlying facility and was transferred to our hospital for higher level of care, patient was admitted to hospital medicine services, Gastroenterology, general surgery services consulted, MRCP was ordered to further evaluate worsening liver function test.  MRCP revealed findings concerning for HCC, distended gallbladder with cholelithiasis and mild intrahepatic biliary ductal dilation, evaluation was challenging secondary to underlying portal vein thrombus.  Suspicion for cholecystitis given clinical presentation.  No evidence of choledocholithiasis, no indication for ERCP per Gastroenterology, recommended anticoagulation for portal vein thrombosis.  Ultrasound with positive from Chang's sign and thickened gallbladder.  General surgery evaluated for possible cholecystitis, poor surgical candidate and therefore interventional radiology was consulted for percutaneous cholecystostomy tube placement and this was done on 7/7.  Zosyn sky, General surgery Plan to follow him in clinic as outpatient, GI planning for repeating triple phase as outpatient in 3 months and also arrange follow up with hepatology in [place].  Patient reported suicidal ideation on 07/09 and therefore psych consulted and he was placed under pec/one-to-one observation.  Previous CT with concerns for pontine/left internal capsule ischemic CVA, MRI was ordered to further evaluate.  Ultrasound abdomen ordered to quantify ascites on  07/10.  Pec and one-to-one observation continued.  Large volume ascites noted and therefore IR consulted for paracentesis on 07/11, MRI came back positive for thalamic CVA, Neurology consulted, neurology recommended to continue anticoagulation, no statin given transaminitis, pec continued as of 7/11.  Patient with no more suicidal or homicidal ideation and therefore Pec was revoked on 07/12.  INR was more than 5 on 07/12, Xarelto held, no overt bleeding, trended down to 3.7 on 07/13.  INR trended down to less than 2 on 07/14 and therefore Xarelto resumed.  Patient was on Xarelto for 2 days before supratherapeutic INR again had resulted at 8 point 4.  Patient was given Kcentra and vitamin K and Xarelto was on hold.  CT abdomen and pelvis done with no acute bleeding seen.  Nephrology was consulted due to worsening SOCORRO.  Patient deemed poor dialysis candidate.  Recommended hospice.  Palliative Care was consulted.  Family discussion was placed and family is choosing hospice    Interval Hx:   Patient seen and examined by bedside.  No acute overnight events.  Patient appears comfortable.  Denies any pain.  Sister by bedside.     Objective/physical exam:  General:  Acutely ill-appearing male afebrile, cachectic  Chest: Clear to auscultation bilaterally  Heart: S1, S2, no appreciable murmur  Abdomen: Soft, nontender, BS +  MSK: Warm, no lower extremity edema, no clubbing or cyanosis  Neurologic: lethargic     VITAL SIGNS: 24 HRS MIN & MAX LAST   Temp  Min: 97.6 °F (36.4 °C)  Max: 98.3 °F (36.8 °C) 97.8 °F (36.6 °C)   BP  Min: 92/46  Max: 122/68 118/67   Pulse  Min: 70  Max: 79  74   Resp  Min: 18  Max: 20 20   SpO2  Min: 96 %  Max: 100 % 97 %       Recent Labs   Lab 07/21/25 0317   WBC 15.99*   RBC 3.04*   HGB 8.7*   HCT 26.3*   MCV 86.5   MCH 28.6   MCHC 33.1   RDW 21.9*      MPV 11.3*         Recent Labs   Lab 07/21/25 0317   *   K 5.0      CO2 20*   BUN 80.2*   CREATININE 4.06*   GLU 76*   CALCIUM  7.7*   ALBUMIN 1.6*   PROT 8.0*   ALKPHOS 223*   *   *   BILITOT 3.8*          Microbiology Results (last 7 days)       Procedure Component Value Units Date/Time    Fungal Culture [[id number]]  (Normal) Collected: 07/07/25 1639    Order Status: Completed Specimen: Aspirate from Gallbladder Updated: 07/21/25 1801     Fungal Culture No Fungus Isolated at 2 Weeks             Scheduled Med:   ARIPiprazole  10 mg Oral Daily    EScitalopram oxalate  20 mg Oral Daily    folic acid  1 mg Oral Daily    lactulose 10 gram/15 ml  15 g Oral Daily    nicotine  1 patch Transdermal Daily    pantoprazole  40 mg Intravenous BID    thiamine  100 mg Oral Daily      Patient meets ASPEN criteria for severe malnutrition of chronic illness per RD assessment as evidenced by:  Energy Intake (Malnutrition): other (see comments) (Unable to assess)  Weight Loss (Malnutrition): greater than 7.5% in 3 months  Subcutaneous Fat (Malnutrition): severe depletion  Muscle Mass (Malnutrition): severe depletion  Fluid Accumulation (Malnutrition): other (see comments) (Not present)        A minimum of two characteristics is recommended for diagnosis of either severe or non-severe malnutrition.      Assessment/Plan:    Comfort Measures  Acute ischemic CVA -left thalamus  Positive bubble study  Suicidal ideation placed under pec 7/9, revoked 7/12  Symptomatic cholelithiasis with suspected cholecystitis status post CT-guided percutaneous cholecystostomy tube placement 7/7  Acute on chronic hyperbilirubinemia with transaminitis  Decompensated cirrhosis with large volume recurrent ascites status post paracentesis 7/11, removed 2.2 L, paracentesis again on 07/23, 1.4 L removed  Supratherapeutic INR secondary to advanced liver disease and Xarelto, resolved, Xarelto has been on hold  Hypervolemic hyponatremia   Mild metabolic acidosis  Portal vein thrombosis  Suspected HCC  Suspected bilateral pneumonia-HA P  Substance use disorder  Chronic hep C    History of essential HTN  Sciatica   History of CTS   Physical deconditioning  Cancer related fatigue        Evaluated by Neurology for left thalamic CVA and recommended to continue Xarelto which he was on for portal vein thrombosis   No statin given transaminitis  No large vessel occlusion  Bubble study has come back positive,   Lexapro increased to 20 mg.  Continue Abilify  Psych team is okay to cancel pec, revoked 7/12  For cholecystitis needing cholecystostomy tube placement, patient was deemed poor surgical candidate   Zosyn was discontinued on 07/17 as patient has received more than 2 weeks of antibiotic therapy and has had normal white count along with remaining afebrile.  Cultures have all been negative so far  Bloody output through cholecystostomy and output is also increased most likely secondary to leaking ascitic fluid  Reconsulted general surgery, no plans for intervention  Supratherapeutic INR resolved; will continue to hold xarelto as patient is hospice now  Underwent paracentesis for large volume ascites on 07/11, done again today on 7/23 and 1.4 L removed  Renal function continues to be worsen.  Hepatorenal could be playing a factor.  Nephrology consulted and recommended hospice.  Patient is poor candidate for dialysis; signed off  We will consult IR for plerux catheter for comfort measures; they will do paracentesis first   IR unwilling to place PleurX catheter due to leukocytosis, repeat CBC and see if leukocytosis resolved.  Discuss with IR that patient is hospice however still not willing   Inpatient hospice was attempted however patient does not meet criteria.  Case management palliative care consulted for discussing hospice and nursing home versus home  Continue lactulose to prevent hepatic encephalopathy  Palliative care have family discussion and hospice was decided.  Patient will be going to nursing home with hospice.  Case management consulted  DVT prophylaxis: nicanors      Xiomy Griggs,    Department of Hospital Medicine  [facility]  07/23/2025

## 2025-07-23 NOTE — PLAN OF CARE
Kenneth from [facility] is here to see the patient.  He stated that the patient's sister called him.

## 2025-07-23 NOTE — PROGRESS NOTES
[facility] Hospital Medicine Progress Note        Chief Complaint: Inpatient Follow-up    HPI:     64-year-old male with significant history of carpal tunnel syndrome, HTN, sciatica, portal vein thrombosis, cocaine use, chronic hep C, hepatic adenoma concerning for HCC, cirrhosis presented to the ED with complaints of progressively worsening abdominal pain for the past 2 months.  Patient was noted to have acute on chronic hyperbilirubinemia with leukocytosis.  He initially presented to outlying facility and was transferred to our hospital for higher level of care, patient was admitted to hospital medicine services, Gastroenterology, general surgery services consulted, MRCP was ordered to further evaluate worsening liver function test.  MRCP revealed findings concerning for HCC, distended gallbladder with cholelithiasis and mild intrahepatic biliary ductal dilation, evaluation was challenging secondary to underlying portal vein thrombus.  Suspicion for cholecystitis given clinical presentation.  No evidence of choledocholithiasis, no indication for ERCP per Gastroenterology, recommended anticoagulation for portal vein thrombosis.  Ultrasound with positive from Chang's sign and thickened gallbladder.  General surgery evaluated for possible cholecystitis, poor surgical candidate and therefore interventional radiology was consulted for percutaneous cholecystostomy tube placement and this was done on 7/7.  Zosyn sky, General surgery Plan to follow him in clinic as outpatient, GI planning for repeating triple phase as outpatient in 3 months and also arrange follow up with hepatology in [place].  Patient reported suicidal ideation on 07/09 and therefore psych consulted and he was placed under pec/one-to-one observation.  Previous CT with concerns for pontine/left internal capsule ischemic CVA, MRI was ordered to further evaluate.  Ultrasound abdomen ordered to quantify ascites on  07/10.  Pec and one-to-one observation continued.  Large volume ascites noted and therefore IR consulted for paracentesis on 07/11, MRI came back positive for thalamic CVA, Neurology consulted, neurology recommended to continue anticoagulation, no statin given transaminitis, pec continued as of 7/11.  Patient with no more suicidal or homicidal ideation and therefore Pec was revoked on 07/12.  INR was more than 5 on 07/12, Xarelto held, no overt bleeding, trended down to 3.7 on 07/13.  INR trended down to less than 2 on 07/14 and therefore Xarelto resumed.  Patient was on Xarelto for 2 days before supratherapeutic INR again had resulted at 8 point 4.  Patient was given Kcentra and vitamin K and Xarelto was on hold.  CT abdomen and pelvis done with no acute bleeding seen.  Nephrology was consulted due to worsening SOCORRO.  Patient deemed poor dialysis candidate.  Recommended hospice.  Palliative Care was consulted.  Family discussion was placed and family is choosing hospice    Interval Hx:   Patient seen and examined by bedside.  Appears comfortable.  Sleeping.  Sister by bedside.  Currently awaiting nursing home acceptance for hospice     Objective/physical exam:  General:  Acutely ill-appearing male afebrile, cachectic  Chest: Clear to auscultation bilaterally  Heart: S1, S2, no appreciable murmur  Abdomen: Soft, nontender, BS +  MSK: Warm, no lower extremity edema, no clubbing or cyanosis  Neurologic: lethargic     VITAL SIGNS: 24 HRS MIN & MAX LAST   Temp  Min: 97 °F (36.1 °C)  Max: 98.1 °F (36.7 °C) 98.1 °F (36.7 °C)   BP  Min: 97/54  Max: 124/72 118/61   Pulse  Min: 74  Max: 86  74   Resp  Min: 16  Max: 20 18   SpO2  Min: 95 %  Max: 98 % 98 %       Recent Labs   Lab 07/21/25 0317   WBC 15.99*   RBC 3.04*   HGB 8.7*   HCT 26.3*   MCV 86.5   MCH 28.6   MCHC 33.1   RDW 21.9*      MPV 11.3*         Recent Labs   Lab 07/21/25 0317   *   K 5.0      CO2 20*   BUN 80.2*   CREATININE 4.06*   GLU 76*    CALCIUM 7.7*   ALBUMIN 1.6*   PROT 8.0*   ALKPHOS 223*   *   *   BILITOT 3.8*          Microbiology Results (last 7 days)       Procedure Component Value Units Date/Time    Fungal Culture [[id number]]  (Normal) Collected: 07/07/25 1639    Order Status: Completed Specimen: Aspirate from Gallbladder Updated: 07/21/25 1801     Fungal Culture No Fungus Isolated at 2 Weeks             Scheduled Med:   ARIPiprazole  10 mg Oral Daily    EScitalopram oxalate  20 mg Oral Daily    folic acid  1 mg Oral Daily    lactulose 10 gram/15 ml  15 g Oral Daily    nicotine  1 patch Transdermal Daily    pantoprazole  40 mg Intravenous BID    thiamine  100 mg Oral Daily      Patient meets ASPEN criteria for severe malnutrition of chronic illness per RD assessment as evidenced by:  Energy Intake (Malnutrition): other (see comments) (Unable to assess)  Weight Loss (Malnutrition): greater than 7.5% in 3 months  Subcutaneous Fat (Malnutrition): severe depletion  Muscle Mass (Malnutrition): severe depletion  Fluid Accumulation (Malnutrition): other (see comments) (Not present)        A minimum of two characteristics is recommended for diagnosis of either severe or non-severe malnutrition.      Assessment/Plan:    Acute ischemic CVA -left thalamus  Positive bubble study  Suicidal ideation placed under pec 7/9, revoked 7/12  Symptomatic cholelithiasis with suspected cholecystitis status post CT-guided percutaneous cholecystostomy tube placement 7/7  Sepsis secondary to above-improving   Acute on chronic hyperbilirubinemia with transaminitis-slowly improving  Decompensated cirrhosis with large volume recurrent ascites status post paracentesis 7/11, removed 2.2 L, reaccumulating now  Supratherapeutic INR secondary to advanced liver disease and Xarelto  Hypervolemic hyponatremia   Mild metabolic acidosis  Portal vein thrombosis  Suspected HCC  Suspected bilateral pneumonia-HA P  Substance use disorder  Chronic hep C   History of  essential HTN  Sciatica   History of CTS   Physical deconditioning  Cancer related fatigue  Comfort Measures      Evaluated by Neurology for left thalamic CVA and recommended to continue Xarelto which he was on for portal vein thrombosis   No statin given transaminitis  No large vessel occlusion  Bubble study has come back positive, Arrange outpatient follow up with Cardiology for OLAMIDE  Lexapro increased to 20 mg.  Continue Abilify  Psych team is okay to cancel pec, revoked 7/12  For cholecystitis needing cholecystostomy tube placement, patient was deemed poor surgical candidate   Zosyn was discontinued on 07/17 as patient has received more than 2 weeks of antibiotic therapy and has had normal white count along with remaining afebrile.  Cultures have all been negative so far  Bloody output through cholecystostomy and output is also increased most likely secondary to leaking ascitic fluid  Reconsulted general surgery, no plans for intervention  Supratherapeutic INR resolved; will continue to hold xarelto as patient is hospice now  Underwent paracentesis for large volume ascites on 07/11, reaccumulating quickly   Renal function continues to be worsen.  Hepatorenal could be playing a factor.  Nephrology consulted and recommended hospice.  Patient is poor candidate for dialysis; signed off  We will consult IR for plerux catheter for comfort measures; they will do paracentesis first   Inpatient hospice was attempted however patient does not meet criteria.  Case management palliative care consulted for discussing hospice and nursing home versus home  Continue lactulose to prevent hepatic encephalopathy  Patient has very poor prognosis and very high-risk of decompensation   Palliative care have family discussion and hospice was decided.  Patient will be going to nursing home with hospice.  Case management consulted  DVT prophylaxis: kayce Griggs DO  Department of Hospital Medicine  [facility]  [place]  07/22/2025

## 2025-07-23 NOTE — PLAN OF CARE
Problem: Adult Inpatient Plan of Care  Goal: Plan of Care Review  Outcome: Progressing  Goal: Patient-Specific Goal (Individualized)  Outcome: Progressing  Goal: Absence of Hospital-Acquired Illness or Injury  Outcome: Progressing  Intervention: Identify and Manage Fall Risk  Flowsheets (Taken 7/23/2025 0038)  Safety Promotion/Fall Prevention:   assistive device/personal item within reach   Fall Risk reviewed with patient/family   side rails raised x 2  Intervention: Prevent Skin Injury  Flowsheets (Taken 7/23/2025 0038)  Body Position: weight shifting  Skin Protection: incontinence pads utilized  Device Skin Pressure Protection:   absorbent pad utilized/changed   tubing/devices free from skin contact  Intervention: Prevent and Manage VTE (Venous Thromboembolism) Risk  Flowsheets (Taken 7/23/2025 0038)  VTE Prevention/Management:   fluids promoted   bleeding precautions maintained  Intervention: Prevent Infection  Flowsheets (Taken 7/23/2025 0038)  Infection Prevention:   single patient room provided   rest/sleep promoted  Goal: Optimal Comfort and Wellbeing  Outcome: Progressing  Intervention: Monitor Pain and Promote Comfort  Flowsheets (Taken 7/23/2025 0038)  Pain Management Interventions:   care clustered   quiet environment facilitated   position adjusted   pillow support provided  Intervention: Provide Person-Centered Care  Flowsheets (Taken 7/23/2025 0038)  Trust Relationship/Rapport:   care explained   questions answered   thoughts/feelings acknowledged  Goal: Readiness for Transition of Care  Outcome: Progressing

## 2025-07-23 NOTE — PLAN OF CARE
REYMUNDO talked to Emelina with [facility] regarding financials. She stated patient was unable to remember his email information to retreive statements. Patient's bank requires that bank statements be mailed to patient and could take up to 5-7 business days.    PASSR/142 uploaded

## 2025-07-23 NOTE — INTERVAL H&P NOTE
The patient has been examined and the H&P has been reviewed:    I concur with the findings and no changes have occurred since H&P was written. Aman Humphrey is a 64 y.o. male with Cirrhosis & Ascites who presents for Paracentesis.           Active Hospital Problems    Diagnosis  POA    Stroke [I63.9]  Yes    Severe malnutrition [E43]  Yes     Patient has been screened and assessed by RD.    Malnutrition Type:  Context:  chronic illness  Level: moderate    Malnutrition Characteristic Summary:  Weight Loss (Malnutrition): other (see comments) (Does not meet criteria)  Energy Intake (Malnutrition): less than or equal to 75% for greater than or equal to 1 month  Subcutaneous Fat (Malnutrition): mild depletion  Muscle Mass (Malnutrition): moderate depletion  Fluid Accumulation (Malnutrition): other (see comments) (Not present)    Interventions/Recommendations (treatment strategy):  Oral nutritional supplement            Resolved Hospital Problems   No resolved problems to display.

## 2025-07-23 NOTE — PLAN OF CARE
[facility] sent clinical updates to [place] via [facility].    @[id number] Talked to Comfort with [facility] to check the status on 142 appvl and she stated it's being reviewed. Docs were sent to [facility] 7/15/2025.

## 2025-07-23 NOTE — DISCHARGE SUMMARY
Radiology Post-Procedure Note    Pre Op Diagnosis: Ascites    Post Op Diagnosis: Same    Secondary Diagnoses:   Problem List Items Addressed This Visit       Portal vein thrombosis    Relevant Orders    Inpatient consult to Social Work/Case Management (Completed)    Stroke    Relevant Orders    Echo    Echo Saline Bubble? Yes (Completed)     Other Visit Diagnoses         Supratherapeutic INR    -  Primary    Relevant Medications    Prothrombin complex concentrate, human (Kcentra) 2,798 Units in sterile water for injection 100 mL IVPB (Completed)      Choledocholithiasis          Chest pain                 Procedure: US Guided Paracentesis    Procedure performed by: Neto Bird MD    Assistant: None    Written Informed Consent Obtained: Yes    Specimen Removed: None    Estimated Blood Loss: <10 cc    Condition: Stable    Outcome: The patient tolerated the procedure well and was without complications.    For further details please see the imaging report associated.    Disposition: Transfer back to inpatient unit.

## 2025-07-24 LAB
ALBUMIN SERPL-MCNC: 1.4 G/DL (ref 3.4–4.8)
ALBUMIN/GLOB SERPL: 0.2 RATIO (ref 1.1–2)
ALP SERPL-CCNC: 224 UNIT/L (ref 40–150)
ALT SERPL-CCNC: 181 UNIT/L (ref 0–55)
ANION GAP SERPL CALC-SCNC: 10 MEQ/L
AST SERPL-CCNC: 138 UNIT/L (ref 11–45)
BASOPHILS # BLD AUTO: 0.03 X10(3)/MCL
BASOPHILS NFR BLD AUTO: 0.2 %
BILIRUB SERPL-MCNC: 4 MG/DL
BUN SERPL-MCNC: 97.2 MG/DL (ref 8.4–25.7)
CALCIUM SERPL-MCNC: 8 MG/DL (ref 8.8–10)
CHLORIDE SERPL-SCNC: 105 MMOL/L (ref 98–107)
CO2 SERPL-SCNC: 20 MMOL/L (ref 23–31)
CREAT SERPL-MCNC: 3.54 MG/DL (ref 0.72–1.25)
CREAT/UREA NIT SERPL: 27
EOSINOPHIL # BLD AUTO: 0.08 X10(3)/MCL (ref 0–0.9)
EOSINOPHIL NFR BLD AUTO: 0.6 %
ERYTHROCYTE [DISTWIDTH] IN BLOOD BY AUTOMATED COUNT: 21.7 % (ref 11.5–17)
GFR SERPLBLD CREATININE-BSD FMLA CKD-EPI: 19 ML/MIN/1.73/M2
GLOBULIN SER-MCNC: 7.2 GM/DL (ref 2.4–3.5)
GLUCOSE SERPL-MCNC: 82 MG/DL (ref 82–115)
HCT VFR BLD AUTO: 29 % (ref 42–52)
HGB BLD-MCNC: 9.2 G/DL (ref 14–18)
IMM GRANULOCYTES # BLD AUTO: 0.06 X10(3)/MCL (ref 0–0.04)
IMM GRANULOCYTES NFR BLD AUTO: 0.5 %
INR PPP: 1.6
LYMPHOCYTES # BLD AUTO: 1.54 X10(3)/MCL (ref 0.6–4.6)
LYMPHOCYTES NFR BLD AUTO: 12.2 %
MCH RBC QN AUTO: 28.1 PG (ref 27–31)
MCHC RBC AUTO-ENTMCNC: 31.7 G/DL (ref 33–36)
MCV RBC AUTO: 88.7 FL (ref 80–94)
MONOCYTES # BLD AUTO: 1.12 X10(3)/MCL (ref 0.1–1.3)
MONOCYTES NFR BLD AUTO: 8.9 %
NEUTROPHILS # BLD AUTO: 9.79 X10(3)/MCL (ref 2.1–9.2)
NEUTROPHILS NFR BLD AUTO: 77.6 %
NRBC BLD AUTO-RTO: 0 %
PLATELET # BLD AUTO: 200 X10(3)/MCL (ref 130–400)
PMV BLD AUTO: 9.6 FL (ref 7.4–10.4)
POTASSIUM SERPL-SCNC: 5.3 MMOL/L (ref 3.5–5.1)
PROT SERPL-MCNC: 8.6 GM/DL (ref 5.8–7.6)
PROTHROMBIN TIME: 19.2 SECONDS (ref 12.5–14.5)
RBC # BLD AUTO: 3.27 X10(6)/MCL (ref 4.7–6.1)
SODIUM SERPL-SCNC: 135 MMOL/L (ref 136–145)
WBC # BLD AUTO: 12.62 X10(3)/MCL (ref 4.5–11.5)

## 2025-07-24 PROCEDURE — 25000003 PHARM REV CODE 250: Performed by: INTERNAL MEDICINE

## 2025-07-24 PROCEDURE — 36415 COLL VENOUS BLD VENIPUNCTURE: CPT | Performed by: INTERNAL MEDICINE

## 2025-07-24 PROCEDURE — 80053 COMPREHEN METABOLIC PANEL: CPT | Performed by: INTERNAL MEDICINE

## 2025-07-24 PROCEDURE — 85025 COMPLETE CBC W/AUTO DIFF WBC: CPT | Performed by: INTERNAL MEDICINE

## 2025-07-24 PROCEDURE — 25000003 PHARM REV CODE 250: Performed by: STUDENT IN AN ORGANIZED HEALTH CARE EDUCATION/TRAINING PROGRAM

## 2025-07-24 PROCEDURE — 11000001 HC ACUTE MED/SURG PRIVATE ROOM

## 2025-07-24 PROCEDURE — S4991 NICOTINE PATCH NONLEGEND: HCPCS | Performed by: HOSPITALIST

## 2025-07-24 PROCEDURE — 25000003 PHARM REV CODE 250: Performed by: HOSPITALIST

## 2025-07-24 PROCEDURE — 85610 PROTHROMBIN TIME: CPT | Performed by: INTERNAL MEDICINE

## 2025-07-24 PROCEDURE — 99231 SBSQ HOSP IP/OBS SF/LOW 25: CPT | Mod: ,,,

## 2025-07-24 RX ORDER — PANTOPRAZOLE SODIUM 40 MG/1
40 TABLET, DELAYED RELEASE ORAL 2 TIMES DAILY
Status: DISCONTINUED | OUTPATIENT
Start: 2025-07-24 | End: 2025-07-30 | Stop reason: HOSPADM

## 2025-07-24 RX ORDER — SODIUM BICARBONATE 650 MG/1
1300 TABLET ORAL 2 TIMES DAILY
Status: DISPENSED | OUTPATIENT
Start: 2025-07-24 | End: 2025-07-26

## 2025-07-24 RX ADMIN — SODIUM ZIRCONIUM CYCLOSILICATE 10 G: 10 POWDER, FOR SUSPENSION ORAL at 08:07

## 2025-07-24 RX ADMIN — ARIPIPRAZOLE 10 MG: 5 TABLET ORAL at 09:07

## 2025-07-24 RX ADMIN — HYDROCODONE BITARTRATE AND ACETAMINOPHEN 1 TABLET: 5; 325 TABLET ORAL at 05:07

## 2025-07-24 RX ADMIN — NICOTINE 1 PATCH: 14 PATCH TRANSDERMAL at 09:07

## 2025-07-24 RX ADMIN — LACTULOSE 15 G: 10 SOLUTION ORAL at 09:07

## 2025-07-24 RX ADMIN — ESCITALOPRAM OXALATE 20 MG: 10 TABLET ORAL at 09:07

## 2025-07-24 RX ADMIN — SODIUM BICARBONATE 1300 MG: 650 TABLET ORAL at 11:07

## 2025-07-24 RX ADMIN — HYDROCODONE BITARTRATE AND ACETAMINOPHEN 1 TABLET: 5; 325 TABLET ORAL at 08:07

## 2025-07-24 RX ADMIN — FOLIC ACID 1 MG: 1 TABLET ORAL at 09:07

## 2025-07-24 RX ADMIN — PANTOPRAZOLE SODIUM 40 MG: 40 TABLET, DELAYED RELEASE ORAL at 07:07

## 2025-07-24 RX ADMIN — PANTOPRAZOLE SODIUM 40 MG: 40 TABLET, DELAYED RELEASE ORAL at 11:07

## 2025-07-24 RX ADMIN — THIAMINE HCL TAB 100 MG 100 MG: 100 TAB at 09:07

## 2025-07-24 NOTE — PROGRESS NOTES
Patient Name: Aman Humphrey   MRN: [id number]   Admission Date: 7/3/2025   Hospital Length of Stay: 21   Attending Provider: Beth Beckett MD   Consulting Provider: JARROD Arizmendi  Reason for Consult: Goals of Care  Primary Care Physician:  Stalin Landin DO     Principal Problem: <principal problem not specified>     Patient information was obtained from patient, relative(s), and ER records.     Final diagnoses:  [K80.50] Choledocholithiasis      Assessment/Plan:     I reviewed the family's understanding of the seriousness of the illness and its expected prognosis. We discussed the patient's goals of care and treatment preferences. I identified the surrogate decision maker or health care POA to be his three sisters, Grisel Humphrey, Aidee Humphrey, and Christiana Jimenez. I answered all questions and we formulated a plan including recommendations for symptom management and how to best achieve goals of care.       Patient is resting comfortably in bed. He is asleep during exam. Arouses slightly and quickly returns to sleep. Sister/HCPOA at bedside says he was interacting and talking with her earlier and is now resting. Reviewed current plans for nursing home placement with hospice services, which case management is arranging. She confirms this goal and focus on comfort, symptom management, and quality of life. She reports an episode of agitation associated with pain overnight. He did receive 1 dose of Norco 5-325 mg at 0534 this morning. Discussed the role of pain in agitation. Offered support. Encouraged to call with questions or concerns. Palliative Medicine will continue to follow. Discussed with nursing and case management.    Advance Care Planning     Date: 07/24/2025    [facility]  I engaged the healthcare power of   in a voluntary conversation about advance care planning and we specifically addressed what the goals of care would be moving forward, in light of the patient's change in clinical status,  [Time Spent: ___ minutes] : I have spent [unfilled] minutes of time on the encounter. specifically current condition.  We did specifically address the patient's likely prognosis, which is poor.  We explored the patient's values and preferences for future care.  The healthcare power of   endorses that what is most important right now is to focus on symptom/pain control and comfort and QOL     Accordingly, we have decided that the best plan to meet the patient's goals includes enrolling in hospice care        Interval History:     No acute events overnight. Underwent IR paracentesis yesterday with 1.8L drained yesterday.      Active Ambulatory Problems     Diagnosis Date Noted    HCC (hepatocellular carcinoma) 10/23/2023    Encephalopathy, metabolic 10/23/2023    Essential hypertension 10/23/2023    Chronic liver disease 10/23/2023    Hepatitis C, chronic 10/23/2023    Tobacco dependency 06/18/2025    Portal vein thrombosis 06/23/2025    Severe malnutrition 06/27/2025     Resolved Ambulatory Problems     Diagnosis Date Noted    SOCORRO (acute kidney injury) 10/23/2023     Past Medical History:   Diagnosis Date    Carpal tunnel syndrome     HTN (hypertension)     Sciatica         Past Surgical History:   Procedure Laterality Date    ESOPHAGOGASTRODUODENOSCOPY N/A 6/20/2025    Procedure: EGD;  Surgeon: Jalyn Day MD;  [facility] ENDOSCOPY;  Service: Gastroenterology;  Laterality: N/A;    SKIN GRAFT          Review of patient's allergies indicates:  No Known Allergies     Current Medications[1]       Current Facility-Administered Medications:     0.9%  NaCl infusion (for blood administration), , Intravenous, Q24H PRN    0.9%  NaCl infusion (for blood administration), , Intravenous, Q24H PRN    acetaminophen, 500 mg, Oral, Q6H PRN    dextrose 50%, 12.5 g, Intravenous, PRN    dextrose 50%, 25 g, Intravenous, PRN    glucagon (human recombinant), 1 mg, Intramuscular, PRN    glucose, 16 g, Oral, PRN    glucose, 24 g, Oral, PRN    HYDROcodone-acetaminophen, 1 tablet, Oral, Q6H PRN     "HYDROcodone-acetaminophen, 1 tablet, Oral, Q6H PRN    labetalol, 10 mg, Intravenous, Q4H PRN    lactulose 10 gram/15 ml, 20 g, Oral, TID PRN    melatonin, 6 mg, Oral, Nightly PRN    ondansetron, 4 mg, Intravenous, Q6H PRN    sodium chloride 0.9%, 10 mL, Intravenous, PRN     History reviewed. No pertinent family history.       Review of Systems   Unable to perform ROS: Other (asleep/lethargic)            Objective:   /68   Pulse 69   Temp 97.6 °F (36.4 °C) (Oral)   Resp 10   Ht 5' 8.9" (1.75 m)   Wt 57.2 kg (126 lb)   SpO2 98%   BMI 18.66 kg/m²      Physical Exam   Constitutional: No distress. He appears ill.   HENT:   Mouth/Throat: Mucous membranes are dry.   Cardiovascular: Normal rate. Pulmonary:      Effort: Pulmonary effort is normal. No respiratory distress.     Abdominal: He exhibits distension.   Musculoskeletal:      Comments: Generalized deconditioning  Cachectic     Neurological:   Patient asleep, arouses some to verbal stimuli  Quickly returns to sleep   Skin: Skin is warm and dry.          Review of Symptoms  Review of Symptoms      Symptom Assessment (ESAS 0-10 Scale)  Unable to complete assessment due to Other         Pain Assessment in Advanced Demential Scale (PAINAD)   Breathing - Independent of vocalization:  0  Negative vocalization:  0  Facial expression:  0  Body language:  0  Consolability:  0  Total:  0    Psychosocial/Cultural:   See Palliative Psychosocial Note: Yes  **Primary  to Follow**  Palliative Care  Consult: No      Advance Care Planning   Advance Directives:   Medical Power of : Yes      Decision Making:  Family answered questions  Goals of Care: What is most important right now is to focus on symptom/pain control, comfort and QOL . Accordingly, we have decided that the best plan to meet the patient's goals includes enrolling in hospice care.          PAINAD: 0    Caregiver burden formerly assessed: Yes    > 50% of 25 min of encounter " was spent in chart review, face to face discussion of goals of care, symptom assessment, coordination of care and emotional support.    JARROD Arizmendi-BC  Palliative Medicine  [facility]         [1]   Current Facility-Administered Medications:     0.9%  NaCl infusion (for blood administration), , Intravenous, Q24H PRN, Bux, Xiomy, DO    0.9%  NaCl infusion (for blood administration), , Intravenous, Q24H PRN, Bux, Xiomy, DO    acetaminophen tablet 500 mg, 500 mg, Oral, Q6H PRN, Beth Beckett MD, 500 mg at 07/16/25 1847    ARIPiprazole tablet 10 mg, 10 mg, Oral, Daily, Bux, Xiomy, DO, 10 mg at 07/24/25 0936    dextrose 50% injection 12.5 g, 12.5 g, Intravenous, PRN, Jerson, Vandana, FNP    dextrose 50% injection 25 g, 25 g, Intravenous, PRN, Jerson Vandana, FNP, 25 g at 07/04/25 0529    EScitalopram oxalate tablet 20 mg, 20 mg, Oral, Daily, Bux, Xiomy, DO, 20 mg at 07/24/25 0937    folic acid tablet 1 mg, 1 mg, Oral, Daily, Luis Diaz MD, 1 mg at 07/24/25 0937    glucagon (human recombinant) injection 1 mg, 1 mg, Intramuscular, PRN, Berkley Arthursa, FNP    glucose chewable tablet 16 g, 16 g, Oral, PRN, Jerson Vandana, FNP    glucose chewable tablet 24 g, 24 g, Oral, PRN, Berkley Arthursa, FNP    HYDROcodone-acetaminophen  mg per tablet 1 tablet, 1 tablet, Oral, Q6H PRN, Beth Beckett MD, 1 tablet at 07/21/25 1355    HYDROcodone-acetaminophen 5-325 mg per tablet 1 tablet, 1 tablet, Oral, Q6H PRN, Beth Beckett MD, 1 tablet at 07/24/25 0534    labetaloL injection 10 mg, 10 mg, Intravenous, Q4H PRN, Beth Beckett MD    lactulose 10 gram/15 ml solution 15 g, 15 g, Oral, Daily, Beth Beckett MD, 15 g at 07/24/25 0937    lactulose 10 gram/15 ml solution 20 g, 20 g, Oral, TID PRN, Luis Diaz MD    melatonin tablet 6 mg, 6 mg, Oral, Nightly PRN, Luis Diaz MD    nicotine 14 mg/24 hr 1 patch, 1 patch, Transdermal, Daily, Luis Diaz MD, 1 patch at 07/24/25 0936     ondansetron injection 4 mg, 4 mg, Intravenous, Q6H PRN, Beth Beckett MD, 4 mg at 07/16/25 2045    pantoprazole injection 40 mg, 40 mg, Intravenous, BID, Xiomy Griggs DO, 40 mg at 07/23/25 2240    sodium chloride 0.9% flush 10 mL, 10 mL, Intravenous, PRN, Luis Diaz MD    thiamine tablet 100 mg, 100 mg, Oral, Daily, Luis Diaz MD, 100 mg at 07/24/25 8843

## 2025-07-24 NOTE — CONSULTS
Plan to continue with Paracentesis for Ascites.  Hold Abdominal Pleurex until WBC normalizes and no signs of infection.

## 2025-07-24 NOTE — PLAN OF CARE
Problem: Adult Inpatient Plan of Care  Goal: Plan of Care Review  Outcome: Progressing  Goal: Patient-Specific Goal (Individualized)  Outcome: Progressing  Goal: Absence of Hospital-Acquired Illness or Injury  Outcome: Progressing  Goal: Optimal Comfort and Wellbeing  Outcome: Progressing  Goal: Readiness for Transition of Care  Outcome: Progressing     Problem: Skin Injury Risk Increased  Goal: Skin Health and Integrity  Outcome: Progressing     Problem: Coping Ineffective  Goal: Effective Coping  Outcome: Progressing     Problem: Wound  Goal: Optimal Coping  Outcome: Progressing  Goal: Optimal Functional Ability  Outcome: Progressing  Goal: Absence of Infection Signs and Symptoms  Outcome: Progressing  Goal: Improved Oral Intake  Outcome: Progressing  Goal: Optimal Pain Control and Function  Outcome: Progressing  Goal: Skin Health and Integrity  Outcome: Progressing  Goal: Optimal Wound Healing  Outcome: Progressing     Problem: Wound  Goal: Optimal Coping  Outcome: Progressing  Goal: Optimal Functional Ability  Outcome: Progressing  Goal: Absence of Infection Signs and Symptoms  Outcome: Progressing  Goal: Improved Oral Intake  Outcome: Progressing  Goal: Optimal Pain Control and Function  Outcome: Progressing  Goal: Skin Health and Integrity  Outcome: Progressing  Goal: Optimal Wound Healing  Outcome: Progressing

## 2025-07-24 NOTE — PLAN OF CARE
[facility] is waiting on financial documents from the family.  Spoke with the patient's sister.  They tried accessing the patient's online bank account but the passwords are unknown.  So they will have to wait for the documents to be mailed to the patient's address.  It's the only way that [employer] will provide the documents to them.

## 2025-07-24 NOTE — PROGRESS NOTES
[facility] Hospital Medicine Progress Note        Chief Complaint: Inpatient Follow-up    HPI:   64-year-old male with significant history of carpal tunnel syndrome, HTN, sciatica, portal vein thrombosis, cocaine use, chronic hep C, hepatic adenoma concerning for HCC, cirrhosis presented to the ED with complaints of progressively worsening abdominal pain for the past 2 months.  Patient was noted to have acute on chronic hyperbilirubinemia with leukocytosis.  He initially presented to outlying facility and was transferred to our hospital for higher level of care, patient was admitted to hospital medicine services, Gastroenterology, general surgery services consulted, MRCP was ordered to further evaluate worsening liver function test.  MRCP revealed findings concerning for HCC, distended gallbladder with cholelithiasis and mild intrahepatic biliary ductal dilation, evaluation was challenging secondary to underlying portal vein thrombus.  Suspicion for cholecystitis given clinical presentation.  No evidence of choledocholithiasis, no indication for ERCP per Gastroenterology, recommended anticoagulation for portal vein thrombosis.  Ultrasound with positive from Chang's sign and thickened gallbladder.  General surgery evaluated for possible cholecystitis, poor surgical candidate and therefore interventional radiology was consulted for percutaneous cholecystostomy tube placement and this was done on 7/7.  Zosyn sky, General surgery Plan to follow him in clinic as outpatient, GI planning for repeating triple phase as outpatient in 3 months and also arrange follow up with hepatology in [place].  Patient reported suicidal ideation on 07/09 and therefore psych consulted and he was placed under pec/one-to-one observation.  Previous CT with concerns for pontine/left internal capsule ischemic CVA, MRI was ordered to further evaluate.  Ultrasound abdomen ordered to quantify ascites on  07/10.  Pec and one-to-one observation continued.  Large volume ascites noted and therefore IR consulted for paracentesis on 07/11, MRI came back positive for thalamic CVA, Neurology consulted, neurology recommended to continue anticoagulation, no statin given transaminitis, pec continued as of 7/11.  Patient with no more suicidal or homicidal ideation and therefore Pec was revoked on 07/12.  INR was more than 5 on 07/12, Xarelto held, no overt bleeding, trended down to 3.7 on 07/13.  INR trended down to less than 2 on 07/14 and therefore Xarelto resumed.  After resumption INR trended up again and therefore Xarelto held again, there was also concern for bleeding from cholecystostomy tube, General surgery re-evaluated, no interventions recommended, patient also developed acute kidney injury which is most likely hepatorenal syndrome, evaluated by Nephrology, deemed poor candidate for hemodialysis, nephrology signed off, overall prognosis extremely poor, palliative care team consulted, patient DNR, communicated with family and they decided to pursue hospice at nursing home, case management working on the same, patient also underwent paracentesis again given reaccumulating ascites.    Interval Hx:   Patient seen at bedside, comfortably laying in bed, he is in much better mood now that he is surrounded by family members.  Plan of care discussed in detail with family members at bedside, patient did not have any active complaints at the time of my rounds.    Objective/physical exam:  General: In no acute distress, afebrile  Chest: Clear to auscultation bilaterally  Heart: S1, S2, no appreciable murmur  Abdomen: Soft, distended, nontender, BS +  MSK: Warm, no lower extremity edema, no clubbing or cyanosis  Neurologic:  Awake, alert and seems oriented  VITAL SIGNS: 24 HRS MIN & MAX LAST   Temp  Min: 97.6 °F (36.4 °C)  Max: 98.6 °F (37 °C) 97.6 °F (36.4 °C)   BP  Min: 113/58  Max: 123/68 119/68   Pulse  Min: 68  Max: 74  69    Resp  Min: 10  Max: 20 10   SpO2  Min: 96 %  Max: 99 % 98 %       Recent Labs   Lab 07/23/25  1536   WBC 13.03*   RBC 3.24*   HGB 9.3*   HCT 28.3*   MCV 87.3   MCH 28.7   MCHC 32.9*   RDW 21.7*      MPV 8.9         Recent Labs   Lab 07/21/25  0317   *   K 5.0      CO2 20*   BUN 80.2*   CREATININE 4.06*   GLU 76*   CALCIUM 7.7*   ALBUMIN 1.6*   PROT 8.0*   ALKPHOS 223*   *   *   BILITOT 3.8*          Microbiology Results (last 7 days)       Procedure Component Value Units Date/Time    Fungal Culture [[id number]]  (Normal) Collected: 07/07/25 1639    Order Status: Completed Specimen: Aspirate from Gallbladder Updated: 07/21/25 1801     Fungal Culture No Fungus Isolated at 2 Weeks             Scheduled Med:   ARIPiprazole  10 mg Oral Daily    EScitalopram oxalate  20 mg Oral Daily    folic acid  1 mg Oral Daily    lactulose 10 gram/15 ml  15 g Oral Daily    nicotine  1 patch Transdermal Daily    pantoprazole  40 mg Intravenous BID    thiamine  100 mg Oral Daily          Assessment/Plan:    Acute kidney injury-worsening, suspect hepatorenal syndrome   Mild hyperkalemia  Acute ischemic CVA -left thalamus  Positive bubble study  Suicidal ideation placed under pec 7/9, revoked 7/12  Symptomatic cholelithiasis with suspected cholecystitis status post CT-guided percutaneous cholecystostomy tube placement 7/7  Sepsis secondary to above-improving   Acute on chronic hyperbilirubinemia with transaminitis-worsening Decompensated cirrhosis with large volume recurrent ascites status post paracentesis x2 occasions  Supratherapeutic INR-improving off Xarelto  Portal vein thrombosis  Suspected HCC  Suspected bilateral pneumonia-HA P  Substance use disorder  Chronic hep C   History of essential HTN  Sciatica   History of CTS   Physical deconditioning  Prophylaxis        Renal function worsening, patient was evaluated by Nephrology, unfortunately no much options and not a candidate for hemodialysis  given multiple other comorbidities  Lokelma for hyperkalemia  P.o. bicarb for metabolic acidosis  Cholecystostomy tube still remains in place   Hyperbilirubinemia and transaminitis worsening, this could be secondary to suspected HCC/cirrhosis  Patient completed course of antibiotics for cholecystitis  Not a candidate for anymore intervention given multiple comorbidities/poor prognosis  Evaluated by Neurology for thalamic CVA and had recommended anticoagulation, intolerant to Xarelto, INR trends up to as high as more than 8 on Xarelto and therefore indefinitely held to avoid major bleeding  Positive bubble study, hold off on further workup as family has opted for hospice  Ascites reaccumulating quick, hold off on Lasix and Aldactone given significantly compromised renal function  Will benefit from PleurX cath which IR will consider once leukocytosis resolves   Low suspicion for sepsis at this time  Continue therapy services as tolerated  Continue lactulose to prevent hepatic encephalopathy   Continue aripiprazole, citalopram, folic acid, nicotine patch, thiamine   DVT prophylaxis-no chemical prophylaxis given recurrent supratherapeutic INR and increased risk of bleeding, bilateral SCDs    Overall prognosis extremely poor   Patient is now DNR   Awaiting DC to nursing home with hospice   Case management is working on this    Beth Beckett MD   07/24/2025

## 2025-07-24 NOTE — PT/OT/SLP DISCHARGE
Occupational Therapy Discharge Summary    Aman Humphrey  MRN: [id number]   Principal Problem: <principal problem not specified>      Patient Discharged from acute Occupational Therapy on 7/24/25.  Please refer to prior OT note dated 7/22/25 for functional status.    Assessment:   CCC c ESPINOSA. Pt with poor tolerance to OT sessions and presents with significant weakness and limited willingness to participate. Plans for d/c to nursing home with hospice and pt no longer interested in therapy. OT to sign off.    Objective:     GOALS:   Multidisciplinary Problems       Occupational Therapy Goals          Problem: Occupational Therapy    Goal Priority Disciplines Outcome Interventions   Occupational Therapy Goal     OT, PT/OT     Description: LTG: Pt will perform basic ADLs and ADL transfers with Modified independence using LRAD by discharge.                       Reasons for Discontinuation of Therapy Services  Patient declines to continue care.      Plan:     Patient Discharged to: Palliative Care/Hospice    7/24/2025

## 2025-07-25 LAB
ALBUMIN SERPL-MCNC: 1.4 G/DL (ref 3.4–4.8)
ALBUMIN/GLOB SERPL: 0.2 RATIO (ref 1.1–2)
ALP SERPL-CCNC: 234 UNIT/L (ref 40–150)
ALT SERPL-CCNC: 167 UNIT/L (ref 0–55)
ANION GAP SERPL CALC-SCNC: 8 MEQ/L
AST SERPL-CCNC: 143 UNIT/L (ref 11–45)
BASOPHILS # BLD AUTO: 0.02 X10(3)/MCL
BASOPHILS NFR BLD AUTO: 0.2 %
BILIRUB SERPL-MCNC: 3.3 MG/DL
BUN SERPL-MCNC: 93.6 MG/DL (ref 8.4–25.7)
CALCIUM SERPL-MCNC: 8.1 MG/DL (ref 8.8–10)
CHLORIDE SERPL-SCNC: 103 MMOL/L (ref 98–107)
CO2 SERPL-SCNC: 23 MMOL/L (ref 23–31)
CREAT SERPL-MCNC: 3.61 MG/DL (ref 0.72–1.25)
CREAT/UREA NIT SERPL: 26
EOSINOPHIL # BLD AUTO: 0.09 X10(3)/MCL (ref 0–0.9)
EOSINOPHIL NFR BLD AUTO: 0.8 %
ERYTHROCYTE [DISTWIDTH] IN BLOOD BY AUTOMATED COUNT: 21.5 % (ref 11.5–17)
GFR SERPLBLD CREATININE-BSD FMLA CKD-EPI: 18 ML/MIN/1.73/M2
GLOBULIN SER-MCNC: 7.5 GM/DL (ref 2.4–3.5)
GLUCOSE SERPL-MCNC: 125 MG/DL (ref 82–115)
HCT VFR BLD AUTO: 28.5 % (ref 42–52)
HGB BLD-MCNC: 9.1 G/DL (ref 14–18)
IMM GRANULOCYTES # BLD AUTO: 0.05 X10(3)/MCL (ref 0–0.04)
IMM GRANULOCYTES NFR BLD AUTO: 0.4 %
LYMPHOCYTES # BLD AUTO: 1.61 X10(3)/MCL (ref 0.6–4.6)
LYMPHOCYTES NFR BLD AUTO: 13.6 %
MCH RBC QN AUTO: 28.5 PG (ref 27–31)
MCHC RBC AUTO-ENTMCNC: 31.9 G/DL (ref 33–36)
MCV RBC AUTO: 89.3 FL (ref 80–94)
MONOCYTES # BLD AUTO: 0.78 X10(3)/MCL (ref 0.1–1.3)
MONOCYTES NFR BLD AUTO: 6.6 %
NEUTROPHILS # BLD AUTO: 9.33 X10(3)/MCL (ref 2.1–9.2)
NEUTROPHILS NFR BLD AUTO: 78.4 %
NRBC BLD AUTO-RTO: 0 %
PLATELET # BLD AUTO: 228 X10(3)/MCL (ref 130–400)
PMV BLD AUTO: 9 FL (ref 7.4–10.4)
POTASSIUM SERPL-SCNC: 4.7 MMOL/L (ref 3.5–5.1)
PROT SERPL-MCNC: 8.9 GM/DL (ref 5.8–7.6)
RBC # BLD AUTO: 3.19 X10(6)/MCL (ref 4.7–6.1)
SODIUM SERPL-SCNC: 134 MMOL/L (ref 136–145)
WBC # BLD AUTO: 11.88 X10(3)/MCL (ref 4.5–11.5)

## 2025-07-25 PROCEDURE — 25000003 PHARM REV CODE 250: Performed by: STUDENT IN AN ORGANIZED HEALTH CARE EDUCATION/TRAINING PROGRAM

## 2025-07-25 PROCEDURE — 25000003 PHARM REV CODE 250: Performed by: HOSPITALIST

## 2025-07-25 PROCEDURE — 80053 COMPREHEN METABOLIC PANEL: CPT | Performed by: INTERNAL MEDICINE

## 2025-07-25 PROCEDURE — 85025 COMPLETE CBC W/AUTO DIFF WBC: CPT | Performed by: INTERNAL MEDICINE

## 2025-07-25 PROCEDURE — S4991 NICOTINE PATCH NONLEGEND: HCPCS | Performed by: HOSPITALIST

## 2025-07-25 PROCEDURE — 36415 COLL VENOUS BLD VENIPUNCTURE: CPT | Performed by: INTERNAL MEDICINE

## 2025-07-25 PROCEDURE — 11000001 HC ACUTE MED/SURG PRIVATE ROOM

## 2025-07-25 PROCEDURE — 25000003 PHARM REV CODE 250: Performed by: INTERNAL MEDICINE

## 2025-07-25 RX ADMIN — ARIPIPRAZOLE 10 MG: 5 TABLET ORAL at 09:07

## 2025-07-25 RX ADMIN — THIAMINE HCL TAB 100 MG 100 MG: 100 TAB at 09:07

## 2025-07-25 RX ADMIN — SODIUM ZIRCONIUM CYCLOSILICATE 10 G: 10 POWDER, FOR SUSPENSION ORAL at 09:07

## 2025-07-25 RX ADMIN — HYDROCODONE BITARTRATE AND ACETAMINOPHEN 1 TABLET: 5; 325 TABLET ORAL at 07:07

## 2025-07-25 RX ADMIN — PANTOPRAZOLE SODIUM 40 MG: 40 TABLET, DELAYED RELEASE ORAL at 09:07

## 2025-07-25 RX ADMIN — SODIUM BICARBONATE 1300 MG: 650 TABLET ORAL at 09:07

## 2025-07-25 RX ADMIN — FOLIC ACID 1 MG: 1 TABLET ORAL at 09:07

## 2025-07-25 RX ADMIN — LACTULOSE 15 G: 10 SOLUTION ORAL at 09:07

## 2025-07-25 RX ADMIN — HYDROCODONE BITARTRATE AND ACETAMINOPHEN 1 TABLET: 5; 325 TABLET ORAL at 06:07

## 2025-07-25 RX ADMIN — NICOTINE 1 PATCH: 14 PATCH TRANSDERMAL at 09:07

## 2025-07-25 RX ADMIN — ESCITALOPRAM OXALATE 20 MG: 10 TABLET ORAL at 09:07

## 2025-07-25 NOTE — PLAN OF CARE
Problem: Adult Inpatient Plan of Care  Goal: Plan of Care Review  Outcome: Progressing  Goal: Patient-Specific Goal (Individualized)  Outcome: Progressing  Goal: Absence of Hospital-Acquired Illness or Injury  Outcome: Progressing  Goal: Optimal Comfort and Wellbeing  Outcome: Progressing  Goal: Readiness for Transition of Care  Outcome: Progressing     Problem: Skin Injury Risk Increased  Goal: Skin Health and Integrity  Outcome: Progressing     Problem: Coping Ineffective  Goal: Effective Coping  Outcome: Progressing     Problem: Wound  Goal: Optimal Coping  Outcome: Progressing  Goal: Optimal Functional Ability  Outcome: Progressing  Goal: Absence of Infection Signs and Symptoms  Outcome: Progressing  Goal: Improved Oral Intake  Outcome: Progressing  Goal: Optimal Pain Control and Function  Outcome: Progressing  Goal: Skin Health and Integrity  Outcome: Progressing  Goal: Optimal Wound Healing  Outcome: Progressing     Problem: Stroke, Ischemic (Includes Transient Ischemic Attack)  Goal: Optimal Coping  Outcome: Progressing  Goal: Effective Bowel Elimination  Outcome: Progressing  Goal: Optimal Cerebral Tissue Perfusion  Outcome: Progressing  Goal: Optimal Cognitive Function  Outcome: Progressing  Goal: Improved Communication Skills  Outcome: Progressing  Goal: Optimal Functional Ability  Outcome: Progressing  Goal: Optimal Nutrition Intake  Outcome: Progressing  Goal: Effective Oxygenation and Ventilation  Outcome: Progressing  Goal: Improved Sensorimotor Function  Outcome: Progressing  Goal: Safe and Effective Swallow  Outcome: Progressing  Goal: Effective Urinary Elimination  Outcome: Progressing     Problem: Fall Injury Risk  Goal: Absence of Fall and Fall-Related Injury  Outcome: Progressing

## 2025-07-25 NOTE — PROGRESS NOTES
Inpatient Nutrition Assessment    Admit Date: 7/3/2025   Total duration of encounter: 22 days   Patient Age: 64 y.o.    Nutrition Recommendation/Prescription     Liberalize diet to Regular diet  Continue Boost Plus TID (360 kcal and 14 gm protein per serving)  Continue folic acid and thiamine, consider MVI  Monitor PO intake, labs and weight  Honor pt/family wishes    Communication of Recommendations: reviewed with family and EMR    Nutrition Assessment     Malnutrition Assessment/Nutrition-Focused Physical Exam    Malnutrition Context: chronic illness (07/22/25 1319)  Malnutrition Level: severe (07/22/25 1319)  Energy Intake (Malnutrition): other (see comments) (Unable to assess) (07/22/25 1319)  Weight Loss (Malnutrition): greater than 7.5% in 3 months (07/22/25 1319)  Subcutaneous Fat (Malnutrition): severe depletion (07/22/25 1319)  Orbital Region: moderate depletion  Upper Arm Region : severe depletion     Muscle Mass (Malnutrition): severe depletion (07/22/25 1319)  Leota Region (Muscle Loss): severe depletion  Clavicle Bone Region (Muscle Loss): severe depletion                    Fluid Accumulation (Malnutrition): other (see comments) (Not present) (07/22/25 1319)        A minimum of two characteristics is recommended for diagnosis of either severe or non-severe malnutrition.    Chart Review    Reason Seen: follow-up    Malnutrition Screening Tool Results   Have you recently lost weight without trying?: Yes: 24-33 lbs  Have you been eating poorly because of a decreased appetite?: Yes   MST Score: 4   Diagnosis:  Acute ischemic CVA -left thalamus  Positive bubble study  Suicidal ideation placed under pec 7/9, revoked 7/12  Symptomatic cholelithiasis with suspected cholecystitis status post CT-guided percutaneous cholecystostomy tube placement 7/7  Sepsis secondary to above-improving   Acute on chronic hyperbilirubinemia with transaminitis-slowly improving  Decompensated cirrhosis with large volume recurrent  ascites status post paracentesis 7/11, removed 2.2 L, reaccumulating now  Supratherapeutic INR  Hypervolemic hyponatremia   Mild metabolic acidosis  Portal vein thrombosis  Suspected HCC  Suspected bilateral pneumonia-HA P  Substance use disorder  Chronic hep C   History of essential HTN  Sciatica   History of CTS   Physical deconditioning  Prophylaxis    Relevant Medical History: carpal tunnel syndrome, HTN, sciatica, portal vein thrombosis, cocaine use, hepatitis-C, hepatic adenoma     Scheduled Medications:  ARIPiprazole, 10 mg, Daily  EScitalopram oxalate, 20 mg, Daily  folic acid, 1 mg, Daily  lactulose 10 gram/15 ml, 15 g, Daily  nicotine, 1 patch, Daily  pantoprazole, 40 mg, BID  sodium bicarbonate, 1,300 mg, BID  sodium zirconium cyclosilicate, 10 g, TID  thiamine, 100 mg, Daily    Continuous Infusions:   PRN Medications:  0.9%  NaCl infusion (for blood administration), , Q24H PRN  0.9%  NaCl infusion (for blood administration), , Q24H PRN  acetaminophen, 500 mg, Q6H PRN  dextrose 50%, 12.5 g, PRN  dextrose 50%, 25 g, PRN  glucagon (human recombinant), 1 mg, PRN  glucose, 16 g, PRN  glucose, 24 g, PRN  HYDROcodone-acetaminophen, 1 tablet, Q6H PRN  HYDROcodone-acetaminophen, 1 tablet, Q6H PRN  labetalol, 10 mg, Q4H PRN  lactulose 10 gram/15 ml, 20 g, TID PRN  melatonin, 6 mg, Nightly PRN  ondansetron, 4 mg, Q6H PRN  sodium chloride 0.9%, 10 mL, PRN    Calorie Containing IV Medications: no significant kcals from medications at this time    Recent Labs   Lab 07/19/25  0542 07/20/25  0609 07/21/25  0317 07/23/25  1536 07/24/25  0940 07/25/25  0940   * 133* 133*  --  135* 134*   K 5.1 4.7 5.0  --  5.3* 4.7   CALCIUM 7.3* 7.4* 7.7*  --  8.0* 8.1*    104 103  --  105 103   CO2 20* 21* 20*  --  20* 23   BUN 69.5* 70.8* 80.2*  --  97.2* 93.6*   CREATININE 5.14* 4.62* 4.06*  --  3.54* 3.61*   EGFRNORACEVR 12 13 16  --  19 18   GLU 72* 70* 76*  --  82 125*   BILITOT 2.5* 3.5* 3.8*  --  4.0* 3.3*   ALKPHOS  206* 216* 223*  --  224* 234*   * 405* 332*  --  181* 167*   * 340* 251*  --  138* 143*   ALBUMIN 1.4* 1.4* 1.6*  --  1.4* 1.4*   WBC  --  14.48* 15.99* 13.03* 12.62* 11.88*   HGB 7.4* 9.1* 8.7* 9.3* 9.2* 9.1*   HCT 23.2* 27.8* 26.3* 28.3* 29.0* 28.5*     Nutrition Orders:  Diet Heart Healthy Standard Tray  Dietary nutrition supplements BID; Boost Plus Nutritional Drink - Very Vanilla    Appetite/Oral Intake: poor/0-25% of meals  Factors Affecting Nutritional Intake: impaired cognitive status/motor control, decreased appetite, and inability to feed self  Social Needs Impacting Access to Food: [place] [facility] [place]  Food/Taoist/Cultural Preferences: unable to obtain  Food Allergies: no known food allergies  Last Bowel Movement: 07/23/25  Wound(s):  none documented    Comments    7/5/25: Noted MST indicates wt loss and decreased appetite. Unable to verify with pt, pt confused and hard to understand at time of RD visit. Noted EMR wts confirm wt loss over past few months. Awaiting MRCP results per MD notes. Due to malnourished state and NPO, may need to consider starting PN. Currently receiving minimal D5.     7/7/2025: Pt NPO for IR today per nurse. Pt reportedly thirsty. The nurse denies N/V. Last BM noted. Will monitor.     7/10/2025: The sitter reports a poor appetite/PO intake and denies N/V/D/C. Will add ONS to help meet needs. Will monitor.     7/14/2025: Pt's poor appetite/PO intake continues. Pt may benefit from appetite stimulant if medically feasible.  No reported N/V. Last BM noted. Will monitor.    7/17/25: Pt continues with decreased appetite. Will trial ONS BID to aid with nutrition intake. Pt may benefit from liberalized diet and appetite stimulant. No reports of n/v/d/c; LBM 7/17. Will monitor.    7/22/25: Pt continues with decline in status. Per Nephrology, patient poor dialysis candidate. On lactulose for hepatic encephalopathy. Currently pt getting supervision with  "meals and an assist feed.  Family at bedside stated he is eating only bites of food and sips of water. Requested Sprite with meals. Also providing oral supplement. Muscle and fat wasting noted on assessment. Family and patient wanting nsg home with hospice. Family noted no other needs at this time. Will liberalize diet.     2025: The nurse reports continued poor appetite/PO intake and denies N/V. Last BM noted. Will monitor.    Anthropometrics    Height: 5' 8.9" (175 cm), Height Method: Stated  Last Weight: 57.2 kg (126 lb) (25 0927), Weight Method: Bed Scale  BMI (Calculated): 18.7  BMI Classification: normal (BMI 18.5-24.9)        Ideal Body Weight (IBW), Male: 159.4 lb     % Ideal Body Weight, Male (lb): 74.82 %                 Usual Body Weight (UBW), k kg  % Usual Body Weight: 91.89  % Weight Change From Usual Weight: -8.45 %  Usual Weight Provided By: EMR weight history    Wt Readings from Last 5 Encounters:   25 57.2 kg (126 lb)   25 54.4 kg (120 lb)   25 59 kg (130 lb)   25 59 kg (130 lb)   23 63 kg (138 lb 14.2 oz)     Weight Change(s) Since Admission:   Wt Readings from Last 1 Encounters:   25 0927 57.2 kg (126 lb)   25 1153 54.1 kg (119 lb 4.3 oz)   25 1704 54.1 kg (119 lb 4.3 oz)   Admit Weight: 54.1 kg (119 lb 4.3 oz) (25 1704), Weight Method: Standard Scale    2025: 54.1 kg  7/10/2025: no new wts  2025: no new wts  2025: no new weights   : 57.2kg noted per EMR  2025: no new wts    Estimated Needs    Weight Used For Calorie Calculations: 54.1 kg (119 lb 4.3 oz)  Energy Calorie Requirements (kcal): 7524-6493 (30-35 kcal/kg)  Energy Need Method: Kcal/kg  Weight Used For Protein Calculations: 54.1 kg (119 lb 4.3 oz)  Protein Requirements: 65 (1.2 g/kg)  Fluid Requirements (mL): 1623 (1 mL/kcal)  CHO Requirement: 182-223 g/day (~45-55% est min kcal needs)     Enteral Nutrition     Patient not receiving enteral " nutrition at this time.    Parenteral Nutrition     Patient not receiving parenteral nutrition support at this time.    Evaluation of Received Nutrient Intake    Calories: not meeting estimated needs  Protein: not meeting estimated needs    Patient Education     Not applicable.    Nutrition Diagnosis     PES: Inadequate oral intake related to acute illness as evidenced by poor PO intake. (active)     PES: Severe chronic disease or condition related malnutrition Related to chronic condition As Evidenced by:  - weight loss: > 7.5% in 3 months - muscle mass depletion: 2 areas of mild muscle loss (Clavicle, Temporalis) - loss of subcutaneous fat: 1 area of mild fat loss (Buccal) active    Nutrition Interventions     Intervention(s): General/healthful diet, Medical food supplement therapy, and Vitamin and mineral supplement therapy  Intervention(s): Vitamin and mineral supplement therapy, General healthful diet, Medical food supplement therapy    Goal: Meet greater than 80% of nutritional needs by follow-up. (goal not met)  Goal: Consume % of meals/snacks by follow-up. (goal not met)    Nutrition Goals & Monitoring     Dietitian will monitor: food and beverage intake, weight, electrolyte/renal panel, and gastrointestinal profile  Discharge planning: continue regular vs heart healthy diet with boost or similar oral supplements  Nutrition Risk/Follow-Up: patient at increased nutrition risk; dietitian will follow-up twice weekly   Please consult if re-assessment needed sooner.

## 2025-07-25 NOTE — PLAN OF CARE
Kishore with [facility] is her to re-evaluate the patient for inpatient hospice at the [facility].

## 2025-07-25 NOTE — PLAN OF CARE
Spoke with the admissions coordinator at the [facility]. They are still denying the patient. She said there are no symptoms for them to manage like IV pain meds, IV med for agitation, or something oxygen related. She said his symptoms can be managed in the nursing home.

## 2025-07-25 NOTE — PROGRESS NOTES
[facility] Hospital Medicine Progress Note        Chief Complaint: Inpatient Follow-up    HPI:     64-year-old male with significant history of carpal tunnel syndrome, HTN, sciatica, portal vein thrombosis, cocaine use, chronic hep C, hepatic adenoma concerning for HCC, cirrhosis presented to the ED with complaints of progressively worsening abdominal pain for the past 2 months.  Patient was noted to have acute on chronic hyperbilirubinemia with leukocytosis.  He initially presented to outlying facility and was transferred to our hospital for higher level of care, patient was admitted to hospital medicine services, Gastroenterology, general surgery services consulted, MRCP was ordered to further evaluate worsening liver function test.  MRCP revealed findings concerning for HCC, distended gallbladder with cholelithiasis and mild intrahepatic biliary ductal dilation, evaluation was challenging secondary to underlying portal vein thrombus.  Suspicion for cholecystitis given clinical presentation.  No evidence of choledocholithiasis, no indication for ERCP per Gastroenterology, recommended anticoagulation for portal vein thrombosis.  Ultrasound with positive from Chang's sign and thickened gallbladder.  General surgery evaluated for possible cholecystitis, poor surgical candidate and therefore interventional radiology was consulted for percutaneous cholecystostomy tube placement and this was done on 7/7.  Zosyn sky, General surgery Plan to follow him in clinic as outpatient, GI planning for repeating triple phase as outpatient in 3 months and also arrange follow up with hepatology in [place].  Patient reported suicidal ideation on 07/09 and therefore psych consulted and he was placed under pec/one-to-one observation.  Previous CT with concerns for pontine/left internal capsule ischemic CVA, MRI was ordered to further evaluate.  Ultrasound abdomen ordered to quantify ascites on  07/10.  Pec and one-to-one observation continued.  Large volume ascites noted and therefore IR consulted for paracentesis on 07/11, MRI came back positive for thalamic CVA, Neurology consulted, neurology recommended to continue anticoagulation, no statin given transaminitis, pec continued as of 7/11.  Patient with no more suicidal or homicidal ideation and therefore Pec was revoked on 07/12.  INR was more than 5 on 07/12, Xarelto held, no overt bleeding, trended down to 3.7 on 07/13.  INR trended down to less than 2 on 07/14 and therefore Xarelto resumed.  After resumption INR trended up again and therefore Xarelto held again, there was also concern for bleeding from cholecystostomy tube, General surgery re-evaluated, no interventions recommended, patient also developed acute kidney injury which is most likely hepatorenal syndrome, evaluated by Nephrology, deemed poor candidate for hemodialysis, nephrology signed off, overall prognosis extremely poor, palliative care team consulted, patient DNR, communicated with family and they decided to pursue hospice at nursing home, case management working on the same, patient also underwent paracentesis again given reaccumulating ascites.  Labs repeated on 07/24 with continued worsening acute kidney injury.  Also had hyperkalemia and metabolic acidosis which was treated with Lokelma and p.o. bicarb accordingly    Interval Hx:   Patient seen at bedside, comfortably laying in, family members at bedside, no acute events overnight, patient is feeling weak overall, hemodynamics stable, no change in neurological status, oriented  Objective/physical exam:  General: In no acute distress, afebrile  Chest: Clear to auscultation bilaterally  Heart: S1, S2, no appreciable murmur  Abdomen: Soft, distended, nontender, BS +  MSK: Warm, no lower extremity edema, no clubbing or cyanosis  Neurologic:  Awake, alert and seems oriented  VITAL SIGNS: 24 HRS MIN & MAX LAST   Temp  Min: 97.4 °F (36.3  °C)  Max: 98.4 °F (36.9 °C) 97.4 °F (36.3 °C)   BP  Min: 94/56  Max: 126/69 (!) 105/56   Pulse  Min: 61  Max: 77  67   Resp  Min: 8  Max: 18 18   SpO2  Min: 98 %  Max: 100 % 99 %       Recent Labs   Lab 07/24/25  0940   WBC 12.62*   RBC 3.27*   HGB 9.2*   HCT 29.0*   MCV 88.7   MCH 28.1   MCHC 31.7*   RDW 21.7*      MPV 9.6         Recent Labs   Lab 07/24/25  0940   *   K 5.3*      CO2 20*   BUN 97.2*   CREATININE 3.54*   GLU 82   CALCIUM 8.0*   ALBUMIN 1.4*   PROT 8.6*   ALKPHOS 224*   *   *   BILITOT 4.0*          Microbiology Results (last 7 days)       Procedure Component Value Units Date/Time    Fungal Culture [[id number]]  (Normal) Collected: 07/07/25 1639    Order Status: Completed Specimen: Aspirate from Gallbladder Updated: 07/21/25 1801     Fungal Culture No Fungus Isolated at 2 Weeks             Scheduled Med:   ARIPiprazole  10 mg Oral Daily    EScitalopram oxalate  20 mg Oral Daily    folic acid  1 mg Oral Daily    lactulose 10 gram/15 ml  15 g Oral Daily    nicotine  1 patch Transdermal Daily    pantoprazole  40 mg Oral BID    sodium bicarbonate  1,300 mg Oral BID    sodium zirconium cyclosilicate  10 g Oral TID    thiamine  100 mg Oral Daily          Assessment/Plan:    Acute kidney injury-worsening, suspect hepatorenal syndrome   Acute ischemic CVA -left thalamus  Positive bubble study  Suicidal ideation placed under pec 7/9, revoked 7/12  Symptomatic cholelithiasis with suspected cholecystitis status post CT-guided percutaneous cholecystostomy tube placement 7/7  Sepsis secondary to above-improving   Acute on chronic hyperbilirubinemia with transaminitis  Decompensated cirrhosis with large volume recurrent ascites status post paracentesis x2 occasions  Supratherapeutic INR-improving off Xarelto  Portal vein thrombosis  Suspected HCC  Suspected bilateral pneumonia-HA P  Substance use disorder  Chronic hep C   History of essential HTN  Sciatica   History of CTS    Physical deconditioning  Prophylaxis        Renal compromise still persist without much improvement, patient was evaluated by Nephrology, unfortunately no much options and not a candidate for hemodialysis given multiple other comorbidities  Hyperkalemia better, acidosis also better on p.o. bicarb  Cholecystostomy tube still remains in place   Hyperbilirubinemia and transaminitis persist, this could be secondary to suspected HCC/cirrhosis  Patient completed course of antibiotics for cholecystitis  Not a candidate for anymore intervention given multiple comorbidities/poor prognosis  Evaluated by Neurology for thalamic CVA and had recommended anticoagulation, intolerant to Xarelto, INR trended up to as high as more than 8 on Xarelto and therefore indefinitely held to avoid major bleeding  Positive bubble study, hold off on further workup as family has opted for hospice  Ascites reaccumulated quick and he had to undergo repeat paracentesis, hold off on Lasix and Aldactone given significantly compromised renal function  Will benefit from PleurX cath which IR will consider once leukocytosis resolves , improving  Low suspicion for sepsis at this time  Continue therapy services as tolerated  Continue lactulose to prevent hepatic encephalopathy   Continue aripiprazole, citalopram, folic acid, nicotine patch, thiamine   DVT prophylaxis-no chemical prophylaxis given recurrent supratherapeutic INR and increased risk of bleeding, bilateral SCDs    Overall prognosis extremely poor , family understands, discussed again at bedside today  Patient is now DNR   Awaiting DC to nursing home with hospice   Case management is working on this  Denied by inpatient hospice    Beth Beckett MD   07/25/2025

## 2025-07-26 LAB
ALBUMIN SERPL-MCNC: 1.4 G/DL (ref 3.4–4.8)
ALBUMIN/GLOB SERPL: 0.2 RATIO (ref 1.1–2)
ALP SERPL-CCNC: 235 UNIT/L (ref 40–150)
ALT SERPL-CCNC: 165 UNIT/L (ref 0–55)
ANION GAP SERPL CALC-SCNC: 10 MEQ/L
AST SERPL-CCNC: 155 UNIT/L (ref 11–45)
BASOPHILS # BLD AUTO: 0.04 X10(3)/MCL
BASOPHILS NFR BLD AUTO: 0.4 %
BILIRUB SERPL-MCNC: 3.3 MG/DL
BUN SERPL-MCNC: 96.1 MG/DL (ref 8.4–25.7)
CALCIUM SERPL-MCNC: 8 MG/DL (ref 8.8–10)
CHLORIDE SERPL-SCNC: 104 MMOL/L (ref 98–107)
CO2 SERPL-SCNC: 23 MMOL/L (ref 23–31)
CREAT SERPL-MCNC: 3.41 MG/DL (ref 0.72–1.25)
CREAT/UREA NIT SERPL: 28
EOSINOPHIL # BLD AUTO: 0.16 X10(3)/MCL (ref 0–0.9)
EOSINOPHIL NFR BLD AUTO: 1.4 %
ERYTHROCYTE [DISTWIDTH] IN BLOOD BY AUTOMATED COUNT: 21 % (ref 11.5–17)
GFR SERPLBLD CREATININE-BSD FMLA CKD-EPI: 19 ML/MIN/1.73/M2
GLOBULIN SER-MCNC: 7.4 GM/DL (ref 2.4–3.5)
GLUCOSE SERPL-MCNC: 67 MG/DL (ref 82–115)
HCT VFR BLD AUTO: 28.7 % (ref 42–52)
HGB BLD-MCNC: 9.2 G/DL (ref 14–18)
IMM GRANULOCYTES # BLD AUTO: 0.05 X10(3)/MCL (ref 0–0.04)
IMM GRANULOCYTES NFR BLD AUTO: 0.4 %
LYMPHOCYTES # BLD AUTO: 1.75 X10(3)/MCL (ref 0.6–4.6)
LYMPHOCYTES NFR BLD AUTO: 15.7 %
MCH RBC QN AUTO: 28.8 PG (ref 27–31)
MCHC RBC AUTO-ENTMCNC: 32.1 G/DL (ref 33–36)
MCV RBC AUTO: 89.7 FL (ref 80–94)
MONOCYTES # BLD AUTO: 0.82 X10(3)/MCL (ref 0.1–1.3)
MONOCYTES NFR BLD AUTO: 7.3 %
NEUTROPHILS # BLD AUTO: 8.35 X10(3)/MCL (ref 2.1–9.2)
NEUTROPHILS NFR BLD AUTO: 74.8 %
NRBC BLD AUTO-RTO: 0 %
PLATELET # BLD AUTO: 223 X10(3)/MCL (ref 130–400)
PMV BLD AUTO: 9.1 FL (ref 7.4–10.4)
POTASSIUM SERPL-SCNC: 4.3 MMOL/L (ref 3.5–5.1)
PROT SERPL-MCNC: 8.8 GM/DL (ref 5.8–7.6)
RBC # BLD AUTO: 3.2 X10(6)/MCL (ref 4.7–6.1)
SODIUM SERPL-SCNC: 137 MMOL/L (ref 136–145)
WBC # BLD AUTO: 11.17 X10(3)/MCL (ref 4.5–11.5)

## 2025-07-26 PROCEDURE — 80053 COMPREHEN METABOLIC PANEL: CPT | Performed by: INTERNAL MEDICINE

## 2025-07-26 PROCEDURE — 36415 COLL VENOUS BLD VENIPUNCTURE: CPT | Performed by: INTERNAL MEDICINE

## 2025-07-26 PROCEDURE — 25000003 PHARM REV CODE 250: Performed by: STUDENT IN AN ORGANIZED HEALTH CARE EDUCATION/TRAINING PROGRAM

## 2025-07-26 PROCEDURE — 25000003 PHARM REV CODE 250: Performed by: INTERNAL MEDICINE

## 2025-07-26 PROCEDURE — 85025 COMPLETE CBC W/AUTO DIFF WBC: CPT | Performed by: INTERNAL MEDICINE

## 2025-07-26 PROCEDURE — 25000003 PHARM REV CODE 250: Performed by: HOSPITALIST

## 2025-07-26 PROCEDURE — S4991 NICOTINE PATCH NONLEGEND: HCPCS | Performed by: HOSPITALIST

## 2025-07-26 PROCEDURE — 11000001 HC ACUTE MED/SURG PRIVATE ROOM

## 2025-07-26 RX ADMIN — THIAMINE HCL TAB 100 MG 100 MG: 100 TAB at 09:07

## 2025-07-26 RX ADMIN — ESCITALOPRAM OXALATE 20 MG: 10 TABLET ORAL at 09:07

## 2025-07-26 RX ADMIN — HYDROCODONE BITARTRATE AND ACETAMINOPHEN 1 TABLET: 5; 325 TABLET ORAL at 03:07

## 2025-07-26 RX ADMIN — PANTOPRAZOLE SODIUM 40 MG: 40 TABLET, DELAYED RELEASE ORAL at 09:07

## 2025-07-26 RX ADMIN — ARIPIPRAZOLE 10 MG: 5 TABLET ORAL at 09:07

## 2025-07-26 RX ADMIN — HYDROCODONE BITARTRATE AND ACETAMINOPHEN 1 TABLET: 10; 325 TABLET ORAL at 10:07

## 2025-07-26 RX ADMIN — SODIUM BICARBONATE 1300 MG: 650 TABLET ORAL at 09:07

## 2025-07-26 RX ADMIN — HYDROCODONE BITARTRATE AND ACETAMINOPHEN 1 TABLET: 5; 325 TABLET ORAL at 08:07

## 2025-07-26 RX ADMIN — LACTULOSE 15 G: 10 SOLUTION ORAL at 09:07

## 2025-07-26 RX ADMIN — FOLIC ACID 1 MG: 1 TABLET ORAL at 09:07

## 2025-07-26 RX ADMIN — PANTOPRAZOLE SODIUM 40 MG: 40 TABLET, DELAYED RELEASE ORAL at 08:07

## 2025-07-26 RX ADMIN — NICOTINE 1 PATCH: 14 PATCH TRANSDERMAL at 09:07

## 2025-07-26 NOTE — PLAN OF CARE
Problem: Adult Inpatient Plan of Care  Goal: Plan of Care Review  Outcome: Progressing  Goal: Patient-Specific Goal (Individualized)  Outcome: Progressing  Goal: Absence of Hospital-Acquired Illness or Injury  Outcome: Progressing  Goal: Optimal Comfort and Wellbeing  Outcome: Progressing  Goal: Readiness for Transition of Care  Outcome: Progressing     Problem: Coping Ineffective  Goal: Effective Coping  Outcome: Progressing     Problem: Skin Injury Risk Increased  Goal: Skin Health and Integrity  Outcome: Progressing     Problem: Wound  Goal: Optimal Coping  Outcome: Progressing  Goal: Optimal Functional Ability  Outcome: Progressing  Goal: Absence of Infection Signs and Symptoms  Outcome: Progressing  Goal: Improved Oral Intake  Outcome: Progressing  Goal: Optimal Pain Control and Function  Outcome: Progressing  Goal: Skin Health and Integrity  Outcome: Progressing  Goal: Optimal Wound Healing  Outcome: Progressing     Problem: Suicide Risk  Goal: Absence of Self-Harm  Outcome: Progressing     Problem: Infection  Goal: Absence of Infection Signs and Symptoms  Outcome: Progressing     Problem: Fall Injury Risk  Goal: Absence of Fall and Fall-Related Injury  Outcome: Progressing

## 2025-07-26 NOTE — PROGRESS NOTES
[facility] Hospital Medicine Progress Note        Chief Complaint: Inpatient Follow-up    HPI:     64-year-old male with significant history of carpal tunnel syndrome, HTN, sciatica, portal vein thrombosis, cocaine use, chronic hep C, hepatic adenoma concerning for HCC, cirrhosis presented to the ED with complaints of progressively worsening abdominal pain for the past 2 months.  Patient was noted to have acute on chronic hyperbilirubinemia with leukocytosis.  He initially presented to outlying facility and was transferred to our hospital for higher level of care, patient was admitted to hospital medicine services, Gastroenterology, general surgery services consulted, MRCP was ordered to further evaluate worsening liver function test.  MRCP revealed findings concerning for HCC, distended gallbladder with cholelithiasis and mild intrahepatic biliary ductal dilation, evaluation was challenging secondary to underlying portal vein thrombus.  Suspicion for cholecystitis given clinical presentation.  No evidence of choledocholithiasis, no indication for ERCP per Gastroenterology, recommended anticoagulation for portal vein thrombosis.  Ultrasound with positive from Chang's sign and thickened gallbladder.  General surgery evaluated for possible cholecystitis, poor surgical candidate and therefore interventional radiology was consulted for percutaneous cholecystostomy tube placement and this was done on 7/7.  Zosyn sky, General surgery Plan to follow him in clinic as outpatient, GI planning for repeating triple phase as outpatient in 3 months and also arrange follow up with hepatology in [place].  Patient reported suicidal ideation on 07/09 and therefore psych consulted and he was placed under pec/one-to-one observation.  Previous CT with concerns for pontine/left internal capsule ischemic CVA, MRI was ordered to further evaluate.  Ultrasound abdomen ordered to quantify ascites on  07/10.  Pec and one-to-one observation continued.  Large volume ascites noted and therefore IR consulted for paracentesis on 07/11, MRI came back positive for thalamic CVA, Neurology consulted, neurology recommended to continue anticoagulation, no statin given transaminitis, pec continued as of 7/11.  Patient with no more suicidal or homicidal ideation and therefore Pec was revoked on 07/12.  INR was more than 5 on 07/12, Xarelto held, no overt bleeding, trended down to 3.7 on 07/13.  INR trended down to less than 2 on 07/14 and therefore Xarelto resumed.  After resumption INR trended up again and therefore Xarelto held again, there was also concern for bleeding from cholecystostomy tube, General surgery re-evaluated, no interventions recommended, patient also developed acute kidney injury which is most likely hepatorenal syndrome, evaluated by Nephrology, deemed poor candidate for hemodialysis, nephrology signed off, overall prognosis extremely poor, palliative care team consulted, patient DNR, communicated with family and they decided to pursue hospice at nursing home, case management working on the same, patient also underwent paracentesis again given reaccumulating ascites.  Labs repeated on 07/24 with continued worsening acute kidney injury.  Also had hyperkalemia and metabolic acidosis which was treated with Lokelma and p.o. bicarb accordingly.  Hyperkalemia and metabolic acidosis improved, but renal function continues to get monitor, patient awaiting nursing home placement with hospice, plan for PleurX cath if white count normalizes    Interval Hx:   Patient seen at bedside, patient is comfortably laying in bed, no new complaints, no acute events overnight, he is hemodynamically stable, no active complaints, multiple family members at bedside   Objective/physical exam:  General: In no acute distress, afebrile  Chest: Clear to auscultation bilaterally  Heart: S1, S2, no appreciable murmur  Abdomen: Soft,  distended, nontender, BS +  MSK: Warm, no lower extremity edema, no clubbing or cyanosis  Neurologic:  Awake, alert and seems oriented  VITAL SIGNS: 24 HRS MIN & MAX LAST   Temp  Min: 97.4 °F (36.3 °C)  Max: 98 °F (36.7 °C) 97.4 °F (36.3 °C)   BP  Min: 101/59  Max: 120/71 (!) 113/56   Pulse  Min: 66  Max: 71  67   Resp  Min: 16  Max: 19 19   SpO2  Min: 97 %  Max: 100 % 98 %       Recent Labs   Lab 07/26/25  0257   WBC 11.17   RBC 3.20*   HGB 9.2*   HCT 28.7*   MCV 89.7   MCH 28.8   MCHC 32.1*   RDW 21.0*      MPV 9.1         Recent Labs   Lab 07/26/25  0257      K 4.3      CO2 23   BUN 96.1*   CREATININE 3.41*   GLU 67*   CALCIUM 8.0*   ALBUMIN 1.4*   PROT 8.8*   ALKPHOS 235*   *   *   BILITOT 3.3*          Microbiology Results (last 7 days)       Procedure Component Value Units Date/Time    Fungal Culture [[id number]]  (Normal) Collected: 07/07/25 1639    Order Status: Completed Specimen: Aspirate from Gallbladder Updated: 07/21/25 1801     Fungal Culture No Fungus Isolated at 2 Weeks             Scheduled Med:   ARIPiprazole  10 mg Oral Daily    EScitalopram oxalate  20 mg Oral Daily    folic acid  1 mg Oral Daily    lactulose 10 gram/15 ml  15 g Oral Daily    nicotine  1 patch Transdermal Daily    pantoprazole  40 mg Oral BID    sodium bicarbonate  1,300 mg Oral BID    thiamine  100 mg Oral Daily          Assessment/Plan:    Acute kidney injury-worsening, suspect hepatorenal syndrome   Acute ischemic CVA -left thalamus  Positive bubble study  Suicidal ideation placed under pec 7/9, revoked 7/12  Symptomatic cholelithiasis with suspected cholecystitis status post CT-guided percutaneous cholecystostomy tube placement 7/7  Sepsis secondary to above-improving   Acute on chronic hyperbilirubinemia with transaminitis  Decompensated cirrhosis with large volume recurrent ascites status post paracentesis x2 occasions  Supratherapeutic INR-improving off Xarelto  Portal vein  thrombosis  Suspected HCC  Suspected bilateral pneumonia-HA P  Substance use disorder  Chronic hep C   History of essential HTN  Sciatica   History of CTS   Physical deconditioning  Prophylaxis        Renal compromise still persist without much improvement, in fact slightly worse today, patient was evaluated by Nephrology, unfortunately no much options and not a candidate for hemodialysis given multiple other comorbidities  Cholecystostomy tube still remains in place   Hyperbilirubinemia and transaminitis persist, this could be secondary to suspected HCC/cirrhosis  Patient completed course of antibiotics for cholecystitis  Not a candidate for anymore intervention given multiple comorbidities/poor prognosis  Evaluated by Neurology for thalamic CVA and had recommended anticoagulation, intolerant to Xarelto, INR trended up to as high as more than 8 on Xarelto and therefore indefinitely held to avoid major bleeding  Positive bubble study, hold off on further workup as family has opted for hospice  Ascites reaccumulated quick and he had to undergo repeat paracentesis, hold off on Lasix and Aldactone given significantly compromised renal function  Will benefit from PleurX cath, now that leukocytosis is resolved I will reconsult IR on Monday   Continue therapy services as tolerated  Continue lactulose to prevent hepatic encephalopathy   Continue aripiprazole, citalopram, folic acid, nicotine patch, thiamine   DVT prophylaxis-no chemical prophylaxis given recurrent supratherapeutic INR and increased risk of bleeding, bilateral SCDs    Overall prognosis extremely poor , family understands,  Patient is now DNR   Awaiting DC to nursing home with hospice   Case management is working on this  Denied by inpatient hospice  PleurX cath Monday if WBC count remains stable    Beth Beckett MD   07/26/2025

## 2025-07-27 LAB
ALBUMIN SERPL-MCNC: 1.5 G/DL (ref 3.4–4.8)
ALBUMIN/GLOB SERPL: 0.2 RATIO (ref 1.1–2)
ALP SERPL-CCNC: 262 UNIT/L (ref 40–150)
ALT SERPL-CCNC: 182 UNIT/L (ref 0–55)
ANION GAP SERPL CALC-SCNC: 10 MEQ/L
AST SERPL-CCNC: 181 UNIT/L (ref 11–45)
BASOPHILS # BLD AUTO: 0.05 X10(3)/MCL
BASOPHILS NFR BLD AUTO: 0.3 %
BILIRUB SERPL-MCNC: 3.4 MG/DL
BUN SERPL-MCNC: 93.8 MG/DL (ref 8.4–25.7)
CALCIUM SERPL-MCNC: 7.9 MG/DL (ref 8.8–10)
CHLORIDE SERPL-SCNC: 99 MMOL/L (ref 98–107)
CO2 SERPL-SCNC: 26 MMOL/L (ref 23–31)
CREAT SERPL-MCNC: 3.5 MG/DL (ref 0.72–1.25)
CREAT/UREA NIT SERPL: 27
EOSINOPHIL # BLD AUTO: 0.14 X10(3)/MCL (ref 0–0.9)
EOSINOPHIL NFR BLD AUTO: 1 %
ERYTHROCYTE [DISTWIDTH] IN BLOOD BY AUTOMATED COUNT: 21.3 % (ref 11.5–17)
GFR SERPLBLD CREATININE-BSD FMLA CKD-EPI: 19 ML/MIN/1.73/M2
GLOBULIN SER-MCNC: 7.7 GM/DL (ref 2.4–3.5)
GLUCOSE SERPL-MCNC: 69 MG/DL (ref 82–115)
HCT VFR BLD AUTO: 31.9 % (ref 42–52)
HGB BLD-MCNC: 10.1 G/DL (ref 14–18)
IMM GRANULOCYTES # BLD AUTO: 0.07 X10(3)/MCL (ref 0–0.04)
IMM GRANULOCYTES NFR BLD AUTO: 0.5 %
LYMPHOCYTES # BLD AUTO: 2.05 X10(3)/MCL (ref 0.6–4.6)
LYMPHOCYTES NFR BLD AUTO: 14.3 %
MCH RBC QN AUTO: 28.8 PG (ref 27–31)
MCHC RBC AUTO-ENTMCNC: 31.7 G/DL (ref 33–36)
MCV RBC AUTO: 90.9 FL (ref 80–94)
MONOCYTES # BLD AUTO: 1.08 X10(3)/MCL (ref 0.1–1.3)
MONOCYTES NFR BLD AUTO: 7.5 %
NEUTROPHILS # BLD AUTO: 10.96 X10(3)/MCL (ref 2.1–9.2)
NEUTROPHILS NFR BLD AUTO: 76.4 %
NRBC BLD AUTO-RTO: 0 %
PLATELET # BLD AUTO: 142 X10(3)/MCL (ref 130–400)
PMV BLD AUTO: 10.1 FL (ref 7.4–10.4)
POTASSIUM SERPL-SCNC: 4.6 MMOL/L (ref 3.5–5.1)
PROT SERPL-MCNC: 9.2 GM/DL (ref 5.8–7.6)
RBC # BLD AUTO: 3.51 X10(6)/MCL (ref 4.7–6.1)
SODIUM SERPL-SCNC: 135 MMOL/L (ref 136–145)
WBC # BLD AUTO: 14.35 X10(3)/MCL (ref 4.5–11.5)

## 2025-07-27 PROCEDURE — 85025 COMPLETE CBC W/AUTO DIFF WBC: CPT | Performed by: INTERNAL MEDICINE

## 2025-07-27 PROCEDURE — 25000003 PHARM REV CODE 250: Performed by: INTERNAL MEDICINE

## 2025-07-27 PROCEDURE — 11000001 HC ACUTE MED/SURG PRIVATE ROOM

## 2025-07-27 PROCEDURE — 25000003 PHARM REV CODE 250: Performed by: STUDENT IN AN ORGANIZED HEALTH CARE EDUCATION/TRAINING PROGRAM

## 2025-07-27 PROCEDURE — 36415 COLL VENOUS BLD VENIPUNCTURE: CPT | Performed by: INTERNAL MEDICINE

## 2025-07-27 PROCEDURE — 80053 COMPREHEN METABOLIC PANEL: CPT | Performed by: INTERNAL MEDICINE

## 2025-07-27 PROCEDURE — S4991 NICOTINE PATCH NONLEGEND: HCPCS | Performed by: HOSPITALIST

## 2025-07-27 PROCEDURE — 25000003 PHARM REV CODE 250: Performed by: HOSPITALIST

## 2025-07-27 RX ADMIN — ESCITALOPRAM OXALATE 20 MG: 10 TABLET ORAL at 08:07

## 2025-07-27 RX ADMIN — LACTULOSE 15 G: 10 SOLUTION ORAL at 08:07

## 2025-07-27 RX ADMIN — ARIPIPRAZOLE 10 MG: 5 TABLET ORAL at 08:07

## 2025-07-27 RX ADMIN — PANTOPRAZOLE SODIUM 40 MG: 40 TABLET, DELAYED RELEASE ORAL at 07:07

## 2025-07-27 RX ADMIN — THIAMINE HCL TAB 100 MG 100 MG: 100 TAB at 08:07

## 2025-07-27 RX ADMIN — PANTOPRAZOLE SODIUM 40 MG: 40 TABLET, DELAYED RELEASE ORAL at 08:07

## 2025-07-27 RX ADMIN — NICOTINE 1 PATCH: 14 PATCH TRANSDERMAL at 08:07

## 2025-07-27 RX ADMIN — FOLIC ACID 1 MG: 1 TABLET ORAL at 08:07

## 2025-07-27 RX ADMIN — HYDROCODONE BITARTRATE AND ACETAMINOPHEN 1 TABLET: 5; 325 TABLET ORAL at 07:07

## 2025-07-27 NOTE — PROGRESS NOTES
[facility] Hospital Medicine Progress Note        Chief Complaint: Inpatient Follow-up    HPI:     64-year-old male with significant history of carpal tunnel syndrome, HTN, sciatica, portal vein thrombosis, cocaine use, chronic hep C, hepatic adenoma concerning for HCC, cirrhosis presented to the ED with complaints of progressively worsening abdominal pain for the past 2 months.  Patient was noted to have acute on chronic hyperbilirubinemia with leukocytosis.  He initially presented to outlying facility and was transferred to our hospital for higher level of care, patient was admitted to hospital medicine services, Gastroenterology, general surgery services consulted, MRCP was ordered to further evaluate worsening liver function test.  MRCP revealed findings concerning for HCC, distended gallbladder with cholelithiasis and mild intrahepatic biliary ductal dilation, evaluation was challenging secondary to underlying portal vein thrombus.  Suspicion for cholecystitis given clinical presentation.  No evidence of choledocholithiasis, no indication for ERCP per Gastroenterology, recommended anticoagulation for portal vein thrombosis.  Ultrasound with positive from Chang's sign and thickened gallbladder.  General surgery evaluated for possible cholecystitis, poor surgical candidate and therefore interventional radiology was consulted for percutaneous cholecystostomy tube placement and this was done on 7/7.  Zosyn sky, General surgery Plan to follow him in clinic as outpatient, GI planning for repeating triple phase as outpatient in 3 months and also arrange follow up with hepatology in [place].  Patient reported suicidal ideation on 07/09 and therefore psych consulted and he was placed under pec/one-to-one observation.  Previous CT with concerns for pontine/left internal capsule ischemic CVA, MRI was ordered to further evaluate.  Ultrasound abdomen ordered to quantify ascites on  07/10.  Pec and one-to-one observation continued.  Large volume ascites noted and therefore IR consulted for paracentesis on 07/11, MRI came back positive for thalamic CVA, Neurology consulted, neurology recommended to continue anticoagulation, no statin given transaminitis, pec continued as of 7/11.  Patient with no more suicidal or homicidal ideation and therefore Pec was revoked on 07/12.  INR was more than 5 on 07/12, Xarelto held, no overt bleeding, trended down to 3.7 on 07/13.  INR trended down to less than 2 on 07/14 and therefore Xarelto resumed.  After resumption INR trended up again and therefore Xarelto held again, there was also concern for bleeding from cholecystostomy tube, General surgery re-evaluated, no interventions recommended, patient also developed acute kidney injury which is most likely hepatorenal syndrome, evaluated by Nephrology, deemed poor candidate for hemodialysis, nephrology signed off, overall prognosis extremely poor, palliative care team consulted, patient DNR, communicated with family and they decided to pursue hospice at nursing home, case management working on the same, patient also underwent paracentesis again given reaccumulating ascites.  Labs repeated on 07/24 with continued worsening acute kidney injury.  Also had hyperkalemia and metabolic acidosis which was treated with Lokelma and p.o. bicarb accordingly.  Hyperkalemia and metabolic acidosis improved, but renal function continues to get monitor, patient awaiting nursing home placement with hospice, plan for PleurX cath if white count normalizes    Interval Hx:   Patient seen at bedside, patient is comfortably laying in bed, no acute events overnight, no new complaints, complaints of fatigue and generalized weakness, appetite is poor, family members at bedside     Objective/physical exam:  General: In no acute distress, afebrile  Chest: Clear to auscultation bilaterally  Heart: S1, S2, no appreciable murmur  Abdomen:  Soft, distended, nontender, BS +  MSK: Warm, no lower extremity edema, no clubbing or cyanosis  Neurologic:  Awake, alert and seems oriented  VITAL SIGNS: 24 HRS MIN & MAX LAST   Temp  Min: 97.5 °F (36.4 °C)  Max: 98.5 °F (36.9 °C) 97.5 °F (36.4 °C)   BP  Min: 108/56  Max: 122/70 122/70   Pulse  Min: 69  Max: 74  69   Resp  Min: 16  Max: 18 18   SpO2  Min: 97 %  Max: 100 % 98 %       Recent Labs   Lab 07/27/25  0407   WBC 14.35*   RBC 3.51*   HGB 10.1*   HCT 31.9*   MCV 90.9   MCH 28.8   MCHC 31.7*   RDW 21.3*      MPV 10.1         Recent Labs   Lab 07/27/25  0407   *   K 4.6   CL 99   CO2 26   BUN 93.8*   CREATININE 3.50*   GLU 69*   CALCIUM 7.9*   ALBUMIN 1.5*   PROT 9.2*   ALKPHOS 262*   *   *   BILITOT 3.4*          Microbiology Results (last 7 days)       Procedure Component Value Units Date/Time    Fungal Culture [[id number]]  (Normal) Collected: 07/07/25 1639    Order Status: Completed Specimen: Aspirate from Gallbladder Updated: 07/21/25 1801     Fungal Culture No Fungus Isolated at 2 Weeks             Scheduled Med:   ARIPiprazole  10 mg Oral Daily    EScitalopram oxalate  20 mg Oral Daily    folic acid  1 mg Oral Daily    lactulose 10 gram/15 ml  15 g Oral Daily    nicotine  1 patch Transdermal Daily    pantoprazole  40 mg Oral BID    thiamine  100 mg Oral Daily          Assessment/Plan:    Acute kidney injury-worsening, suspect hepatorenal syndrome   Acute ischemic CVA -left thalamus  Positive bubble study  Suicidal ideation placed under pec 7/9, revoked 7/12  Symptomatic cholelithiasis with suspected cholecystitis status post CT-guided percutaneous cholecystostomy tube placement 7/7  Sepsis secondary to above-improving   Acute on chronic hyperbilirubinemia with transaminitis  Decompensated cirrhosis with large volume recurrent ascites status post paracentesis x2 occasions  Supratherapeutic INR-improving off Xarelto  Portal vein thrombosis  Suspected HCC  Suspected bilateral  pneumonia-HA P  Substance use disorder  Chronic hep C   History of essential HTN  Sciatica   History of CTS   Physical deconditioning  Prophylaxis        Renal compromise still persist without much improvement,patient was evaluated by Nephrology, unfortunately no much options and not a candidate for hemodialysis given multiple other comorbidities  Cholecystostomy tube still remains in place   Drain output is still high  Hyperbilirubinemia and transaminitis persist, this could be secondary to suspected HCC/cirrhosis  Patient completed course of antibiotics for cholecystitis  Not a candidate for anymore intervention given multiple comorbidities/poor prognosis  Evaluated by Neurology for thalamic CVA and had recommended anticoagulation, intolerant to Xarelto, INR trended up to as high as more than 8 on Xarelto and therefore indefinitely held to avoid major bleeding  Positive bubble study, hold off on further workup as family has opted for hospice  Ascites reaccumulated quick and he had to undergo repeat paracentesis, hold off on Lasix and Aldactone given significantly compromised renal function  Will benefit from PleurX cath, but IR would want normal WBC count for the same, WBC count is slightly worse today, monitor again tomorrow, suspicion for sepsis is very less likely, leukocytosis is likely stress related/reactive   Continue therapy services as tolerated  Continue lactulose to prevent hepatic encephalopathy   Continue aripiprazole, citalopram, folic acid, nicotine patch, thiamine   DVT prophylaxis-no chemical prophylaxis given recurrent supratherapeutic INR and increased risk of bleeding, bilateral SCDs    Overall prognosis extremely poor , family understands,  Patient is now DNR   Awaiting DC to nursing home with hospice   Case management is working on this  Denied by inpatient hospice  PleurX cath prior to DC if WBC count normalizes    Beth Beckett MD   07/27/2025

## 2025-07-27 NOTE — PLAN OF CARE
Problem: Adult Inpatient Plan of Care  Goal: Plan of Care Review  Outcome: Progressing  Goal: Patient-Specific Goal (Individualized)  Outcome: Progressing  Goal: Absence of Hospital-Acquired Illness or Injury  Outcome: Progressing  Intervention: Identify and Manage Fall Risk  Flowsheets (Taken 7/26/2025 2330)  Safety Promotion/Fall Prevention:   assistive device/personal item within reach   Fall Risk reviewed with patient/family   Fall Risk signage in place   side rails raised x 2  Intervention: Prevent Skin Injury  Flowsheets (Taken 7/26/2025 2330)  Body Position: weight shifting  Skin Protection: incontinence pads utilized  Device Skin Pressure Protection:   absorbent pad utilized/changed   tubing/devices free from skin contact  Intervention: Prevent and Manage VTE (Venous Thromboembolism) Risk  Flowsheets (Taken 7/26/2025 2330)  VTE Prevention/Management: fluids promoted  Intervention: Prevent Infection  Flowsheets (Taken 7/26/2025 2330)  Infection Prevention:   single patient room provided   rest/sleep promoted  Goal: Optimal Comfort and Wellbeing  Outcome: Progressing  Intervention: Monitor Pain and Promote Comfort  Flowsheets (Taken 7/26/2025 2330)  Pain Management Interventions:   quiet environment facilitated   pillow support provided   care clustered  Intervention: Provide Person-Centered Care  Flowsheets (Taken 7/26/2025 2330)  Trust Relationship/Rapport:   care explained   emotional support provided   thoughts/feelings acknowledged  Goal: Readiness for Transition of Care  Outcome: Progressing     Problem: Skin Injury Risk Increased  Goal: Skin Health and Integrity  Outcome: Progressing  Intervention: Optimize Skin Protection  Flowsheets (Taken 7/26/2025 2330)  Pressure Reduction Techniques: frequent weight shift encouraged  Pressure Reduction Devices: positioning supports utilized  Skin Protection: incontinence pads utilized  Activity Management: Rolling - L1  Head of Bed (HOB) Positioning: HOB  lowered

## 2025-07-28 PROCEDURE — 25000003 PHARM REV CODE 250: Performed by: INTERNAL MEDICINE

## 2025-07-28 PROCEDURE — 11000001 HC ACUTE MED/SURG PRIVATE ROOM

## 2025-07-28 PROCEDURE — 25000003 PHARM REV CODE 250: Performed by: HOSPITALIST

## 2025-07-28 PROCEDURE — 25000003 PHARM REV CODE 250: Performed by: STUDENT IN AN ORGANIZED HEALTH CARE EDUCATION/TRAINING PROGRAM

## 2025-07-28 PROCEDURE — S4991 NICOTINE PATCH NONLEGEND: HCPCS | Performed by: HOSPITALIST

## 2025-07-28 RX ADMIN — ESCITALOPRAM OXALATE 20 MG: 10 TABLET ORAL at 08:07

## 2025-07-28 RX ADMIN — NICOTINE 1 PATCH: 14 PATCH TRANSDERMAL at 08:07

## 2025-07-28 RX ADMIN — PANTOPRAZOLE SODIUM 40 MG: 40 TABLET, DELAYED RELEASE ORAL at 08:07

## 2025-07-28 RX ADMIN — FOLIC ACID 1 MG: 1 TABLET ORAL at 08:07

## 2025-07-28 RX ADMIN — PANTOPRAZOLE SODIUM 40 MG: 40 TABLET, DELAYED RELEASE ORAL at 10:07

## 2025-07-28 RX ADMIN — HYDROCODONE BITARTRATE AND ACETAMINOPHEN 1 TABLET: 5; 325 TABLET ORAL at 10:07

## 2025-07-28 RX ADMIN — ARIPIPRAZOLE 10 MG: 5 TABLET ORAL at 08:07

## 2025-07-28 RX ADMIN — THIAMINE HCL TAB 100 MG 100 MG: 100 TAB at 08:07

## 2025-07-28 RX ADMIN — LACTULOSE 15 G: 10 SOLUTION ORAL at 08:07

## 2025-07-28 NOTE — PROGRESS NOTES
[facility] Hospital Medicine Progress Note        Chief Complaint: Inpatient Follow-up    HPI:     64-year-old male with significant history of carpal tunnel syndrome, HTN, sciatica, portal vein thrombosis, cocaine use, chronic hep C, hepatic adenoma concerning for HCC, cirrhosis presented to the ED with complaints of progressively worsening abdominal pain for the past 2 months.  Patient was noted to have acute on chronic hyperbilirubinemia with leukocytosis.  He initially presented to outlying facility and was transferred to our hospital for higher level of care, patient was admitted to hospital medicine services, Gastroenterology, general surgery services consulted, MRCP was ordered to further evaluate worsening liver function test.  MRCP revealed findings concerning for HCC, distended gallbladder with cholelithiasis and mild intrahepatic biliary ductal dilation, evaluation was challenging secondary to underlying portal vein thrombus.  Suspicion for cholecystitis given clinical presentation.  No evidence of choledocholithiasis, no indication for ERCP per Gastroenterology, recommended anticoagulation for portal vein thrombosis.  Ultrasound with positive from Chang's sign and thickened gallbladder.  General surgery evaluated for possible cholecystitis, poor surgical candidate and therefore interventional radiology was consulted for percutaneous cholecystostomy tube placement and this was done on 7/7.  Zosyn sky, General surgery Plan to follow him in clinic as outpatient, GI planning for repeating triple phase as outpatient in 3 months and also arrange follow up with hepatology in [place].  Patient reported suicidal ideation on 07/09 and therefore psych consulted and he was placed under pec/one-to-one observation.  Previous CT with concerns for pontine/left internal capsule ischemic CVA, MRI was ordered to further evaluate.  Ultrasound abdomen ordered to quantify ascites on  07/10.  Pec and one-to-one observation continued.  Large volume ascites noted and therefore IR consulted for paracentesis on 07/11, MRI came back positive for thalamic CVA, Neurology consulted, neurology recommended to continue anticoagulation, no statin given transaminitis, pec continued as of 7/11.  Patient with no more suicidal or homicidal ideation and therefore Pec was revoked on 07/12.  INR was more than 5 on 07/12, Xarelto held, no overt bleeding, trended down to 3.7 on 07/13.  INR trended down to less than 2 on 07/14 and therefore Xarelto resumed.  After resumption INR trended up again and therefore Xarelto held again, there was also concern for bleeding from cholecystostomy tube, General surgery re-evaluated, no interventions recommended, patient also developed acute kidney injury which is most likely hepatorenal syndrome, evaluated by Nephrology, deemed poor candidate for hemodialysis, nephrology signed off, overall prognosis extremely poor, palliative care team consulted, patient DNR, communicated with family and they decided to pursue hospice at nursing home, case management working on the same, patient also underwent paracentesis again given reaccumulating ascites.  Labs repeated on 07/24 with continued worsening acute kidney injury.  Also had hyperkalemia and metabolic acidosis which was treated with Lokelma and p.o. bicarb accordingly.  Hyperkalemia and metabolic acidosis improved, but renal function continues to get monitor, patient awaiting nursing home placement with hospice, plan for PleurX cath if white count normalizes.  White count still high on 07/27.  Hold off on PleurX cath, fortunately does not have much ascites    Interval Hx:   Patient seen at bedside, family is at bedside, did not sleep well last night, no particular reason, no pain today morning, hemodynamics stable, no acute events overnight     Objective/physical exam:  General: In no acute distress, afebrile  Chest: Clear to  auscultation bilaterally  Heart: S1, S2, no appreciable murmur  Abdomen: Soft, distended, nontender, BS +  MSK: Warm, no lower extremity edema, no clubbing or cyanosis  Neurologic:  Awake, alert and seems oriented  VITAL SIGNS: 24 HRS MIN & MAX LAST   Temp  Min: 97.4 °F (36.3 °C)  Max: 98 °F (36.7 °C) 97.7 °F (36.5 °C)   BP  Min: 95/53  Max: 122/70 (!) 102/57   Pulse  Min: 67  Max: 82  67   Resp  Min: 16  Max: 18 16   SpO2  Min: 98 %  Max: 100 % 100 %       Recent Labs   Lab 07/27/25  0407   WBC 14.35*   RBC 3.51*   HGB 10.1*   HCT 31.9*   MCV 90.9   MCH 28.8   MCHC 31.7*   RDW 21.3*      MPV 10.1         Recent Labs   Lab 07/27/25  0407   *   K 4.6   CL 99   CO2 26   BUN 93.8*   CREATININE 3.50*   GLU 69*   CALCIUM 7.9*   ALBUMIN 1.5*   PROT 9.2*   ALKPHOS 262*   *   *   BILITOT 3.4*          Microbiology Results (last 7 days)       Procedure Component Value Units Date/Time    Fungal Culture [[id number]]  (Normal) Collected: 07/07/25 1639    Order Status: Completed Specimen: Aspirate from Gallbladder Updated: 07/21/25 1801     Fungal Culture No Fungus Isolated at 2 Weeks             Scheduled Med:   ARIPiprazole  10 mg Oral Daily    EScitalopram oxalate  20 mg Oral Daily    folic acid  1 mg Oral Daily    lactulose 10 gram/15 ml  15 g Oral Daily    nicotine  1 patch Transdermal Daily    pantoprazole  40 mg Oral BID    thiamine  100 mg Oral Daily          Assessment/Plan:    Acute kidney injury-worsening, suspect hepatorenal syndrome   Acute ischemic CVA -left thalamus  Positive bubble study  Suicidal ideation placed under pec 7/9, revoked 7/12  Symptomatic cholelithiasis with suspected cholecystitis status post CT-guided percutaneous cholecystostomy tube placement 7/7  Acute on chronic hyperbilirubinemia with transaminitis  Decompensated cirrhosis with large volume recurrent ascites status post paracentesis x2 occasions  Supratherapeutic INR-improving off Xarelto  Portal vein  thrombosis  Suspected HCC  Suspected bilateral pneumonia-HA P  Substance use disorder  Chronic hep C   History of essential HTN  Sciatica   History of CTS   Physical deconditioning  Prophylaxis        Renal compromise still persist without much improvement,patient was evaluated by Nephrology, unfortunately no much options and not a candidate for hemodialysis given multiple other comorbidities  Cholecystostomy tube still remains in place   Drain output still remains significant  Hyperbilirubinemia and transaminitis persist, this could be secondary to suspected HCC/cirrhosis  Patient completed course of antibiotics for cholecystitis  Not a candidate for anymore intervention given multiple comorbidities/poor prognosis  Evaluated by Neurology for thalamic CVA and had recommended anticoagulation, intolerant to Xarelto, INR trended up to as high as more than 8 on Xarelto and therefore indefinitely held to avoid major bleeding  Positive bubble study, hold off on further workup as family has opted for hospice  Ascites reaccumulated quick and he had to undergo repeat paracentesis,(7/11 & 7/23) hold off on Lasix and Aldactone given significantly compromised renal function  Will benefit from PleurX cath, but IR would want normal WBC count for the same, suspicion for sepsis is low, this is most likely stress related/reactive from suspected malignancy  Fortunately patient does not have much ascites and is not uncomfortable, okay to hold off on PleurX cath and opt for p.r.n. paracentesis   Continue therapy services as tolerated  Continue lactulose to prevent hepatic encephalopathy   Continue aripiprazole, citalopram, folic acid, nicotine patch, thiamine   DVT prophylaxis-no chemical prophylaxis given recurrent supratherapeutic INR and increased risk of bleeding, bilateral SCDs    Overall prognosis extremely poor , patient and family very well understands and they have opted for nursing home with hospice   Patient denied by  inpatient hospice   Case management is actively working on nursing home placement  Awaiting financials to be cleared      Beth Beckett MD   07/28/2025

## 2025-07-28 NOTE — CONSULTS
[facility] Acute  Wound Care    Patient Name:  Aman Humphrey   MRN:  [id number]  Date: 7/28/2025  Diagnosis: <principal problem not specified>    History:     Past Medical History:   Diagnosis Date    Carpal tunnel syndrome     HTN (hypertension)     Sciatica        Social History[1]    Precautions:     Allergies as of 07/03/2025    (No Known Allergies)       WOC Assessment Details/Treatment      07/28/25 1015   WOCN Assessment   WOCN Total Time (mins) 30   Visit Date 07/28/25   Visit Time 1015   Consult Type New   WOCN Speciality Wound   Intervention assessed;changed;applied;chart review   Teaching on-going        Wound 07/27/25 1031 Pressure Injury Coccyx   Date First Assessed/Time First Assessed: 07/27/25 1031   Primary Wound Type: Pressure Injury  Location: Coccyx   Wound Image    Pressure Injury Stage 2   Dressing Appearance Dry;Intact;Clean   Drainage Amount Scant   Drainage Characteristics/Odor Serosanguineous   Appearance Pink;Red;Moist   Tissue loss description Partial thickness   Black (%), Wound Tissue Color 0 %   Red (%), Wound Tissue Color 100 %   Yellow (%), Wound Tissue Color 0 %   Periwound Area Intact   Wound Edges Defined   Wound Length (cm) 1 cm   Wound Width (cm) 1 cm   Wound Depth (cm) 0 cm   Wound Volume (cm^3) 0 cm^3   Wound Surface Area (cm^2) 0.79 cm^2   Care Cleansed with:;Sterile normal saline   Dressing Removed;Foam;Applied;Gauze;Other (comment)  (zinc oxide)   WOCN consulted for pressure injury to the coccyx. 64 y.o. male, sister at bedside. Repositioned patient with assistance x2. Removed sacral foam. Cleansed area with sterile saline. Patted dry. Applied dime sized amount of zinc oxide paste to wound bed, covered with folded 4x4 gauze and medipore tape. Answered questions to satisfaction of patient and family. Nursing to continue with preventative measures such as q2 turns, offloading, and heel boots.   Treatment recommendation for coccyx: Cleanse area with  sterile saline. Pat dry. Apply zinc oxide paste to wound bed. Cover with folded 4x4 gauze and Medipore tape.   WOCN will follow-up.        [1]   Social History  Socioeconomic History    Marital status: Single   Tobacco Use    Smoking status: Some Days     Types: Cigarettes    Smokeless tobacco: Never   Substance and Sexual Activity    Alcohol use: Yes     Comment: daily    Drug use: Not Currently     Social Drivers of Health     Financial Resource Strain: Low Risk  (7/3/2025)    Overall Financial Resource Strain (CARDIA)     Difficulty of Paying Living Expenses: Not hard at all   Food Insecurity: No Food Insecurity (7/3/2025)    Hunger Vital Sign     Worried About Running Out of Food in the Last Year: Never true     Ran Out of Food in the Last Year: Never true   Transportation Needs: No Transportation Needs (7/3/2025)    PRAPARE - Transportation     Lack of Transportation (Medical): No     Lack of Transportation (Non-Medical): No   Stress: No Stress Concern Present (7/3/2025)    [employer] [employer] - Occupational Stress Questionnaire     Feeling of Stress : Not at all   Housing Stability: Low Risk  (7/3/2025)    Housing Stability Vital Sign     Unable to Pay for Housing in the Last Year: No     Number of Times Moved in the Last Year: 0     Homeless in the Last Year: No

## 2025-07-28 NOTE — PLAN OF CARE
Problem: Adult Inpatient Plan of Care  Goal: Plan of Care Review  Outcome: Progressing  Goal: Patient-Specific Goal (Individualized)  Outcome: Progressing  Goal: Absence of Hospital-Acquired Illness or Injury  Outcome: Progressing  Intervention: Identify and Manage Fall Risk  Flowsheets (Taken 7/28/2025 0222)  Safety Promotion/Fall Prevention:   assistive device/personal item within reach   Fall Risk reviewed with patient/family   crib side rails raised x2   bed alarm set  Intervention: Prevent Skin Injury  Flowsheets (Taken 7/28/2025 0222)  Body Position: weight shifting  Skin Protection: incontinence pads utilized  Device Skin Pressure Protection:   absorbent pad utilized/changed   tubing/devices free from skin contact  Intervention: Prevent and Manage VTE (Venous Thromboembolism) Risk  Flowsheets (Taken 7/28/2025 0222)  VTE Prevention/Management: bleeding precautions maintained  Intervention: Prevent Infection  Flowsheets (Taken 7/28/2025 0222)  Infection Prevention:   single patient room provided   rest/sleep promoted  Goal: Optimal Comfort and Wellbeing  Outcome: Progressing  Intervention: Monitor Pain and Promote Comfort  Flowsheets (Taken 7/28/2025 0222)  Pain Management Interventions:   pillow support provided   quiet environment facilitated   care clustered  Intervention: Provide Person-Centered Care  Flowsheets (Taken 7/28/2025 0222)  Trust Relationship/Rapport:   care explained   emotional support provided   thoughts/feelings acknowledged     Problem: Skin Injury Risk Increased  Goal: Skin Health and Integrity  Outcome: Progressing  Intervention: Optimize Skin Protection  Flowsheets (Taken 7/28/2025 0222)  Pressure Reduction Techniques: frequent weight shift encouraged  Pressure Reduction Devices: positioning supports utilized  Skin Protection: incontinence pads utilized  Activity Management: Rolling - L1  Head of Bed (HOB) Positioning: HOB elevated  Intervention: Promote and Optimize Oral  Intake  Flowsheets (Taken 7/28/2025 0222)  Oral Nutrition Promotion: rest periods promoted     Problem: Wound  Goal: Optimal Coping  Outcome: Progressing  Intervention: Support Patient and Family Response  Flowsheets (Taken 7/28/2025 0222)  Supportive Measures:   active listening utilized   self-care encouraged   verbalization of feelings encouraged

## 2025-07-29 LAB
ALBUMIN SERPL-MCNC: 1.5 G/DL (ref 3.4–4.8)
ALBUMIN/GLOB SERPL: 0.2 RATIO (ref 1.1–2)
ALP SERPL-CCNC: 268 UNIT/L (ref 40–150)
ALT SERPL-CCNC: 166 UNIT/L (ref 0–55)
ANION GAP SERPL CALC-SCNC: 9 MEQ/L
AST SERPL-CCNC: 175 UNIT/L (ref 11–45)
BASOPHILS # BLD AUTO: 0.03 X10(3)/MCL
BASOPHILS NFR BLD AUTO: 0.3 %
BILIRUB SERPL-MCNC: 3.3 MG/DL
BUN SERPL-MCNC: 91.5 MG/DL (ref 8.4–25.7)
CALCIUM SERPL-MCNC: 8 MG/DL (ref 8.8–10)
CHLORIDE SERPL-SCNC: 100 MMOL/L (ref 98–107)
CO2 SERPL-SCNC: 24 MMOL/L (ref 23–31)
CREAT SERPL-MCNC: 3.43 MG/DL (ref 0.72–1.25)
CREAT/UREA NIT SERPL: 27
EOSINOPHIL # BLD AUTO: 0.07 X10(3)/MCL (ref 0–0.9)
EOSINOPHIL NFR BLD AUTO: 0.7 %
ERYTHROCYTE [DISTWIDTH] IN BLOOD BY AUTOMATED COUNT: 20.7 % (ref 11.5–17)
GFR SERPLBLD CREATININE-BSD FMLA CKD-EPI: 19 ML/MIN/1.73/M2
GLOBULIN SER-MCNC: 7.9 GM/DL (ref 2.4–3.5)
GLUCOSE SERPL-MCNC: 72 MG/DL (ref 82–115)
HCT VFR BLD AUTO: 29.7 % (ref 42–52)
HGB BLD-MCNC: 9.5 G/DL (ref 14–18)
IMM GRANULOCYTES # BLD AUTO: 0.05 X10(3)/MCL (ref 0–0.04)
IMM GRANULOCYTES NFR BLD AUTO: 0.5 %
LYMPHOCYTES # BLD AUTO: 1.28 X10(3)/MCL (ref 0.6–4.6)
LYMPHOCYTES NFR BLD AUTO: 11.9 %
MCH RBC QN AUTO: 28.7 PG (ref 27–31)
MCHC RBC AUTO-ENTMCNC: 32 G/DL (ref 33–36)
MCV RBC AUTO: 89.7 FL (ref 80–94)
MONOCYTES # BLD AUTO: 0.67 X10(3)/MCL (ref 0.1–1.3)
MONOCYTES NFR BLD AUTO: 6.3 %
NEUTROPHILS # BLD AUTO: 8.62 X10(3)/MCL (ref 2.1–9.2)
NEUTROPHILS NFR BLD AUTO: 80.3 %
NRBC BLD AUTO-RTO: 0 %
PLATELET # BLD AUTO: 194 X10(3)/MCL (ref 130–400)
PMV BLD AUTO: 10.3 FL (ref 7.4–10.4)
POTASSIUM SERPL-SCNC: 4.7 MMOL/L (ref 3.5–5.1)
PROT SERPL-MCNC: 9.4 GM/DL (ref 5.8–7.6)
RBC # BLD AUTO: 3.31 X10(6)/MCL (ref 4.7–6.1)
SODIUM SERPL-SCNC: 133 MMOL/L (ref 136–145)
WBC # BLD AUTO: 10.72 X10(3)/MCL (ref 4.5–11.5)

## 2025-07-29 PROCEDURE — 36415 COLL VENOUS BLD VENIPUNCTURE: CPT | Performed by: INTERNAL MEDICINE

## 2025-07-29 PROCEDURE — 80053 COMPREHEN METABOLIC PANEL: CPT | Performed by: INTERNAL MEDICINE

## 2025-07-29 PROCEDURE — 85025 COMPLETE CBC W/AUTO DIFF WBC: CPT | Performed by: INTERNAL MEDICINE

## 2025-07-29 PROCEDURE — 11000001 HC ACUTE MED/SURG PRIVATE ROOM

## 2025-07-29 NOTE — PLAN OF CARE
Problem: Adult Inpatient Plan of Care  Goal: Plan of Care Review  Outcome: Not Progressing  Goal: Patient-Specific Goal (Individualized)  Outcome: Not Progressing  Goal: Absence of Hospital-Acquired Illness or Injury  Outcome: Not Progressing  Goal: Optimal Comfort and Wellbeing  Outcome: Not Progressing  Goal: Readiness for Transition of Care  Outcome: Not Progressing     Problem: Skin Injury Risk Increased  Goal: Skin Health and Integrity  Outcome: Not Progressing     Problem: Coping Ineffective  Goal: Effective Coping  Outcome: Not Progressing     Problem: Wound  Goal: Optimal Coping  Outcome: Not Progressing  Goal: Optimal Functional Ability  Outcome: Not Progressing  Goal: Absence of Infection Signs and Symptoms  Outcome: Not Progressing  Goal: Improved Oral Intake  Outcome: Not Progressing  Goal: Optimal Pain Control and Function  Outcome: Not Progressing  Goal: Skin Health and Integrity  Outcome: Not Progressing  Goal: Optimal Wound Healing  Outcome: Not Progressing     Problem: Suicide Risk  Goal: Absence of Self-Harm  Outcome: Not Progressing     Problem: Infection  Goal: Absence of Infection Signs and Symptoms  Outcome: Not Progressing     Problem: Stroke, Ischemic (Includes Transient Ischemic Attack)  Goal: Optimal Coping  Outcome: Not Progressing  Goal: Effective Bowel Elimination  Outcome: Not Progressing  Goal: Optimal Cerebral Tissue Perfusion  Outcome: Not Progressing  Goal: Optimal Cognitive Function  Outcome: Not Progressing  Goal: Improved Communication Skills  Outcome: Not Progressing  Goal: Optimal Functional Ability  Outcome: Not Progressing  Goal: Optimal Nutrition Intake  Outcome: Not Progressing  Goal: Effective Oxygenation and Ventilation  Outcome: Not Progressing  Goal: Improved Sensorimotor Function  Outcome: Not Progressing  Goal: Safe and Effective Swallow  Outcome: Not Progressing  Goal: Effective Urinary Elimination  Outcome: Not Progressing     Problem: Fall Injury Risk  Goal:  Absence of Fall and Fall-Related Injury  Outcome: Not Progressing

## 2025-07-29 NOTE — PLAN OF CARE
Updates sent via EPIC to facilities who have not accepted patient spoke to Radha they will accept patient when the financials come in the mail

## 2025-07-29 NOTE — PROGRESS NOTES
[facility] Hospital Medicine Progress Note        Chief Complaint: Inpatient Follow-up for CVA and cirrhosis     HPI:   64-year-old male with significant history of carpal tunnel syndrome, HTN, sciatica, portal vein thrombosis, cocaine use, chronic hep C, hepatic adenoma concerning for HCC, cirrhosis presented to the ED with complaints of progressively worsening abdominal pain for the past 2 months.  Patient was noted to have acute on chronic hyperbilirubinemia with leukocytosis.  He initially presented to outlying facility and was transferred to our hospital for higher level of care, patient was admitted to hospital medicine services, Gastroenterology, general surgery services consulted, MRCP was ordered to further evaluate worsening liver function test.  MRCP revealed findings concerning for HCC, distended gallbladder with cholelithiasis and mild intrahepatic biliary ductal dilation, evaluation was challenging secondary to underlying portal vein thrombus.  Suspicion for cholecystitis given clinical presentation.  No evidence of choledocholithiasis, no indication for ERCP per Gastroenterology, recommended anticoagulation for portal vein thrombosis.  Ultrasound with positive from Chang's sign and thickened gallbladder.  General surgery evaluated for possible cholecystitis, poor surgical candidate and therefore interventional radiology was consulted for percutaneous cholecystostomy tube placement and this was done on 7/7.  [place] continued, General surgery Plan to follow him in clinic as outpatient, GI planning for repeating triple phase as outpatient in 3 months and also arrange follow up with hepatology in [place].  Patient reported suicidal ideation on 07/09 and therefore psych consulted and he was placed under pec/one-to-one observation.  Previous CT with concerns for pontine/left internal capsule ischemic CVA, MRI was ordered to further evaluate.  Ultrasound abdomen ordered to  quantify ascites on 07/10.  Pec and one-to-one observation continued.  Large volume ascites noted and therefore IR consulted for paracentesis on 07/11, MRI came back positive for thalamic CVA, Neurology consulted, neurology recommended to continue anticoagulation, no statin given transaminitis, pec continued as of 7/11.  Patient with no more suicidal or homicidal ideation and therefore Pec was revoked on 07/12.  INR was more than 5 on 07/12, Xarelto held, no overt bleeding, trended down to 3.7 on 07/13.  INR trended down to less than 2 on 07/14 and therefore Xarelto resumed.  After resumption INR trended up again and therefore Xarelto held again, there was also concern for bleeding from cholecystostomy tube, General surgery re-evaluated, no interventions recommended, patient also developed acute kidney injury which is most likely hepatorenal syndrome, evaluated by Nephrology, deemed poor candidate for hemodialysis, nephrology signed off, overall prognosis extremely poor, palliative care team consulted, patient DNR, communicated with family and they decided to pursue hospice at nursing home, case management working on the same, patient also underwent paracentesis again given reaccumulating ascites.  Labs repeated on 07/24 with continued worsening acute kidney injury.  Also had hyperkalemia and metabolic acidosis which was treated with Lokelma and p.o. bicarb accordingly.  Hyperkalemia and metabolic acidosis improved, but renal function continues to get monitor, patient awaiting nursing home placement with hospice, plan for PleurX cath if white count normalizes.  White count still high on 07/27.  Hold off on PleurX cath, fortunately does not have much ascites     Patients functional status was poor and he had poor prognosis. Family decided on NH with hospice care.     Interval Hx:   Patient awake but lethargic. Mumbling words. Unable to follow commands. Has been afebrile.     No family at bedside.     Case was  discussed with patient's nurse and  on the floor.    Objective/physical exam:  General: In no acute distress, malnourished/cachectic  Chest: Clear to auscultation bilaterally  Heart: RRR, +S1, S2, no appreciable murmur  Abdomen: Soft, nontender, BS +  Neurologic: Unable to do a neuro exam. Patient in fetal position.     VITAL SIGNS: 24 HRS MIN & MAX LAST   Temp  Min: 97.5 °F (36.4 °C)  Max: 98.1 °F (36.7 °C) 97.5 °F (36.4 °C)   BP  Min: 97/55  Max: 122/65 122/65   Pulse  Min: 69  Max: 81  72   Resp  Min: 18  Max: 20 20   SpO2  Min: 98 %  Max: 100 % 100 %     I have reviewed the following labs:  Recent Labs   Lab 07/26/25 0257 07/27/25 0407 07/29/25  0355   WBC 11.17 14.35* 10.72   RBC 3.20* 3.51* 3.31*   HGB 9.2* 10.1* 9.5*   HCT 28.7* 31.9* 29.7*   MCV 89.7 90.9 89.7   MCH 28.8 28.8 28.7   MCHC 32.1* 31.7* 32.0*   RDW 21.0* 21.3* 20.7*    142 194   MPV 9.1 10.1 10.3     Recent Labs   Lab 07/26/25 0257 07/27/25 0407 07/29/25  0355    135* 133*   K 4.3 4.6 4.7    99 100   CO2 23 26 24   BUN 96.1* 93.8* 91.5*   CREATININE 3.41* 3.50* 3.43*   GLU 67* 69* 72*   CALCIUM 8.0* 7.9* 8.0*   ALBUMIN 1.4* 1.5* 1.5*   PROT 8.8* 9.2* 9.4*   ALKPHOS 235* 262* 268*   * 182* 166*   * 181* 175*   BILITOT 3.3* 3.4* 3.3*     Microbiology Results (last 7 days)       ** No results found for the last 168 hours. **             See below for Radiology    Assessment/Plan:  Acute kidney injury-worsening, suspect hepatorenal syndrome   Acute ischemic CVA -left thalamus  Positive bubble study  Suicidal ideation placed under pec 7/9, revoked 7/12  Symptomatic cholelithiasis with suspected cholecystitis status post CT-guided percutaneous cholecystostomy tube placement 7/7  Acute on chronic hyperbilirubinemia with transaminitis  Decompensated cirrhosis with large volume recurrent ascites status post paracentesis x2 occasions  Supratherapeutic INR-improving off Xarelto  Portal vein  thrombosis  Suspected HCC  Suspected bilateral pneumonia-HA P  Substance use disorder  Chronic hep C   History of essential HTN  Sciatica   History of CTS   Physical deconditioning  Severe PCM     Plan:  Patient over condition is declining   He is not eating much  Poor mentation and poor functional status     Will continue supportive care     Focus on keeping in comfortable, continue po prn pain meds     VTE prophylaxis: SCD    Patient condition:  Fair    Anticipated discharge and Disposition:   Dc to NH with hospice care when accepted       All diagnosis and differential diagnosis have been reviewed; assessment and plan has been documented; I have personally reviewed the labs and test results that are presently available; I have reviewed the patients medication list; I have reviewed the consulting providers response and recommendations. I have reviewed or attempted to review medical records based upon their availability    All of the patient's questions have been  addressed and answered. Patient's is agreeable to the above stated plan. I will continue to monitor closely and make adjustments to medical management as needed.    Portions of this note dictated using EMR integrated voice recognition software, and may be subject to voice recognition errors not corrected at proofreading. Please contact writer for clarification if needed.   _____________________________________________________________________    Malnutrition Status:  Nutrition consulted. Most recent weight and BMI monitored-     Measurements:  Wt Readings from Last 1 Encounters:   07/21/25 57.2 kg (126 lb)   Body mass index is 18.66 kg/m².    Patient has been screened and assessed by RD.    Malnutrition Type:  Context: chronic illness  Level: severe    Malnutrition Characteristic Summary:  Weight Loss (Malnutrition): greater than 7.5% in 3 months  Energy Intake (Malnutrition): other (see comments) (Unable to assess)  Subcutaneous Fat (Malnutrition): severe  depletion  Muscle Mass (Malnutrition): severe depletion  Fluid Accumulation (Malnutrition): other (see comments) (Not present)    Interventions/Recommendations (treatment strategy):  Oral diet/nutrient modifications;Oral nutritional supplement     Scheduled Med:   ARIPiprazole  10 mg Oral Daily    EScitalopram oxalate  20 mg Oral Daily    folic acid  1 mg Oral Daily    lactulose 10 gram/15 ml  15 g Oral Daily    nicotine  1 patch Transdermal Daily    pantoprazole  40 mg Oral BID    thiamine  100 mg Oral Daily      Continuous Infusions:     PRN Meds:    Current Facility-Administered Medications:     0.9%  NaCl infusion (for blood administration), , Intravenous, Q24H PRN    0.9%  NaCl infusion (for blood administration), , Intravenous, Q24H PRN    acetaminophen, 500 mg, Oral, Q6H PRN    dextrose 50%, 12.5 g, Intravenous, PRN    dextrose 50%, 25 g, Intravenous, PRN    glucagon (human recombinant), 1 mg, Intramuscular, PRN    glucose, 16 g, Oral, PRN    glucose, 24 g, Oral, PRN    HYDROcodone-acetaminophen, 1 tablet, Oral, Q6H PRN    HYDROcodone-acetaminophen, 1 tablet, Oral, Q6H PRN    labetalol, 10 mg, Intravenous, Q4H PRN    lactulose 10 gram/15 ml, 20 g, Oral, TID PRN    melatonin, 6 mg, Oral, Nightly PRN    ondansetron, 4 mg, Intravenous, Q6H PRN    sodium chloride 0.9%, 10 mL, Intravenous, PRN     Radiology:  I have personally reviewed the following imaging and agree with the radiologist.     US Retroperitoneal Complete  Narrative: EXAMINATION:  US RETROPERITONEAL COMPLETE    CLINICAL HISTORY:  nargis;    COMPARISON:  None    FINDINGS:  The left kidney measures 10.3 55.6 by 4.7 cm.  There is an echogenic focus measuring 9 mm.  There is no hydronephrosis.    The right kidney measures 9.2 x 4.6 by 4.8 cm.  There are no focal lesions noted there is no hydronephrosis.  Impression: Changes most consistent with a nonobstructing nephrolithiasis in the left kidney    Electronically signed by: Que Robison  MD  Date:    07/25/2025  Time:    08:12      Presley Esparza MD  Department of Hospital Medicine   [facility]   07/29/2025

## 2025-07-30 VITALS
DIASTOLIC BLOOD PRESSURE: 70 MMHG | WEIGHT: 126 LBS | SYSTOLIC BLOOD PRESSURE: 121 MMHG | BODY MASS INDEX: 18.66 KG/M2 | OXYGEN SATURATION: 100 % | TEMPERATURE: 98 F | HEART RATE: 80 BPM | RESPIRATION RATE: 18 BRPM | HEIGHT: 69 IN

## 2025-07-30 PROCEDURE — S4991 NICOTINE PATCH NONLEGEND: HCPCS | Performed by: HOSPITALIST

## 2025-07-30 PROCEDURE — 25000003 PHARM REV CODE 250: Performed by: STUDENT IN AN ORGANIZED HEALTH CARE EDUCATION/TRAINING PROGRAM

## 2025-07-30 PROCEDURE — 25000003 PHARM REV CODE 250: Performed by: HOSPITALIST

## 2025-07-30 PROCEDURE — 25000003 PHARM REV CODE 250: Performed by: INTERNAL MEDICINE

## 2025-07-30 RX ADMIN — THIAMINE HCL TAB 100 MG 100 MG: 100 TAB at 09:07

## 2025-07-30 RX ADMIN — LACTULOSE 15 G: 10 SOLUTION ORAL at 09:07

## 2025-07-30 RX ADMIN — FOLIC ACID 1 MG: 1 TABLET ORAL at 09:07

## 2025-07-30 RX ADMIN — ARIPIPRAZOLE 10 MG: 5 TABLET ORAL at 09:07

## 2025-07-30 RX ADMIN — NICOTINE 1 PATCH: 14 PATCH TRANSDERMAL at 09:07

## 2025-07-30 RX ADMIN — HYDROCODONE BITARTRATE AND ACETAMINOPHEN 1 TABLET: 5; 325 TABLET ORAL at 01:07

## 2025-07-30 RX ADMIN — PANTOPRAZOLE SODIUM 40 MG: 40 TABLET, DELAYED RELEASE ORAL at 09:07

## 2025-07-30 RX ADMIN — ESCITALOPRAM OXALATE 20 MG: 10 TABLET ORAL at 09:07

## 2025-07-30 NOTE — PLAN OF CARE
07/30/25 0803   Final Note   Assessment Type Final Discharge Note   Anticipated Discharge Disposition SNF  ([facility])   Post-Acute Status   Post-Acute Authorization Placement;Hospice   Post-Acute Placement Status Set-up Complete/Auth obtained   Hospice Status Set-up Complete/Auth obtained   Discharge Delays (!) Ambulance Transport/Facility Transport

## 2025-07-30 NOTE — PLAN OF CARE
Facility has concerns about patient's drain and the care it involves. I spoke to the hospice who states that they can manage it but the NH needs to insure that they can manage it when hospice is not there. Spoke to the nurse and arranged for her to call [facility] to discuss the care with the drain

## 2025-07-30 NOTE — PLAN OF CARE
Problem: Adult Inpatient Plan of Care  Goal: Plan of Care Review  Outcome: Met  Goal: Patient-Specific Goal (Individualized)  Outcome: Met  Goal: Absence of Hospital-Acquired Illness or Injury  Outcome: Met  Goal: Optimal Comfort and Wellbeing  Outcome: Met  Goal: Readiness for Transition of Care  Outcome: Met     Problem: Skin Injury Risk Increased  Goal: Skin Health and Integrity  Outcome: Met     Problem: Coping Ineffective  Goal: Effective Coping  Outcome: Met     Problem: Wound  Goal: Optimal Coping  Outcome: Met  Goal: Optimal Functional Ability  Outcome: Met  Goal: Absence of Infection Signs and Symptoms  Outcome: Met  Goal: Improved Oral Intake  Outcome: Met  Goal: Optimal Pain Control and Function  Outcome: Met  Goal: Skin Health and Integrity  Outcome: Met  Goal: Optimal Wound Healing  Outcome: Met     Problem: Suicide Risk  Goal: Absence of Self-Harm  Outcome: Met     Problem: Infection  Goal: Absence of Infection Signs and Symptoms  Outcome: Met     Problem: Stroke, Ischemic (Includes Transient Ischemic Attack)  Goal: Optimal Coping  Outcome: Met  Goal: Effective Bowel Elimination  Outcome: Met  Goal: Optimal Cerebral Tissue Perfusion  Outcome: Met  Goal: Optimal Cognitive Function  Outcome: Met  Goal: Improved Communication Skills  Outcome: Met  Goal: Optimal Functional Ability  Outcome: Met  Goal: Optimal Nutrition Intake  Outcome: Met  Goal: Effective Oxygenation and Ventilation  Outcome: Met  Goal: Improved Sensorimotor Function  Outcome: Met  Goal: Safe and Effective Swallow  Outcome: Met  Goal: Effective Urinary Elimination  Outcome: Met     Problem: Fall Injury Risk  Goal: Absence of Fall and Fall-Related Injury  Outcome: Met

## 2025-07-30 NOTE — PLAN OF CARE
"Radha from [facility] called.  I sent pictures of the drain and CXR results via epic.  Informed her that the drain has been emptied daily and dressing around site changed prn soiling.  She stated "We are ready to move. We will handle it once he gets here." Instructed her to call his  Jeanette.   "

## 2025-07-30 NOTE — DISCHARGE SUMMARY
[facility] Medicine Discharge Summary    Admit Date: 7/3/2025  Discharge Date and Time: 7/30/202511:28 AM  Admitting Physician:  Team  Discharging Physician: Presley Esparza MD.  Primary Care Physician: Stalin Landin, DO      Discharge Diagnoses:  Acute kidney injury-worsening, suspect hepatorenal syndrome   Acute ischemic CVA -left thalamus  Positive bubble study  Suicidal ideation placed under pec 7/9, revoked 7/12  Symptomatic cholelithiasis with suspected cholecystitis status post CT-guided percutaneous cholecystostomy tube placement 7/7  Acute on chronic hyperbilirubinemia with transaminitis  Decompensated cirrhosis with large volume recurrent ascites status post paracentesis x2 occasions  Supratherapeutic INR-improving off Xarelto  Portal vein thrombosis  Suspected HCC  Suspected bilateral pneumonia-HA P  Substance use disorder  Chronic hep C   History of essential HTN  Sciatica   History of CTS   Physical deconditioning  Severe PCM     Hospital Course:   64-year-old male with significant history of carpal tunnel syndrome, HTN, sciatica, portal vein thrombosis, cocaine use, chronic hep C, hepatic adenoma concerning for HCC, cirrhosis presented to the ED with complaints of progressively worsening abdominal pain for the past 2 months.  Patient was noted to have acute on chronic hyperbilirubinemia with leukocytosis.  He initially presented to outlying facility and was transferred to our hospital for higher level of care, patient was admitted to hospital medicine services, Gastroenterology, general surgery services consulted, MRCP was ordered to further evaluate worsening liver function test.  MRCP revealed findings concerning for HCC, distended gallbladder with cholelithiasis and mild intrahepatic biliary ductal dilation, evaluation was challenging secondary to underlying portal vein thrombus.  Suspicion for cholecystitis given clinical presentation.  No  evidence of choledocholithiasis, no indication for ERCP per Gastroenterology, recommended anticoagulation for portal vein thrombosis.  Ultrasound with positive from Chang's sign and thickened gallbladder.  General surgery evaluated for possible cholecystitis, poor surgical candidate and therefore interventional radiology was consulted for percutaneous cholecystostomy tube placement and this was done on 7/7.  Zosyn continued, General surgery Plan to follow him in clinic as outpatient, GI planning for repeating triple phase as outpatient in 3 months and also arrange follow up with hepatology in [place].  Patient reported suicidal ideation on 07/09 and therefore psych consulted and he was placed under pec/one-to-one observation.  Previous CT with concerns for pontine/left internal capsule ischemic CVA, MRI was ordered to further evaluate.  Ultrasound abdomen ordered to quantify ascites on 07/10.  Pec and one-to-one observation continued.  Large volume ascites noted and therefore IR consulted for paracentesis on 07/11, MRI came back positive for thalamic CVA, Neurology consulted, neurology recommended to continue anticoagulation, no statin given transaminitis, pec continued as of 7/11.  Patient with no more suicidal or homicidal ideation and therefore Pec was revoked on 07/12.  INR was more than 5 on 07/12, Xarelto held, no overt bleeding, trended down to 3.7 on 07/13.  INR trended down to less than 2 on 07/14 and therefore Xarelto resumed.  After resumption INR trended up again and therefore Xarelto held again, there was also concern for bleeding from cholecystostomy tube, General surgery re-evaluated, no interventions recommended, patient also developed acute kidney injury which is most likely hepatorenal syndrome, evaluated by Nephrology, deemed poor candidate for hemodialysis, nephrology signed off.    overall prognosis was extremely poor, palliative care team consulted, patient DNR, communicated with family  and they decided to pursue hospice at nursing home, case management working on the same, patient also underwent paracentesis again given reaccumulating ascites.  Labs repeated on 07/24 with continued worsening acute kidney injury.  Also had hyperkalemia and metabolic acidosis which was treated with Lokelma and p.o. bicarb accordingly.  Hyperkalemia and metabolic acidosis improved, but renal function continues to get monitor, patient awaiting nursing home placement with hospice, plan for PleurX cath if white count normalizes.  White count still high on 07/27.  Hold off on PleurX cath, fortunately does not have much ascites      Patients functional status was poor and he had poor prognosis. Family decided on NH with hospice care.     Patient was accepted to a NH and discharged with hospice care.     Pt was seen and examined on the day of discharge  Vitals:  VITAL SIGNS: 24 HRS MIN & MAX LAST   Temp  Min: 97.5 °F (36.4 °C)  Max: 98.1 °F (36.7 °C) 97.7 °F (36.5 °C)   BP  Min: 118/58  Max: 133/76 (!) 118/58   Pulse  Min: 58  Max: 77  70   Resp  Min: 18  Max: 20 20   SpO2  Min: 98 %  Max: 100 % 98 %       Physical Exam:  Heart RRR  Lungs clear   Abdomen soft and non tender   Neuro: No FND    Frail     Procedures Performed: No admission procedures for hospital encounter.     Significant Diagnostic Studies: See Full reports for all details    Recent Labs   Lab 07/26/25 0257 07/27/25 0407 07/29/25  0355   WBC 11.17 14.35* 10.72   RBC 3.20* 3.51* 3.31*   HGB 9.2* 10.1* 9.5*   HCT 28.7* 31.9* 29.7*   MCV 89.7 90.9 89.7   MCH 28.8 28.8 28.7   MCHC 32.1* 31.7* 32.0*   RDW 21.0* 21.3* 20.7*    142 194   MPV 9.1 10.1 10.3       Recent Labs   Lab 07/26/25 0257 07/27/25 0407 07/29/25  0355    135* 133*   K 4.3 4.6 4.7    99 100   CO2 23 26 24   BUN 96.1* 93.8* 91.5*   CREATININE 3.41* 3.50* 3.43*   GLU 67* 69* 72*   CALCIUM 8.0* 7.9* 8.0*   ALBUMIN 1.4* 1.5* 1.5*   PROT 8.8* 9.2* 9.4*   ALKPHOS 235* 262* 268*    * 182* 166*   * 181* 175*   BILITOT 3.3* 3.4* 3.3*        Microbiology Results (last 7 days)       ** No results found for the last 168 hours. **             X-Ray Chest 1 View  Narrative: EXAMINATION:  XR CHEST 1 VIEW    CPT 73698    CLINICAL HISTORY:  discharge;    COMPARISON:  April 23, 2025    FINDINGS:  Examination reveals mediastinal silhouette to be within normal limits cardiac silhouette is not enlarged right lung field is clear and free of gross infiltrates atelectasis or effusions.    Ill-defined opacity identified in the left upper lobe adjacent to the aortic no up although these might reflect the end of the 1st rib and pleuroparenchymal opacities other entities cannot be completely excluded apical lordotic projection and or other imaging modalities might prove helpful for further assessment.    Otherwise no focal consolidative changes  Impression: Ill-defined opacity identified in the left upper lobe might be related to the end of the 1st rib and other pleuroparenchymal opacities, however, an apical lordotic projection might prove helpful for further assessment.    Otherwise no active pulmonary disease.    This report was flagged in Epic as abnormal.    Electronically signed by: Ancelmo Alex  Date:    07/30/2025  Time:    10:21         Medication List        CHANGE how you take these medications      lactulose 10 gram/15 ml 10 gram/15 mL (15 mL) solution  Commonly known as: CHRONULAC  Take 23 mLs (15.3333 g total) by mouth once daily.  What changed:   how much to take  when to take this  reasons to take this            CONTINUE taking these medications      ARIPiprazole 5 MG Tab  Commonly known as: ABILIFY  Take 1 tablet (5 mg total) by mouth once daily.     EScitalopram oxalate 5 MG Tab  Commonly known as: LEXAPRO  Take 1 tablet (5 mg total) by mouth once daily.     folic acid 1 MG tablet  Commonly known as: FOLVITE  Take 1 tablet (1 mg total) by mouth once daily.     furosemide 20  MG tablet  Commonly known as: LASIX  Take 1 tablet (20 mg total) by mouth once daily.     nicotine 14 mg/24 hr  Commonly known as: NICODERM CQ  Place 1 patch onto the skin once daily.     pantoprazole 40 MG tablet  Commonly known as: PROTONIX  Take 1 tablet (40 mg total) by mouth once daily.     thiamine 100 MG tablet  Take 1 tablet (100 mg total) by mouth once daily.            STOP taking these medications      doxycycline 100 MG tablet  Commonly known as: VIBRA-TABS     multivitamin Tab     rivaroxaban 20 mg Tab  Commonly known as: XARELTO     spironolactone 25 MG tablet  Commonly known as: ALDACTONE               Where to Get Your Medications        Information about where to get these medications is not yet available    Ask your nurse or doctor about these medications  lactulose 10 gram/15 ml 10 gram/15 mL (15 mL) solution          Explained in detail to the patient about the discharge plan, medications, and follow-up visits. Pt understands and agrees with the treatment plan  Discharge Disposition: NH with hospice care   Discharged Condition: stable  Diet-   Dietary Orders (From admission, onward)       Start     Ordered    07/27/25 2136  Diet Heart Healthy Soft & Bite Sized (IDDSI Level 6); Standard Tray  Diet effective now        Question Answer Comment   Additional Diet Options: Soft & Bite Sized (IDDSI Level 6)    Tray type: Standard Tray        07/27/25 2137    07/17/25 1244  Dietary nutrition supplements BID; Boost Plus Nutritional Drink - Very Vanilla  Continuous        Question Answer Comment   Frequency: BID    Select PO Supplement: Boost Plus Nutritional Drink - Very Vanilla        07/17/25 1243                   Medications Per DC med rec  Activities as tolerated   Follow-up Information       Clinic, Acute Care Surgery. Go to.    Why: Appointment 8/19/2025 at 10am, Cholangiogram to be done prior to this appointment. Central Scheduling will call to schedule.  Contact information:  [address]  [address]  [place] [address]  [phone]               Angelita Johnson FNP. Schedule an appointment as soon as possible for a visit in 3 day(s).    Specialty: Neurology  Contact information:  [address] [place] [address] [address] [place] [address]  [phone]                           For further questions contact hospitalist office    Discharge time 33 minutes    For worsening symptoms, chest pain, shortness of breath, increased abdominal pain, high grade fever, stroke or stroke like symptoms, immediately go to the nearest Emergency Room or call 911 as soon as possible.      Presley Ruffin M.D on 7/30/2025. at 11:28 AM.

## 2025-07-30 NOTE — PROGRESS NOTES
Inpatient Nutrition Assessment    Admit Date: 7/3/2025   Total duration of encounter: 26 days   Patient Age: 64 y.o.    Nutrition Recommendation/Prescription     Liberalize diet to Regular diet  Continue Boost Plus TID (360 kcal and 14 gm protein per serving)  Continue folic acid and thiamine, consider MVI  Monitor PO intake, labs and weight  Honor pt/family wishes    Communication of Recommendations: reviewed with family and EMR    Nutrition Assessment     Malnutrition Assessment/Nutrition-Focused Physical Exam    Malnutrition Context: chronic illness (07/22/25 1319)  Malnutrition Level: severe (07/22/25 1319)  Energy Intake (Malnutrition): other (see comments) (Unable to assess) (07/22/25 1319)  Weight Loss (Malnutrition): greater than 7.5% in 3 months (07/22/25 1319)  Subcutaneous Fat (Malnutrition): severe depletion (07/22/25 1319)  Orbital Region: moderate depletion  Upper Arm Region : severe depletion     Muscle Mass (Malnutrition): severe depletion (07/22/25 1319)  [place] Region (Muscle Loss): severe depletion  Clavicle Bone Region (Muscle Loss): severe depletion                    Fluid Accumulation (Malnutrition): other (see comments) (Not present) (07/22/25 1319)        A minimum of two characteristics is recommended for diagnosis of either severe or non-severe malnutrition.    Chart Review    Reason Seen: follow-up    Malnutrition Screening Tool Results   Have you recently lost weight without trying?: Yes: 24-33 lbs  Have you been eating poorly because of a decreased appetite?: Yes   MST Score: 4   Diagnosis:  Acute ischemic CVA -left thalamus  Positive bubble study  Suicidal ideation placed under pec 7/9, revoked 7/12  Symptomatic cholelithiasis with suspected cholecystitis status post CT-guided percutaneous cholecystostomy tube placement 7/7  Sepsis secondary to above-improving   Acute on chronic hyperbilirubinemia with transaminitis-slowly improving  Decompensated cirrhosis with large volume recurrent  ascites status post paracentesis 7/11, removed 2.2 L, reaccumulating now  Supratherapeutic INR  Hypervolemic hyponatremia   Mild metabolic acidosis  Portal vein thrombosis  Suspected HCC  Suspected bilateral pneumonia-HA P  Substance use disorder  Chronic hep C   History of essential HTN  Sciatica   History of CTS   Physical deconditioning  Prophylaxis    Relevant Medical History: carpal tunnel syndrome, HTN, sciatica, portal vein thrombosis, cocaine use, hepatitis-C, hepatic adenoma     Scheduled Medications:  ARIPiprazole, 10 mg, Daily  EScitalopram oxalate, 20 mg, Daily  folic acid, 1 mg, Daily  lactulose 10 gram/15 ml, 15 g, Daily  nicotine, 1 patch, Daily  pantoprazole, 40 mg, BID  thiamine, 100 mg, Daily    Continuous Infusions:   PRN Medications:  0.9%  NaCl infusion (for blood administration), , Q24H PRN  0.9%  NaCl infusion (for blood administration), , Q24H PRN  acetaminophen, 500 mg, Q6H PRN  dextrose 50%, 12.5 g, PRN  dextrose 50%, 25 g, PRN  glucagon (human recombinant), 1 mg, PRN  glucose, 16 g, PRN  glucose, 24 g, PRN  HYDROcodone-acetaminophen, 1 tablet, Q6H PRN  HYDROcodone-acetaminophen, 1 tablet, Q6H PRN  labetalol, 10 mg, Q4H PRN  lactulose 10 gram/15 ml, 20 g, TID PRN  melatonin, 6 mg, Nightly PRN  ondansetron, 4 mg, Q6H PRN  sodium chloride 0.9%, 10 mL, PRN    Calorie Containing IV Medications: no significant kcals from medications at this time    Recent Labs   Lab 07/23/25  1536 07/24/25  0940 07/25/25  0940 07/26/25  0257 07/27/25  0407 07/29/25  0355   NA  --  135* 134* 137 135* 133*   K  --  5.3* 4.7 4.3 4.6 4.7   CALCIUM  --  8.0* 8.1* 8.0* 7.9* 8.0*   CL  --  105 103 104 99 100   CO2  --  20* 23 23 26 24   BUN  --  97.2* 93.6* 96.1* 93.8* 91.5*   CREATININE  --  3.54* 3.61* 3.41* 3.50* 3.43*   EGFRNORACEVR  --  19 18 19 19 19   GLU  --  82 125* 67* 69* 72*   BILITOT  --  4.0* 3.3* 3.3* 3.4* 3.3*   ALKPHOS  --  224* 234* 235* 262* 268*   ALT  --  181* 167* 165* 182* 166*   AST  --  138*  143* 155* 181* 175*   ALBUMIN  --  1.4* 1.4* 1.4* 1.5* 1.5*   WBC 13.03* 12.62* 11.88* 11.17 14.35* 10.72   HGB 9.3* 9.2* 9.1* 9.2* 10.1* 9.5*   HCT 28.3* 29.0* 28.5* 28.7* 31.9* 29.7*     Nutrition Orders:  Diet Heart Healthy Soft & Bite Sized (IDDSI Level 6); Standard Tray  Dietary nutrition supplements BID; Boost Plus Nutritional Drink - Very Vanilla    Appetite/Oral Intake: poor/0-25% of meals  Factors Affecting Nutritional Intake: impaired cognitive status/motor control, decreased appetite, and inability to feed self  Social Needs Impacting Access to Food: [place] [facility] [place]  Food/Mosque/Cultural Preferences: unable to obtain  Food Allergies: no known food allergies  Last Bowel Movement: 07/29/25  Wound(s):     Wound 07/27/25 1031 Pressure Injury Coccyx-Tissue loss description: Partial thickness none documented    Comments    7/5/25: Noted MST indicates wt loss and decreased appetite. Unable to verify with pt, pt confused and hard to understand at time of RD visit. Noted EMR wts confirm wt loss over past few months. Awaiting MRCP results per MD notes. Due to malnourished state and NPO, may need to consider starting PN. Currently receiving minimal D5.     7/7/2025: Pt NPO for IR today per nurse. Pt reportedly thirsty. The nurse denies N/V. Last BM noted. Will monitor.     7/10/2025: The sitter reports a poor appetite/PO intake and denies N/V/D/C. Will add ONS to help meet needs. Will monitor.     7/14/2025: Pt's poor appetite/PO intake continues. Pt may benefit from appetite stimulant if medically feasible.  No reported N/V. Last BM noted. Will monitor.    7/17/25: Pt continues with decreased appetite. Will trial ONS BID to aid with nutrition intake. Pt may benefit from liberalized diet and appetite stimulant. No reports of n/v/d/c; LBM 7/17. Will monitor.    7/22/25: Pt continues with decline in status. Per Nephrology, patient poor dialysis candidate. On lactulose for hepatic  "encephalopathy. Currently pt getting supervision with meals and an assist feed.  Family at bedside stated he is eating only bites of food and sips of water. Requested Sprite with meals. Also providing oral supplement. Muscle and fat wasting noted on assessment. Family and patient wanting nsg home with hospice. Family noted no other needs at this time. Will liberalize diet.     2025: The nurse reports continued poor appetite/PO intake and denies N/V. Last BM noted. Will monitor.    2025: Poor appetite/PO intake continues. No reported N/V. Last BM noted. Lactulose ordered. Will monitor.    Anthropometrics    Height: 5' 8.9" (175 cm), Height Method: Stated  Last Weight: 57.2 kg (126 lb) (25), Weight Method: Bed Scale  BMI (Calculated): 18.7  BMI Classification: normal (BMI 18.5-24.9)        Ideal Body Weight (IBW), Male: 159.4 lb     % Ideal Body Weight, Male (lb): 74.82 %                 Usual Body Weight (UBW), k kg  % Usual Body Weight: 91.89  % Weight Change From Usual Weight: -8.45 %  Usual Weight Provided By: EMR weight history    Wt Readings from Last 5 Encounters:   25 57.2 kg (126 lb)   25 54.4 kg (120 lb)   25 59 kg (130 lb)   25 59 kg (130 lb)   23 63 kg (138 lb 14.2 oz)     Weight Change(s) Since Admission:   Wt Readings from Last 1 Encounters:   25 57.2 kg (126 lb)   25 1153 54.1 kg (119 lb 4.3 oz)   25 170 54.1 kg (119 lb 4.3 oz)   Admit Weight: 54.1 kg (119 lb 4.3 oz) (25 170), Weight Method: Standard Scale    2025: 54.1 kg  7/10/2025: no new wts  2025: no new wts  2025: no new weights   : 57.2kg noted per EMR  2025: no new wts  2025: no new wts    Estimated Needs    Weight Used For Calorie Calculations: 54.1 kg (119 lb 4.3 oz)  Energy Calorie Requirements (kcal): 8083-2031 (30-35 kcal/kg)  Energy Need Method: Kcal/kg  Weight Used For Protein Calculations: 54.1 kg (119 lb 4.3 oz)  Protein " Requirements: 65 (1.2 g/kg)  Fluid Requirements (mL): 1623 (1 mL/kcal)  CHO Requirement: 182-223 g/day (~45-55% est min kcal needs)     Enteral Nutrition     Patient not receiving enteral nutrition at this time.    Parenteral Nutrition     Patient not receiving parenteral nutrition support at this time.    Evaluation of Received Nutrient Intake    Calories: not meeting estimated needs  Protein: not meeting estimated needs    Patient Education     Not applicable.    Nutrition Diagnosis     PES: Inadequate oral intake related to acute illness as evidenced by poor PO intake. (active)     PES: Severe chronic disease or condition related malnutrition Related to chronic condition As Evidenced by:  - weight loss: > 7.5% in 3 months - muscle mass depletion: 2 areas of mild muscle loss (Clavicle, Temporalis) - loss of subcutaneous fat: 1 area of mild fat loss (Buccal) active    Nutrition Interventions     Intervention(s): General/healthful diet, Medical food supplement therapy, and Vitamin and mineral supplement therapy  Intervention(s): Vitamin and mineral supplement therapy, General healthful diet, Medical food supplement therapy    Goal: Meet greater than 80% of nutritional needs by follow-up. (goal not met)  Goal: Consume % of meals/snacks by follow-up. (goal not met)    Nutrition Goals & Monitoring     Dietitian will monitor: food and beverage intake, weight, electrolyte/renal panel, and gastrointestinal profile  Discharge planning: continue regular vs heart healthy diet with boost or similar oral supplements  Nutrition Risk/Follow-Up: patient at increased nutrition risk; dietitian will follow-up twice weekly   Please consult if re-assessment needed sooner.

## 2025-07-30 NOTE — PLAN OF CARE
Chest Xray and hospice order sent to [facility] as requested anticipated discharge today

## 2025-07-31 ENCOUNTER — TELEPHONE (OUTPATIENT)
Facility: CLINIC | Age: 65
End: 2025-07-31
Payer: MEDICAID

## 2025-07-31 NOTE — TELEPHONE ENCOUNTER
Contacted [facility] and spoke with Miriam who stated the pt is not going to be seeing any physicians outside of hospice so I canceled pt post op appointment

## (undated) DEVICE — SYRINGE 0.9% NACL 10MIL PREFIL

## (undated) DEVICE — LINER MEDI-VAC PPV FLTR 1500CC

## (undated) DEVICE — MANIFOLD 4 PORT

## (undated) DEVICE — CABLE EKG DL RBBN 3 LEAD DISP

## (undated) DEVICE — SOL IRRI STRL WATER 1000ML

## (undated) DEVICE — TIP SUCTION YANKAUER

## (undated) DEVICE — BLOCK BLOX BITE DENT RIM 54FR

## (undated) DEVICE — KIT SURGICAL COLON .25 1.1OZ